# Patient Record
Sex: FEMALE | Race: WHITE | NOT HISPANIC OR LATINO | Employment: OTHER | URBAN - METROPOLITAN AREA
[De-identification: names, ages, dates, MRNs, and addresses within clinical notes are randomized per-mention and may not be internally consistent; named-entity substitution may affect disease eponyms.]

---

## 2017-04-15 ENCOUNTER — APPOINTMENT (EMERGENCY)
Dept: RADIOLOGY | Facility: HOSPITAL | Age: 67
End: 2017-04-15
Payer: MEDICARE

## 2017-04-15 ENCOUNTER — HOSPITAL ENCOUNTER (EMERGENCY)
Facility: HOSPITAL | Age: 67
Discharge: HOME | End: 2017-04-15
Admitting: EMERGENCY MEDICINE
Payer: MEDICARE

## 2017-04-15 VITALS
WEIGHT: 118 LBS | RESPIRATION RATE: 16 BRPM | TEMPERATURE: 99 F | DIASTOLIC BLOOD PRESSURE: 67 MMHG | OXYGEN SATURATION: 97 % | HEART RATE: 79 BPM | SYSTOLIC BLOOD PRESSURE: 149 MMHG

## 2017-04-15 DIAGNOSIS — M79.645 PAIN IN FINGER OF LEFT HAND: Primary | ICD-10-CM

## 2017-04-15 PROCEDURE — 99283 EMERGENCY DEPT VISIT LOW MDM: CPT

## 2017-04-15 PROCEDURE — 73130 X-RAY EXAM OF HAND: CPT

## 2017-04-15 RX ORDER — OXYCODONE HYDROCHLORIDE AND ACETAMINOPHEN 5; 325 MG/1; MG/1
1 TABLET ORAL EVERY 6 HOURS PRN
Qty: 12 TABLET | Refills: 0 | Status: SHIPPED | OUTPATIENT
Start: 2017-04-15 | End: 2017-04-18

## 2017-10-17 ENCOUNTER — TRANSCRIBE ORDERS (OUTPATIENT)
Dept: ADMINISTRATIVE | Facility: HOSPITAL | Age: 67
End: 2017-10-17

## 2017-10-17 DIAGNOSIS — Z12.39 SCREENING BREAST EXAMINATION: Primary | ICD-10-CM

## 2017-10-17 DIAGNOSIS — Z12.31 ENCOUNTER FOR MAMMOGRAM TO ESTABLISH BASELINE MAMMOGRAM: ICD-10-CM

## 2017-10-27 ENCOUNTER — HOSPITAL ENCOUNTER (OUTPATIENT)
Dept: RADIOLOGY | Facility: HOSPITAL | Age: 67
Discharge: HOME | End: 2017-10-27
Payer: MEDICARE

## 2017-10-27 DIAGNOSIS — Z12.31 ENCOUNTER FOR MAMMOGRAM TO ESTABLISH BASELINE MAMMOGRAM: ICD-10-CM

## 2017-10-27 PROCEDURE — 77063 BREAST TOMOSYNTHESIS BI: CPT

## 2017-10-27 PROCEDURE — G0202 SCR MAMMO BI INCL CAD: HCPCS

## 2018-11-02 ENCOUNTER — DOCTOR'S OFFICE (OUTPATIENT)
Dept: URBAN - METROPOLITAN AREA CLINIC 137 | Facility: CLINIC | Age: 68
Setting detail: OPHTHALMOLOGY
End: 2018-11-02
Payer: COMMERCIAL

## 2018-11-02 DIAGNOSIS — H43.811: ICD-10-CM

## 2018-11-02 DIAGNOSIS — H25.13: ICD-10-CM

## 2018-11-02 PROCEDURE — 00001 INTEREST CHARGE: CPT | Performed by: OPTOMETRIST

## 2018-11-02 PROCEDURE — 92004 COMPRE OPH EXAM NEW PT 1/>: CPT | Performed by: OPTOMETRIST

## 2018-11-02 ASSESSMENT — REFRACTION_CURRENTRX
OD_OVR_VA: 20/
OS_OVR_VA: 20/
OD_OVR_VA: 20/
OD_OVR_VA: 20/
OS_OVR_VA: 20/
OS_OVR_VA: 20/

## 2018-11-02 ASSESSMENT — REFRACTION_MANIFEST
OS_VA2: 20/
OU_VA: 20/
OS_VA3: 20/
OD_VA3: 20/
OD_VA2: 20/
OD_VA1: 20/
OS_VA1: 20/
OD_VA2: 20/
OS_VA2: 20/
OU_VA: 20/
OS_VA3: 20/
OS_VA1: 20/
OD_VA3: 20/
OD_VA1: 20/

## 2018-11-02 ASSESSMENT — CONFRONTATIONAL VISUAL FIELD TEST (CVF)
OS_FINDINGS: FULL
OD_FINDINGS: FULL

## 2018-11-02 ASSESSMENT — VISUAL ACUITY
OD_BCVA: 20/60-1
OS_BCVA: 20/60

## 2018-11-12 ENCOUNTER — TRANSCRIBE ORDERS (OUTPATIENT)
Dept: ADMINISTRATIVE | Facility: HOSPITAL | Age: 68
End: 2018-11-12

## 2018-11-12 DIAGNOSIS — Z12.39 SCREENING BREAST EXAMINATION: Primary | ICD-10-CM

## 2018-11-27 ENCOUNTER — HOSPITAL ENCOUNTER (OUTPATIENT)
Dept: RADIOLOGY | Facility: HOSPITAL | Age: 68
Discharge: HOME | End: 2018-11-27
Payer: MEDICARE

## 2018-11-27 VITALS — BODY MASS INDEX: 21.97 KG/M2 | HEIGHT: 63 IN | WEIGHT: 124 LBS

## 2018-11-27 DIAGNOSIS — Z12.39 SCREENING BREAST EXAMINATION: ICD-10-CM

## 2018-11-27 PROCEDURE — 77067 SCR MAMMO BI INCL CAD: CPT

## 2018-11-27 PROCEDURE — 77063 BREAST TOMOSYNTHESIS BI: CPT

## 2018-12-03 ENCOUNTER — DOCTOR'S OFFICE (OUTPATIENT)
Dept: URBAN - METROPOLITAN AREA CLINIC 137 | Facility: CLINIC | Age: 68
Setting detail: OPHTHALMOLOGY
End: 2018-12-03
Payer: COMMERCIAL

## 2018-12-03 DIAGNOSIS — H25.043: ICD-10-CM

## 2018-12-03 DIAGNOSIS — H43.811: ICD-10-CM

## 2018-12-03 DIAGNOSIS — H25.13: ICD-10-CM

## 2018-12-03 PROCEDURE — 00001 INTEREST CHARGE: CPT | Performed by: OPHTHALMOLOGY

## 2018-12-03 PROCEDURE — 92014 COMPRE OPH EXAM EST PT 1/>: CPT | Performed by: OPHTHALMOLOGY

## 2018-12-03 ASSESSMENT — REFRACTION_MANIFEST
OD_VA1: 20/
OD_VA3: 20/
OU_VA: 20/
OS_VA1: 20/
OS_VA3: 20/
OD_VA3: 20/
OS_VA1: 20/
OS_VA2: 20/
OS_VA2: 20/
OD_VA1: 20/
OD_VA2: 20/
OD_VA2: 20/
OU_VA: 20/
OS_VA3: 20/

## 2018-12-03 ASSESSMENT — REFRACTION_CURRENTRX
OD_OVR_VA: 20/
OS_AXIS: 108
OS_CYLINDER: -1.75
OS_SPHERE: +3.00
OS_OVR_VA: 20/
OD_OVR_VA: 20/
OD_SPHERE: +5.00
OD_AXIS: 069
OS_OVR_VA: 20/
OS_ADD: +2.50
OS_OVR_VA: 20/
OS_VPRISM_DIRECTION: PROGS
OD_OVR_VA: 20/
OD_ADD: +2.50
OD_CYLINDER: -3.75
OD_VPRISM_DIRECTION: PROGS

## 2018-12-03 ASSESSMENT — CONFRONTATIONAL VISUAL FIELD TEST (CVF)
OD_FINDINGS: FULL
OS_FINDINGS: FULL

## 2018-12-03 ASSESSMENT — REFRACTION_AUTOREFRACTION
OD_CYLINDER: -2.50
OD_SPHERE: +1.75
OS_CYLINDER: -0.75
OS_AXIS: 093
OS_SPHERE: +1.75
OD_AXIS: 075

## 2018-12-03 ASSESSMENT — AXIALLENGTH_DERIVED
OD_AL: 22.529
OS_AL: 22.1008

## 2018-12-03 ASSESSMENT — VISUAL ACUITY
OS_BCVA: 20/50-1
OD_BCVA: 20/40-1

## 2018-12-03 ASSESSMENT — KERATOMETRY
OS_AXISANGLE_DEGREES: 012
OS_K1POWER_DIOPTERS: 46.25
OD_K2POWER_DIOPTERS: 46.25
OD_K1POWER_DIOPTERS: 45.75
OS_K2POWER_DIOPTERS: 46.50
OD_AXISANGLE_DEGREES: 074

## 2018-12-03 ASSESSMENT — SPHEQUIV_DERIVED
OS_SPHEQUIV: 1.375
OD_SPHEQUIV: 0.5

## 2018-12-14 ENCOUNTER — DOCTOR'S OFFICE (OUTPATIENT)
Dept: URBAN - METROPOLITAN AREA CLINIC 137 | Facility: CLINIC | Age: 68
Setting detail: OPHTHALMOLOGY
End: 2018-12-14
Payer: COMMERCIAL

## 2018-12-14 DIAGNOSIS — H43.811: ICD-10-CM

## 2018-12-14 DIAGNOSIS — H25.13: ICD-10-CM

## 2018-12-14 DIAGNOSIS — H25.043: ICD-10-CM

## 2018-12-14 PROCEDURE — 92134 CPTRZ OPH DX IMG PST SGM RTA: CPT | Performed by: OPHTHALMOLOGY

## 2018-12-14 PROCEDURE — 00001 INTEREST CHARGE: CPT | Performed by: OPHTHALMOLOGY

## 2018-12-14 PROCEDURE — 92014 COMPRE OPH EXAM EST PT 1/>: CPT | Performed by: OPHTHALMOLOGY

## 2018-12-14 ASSESSMENT — CONFRONTATIONAL VISUAL FIELD TEST (CVF)
OD_FINDINGS: FULL
OS_FINDINGS: FULL

## 2018-12-17 ASSESSMENT — REFRACTION_MANIFEST
OS_VA3: 20/
OS_VA2: 20/
OS_VA3: 20/
OS_VA1: 20/
OD_VA2: 20/
OS_VA2: 20/
OD_VA2: 20/
OD_VA1: 20/
OD_VA1: 20/
OU_VA: 20/
OS_VA1: 20/
OD_VA3: 20/
OD_VA3: 20/
OU_VA: 20/

## 2018-12-17 ASSESSMENT — REFRACTION_CURRENTRX
OS_SPHERE: +3.00
OS_AXIS: 108
OS_CYLINDER: -1.75
OS_ADD: +2.50
OS_VPRISM_DIRECTION: PROGS
OD_OVR_VA: 20/
OS_OVR_VA: 20/
OD_ADD: +2.50
OS_OVR_VA: 20/
OD_VPRISM_DIRECTION: PROGS
OD_CYLINDER: -3.75
OD_AXIS: 069
OD_OVR_VA: 20/
OS_OVR_VA: 20/
OD_SPHERE: +5.00
OD_OVR_VA: 20/

## 2018-12-17 ASSESSMENT — REFRACTION_AUTOREFRACTION
OD_SPHERE: +1.75
OS_SPHERE: +1.75
OD_CYLINDER: -2.50
OS_AXIS: 093
OS_CYLINDER: -0.75
OD_AXIS: 075

## 2018-12-17 ASSESSMENT — VISUAL ACUITY
OD_BCVA: 20/50-2
OS_BCVA: 20/50

## 2018-12-17 ASSESSMENT — SPHEQUIV_DERIVED
OS_SPHEQUIV: 1.375
OD_SPHEQUIV: 0.5

## 2019-02-01 ENCOUNTER — DOCTOR'S OFFICE (OUTPATIENT)
Dept: URBAN - METROPOLITAN AREA CLINIC 137 | Facility: CLINIC | Age: 69
Setting detail: OPHTHALMOLOGY
End: 2019-02-01
Payer: COMMERCIAL

## 2019-02-01 DIAGNOSIS — H25.011: ICD-10-CM

## 2019-02-01 PROCEDURE — CATARACT K CATARACT KIT: Performed by: OPHTHALMOLOGY

## 2019-02-01 PROCEDURE — 92136 OPHTHALMIC BIOMETRY: CPT | Performed by: OPHTHALMOLOGY

## 2019-02-11 ENCOUNTER — AMBUL SURGICAL CARE (OUTPATIENT)
Dept: URBAN - METROPOLITAN AREA SURGERY 32 | Facility: SURGERY | Age: 69
Setting detail: OPHTHALMOLOGY
End: 2019-02-11
Payer: COMMERCIAL

## 2019-02-11 DIAGNOSIS — H25.11: ICD-10-CM

## 2019-02-11 DIAGNOSIS — H25.041: ICD-10-CM

## 2019-02-11 PROCEDURE — G8907 PT DOC NO EVENTS ON DISCHARG: HCPCS | Performed by: OPHTHALMOLOGY

## 2019-02-11 PROCEDURE — 66984 XCAPSL CTRC RMVL W/O ECP: CPT | Performed by: OPHTHALMOLOGY

## 2019-02-11 PROCEDURE — G8918 PT W/O PREOP ORDER IV AB PRO: HCPCS | Performed by: OPHTHALMOLOGY

## 2019-02-11 PROCEDURE — 00001 INTEREST CHARGE: CPT | Performed by: OPHTHALMOLOGY

## 2019-02-12 ENCOUNTER — RX ONLY (RX ONLY)
Age: 69
End: 2019-02-12

## 2019-02-12 ENCOUNTER — DOCTOR'S OFFICE (OUTPATIENT)
Dept: URBAN - METROPOLITAN AREA CLINIC 137 | Facility: CLINIC | Age: 69
Setting detail: OPHTHALMOLOGY
End: 2019-02-12
Payer: COMMERCIAL

## 2019-02-12 DIAGNOSIS — H25.12: ICD-10-CM

## 2019-02-12 DIAGNOSIS — H43.811: ICD-10-CM

## 2019-02-12 DIAGNOSIS — Z96.1: ICD-10-CM

## 2019-02-12 PROCEDURE — 00001 INTEREST CHARGE: CPT | Performed by: OPTOMETRIST

## 2019-02-12 PROCEDURE — 92136 OPHTHALMIC BIOMETRY: CPT | Performed by: OPTOMETRIST

## 2019-02-12 PROCEDURE — 99024 POSTOP FOLLOW-UP VISIT: CPT | Performed by: OPTOMETRIST

## 2019-02-12 ASSESSMENT — REFRACTION_CURRENTRX
OD_AXIS: 069
OD_ADD: +2.50
OS_OVR_VA: 20/
OS_SPHERE: +3.00
OS_OVR_VA: 20/
OD_OVR_VA: 20/
OD_CYLINDER: -3.75
OD_SPHERE: +5.00
OS_ADD: +2.50
OS_AXIS: 108
OS_OVR_VA: 20/
OD_VPRISM_DIRECTION: PROGS
OD_OVR_VA: 20/
OD_OVR_VA: 20/
OS_VPRISM_DIRECTION: PROGS
OS_CYLINDER: -1.75

## 2019-02-12 ASSESSMENT — REFRACTION_MANIFEST
OS_VA1: 20/
OD_VA2: 20/
OS_VA1: 20/
OD_VA1: 20/
OS_VA2: 20/
OD_VA1: 20/
OU_VA: 20/
OD_VA3: 20/
OS_VA2: 20/
OD_VA2: 20/
OD_VA3: 20/
OU_VA: 20/
OS_VA3: 20/
OS_VA3: 20/

## 2019-02-12 ASSESSMENT — REFRACTION_AUTOREFRACTION
OS_SPHERE: +1.75
OD_SPHERE: +1.75
OD_CYLINDER: -2.50
OS_AXIS: 093
OS_CYLINDER: -0.75
OD_AXIS: 075

## 2019-02-12 ASSESSMENT — SPHEQUIV_DERIVED
OD_SPHEQUIV: 0.5
OS_SPHEQUIV: 1.375

## 2019-02-12 ASSESSMENT — VISUAL ACUITY
OD_BCVA: 20/50-2
OS_BCVA: 20/40

## 2019-02-19 ENCOUNTER — DOCTOR'S OFFICE (OUTPATIENT)
Dept: URBAN - METROPOLITAN AREA CLINIC 137 | Facility: CLINIC | Age: 69
Setting detail: OPHTHALMOLOGY
End: 2019-02-19
Payer: COMMERCIAL

## 2019-02-19 DIAGNOSIS — H43.811: ICD-10-CM

## 2019-02-19 DIAGNOSIS — Z96.1: ICD-10-CM

## 2019-02-19 DIAGNOSIS — H25.043: ICD-10-CM

## 2019-02-19 DIAGNOSIS — H25.13: ICD-10-CM

## 2019-02-19 PROCEDURE — 99024 POSTOP FOLLOW-UP VISIT: CPT | Performed by: OPTOMETRIST

## 2019-02-19 ASSESSMENT — REFRACTION_MANIFEST
OS_VA1: 20/
OS_VA2: 20/
OD_VA1: 20/
OD_VA2: 20/
OD_VA1: 20/
OS_VA1: 20/
OS_VA2: 20/
OU_VA: 20/
OS_VA3: 20/
OD_VA2: 20/
OD_VA3: 20/
OS_VA3: 20/
OD_VA3: 20/
OU_VA: 20/

## 2019-02-19 ASSESSMENT — REFRACTION_CURRENTRX
OD_OVR_VA: 20/
OS_VPRISM_DIRECTION: PROGS
OS_SPHERE: +3.00
OS_CYLINDER: -1.75
OD_OVR_VA: 20/
OD_ADD: +2.50
OS_AXIS: 108
OD_SPHERE: +5.00
OS_OVR_VA: 20/
OD_OVR_VA: 20/
OS_OVR_VA: 20/
OS_ADD: +2.50
OD_AXIS: 069
OD_CYLINDER: -3.75
OS_OVR_VA: 20/
OD_VPRISM_DIRECTION: PROGS

## 2019-02-19 ASSESSMENT — KERATOMETRY
OS_K1POWER_DIOPTERS: 46.25
OS_K2POWER_DIOPTERS: 46.50
OD_K1POWER_DIOPTERS: 45.75
OD_K2POWER_DIOPTERS: 46.25
OD_AXISANGLE_DEGREES: 074
OS_AXISANGLE_DEGREES: 012

## 2019-02-19 ASSESSMENT — VISUAL ACUITY
OD_BCVA: 20/50-2
OS_BCVA: 20/40

## 2019-02-19 ASSESSMENT — AXIALLENGTH_DERIVED
OS_AL: 22.1008
OD_AL: 22.7532

## 2019-02-19 ASSESSMENT — REFRACTION_AUTOREFRACTION
OS_CYLINDER: -0.75
OS_SPHERE: +1.75
OD_AXIS: 68
OS_AXIS: 093
OD_SPHERE: +0.50
OD_CYLINDER: -1.25

## 2019-02-19 ASSESSMENT — SPHEQUIV_DERIVED
OD_SPHEQUIV: -0.125
OS_SPHEQUIV: 1.375

## 2019-02-25 ENCOUNTER — AMBUL SURGICAL CARE (OUTPATIENT)
Dept: URBAN - METROPOLITAN AREA SURGERY 32 | Facility: SURGERY | Age: 69
Setting detail: OPHTHALMOLOGY
End: 2019-02-25
Payer: COMMERCIAL

## 2019-02-25 DIAGNOSIS — H25.042: ICD-10-CM

## 2019-02-25 DIAGNOSIS — H25.12: ICD-10-CM

## 2019-02-25 PROCEDURE — G8907 PT DOC NO EVENTS ON DISCHARG: HCPCS | Performed by: OPHTHALMOLOGY

## 2019-02-25 PROCEDURE — 66984 XCAPSL CTRC RMVL W/O ECP: CPT | Performed by: OPHTHALMOLOGY

## 2019-02-25 PROCEDURE — 00001 INTEREST CHARGE: CPT | Performed by: OPHTHALMOLOGY

## 2019-02-25 PROCEDURE — G8918 PT W/O PREOP ORDER IV AB PRO: HCPCS | Performed by: OPHTHALMOLOGY

## 2019-02-26 ENCOUNTER — DOCTOR'S OFFICE (OUTPATIENT)
Dept: URBAN - METROPOLITAN AREA CLINIC 137 | Facility: CLINIC | Age: 69
Setting detail: OPHTHALMOLOGY
End: 2019-02-26
Payer: COMMERCIAL

## 2019-02-26 DIAGNOSIS — H43.811: ICD-10-CM

## 2019-02-26 DIAGNOSIS — Z96.1: ICD-10-CM

## 2019-02-26 PROBLEM — H25.043 PSC POLAR AGE RELATED CATARACT; BOTH EYES: Status: RESOLVED | Noted: 2018-12-03 | Resolved: 2019-02-26

## 2019-02-26 PROBLEM — H25.13 CATARACT NUCLEAR SCLEROSIS AGE RELATED; BOTH EYES: Status: RESOLVED | Noted: 2018-11-02 | Resolved: 2019-02-26

## 2019-02-26 PROCEDURE — 99024 POSTOP FOLLOW-UP VISIT: CPT | Performed by: OPTOMETRIST

## 2019-02-26 ASSESSMENT — REFRACTION_AUTOREFRACTION
OD_SPHERE: PLANO
OD_CYLINDER: -1.50
OS_AXIS: 59
OS_CYLINDER: -0.50
OD_AXIS: 67
OS_SPHERE: +0.25

## 2019-02-26 ASSESSMENT — AXIALLENGTH_DERIVED: OS_AL: 22.5811

## 2019-02-26 ASSESSMENT — REFRACTION_CURRENTRX
OD_OVR_VA: 20/
OS_AXIS: 108
OS_CYLINDER: -1.75
OS_OVR_VA: 20/
OS_OVR_VA: 20/
OS_ADD: +2.50
OD_AXIS: 069
OD_SPHERE: +5.00
OS_SPHERE: +3.00
OS_OVR_VA: 20/
OD_CYLINDER: -3.75
OD_VPRISM_DIRECTION: PROGS
OD_ADD: +2.50
OD_OVR_VA: 20/
OS_VPRISM_DIRECTION: PROGS
OD_OVR_VA: 20/

## 2019-02-26 ASSESSMENT — REFRACTION_MANIFEST
OS_VA2: 20/
OD_VA1: 20/
OD_VA3: 20/
OS_VA1: 20/
OD_VA1: 20/
OU_VA: 20/
OU_VA: 20/
OS_VA3: 20/
OD_VA2: 20/
OS_VA1: 20/
OD_VA2: 20/
OD_VA3: 20/
OS_VA3: 20/
OS_VA2: 20/

## 2019-02-26 ASSESSMENT — KERATOMETRY
OD_AXISANGLE_DEGREES: 074
OD_K1POWER_DIOPTERS: 45.75
OS_K1POWER_DIOPTERS: 46.25
OS_AXISANGLE_DEGREES: 012
OD_K2POWER_DIOPTERS: 46.25
OS_K2POWER_DIOPTERS: 46.50

## 2019-02-26 ASSESSMENT — VISUAL ACUITY
OS_BCVA: 20/40
OD_BCVA: 20/40-1

## 2019-02-26 ASSESSMENT — SPHEQUIV_DERIVED: OS_SPHEQUIV: 0

## 2019-03-05 ENCOUNTER — DOCTOR'S OFFICE (OUTPATIENT)
Dept: URBAN - METROPOLITAN AREA CLINIC 137 | Facility: CLINIC | Age: 69
Setting detail: OPHTHALMOLOGY
End: 2019-03-05
Payer: COMMERCIAL

## 2019-03-05 DIAGNOSIS — Z96.1: ICD-10-CM

## 2019-03-05 DIAGNOSIS — H43.811: ICD-10-CM

## 2019-03-05 PROCEDURE — 99024 POSTOP FOLLOW-UP VISIT: CPT | Performed by: OPTOMETRIST

## 2019-03-05 ASSESSMENT — REFRACTION_MANIFEST
OU_VA: 20/
OS_VA1: 20/
OD_VA1: 20/
OU_VA: 20/
OS_VA3: 20/
OS_VA1: 20/
OS_VA3: 20/
OD_VA2: 20/
OD_VA2: 20/
OD_VA3: 20/
OD_VA3: 20/
OS_VA2: 20/
OS_VA2: 20/
OD_VA1: 20/

## 2019-03-05 ASSESSMENT — REFRACTION_CURRENTRX
OS_SPHERE: +3.00
OD_OVR_VA: 20/
OD_VPRISM_DIRECTION: PROGS
OD_SPHERE: +5.00
OS_OVR_VA: 20/
OS_OVR_VA: 20/
OS_AXIS: 108
OD_AXIS: 069
OS_OVR_VA: 20/
OS_CYLINDER: -1.75
OD_ADD: +2.50
OD_CYLINDER: -3.75
OS_VPRISM_DIRECTION: PROGS
OD_OVR_VA: 20/
OS_ADD: +2.50
OD_OVR_VA: 20/

## 2019-03-05 ASSESSMENT — KERATOMETRY
OS_K2POWER_DIOPTERS: 46.50
OS_K1POWER_DIOPTERS: 46.25
OS_AXISANGLE_DEGREES: 012
OD_K2POWER_DIOPTERS: 46.25
OD_K1POWER_DIOPTERS: 45.75
OD_AXISANGLE_DEGREES: 074

## 2019-03-05 ASSESSMENT — SPHEQUIV_DERIVED
OS_SPHEQUIV: -0.125
OD_SPHEQUIV: -0.375

## 2019-03-05 ASSESSMENT — REFRACTION_AUTOREFRACTION
OD_CYLINDER: -1.25
OS_CYLINDER: -0.75
OD_SPHERE: +0.25
OS_SPHERE: +0.25
OD_AXIS: 78
OS_AXIS: 96

## 2019-03-05 ASSESSMENT — AXIALLENGTH_DERIVED
OD_AL: 22.8442
OS_AL: 22.6258

## 2019-03-05 ASSESSMENT — VISUAL ACUITY
OS_BCVA: 20/40
OD_BCVA: 20/30-2

## 2019-04-02 ENCOUNTER — DOCTOR'S OFFICE (OUTPATIENT)
Dept: URBAN - METROPOLITAN AREA CLINIC 137 | Facility: CLINIC | Age: 69
Setting detail: OPHTHALMOLOGY
End: 2019-04-02

## 2019-04-02 ENCOUNTER — RX ONLY (RX ONLY)
Age: 69
End: 2019-04-02

## 2019-04-02 DIAGNOSIS — H52.4: ICD-10-CM

## 2019-04-02 PROCEDURE — 92015 DETERMINE REFRACTIVE STATE: CPT | Performed by: OPTOMETRIST

## 2019-04-02 ASSESSMENT — KERATOMETRY
OS_AXISANGLE_DEGREES: 012
OD_AXISANGLE_DEGREES: 074
OS_K1POWER_DIOPTERS: 46.25
OD_K2POWER_DIOPTERS: 46.25
OS_K2POWER_DIOPTERS: 46.50
OD_K1POWER_DIOPTERS: 45.75

## 2019-04-02 ASSESSMENT — REFRACTION_MANIFEST
OS_ADD: +2.75
OD_VA3: 20/
OS_VA3: 20/
OS_VA3: 20/
OU_VA: 20/
OD_VA2: 20/
OS_SPHERE: PL
OD_VA3: 20/
OS_VA1: 20/
OD_ADD: +2.75
OU_VA: 20/
OS_VA1: 20/25
OD_SPHERE: PL
OS_VA2: 20/
OD_AXIS: 85
OS_VA2: 20/
OD_CYLINDER: -1.75
OS_CYLINDER: -0.75
OS_AXIS: 100
OD_VA1: 20/
OD_VA1: 20/25
OD_VA2: 20/

## 2019-04-02 ASSESSMENT — REFRACTION_CURRENTRX
OD_OVR_VA: 20/
OS_OVR_VA: 20/
OS_OVR_VA: 20/
OD_AXIS: 069
OD_ADD: +2.50
OS_SPHERE: +3.00
OS_OVR_VA: 20/
OS_VPRISM_DIRECTION: PROGS
OS_ADD: +2.50
OD_OVR_VA: 20/
OD_OVR_VA: 20/
OS_AXIS: 108
OD_CYLINDER: -3.75
OD_VPRISM_DIRECTION: PROGS
OS_CYLINDER: -1.75
OD_SPHERE: +5.00

## 2019-04-02 ASSESSMENT — AXIALLENGTH_DERIVED
OS_AL: 22.6707
OD_AL: 22.89

## 2019-04-02 ASSESSMENT — REFRACTION_AUTOREFRACTION
OD_CYLINDER: -2.00
OS_SPHERE: +0.25
OD_SPHERE: +0.50
OS_CYLINDER: -1.00
OD_AXIS: 82
OS_AXIS: 98

## 2019-04-02 ASSESSMENT — SPHEQUIV_DERIVED
OD_SPHEQUIV: -0.5
OS_SPHEQUIV: -0.25

## 2019-04-02 ASSESSMENT — VISUAL ACUITY
OS_BCVA: 20/40
OD_BCVA: 20/40

## 2019-04-12 ENCOUNTER — OPTICAL (OUTPATIENT)
Dept: URBAN - METROPOLITAN AREA CLINIC 148 | Facility: CLINIC | Age: 69
Setting detail: OPHTHALMOLOGY
End: 2019-04-12
Payer: COMMERCIAL

## 2019-04-12 DIAGNOSIS — Z96.1: ICD-10-CM

## 2019-04-12 PROCEDURE — V2020 VISION SVCS FRAMES PURCHASES: HCPCS | Performed by: OPTOMETRIST

## 2019-04-12 PROCEDURE — V2781 PROGRESSIVE LENS PER LENS: HCPCS | Performed by: OPTOMETRIST

## 2019-04-12 PROCEDURE — V2750 ANTI-REFLECTIVE COATING: HCPCS | Performed by: OPTOMETRIST

## 2019-04-12 PROCEDURE — V2200 LENS SPHER BIFOC PLANO 4.00D: HCPCS | Performed by: OPTOMETRIST

## 2019-04-12 PROCEDURE — V2025 EYEGLASSES DELUX FRAMES: HCPCS | Performed by: OPTOMETRIST

## 2019-05-07 ENCOUNTER — TRANSCRIBE ORDERS (OUTPATIENT)
Dept: ADMINISTRATIVE | Facility: HOSPITAL | Age: 69
End: 2019-05-07

## 2019-05-07 ENCOUNTER — HOSPITAL ENCOUNTER (OUTPATIENT)
Dept: RADIOLOGY | Facility: HOSPITAL | Age: 69
Discharge: HOME | End: 2019-05-07
Payer: MEDICARE

## 2019-05-07 DIAGNOSIS — M79.605 LEFT LEG PAIN: ICD-10-CM

## 2019-05-07 DIAGNOSIS — M79.605 LEFT LEG PAIN: Primary | ICD-10-CM

## 2019-05-07 PROCEDURE — 73590 X-RAY EXAM OF LOWER LEG: CPT

## 2019-05-13 ENCOUNTER — TRANSCRIBE ORDERS (OUTPATIENT)
Dept: ADMINISTRATIVE | Facility: HOSPITAL | Age: 69
End: 2019-05-13

## 2019-05-13 DIAGNOSIS — M79.605 LEFT LEG PAIN: Primary | ICD-10-CM

## 2019-05-15 ENCOUNTER — HOSPITAL ENCOUNTER (OUTPATIENT)
Dept: RADIOLOGY | Facility: HOSPITAL | Age: 69
Discharge: HOME | End: 2019-05-15
Payer: MEDICARE

## 2019-05-15 DIAGNOSIS — M79.605 LEFT LEG PAIN: ICD-10-CM

## 2019-05-15 PROCEDURE — 76882 US LMTD JT/FCL EVL NVASC XTR: CPT

## 2019-09-04 ENCOUNTER — TRANSCRIBE ORDERS (OUTPATIENT)
Dept: ADMINISTRATIVE | Facility: HOSPITAL | Age: 69
End: 2019-09-04

## 2019-09-04 DIAGNOSIS — M81.0 AGE-RELATED OSTEOPOROSIS WITHOUT CURRENT PATHOLOGICAL FRACTURE: Primary | ICD-10-CM

## 2019-09-09 ENCOUNTER — DOCTOR'S OFFICE (OUTPATIENT)
Dept: URBAN - METROPOLITAN AREA CLINIC 137 | Facility: CLINIC | Age: 69
Setting detail: OPHTHALMOLOGY
End: 2019-09-09
Payer: COMMERCIAL

## 2019-09-09 DIAGNOSIS — H43.813: ICD-10-CM

## 2019-09-09 DIAGNOSIS — Z96.1: ICD-10-CM

## 2019-09-09 PROBLEM — H52.223 ASTIGMATISM, REGULAR; BOTH EYES: Status: ACTIVE | Noted: 2019-04-02

## 2019-09-09 PROBLEM — H43.812 POSTERIOR VITREOUS DETACHMENT; LEFT EYE: Status: ACTIVE | Noted: 2019-09-09

## 2019-09-09 PROBLEM — H52.4 PRESBYOPIA ; BOTH EYES: Status: ACTIVE | Noted: 2019-04-02

## 2019-09-09 PROCEDURE — 99214 OFFICE O/P EST MOD 30 MIN: CPT | Performed by: OPTOMETRIST

## 2019-09-09 ASSESSMENT — REFRACTION_CURRENTRX
OS_OVR_VA: 20/
OD_AXIS: 069
OS_SPHERE: +3.00
OS_CYLINDER: -1.75
OD_SPHERE: +5.00
OD_VPRISM_DIRECTION: PROGS
OS_AXIS: 108
OD_ADD: +2.50
OD_OVR_VA: 20/
OS_VPRISM_DIRECTION: PROGS
OD_OVR_VA: 20/
OS_OVR_VA: 20/
OS_ADD: +2.50
OS_OVR_VA: 20/
OD_OVR_VA: 20/

## 2019-09-09 ASSESSMENT — REFRACTION_MANIFEST
OS_VA2: 20/
OS_AXIS: 100
OS_VA1: 20/
OD_VA1: 20/
OS_VA1: 20/25
OD_VA1: 20/25
OS_CYLINDER: -0.75
OS_VA2: 20/
OU_VA: 20/
OS_ADD: +2.75
OS_SPHERE: PL
OD_VA3: 20/
OD_VA2: 20/
OU_VA: 20/
OD_VA2: 20/
OD_AXIS: 85
OS_VA3: 20/
OD_CYLINDER: -1.75
OS_VA3: 20/
OD_SPHERE: PL
OD_ADD: +2.75
OD_VA3: 20/

## 2019-09-09 ASSESSMENT — REFRACTION_AUTOREFRACTION
OS_AXIS: 98
OS_SPHERE: +0.25
OD_AXIS: 82
OD_SPHERE: +0.50
OD_CYLINDER: -2.00
OS_CYLINDER: -1.00

## 2019-09-09 ASSESSMENT — CONFRONTATIONAL VISUAL FIELD TEST (CVF)
OD_FINDINGS: FULL
OS_FINDINGS: FULL

## 2019-09-09 ASSESSMENT — SPHEQUIV_DERIVED
OS_SPHEQUIV: -0.25
OD_SPHEQUIV: -0.5

## 2019-09-09 ASSESSMENT — VISUAL ACUITY
OS_BCVA: 20/30-2
OD_BCVA: 20/60

## 2020-04-27 DIAGNOSIS — I10 HYPERTENSION, UNSPECIFIED TYPE: Primary | ICD-10-CM

## 2020-04-27 RX ORDER — ESCITALOPRAM OXALATE 20 MG/1
TABLET ORAL
COMMUNITY
Start: 2020-04-26 | End: 2021-01-26 | Stop reason: SDUPTHER

## 2020-04-27 RX ORDER — RAMIPRIL 10 MG/1
10 CAPSULE ORAL 2 TIMES DAILY
Qty: 180 CAPSULE | Refills: 3 | Status: SHIPPED | OUTPATIENT
Start: 2020-04-27 | End: 2021-04-28 | Stop reason: SDUPTHER

## 2020-04-27 RX ORDER — ALPRAZOLAM 0.5 MG/1
TABLET ORAL
COMMUNITY
Start: 2020-04-19 | End: 2020-06-16 | Stop reason: SDUPTHER

## 2020-04-27 RX ORDER — HYDROCODONE BITARTRATE AND ACETAMINOPHEN 5; 325 MG/1; MG/1
TABLET ORAL
COMMUNITY
Start: 2020-03-27 | End: 2020-07-09

## 2020-04-27 RX ORDER — ONDANSETRON 4 MG/1
TABLET, FILM COATED ORAL
COMMUNITY
Start: 2020-01-21 | End: 2021-01-06

## 2020-06-16 ENCOUNTER — TELEPHONE (OUTPATIENT)
Dept: FAMILY MEDICINE CLINIC | Facility: CLINIC | Age: 70
End: 2020-06-16

## 2020-06-16 DIAGNOSIS — F41.9 ANXIETY: Primary | ICD-10-CM

## 2020-06-16 RX ORDER — ALPRAZOLAM 0.5 MG/1
0.5 TABLET ORAL 2 TIMES DAILY PRN
Qty: 60 TABLET | Refills: 3 | Status: SHIPPED | OUTPATIENT
Start: 2020-06-16 | End: 2020-10-19 | Stop reason: SDUPTHER

## 2020-06-16 NOTE — TELEPHONE ENCOUNTER
Needs a refill alprazolam 0 5mg, 2x's a day;     Pharmacy - [facility]    Patient ph # [phone]

## 2020-07-07 ENCOUNTER — TELEPHONE (OUTPATIENT)
Dept: FAMILY MEDICINE CLINIC | Facility: CLINIC | Age: 70
End: 2020-07-07

## 2020-07-07 NOTE — TELEPHONE ENCOUNTER
Patient fell about 7 weeks ago from a bench, since than has pain in the neck when twisting to left, feels like its cracking wants to be seen and no slots available to add patient wants a call for advice or recommendations to a specialist  ND

## 2020-07-09 ENCOUNTER — OFFICE VISIT (OUTPATIENT)
Dept: FAMILY MEDICINE CLINIC | Facility: CLINIC | Age: 70
End: 2020-07-09
Payer: MEDICARE

## 2020-07-09 ENCOUNTER — HOSPITAL ENCOUNTER (OUTPATIENT)
Dept: RADIOLOGY | Facility: HOSPITAL | Age: 70
Discharge: HOME | End: 2020-07-09
Payer: MEDICARE

## 2020-07-09 VITALS
WEIGHT: 126.5 LBS | BODY MASS INDEX: 22.41 KG/M2 | RESPIRATION RATE: 16 BRPM | DIASTOLIC BLOOD PRESSURE: 70 MMHG | SYSTOLIC BLOOD PRESSURE: 116 MMHG | OXYGEN SATURATION: 98 % | TEMPERATURE: 98.1 F | HEIGHT: 63 IN | HEART RATE: 66 BPM

## 2020-07-09 DIAGNOSIS — M54.2 NECK PAIN: ICD-10-CM

## 2020-07-09 DIAGNOSIS — Z12.31 BREAST CANCER SCREENING BY MAMMOGRAM: Primary | ICD-10-CM

## 2020-07-09 PROCEDURE — 3074F SYST BP LT 130 MM HG: CPT | Performed by: INTERNAL MEDICINE

## 2020-07-09 PROCEDURE — 3078F DIAST BP <80 MM HG: CPT | Performed by: INTERNAL MEDICINE

## 2020-07-09 PROCEDURE — 3008F BODY MASS INDEX DOCD: CPT | Performed by: INTERNAL MEDICINE

## 2020-07-09 PROCEDURE — 99214 OFFICE O/P EST MOD 30 MIN: CPT | Performed by: INTERNAL MEDICINE

## 2020-07-09 PROCEDURE — 4040F PNEUMOC VAC/ADMIN/RCVD: CPT | Performed by: INTERNAL MEDICINE

## 2020-07-09 PROCEDURE — 1160F RVW MEDS BY RX/DR IN RCRD: CPT | Performed by: INTERNAL MEDICINE

## 2020-07-09 PROCEDURE — 72040 X-RAY EXAM NECK SPINE 2-3 VW: CPT

## 2020-07-09 PROCEDURE — 1036F TOBACCO NON-USER: CPT | Performed by: INTERNAL MEDICINE

## 2020-07-09 RX ORDER — BACLOFEN 10 MG/1
10 TABLET ORAL 2 TIMES DAILY PRN
Qty: 60 TABLET | Refills: 3 | Status: SHIPPED | OUTPATIENT
Start: 2020-07-09 | End: 2021-01-06

## 2020-07-09 RX ORDER — NAPROXEN 500 MG/1
500 TABLET ORAL 2 TIMES DAILY WITH MEALS
Qty: 60 TABLET | Refills: 5 | Status: SHIPPED | OUTPATIENT
Start: 2020-07-09 | End: 2020-10-19 | Stop reason: SDUPTHER

## 2020-07-09 NOTE — PROGRESS NOTES
Assessment/Plan:         Problem List Items Addressed This Visit        Other    Neck pain      Other Visit Diagnoses     Breast cancer screening by mammogram    -  Primary    Relevant Orders    Mammo screening bilateral w cad      Will order C spine Xray and will try naproxen and baclofen      Subjective:      Patient ID: Piero Fair is a 65 y o  female  Finesse Masury fell off of a bench about a month ago and her neck snapped back-still hurts-no numbness or tingling in hands or arms but neck hurts      The following portions of the patient's history were reviewed and updated as appropriate:   She has a past medical history of Anxiety, Arthritis, Carpal tunnel syndrome, Depression, Disease of thyroid gland, Diverticulitis, HBP (high blood pressure), Hyperlipidemia, Hypertension, Hypothyroid, Pneumonia, and Psychiatric disorder  ,  does not have any pertinent problems on file  ,   has a past surgical history that includes Breast cyst aspiration (Right); Breast biopsy (Right); Appendectomy; Cholecystectomy; Colonoscopy w/ biopsies (2011); Mandible surgery; [place] tooth extraction; EGD; Cataract extraction, bilateral (2019); Mammo (historical) (Bilateral, 11/27/2018); and Mammo (historical) (Bilateral, 10/27/2017)  ,  family history includes Heart disease in her mother; Heart failure in her mother; Hyperlipidemia in her mother; Hypertension in her mother and sister  ,   reports that she quit smoking about 16 years ago  She has never used smokeless tobacco  She reports that she does not drink alcohol or use drugs  ,  has No Known Allergies     Current Outpatient Medications   Medication Sig Dispense Refill    ALPRAZolam (XANAX) 0 5 mg tablet Take 1 tablet (0 5 mg total) by mouth 2 (two) times a day as needed for anxiety 60 tablet 3    atorvastatin (LIPITOR) 10 mg tablet Take 10 mg by mouth daily      Calcium Carb-Cholecalciferol (CALCIUM 600+D3) 600-200 MG-UNIT TABS Take 1 tablet by mouth daily      escitalopram (LEXAPRO) 20 mg tablet       levothyroxine 100 mcg tablet Take 100 mcg by mouth daily      Multiple Vitamins-Minerals (CENTRUM ADULTS PO) Take 1 tablet by mouth daily      naproxen (NAPROSYN) 500 mg tablet Take 500 mg by mouth 2 (two) times a day with meals      ramipril (ALTACE) 10 MG capsule Take 1 capsule (10 mg total) by mouth 2 (two) times a day 180 capsule 3    ondansetron (ZOFRAN) 4 mg tablet TK 1 T PO TID PRN       No current facility-administered medications for this visit  Review of Systems   Musculoskeletal: Positive for neck pain  Objective:  Vitals:    07/09/20 1137   BP: 116/70   BP Location: Left arm   Patient Position: Sitting   Cuff Size: Adult   Pulse: 66   Resp: 16   Temp: 98 1 °F (36 7 °C)   TempSrc: Temporal   SpO2: 98%   Weight: 57 4 kg (126 lb 8 oz)   Height: 5' 3" (1 6 m)     Body mass index is 22 41 kg/m²  Physical Exam   Constitutional: She appears well-developed and well-nourished  HENT:   Head: Normocephalic  Cardiovascular: Normal rate  Pulmonary/Chest: Effort normal    Musculoskeletal: Normal range of motion

## 2020-09-11 ENCOUNTER — OFFICE VISIT (OUTPATIENT)
Dept: FAMILY MEDICINE CLINIC | Facility: CLINIC | Age: 70
End: 2020-09-11
Payer: MEDICARE

## 2020-09-11 ENCOUNTER — TELEPHONE (OUTPATIENT)
Dept: FAMILY MEDICINE CLINIC | Facility: CLINIC | Age: 70
End: 2020-09-11

## 2020-09-11 VITALS
BODY MASS INDEX: 22.53 KG/M2 | HEIGHT: 63 IN | TEMPERATURE: 98.4 F | HEART RATE: 72 BPM | WEIGHT: 127.13 LBS | OXYGEN SATURATION: 97 % | SYSTOLIC BLOOD PRESSURE: 132 MMHG | RESPIRATION RATE: 16 BRPM | DIASTOLIC BLOOD PRESSURE: 70 MMHG

## 2020-09-11 DIAGNOSIS — M54.2 NECK PAIN: Primary | ICD-10-CM

## 2020-09-11 PROCEDURE — 99214 OFFICE O/P EST MOD 30 MIN: CPT | Performed by: INTERNAL MEDICINE

## 2020-09-11 RX ORDER — PREDNISONE 20 MG/1
TABLET ORAL
Qty: 14 TABLET | Refills: 1 | Status: SHIPPED | OUTPATIENT
Start: 2020-09-11 | End: 2021-01-06

## 2020-09-11 RX ORDER — TRAMADOL HYDROCHLORIDE 50 MG/1
50 TABLET ORAL
Qty: 40 TABLET | Refills: 0 | Status: SHIPPED | OUTPATIENT
Start: 2020-09-11 | End: 2021-01-06

## 2020-09-11 NOTE — PROGRESS NOTES
Assessment/Plan:         Problem List Items Addressed This Visit        Other    Neck pain - Primary      Danielle Perez has DDD and oA in her neck-has been going on for some time now and has tried different meds-will run a prednisone course, continue prn baclofen, naproxen, and will try some tramadol-will try to order MRI so we can really see what is going on-may need PT or something more definitive done      Subjective:      Patient ID: Rogerio Ace is a 65 y o  female  Danielle Perez here hx of neck pain, depression, anxiety-has been complaining of neck pain for awhile-we Xrayed her a few months ago and Xray cspine didn't show any fracture or dislocation but did show deg disc issues and osteoarthritis-now she feels her neck is getting worse-we had her on baclofen and prn naproxen and she bought and is using voltaren gel-still wakes up with a lot of pain and stiffness, interferes with her sleep-no numbness or weakness in her arms or hands but a lot of pain      The following portions of the patient's history were reviewed and updated as appropriate:   She has a past medical history of Anxiety, Arthritis, Carpal tunnel syndrome, Depression, Disease of thyroid gland, Diverticulitis, HBP (high blood pressure), Hyperlipidemia, Hypertension, Hypothyroid, Pneumonia, and Psychiatric disorder  ,  does not have any pertinent problems on file  ,   has a past surgical history that includes Breast cyst aspiration (Right); Breast biopsy (Right); Appendectomy; Cholecystectomy; Colonoscopy w/ biopsies (2011); Mandible surgery; [place] tooth extraction; EGD; Cataract extraction, bilateral (2019); Mammo (historical) (Bilateral, 11/27/2018); and Mammo (historical) (Bilateral, 10/27/2017)  ,  family history includes Heart disease in her mother; Heart failure in her mother; Hyperlipidemia in her mother; Hypertension in her mother and sister  ,   reports that she quit smoking about 16 years ago   She has never used smokeless tobacco  She reports that she does not drink alcohol or use drugs  ,  has No Known Allergies     Current Outpatient Medications   Medication Sig Dispense Refill    ALPRAZolam (XANAX) 0 5 mg tablet Take 1 tablet (0 5 mg total) by mouth 2 (two) times a day as needed for anxiety 60 tablet 3    ASPIRIN 81 PO Take 1 tablet by mouth daily      atorvastatin (LIPITOR) 10 mg tablet Take 10 mg by mouth daily      baclofen 10 mg tablet Take 1 tablet (10 mg total) by mouth 2 (two) times a day as needed for muscle spasms 60 tablet 3    Calcium Carb-Cholecalciferol (CALCIUM 600+D3) 600-200 MG-UNIT TABS Take 1 tablet by mouth daily      escitalopram (LEXAPRO) 20 mg tablet       levothyroxine 100 mcg tablet Take 100 mcg by mouth daily      Multiple Vitamins-Minerals (CENTRUM ADULTS PO) Take 1 tablet by mouth daily      naproxen (NAPROSYN) 500 mg tablet Take 1 tablet (500 mg total) by mouth 2 (two) times a day with meals 60 tablet 5    ondansetron (ZOFRAN) 4 mg tablet TK 1 T PO TID PRN      ramipril (ALTACE) 10 MG capsule Take 1 capsule (10 mg total) by mouth 2 (two) times a day 180 capsule 3     No current facility-administered medications for this visit  Review of Systems   Musculoskeletal: Positive for neck pain  Neurological: Negative for weakness  Objective:  Vitals:    09/11/20 1128   BP: 132/70   BP Location: Left arm   Patient Position: Sitting   Cuff Size: Adult   Pulse: 72   Resp: 16   Temp: 98 4 °F (36 9 °C)   TempSrc: Temporal   SpO2: 97%   Weight: 57 7 kg (127 lb 2 oz)   Height: 5' 3" (1 6 m)     Body mass index is 22 52 kg/m²  Physical Exam  Constitutional:       Appearance: Normal appearance  HENT:      Head: Normocephalic  Mouth/Throat:      Mouth: Mucous membranes are moist    Neck:      Musculoskeletal: Neck rigidity present  Neurological:      Mental Status: She is alert

## 2020-09-24 ENCOUNTER — TELEPHONE (OUTPATIENT)
Dept: OBGYN CLINIC | Facility: HOSPITAL | Age: 70
End: 2020-09-24

## 2020-09-24 ENCOUNTER — HOSPITAL ENCOUNTER (OUTPATIENT)
Dept: RADIOLOGY | Facility: HOSPITAL | Age: 70
Discharge: HOME | End: 2020-09-24
Payer: MEDICARE

## 2020-09-24 ENCOUNTER — TELEPHONE (OUTPATIENT)
Dept: FAMILY MEDICINE CLINIC | Facility: CLINIC | Age: 70
End: 2020-09-24

## 2020-09-24 DIAGNOSIS — M54.2 NECK PAIN: Primary | ICD-10-CM

## 2020-09-24 DIAGNOSIS — M54.2 NECK PAIN: ICD-10-CM

## 2020-09-24 PROCEDURE — G1004 CDSM NDSC: HCPCS

## 2020-09-24 PROCEDURE — 72141 MRI NECK SPINE W/O DYE: CPT

## 2020-09-24 NOTE — TELEPHONE ENCOUNTER
Received a call from radiology in regards to a media finding in patients MRI, needs to be reviewed by Dr Ellen Waldron   ND

## 2020-09-24 NOTE — TELEPHONE ENCOUNTER
Tried calling pt to set up appt with dr ordoñez for this Monday  L/M for pt to call back and schedule appt  If pt calls back, please schedule appt for this Monday  Any spot that is not double booked  Send e-mail to manager to be force on

## 2020-09-28 ENCOUNTER — OFFICE VISIT (OUTPATIENT)
Dept: OBGYN CLINIC | Facility: HOSPITAL | Age: 70
End: 2020-09-28
Payer: MEDICARE

## 2020-09-28 VITALS
HEIGHT: 63 IN | HEART RATE: 90 BPM | WEIGHT: 127 LBS | SYSTOLIC BLOOD PRESSURE: 128 MMHG | DIASTOLIC BLOOD PRESSURE: 77 MMHG | BODY MASS INDEX: 22.5 KG/M2

## 2020-09-28 DIAGNOSIS — S12.100A DENS FRACTURE, CLOSED, INITIAL ENCOUNTER (HCC): ICD-10-CM

## 2020-09-28 DIAGNOSIS — M47.812 CERVICAL SPONDYLOSIS: Primary | ICD-10-CM

## 2020-09-28 PROCEDURE — 99203 OFFICE O/P NEW LOW 30 MIN: CPT | Performed by: ORTHOPAEDIC SURGERY

## 2020-09-28 NOTE — LETTER
September 28, 2020     Angel [address] [address]    Patient: Jim Frank   YOB: 1950   Date of Visit: 9/28/2020       Dear Dr Venegas Case:    Thank you for referring Tiana Luu to me for evaluation  Below are my notes for this consultation  If you have questions, please do not hesitate to call me  I look forward to following your patient along with you           Sincerely,        Sandra Leon MD        CC: No Recipients

## 2020-09-28 NOTE — PROGRESS NOTES
Assessment:  Nondisplaced dense fracture with no evidence of cord compression  CT scan will be needed to better evaluate fracture pattern  Patient is neurologically intact  Cervical collar will be provided  Diagnosis ICD-10-CM Associated Orders   1  Cervical spondylosis  M47 812    2  Dens fracture, closed, initial encounter ([facility] [address] )  S12 100A Cervical Collar     CT spine cervical wo contrast       Plan:    MRI and exam correlate with type II dens fracture of odontoid process without cord compression at this time, she also have multilevel spondylosis  She will be given soft cervical collar to wear at all times, also get CT scan to further evaluate the fracture, see back to review CT scan  To do next visit:  No follow-ups on file  The above stated was discussed in layman's terms and the patient expressed understanding  All questions were answered to the patient's satisfaction  Scribe Attestation    I,:   Yeyo Vigil am acting as a scribe while in the presence of the attending physician :        I,:   Vicki Schultz MD personally performed the services described in this documentation    as scribed in my presence :              Subjective:   Pepe Eric is a 65 y o  female who presents for evaluation of neck pain, referred by Dr Sparkle Caro  Ongoing neck pain since fall off bench backwards 2 months ago  Since then worsening neck pain with cracking and popping  Decreased motion  Reports no numbness or tinging, radiating pain down her arms  2/10 pain today  OTC NsAIDs for pain control  She has been using naproxen, prednisone course, tramadol for pain         Review of systems negative unless otherwise specified in HPI    Past Medical History:   Diagnosis Date    Anxiety     Arthritis     shoulder    Carpal tunnel syndrome     Depression     Disease of thyroid gland     Diverticulitis     HBP (high blood pressure)     Hyperlipidemia     Hypertension     Hypothyroid     Pneumonia     Psychiatric disorder     Depression       Past Surgical History:   Procedure Laterality Date    APPENDECTOMY      BREAST BIOPSY Right     benign    BREAST CYST ASPIRATION Right     benign    CATARACT EXTRACTION, BILATERAL  2019    CHOLECYSTECTOMY      COLONOSCOPY W/ BIOPSIES      EGD      MAMMO (HISTORICAL) Bilateral 2018    MAMMO (HISTORICAL) Bilateral 10/27/2017    MANDIBLE SURGERY      WISDOM TOOTH EXTRACTION      X4       Family History   Problem Relation Age of Onset    Heart disease Mother     Hypertension Mother     Hyperlipidemia Mother     Heart failure Mother     Hypertension Sister        Social History     Occupational History    Not on file   Tobacco Use    Smoking status: Former Smoker     Last attempt to quit: 2004     Years since quittin 7    Smokeless tobacco: Never Used   Substance and Sexual Activity    Alcohol use: No     Comment: last drink 7 years ago    Drug use: No    Sexual activity: Not Currently         Current Outpatient Medications:     ALPRAZolam (XANAX) 0 5 mg tablet, Take 1 tablet (0 5 mg total) by mouth 2 (two) times a day as needed for anxiety, Disp: 60 tablet, Rfl: 3    ASPIRIN 81 PO, Take 1 tablet by mouth daily, Disp: , Rfl:     atorvastatin (LIPITOR) 10 mg tablet, Take 10 mg by mouth daily, Disp: , Rfl:     baclofen 10 mg tablet, Take 1 tablet (10 mg total) by mouth 2 (two) times a day as needed for muscle spasms, Disp: 60 tablet, Rfl: 3    Calcium Carb-Cholecalciferol (CALCIUM 600+D3) 600-200 MG-UNIT TABS, Take 1 tablet by mouth daily, Disp: , Rfl:     escitalopram (LEXAPRO) 20 mg tablet, , Disp: , Rfl:     levothyroxine 100 mcg tablet, Take 100 mcg by mouth daily, Disp: , Rfl:     Multiple Vitamins-Minerals (CENTRUM ADULTS PO), Take 1 tablet by mouth daily, Disp: , Rfl:     naproxen (NAPROSYN) 500 mg tablet, Take 1 tablet (500 mg total) by mouth 2 (two) times a day with meals, Disp: 60 tablet, Rfl: 5    ondansetron (ZOFRAN) 4 mg tablet, TK 1 T PO TID PRN, Disp: , Rfl:     predniSONE 20 mg tablet, Take 2 tablets daily for 2 days, 1 tablet daily for 4 days, then 1/2 tablet daily for 5 days, Disp: 14 tablet, Rfl: 1    ramipril (ALTACE) 10 MG capsule, Take 1 capsule (10 mg total) by mouth 2 (two) times a day, Disp: 180 capsule, Rfl: 3    traMADol (ULTRAM) 50 mg tablet, Take 1 tablet (50 mg total) by mouth daily at bedtime as needed for moderate pain, Disp: 40 tablet, Rfl: 0    No Known Allergies         Vitals:    09/28/20 1437   BP: 128/77   Pulse: 90       Objective:                    Back Exam     Comments:  Cervical spine  No acute distress, no skin deformity  Tenderness to palpation over cervical posture musculature  Shoulder abd, biceps, triceps, wrist 5/5  C1-C7 sensation symmetric and intact  Negative warren             Diagnostics, reviewed and taken today if performed as documented: The attending physician has personally reviewed the pertinent films in PACS and interpretation is as follows: MRI cervical spine trace grade 1 spondylolisthesis at C6/7, mild scatter spondylosis with out cord compression, suspicion of dens fracture type II vs os odontoideum discontinuity of odontoid process from body of C2  Procedures, if performed today:    Procedures    None performed      Portions of the record may have been created with voice recognition software  Occasional wrong word or "sound a like" substitutions may have occurred due to the inherent limitations of voice recognition software  Read the chart carefully and recognize, using context, where substitutions have occurred

## 2020-09-28 NOTE — TELEPHONE ENCOUNTER
MRN: [id number]  Patient Name: David ORDOÑEZ B: 1950  Dept & Loc: Orth[facility]/[place]  Appt Notes: NP/NECK PAIN/MRI-REF by Danny Cerda  Visit Type: New  Provider Name: Adrian Jarquin  Appointment Desired Day/Time: per phone note from Lawanda Flores appointment on Monday, 09-28-20 that is not double booked  Patient chose 3:15 pm today  [facility] [phone]    email sent to Bharti

## 2020-10-06 ENCOUNTER — HOSPITAL ENCOUNTER (OUTPATIENT)
Dept: RADIOLOGY | Facility: HOSPITAL | Age: 70
Discharge: HOME | End: 2020-10-06
Attending: ORTHOPAEDIC SURGERY
Payer: MEDICARE

## 2020-10-06 ENCOUNTER — TRANSCRIBE ORDERS (OUTPATIENT)
Dept: RADIOLOGY | Facility: HOSPITAL | Age: 70
End: 2020-10-06

## 2020-10-06 DIAGNOSIS — S12.100A DENS FRACTURE, CLOSED, INITIAL ENCOUNTER (HCC): ICD-10-CM

## 2020-10-06 PROCEDURE — G1004 CDSM NDSC: HCPCS

## 2020-10-06 PROCEDURE — 72125 CT NECK SPINE W/O DYE: CPT

## 2020-10-12 ENCOUNTER — OFFICE VISIT (OUTPATIENT)
Dept: OBGYN CLINIC | Facility: HOSPITAL | Age: 70
End: 2020-10-12
Payer: MEDICARE

## 2020-10-12 VITALS
DIASTOLIC BLOOD PRESSURE: 77 MMHG | BODY MASS INDEX: 22.5 KG/M2 | HEART RATE: 75 BPM | HEIGHT: 63 IN | WEIGHT: 127 LBS | SYSTOLIC BLOOD PRESSURE: 120 MMHG

## 2020-10-12 DIAGNOSIS — M54.2 NECK PAIN: Primary | ICD-10-CM

## 2020-10-12 PROCEDURE — 99213 OFFICE O/P EST LOW 20 MIN: CPT | Performed by: ORTHOPAEDIC SURGERY

## 2020-10-19 DIAGNOSIS — F41.9 ANXIETY: ICD-10-CM

## 2020-10-19 DIAGNOSIS — M54.2 NECK PAIN: ICD-10-CM

## 2020-10-19 RX ORDER — NAPROXEN 500 MG/1
500 TABLET ORAL 2 TIMES DAILY WITH MEALS
Qty: 60 TABLET | Refills: 5 | Status: SHIPPED | OUTPATIENT
Start: 2020-10-19 | End: 2021-05-28 | Stop reason: HOSPADM

## 2020-10-19 RX ORDER — ALPRAZOLAM 0.5 MG/1
0.5 TABLET ORAL 2 TIMES DAILY PRN
Qty: 60 TABLET | Refills: 3 | Status: SHIPPED | OUTPATIENT
Start: 2020-10-19 | End: 2021-02-15 | Stop reason: SDUPTHER

## 2020-11-23 ENCOUNTER — OFFICE VISIT (OUTPATIENT)
Dept: OBGYN CLINIC | Facility: HOSPITAL | Age: 70
End: 2020-11-23
Payer: MEDICARE

## 2020-11-23 ENCOUNTER — HOSPITAL ENCOUNTER (OUTPATIENT)
Dept: RADIOLOGY | Facility: HOSPITAL | Age: 70
Discharge: HOME | End: 2020-11-23
Attending: ORTHOPAEDIC SURGERY
Payer: MEDICARE

## 2020-11-23 VITALS
HEIGHT: 63 IN | DIASTOLIC BLOOD PRESSURE: 74 MMHG | HEART RATE: 96 BPM | WEIGHT: 128 LBS | BODY MASS INDEX: 22.68 KG/M2 | SYSTOLIC BLOOD PRESSURE: 118 MMHG

## 2020-11-23 DIAGNOSIS — S12.100D CLOSED ODONTOID FRACTURE WITH ROUTINE HEALING, SUBSEQUENT ENCOUNTER: Primary | ICD-10-CM

## 2020-11-23 DIAGNOSIS — M47.812 SPONDYLOSIS OF CERVICAL REGION WITHOUT MYELOPATHY OR RADICULOPATHY: ICD-10-CM

## 2020-11-23 DIAGNOSIS — M50.30 DDD (DEGENERATIVE DISC DISEASE), CERVICAL: ICD-10-CM

## 2020-11-23 DIAGNOSIS — M54.2 NECK PAIN: ICD-10-CM

## 2020-11-23 DIAGNOSIS — S12.100D CLOSED ODONTOID FRACTURE WITH ROUTINE HEALING, SUBSEQUENT ENCOUNTER: ICD-10-CM

## 2020-11-23 DIAGNOSIS — M62.838 MUSCLE SPASMS OF NECK: ICD-10-CM

## 2020-11-23 PROCEDURE — 99213 OFFICE O/P EST LOW 20 MIN: CPT | Performed by: ORTHOPAEDIC SURGERY

## 2020-11-23 PROCEDURE — 72050 X-RAY EXAM NECK SPINE 4/5VWS: CPT

## 2020-12-04 ENCOUNTER — EVALUATION (OUTPATIENT)
Dept: PHYSICAL THERAPY | Facility: CLINIC | Age: 70
End: 2020-12-04
Payer: MEDICARE

## 2020-12-04 ENCOUNTER — TELEPHONE (OUTPATIENT)
Dept: OBGYN CLINIC | Facility: HOSPITAL | Age: 70
End: 2020-12-04

## 2020-12-04 DIAGNOSIS — S12.100D CLOSED ODONTOID FRACTURE WITH ROUTINE HEALING, SUBSEQUENT ENCOUNTER: Primary | ICD-10-CM

## 2020-12-04 DIAGNOSIS — M62.838 MUSCLE SPASMS OF NECK: ICD-10-CM

## 2020-12-04 DIAGNOSIS — M47.812 SPONDYLOSIS OF CERVICAL REGION WITHOUT MYELOPATHY OR RADICULOPATHY: ICD-10-CM

## 2020-12-04 DIAGNOSIS — M50.30 DDD (DEGENERATIVE DISC DISEASE), CERVICAL: ICD-10-CM

## 2020-12-04 DIAGNOSIS — M54.2 NECK PAIN: ICD-10-CM

## 2020-12-04 PROCEDURE — 97161 PT EVAL LOW COMPLEX 20 MIN: CPT | Performed by: PHYSICAL THERAPIST

## 2020-12-08 ENCOUNTER — OFFICE VISIT (OUTPATIENT)
Dept: PHYSICAL THERAPY | Facility: CLINIC | Age: 70
End: 2020-12-08
Payer: MEDICARE

## 2020-12-08 DIAGNOSIS — M62.838 MUSCLE SPASMS OF NECK: ICD-10-CM

## 2020-12-08 DIAGNOSIS — S12.100D CLOSED ODONTOID FRACTURE WITH ROUTINE HEALING, SUBSEQUENT ENCOUNTER: Primary | ICD-10-CM

## 2020-12-08 DIAGNOSIS — M54.2 NECK PAIN: ICD-10-CM

## 2020-12-08 DIAGNOSIS — M50.30 DDD (DEGENERATIVE DISC DISEASE), CERVICAL: ICD-10-CM

## 2020-12-08 DIAGNOSIS — M47.812 SPONDYLOSIS OF CERVICAL REGION WITHOUT MYELOPATHY OR RADICULOPATHY: ICD-10-CM

## 2020-12-08 PROCEDURE — 97112 NEUROMUSCULAR REEDUCATION: CPT | Performed by: PHYSICAL THERAPIST

## 2020-12-11 ENCOUNTER — OFFICE VISIT (OUTPATIENT)
Dept: PHYSICAL THERAPY | Facility: CLINIC | Age: 70
End: 2020-12-11
Payer: MEDICARE

## 2020-12-11 DIAGNOSIS — M47.812 SPONDYLOSIS OF CERVICAL REGION WITHOUT MYELOPATHY OR RADICULOPATHY: ICD-10-CM

## 2020-12-11 DIAGNOSIS — S12.100D CLOSED ODONTOID FRACTURE WITH ROUTINE HEALING, SUBSEQUENT ENCOUNTER: Primary | ICD-10-CM

## 2020-12-11 DIAGNOSIS — M62.838 MUSCLE SPASMS OF NECK: ICD-10-CM

## 2020-12-11 DIAGNOSIS — M50.30 DDD (DEGENERATIVE DISC DISEASE), CERVICAL: ICD-10-CM

## 2020-12-11 DIAGNOSIS — M54.2 NECK PAIN: ICD-10-CM

## 2020-12-11 PROCEDURE — 97140 MANUAL THERAPY 1/> REGIONS: CPT | Performed by: PHYSICAL THERAPIST

## 2020-12-11 PROCEDURE — 97112 NEUROMUSCULAR REEDUCATION: CPT | Performed by: PHYSICAL THERAPIST

## 2020-12-14 ENCOUNTER — OFFICE VISIT (OUTPATIENT)
Dept: PHYSICAL THERAPY | Facility: CLINIC | Age: 70
End: 2020-12-14
Payer: MEDICARE

## 2020-12-14 DIAGNOSIS — M54.2 NECK PAIN: ICD-10-CM

## 2020-12-14 DIAGNOSIS — M50.30 DDD (DEGENERATIVE DISC DISEASE), CERVICAL: ICD-10-CM

## 2020-12-14 DIAGNOSIS — M62.838 MUSCLE SPASMS OF NECK: ICD-10-CM

## 2020-12-14 DIAGNOSIS — S12.100D CLOSED ODONTOID FRACTURE WITH ROUTINE HEALING, SUBSEQUENT ENCOUNTER: Primary | ICD-10-CM

## 2020-12-14 DIAGNOSIS — M47.812 SPONDYLOSIS OF CERVICAL REGION WITHOUT MYELOPATHY OR RADICULOPATHY: ICD-10-CM

## 2020-12-14 PROCEDURE — 97112 NEUROMUSCULAR REEDUCATION: CPT | Performed by: PHYSICAL THERAPIST

## 2020-12-15 ENCOUNTER — OFFICE VISIT (OUTPATIENT)
Dept: PHYSICAL THERAPY | Facility: CLINIC | Age: 70
End: 2020-12-15
Payer: MEDICARE

## 2020-12-15 DIAGNOSIS — M54.2 NECK PAIN: ICD-10-CM

## 2020-12-15 DIAGNOSIS — M50.30 DDD (DEGENERATIVE DISC DISEASE), CERVICAL: ICD-10-CM

## 2020-12-15 DIAGNOSIS — M47.812 SPONDYLOSIS OF CERVICAL REGION WITHOUT MYELOPATHY OR RADICULOPATHY: ICD-10-CM

## 2020-12-15 DIAGNOSIS — S12.100D CLOSED ODONTOID FRACTURE WITH ROUTINE HEALING, SUBSEQUENT ENCOUNTER: Primary | ICD-10-CM

## 2020-12-15 DIAGNOSIS — M62.838 MUSCLE SPASMS OF NECK: ICD-10-CM

## 2020-12-15 PROCEDURE — 97112 NEUROMUSCULAR REEDUCATION: CPT

## 2020-12-16 ENCOUNTER — APPOINTMENT (OUTPATIENT)
Dept: PHYSICAL THERAPY | Facility: CLINIC | Age: 70
End: 2020-12-16
Payer: MEDICARE

## 2020-12-21 ENCOUNTER — OFFICE VISIT (OUTPATIENT)
Dept: PHYSICAL THERAPY | Facility: CLINIC | Age: 70
End: 2020-12-21
Payer: MEDICARE

## 2020-12-21 DIAGNOSIS — M50.30 DDD (DEGENERATIVE DISC DISEASE), CERVICAL: ICD-10-CM

## 2020-12-21 DIAGNOSIS — M62.838 MUSCLE SPASMS OF NECK: ICD-10-CM

## 2020-12-21 DIAGNOSIS — M47.812 SPONDYLOSIS OF CERVICAL REGION WITHOUT MYELOPATHY OR RADICULOPATHY: ICD-10-CM

## 2020-12-21 DIAGNOSIS — M54.2 NECK PAIN: ICD-10-CM

## 2020-12-21 DIAGNOSIS — S12.100D CLOSED ODONTOID FRACTURE WITH ROUTINE HEALING, SUBSEQUENT ENCOUNTER: Primary | ICD-10-CM

## 2020-12-21 PROCEDURE — 97140 MANUAL THERAPY 1/> REGIONS: CPT | Performed by: PHYSICAL MEDICINE & REHABILITATION

## 2020-12-21 PROCEDURE — 97110 THERAPEUTIC EXERCISES: CPT | Performed by: PHYSICAL MEDICINE & REHABILITATION

## 2020-12-23 ENCOUNTER — OFFICE VISIT (OUTPATIENT)
Dept: PHYSICAL THERAPY | Facility: CLINIC | Age: 70
End: 2020-12-23
Payer: MEDICARE

## 2020-12-23 DIAGNOSIS — M62.838 MUSCLE SPASMS OF NECK: ICD-10-CM

## 2020-12-23 DIAGNOSIS — S12.100D CLOSED ODONTOID FRACTURE WITH ROUTINE HEALING, SUBSEQUENT ENCOUNTER: Primary | ICD-10-CM

## 2020-12-23 DIAGNOSIS — M47.812 SPONDYLOSIS OF CERVICAL REGION WITHOUT MYELOPATHY OR RADICULOPATHY: ICD-10-CM

## 2020-12-23 DIAGNOSIS — M50.30 DDD (DEGENERATIVE DISC DISEASE), CERVICAL: ICD-10-CM

## 2020-12-23 DIAGNOSIS — M54.2 NECK PAIN: ICD-10-CM

## 2020-12-23 PROCEDURE — 97112 NEUROMUSCULAR REEDUCATION: CPT

## 2020-12-23 PROCEDURE — 97140 MANUAL THERAPY 1/> REGIONS: CPT

## 2020-12-29 ENCOUNTER — OFFICE VISIT (OUTPATIENT)
Dept: PHYSICAL THERAPY | Facility: CLINIC | Age: 70
End: 2020-12-29
Payer: MEDICARE

## 2020-12-29 DIAGNOSIS — S12.100D CLOSED ODONTOID FRACTURE WITH ROUTINE HEALING, SUBSEQUENT ENCOUNTER: ICD-10-CM

## 2020-12-29 DIAGNOSIS — M50.30 DDD (DEGENERATIVE DISC DISEASE), CERVICAL: ICD-10-CM

## 2020-12-29 DIAGNOSIS — M62.838 MUSCLE SPASMS OF NECK: Primary | ICD-10-CM

## 2020-12-29 DIAGNOSIS — M47.812 SPONDYLOSIS OF CERVICAL REGION WITHOUT MYELOPATHY OR RADICULOPATHY: ICD-10-CM

## 2020-12-29 DIAGNOSIS — M54.2 NECK PAIN: ICD-10-CM

## 2020-12-29 PROCEDURE — 97112 NEUROMUSCULAR REEDUCATION: CPT | Performed by: PHYSICAL THERAPIST

## 2021-01-05 ENCOUNTER — OFFICE VISIT (OUTPATIENT)
Dept: PHYSICAL THERAPY | Facility: CLINIC | Age: 71
End: 2021-01-05
Payer: MEDICARE

## 2021-01-05 DIAGNOSIS — M47.812 SPONDYLOSIS OF CERVICAL REGION WITHOUT MYELOPATHY OR RADICULOPATHY: ICD-10-CM

## 2021-01-05 DIAGNOSIS — M62.838 MUSCLE SPASMS OF NECK: Primary | ICD-10-CM

## 2021-01-05 DIAGNOSIS — M54.2 NECK PAIN: ICD-10-CM

## 2021-01-05 DIAGNOSIS — M50.30 DDD (DEGENERATIVE DISC DISEASE), CERVICAL: ICD-10-CM

## 2021-01-05 DIAGNOSIS — S12.100D CLOSED ODONTOID FRACTURE WITH ROUTINE HEALING, SUBSEQUENT ENCOUNTER: ICD-10-CM

## 2021-01-05 PROCEDURE — 97112 NEUROMUSCULAR REEDUCATION: CPT | Performed by: PHYSICAL THERAPIST

## 2021-01-05 NOTE — PROGRESS NOTES
Daily Note     Today's date: 2021  Patient name: Kian Pereyra  : 1950  MRN: [id number]  Referring provider: Alannah Triplett MD  Dx:   Encounter Diagnosis     ICD-10-CM    1  Muscle spasms of neck  M62 838    2  Closed odontoid fracture with routine healing, subsequent encounter  S12 100D    3  Spondylosis of cervical region without myelopathy or radiculopathy  M47 812    4  Neck pain  M54 2    5  DDD (degenerative disc disease), cervical  M50 30                 Subjective: Pt reports mildly improved pain with ROM  She will be f/u with pain mgmt later this week  Objective: See treatment diary below    Assessment: Pt continues to require max cueing for HEP technique, unable to maintain correction from verbal and tactile cueing for neuromuscular control  Plan: RE NV  Precautions:  PMHx: depression, anxiety, hypothyroidism, HTN, s/p L knee scope, falls  No traction, C/S manips  Dx: C/S DDD/DJD, s/p C2 fx 2020    Manuals 12/4 12/8 12/11 12/14 12/15 12/21 12/23 12/29 1/5/21    Gentle B C/S rotation, SB PROM  5"x5 ea 5"x5 ea 5"x10 ea 5"x10 ea LH 5"x10 ea 5"x10 ea 5"X10 ea    STM B UT, post scalenes, suboccipitals  8 min 8 min 8 min 8 min LH 8 min 8 min 6 min                              Neuro Re-Ed             Supine chin tucks 2"x10 2"x10 2"x10 3"x10 3"x10 10x3" 3"x10 3"x15 3"x15    Supine chin tuck w/ B cv rotation AROM 2"x10 2"x10 2"x10 1x15 1x15 15x ea 1x15 np     Supine chin tuck w/ B cv SB AROM 2"x10 2"x10 2"x10  1x15 1x15 15x ea AAROM 1x15  AAROM np     iso scap squeeze 5"x10 5"x10 5"x10 5"x15 5"x15 15x5" 5"x15 5"x10 5"x10    B TB ER  Y/  2x10 Y/   2x10 Y/  2x10 Y/  2x12   YTB 2x12 YTB  3x10 Y/  3x10 Y/  3x15    Seated chin tucks 2"x10 2"x10 2"x10 2"x10 2"x10 10x2" 2"x10 3"x10 3"x10    Seated chin tuck w/ B C/S rotation, SB        AAROM  1x10 ea AAROM  1x10 ea                                           Ther Ex Ther Activity                                       Gait Training                                       Modalities

## 2021-01-06 ENCOUNTER — CONSULT (OUTPATIENT)
Dept: PAIN MEDICINE | Facility: CLINIC | Age: 71
End: 2021-01-06
Payer: MEDICARE

## 2021-01-06 ENCOUNTER — TRANSCRIBE ORDERS (OUTPATIENT)
Dept: PAIN MEDICINE | Facility: CLINIC | Age: 71
End: 2021-01-06

## 2021-01-06 VITALS
DIASTOLIC BLOOD PRESSURE: 72 MMHG | SYSTOLIC BLOOD PRESSURE: 139 MMHG | WEIGHT: 131 LBS | BODY MASS INDEX: 23.21 KG/M2 | HEART RATE: 68 BPM

## 2021-01-06 DIAGNOSIS — S12.100A DENS FRACTURE, CLOSED, INITIAL ENCOUNTER (HCC): ICD-10-CM

## 2021-01-06 DIAGNOSIS — M47.812 SPONDYLOSIS OF CERVICAL REGION WITHOUT MYELOPATHY OR RADICULOPATHY: Primary | ICD-10-CM

## 2021-01-06 DIAGNOSIS — M54.2 NECK PAIN: ICD-10-CM

## 2021-01-06 PROCEDURE — 99204 OFFICE O/P NEW MOD 45 MIN: CPT | Performed by: ANESTHESIOLOGY

## 2021-01-06 RX ORDER — BACLOFEN 10 MG/1
10 TABLET ORAL 2 TIMES DAILY PRN
Qty: 30 TABLET | Refills: 5 | Status: SHIPPED | OUTPATIENT
Start: 2021-01-06 | End: 2022-01-21

## 2021-01-06 RX ORDER — ATORVASTATIN CALCIUM 10 MG/1
10 TABLET, FILM COATED ORAL 2 TIMES DAILY
COMMUNITY
End: 2021-03-14

## 2021-01-06 RX ORDER — ASPIRIN 81 MG/1
81 TABLET ORAL DAILY
COMMUNITY
End: 2022-01-28 | Stop reason: HOSPADM

## 2021-01-06 NOTE — PROGRESS NOTES
Assessment  1  Spondylosis of cervical region without myelopathy or radiculopathy    2  Dens fracture, closed, initial encounter ([facility] [address] )    3  Neck pain        Plan  The patient's symptoms, history/physical are consistent with pain that is multifactorial in origin but predominantly the result of her cervical spondylosis and dens fracture  At this time, I discussed treatment that will be multimodal in approach  I discussed performing diagnostic left C3-6 medial branch blocks to diagnose facet mediated pain  If she gets significant relief from that then she would be a candidate for medial branch radiofrequency ablation which provides more long-term relief  She was apprised of the most common risks and would like to proceed  She will be scheduled for an upcoming Tuesday or Thursday under fluoroscopic guidance  In addition, since baclofen was helping for the spasms, I will resume her on 10 mg just at bedtime  Complete risks and benefits including bleeding, infection, tissue reaction, nerve injury and allergic reaction were discussed  The approach was demonstrated using models and literature was provided  Verbal and written consent was obtained  My impressions and treatment recommendations were discussed in detail with the patient who verbalized understanding and had no further questions  Discharge instructions were provided  I personally saw and examined the patient and I agree with the above discussed plan of care  Orders Placed This Encounter   Procedures    FL spine and pain procedure     Standing Status:   Future     Standing Expiration Date:   1/6/2025     Order Specific Question:   Reason for Exam:     Answer:   Left C3-6 MBB     Order Specific Question:   Anticoagulant hold needed?      Answer:   No     New Medications Ordered This Visit   Medications    aspirin (ECOTRIN LOW STRENGTH) 81 mg EC tablet     Sig: Take 81 mg by mouth daily    atorvastatin (LIPITOR) 10 mg tablet     Sig: Take 10 mg by mouth 2 (two) times a day    baclofen 10 mg tablet     Sig: Take 1 tablet (10 mg total) by mouth 2 (two) times a day as needed for muscle spasms     Dispense:  30 tablet     Refill:  5       History of Present Illness    Zeynep Bar is a 65 y o  female who presents for consultation in regards to left-sided neck pain  Symptoms have been present for several months following an injury on May 8, 2020 in which she fell off a bench  Pain is moderate to severe rated 8/10 on a numeric rating scale and felt nearly constantly  Symptoms are worst in the morning and nighttime  Pain is described to be sharp and shooting  Symptoms are aggravated with turning her head and decreased with lying down  There is no change with coughing, sneezing or bowel movements  Treatment history has included physical therapy which provided no relief  She has been taking baclofen which provides moderate relief  She saw Dr Idania Sutton for consultation who referred her here for further treatment including facet blocks    I have personally reviewed and/or updated the patient's past medical history, past surgical history, family history, social history, current medications, allergies, and vital signs today  Review of Systems   Constitutional: Negative for fever and unexpected weight change  HENT: Negative for trouble swallowing  Eyes: Negative for visual disturbance  Respiratory: Negative for shortness of breath and wheezing  Cardiovascular: Negative for chest pain and palpitations  Gastrointestinal: Negative for constipation, diarrhea, nausea and vomiting  Endocrine: Negative for cold intolerance, heat intolerance and polydipsia  Genitourinary: Negative for difficulty urinating and frequency  Musculoskeletal: Positive for neck pain and neck stiffness  Negative for arthralgias, gait problem, joint swelling and myalgias  Skin: Negative for rash     Neurological: Negative for dizziness, seizures, syncope, weakness and headaches  Hematological: Does not bruise/bleed easily  Psychiatric/Behavioral: Negative for dysphoric mood  All other systems reviewed and are negative        Patient Active Problem List   Diagnosis    Neck pain       Past Medical History:   Diagnosis Date    Anxiety     Arthritis     shoulder    Carpal tunnel syndrome     Depression     Disease of thyroid gland     Diverticulitis     HBP (high blood pressure)     Hyperlipidemia     Hypertension     Hypothyroid     Pneumonia     Psychiatric disorder     Depression       Past Surgical History:   Procedure Laterality Date    APPENDECTOMY      BREAST BIOPSY Right     benign    BREAST CYST ASPIRATION Right     benign    CATARACT EXTRACTION, BILATERAL  2019    CHOLECYSTECTOMY      COLONOSCOPY W/ BIOPSIES      EGD      MAMMO (HISTORICAL) Bilateral 2018    MAMMO (HISTORICAL) Bilateral 10/27/2017    MANDIBLE SURGERY      WISDOM TOOTH EXTRACTION      X4       Family History   Problem Relation Age of Onset    Heart disease Mother     Hypertension Mother     Hyperlipidemia Mother     Heart failure Mother     Hypertension Sister        Social History     Occupational History    Not on file   Tobacco Use    Smoking status: Former Smoker     Quit date:      Years since quittin 0    Smokeless tobacco: Never Used   Substance and Sexual Activity    Alcohol use: No     Comment: last drink 7 years ago    Drug use: No    Sexual activity: Not Currently       Current Outpatient Medications on File Prior to Visit   Medication Sig    ALPRAZolam (XANAX) 0 5 mg tablet Take 1 tablet (0 5 mg total) by mouth 2 (two) times a day as needed for anxiety    aspirin (ECOTRIN LOW STRENGTH) 81 mg EC tablet Take 81 mg by mouth daily    atorvastatin (LIPITOR) 10 mg tablet Take 10 mg by mouth 2 (two) times a day    Calcium Carb-Cholecalciferol (CALCIUM 600+D3) 600-200 MG-UNIT TABS Take 1 tablet by mouth daily    escitalopram (LEXAPRO) 20 mg tablet     levothyroxine 100 mcg tablet Take 100 mcg by mouth daily    Multiple Vitamins-Minerals (CENTRUM ADULTS PO) Take 1 tablet by mouth daily    naproxen (NAPROSYN) 500 mg tablet Take 1 tablet (500 mg total) by mouth 2 (two) times a day with meals    ramipril (ALTACE) 10 MG capsule Take 1 capsule (10 mg total) by mouth 2 (two) times a day    [DISCONTINUED] ASPIRIN 81 PO Take 1 tablet by mouth daily    [DISCONTINUED] atorvastatin (LIPITOR) 10 mg tablet Take 10 mg by mouth daily    [DISCONTINUED] baclofen 10 mg tablet Take 1 tablet (10 mg total) by mouth 2 (two) times a day as needed for muscle spasms    [DISCONTINUED] ondansetron (ZOFRAN) 4 mg tablet TK 1 T PO TID PRN    [DISCONTINUED] predniSONE 20 mg tablet Take 2 tablets daily for 2 days, 1 tablet daily for 4 days, then 1/2 tablet daily for 5 days    [DISCONTINUED] traMADol (ULTRAM) 50 mg tablet Take 1 tablet (50 mg total) by mouth daily at bedtime as needed for moderate pain     No current facility-administered medications on file prior to visit  No Known Allergies    Physical Exam    /72   Pulse 68   Wt 59 4 kg (131 lb)   BMI 23 21 kg/m²     Constitutional: normal, well developed, well nourished, alert, in no distress and non-toxic and no overt pain behavior  Eyes: anicteric  HEENT: grossly intact  Neck: supple, symmetric, trachea midline and no masses   Pulmonary:even and unlabored  Cardiovascular:No edema or pitting edema present  Skin:Normal without rashes or lesions and well hydrated  Psychiatric:Mood and affect appropriate  Neurologic:Cranial Nerves II-XII grossly intact  Musculoskeletal:normal     Cervical Spine Exam  Appearance:  Normal lordosis  Palpation/Tenderness:  left cervical paraspinal tenderness  left trapezium tenderness  Range of Motion:  Flexion:   Moderately limited  with pain  Extension:  Moderately limited  with pain  Lateral Flexion - Left:  Severely limited  with pain  Lateral Flexion - Right:  Moderately limited  with pain  Rotation - Left:  Severely limited  with pain  Rotation - Right:  Moderately limited  with pain  Motor Strength:  Left Arm Flexion  5/5  Left Arm Extension  5/5  Right Arm Flexion  5/5  Right Arm Extension  5/5  Left Wrist Flexion  5/5  Left Wrist Extension  5/5  Left Finger Abduction  5/5  Right Finger Abduction  5/5  Reflexes:  Left Biceps:  2+   Right Biceps:  2+   Left Triceps:  2+   Right Triceps:  2+     Imaging      MRI CERVICAL SPINE WITHOUT CONTRAST (9/24/2020)     INDICATION: M54 2: Cervicalgia  hx of fall c/o left sided neck pain x 3 months      COMPARISON:  7/29/2020     TECHNIQUE:  Sagittal T1, sagittal T2, sagittal inversion recovery, axial T2, axial  2D merge     IMAGE QUALITY:  Diagnostic     FINDINGS:     ALIGNMENT:  Normal alignment of the cervical spine  No compression fracture  No subluxation  No scoliosis      MARROW SIGNAL:  There is a apparent fragmentation between the base of the dens and the body of the C2 vertebra with questionable edema about this region, possibly a type II dens fracture  No definite bony retropulsion  No epidural hematoma  Mild   spondylotic changes elsewhere noted      CERVICAL AND VISUALIZED THORACIC CORD:  Normal signal within the visualized cord      PREVERTEBRAL AND PARASPINAL SOFT TISSUES:  Normal      VISUALIZED POSTERIOR FOSSA:  The visualized posterior fossa demonstrates no abnormal signal      CERVICAL DISC SPACES:     C2-C3:  Normal      C3-C4:  There is diffuse disc bulge  Central canal patent  Mild bilateral neural foraminal narrowing      C4-C5:  Mild loss of disc height and signal without focal herniation  Bilateral facet hypertrophy  Central canal and neural foramina patent      C5-C6:  There is a right paracentral disc herniation, protrusion type  There is mild to moderate right neural foraminal narrowing  Central canal and left neural foramen patent      C6-C7:  There is uncovering of the intervertebral disc space  Central canal patent  Mild bilateral neural foraminal narrowing      C7-T1:  Normal      UPPER THORACIC DISC SPACES:  Normal      IMPRESSION:        1  Discontinuity of the odontoid process from the body of C2, suspicious for a type II dens fracture  Differential diagnosis includes os odontoideum  There is no cord compression, significant bony retropulsion or epidural hematoma  Consider further   assessment with CT of the cervical spine, as clinically indicated    2   Trace grade 1 anterolisthesis of C6 on C7   3   Mild scattered spondylosis without cord compression or cord signal abnormality

## 2021-01-06 NOTE — PATIENT INSTRUCTIONS
Baclofen (By mouth)   Baclofen (EVELIA-hu-fen)  Treats muscle spasms  Brand Name(s): Ozobax   There may be other brand names for this medicine  When This Medicine Should Not Be Used: This medicine is not right for everyone  Do not use it if you had an allergic reaction to baclofen  How to Use This Medicine:   Liquid, Tablet  · Take your medicine as directed  Your dose may need to be changed several times to find what works best for you  · Oral liquid: Measure the oral liquid medicine with a marked measuring spoon, oral syringe, or medicine cup  · Missed dose: Take a dose as soon as you remember  If it is almost time for your next dose, wait until then and take a regular dose  Do not take extra medicine to make up for a missed dose  ·   ? Oral liquid: Store in the refrigerator  Do not freeze  ? Tablets: Store the medicine in a closed container at room temperature, away from heat, moisture, and direct light  Drugs and Foods to Avoid:   Ask your doctor or pharmacist before using any other medicine, including over-the-counter medicines, vitamins, and herbal products  · Do not drink alcohol while you are using this medicine  · Tell your doctor if you use anything else that makes you sleepy  Some examples are allergy medicine, narcotic pain medicine, and alcohol  Warnings While Using This Medicine:   · Tell your doctor if you are pregnant or breastfeeding, or if you have kidney disease, diabetes, epilepsy, mental illness, ovarian cyst, posture or balance problems, a recent stroke, or history of autonomic dysreflexia  · This medicine may make you dizzy or drowsy  Avoid driving, using machines, or doing anything else that could be dangerous if you are not alert  · Do not stop using this medicine suddenly  Your doctor will need to slowly decrease your dose before you stop it completely  · Your doctor will check your progress and the effects of this medicine at regular visits  Keep all appointments    · Keep all medicine out of the reach of children  Never share your medicine with anyone  Possible Side Effects While Using This Medicine:   Call your doctor right away if you notice any of these side effects:  · Allergic reaction: Itching or hives, swelling in your face or hands, swelling or tingling in your mouth or throat, chest tightness, trouble breathing  · Lightheadedness, dizziness, fainting  · Seizures  · Muscles weakness, trouble breathing, trouble seeing  · Increase in how much or how often you urinate  If you notice these less serious side effects, talk with your doctor:   · Confusion, headache, trouble sleeping  · Constipation, nausea  · Drowsiness, dizziness, or weakness  If you notice other side effects that you think are caused by this medicine, tell your doctor  Call your doctor for medical advice about side effects  You may report side effects to FDA at 4-668-FDA-8209  © Copyright [address] Information is for End User's use only and may not be sold, redistributed or otherwise used for commercial purposes  The above information is an  only  It is not intended as medical advice for individual conditions or treatments  Talk to your doctor, nurse or pharmacist before following any medical regimen to see if it is safe and effective for you

## 2021-01-08 ENCOUNTER — APPOINTMENT (OUTPATIENT)
Dept: PHYSICAL THERAPY | Facility: CLINIC | Age: 71
End: 2021-01-08
Payer: MEDICARE

## 2021-01-12 ENCOUNTER — TELEPHONE (OUTPATIENT)
Dept: PAIN MEDICINE | Facility: CLINIC | Age: 71
End: 2021-01-12

## 2021-01-12 NOTE — TELEPHONE ENCOUNTER
Scheduled pt for Lt C3-C6 MBB#1 for 1/21/21  Went over pre-procedure instructions below:  Nothing to eat or drink 1 hr prior to procedure  Need to arrange transportation  Proper clothing for procedure  If ill or placed on antibiotics please call to reschedule  Covid/travel/ and vaccine instructions   Lyft arranged through [place] at [phone]

## 2021-01-18 DIAGNOSIS — E03.9 HYPOTHYROIDISM, UNSPECIFIED TYPE: Primary | ICD-10-CM

## 2021-01-18 RX ORDER — LEVOTHYROXINE SODIUM 0.1 MG/1
100 TABLET ORAL DAILY
Qty: 90 TABLET | Refills: 3 | Status: SHIPPED | OUTPATIENT
Start: 2021-01-18 | End: 2021-09-30

## 2021-01-21 ENCOUNTER — HOSPITAL ENCOUNTER (OUTPATIENT)
Dept: RADIOLOGY | Facility: CLINIC | Age: 71
Discharge: HOME | End: 2021-01-21
Attending: ANESTHESIOLOGY | Admitting: ANESTHESIOLOGY
Payer: MEDICARE

## 2021-01-21 VITALS
OXYGEN SATURATION: 97 % | RESPIRATION RATE: 20 BRPM | HEART RATE: 71 BPM | DIASTOLIC BLOOD PRESSURE: 75 MMHG | SYSTOLIC BLOOD PRESSURE: 180 MMHG

## 2021-01-21 DIAGNOSIS — M47.812 SPONDYLOSIS OF CERVICAL REGION WITHOUT MYELOPATHY OR RADICULOPATHY: ICD-10-CM

## 2021-01-21 PROCEDURE — 64491 INJ PARAVERT F JNT C/T 2 LEV: CPT | Performed by: ANESTHESIOLOGY

## 2021-01-21 PROCEDURE — 64492 INJ PARAVERT F JNT C/T 3 LEV: CPT | Performed by: ANESTHESIOLOGY

## 2021-01-21 PROCEDURE — 64490 INJ PARAVERT F JNT C/T 1 LEV: CPT | Performed by: ANESTHESIOLOGY

## 2021-01-21 RX ORDER — BUPIVACAINE HCL/PF 2.5 MG/ML
10 VIAL (ML) INJECTION ONCE
Status: COMPLETED | OUTPATIENT
Start: 2021-01-21 | End: 2021-01-21

## 2021-01-21 RX ADMIN — BUPIVACAINE HYDROCHLORIDE 2 ML: 2.5 INJECTION, SOLUTION EPIDURAL; INFILTRATION; INTRACAUDAL at 09:59

## 2021-01-21 NOTE — DISCHARGE INSTR - LAB

## 2021-01-21 NOTE — H&P
History of Present Illness: The patient is a 65 y o  female who presents with complaints of left-sided neck pain secondary cervical spondylosis and is here today for left C3-6 medial branch blocks      Patient Active Problem List   Diagnosis    Neck pain       Past Medical History:   Diagnosis Date    Anxiety     Arthritis     shoulder    Carpal tunnel syndrome     Depression     Disease of thyroid gland     Diverticulitis     HBP (high blood pressure)     Hyperlipidemia     Hypertension     Hypothyroid     Pneumonia     Psychiatric disorder     Depression       Past Surgical History:   Procedure Laterality Date    APPENDECTOMY      BREAST BIOPSY Right     benign    BREAST CYST ASPIRATION Right     benign    CATARACT EXTRACTION, BILATERAL  2019    CHOLECYSTECTOMY      COLONOSCOPY W/ BIOPSIES  2011    EGD      MAMMO (HISTORICAL) Bilateral 11/27/2018    MAMMO (HISTORICAL) Bilateral 10/27/2017    MANDIBLE SURGERY      WISDOM TOOTH EXTRACTION      X4         Current Outpatient Medications:     ALPRAZolam (XANAX) 0 5 mg tablet, Take 1 tablet (0 5 mg total) by mouth 2 (two) times a day as needed for anxiety, Disp: 60 tablet, Rfl: 3    aspirin (ECOTRIN LOW STRENGTH) 81 mg EC tablet, Take 81 mg by mouth daily, Disp: , Rfl:     atorvastatin (LIPITOR) 10 mg tablet, Take 10 mg by mouth 2 (two) times a day, Disp: , Rfl:     baclofen 10 mg tablet, Take 1 tablet (10 mg total) by mouth 2 (two) times a day as needed for muscle spasms, Disp: 30 tablet, Rfl: 5    Calcium Carb-Cholecalciferol (CALCIUM 600+D3) 600-200 MG-UNIT TABS, Take 1 tablet by mouth daily, Disp: , Rfl:     escitalopram (LEXAPRO) 20 mg tablet, , Disp: , Rfl:     levothyroxine 100 mcg tablet, Take 1 tablet (100 mcg total) by mouth daily, Disp: 90 tablet, Rfl: 3    Multiple Vitamins-Minerals (CENTRUM ADULTS PO), Take 1 tablet by mouth daily, Disp: , Rfl:     naproxen (NAPROSYN) 500 mg tablet, Take 1 tablet (500 mg total) by mouth 2 (two) times a day with meals, Disp: 60 tablet, Rfl: 5    ramipril (ALTACE) 10 MG capsule, Take 1 capsule (10 mg total) by mouth 2 (two) times a day, Disp: 180 capsule, Rfl: 3    Current Facility-Administered Medications:     bupivacaine (PF) (MARCAINE) 0 25 % injection 10 mL, 10 mL, Perineural, Once, Xochitl Donovan MD    No Known Allergies    Physical Exam:   Vitals:    01/21/21 0945   BP: 170/79   Pulse: 68   Resp: 20   SpO2: 99%     General: Awake, Alert, Oriented x 3, Mood and affect appropriate  Respiratory: Respirations even and unlabored  Cardiovascular: Peripheral pulses intact; no edema  Musculoskeletal Exam:  Left-sided neck tenderness    ASA Score: 2    Patient/Chart Verification  Patient ID Verified: Verbal  Consents Confirmed: To be obtained in the Pre-Procedure area  H&P( within 30 days) Verified: To be obtained in the Pre-Procedure area  Allergies Reviewed: Yes  Anticoag/NSAID held?: NA  Currently on antibiotics?: No    Assessment:   1   Spondylosis of cervical region without myelopathy or radiculopathy        Plan: Left C3-6 MBB

## 2021-01-26 ENCOUNTER — TELEPHONE (OUTPATIENT)
Dept: FAMILY MEDICINE CLINIC | Facility: CLINIC | Age: 71
End: 2021-01-26

## 2021-01-26 DIAGNOSIS — Z76.0 MEDICATION REFILL: Primary | ICD-10-CM

## 2021-01-26 RX ORDER — ESCITALOPRAM OXALATE 20 MG/1
20 TABLET ORAL DAILY
Qty: 30 TABLET | Refills: 5 | Status: SHIPPED | OUTPATIENT
Start: 2021-01-26 | End: 2021-05-03

## 2021-02-04 ENCOUNTER — TELEPHONE (OUTPATIENT)
Dept: PAIN MEDICINE | Facility: CLINIC | Age: 71
End: 2021-02-04

## 2021-02-04 NOTE — TELEPHONE ENCOUNTER
Pt fell and slid on the ice   now has extreme pain in her neck again where she got her injection on 1/21/21    Pt # [phone]

## 2021-02-04 NOTE — TELEPHONE ENCOUNTER
S/w pt, advised that if she suffered a fall recently she should go to ED or urgent care to be evaluated  Pt verbalized understanding  Any further recommendations at this time?

## 2021-02-05 ENCOUNTER — TRANSCRIBE ORDERS (OUTPATIENT)
Dept: PAIN MEDICINE | Facility: CLINIC | Age: 71
End: 2021-02-05

## 2021-02-05 ENCOUNTER — TELEPHONE (OUTPATIENT)
Dept: PAIN MEDICINE | Facility: CLINIC | Age: 71
End: 2021-02-05

## 2021-02-08 ENCOUNTER — HOSPITAL ENCOUNTER (EMERGENCY)
Facility: HOSPITAL | Age: 71
Discharge: HOME | End: 2021-02-08
Attending: EMERGENCY MEDICINE | Admitting: EMERGENCY MEDICINE
Payer: MEDICARE

## 2021-02-08 ENCOUNTER — TELEPHONE (OUTPATIENT)
Dept: FAMILY MEDICINE CLINIC | Facility: CLINIC | Age: 71
End: 2021-02-08

## 2021-02-08 ENCOUNTER — APPOINTMENT (EMERGENCY)
Dept: RADIOLOGY | Facility: HOSPITAL | Age: 71
End: 2021-02-08
Payer: MEDICARE

## 2021-02-08 VITALS
OXYGEN SATURATION: 98 % | RESPIRATION RATE: 16 BRPM | HEIGHT: 63 IN | HEART RATE: 72 BPM | BODY MASS INDEX: 21.26 KG/M2 | WEIGHT: 120 LBS | DIASTOLIC BLOOD PRESSURE: 79 MMHG | SYSTOLIC BLOOD PRESSURE: 132 MMHG | TEMPERATURE: 98.3 F

## 2021-02-08 DIAGNOSIS — S12.110K ODONTOID FRACTURE WITH NONUNION: ICD-10-CM

## 2021-02-08 DIAGNOSIS — M54.2 NECK PAIN: ICD-10-CM

## 2021-02-08 DIAGNOSIS — S12.9XXA CERVICAL SPINE FRACTURE (HCC): Primary | ICD-10-CM

## 2021-02-08 PROCEDURE — 99284 EMERGENCY DEPT VISIT MOD MDM: CPT

## 2021-02-08 PROCEDURE — 99285 EMERGENCY DEPT VISIT HI MDM: CPT | Performed by: EMERGENCY MEDICINE

## 2021-02-08 PROCEDURE — NC001 PR NO CHARGE: Performed by: ORTHOPAEDIC SURGERY

## 2021-02-08 PROCEDURE — 72125 CT NECK SPINE W/O DYE: CPT

## 2021-02-08 PROCEDURE — G1004 CDSM NDSC: HCPCS

## 2021-02-08 PROCEDURE — 70450 CT HEAD/BRAIN W/O DYE: CPT

## 2021-02-08 RX ORDER — ACETAMINOPHEN 500 MG
500 TABLET ORAL EVERY 6 HOURS PRN
Qty: 30 TABLET | Refills: 0 | Status: ON HOLD | OUTPATIENT
Start: 2021-02-08 | End: 2022-01-28 | Stop reason: SDUPTHER

## 2021-02-08 RX ORDER — LIDOCAINE 50 MG/G
1 PATCH TOPICAL ONCE
Status: DISCONTINUED | OUTPATIENT
Start: 2021-02-08 | End: 2021-02-08 | Stop reason: HOSPADM

## 2021-02-08 RX ORDER — ACETAMINOPHEN 325 MG/1
975 TABLET ORAL ONCE
Status: COMPLETED | OUTPATIENT
Start: 2021-02-08 | End: 2021-02-08

## 2021-02-08 RX ADMIN — ACETAMINOPHEN 975 MG: 325 TABLET, FILM COATED ORAL at 14:49

## 2021-02-08 RX ADMIN — LIDOCAINE 1 PATCH: 50 PATCH TOPICAL at 14:49

## 2021-02-08 NOTE — ED PROVIDER NOTES
History  Chief Complaint   Patient presents with    Pain     pt fell 3 days ago that affected her chronic pain that she sees pain mag for  she called them as she was having problems controlling her left sided neck pain, they refered her to go to ED for xrays  she usually gos to [facility] but missed the exit and came to [place] instead, since we where the next exit        Neck Pain  Pain location:  L side  Quality:  Stiffness  Pain radiates to:  Does not radiate  Pain severity:  Moderate  Timing:  Constant  Progression:  Waxing and waning  Chronicity: acute on chronic  Context: fall    Associated symptoms: no chest pain, no fever, no headaches, no numbness and no weakness        Prior to Admission Medications   Prescriptions Last Dose Informant Patient Reported? Taking?    ALPRAZolam (XANAX) 0 5 mg tablet  Self No Yes   Sig: Take 1 tablet (0 5 mg total) by mouth 2 (two) times a day as needed for anxiety   Calcium Carb-Cholecalciferol (CALCIUM 600+D3) 600-200 MG-UNIT TABS  Self Yes Yes   Sig: Take 1 tablet by mouth daily   Multiple Vitamins-Minerals (CENTRUM ADULTS PO)  Self Yes Yes   Sig: Take 1 tablet by mouth daily   aspirin (ECOTRIN LOW STRENGTH) 81 mg EC tablet  Self Yes Yes   Sig: Take 81 mg by mouth daily   atorvastatin (LIPITOR) 10 mg tablet  Self Yes Yes   Sig: Take 10 mg by mouth 2 (two) times a day   baclofen 10 mg tablet   No Yes   Sig: Take 1 tablet (10 mg total) by mouth 2 (two) times a day as needed for muscle spasms   escitalopram (LEXAPRO) 20 mg tablet   No Yes   Sig: Take 1 tablet (20 mg total) by mouth daily   levothyroxine 100 mcg tablet   No Yes   Sig: Take 1 tablet (100 mcg total) by mouth daily   naproxen (NAPROSYN) 500 mg tablet  Self No Yes   Sig: Take 1 tablet (500 mg total) by mouth 2 (two) times a day with meals   ramipril (ALTACE) 10 MG capsule  Self No Yes   Sig: Take 1 capsule (10 mg total) by mouth 2 (two) times a day      Facility-Administered Medications: None       Past Medical History:   Diagnosis Date    Anxiety     Arthritis     shoulder    Carpal tunnel syndrome     Depression     Disease of thyroid gland     Diverticulitis     HBP (high blood pressure)     Hyperlipidemia     Hypertension     Hypothyroid     Pneumonia     Psychiatric disorder     Depression       Past Surgical History:   Procedure Laterality Date    APPENDECTOMY      BREAST BIOPSY Right     benign    BREAST CYST ASPIRATION Right     benign    CATARACT EXTRACTION, BILATERAL  2019    CHOLECYSTECTOMY      COLONOSCOPY W/ BIOPSIES      EGD      MAMMO (HISTORICAL) Bilateral 2018    MAMMO (HISTORICAL) Bilateral 10/27/2017    MANDIBLE SURGERY      WISDOM TOOTH EXTRACTION      X4       Family History   Problem Relation Age of Onset    Heart disease Mother     Hypertension Mother     Hyperlipidemia Mother     Heart failure Mother     Hypertension Sister      I have reviewed and agree with the history as documented  E-Cigarette/Vaping     E-Cigarette/Vaping Substances     Social History     Tobacco Use    Smoking status: Former Smoker     Quit date:      Years since quittin 1    Smokeless tobacco: Never Used   Substance Use Topics    Alcohol use: No     Comment: last drink 7 years ago    Drug use: No        Review of Systems   Constitutional: Negative for chills and fever  Eyes: Negative for visual disturbance  Respiratory: Negative for cough and shortness of breath  Cardiovascular: Negative for chest pain  Gastrointestinal: Negative for vomiting  Genitourinary: Negative for flank pain  Musculoskeletal: Positive for neck pain and neck stiffness  Skin: Negative for wound  Neurological: Negative for syncope, weakness, numbness and headaches  Psychiatric/Behavioral: Negative for confusion  All other systems reviewed and are negative        Physical Exam  ED Triage Vitals   Temperature Pulse Respirations Blood Pressure SpO2   21 1308 21 1308 02/08/21 1308 02/08/21 1308 02/08/21 1308   98 3 °F (36 8 °C) 69 16 157/93 98 %      Temp Source Heart Rate Source Patient Position - Orthostatic VS BP Location FiO2 (%)   02/08/21 1308 02/08/21 1410 02/08/21 1410 02/08/21 1715 --   Oral Monitor Lying Right arm       Pain Score       02/08/21 1308       9             Orthostatic Vital Signs  Vitals:    02/08/21 1308 02/08/21 1410 02/08/21 1715 02/08/21 2053   BP: 157/93 150/90 140/80 132/79   Pulse: 69 70 75 72   Patient Position - Orthostatic VS:  Lying Lying Lying       Physical Exam  Vitals signs and nursing note reviewed  Constitutional:       General: She is not in acute distress  Appearance: She is well-developed  She is not diaphoretic  HENT:      Head: Normocephalic  Right Ear: External ear normal       Left Ear: External ear normal       Nose: Nose normal    Eyes:      Conjunctiva/sclera: Conjunctivae normal    Neck:      Musculoskeletal: Normal range of motion  Trachea: No tracheal deviation  Cardiovascular:      Rate and Rhythm: Normal rate  Pulmonary:      Effort: Pulmonary effort is normal  No respiratory distress  Breath sounds: No stridor  Abdominal:      General: There is no distension  Musculoskeletal:         General: No tenderness or deformity  Comments: + C/ - T/ - L spine tenderness     No Abrasions    LUE:  No bony tenderness, crepitus, deformity, or effusion  Full range of motion, compartments soft, intact motor and distal sensation, intact pincher grasp  RUE:  No bony tenderness, crepitus, deformity, or effusion  Full range of motion, compartments soft, intact motor and distal sensation, intact pincher grasp  LLE: No bony tenderness, crepitus, deformity, or effusion  Full range of motion, compartments soft, intact motor and distal sensation  RLE: No bony tenderness, crepitus, deformity, or effusion    Full range of motion, compartments soft, intact motor and distal sensation         Skin:     General: Skin is warm and dry  Neurological:      Mental Status: She is alert  Cranial Nerves: Cranial nerves are intact  No cranial nerve deficit  Sensory: Sensation is intact  Motor: Motor function is intact  Comments: LUE: 5/5 motor strength, intact distal sensation  RUE: 5/5 motor strength, intact distal sensation  LLE: 5/5 motor strength, intact distal sensation  RLE: 5/5 motor strength, intact distal sensation     Psychiatric:         Behavior: Behavior normal          ED Medications  Medications   lidocaine (LIDODERM) 5 % patch 1 patch (1 patch Topical Medication Applied 2/8/21 1449)   acetaminophen (TYLENOL) tablet 975 mg (975 mg Oral Given 2/8/21 1449)       Diagnostic Studies  Results Reviewed     None                 CT cervical spine without contrast   Final Result by Mariluz Guzman MD (02/08 0466)      Interval rightward deviation of C1 lateral mass compared to C2  Interval rightward shift of the odontoid  There is nonunion of the odontoid fracture  Other findings are unchanged  Workstation performed: [id number]         CT head wo contrast   ED Interpretation by Nelida Rudd MD (02/08 6989)   No grossly appreciable hematoma or mass lesion      Final Result by Mariluz Guzman MD (02/08 7547)      No acute intracranial abnormality  Microangiopathic changes  Workstation performed: [id number]               Procedures  Procedures      ED Course  ED Course as of Feb 08 2053   Mon Feb 08, 2021   1607 I personally reviewed these images and agree with the radiologist's report  CT cervical spine without contrast   1608 Pt in c-collar, discussed w Dr Cassidy Martin trauma consult placed given concern for new c-spine injury      1842 Trauma service recommends pt be evaluated by ortho      1957 Ortho recommends conservative management for patient, may be discharged with c-collar in place and f/u with Ortho  Pt already has home [facility] collar from prior injury  Identification of Seniors at Risk      Most Recent Value   (ISAR) Identification of Seniors at Risk   Before the illness or injury that brought you to the Emergency, did you need someone to help you on a regular basis? 0 Filed at: 02/08/2021 1311   In the last 24 hours, have you needed more help than usual?  0 Filed at: 02/08/2021 1311   Have you been hospitalized for one or more nights during the past 6 months? 1 Filed at: 02/08/2021 1311   In general, do you see well?  0 Filed at: 02/08/2021 1311   In general, do you have serious problems with your memory? 0 Filed at: 02/08/2021 1311   Do you take more than three different medications every day? 1 Filed at: 02/08/2021 1311   ISAR Score  2 Filed at: 02/08/2021 1311                    SBIRT 22yo+      Most Recent Value   SBIRT (23 yo +)   In order to provide better care to our patients, we are screening all of our patients for alcohol and drug use  Would it be okay to ask you these screening questions? No Filed at: 02/08/2021 1409                MDM  Number of Diagnoses or Management Options  Diagnosis management comments: This is a 57-year-old female who presents for evaluation of neck pain    I made the decision to obtain, review, and summarize prior medical records of the patient  Pertinent summary of chart review:  She has a history of a type 2 dens fracture which she has followed with neuro surgery, she has some chronic pain from this prior injury for which she has has been referred to a chronic pain service for which she has been receiving injections  Patient now reports that in the past week she slipped and fell on ice, she fell backwards struck her head, states that since then she has had worsening pain in the neck, the pain does not radiate, she has not had any weakness or numbness, no fevers or chills or systemic symptoms, she denies headache or vision changes  She has not had any nausea or vomiting    She states that the pain is primarily on the left side of her neck and she does report difficulty turning her neck due to the pain  Patient exam is well-appearing with stable vital signs, she has an intact neurologic exam, she is noted to have some tenderness in the midline of her C-spine as well as on the left paraspinal musculature  She has no other injuries that I can appreciate from the fall on head-to-toe exam     Overall given mechanism and age with a history of prior C-spine fracture, will obtain CT of the neck to rule out acute fracture, will also obtain a CT of the head to rule out occult head bleed as patient struck her head on aspirin  I do not appreciate any injuries of her extremities, she does not report any lower back pain, low suspicion for other injuries  Will give Tylenol and Lidoderm patch applied to the neck for pain control  Amount and/or Complexity of Data Reviewed  Tests in the radiology section of CPT®: ordered and reviewed  Decide to obtain previous medical records or to obtain history from someone other than the patient: yes  Review and summarize past medical records: yes    Risk of Complications, Morbidity, and/or Mortality  Presenting problems: high        Disposition  Final diagnoses:   Neck pain   Cervical spine fracture ([facility] [address] )   Odontoid fracture with nonunion     Time reflects when diagnosis was documented in both MDM as applicable and the Disposition within this note     Time User Action Codes Description Comment    2/8/2021  8:28 PM Trevor Richardson Add [M54 2] Neck pain     2/8/2021  8:33 PM Trevor Richardson Add [S12  9XXA] Cervical spine fracture ([facility] [address] )     2/8/2021  8:33 PM Adam Richardson Modify [M54 2] Neck pain     2/8/2021  8:33 PM Trevor Richardson Modify [S12  9XXA] Cervical spine fracture ([facility] [address] )     2/8/2021  8:34 PM Trevor Richardson Add [B47 905Y] Odontoid fracture with nonunion       ED Disposition     ED Disposition Condition Date/Time Comment    Discharge Stable Mon Feb 8, 2021  8:24 PM Adam Bradford discharge to home/self care              Follow-up Information     Follow up With Specialties Details Why Contact Info Additional MD [facility] Schedule an appointment as soon as possible for a visit   [facility] [id number] [place] [address] Emergency Department Emergency Medicine Go to  If symptoms worsen [address]  [address] [facility] [address] Emergency Department, [address], [address] [phone]          Discharge Medication List as of 2/8/2021  8:34 PM      START taking these medications    Details   acetaminophen (TYLENOL) 500 mg tablet Take 1 tablet (500 mg total) by mouth every 6 (six) hours as needed for mild pain, Starting Mon 2/8/2021, Normal         CONTINUE these medications which have NOT CHANGED    Details   ALPRAZolam (XANAX) 0 5 mg tablet Take 1 tablet (0 5 mg total) by mouth 2 (two) times a day as needed for anxiety, Starting Mon 10/19/2020, Normal      aspirin (ECOTRIN LOW STRENGTH) 81 mg EC tablet Take 81 mg by mouth daily, Historical Med      atorvastatin (LIPITOR) 10 mg tablet Take 10 mg by mouth 2 (two) times a day, Historical Med      baclofen 10 mg tablet Take 1 tablet (10 mg total) by mouth 2 (two) times a day as needed for muscle spasms, Starting Wed 1/6/2021, Normal      Calcium Carb-Cholecalciferol (CALCIUM 600+D3) 600-200 MG-UNIT TABS Take 1 tablet by mouth daily, Historical Med      escitalopram (LEXAPRO) 20 mg tablet Take 1 tablet (20 mg total) by mouth daily, Starting Tue 1/26/2021, Until Thu 2/25/2021, Normal      levothyroxine 100 mcg tablet Take 1 tablet (100 mcg total) by mouth daily, Starting Mon 1/18/2021, Normal      Multiple Vitamins-Minerals (CENTRUM ADULTS PO) Take 1 tablet by mouth daily, Historical Med      naproxen (NAPROSYN) 500 mg tablet Take 1 tablet (500 mg total) by mouth 2 (two) times a day with meals, Starting Mon 10/19/2020, Normal      ramipril (ALTACE) 10 MG capsule Take 1 capsule (10 mg total) by mouth 2 (two) times a day, Starting Mon 4/27/2020, Normal           No discharge procedures on file  PDMP Review     None           ED Provider  Attending physically available and evaluated Thad Dials  I managed the patient along with the ED Attending      Electronically Signed by         Christie Cleaning MD  02/08/21 2751

## 2021-02-09 NOTE — DISCHARGE INSTRUCTIONS
Keep cervical collar on at all times  Return to the ER if you have any weakness, numbness, or new or worsening symptoms

## 2021-02-12 NOTE — ED ATTENDING ATTESTATION
2/8/2021  IAllen MD, saw and evaluated the patient  I have discussed the patient with the resident/non-physician practitioner and agree with the resident's/non-physician practitioner's findings, Plan of Care, and MDM as documented in the resident's/non-physician practitioner's note, except where noted  All available labs and Radiology studies were reviewed  I was present for key portions of any procedure(s) performed by the resident/non-physician practitioner and I was immediately available to provide assistance  At this point I agree with the current assessment done in the Emergency Department  I have conducted an independent evaluation of this patient a history and physical is as follows:    ED Course     Patient is a 75-year-old female who presents with worsening neck pain  Patient has a past medical history of a type 2 dens fracture for which she is followed by Orthopedics and Spine surgery  Patient reports mechanical fall 3 days prior to arrival with worsening left-sided neck pain  Patient was referred to the emergency department for x-rays by her pain management doctor  Patient denies any neurologic deficits  Vital signs reviewed    Physical exam is remarkable for lateral left neck tenderness to palpation  Impression left neck pain with history of type 2 dens fracture obtain CT of the neck to evaluate for any new fractures or instability pain control    Results of CT imaging reviewed patient now has interval rightward deviation of C1 lateral mass compared to C2 with interval rightward shift of the odontoid  Trauma Orthopedics for consulted for patient given new radiographic findings  Orthopedics saw and evaluated patient recommends conservative management for patient patient may be discharged cervical collar in place follow-up with orthopedics as outpatient  Patient already has a [place] collar at home from her prior injury and was advised to continue to use [facility] collar  Return precautions given      Critical Care Time  Procedures

## 2021-02-15 DIAGNOSIS — F41.9 ANXIETY: ICD-10-CM

## 2021-02-15 RX ORDER — ALPRAZOLAM 0.5 MG/1
0.5 TABLET ORAL 2 TIMES DAILY PRN
Qty: 60 TABLET | Refills: 3 | Status: SHIPPED | OUTPATIENT
Start: 2021-02-15 | End: 2021-06-29 | Stop reason: SDUPTHER

## 2021-02-15 NOTE — TELEPHONE ENCOUNTER
Needs a refill for:  Alprazolam 0 5mg, 2x's a day; Pharmacy - [facility] [address]    Patient ph # [phone]

## 2021-03-01 ENCOUNTER — OFFICE VISIT (OUTPATIENT)
Dept: OBGYN CLINIC | Facility: HOSPITAL | Age: 71
End: 2021-03-01
Payer: MEDICARE

## 2021-03-01 VITALS
HEART RATE: 64 BPM | BODY MASS INDEX: 21.26 KG/M2 | DIASTOLIC BLOOD PRESSURE: 74 MMHG | SYSTOLIC BLOOD PRESSURE: 125 MMHG | HEIGHT: 63 IN | WEIGHT: 120 LBS

## 2021-03-01 DIAGNOSIS — M47.812 SPONDYLOSIS OF CERVICAL REGION WITHOUT MYELOPATHY OR RADICULOPATHY: ICD-10-CM

## 2021-03-01 DIAGNOSIS — S12.9XXA CLOSED FRACTURE DISLOCATION OF CERVICAL SPINE, INITIAL ENCOUNTER (HCC): Primary | ICD-10-CM

## 2021-03-01 PROCEDURE — 99213 OFFICE O/P EST LOW 20 MIN: CPT | Performed by: ORTHOPAEDIC SURGERY

## 2021-03-01 NOTE — PROGRESS NOTES
Assessment:    Cervical DDD, multilevel  Non-union of odontoid fracture, type II  Neurologically stable      Plan:    ASAP referral to pain management for occipital nerve block, C2-C3 left facet injection  Cervical collar at all times  Follow-up 3-4 weeks for re-evaluation  No driving  May require surgical intervention in the future      Problem List Items Addressed This Visit        Musculoskeletal and Integument    Spondylosis of cervical region without myelopathy or radiculopathy      Other Visit Diagnoses     Closed fracture dislocation of cervical spine, initial encounter [facility])    -  Primary                   Subjective:     Patient ID:  Uyen Schulte is a 65 y o  female    HPI    75-year-old female presenting for follow-up  She was last seen in November 2020 for cervical spine  She has a history of cervical degenerative disc disease as well as chronic dens fracture for which a cervical collar was discontinued at her last visit  Today, she returns to the office  With continued posterior left-sided neck pain with radiation to the base of her skull  Pain is made worse with any movement specifically to the left side  She states it has intensified despite recent administration of Tylenol  She denies any pain distally to the upper extremities  He is unable to wear cervical collar due to comfort reasons  She was seen in the emergency department less than one month ago for a fall  CT of the cervical spine demonstrated nonunion of the odontoid fracture  The following portions of the patient's history were reviewed and updated as appropriate: allergies, current medications, past family history, past medical history, past social history, past surgical history and problem list     Review of Systems   Constitutional: Positive for activity change  Negative for chills, diaphoresis, fatigue and fever  Respiratory: Negative  Cardiovascular: Negative      Genitourinary: Negative for decreased urine volume and difficulty urinating  Musculoskeletal: Positive for neck pain and neck stiffness  Skin: Negative  Neurological: Negative for weakness and numbness  All other systems reviewed and are negative  Objective:    Imaging:  Recent CT cervical spine demonstrates multilevel degenerative changes, and  Nonunion of odontoid fracture      Vitals:    03/01/21 1132   BP: 125/74   Pulse: 64           Physical Exam  Vitals signs and nursing note reviewed  Constitutional:       General: She is not in acute distress  Appearance: Normal appearance  She is not ill-appearing, toxic-appearing or diaphoretic  HENT:      Head: Normocephalic and atraumatic  Eyes:      General:         Right eye: No discharge  Left eye: No discharge  Pulmonary:      Effort: Pulmonary effort is normal  No respiratory distress  Musculoskeletal:         General: Tenderness and deformity present  Skin:     General: Skin is warm and dry  Neurological:      General: No focal deficit present  Mental Status: She is alert and oriented to person, place, and time  Psychiatric:         Mood and Affect: Mood normal          Behavior: Behavior normal         No acute distress  Gait is normal without assistance  Inspection reveals no open wounds    Cervical ROM is significantly limited to the left  Left trapezius muscle is TTP  Grossly motor and sensory stable, C5-T1 bilaterally  Upper extremities are warm and well perfused

## 2021-03-02 ENCOUNTER — TELEPHONE (OUTPATIENT)
Dept: PAIN MEDICINE | Facility: CLINIC | Age: 71
End: 2021-03-02

## 2021-03-02 NOTE — TELEPHONE ENCOUNTER
Scheduled pt for left C2-3 facet injection/L occipital nerve block for 3/4/21  Pt denies blood thinners  Went over pre-procedure instructions below:  Nothing to eat or drink 1 hr prior to procedure  Need to arrange transportation  Proper clothing for procedure  If ill or placed on antibiotics please call to reschedule  Covid/travel/ and vaccine instructions  Lyft has been arranged by Juhi Cotto at [address]    time 2:15 Pt is aware

## 2021-03-03 ENCOUNTER — TELEPHONE (OUTPATIENT)
Dept: OBGYN CLINIC | Facility: OTHER | Age: 71
End: 2021-03-03

## 2021-03-03 NOTE — TELEPHONE ENCOUNTER
Patient is looking to schedule KENRICK for her upcoming appointment with Dr Valarie Alvarez on 03-22 @ 3:15pm     C/b # [phone]

## 2021-03-04 ENCOUNTER — HOSPITAL ENCOUNTER (OUTPATIENT)
Dept: RADIOLOGY | Facility: CLINIC | Age: 71
Discharge: HOME | End: 2021-03-04
Attending: ANESTHESIOLOGY | Admitting: ANESTHESIOLOGY
Payer: MEDICARE

## 2021-03-04 VITALS
SYSTOLIC BLOOD PRESSURE: 175 MMHG | OXYGEN SATURATION: 100 % | HEART RATE: 72 BPM | RESPIRATION RATE: 20 BRPM | DIASTOLIC BLOOD PRESSURE: 83 MMHG | TEMPERATURE: 99.1 F

## 2021-03-04 DIAGNOSIS — S12.9XXA FRACTURE OF CERVICAL VERTEBRAE, MULTIPLE, INITIAL ENCOUNTER (HCC): ICD-10-CM

## 2021-03-04 DIAGNOSIS — M47.812 CERVICAL SPONDYLOSIS: ICD-10-CM

## 2021-03-04 PROCEDURE — 64490 INJ PARAVERT F JNT C/T 1 LEV: CPT | Performed by: ANESTHESIOLOGY

## 2021-03-04 PROCEDURE — 64405 NJX AA&/STRD GR OCPL NRV: CPT | Performed by: ANESTHESIOLOGY

## 2021-03-04 RX ORDER — METHYLPREDNISOLONE ACETATE 80 MG/ML
80 INJECTION, SUSPENSION INTRA-ARTICULAR; INTRALESIONAL; INTRAMUSCULAR; PARENTERAL; SOFT TISSUE ONCE
Status: COMPLETED | OUTPATIENT
Start: 2021-03-04 | End: 2021-03-04

## 2021-03-04 RX ORDER — 0.9 % SODIUM CHLORIDE 0.9 %
10 VIAL (ML) INJECTION ONCE
Status: COMPLETED | OUTPATIENT
Start: 2021-03-04 | End: 2021-03-04

## 2021-03-04 RX ORDER — PAPAVERINE HCL 150 MG
10 CAPSULE, EXTENDED RELEASE ORAL ONCE
Status: COMPLETED | OUTPATIENT
Start: 2021-03-04 | End: 2021-03-04

## 2021-03-04 RX ORDER — BUPIVACAINE HCL/PF 2.5 MG/ML
10 VIAL (ML) INJECTION ONCE
Status: COMPLETED | OUTPATIENT
Start: 2021-03-04 | End: 2021-03-04

## 2021-03-04 RX ADMIN — SODIUM CHLORIDE 1 ML: 9 INJECTION, SOLUTION INTRAMUSCULAR; INTRAVENOUS; SUBCUTANEOUS at 15:32

## 2021-03-04 RX ADMIN — BUPIVACAINE HYDROCHLORIDE 0.5 ML: 2.5 INJECTION, SOLUTION EPIDURAL; INFILTRATION; INTRACAUDAL at 15:35

## 2021-03-04 RX ADMIN — IOHEXOL 1 ML: 300 INJECTION, SOLUTION INTRAVENOUS at 15:34

## 2021-03-04 RX ADMIN — METHYLPREDNISOLONE ACETATE 40 MG: 80 INJECTION, SUSPENSION INTRA-ARTICULAR; INTRALESIONAL; INTRAMUSCULAR; PARENTERAL; SOFT TISSUE at 15:35

## 2021-03-04 RX ADMIN — Medication 1 ML: at 15:32

## 2021-03-04 RX ADMIN — Medication 10 MG: at 15:35

## 2021-03-04 NOTE — TELEPHONE ENCOUNTER
I called and spoke to the patient  I informed her that we can only set up the LYFT rides a week before the appointment  I will add her to our call for LYFT ride calendar so that this can be set up  I also told her that if she does not hear from us by 3/21/21 to call us back to verify

## 2021-03-04 NOTE — H&P
History of Present Illness:  The patient is a 65 y o  female who presents with complaints of  Left-sided neck pain secondary to occipital neuralgia and is here today for left C2-3 facet injection and occipital nerve block    Patient Active Problem List   Diagnosis    Neck pain    Spondylosis of cervical region without myelopathy or radiculopathy       Past Medical History:   Diagnosis Date    Anxiety     Arthritis     shoulder    Carpal tunnel syndrome     Depression     Disease of thyroid gland     Diverticulitis     HBP (high blood pressure)     Hyperlipidemia     Hypertension     Hypothyroid     Pneumonia     Psychiatric disorder     Depression       Past Surgical History:   Procedure Laterality Date    APPENDECTOMY      BREAST BIOPSY Right     benign    BREAST CYST ASPIRATION Right     benign    CATARACT EXTRACTION, BILATERAL  2019    CHOLECYSTECTOMY      COLONOSCOPY W/ BIOPSIES  2011    EGD      MAMMO (HISTORICAL) Bilateral 11/27/2018    MAMMO (HISTORICAL) Bilateral 10/27/2017    MANDIBLE SURGERY      WISDOM TOOTH EXTRACTION      X4         Current Outpatient Medications:     acetaminophen (TYLENOL) 500 mg tablet, Take 1 tablet (500 mg total) by mouth every 6 (six) hours as needed for mild pain, Disp: 30 tablet, Rfl: 0    ALPRAZolam (XANAX) 0 5 mg tablet, Take 1 tablet (0 5 mg total) by mouth 2 (two) times a day as needed for anxiety, Disp: 60 tablet, Rfl: 3    aspirin (ECOTRIN LOW STRENGTH) 81 mg EC tablet, Take 81 mg by mouth daily, Disp: , Rfl:     atorvastatin (LIPITOR) 10 mg tablet, Take 10 mg by mouth 2 (two) times a day, Disp: , Rfl:     baclofen 10 mg tablet, Take 1 tablet (10 mg total) by mouth 2 (two) times a day as needed for muscle spasms, Disp: 30 tablet, Rfl: 5    Calcium Carb-Cholecalciferol (CALCIUM 600+D3) 600-200 MG-UNIT TABS, Take 1 tablet by mouth daily, Disp: , Rfl:     escitalopram (LEXAPRO) 20 mg tablet, Take 1 tablet (20 mg total) by mouth daily, Disp: 30 tablet, Rfl: 5    levothyroxine 100 mcg tablet, Take 1 tablet (100 mcg total) by mouth daily, Disp: 90 tablet, Rfl: 3    Multiple Vitamins-Minerals (CENTRUM ADULTS PO), Take 1 tablet by mouth daily, Disp: , Rfl:     naproxen (NAPROSYN) 500 mg tablet, Take 1 tablet (500 mg total) by mouth 2 (two) times a day with meals, Disp: 60 tablet, Rfl: 5    ramipril (ALTACE) 10 MG capsule, Take 1 capsule (10 mg total) by mouth 2 (two) times a day, Disp: 180 capsule, Rfl: 3    Current Facility-Administered Medications:     bupivacaine (PF) (MARCAINE) 0 25 % injection 10 mL, 10 mL, Intra-articular, Once, Adam Howe MD    dexamethasone (PF) (DECADRON) injection 10 mg, 10 mg, Intra-articular, Once, Adam Howe MD    iohexol (OMNIPAQUE) 300 mg/mL injection 50 mL, 50 mL, Intra-articular, Once, Adma Howe MD    lidocaine (PF) (XYLOCAINE-MPF) 2 % injection 5 mL, 5 mL, Infiltration, Once, Adam Howe MD    methylPREDNISolone acetate (DEPO-MEDROL) injection 80 mg, 80 mg, Intra-articular, Once, Adam Howe MD    sodium chloride (PF) 0 9 % injection 10 mL, 10 mL, Infiltration, Once, Adam Howe MD    No Known Allergies    Physical Exam:   Vitals:    03/04/21 1517   BP: (!) 172/75   Pulse: 75   Resp: 20   Temp: 99 1 °F (37 3 °C)   SpO2: 99%     General: Awake, Alert, Oriented x 3, Mood and affect appropriate  Respiratory: Respirations even and unlabored  Cardiovascular: Peripheral pulses intact; no edema  Musculoskeletal Exam:  Left neck pain    ASA Score: 3    Patient/Chart Verification  Patient ID Verified: Verbal  Consents Confirmed: To be obtained in the Pre-Procedure area  H&P( within 30 days) Verified: To be obtained in the Pre-Procedure area  Allergies Reviewed: Yes  Anticoag/NSAID held?: NA  Currently on antibiotics?: NA    Assessment:   1   Fracture of cervical vertebrae, multiple, initial encounter ([facility] [address] )    2  Cervical spondylosis        Plan: left C2-3 facet injection/L occipital nerve block

## 2021-03-04 NOTE — DISCHARGE INSTR - LAB
Steroid Joint Injection   WHAT YOU NEED TO KNOW:   A steroid joint injection is a procedure to inject steroid medicine into a joint  Steroid medicine decreases pain and inflammation  The injection may also contain an anesthetic (numbing medicine) to decrease pain  It may be done to treat conditions such as arthritis, gout, or carpal tunnel syndrome  The injections may be given in your knee, ankle, shoulder, elbow, wrist, ankle or sacroiliac joint  1  Do not apply heat to any area that is numb  If you have discomfort or soreness at the injection site, you may apply ice today, 20 minutes on and 20 minutes off  Tomorrow you may use ice or warm, moist heat  Do not apply ice or heat directly to the skin  2  You may have an increase or change in the discomfort for 36-48 hours after your treatment  Apply ice and continue with any pain medicine you have been prescribed  3  Do not do anything strenuous today  You may shower, but no tub baths or hot tubs today  You may resume your normal activities tomorrow, but do not overdo it  Resume normal activities slowly when you are feeling better  4  If you experience redness, drainage or swelling at the injection site, or if you develop a fever above 100 degrees, please call [facility] at [phone] or go to the Emergency Room  5  Continue to take all routine medicines prescribed by your primary care physician unless otherwise instructed by our staff  Most blood thinners should be started again according to your regularly scheduled dosing  If you have any questions, please give our office a call  If you have a problem specifically related to your procedure, please call our office at [phone]  Problems not related to your procedure should be directed to your primary care physician

## 2021-03-11 ENCOUNTER — TELEPHONE (OUTPATIENT)
Dept: RADIOLOGY | Facility: CLINIC | Age: 71
End: 2021-03-11

## 2021-03-11 NOTE — TELEPHONE ENCOUNTER
1st attempt   Attempt to call house number which gave a busy signal  Attempt to call cell phone number the patient does not have an voicemail set up yet

## 2021-03-14 DIAGNOSIS — Z76.0 MEDICATION REFILL: Primary | ICD-10-CM

## 2021-03-14 RX ORDER — ATORVASTATIN CALCIUM 10 MG/1
TABLET, FILM COATED ORAL
Qty: 90 TABLET | Refills: 3 | Status: SHIPPED | OUTPATIENT
Start: 2021-03-14 | End: 2021-12-27

## 2021-03-15 ENCOUNTER — TELEPHONE (OUTPATIENT)
Dept: FAMILY MEDICINE CLINIC | Facility: CLINIC | Age: 71
End: 2021-03-15

## 2021-03-17 ENCOUNTER — TELEPHONE (OUTPATIENT)
Dept: OBGYN CLINIC | Facility: HOSPITAL | Age: 71
End: 2021-03-17

## 2021-03-17 NOTE — TELEPHONE ENCOUNTER
Spoke to patient Emily zambrano is scheduled for appointment with Dr Raquel Diggs on 3/22/21  Patient will be picked up @ approximately 2:15pm for a 3:15pm appointment  Emily will call the patient a few minutes before they arrive to pick her up  Patient informed of the above information

## 2021-03-22 ENCOUNTER — HOSPITAL ENCOUNTER (OUTPATIENT)
Dept: RADIOLOGY | Facility: HOSPITAL | Age: 71
Discharge: HOME | End: 2021-03-22
Attending: ORTHOPAEDIC SURGERY
Payer: MEDICARE

## 2021-03-22 ENCOUNTER — OFFICE VISIT (OUTPATIENT)
Dept: OBGYN CLINIC | Facility: HOSPITAL | Age: 71
End: 2021-03-22
Payer: MEDICARE

## 2021-03-22 VITALS
HEART RATE: 76 BPM | BODY MASS INDEX: 23.39 KG/M2 | HEIGHT: 63 IN | WEIGHT: 132 LBS | DIASTOLIC BLOOD PRESSURE: 77 MMHG | SYSTOLIC BLOOD PRESSURE: 169 MMHG

## 2021-03-22 DIAGNOSIS — S12.9XXA FRACTURE OF CERVICAL VERTEBRAE, MULTIPLE, INITIAL ENCOUNTER (HCC): Primary | ICD-10-CM

## 2021-03-22 DIAGNOSIS — M50.30 DDD (DEGENERATIVE DISC DISEASE), CERVICAL: ICD-10-CM

## 2021-03-22 DIAGNOSIS — S12.100K CLOSED ODONTOID FRACTURE WITH NONUNION, SUBSEQUENT ENCOUNTER: ICD-10-CM

## 2021-03-22 DIAGNOSIS — S12.9XXA FRACTURE OF CERVICAL VERTEBRAE, MULTIPLE, INITIAL ENCOUNTER (HCC): ICD-10-CM

## 2021-03-22 PROCEDURE — 99214 OFFICE O/P EST MOD 30 MIN: CPT | Performed by: ORTHOPAEDIC SURGERY

## 2021-03-22 PROCEDURE — 72050 X-RAY EXAM NECK SPINE 4/5VWS: CPT

## 2021-03-22 NOTE — TELEPHONE ENCOUNTER
Patient reports no change since last week  10% relief  Pain scale is 8/10    Patient states the she has appointment with Dr Betty Zamorano on 3/22/21

## 2021-03-22 NOTE — PROGRESS NOTES
Assessment:   patient with persistent activity related neck pain especially with head movements in the setting of history of C2 nonunion  Recent CT scan demonstrates offset of the lateral masses of C1 and C2 on the right  She also has extensive degenerative changes and facet arthrosis at multiple levels  So far recent occipital nerve root block and see to 3 facet injection has not provided lasting relief  On clinical exam she has pain with flexion extension lateral rotation  She has mild torticollis  She has limitations with lateral rotation to the left past 10  Degrees  She is tender to palpation from the occiput to the mid cervical spine  No crepitus  she has tried and failed conservative management  She will need a CT angio to determine the vertebral artery course as well as an MRI of the cervical spine in preparation for surgical intervention which will likely include posterior C1-C2 fusion for her C2 nonunion which appears to be symptomatic       Diagnosis ICD-10-CM Associated Orders   1  Fracture of cervical vertebrae, multiple, initial encounter ([facility] [address] )  S12  9XXA XR spine cervical complete 4 or 5 vw non injury   2  DDD (degenerative disc disease), cervical  M50 30    3  Closed odontoid fracture with nonunion, subsequent encounter  S12 100K CTA head and neck w wo contrast     BUN     Creatinine, serum     MRI cervical spine wo contrast       Plan:    Patient will be sent for CT of head and neck with contrast to evaluate vertebral arteries for presurgical planning of non union of odontoid for cervical fusion of C1 and C2, also get MRI as well for surgical planning, BUN and Creatinine placed  Patient continues with limited range of motion in cervical spine with left cervical paraspinal and upper trapezius musculature  She maintains good strength in bilateral UE and is neurologically stable  See back to review the studies and determine if she wishes to proceed with surgery   She would need to stay on in patient rehab following surgery and would need help at home  See back for imaging review  To do next visit:  Return for see back to review imaging   The above stated was discussed in layman's terms and the patient expressed understanding  All questions were answered to the patient's satisfaction  Scribe Attestation    I,:  Kristin Saenz am acting as a scribe while in the presence of the attending physician :       I,:  Barbi More MD personally performed the services described in this documentation    as scribed in my presence :             Subjective:   Zeynep Bar is a 65 y o  female who presents for follow up cervical spine  Treating for cervical ddd and chronic dens fractures  She was placed back in cervical collar at last appt and referral to pain management for occipital nerve block, C2-3 facet injection  She did have with Dr Jerome Cervantes 3/4/21 and got 2 solid day of relief before symptoms returned  She continues with left sided neck, axial pain and continued radiating pain into occiput  She states the pain isn't as bad as last appt  Denies any radiating pain into her upper extremities, numbness or tingling  No weakness in arms  She is using tylenol for pain as needed  Review of systems negative unless otherwise specified in HPI  Review of Systems   Constitutional: Negative for chills and fever  HENT: Negative for ear pain and sore throat  Eyes: Negative for pain and visual disturbance  Respiratory: Negative for cough and shortness of breath  Cardiovascular: Negative for chest pain and palpitations  Gastrointestinal: Negative for abdominal pain and vomiting  Genitourinary: Negative for dysuria and hematuria  Musculoskeletal: Negative for arthralgias and back pain  Skin: Negative for color change and rash  Neurological: Negative for seizures and syncope  All other systems reviewed and are negative        Past Medical History:   Diagnosis Date    Anxiety  Arthritis     shoulder    Carpal tunnel syndrome     Depression     Disease of thyroid gland     Diverticulitis     HBP (high blood pressure)     Hyperlipidemia     Hypertension     Hypothyroid     Pneumonia     Psychiatric disorder     Depression       Past Surgical History:   Procedure Laterality Date    APPENDECTOMY      BREAST BIOPSY Right     benign    BREAST CYST ASPIRATION Right     benign    CATARACT EXTRACTION, BILATERAL  2019    CHOLECYSTECTOMY      COLONOSCOPY W/ BIOPSIES      EGD      MAMMO (HISTORICAL) Bilateral 2018    MAMMO (HISTORICAL) Bilateral 10/27/2017    MANDIBLE SURGERY      WISDOM TOOTH EXTRACTION      X4       Family History   Problem Relation Age of Onset    Heart disease Mother     Hypertension Mother     Hyperlipidemia Mother     Heart failure Mother     Hypertension Sister        Social History     Occupational History    Not on file   Tobacco Use    Smoking status: Former Smoker     Quit date:      Years since quittin 2    Smokeless tobacco: Never Used   Substance and Sexual Activity    Alcohol use: No     Comment: last drink 7 years ago    Drug use: No    Sexual activity: Not Currently         Current Outpatient Medications:     acetaminophen (TYLENOL) 500 mg tablet, Take 1 tablet (500 mg total) by mouth every 6 (six) hours as needed for mild pain, Disp: 30 tablet, Rfl: 0    ALPRAZolam (XANAX) 0 5 mg tablet, Take 1 tablet (0 5 mg total) by mouth 2 (two) times a day as needed for anxiety, Disp: 60 tablet, Rfl: 3    aspirin (ECOTRIN LOW STRENGTH) 81 mg EC tablet, Take 81 mg by mouth daily, Disp: , Rfl:     atorvastatin (LIPITOR) 10 mg tablet, TAKE 1 TABLET BY MOUTH EVERY DAY AT BEDTIME, Disp: 90 tablet, Rfl: 3    baclofen 10 mg tablet, Take 1 tablet (10 mg total) by mouth 2 (two) times a day as needed for muscle spasms, Disp: 30 tablet, Rfl: 5    Calcium Carb-Cholecalciferol (CALCIUM 600+D3) 600-200 MG-UNIT TABS, Take 1 tablet by mouth daily, Disp: , Rfl:     levothyroxine 100 mcg tablet, Take 1 tablet (100 mcg total) by mouth daily, Disp: 90 tablet, Rfl: 3    Multiple Vitamins-Minerals (CENTRUM ADULTS PO), Take 1 tablet by mouth daily, Disp: , Rfl:     naproxen (NAPROSYN) 500 mg tablet, Take 1 tablet (500 mg total) by mouth 2 (two) times a day with meals, Disp: 60 tablet, Rfl: 5    ramipril (ALTACE) 10 MG capsule, Take 1 capsule (10 mg total) by mouth 2 (two) times a day, Disp: 180 capsule, Rfl: 3    escitalopram (LEXAPRO) 20 mg tablet, Take 1 tablet (20 mg total) by mouth daily, Disp: 30 tablet, Rfl: 5    No Known Allergies         Vitals:    03/22/21 1500   BP: 169/77   Pulse: 76       Objective:                    Back Exam     Comments:  Cervical spine  No acute distress  Gait is normal without assistance, wearing cervical collar  Inspection reveals no open wounds  Cervical ROM is significantly limited in all planes by pain  Left trapezius muscle is TTP along with cervical paraspinals   Grossly motor and sensory stable, C5-T1 bilaterally  Upper extremities are warm and well perfused            Diagnostics, reviewed and taken today if performed as documented: The attending physician has personally reviewed the pertinent films in PACS and interpretation is as follows:XR cervical spine nonunion of odontoid fracture, multi level ddd  Procedures, if performed today:    Procedures    None performed      Portions of the record may have been created with voice recognition software  Occasional wrong word or "sound a like" substitutions may have occurred due to the inherent limitations of voice recognition software  Read the chart carefully and recognize, using context, where substitutions have occurred

## 2021-03-24 ENCOUNTER — APPOINTMENT (OUTPATIENT)
Dept: LAB | Facility: HOSPITAL | Age: 71
End: 2021-03-24
Attending: ORTHOPAEDIC SURGERY
Payer: MEDICARE

## 2021-03-24 DIAGNOSIS — S12.100K CLOSED ODONTOID FRACTURE WITH NONUNION, SUBSEQUENT ENCOUNTER: ICD-10-CM

## 2021-03-24 LAB
BUN SERPL-MCNC: 22 MG/DL (ref 6–20)
CREAT SERPL-MCNC: 0.92 MG/DL (ref 0.4–1.1)
GFR SERPL CREATININE-BSD FRML MDRD: 62 ML/MIN/1.73SQ M

## 2021-03-24 PROCEDURE — 36415 COLL VENOUS BLD VENIPUNCTURE: CPT

## 2021-03-24 PROCEDURE — 84520 ASSAY OF UREA NITROGEN: CPT

## 2021-03-24 PROCEDURE — 82565 ASSAY OF CREATININE: CPT

## 2021-04-04 ENCOUNTER — HOSPITAL ENCOUNTER (OUTPATIENT)
Dept: RADIOLOGY | Facility: HOSPITAL | Age: 71
Discharge: HOME | End: 2021-04-04
Attending: ORTHOPAEDIC SURGERY
Payer: MEDICARE

## 2021-04-04 DIAGNOSIS — S12.100K CLOSED ODONTOID FRACTURE WITH NONUNION, SUBSEQUENT ENCOUNTER: ICD-10-CM

## 2021-04-04 PROCEDURE — 72141 MRI NECK SPINE W/O DYE: CPT

## 2021-04-04 PROCEDURE — G1004 CDSM NDSC: HCPCS

## 2021-04-04 PROCEDURE — 70498 CT ANGIOGRAPHY NECK: CPT

## 2021-04-04 PROCEDURE — 70496 CT ANGIOGRAPHY HEAD: CPT

## 2021-04-04 RX ADMIN — IOHEXOL 75 ML: 350 INJECTION, SOLUTION INTRAVENOUS at 10:45

## 2021-04-15 ENCOUNTER — TELEPHONE (OUTPATIENT)
Dept: FAMILY MEDICINE CLINIC | Facility: CLINIC | Age: 71
End: 2021-04-15

## 2021-04-15 NOTE — TELEPHONE ENCOUNTER
Patient says she is going to have surgery on her neck soon- wants to know if the covid shot is required for the procedure- I told her usually its not required and that she might just need to get tested for covid before but she says she wants dr villalba to get in touch with the surgeon because she cant  Doesn't have a date for surgery yet but her covid shot was held off because she was getting steroid injections

## 2021-04-15 NOTE — TELEPHONE ENCOUNTER
So,usually if the surgeon asks for a covid test that's ordered prior to the surgery I can look in the chart and see if one's ordered

## 2021-04-19 ENCOUNTER — TELEPHONE (OUTPATIENT)
Dept: OBGYN CLINIC | Facility: OTHER | Age: 71
End: 2021-04-19

## 2021-04-19 NOTE — TELEPHONE ENCOUNTER
Patient called in requesting a LYFT ride for her upcoming appointment on 04-26 with Dr Best Pae  Appointment is at 1:15pm     Please call patient back if / when it is set up      C/b # [phone] , can leave a message

## 2021-04-20 ENCOUNTER — TELEPHONE (OUTPATIENT)
Dept: OBGYN CLINIC | Facility: HOSPITAL | Age: 71
End: 2021-04-20

## 2021-04-20 NOTE — TELEPHONE ENCOUNTER
Left voice mail for patient with all of the LYFT information  Patient will be picked up on 4/26/21 at approx 12:15 for her appointment with Dr Shin Counter   Patient will be contacted about 5 minutes before they arrive to pick her up

## 2021-04-26 ENCOUNTER — HOSPITAL ENCOUNTER (OUTPATIENT)
Dept: RADIOLOGY | Facility: HOSPITAL | Age: 71
Discharge: HOME | End: 2021-04-26
Payer: MEDICARE

## 2021-04-26 ENCOUNTER — LAB REQUISITION (OUTPATIENT)
Dept: LAB | Facility: HOSPITAL | Age: 71
End: 2021-04-26
Payer: MEDICARE

## 2021-04-26 ENCOUNTER — OFFICE VISIT (OUTPATIENT)
Dept: OBGYN CLINIC | Facility: HOSPITAL | Age: 71
End: 2021-04-26
Payer: MEDICARE

## 2021-04-26 ENCOUNTER — APPOINTMENT (OUTPATIENT)
Dept: LAB | Facility: HOSPITAL | Age: 71
End: 2021-04-26
Attending: ORTHOPAEDIC SURGERY
Payer: MEDICARE

## 2021-04-26 VITALS
HEART RATE: 90 BPM | BODY MASS INDEX: 23.39 KG/M2 | SYSTOLIC BLOOD PRESSURE: 150 MMHG | WEIGHT: 132 LBS | HEIGHT: 63 IN | DIASTOLIC BLOOD PRESSURE: 88 MMHG

## 2021-04-26 DIAGNOSIS — M50.30 DDD (DEGENERATIVE DISC DISEASE), CERVICAL: ICD-10-CM

## 2021-04-26 DIAGNOSIS — S12.9XXA FRACTURE OF CERVICAL VERTEBRAE, MULTIPLE, INITIAL ENCOUNTER (HCC): ICD-10-CM

## 2021-04-26 DIAGNOSIS — S12.100K CLOSED ODONTOID FRACTURE WITH NONUNION, SUBSEQUENT ENCOUNTER: ICD-10-CM

## 2021-04-26 DIAGNOSIS — S12.9XXA FRACTURE OF CERVICAL VERTEBRAE, MULTIPLE, INITIAL ENCOUNTER (HCC): Primary | ICD-10-CM

## 2021-04-26 DIAGNOSIS — M50.30 OTHER CERVICAL DISC DEGENERATION, UNSPECIFIED CERVICAL REGION: ICD-10-CM

## 2021-04-26 LAB
ABO GROUP BLD: NORMAL
ALBUMIN SERPL BCP-MCNC: 4 G/DL (ref 3.5–5)
ALP SERPL-CCNC: 64 U/L (ref 46–116)
ALT SERPL W P-5'-P-CCNC: 37 U/L (ref 12–78)
ANION GAP SERPL CALCULATED.3IONS-SCNC: 7 MMOL/L (ref 4–13)
APTT PPP: 27 SECONDS (ref 23–37)
AST SERPL W P-5'-P-CCNC: 29 U/L (ref 5–45)
ATRIAL RATE: 65 BPM
BASOPHILS # BLD AUTO: 0.07 THOUSANDS/ΜL (ref 0–0.1)
BASOPHILS NFR BLD AUTO: 1 % (ref 0–1)
BILIRUB SERPL-MCNC: 0.38 MG/DL (ref 0.2–1)
BILIRUB UR QL STRIP: NEGATIVE
BLD GP AB SCN SERPL QL: NEGATIVE
BUN SERPL-MCNC: 21 MG/DL (ref 5–25)
CALCIUM SERPL-MCNC: 9.8 MG/DL (ref 8.3–10.1)
CHLORIDE SERPL-SCNC: 104 MMOL/L (ref 100–108)
CLARITY UR: CLEAR
CO2 SERPL-SCNC: 28 MMOL/L (ref 21–32)
COLOR UR: YELLOW
CREAT SERPL-MCNC: 0.91 MG/DL (ref 0.6–1.3)
EOSINOPHIL # BLD AUTO: 0.38 THOUSAND/ΜL (ref 0–0.61)
EOSINOPHIL NFR BLD AUTO: 5 % (ref 0–6)
ERYTHROCYTE [DISTWIDTH] IN BLOOD BY AUTOMATED COUNT: 12.8 % (ref 11.6–15.1)
EST. AVERAGE GLUCOSE BLD GHB EST-MCNC: 120 MG/DL
GFR SERPL CREATININE-BSD FRML MDRD: 63 ML/MIN/1.73SQ M
GLUCOSE P FAST SERPL-MCNC: 120 MG/DL (ref 65–99)
GLUCOSE UR STRIP-MCNC: NEGATIVE MG/DL
HBA1C MFR BLD: 5.8 %
HCT VFR BLD AUTO: 37 % (ref 34.8–46.1)
HGB BLD-MCNC: 11.9 G/DL (ref 11.5–15.4)
HGB UR QL STRIP.AUTO: NEGATIVE
IMM GRANULOCYTES # BLD AUTO: 0.03 THOUSAND/UL (ref 0–0.2)
IMM GRANULOCYTES NFR BLD AUTO: 0 % (ref 0–2)
INR PPP: 0.98 (ref 0.84–1.19)
KETONES UR STRIP-MCNC: NEGATIVE MG/DL
LEUKOCYTE ESTERASE UR QL STRIP: NEGATIVE
LYMPHOCYTES # BLD AUTO: 1.72 THOUSANDS/ΜL (ref 0.6–4.47)
LYMPHOCYTES NFR BLD AUTO: 24 % (ref 14–44)
MCH RBC QN AUTO: 30.1 PG (ref 26.8–34.3)
MCHC RBC AUTO-ENTMCNC: 32.2 G/DL (ref 31.4–37.4)
MCV RBC AUTO: 93 FL (ref 82–98)
MONOCYTES # BLD AUTO: 0.6 THOUSAND/ΜL (ref 0.17–1.22)
MONOCYTES NFR BLD AUTO: 8 % (ref 4–12)
NEUTROPHILS # BLD AUTO: 4.44 THOUSANDS/ΜL (ref 1.85–7.62)
NEUTS SEG NFR BLD AUTO: 62 % (ref 43–75)
NITRITE UR QL STRIP: NEGATIVE
NRBC BLD AUTO-RTO: 0 /100 WBCS
P AXIS: 66 DEGREES
PH UR STRIP.AUTO: 6 [PH]
PLATELET # BLD AUTO: 316 THOUSANDS/UL (ref 149–390)
PMV BLD AUTO: 9.5 FL (ref 8.9–12.7)
POTASSIUM SERPL-SCNC: 4.1 MMOL/L (ref 3.5–5.3)
PR INTERVAL: 162 MS
PROT SERPL-MCNC: 7.1 G/DL (ref 6.4–8.2)
PROT UR STRIP-MCNC: NEGATIVE MG/DL
PROTHROMBIN TIME: 13 SECONDS (ref 11.6–14.5)
QRS AXIS: -37 DEGREES
QRSD INTERVAL: 110 MS
QT INTERVAL: 448 MS
QTC INTERVAL: 465 MS
RBC # BLD AUTO: 3.96 MILLION/UL (ref 3.81–5.12)
RH BLD: POSITIVE
SODIUM SERPL-SCNC: 139 MMOL/L (ref 136–145)
SP GR UR STRIP.AUTO: 1 (ref 1–1.03)
SPECIMEN EXPIRATION DATE: NORMAL
T WAVE AXIS: 76 DEGREES
UROBILINOGEN UR QL STRIP.AUTO: 0.2 E.U./DL
VENTRICULAR RATE: 65 BPM
WBC # BLD AUTO: 7.24 THOUSAND/UL (ref 4.31–10.16)

## 2021-04-26 PROCEDURE — 85610 PROTHROMBIN TIME: CPT

## 2021-04-26 PROCEDURE — 71046 X-RAY EXAM CHEST 2 VIEWS: CPT

## 2021-04-26 PROCEDURE — 93005 ELECTROCARDIOGRAM TRACING: CPT

## 2021-04-26 PROCEDURE — 83036 HEMOGLOBIN GLYCOSYLATED A1C: CPT

## 2021-04-26 PROCEDURE — 85730 THROMBOPLASTIN TIME PARTIAL: CPT

## 2021-04-26 PROCEDURE — 85025 COMPLETE CBC W/AUTO DIFF WBC: CPT

## 2021-04-26 PROCEDURE — 93010 ELECTROCARDIOGRAM REPORT: CPT | Performed by: INTERNAL MEDICINE

## 2021-04-26 PROCEDURE — 36415 COLL VENOUS BLD VENIPUNCTURE: CPT

## 2021-04-26 PROCEDURE — 86901 BLOOD TYPING SEROLOGIC RH(D): CPT | Performed by: ORTHOPAEDIC SURGERY

## 2021-04-26 PROCEDURE — 81003 URINALYSIS AUTO W/O SCOPE: CPT | Performed by: ORTHOPAEDIC SURGERY

## 2021-04-26 PROCEDURE — 80053 COMPREHEN METABOLIC PANEL: CPT

## 2021-04-26 PROCEDURE — 86850 RBC ANTIBODY SCREEN: CPT | Performed by: ORTHOPAEDIC SURGERY

## 2021-04-26 PROCEDURE — 86900 BLOOD TYPING SEROLOGIC ABO: CPT | Performed by: ORTHOPAEDIC SURGERY

## 2021-04-26 PROCEDURE — 99214 OFFICE O/P EST MOD 30 MIN: CPT | Performed by: ORTHOPAEDIC SURGERY

## 2021-04-26 RX ORDER — GABAPENTIN 300 MG/1
300 CAPSULE ORAL ONCE
Status: CANCELLED | OUTPATIENT
Start: 2021-04-26 | End: 2021-04-26

## 2021-04-26 RX ORDER — CEFAZOLIN SODIUM 1 G/50ML
1000 SOLUTION INTRAVENOUS ONCE
Status: CANCELLED | OUTPATIENT
Start: 2021-04-26 | End: 2021-04-26

## 2021-04-26 RX ORDER — CHLORHEXIDINE GLUCONATE 0.12 MG/ML
15 RINSE ORAL ONCE
Status: CANCELLED | OUTPATIENT
Start: 2021-04-26 | End: 2021-04-26

## 2021-04-26 RX ORDER — ACETAMINOPHEN 325 MG/1
975 TABLET ORAL ONCE
Status: CANCELLED | OUTPATIENT
Start: 2021-04-26 | End: 2021-04-26

## 2021-04-26 NOTE — PROGRESS NOTES
Assessment/Plan:    No problem-specific Assessment & Plan notes found for this encounter  C2 odontoid non-union  · Cervical MRI and head CT reviewed with patient  · C1-C2 fusion can be considered yet is explained to patient this does not guarantee pain relief  · Patient displays verbal understanding and would like to proceed with the procedure  · Risks, benefits and expectations were discussed in detail with patient  · Patient is a candidate for posterior cervical fusion C1-C2 with allograft and possible additional levels  she understands risks of surgery to include but not limited to bleeding, infection, CSF leak, nerve damage, possible persistent symptoms, possible need for reoperation  she will need medical clearance and blood work  · Follow up post-op         Problem List Items Addressed This Visit     None      Visit Diagnoses     [facility]e, [facility]    -  Primary    DDD (degenerative disc disease), cervical        Closed odontoid fracture with nonunion, subsequent encounter                Subjective:      Patient ID: Mouna Martines is a 66 y o  female  HPI   The patient presents for follow up of C2 non-union post C1-C2 fusion with Head CT and cervical MRI review  Today she complains of left and posterior cervical pain  She rates her symptoms 5/10 and 10/10 at its worse  She denies radiating symptoms into the arm  He does mention balance issues with walking  Cervical rotation aggravates while remaining still alleviates  She does use a cervical collar  She uses Tylenol for pain  She has tried nerve blocks and facet injections with Dr Pedersen with limited benefit        The following portions of the patient's history were reviewed and updated as appropriate: allergies, current medications, past family history, past medical history, past social history, past surgical history and problem list     Review of Systems   Constitutional: Negative for chills, fever and unexpected weight change  HENT: Negative for hearing loss, nosebleeds and sore throat  Eyes: Negative for pain, redness and visual disturbance  Respiratory: Negative for cough, shortness of breath and wheezing  Cardiovascular: Negative for chest pain, palpitations and leg swelling  Gastrointestinal: Negative for abdominal pain, nausea and vomiting  Genitourinary: Negative for dyspareunia, dysuria and frequency  Skin: Negative for rash and wound  Neurological: Negative for dizziness, numbness and headaches  Psychiatric/Behavioral: Negative for decreased concentration and suicidal ideas  The patient is not nervous/anxious  Objective:      /88   Pulse 90   Ht 5' 3" (1 6 m)   Wt 59 9 kg (132 lb)   BMI 23 38 kg/m²          Physical Exam  Constitutional:       Appearance: She is well-developed  HENT:      Head: Normocephalic  Eyes:      Conjunctiva/sclera: Conjunctivae normal    Neck:      Musculoskeletal: Normal range of motion  Cardiovascular:      Rate and Rhythm: Normal rate  Pulmonary:      Effort: Pulmonary effort is normal    Skin:     General: Skin is warm and dry  Neurological:      Mental Status: She is alert and oriented to person, place, and time  Patient ambulates without assistance   Patient in cervical collar  Tender to palpation over cervical musculature  Strength C5-T1 5/5 bilaterally   Sensation C5-T1 intact bilaterally       Imaging:  Cervical MRI of 4/4/2021  Radiologist IMPRESSION:     1  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

## 2021-04-28 ENCOUNTER — TELEPHONE (OUTPATIENT)
Dept: FAMILY MEDICINE CLINIC | Facility: CLINIC | Age: 71
End: 2021-04-28

## 2021-04-28 DIAGNOSIS — I10 HYPERTENSION, UNSPECIFIED TYPE: ICD-10-CM

## 2021-04-28 RX ORDER — RAMIPRIL 10 MG/1
10 CAPSULE ORAL 2 TIMES DAILY
Qty: 180 CAPSULE | Refills: 3 | Status: SHIPPED | OUTPATIENT
Start: 2021-04-28 | End: 2022-04-26

## 2021-04-29 ENCOUNTER — ANESTHESIA EVENT (OUTPATIENT)
Dept: PERIOP | Facility: HOSPITAL | Age: 71
DRG: 472 | End: 2021-04-29
Payer: MEDICARE

## 2021-05-03 ENCOUNTER — OFFICE VISIT (OUTPATIENT)
Dept: FAMILY MEDICINE CLINIC | Facility: CLINIC | Age: 71
End: 2021-05-03
Payer: MEDICARE

## 2021-05-03 VITALS
RESPIRATION RATE: 16 BRPM | WEIGHT: 132 LBS | OXYGEN SATURATION: 96 % | TEMPERATURE: 98.1 F | HEIGHT: 63 IN | BODY MASS INDEX: 23.39 KG/M2 | HEART RATE: 81 BPM | SYSTOLIC BLOOD PRESSURE: 138 MMHG | DIASTOLIC BLOOD PRESSURE: 72 MMHG

## 2021-05-03 DIAGNOSIS — S12.9XXA FRACTURE OF CERVICAL VERTEBRAE, MULTIPLE, INITIAL ENCOUNTER (HCC): ICD-10-CM

## 2021-05-03 DIAGNOSIS — Z01.818 PRE-OP EXAMINATION: Primary | ICD-10-CM

## 2021-05-03 DIAGNOSIS — M50.30 DDD (DEGENERATIVE DISC DISEASE), CERVICAL: ICD-10-CM

## 2021-05-03 DIAGNOSIS — S12.100K CLOSED ODONTOID FRACTURE WITH NONUNION, SUBSEQUENT ENCOUNTER: ICD-10-CM

## 2021-05-03 DIAGNOSIS — Z13.820 OSTEOPOROSIS SCREENING: ICD-10-CM

## 2021-05-03 PROCEDURE — 99214 OFFICE O/P EST MOD 30 MIN: CPT | Performed by: INTERNAL MEDICINE

## 2021-05-03 NOTE — PROGRESS NOTES
PRE-OP CLEARANCE    Falls Plan of Care: Balance, strength, and gait training instructions were provided  EKG reviewed and NSR rate 60  Assessment/Plan:    No problem-specific Assessment & Plan notes found for this encounter  Diagnoses and all orders for this visit:    Pre-op examination    Fracture of cervical vertebrae, multiple, initial encounter ([facility] [address] )  -     Ambulatory referral to Internal Medicine  -     DXA bone density spine hip and pelvis; Future    DDD (degenerative disc disease), cervical  -     Ambulatory referral to Internal Medicine    Closed odontoid fracture with nonunion, subsequent encounter  -     Ambulatory referral to Internal Medicine    BMI 23 0-23 9, adult    Osteoporosis screening  -     DXA bone density spine hip and pelvis; Future    Other orders  -     Cancel: DXA bone density spine hip and pelvis; Future          Subjective:      Patient ID: Gisselle Blue is a 66 y o  female  Wilmer here for preoperative risk assessment prior to cervical spine fusion surgery on May 25-Josee has no hx of CAD, CHF, CVA, insulin requiring DM, or renal issues-I reviewed all of her medications and the preoperative labwork that was ordered by orthopedics-she does not smoke          Review of Systems   Constitutional: Negative for fatigue and fever  Respiratory: Negative  Cardiovascular: Negative  Gastrointestinal: Negative  Genitourinary: Negative  Musculoskeletal: Positive for neck pain and neck stiffness  Neurological: Negative  Hematological: Negative  Psychiatric/Behavioral: The patient is nervous/anxious  Objective:      /72 (BP Location: Right arm, Patient Position: Sitting, Cuff Size: Adult)   Pulse 81   Temp 98 1 °F (36 7 °C) (Temporal)   Resp 16   Ht 5' 3" (1 6 m)   Wt 59 9 kg (132 lb)   SpO2 96%   BMI 23 38 kg/m²          Physical Exam  Constitutional:       Appearance: Normal appearance  HENT:      Head: Normocephalic and atraumatic  Right Ear: External ear normal       Left Ear: External ear normal       Nose: Nose normal       Mouth/Throat:      Mouth: Mucous membranes are moist    Eyes:      Pupils: Pupils are equal, round, and reactive to light  Neck:      Musculoskeletal: Neck rigidity present  Cardiovascular:      Rate and Rhythm: Normal rate and regular rhythm  Pulmonary:      Effort: Pulmonary effort is normal       Breath sounds: Normal breath sounds  Musculoskeletal: Normal range of motion  Skin:     General: Skin is warm  Capillary Refill: Capillary refill takes less than 2 seconds  Neurological:      General: No focal deficit present  Mental Status: She is alert

## 2021-05-03 NOTE — PATIENT INSTRUCTIONS
Fall Prevention   AMBULATORY CARE:   Fall prevention  includes ways to make your home and other areas safer  It also includes ways you can move more carefully to prevent a fall  Health conditions that cause changes in your blood pressure, vision, or muscle strength and coordination may increase your risk for falls  Medicines may also increase your risk for falls if they make you dizzy, weak, or sleepy  Call 911 or have someone else call if:   · You have fallen and are unconscious  · You have fallen and cannot move part of your body  Contact your healthcare provider if:   · You have fallen and have pain or a headache  · You have questions or concerns about your condition or care  Fall prevention tips:   · Stand or sit up slowly  This may help you keep your balance and prevent falls  · Use assistive devices as directed  Your healthcare provider may suggest that you use a cane or walker to help you keep your balance  You may need to have grab bars put in your bathroom near the toilet or in the shower  · Wear shoes that fit well and have soles that   Wear shoes both inside and outside  Use slippers with good   Do not wear shoes with high heels  · Wear a personal alarm  This is a device that allows you to call 911 if you fall and need help  Ask your healthcare provider for more information  · Stay active  Exercise can help strengthen your muscles and improve your balance  Your healthcare provider may recommend water aerobics or walking  He or she may also recommend physical therapy to improve your coordination  Never start an exercise program without talking to your healthcare provider first          · Manage your medical conditions  Keep all appointments with your healthcare providers  Visit your eye doctor as directed  Home safety tips:       · Add items to prevent falls in the bathroom  Put nonslip strips on your bath or shower floor to prevent you from slipping   Use a bath mat if you do not have carpet in the bathroom  This will prevent you from falling when you step out of the bath or shower  Use a shower seat so you do not need to stand while you shower  Sit on the toilet or a chair in your bathroom to dry yourself and put on clothing  This will prevent you from losing your balance from drying or dressing yourself while you are standing  · Keep paths clear  Remove books, shoes, and other objects from walkways and stairs  Place cords for telephones and lamps out of the way so that you do not need to walk over them  Tape them down if you cannot move them  Remove small rugs  If you cannot remove a rug, secure it with double-sided tape  This will prevent you from tripping  · Install bright lights in your home  Use night lights to help light paths to the bathroom or kitchen  Always turn on the light before you start walking  · Keep items you use often on shelves within reach  Do not use a step stool to help you reach an item  · Paint or place reflective tape on the edges of your stairs  This will help you see the stairs better  Follow up with your healthcare provider as directed:  Write down your questions so you remember to ask them during your visits  © Copyright [address] Information is for End User's use only and may not be sold, redistributed or otherwise used for commercial purposes  All illustrations and images included in CareNotes® are the copyrighted property of [employer]  or [facility]   The above information is an  only  It is not intended as medical advice for individual conditions or treatments  Talk to your doctor, nurse or pharmacist before following any medical regimen to see if it is safe and effective for you

## 2021-05-03 NOTE — H&P (VIEW-ONLY)
PRE-OP CLEARANCE    Falls Plan of Care: Balance, strength, and gait training instructions were provided  EKG reviewed and NSR rate 60  Assessment/Plan:    No problem-specific Assessment & Plan notes found for this encounter  Diagnoses and all orders for this visit:    Pre-op examination    Fracture of cervical vertebrae, multiple, initial encounter ([facility] [address] )  -     Ambulatory referral to Internal Medicine  -     DXA bone density spine hip and pelvis; Future    DDD (degenerative disc disease), cervical  -     Ambulatory referral to Internal Medicine    Closed odontoid fracture with nonunion, subsequent encounter  -     Ambulatory referral to Internal Medicine    BMI 23 0-23 9, adult    Osteoporosis screening  -     DXA bone density spine hip and pelvis; Future    Other orders  -     Cancel: DXA bone density spine hip and pelvis; Future          Subjective:      Patient ID: Jerome Betts is a 66 y o  female  Bruce Nicholasinz here for preoperative risk assessment prior to cervical spine fusion surgery on May 25-Josee has no hx of CAD, CHF, CVA, insulin requiring DM, or renal issues-I reviewed all of her medications and the preoperative labwork that was ordered by orthopedics-she does not smoke          Review of Systems   Constitutional: Negative for fatigue and fever  Respiratory: Negative  Cardiovascular: Negative  Gastrointestinal: Negative  Genitourinary: Negative  Musculoskeletal: Positive for neck pain and neck stiffness  Neurological: Negative  Hematological: Negative  Psychiatric/Behavioral: The patient is nervous/anxious  Objective:      /72 (BP Location: Right arm, Patient Position: Sitting, Cuff Size: Adult)   Pulse 81   Temp 98 1 °F (36 7 °C) (Temporal)   Resp 16   Ht 5' 3" (1 6 m)   Wt 59 9 kg (132 lb)   SpO2 96%   BMI 23 38 kg/m²          Physical Exam  Constitutional:       Appearance: Normal appearance  HENT:      Head: Normocephalic and atraumatic  Right Ear: External ear normal       Left Ear: External ear normal       Nose: Nose normal       Mouth/Throat:      Mouth: Mucous membranes are moist    Eyes:      Pupils: Pupils are equal, round, and reactive to light  Neck:      Musculoskeletal: Neck rigidity present  Cardiovascular:      Rate and Rhythm: Normal rate and regular rhythm  Pulmonary:      Effort: Pulmonary effort is normal       Breath sounds: Normal breath sounds  Musculoskeletal: Normal range of motion  Skin:     General: Skin is warm  Capillary Refill: Capillary refill takes less than 2 seconds  Neurological:      General: No focal deficit present  Mental Status: She is alert

## 2021-05-03 NOTE — ASSESSMENT & PLAN NOTE
Bethany Watts per RCRI index is a Class 0 risk, which carries a 3 9 % risk of death, MI, or cardiac arrest within 30 days of the surgery-recommend proceeding with surgery as planned-hold aspirin several days prior to surgery

## 2021-05-05 ENCOUNTER — TELEPHONE (OUTPATIENT)
Dept: OBGYN CLINIC | Facility: HOSPITAL | Age: 71
End: 2021-05-05

## 2021-05-06 ENCOUNTER — TELEPHONE (OUTPATIENT)
Dept: FAMILY MEDICINE CLINIC | Facility: CLINIC | Age: 71
End: 2021-05-06

## 2021-05-06 NOTE — ANESTHESIA PREPROCEDURE EVALUATION
Procedure:  FUSION CERVICAL POSTERIOR C1-C2 with allograft and neuromonitoring (possible additional levels) (N/A Spine Cervical)    Relevant Problems   MUSCULOSKELETAL   (+) Spondylosis of cervical region without myelopathy or radiculopathy      Pain doc: perea  Pain regimen: tylenol, xanax, naprosyn  Tylenol and gabapentin ordered preop by surgeon  Physical Exam    Airway    Mallampati score: II  TM Distance: >3 FB  Neck ROM: limited     Dental   No notable dental hx     Cardiovascular  Rhythm: regular, Rate: normal,     Pulmonary  Breath sounds clear to auscultation,     Other Findings       Hx of HTN, HLD  > 4 met exercise kim  No red flag cardiopulmonary symptoms      Anesthesia Plan  ASA Score- 3     Anesthesia Type- general with ASA Monitors  Additional Monitors: arterial line  Airway Plan: ETT  Plan Factors-    Chart reviewed  EKG reviewed  Imaging results reviewed  Existing labs reviewed  Patient summary reviewed  Patient is not a current smoker  Induction- intravenous  Postoperative Plan-     Informed Consent- Anesthetic plan and risks discussed with patient  I personally reviewed this patient with the CRNA  Discussed and agreed on the Anesthesia Plan with the CRNA  Bradly Cramer

## 2021-05-06 NOTE — PRE-PROCEDURE INSTRUCTIONS
Pre-Surgery Instructions:   Medication Instructions    acetaminophen (TYLENOL) 500 mg tablet Instructed patient per Anesthesia Guidelines   ALPRAZolam (XANAX) 0 5 mg tablet Instructed patient per Anesthesia Guidelines   aspirin (ECOTRIN LOW STRENGTH) 81 mg EC tablet Patient was instructed by Physician and understands   atorvastatin (LIPITOR) 10 mg tablet Instructed patient per Anesthesia Guidelines   baclofen 10 mg tablet Instructed patient per Anesthesia Guidelines   Calcium Carb-Cholecalciferol (CALCIUM 600+D3) 600-200 MG-UNIT TABS Patient was instructed by Physician and understands   levothyroxine 100 mcg tablet Instructed patient per Anesthesia Guidelines   Multiple Vitamins-Minerals (CENTRUM ADULTS PO) Patient was instructed by Physician and understands   naproxen (NAPROSYN) 500 mg tablet Patient was instructed by Physician and understands   ramipril (ALTACE) 10 MG capsule Instructed patient per Anesthesia Guidelines  Pre op and bathing instructions reviewed  Pt has hibiclens  Pt  Verbalized understanding of current visitor restrictions  Pt  Verbalized an understanding of all instructions reviewed and offers no concerns at this time  Instructed to avoid all NSAIDs and OTC Vit/Supp from now until after surgery   Tylenol ok prn    ASA  Hold 5 days prior to Sx as per PCP  Instructed to take per normal schedule except DOS  DOS instructed to take Levothyroxine,Baclofen and Xanax with a few sips of H2O

## 2021-05-06 NOTE — TELEPHONE ENCOUNTER
Patient wants to confirm if baby aspirin should be discontinued prior to surgery scheduled for 05/25/2021, how many days prior and after  Pending on call back for advice, thanks   ND

## 2021-05-07 ENCOUNTER — TELEPHONE (OUTPATIENT)
Dept: FAMILY MEDICINE CLINIC | Facility: CLINIC | Age: 71
End: 2021-05-07

## 2021-05-07 NOTE — TELEPHONE ENCOUNTER
[facility] called and want to know if you could addendum your note to include the EKG result for her surgery

## 2021-05-18 DIAGNOSIS — S12.9XXA FRACTURE OF CERVICAL VERTEBRAE, MULTIPLE, INITIAL ENCOUNTER (HCC): ICD-10-CM

## 2021-05-18 DIAGNOSIS — M50.30 DDD (DEGENERATIVE DISC DISEASE), CERVICAL: ICD-10-CM

## 2021-05-18 DIAGNOSIS — S12.100K CLOSED ODONTOID FRACTURE WITH NONUNION, SUBSEQUENT ENCOUNTER: ICD-10-CM

## 2021-05-18 LAB — SARS-COV-2 RNA RESP QL NAA+PROBE: NEGATIVE

## 2021-05-18 PROCEDURE — U0003 INFECTIOUS AGENT DETECTION BY NUCLEIC ACID (DNA OR RNA); SEVERE ACUTE RESPIRATORY SYNDROME CORONAVIRUS 2 (SARS-COV-2) (CORONAVIRUS DISEASE [COVID-19]), AMPLIFIED PROBE TECHNIQUE, MAKING USE OF HIGH THROUGHPUT TECHNOLOGIES AS DESCRIBED BY CMS-2020-01-R: HCPCS | Performed by: ORTHOPAEDIC SURGERY

## 2021-05-18 PROCEDURE — U0005 INFEC AGEN DETEC AMPLI PROBE: HCPCS | Performed by: ORTHOPAEDIC SURGERY

## 2021-05-24 ENCOUNTER — TELEPHONE (OUTPATIENT)
Dept: OBGYN CLINIC | Facility: HOSPITAL | Age: 71
End: 2021-05-24

## 2021-05-25 ENCOUNTER — ANESTHESIA (OUTPATIENT)
Dept: PERIOP | Facility: HOSPITAL | Age: 71
DRG: 472 | End: 2021-05-25
Payer: MEDICARE

## 2021-05-25 ENCOUNTER — APPOINTMENT (OUTPATIENT)
Dept: RADIOLOGY | Facility: HOSPITAL | Age: 71
DRG: 472 | End: 2021-05-25
Payer: MEDICARE

## 2021-05-25 ENCOUNTER — HOSPITAL ENCOUNTER (INPATIENT)
Facility: HOSPITAL | Age: 71
LOS: 3 days | Discharge: NURSING, REHAB OR LONG-TERM CARE | DRG: 472 | End: 2021-05-28
Attending: ORTHOPAEDIC SURGERY | Admitting: ORTHOPAEDIC SURGERY
Payer: MEDICARE

## 2021-05-25 DIAGNOSIS — Z98.1 S/P CERVICAL SPINAL FUSION: ICD-10-CM

## 2021-05-25 DIAGNOSIS — K92.0 HEMATEMESIS: ICD-10-CM

## 2021-05-25 DIAGNOSIS — E03.9 HYPOTHYROIDISM, UNSPECIFIED TYPE: ICD-10-CM

## 2021-05-25 DIAGNOSIS — I10 HYPERTENSION, UNSPECIFIED TYPE: Primary | ICD-10-CM

## 2021-05-25 DIAGNOSIS — E78.5 HYPERLIPIDEMIA, UNSPECIFIED HYPERLIPIDEMIA TYPE: ICD-10-CM

## 2021-05-25 LAB
ABO GROUP BLD: NORMAL
BLD GP AB SCN SERPL QL: NEGATIVE
GLUCOSE SERPL-MCNC: 166 MG/DL (ref 65–140)
RH BLD: POSITIVE
SPECIMEN EXPIRATION DATE: NORMAL

## 2021-05-25 PROCEDURE — 86850 RBC ANTIBODY SCREEN: CPT | Performed by: ORTHOPAEDIC SURGERY

## 2021-05-25 PROCEDURE — 82948 REAGENT STRIP/BLOOD GLUCOSE: CPT

## 2021-05-25 PROCEDURE — 22595 ARTHRD PST TQ ATLAS-AXIS: CPT | Performed by: ORTHOPAEDIC SURGERY

## 2021-05-25 PROCEDURE — 72040 X-RAY EXAM NECK SPINE 2-3 VW: CPT

## 2021-05-25 PROCEDURE — C1713 ANCHOR/SCREW BN/BN,TIS/BN: HCPCS | Performed by: ORTHOPAEDIC SURGERY

## 2021-05-25 PROCEDURE — 86901 BLOOD TYPING SEROLOGIC RH(D): CPT | Performed by: ORTHOPAEDIC SURGERY

## 2021-05-25 PROCEDURE — 86900 BLOOD TYPING SEROLOGIC ABO: CPT | Performed by: ORTHOPAEDIC SURGERY

## 2021-05-25 PROCEDURE — 0RG10J1 FUSION OF CERVICAL VERTEBRAL JOINT WITH SYNTHETIC SUBSTITUTE, POSTERIOR APPROACH, POSTERIOR COLUMN, OPEN APPROACH: ICD-10-PCS | Performed by: ORTHOPAEDIC SURGERY

## 2021-05-25 PROCEDURE — 0PS304Z REPOSITION CERVICAL VERTEBRA WITH INTERNAL FIXATION DEVICE, OPEN APPROACH: ICD-10-PCS | Performed by: ORTHOPAEDIC SURGERY

## 2021-05-25 PROCEDURE — 22840 INSERT SPINE FIXATION DEVICE: CPT | Performed by: ORTHOPAEDIC SURGERY

## 2021-05-25 DEVICE — POLYAXIAL SCREW
Type: IMPLANTABLE DEVICE | Site: POSTERIOR CERVICAL | Status: FUNCTIONAL
Brand: OASYS

## 2021-05-25 DEVICE — ROD
Type: IMPLANTABLE DEVICE | Site: POSTERIOR CERVICAL | Status: FUNCTIONAL
Brand: OASYS

## 2021-05-25 DEVICE — SMOOTH SHANK POLYAXIAL SCREW
Type: IMPLANTABLE DEVICE | Site: POSTERIOR CERVICAL | Status: FUNCTIONAL
Brand: OASYS

## 2021-05-25 DEVICE — BLOCKER
Type: IMPLANTABLE DEVICE | Site: POSTERIOR CERVICAL | Status: FUNCTIONAL
Brand: OASYS

## 2021-05-25 RX ORDER — PROPOFOL 10 MG/ML
INJECTION, EMULSION INTRAVENOUS CONTINUOUS PRN
Status: DISCONTINUED | OUTPATIENT
Start: 2021-05-25 | End: 2021-05-25

## 2021-05-25 RX ORDER — ROCURONIUM BROMIDE 10 MG/ML
INJECTION, SOLUTION INTRAVENOUS AS NEEDED
Status: DISCONTINUED | OUTPATIENT
Start: 2021-05-25 | End: 2021-05-25

## 2021-05-25 RX ORDER — GABAPENTIN 300 MG/1
300 CAPSULE ORAL 2 TIMES DAILY
Qty: 60 CAPSULE | Refills: 0 | Status: SHIPPED | OUTPATIENT
Start: 2021-05-25 | End: 2021-05-25 | Stop reason: SDUPTHER

## 2021-05-25 RX ORDER — FENTANYL CITRATE/PF 50 MCG/ML
25 SYRINGE (ML) INJECTION
Status: DISCONTINUED | OUTPATIENT
Start: 2021-05-25 | End: 2021-05-25 | Stop reason: HOSPADM

## 2021-05-25 RX ORDER — PROPOFOL 10 MG/ML
INJECTION, EMULSION INTRAVENOUS AS NEEDED
Status: DISCONTINUED | OUTPATIENT
Start: 2021-05-25 | End: 2021-05-25

## 2021-05-25 RX ORDER — VANCOMYCIN HYDROCHLORIDE 1 G/20ML
INJECTION, POWDER, LYOPHILIZED, FOR SOLUTION INTRAVENOUS AS NEEDED
Status: DISCONTINUED | OUTPATIENT
Start: 2021-05-25 | End: 2021-05-25 | Stop reason: HOSPADM

## 2021-05-25 RX ORDER — DEXAMETHASONE SODIUM PHOSPHATE 10 MG/ML
INJECTION, SOLUTION INTRAMUSCULAR; INTRAVENOUS AS NEEDED
Status: DISCONTINUED | OUTPATIENT
Start: 2021-05-25 | End: 2021-05-25

## 2021-05-25 RX ORDER — ALPRAZOLAM 0.5 MG/1
0.5 TABLET ORAL 2 TIMES DAILY PRN
Status: DISCONTINUED | OUTPATIENT
Start: 2021-05-25 | End: 2021-05-28 | Stop reason: HOSPADM

## 2021-05-25 RX ORDER — ONDANSETRON 2 MG/ML
INJECTION INTRAMUSCULAR; INTRAVENOUS AS NEEDED
Status: DISCONTINUED | OUTPATIENT
Start: 2021-05-25 | End: 2021-05-25

## 2021-05-25 RX ORDER — SODIUM CHLORIDE, SODIUM LACTATE, POTASSIUM CHLORIDE, CALCIUM CHLORIDE 600; 310; 30; 20 MG/100ML; MG/100ML; MG/100ML; MG/100ML
INJECTION, SOLUTION INTRAVENOUS CONTINUOUS PRN
Status: DISCONTINUED | OUTPATIENT
Start: 2021-05-25 | End: 2021-05-25

## 2021-05-25 RX ORDER — FENTANYL CITRATE 50 UG/ML
INJECTION, SOLUTION INTRAMUSCULAR; INTRAVENOUS AS NEEDED
Status: DISCONTINUED | OUTPATIENT
Start: 2021-05-25 | End: 2021-05-25

## 2021-05-25 RX ORDER — DOCUSATE SODIUM 100 MG/1
100 CAPSULE, LIQUID FILLED ORAL 2 TIMES DAILY
Qty: 10 CAPSULE | Refills: 0 | Status: SHIPPED | OUTPATIENT
Start: 2021-05-25 | End: 2022-01-10 | Stop reason: ALTCHOICE

## 2021-05-25 RX ORDER — CEFAZOLIN SODIUM 1 G/50ML
1000 SOLUTION INTRAVENOUS ONCE
Status: COMPLETED | OUTPATIENT
Start: 2021-05-25 | End: 2021-05-25

## 2021-05-25 RX ORDER — DOCUSATE SODIUM 100 MG/1
100 CAPSULE, LIQUID FILLED ORAL 2 TIMES DAILY
Status: DISCONTINUED | OUTPATIENT
Start: 2021-05-25 | End: 2021-05-28 | Stop reason: HOSPADM

## 2021-05-25 RX ORDER — SULFAMETHOXAZOLE AND TRIMETHOPRIM 800; 160 MG/1; MG/1
1 TABLET ORAL 2 TIMES DAILY
Qty: 10 TABLET | Refills: 0 | Status: SHIPPED | OUTPATIENT
Start: 2021-05-25 | End: 2021-05-30

## 2021-05-25 RX ORDER — ACETAMINOPHEN 325 MG/1
975 TABLET ORAL EVERY 8 HOURS
Status: DISCONTINUED | OUTPATIENT
Start: 2021-05-25 | End: 2021-05-28 | Stop reason: HOSPADM

## 2021-05-25 RX ORDER — SODIUM CHLORIDE, SODIUM LACTATE, POTASSIUM CHLORIDE, CALCIUM CHLORIDE 600; 310; 30; 20 MG/100ML; MG/100ML; MG/100ML; MG/100ML
50 INJECTION, SOLUTION INTRAVENOUS CONTINUOUS
Status: DISPENSED | OUTPATIENT
Start: 2021-05-25 | End: 2021-05-26

## 2021-05-25 RX ORDER — CHLORHEXIDINE GLUCONATE 0.12 MG/ML
15 RINSE ORAL ONCE
Status: COMPLETED | OUTPATIENT
Start: 2021-05-25 | End: 2021-05-25

## 2021-05-25 RX ORDER — OXYCODONE HYDROCHLORIDE 5 MG/1
5 TABLET ORAL EVERY 4 HOURS PRN
Status: DISCONTINUED | OUTPATIENT
Start: 2021-05-25 | End: 2021-05-26

## 2021-05-25 RX ORDER — SODIUM CHLORIDE 9 MG/ML
INJECTION, SOLUTION INTRAVENOUS CONTINUOUS PRN
Status: DISCONTINUED | OUTPATIENT
Start: 2021-05-25 | End: 2021-05-25

## 2021-05-25 RX ORDER — HYDRALAZINE HYDROCHLORIDE 25 MG/1
25 TABLET, FILM COATED ORAL EVERY 8 HOURS PRN
Status: DISCONTINUED | OUTPATIENT
Start: 2021-05-25 | End: 2021-05-28 | Stop reason: HOSPADM

## 2021-05-25 RX ORDER — OXYCODONE HYDROCHLORIDE 5 MG/1
5 TABLET ORAL EVERY 6 HOURS PRN
Qty: 30 TABLET | Refills: 0 | Status: SHIPPED | OUTPATIENT
Start: 2021-05-25 | End: 2021-05-25 | Stop reason: SDUPTHER

## 2021-05-25 RX ORDER — ACETAMINOPHEN 325 MG/1
975 TABLET ORAL ONCE
Status: COMPLETED | OUTPATIENT
Start: 2021-05-25 | End: 2021-05-25

## 2021-05-25 RX ORDER — OXYCODONE HYDROCHLORIDE 10 MG/1
10 TABLET ORAL EVERY 4 HOURS PRN
Status: DISCONTINUED | OUTPATIENT
Start: 2021-05-25 | End: 2021-05-26

## 2021-05-25 RX ORDER — GABAPENTIN 300 MG/1
300 CAPSULE ORAL 2 TIMES DAILY
Status: DISCONTINUED | OUTPATIENT
Start: 2021-05-25 | End: 2021-05-26

## 2021-05-25 RX ORDER — SENNOSIDES 8.6 MG
1 TABLET ORAL DAILY
Status: DISCONTINUED | OUTPATIENT
Start: 2021-05-25 | End: 2021-05-28 | Stop reason: HOSPADM

## 2021-05-25 RX ORDER — HYDROMORPHONE HCL/PF 1 MG/ML
SYRINGE (ML) INJECTION AS NEEDED
Status: DISCONTINUED | OUTPATIENT
Start: 2021-05-25 | End: 2021-05-25

## 2021-05-25 RX ORDER — SODIUM CHLORIDE, SODIUM LACTATE, POTASSIUM CHLORIDE, CALCIUM CHLORIDE 600; 310; 30; 20 MG/100ML; MG/100ML; MG/100ML; MG/100ML
100 INJECTION, SOLUTION INTRAVENOUS CONTINUOUS
Status: DISCONTINUED | OUTPATIENT
Start: 2021-05-25 | End: 2021-05-26

## 2021-05-25 RX ORDER — ONDANSETRON 2 MG/ML
4 INJECTION INTRAMUSCULAR; INTRAVENOUS EVERY 6 HOURS PRN
Status: DISCONTINUED | OUTPATIENT
Start: 2021-05-25 | End: 2021-05-28 | Stop reason: HOSPADM

## 2021-05-25 RX ORDER — HYDROMORPHONE HCL/PF 1 MG/ML
0.5 SYRINGE (ML) INJECTION EVERY 2 HOUR PRN
Status: ACTIVE | OUTPATIENT
Start: 2021-05-25 | End: 2021-05-27

## 2021-05-25 RX ORDER — HYDRALAZINE HYDROCHLORIDE 20 MG/ML
10 INJECTION INTRAMUSCULAR; INTRAVENOUS
Status: DISCONTINUED | OUTPATIENT
Start: 2021-05-25 | End: 2021-05-25 | Stop reason: HOSPADM

## 2021-05-25 RX ORDER — SODIUM CHLORIDE, SODIUM LACTATE, POTASSIUM CHLORIDE, CALCIUM CHLORIDE 600; 310; 30; 20 MG/100ML; MG/100ML; MG/100ML; MG/100ML
75 INJECTION, SOLUTION INTRAVENOUS CONTINUOUS
Status: DISCONTINUED | OUTPATIENT
Start: 2021-05-25 | End: 2021-05-26

## 2021-05-25 RX ORDER — SUCCINYLCHOLINE/SOD CL,ISO/PF 100 MG/5ML
SYRINGE (ML) INTRAVENOUS AS NEEDED
Status: DISCONTINUED | OUTPATIENT
Start: 2021-05-25 | End: 2021-05-25

## 2021-05-25 RX ORDER — OXYCODONE HYDROCHLORIDE 5 MG/1
5 TABLET ORAL EVERY 6 HOURS PRN
Qty: 30 TABLET | Refills: 0 | Status: SHIPPED | OUTPATIENT
Start: 2021-05-25 | End: 2021-05-28 | Stop reason: SDUPTHER

## 2021-05-25 RX ORDER — DEXAMETHASONE SODIUM PHOSPHATE 10 MG/ML
10 INJECTION, SOLUTION INTRAMUSCULAR; INTRAVENOUS EVERY 12 HOURS SCHEDULED
Status: COMPLETED | OUTPATIENT
Start: 2021-05-25 | End: 2021-05-26

## 2021-05-25 RX ORDER — CEFAZOLIN SODIUM 2 G/50ML
2000 SOLUTION INTRAVENOUS EVERY 8 HOURS
Status: COMPLETED | OUTPATIENT
Start: 2021-05-25 | End: 2021-05-26

## 2021-05-25 RX ORDER — BACLOFEN 10 MG/1
10 TABLET ORAL 3 TIMES DAILY
Status: DISCONTINUED | OUTPATIENT
Start: 2021-05-25 | End: 2021-05-26

## 2021-05-25 RX ORDER — GABAPENTIN 300 MG/1
300 CAPSULE ORAL 2 TIMES DAILY
Qty: 60 CAPSULE | Refills: 0 | Status: SHIPPED | OUTPATIENT
Start: 2021-05-25 | End: 2022-01-21

## 2021-05-25 RX ORDER — LIDOCAINE HYDROCHLORIDE 10 MG/ML
0.5 INJECTION, SOLUTION EPIDURAL; INFILTRATION; INTRACAUDAL; PERINEURAL ONCE AS NEEDED
Status: DISCONTINUED | OUTPATIENT
Start: 2021-05-25 | End: 2021-05-25 | Stop reason: HOSPADM

## 2021-05-25 RX ORDER — DOCUSATE SODIUM 100 MG/1
100 CAPSULE, LIQUID FILLED ORAL 2 TIMES DAILY
Qty: 10 CAPSULE | Refills: 0 | Status: SHIPPED | OUTPATIENT
Start: 2021-05-25 | End: 2021-05-25 | Stop reason: SDUPTHER

## 2021-05-25 RX ORDER — SULFAMETHOXAZOLE AND TRIMETHOPRIM 800; 160 MG/1; MG/1
1 TABLET ORAL 2 TIMES DAILY
Qty: 10 TABLET | Refills: 0 | Status: SHIPPED | OUTPATIENT
Start: 2021-05-25 | End: 2021-05-25 | Stop reason: SDUPTHER

## 2021-05-25 RX ORDER — LEVOTHYROXINE SODIUM 0.1 MG/1
100 TABLET ORAL
Status: DISCONTINUED | OUTPATIENT
Start: 2021-05-26 | End: 2021-05-28 | Stop reason: HOSPADM

## 2021-05-25 RX ORDER — GABAPENTIN 300 MG/1
300 CAPSULE ORAL ONCE
Status: COMPLETED | OUTPATIENT
Start: 2021-05-25 | End: 2021-05-25

## 2021-05-25 RX ORDER — ONDANSETRON 2 MG/ML
4 INJECTION INTRAMUSCULAR; INTRAVENOUS ONCE AS NEEDED
Status: COMPLETED | OUTPATIENT
Start: 2021-05-25 | End: 2021-05-25

## 2021-05-25 RX ORDER — LIDOCAINE HYDROCHLORIDE 10 MG/ML
INJECTION, SOLUTION EPIDURAL; INFILTRATION; INTRACAUDAL; PERINEURAL AS NEEDED
Status: DISCONTINUED | OUTPATIENT
Start: 2021-05-25 | End: 2021-05-25

## 2021-05-25 RX ORDER — ATORVASTATIN CALCIUM 10 MG/1
10 TABLET, FILM COATED ORAL
Status: DISCONTINUED | OUTPATIENT
Start: 2021-05-25 | End: 2021-05-28 | Stop reason: HOSPADM

## 2021-05-25 RX ADMIN — ATORVASTATIN CALCIUM 10 MG: 10 TABLET, FILM COATED ORAL at 21:34

## 2021-05-25 RX ADMIN — DOCUSATE SODIUM 100 MG: 100 CAPSULE, LIQUID FILLED ORAL at 17:48

## 2021-05-25 RX ADMIN — SODIUM CHLORIDE, SODIUM LACTATE, POTASSIUM CHLORIDE, AND CALCIUM CHLORIDE 50 ML/HR: .6; .31; .03; .02 INJECTION, SOLUTION INTRAVENOUS at 13:07

## 2021-05-25 RX ADMIN — ACETAMINOPHEN 975 MG: 325 TABLET, FILM COATED ORAL at 21:33

## 2021-05-25 RX ADMIN — BACLOFEN 10 MG: 10 TABLET ORAL at 21:34

## 2021-05-25 RX ADMIN — ACETAMINOPHEN 975 MG: 325 TABLET, FILM COATED ORAL at 05:54

## 2021-05-25 RX ADMIN — PHENYLEPHRINE HYDROCHLORIDE 200 MCG: 10 INJECTION INTRAVENOUS at 08:59

## 2021-05-25 RX ADMIN — FENTANYL CITRATE 50 MCG: 50 INJECTION INTRAMUSCULAR; INTRAVENOUS at 07:31

## 2021-05-25 RX ADMIN — GABAPENTIN 300 MG: 300 CAPSULE ORAL at 17:48

## 2021-05-25 RX ADMIN — HYDROMORPHONE HYDROCHLORIDE 1 MG: 1 INJECTION, SOLUTION INTRAMUSCULAR; INTRAVENOUS; SUBCUTANEOUS at 07:27

## 2021-05-25 RX ADMIN — GABAPENTIN 300 MG: 300 CAPSULE ORAL at 05:54

## 2021-05-25 RX ADMIN — CEFAZOLIN SODIUM 1000 MG: 1 SOLUTION INTRAVENOUS at 08:29

## 2021-05-25 RX ADMIN — IRON SUCROSE 200 MG: 20 INJECTION, SOLUTION INTRAVENOUS at 17:49

## 2021-05-25 RX ADMIN — SODIUM CHLORIDE: 0.9 INJECTION, SOLUTION INTRAVENOUS at 09:26

## 2021-05-25 RX ADMIN — ACETAMINOPHEN 975 MG: 325 TABLET, FILM COATED ORAL at 14:28

## 2021-05-25 RX ADMIN — Medication 100 MG: at 07:31

## 2021-05-25 RX ADMIN — PHENYLEPHRINE HYDROCHLORIDE 200 MCG: 10 INJECTION INTRAVENOUS at 09:03

## 2021-05-25 RX ADMIN — PROPOFOL 120 MG: 10 INJECTION, EMULSION INTRAVENOUS at 07:31

## 2021-05-25 RX ADMIN — ONDANSETRON 4 MG: 2 INJECTION INTRAMUSCULAR; INTRAVENOUS at 12:44

## 2021-05-25 RX ADMIN — FENTANYL CITRATE 50 MCG: 50 INJECTION INTRAMUSCULAR; INTRAVENOUS at 07:40

## 2021-05-25 RX ADMIN — CHLORHEXIDINE GLUCONATE 15 ML: 1.2 SOLUTION ORAL at 05:54

## 2021-05-25 RX ADMIN — PROPOFOL 100 MCG/KG/MIN: 10 INJECTION, EMULSION INTRAVENOUS at 07:45

## 2021-05-25 RX ADMIN — HYDRALAZINE HYDROCHLORIDE 10 MG: 20 INJECTION INTRAMUSCULAR; INTRAVENOUS at 11:55

## 2021-05-25 RX ADMIN — PHENYLEPHRINE HYDROCHLORIDE 15 MCG/MIN: 10 INJECTION INTRAVENOUS at 08:40

## 2021-05-25 RX ADMIN — LIDOCAINE HYDROCHLORIDE 50 MG: 10 INJECTION, SOLUTION EPIDURAL; INFILTRATION; INTRACAUDAL; PERINEURAL at 07:31

## 2021-05-25 RX ADMIN — DOCUSATE SODIUM 100 MG: 100 CAPSULE, LIQUID FILLED ORAL at 14:28

## 2021-05-25 RX ADMIN — SODIUM CHLORIDE: 0.9 INJECTION, SOLUTION INTRAVENOUS at 07:36

## 2021-05-25 RX ADMIN — PHENYLEPHRINE HYDROCHLORIDE 200 MCG: 10 INJECTION INTRAVENOUS at 08:51

## 2021-05-25 RX ADMIN — OXYCODONE HYDROCHLORIDE 10 MG: 10 TABLET ORAL at 19:56

## 2021-05-25 RX ADMIN — DEXAMETHASONE SODIUM PHOSPHATE 10 MG: 10 INJECTION, SOLUTION INTRAMUSCULAR; INTRAVENOUS at 08:20

## 2021-05-25 RX ADMIN — ROCURONIUM BROMIDE 10 MG: 50 INJECTION, SOLUTION INTRAVENOUS at 08:34

## 2021-05-25 RX ADMIN — REMIFENTANIL HYDROCHLORIDE 0.2 MCG/KG/MIN: 1 INJECTION, POWDER, LYOPHILIZED, FOR SOLUTION INTRAVENOUS at 07:45

## 2021-05-25 RX ADMIN — CEFAZOLIN SODIUM 1000 MG: 1 SOLUTION INTRAVENOUS at 07:27

## 2021-05-25 RX ADMIN — PHENYLEPHRINE HYDROCHLORIDE 200 MCG: 10 INJECTION INTRAVENOUS at 08:43

## 2021-05-25 RX ADMIN — SODIUM CHLORIDE, SODIUM LACTATE, POTASSIUM CHLORIDE, AND CALCIUM CHLORIDE: .6; .31; .03; .02 INJECTION, SOLUTION INTRAVENOUS at 07:27

## 2021-05-25 RX ADMIN — ONDANSETRON 4 MG: 2 INJECTION INTRAMUSCULAR; INTRAVENOUS at 10:18

## 2021-05-25 RX ADMIN — DEXAMETHASONE SODIUM PHOSPHATE 10 MG: 10 INJECTION, SOLUTION INTRAMUSCULAR; INTRAVENOUS at 21:34

## 2021-05-25 RX ADMIN — BACLOFEN 10 MG: 10 TABLET ORAL at 15:58

## 2021-05-25 RX ADMIN — CEFAZOLIN SODIUM 2000 MG: 2 SOLUTION INTRAVENOUS at 16:54

## 2021-05-25 RX ADMIN — PHENYLEPHRINE HYDROCHLORIDE 200 MCG: 10 INJECTION INTRAVENOUS at 10:23

## 2021-05-25 NOTE — CONSULTS
Internal Medicine  Consultation Note    Patient: Gisselle Blue  Age/sex: 66 y o  female  Medical Record #: [id number]    Assessment/Plan    Status Post decompressive CervicalSurgery   Continue post op pain control measures as prescribed   Follow bowel regimen to help decrease narcotic induced constipation    Follow post operative hemoglobin with serial CBC and treat accordingly   Monitor WBC and fever curve post op while encouraging use of incentive spirometer   DVT prophylaxis in place and reviewed  HTN   Hold ramipril in the post operative setting to avoid hypotension/ROSA   OK to resume on dc   Add hydralazine as needed for SBP >160   Monitor trend    Impaired fasting glucose   Hgb A1c 5 8   Monitor FBS    Hypothyroid  · Cont supplementation    Hyperlipidemia   Low cholesterol diet   Continue statin therapy            PRE-OP HGB LEVEL: 11 9    Subjective/ HPI: Gisselle Blue was seen and examined  Hx of Cervicalpain failed out patient conservative measures  Elected to undergo surgical intervention  We are asked to see patient for post op management of underlying medical co-morbidities as outlined above  Pt did well intra and post operatively with good hemodynamics  Pt currently comfortable and without any reported post op nausea  ROS:   A 10 point ROS was performed; negative except as noted above       Social History:    Substance Use History:   Social History     Substance and Sexual Activity   Alcohol Use No    Comment: last drink 7 years ago     Social History     Tobacco Use   Smoking Status Former Smoker    Quit date:     Years since quittin 4   Smokeless Tobacco Never Used     Social History     Substance and Sexual Activity   Drug Use No       Family History:    non-contributory      Review of Scheduled Meds:  Current Facility-Administered Medications   Medication Dose Route Frequency Provider Last Rate    acetaminophen  975 mg Oral Q8H [place], [place]  ALPRAZolam  0 5 mg Oral BID PRN Ross Beverley, PA-C      atorvastatin  10 mg Oral HS [place], [place]      baclofen  10 mg Oral TID [place], [place]      cefazolin  2,000 mg Intravenous [address] [place] [address]      dexamethasone  10 mg Intravenous Q12H [place] R[place] , [place]      docusate sodium  100 mg Oral BID [place], [place]      gabapentin  300 mg Oral BID [place], [place]      hydrALAZINE  25 mg Oral Q8H PRN CARLOS Garcia      HYDROmorphone  0 5 mg Intravenous Q2H PRN [place], [place]      iron sucrose  200 mg Intravenous Daily CARLOS Garcia      lactated ringers  75 mL/hr Intravenous Continuous Ashish Tesoriero, DO      lactated ringers  50 mL/hr Intravenous Continuous Ross [place], PA-C 50 mL/hr (05/25/21 1307)    lactated ringers  100 mL/hr Intravenous Continuous Ramakrishna Presume CRNA Stopped (05/25/21 1306)    [START ON 5/26/2021] levothyroxine  100 mcg Oral Early Morning Nannette [place], PA-C      ondansetron  4 mg Intravenous Q6H PRN Ross [place], PA-C      oxyCODONE  10 mg Oral Q4H PRN Ross [place], PA-C      oxyCODONE  5 mg Oral Q4H PRN [facility], PA-C      senna  1 tablet Oral Daily [place], [place]         Labs: Invalid input(s): LABGLOM, CMP               Results from last 7 days   Lab Units 05/25/21  1113   POC GLUCOSE mg/dl 166*       No results found for: Gayle Sanches, WOUNDCULT, SPUTUMCULTUR    Input and Output Summary (last 24 hours):        Intake/Output Summary (Last 24 hours) at 5/25/2021 1416  Last data filed at 5/25/2021 1307  Gross per 24 hour   Intake 2100 ml   Output 200 ml   Net 1900 ml       Imaging:     XR spine cervical 2 or 3 vw injury    (Results Pending)       *Labs /Radiology studiesLabs reviewed  *Medications reviewed and reconciled as needed  *Please refer to order section for additional ordered labs studies  *Case discussed with primary attending during morning huddle case rounds    Vitals:   Temp (24hrs), Av 3 °F (36 3 °C), Min:97 °F (36 1 °C), Max:97 7 °F (36 5 °C)    Temp:  [97 °F (36 1 °C)-97 7 °F (36 5 °C)] 97 4 °F (36 3 °C)  HR:  [71-86] 86  Resp:  [11-18] 13  BP: (138-217)/(64-94) 145/76  SpO2:  [97 %-100 %] 97 %  Body mass index is 23 38 kg/m²  Physical Exam:   General Appearance: no distress, conversive, Cervical brace in place  HEENT: PERRLA, conjuctiva normal; oropharynx clear; mucous membranes moist;   Neck:  Supple, no lymphadenopathy or thyromegaly  Lungs: CTA, normal respiratory effort, no retractions, expiratory effort normal  CV: regular rate and rhythm , PMI normal   ABD: soft non tender, no masses , no hepatic or splenomegaly  EXT: DP pulses intact, no lymphadenopathy, no edema   Skin: normal turgor, normal texture, no rash  Psych: affect normal, mood normal  Neuro: AAOx3          Invasive Devices     Peripheral Intravenous Line            Peripheral IV 21 Left Wrist less than 1 day    Peripheral IV 21 Right Hand less than 1 day          Drain            Urethral Catheter Latex 16 Fr  less than 1 day                   Code Status: No Order  Current Length of Stay: 0 day(s)    Total floor / unit time spent today 45 minutes with more than 50% spent counseling/coordinating care  Counseling includes discussion with patient re: progress  and discussion with patient of his/her current medical state/information  Coordination of patient's care was performed in conjunction with primary service  Time invested included review of patient's labs, vitals, and management of their comorbidities with continued monitoring  In addition, this patient was discussed with medical team including physician and advanced extenders  The care of the patient was extensively discussed and appropriate treatment plan was formulated unique for this patient  ** Please Note: Fluency Direct voice to text software may have been used in the creation of this document  Audio transcription errors may occur**

## 2021-05-25 NOTE — PLAN OF CARE
Problem: PAIN - ADULT  Goal: Verbalizes/displays adequate comfort level or baseline comfort level  Description: Interventions:  - Encourage patient to monitor pain and request assistance  - Assess pain using appropriate pain scale  - Administer analgesics based on type and severity of pain and evaluate response  - Implement non-pharmacological measures as appropriate and evaluate response  - Consider cultural and social influences on pain and pain management  - Notify physician/advanced practitioner if interventions unsuccessful or patient reports new pain  Outcome: Progressing     Problem: INFECTION - ADULT  Goal: Absence or prevention of progression during hospitalization  Description: INTERVENTIONS:  - Assess and monitor for signs and symptoms of infection  - Monitor lab/diagnostic results  - Monitor all insertion sites, i e  indwelling lines, tubes, and drains  - Monitor endotracheal if appropriate and nasal secretions for changes in amount and color  - [place] appropriate cooling/warming therapies per order  - Administer medications as ordered  - Instruct and encourage patient and family to use good hand hygiene technique  - Identify and instruct in appropriate isolation precautions for identified infection/condition  Outcome: Progressing  Goal: Absence of fever/infection during neutropenic period  Description: INTERVENTIONS:  - Monitor WBC    Outcome: Progressing     Problem: SAFETY ADULT  Goal: Patient will remain free of falls  Description: INTERVENTIONS:  - Assess patient frequently for physical needs  -  Identify cognitive and physical deficits and behaviors that affect risk of falls    -  [place] fall precautions as indicated by assessment   - Educate patient/family on patient safety including physical limitations  - Instruct patient to call for assistance with activity based on assessment  - Modify environment to reduce risk of injury  - Consider OT/PT consult to assist with strengthening/mobility  Outcome: Progressing  Goal: Maintain or return to baseline ADL function  Description: INTERVENTIONS:  -  Assess patient's ability to carry out ADLs; assess patient's baseline for ADL function and identify physical deficits which impact ability to perform ADLs (bathing, care of mouth/teeth, toileting, grooming, dressing, etc )  - Assess/evaluate cause of self-care deficits   - Assess range of motion  - Assess patient's mobility; develop plan if impaired  - Assess patient's need for assistive devices and provide as appropriate  - Encourage maximum independence but intervene and supervise when necessary  - Involve family in performance of ADLs  - Assess for home care needs following discharge   - Consider OT consult to assist with ADL evaluation and planning for discharge  - Provide patient education as appropriate  Outcome: Progressing  Goal: Maintain or return mobility status to optimal level  Description: INTERVENTIONS:  - Assess patient's baseline mobility status (ambulation, transfers, stairs, etc )    - Identify cognitive and physical deficits and behaviors that affect mobility  - Identify mobility aids required to assist with transfers and/or ambulation (gait belt, sit-to-stand, lift, walker, cane, etc )  - [place] fall precautions as indicated by assessment  - Record patient progress and toleration of activity level on Mobility SBAR; progress patient to next Phase/Stage  - Instruct patient to call for assistance with activity based on assessment  - Consider rehabilitation consult to assist with strengthening/weightbearing, etc   Outcome: Progressing     Problem: DISCHARGE PLANNING  Goal: Discharge to home or other facility with appropriate resources  Description: INTERVENTIONS:  - Identify barriers to discharge w/patient and caregiver  - Arrange for needed discharge resources and transportation as appropriate  - Identify discharge learning needs (meds, wound care, etc )  - Arrange for interpretive services to assist at discharge as needed  - Refer to Case Management Department for coordinating discharge planning if the patient needs post-hospital services based on physician/advanced practitioner order or complex needs related to functional status, cognitive ability, or social support system  Outcome: Progressing     Problem: Knowledge Deficit  Goal: Patient/family/caregiver demonstrates understanding of disease process, treatment plan, medications, and discharge instructions  Description: Complete learning assessment and assess knowledge base    Interventions:  - Provide teaching at level of understanding  - Provide teaching via preferred learning methods  Outcome: Progressing

## 2021-05-25 NOTE — ANESTHESIA PROCEDURE NOTES
Arterial Line Insertion  Performed by: Sahil Rodriguez DO  Authorized by: Sahil Rodriguez DO   Preparation: Patient was prepped and draped in the usual sterile fashion    Indications: hemodynamic monitoring    Procedure Details:  Needle gauge: 20    Post-procedure:  Post-procedure: dressing applied  Patient tolerance: Patient tolerated the procedure well with no immediate complications

## 2021-05-25 NOTE — INTERVAL H&P NOTE
H&P reviewed  After examining the patient I find no changes in the patients condition since the H&P had been written  Vitals:    05/25/21 0635   BP: (!) 179/71   Pulse:    Resp:    Temp:    SpO2:      Assessment/Plan:     No problem-specific Assessment & Plan notes found for this encounter      C2 odontoid non-union  · Cervical MRI and head CT reviewed with patient  · C1-C2 fusion can be considered yet is explained to patient this does not guarantee pain relief  ? Patient displays verbal understanding and would like to proceed with the procedure  ? Risks, benefits and expectations were discussed in detail with patient  · Patient is a candidate for posterior cervical fusion C1-C2 with allograft and possible additional levels   she understands risks of surgery to include but not limited to bleeding, stroke,  infection, CSF leak, nerve damage, possible persistent symptoms, hardware complications, possible need for reoperation   she will need medical clearance and blood work

## 2021-05-25 NOTE — OP NOTE
OPERATIVE REPORT  PATIENT NAME: Aliya Kulkarni    :  1950  MRN: [id number]  Pt Location: BE OR ROOM 18    SURGERY DATE: 2021    Surgeon(s) and Role:     * Jean Berg MD - Primary     * Mehrdad Posadas MD - [facility] [place] - Assisting    Preop Diagnosis:  DDD (degenerative disc disease), cervical [M50 30]  Closed odontoid fracture with nonunion, subsequent encounter [N36 112Y]  Fracture of cervical vertebrae, multiple, initial encounter ([address] ) [S12  9XXA]    Post-Op Diagnosis Codes:     * DDD (degenerative disc disease), cervical [M50 30]     * Closed odontoid fracture with nonunion, subsequent encounter [F04 113T]     * Fracture of cervical vertebrae, multiple, initial encounter ([address] ) [S12  9XXA]    PROCEDURE:  POSTERIOR C1-C2 FIXATION    Specimen(s):  * No specimens in log *    Estimated Blood Loss:   Minimal    Drains:  Urethral Catheter Latex 16 Fr  (Active)   Site Assessment Skin intact 21 0851   Collection Container Standard drainage bag 21 0851   Securement Method Securing device (Describe) 21 0851   Number of days: 0       Anesthesia Type:   General    Operative Indications:  DDD (degenerative disc disease), cervical [M50 30]  Closed odontoid fracture with nonunion, subsequent encounter [S12 100K]  Fracture of cervical vertebrae, multiple, initial encounter ([address] ) [S12  9XXA]      Operative Findings:  SEE DETAILED OPERATIVE SECTION    Complications:   None    Procedure and Technique:    POSTERIOR CERVICAL C1-C2 FIXATION WITH C1 LATERAL MASS SCREWS AND C2 PARS SCREWS, WITH NEURAL MONITORING    PATIENT WAS CORRECTLY IDENTIFIED BY BOTH THE ANESTHESIA DEPARTMENT AND MYSELF  THE NECK WAS MARKED  IN THE OPERATING ROOM GENERAL ANESTHESIA WAS INDUCED  IV ANTIBIOTICS WERE ADMINISTERED PREOPERATIVELY  NEURAL MONITORING LEADS WERE PLACED  CISNEROS CATHETER WAS PLACED  SCDS WERE ATTACHED LEGS  A FINE HEAD VASQUEZ WAS THEN ATTACHED    THE PATIENT WAS THEN FLIPPED INTO A PRONE POSITION ONTO RADIOLUCENT FLAT TOP TABLE ON GEL ROLLS ENSURING THAT ALL BONY PROMINENCES AND NEUROVASCULAR STRUCTURES WERE ADEQUATELY PADDED AND PROTECTED  THE HEAD WAS ATTACHED TO THE BED FRAME UTILIZING THE FINE HELD VASQUEZ WITH THE NECK IN NEUTRAL ALIGNMENT  AP AND LATERAL IMAGING DEMONSTRATED WITH THE INCISION BE MADE OVERLYING OCCIPUT TO C2  THE NECK WAS THEN PREPPED AND DRAPED IN THE USUAL STERILE FASHION  A TIME-OUT WAS PERFORMED  A 10 BLADE WAS USED TO CREATE A 4 CM LONGITUDINAL INCISION OVERLYING THE OCCIPUT TO SEE 3  DISSECTION WAS CARRIED TO THE FASCIAL PLANE  SUBPERIOSTEAL DISSECTION WAS PERFORMED EXPOSING THE BIFIDA SPINOUS PROCESS AT C2  THE POSTERIOR ASPECT OF THE C1 RING WAS DISSECTED WITH THE BOVIE ELECTRIC CAUTERY AVOIDING THE SUPERIOR ASPECT OF THE C1 POSTERIOR ARCH  DISSECTION WAS CARRIED OUT LATERALLY FOR A LENGTH OF 1 CM OFF THE MIDLINE IN EITHER DIRECTION  SELF-RETAINING RETRACTORS WERE PLACED  PENFIELD WAS THEN USED TO FURTHER DISSECT THE LATERAL ASPECTS OF C1 AND C2  THE APPROPRIATE START POINT FOR THE C2 PARS SCREWS WAS CONFIRMED ON LATERAL FLUOROSCOPIC IMAGING  A HAND-HELD DRILL WAS THEN USED TO DRILL A PATHWAY FOR THE PARS SCREWS  BALL-TIP FEELER WAS PASSED AND SHOWED NO SIGNIFICANT BREACHES  THE SCREW HOLES WERE TAPPED  TWO 3 5 X 28 MM SCREWS WERE THEN ADVANCED ON LATERAL FLUOROSCOPIC IMAGING   ATTENTION WAS THEN PAID TO THE START OF THE C1 LATERAL MASS SCREWS  THE C2 NERVE ROOTS WERE BOTH RETRACTED INFERIORLY AND UNDER LATERAL FLUOROSCOPIC GUIDANCE THE TRAJECTORY FOR THE C1 LATERAL MASS SCREWS WAS PERFORMED WITH 5° OF MEDIAL ANGULATION ON EITHER SIDE  WE MEASURED UP TO A SIZE 20  LYRIC SIZE 3 5 X 30 MM SCREWS WITH 20 MM THREADS WERE THEN SELECTED AND ADVANCED INTO THE C1 LATERAL MASSES UNDER FLUOROSCOPIC IMAGING, ENSURING NO VIOLATION OF THE ANTERIOR CORTEX  FOLLOWING PLACEMENT OF THE SCREWS NO SIGNIFICANT NEURAL MONITORING EVENTS WERE NOTED  HEMOSTASIS WAS ACHIEVED  COPIOUS IRRIGATION WAS PERFORMED TO THE ENTIRE WOUND   TWO RODS WERE SELECTED AND SECURED IN PLACE WITH LOCKING CAPS AND FINAL TIGHTENED  THE WOUND WAS ONCE AGAIN COPIOUSLY IRRIGATED  FINAL AP AND LATERAL IMAGING DEMONSTRATED APPROPRIATE PLACEMENT OF C1 LATERAL MASS SCREWS AND C2 PARS SCREWS BILATERALLY  VANCOMYCIN POWDER WAS SPRINKLED INTO THE DEEP AND SUPERFICIAL SOFT TISSUES  THE FASCIAL LAYER WAS CLOSED IN A WATERTIGHT FASHION USING 0 PDS SUTURE  THE SUBCUTANEOUS LAYER WAS CLOSED WITH 2 VICRYL SUTURE  THE SKIN LAYER WAS CLOSED WITH 2-0 NYLON SUTURE IN INTERRUPTED FASHION  STERILE DRESSINGS WERE APPLIED TO THE WOUND THE PATIENT WAS FLIPPED INTO THE SUPINE POSITION, EXTUBATED AND TAKEN TO RECOVERY ROOM STABLE CONDITION    RIGID CERVICAL COLLAR WAS APPLIED IN OPERATING ROOM       I was present for the entire procedure    Patient Disposition:  PACU      Implants: Reinier    SIGNATURE: Kristy Galindo MD  DATE: May 25, 2021  TIME: 10:49 AM

## 2021-05-25 NOTE — ANESTHESIA POSTPROCEDURE EVALUATION
Post-Op Assessment Note    CV Status:  Stable    Pain management: adequate     Mental Status:  Awake and sleepy   Hydration Status:  Euvolemic   PONV Controlled:  Controlled   Airway Patency:  Patent      Post Op Vitals Reviewed: Yes      Staff: CRNA         No complications documented      /65 (05/25/21 1059)    Temp (!) 97 °F (36 1 °C) (05/25/21 1059)    Pulse 71 (05/25/21 1059)   Resp 18 (05/25/21 1059)    SpO2 100 % (05/25/21 1059)

## 2021-05-25 NOTE — DISCHARGE INSTRUCTIONS
Discharge Instructions - Orthopedics  Jasvir Carbone 66 y o  female MRN: [id number]  Unit/Bed#: Operating Room    Weight Bearing Status:                                           Full weight bearing to both legs  Light use of arms  Cervical Collar:  Must wear cervical collar at all times  DVT prophylaxis:  DO NOT take blood thinners until cleared by surgeon    Pain:  Continue analgesics as directed    Showering Instructions:   Do not shower until 2-3 days after the procedure  Dressing Instructions:   Keep surgical incision clean and dry at all times  A dry atmosphere helps the incision heal   No soaking or scrubbing of wound at any time  No ointment or soap on the incision  May change dressing in 2-3 days, apply dry bandage  OK to change the dressing if saturated  Driving Instructions:  No driving until cleared by Orthopaedic Surgery  PT/OT:  No PT/OT required until directed by surgeon at followup appointment    Appt Instructions: If you do not have your appointment, please call the clinic at [phone]  Otherwise followup as scheduled below: Follow-up with Dr Bettie Bennett in the office in 2 weeks  Contact the office sooner if you experience any increased numbness/tingling in the extremities  Miscellaneous:  No lifting greater than 5 lbs  No strenuous exercise  No repetitive lifting or bending    Maintain a soft diet (i e  Pudding, jello, soup, etc )

## 2021-05-26 LAB
ANION GAP SERPL CALCULATED.3IONS-SCNC: 5 MMOL/L (ref 4–13)
BASOPHILS # BLD MANUAL: 0 THOUSAND/UL (ref 0–0.1)
BASOPHILS NFR MAR MANUAL: 0 % (ref 0–1)
BUN SERPL-MCNC: 17 MG/DL (ref 5–25)
CALCIUM SERPL-MCNC: 9.3 MG/DL (ref 8.3–10.1)
CHLORIDE SERPL-SCNC: 109 MMOL/L (ref 100–108)
CO2 SERPL-SCNC: 25 MMOL/L (ref 21–32)
CREAT SERPL-MCNC: 0.86 MG/DL (ref 0.6–1.3)
EOSINOPHIL # BLD MANUAL: 0 THOUSAND/UL (ref 0–0.4)
EOSINOPHIL NFR BLD MANUAL: 0 % (ref 0–6)
ERYTHROCYTE [DISTWIDTH] IN BLOOD BY AUTOMATED COUNT: 13.2 % (ref 11.6–15.1)
GFR SERPL CREATININE-BSD FRML MDRD: 67 ML/MIN/1.73SQ M
GLUCOSE SERPL-MCNC: 201 MG/DL (ref 65–140)
HCT VFR BLD AUTO: 33.2 % (ref 34.8–46.1)
HCT VFR BLD AUTO: 34.6 % (ref 34.8–46.1)
HCT VFR BLD AUTO: 35.6 % (ref 34.8–46.1)
HGB BLD-MCNC: 10.8 G/DL (ref 11.5–15.4)
HGB BLD-MCNC: 11.4 G/DL (ref 11.5–15.4)
HGB BLD-MCNC: 11.6 G/DL (ref 11.5–15.4)
LYMPHOCYTES # BLD AUTO: 0.19 THOUSAND/UL (ref 0.6–4.47)
LYMPHOCYTES # BLD AUTO: 1 % (ref 14–44)
MCH RBC QN AUTO: 30.6 PG (ref 26.8–34.3)
MCHC RBC AUTO-ENTMCNC: 32.5 G/DL (ref 31.4–37.4)
MCV RBC AUTO: 94 FL (ref 82–98)
MONOCYTES # BLD AUTO: 0.56 THOUSAND/UL (ref 0–1.22)
MONOCYTES NFR BLD: 3 % (ref 4–12)
NEUTROPHILS # BLD MANUAL: 17.73 THOUSAND/UL (ref 1.85–7.62)
NEUTS BAND NFR BLD MANUAL: 2 % (ref 0–8)
NEUTS SEG NFR BLD AUTO: 93 % (ref 43–75)
NRBC BLD AUTO-RTO: 0 /100 WBCS
PLATELET # BLD AUTO: 256 THOUSANDS/UL (ref 149–390)
PLATELET BLD QL SMEAR: ADEQUATE
PMV BLD AUTO: 10.4 FL (ref 8.9–12.7)
POTASSIUM SERPL-SCNC: 4.1 MMOL/L (ref 3.5–5.3)
RBC # BLD AUTO: 3.53 MILLION/UL (ref 3.81–5.12)
RBC MORPH BLD: NORMAL
SODIUM SERPL-SCNC: 139 MMOL/L (ref 136–145)
VARIANT LYMPHS # BLD AUTO: 1 %
WBC # BLD AUTO: 18.66 THOUSAND/UL (ref 4.31–10.16)

## 2021-05-26 PROCEDURE — 99222 1ST HOSP IP/OBS MODERATE 55: CPT | Performed by: INTERNAL MEDICINE

## 2021-05-26 PROCEDURE — 80048 BASIC METABOLIC PNL TOTAL CA: CPT | Performed by: PHYSICIAN ASSISTANT

## 2021-05-26 PROCEDURE — 85007 BL SMEAR W/DIFF WBC COUNT: CPT | Performed by: PHYSICIAN ASSISTANT

## 2021-05-26 PROCEDURE — 85014 HEMATOCRIT: CPT | Performed by: NURSE PRACTITIONER

## 2021-05-26 PROCEDURE — 97167 OT EVAL HIGH COMPLEX 60 MIN: CPT

## 2021-05-26 PROCEDURE — 99232 SBSQ HOSP IP/OBS MODERATE 35: CPT | Performed by: NURSE PRACTITIONER

## 2021-05-26 PROCEDURE — NC001 PR NO CHARGE: Performed by: ORTHOPAEDIC SURGERY

## 2021-05-26 PROCEDURE — C9113 INJ PANTOPRAZOLE SODIUM, VIA: HCPCS | Performed by: NURSE PRACTITIONER

## 2021-05-26 PROCEDURE — 85027 COMPLETE CBC AUTOMATED: CPT | Performed by: PHYSICIAN ASSISTANT

## 2021-05-26 PROCEDURE — 97163 PT EVAL HIGH COMPLEX 45 MIN: CPT

## 2021-05-26 PROCEDURE — 85018 HEMOGLOBIN: CPT | Performed by: NURSE PRACTITIONER

## 2021-05-26 RX ORDER — OXYCODONE HYDROCHLORIDE 5 MG/1
2.5 TABLET ORAL EVERY 4 HOURS PRN
Status: DISCONTINUED | OUTPATIENT
Start: 2021-05-26 | End: 2021-05-28 | Stop reason: HOSPADM

## 2021-05-26 RX ORDER — SODIUM CHLORIDE 9 MG/ML
75 INJECTION, SOLUTION INTRAVENOUS CONTINUOUS
Status: DISCONTINUED | OUTPATIENT
Start: 2021-05-26 | End: 2021-05-26

## 2021-05-26 RX ORDER — GABAPENTIN 100 MG/1
100 CAPSULE ORAL 2 TIMES DAILY
Status: DISCONTINUED | OUTPATIENT
Start: 2021-05-26 | End: 2021-05-28 | Stop reason: HOSPADM

## 2021-05-26 RX ORDER — PANTOPRAZOLE SODIUM 40 MG/1
40 INJECTION, POWDER, FOR SOLUTION INTRAVENOUS EVERY 12 HOURS SCHEDULED
Status: DISCONTINUED | OUTPATIENT
Start: 2021-05-26 | End: 2021-05-27

## 2021-05-26 RX ORDER — BACLOFEN 10 MG/1
5 TABLET ORAL 3 TIMES DAILY
Status: DISCONTINUED | OUTPATIENT
Start: 2021-05-26 | End: 2021-05-28 | Stop reason: HOSPADM

## 2021-05-26 RX ORDER — OXYCODONE HYDROCHLORIDE 5 MG/1
5 TABLET ORAL EVERY 4 HOURS PRN
Status: DISCONTINUED | OUTPATIENT
Start: 2021-05-26 | End: 2021-05-28 | Stop reason: HOSPADM

## 2021-05-26 RX ADMIN — IRON SUCROSE 200 MG: 20 INJECTION, SOLUTION INTRAVENOUS at 10:10

## 2021-05-26 RX ADMIN — ONDANSETRON 4 MG: 2 INJECTION INTRAMUSCULAR; INTRAVENOUS at 03:49

## 2021-05-26 RX ADMIN — OXYCODONE HYDROCHLORIDE 5 MG: 5 TABLET ORAL at 23:09

## 2021-05-26 RX ADMIN — SODIUM CHLORIDE 75 ML/HR: 0.9 INJECTION, SOLUTION INTRAVENOUS at 07:33

## 2021-05-26 RX ADMIN — ONDANSETRON 4 MG: 2 INJECTION INTRAMUSCULAR; INTRAVENOUS at 10:44

## 2021-05-26 RX ADMIN — CEFAZOLIN SODIUM 2000 MG: 2 SOLUTION INTRAVENOUS at 00:33

## 2021-05-26 RX ADMIN — PANTOPRAZOLE SODIUM 40 MG: 40 INJECTION, POWDER, FOR SOLUTION INTRAVENOUS at 22:59

## 2021-05-26 RX ADMIN — BACLOFEN 5 MG: 10 TABLET ORAL at 22:59

## 2021-05-26 RX ADMIN — PANTOPRAZOLE SODIUM 40 MG: 40 INJECTION, POWDER, FOR SOLUTION INTRAVENOUS at 07:33

## 2021-05-26 RX ADMIN — ACETAMINOPHEN 975 MG: 325 TABLET, FILM COATED ORAL at 23:00

## 2021-05-26 RX ADMIN — ATORVASTATIN CALCIUM 10 MG: 10 TABLET, FILM COATED ORAL at 22:59

## 2021-05-26 RX ADMIN — DEXAMETHASONE SODIUM PHOSPHATE 10 MG: 10 INJECTION, SOLUTION INTRAMUSCULAR; INTRAVENOUS at 10:10

## 2021-05-26 NOTE — PROGRESS NOTES
Consult Note- Acute Pain Service   Jamal Florence 66 y o  female MRN: [id number]  Unit/Bed#: -01 Encounter: [id number]               Assessment/Plan     Assessment:   Patient Active Problem List   Diagnosis    Neck pain    Spondylosis of cervical region without myelopathy or radiculopathy    Occipital neuralgia of left side    Pre-op examination      Jamal Florence is a 66 y o  female who is POD#1 C1-C2 posterior cervical fusion  Plan:   · Tylenol 975 mg every 8 hours scheduled   · Decrease Baclofen to 5mg TID  · Patients last prescription was for Baclofen 10mg BID PRN spasms  · Decrease Gabapentin to 100mg BID  · Xanax PRN, home medication   · Decrease Oxycodone to 2 5mg every 4 hours as needed for moderate pain  · Decrease Oxycodone to 5 mg every 4 hours as needed for severe pain   · Dilaudid 0 2 mg IV every 4 hours as needed for breakthrough pain  · Continue IV opioids for breakthrough pain whilel patient is NPO/nausea or vomiting    Bowel Management  · Colace 100 mg twice a day  · Senna daily    Treatment recommendations discussed with primary care service  Would recommend de-escalation as outlined above due to age  APS will continue to follow  Please call  / [phone] or "[employer] Acute Pain Service - B (/ between [phone] and on weekends) with questions or concerns    History of Present Illness    Admit Date:  5/25/2021  Hospital Day:  1 day  Primary Service:  Orthopedic Surgery  Attending Provider:  Bebo Rod MD  Reason for Consult / Principal Problem: post-op pain   HPI: Jamal Florence is a 66 y o  female who presents s/p C1-C2 posterior cervical fusion  APS consulted for further evaluation of postoperative pain  GI consulted for severe nausea and vomiting postop and concern for coffee-ground emesis  GI will continue to trend hemoglobin and are recommending outpatient EGD if hemoglobin remains stable      Current pain location(s):  Posterior neck with radiation towards her shoulders  Pain Scale:  3-8  Quality:  shooting  Current Analgesic regimen: In the last 24 hours patient received Tylenol, baclofen, gabapentin, and oxycodone 10  At present time patient is reporting moderate to severe posterior neck pain that did improve post oxycodone dose last night  At this time states she is unable to tolerate liquids/ oral intake due to severe nausea vomiting  Pain History:   History of neck pain, was taking Tylenol and Aleve prior to surgery which was not effective  I have reviewed the patient's controlled substance dispensing history in the Prescription Drug Monitoring Program in compliance with the [facility] regulations before prescribing any controlled substances  Inpatient consult to Acute Pain Service  Consult performed by: CARLOS Smith  Consult ordered by: Delroy Vera PA-C          Review of Systems   Respiratory: Negative for shortness of breath  Cardiovascular: Negative for chest pain  Gastrointestinal: Positive for constipation, nausea and vomiting  Negative for abdominal pain  Genitourinary: Negative for difficulty urinating  Musculoskeletal: Positive for neck pain  Neurological: Positive for headaches  Negative for dizziness, weakness and numbness  All other systems reviewed and are negative        Historical Information   Past Medical History:   Diagnosis Date    Anxiety     Arthritis     shoulder    Carpal tunnel syndrome     Depression     Disease of thyroid gland     Diverticulitis     HBP (high blood pressure)     Hyperlipidemia     Hypertension     Hypothyroid     Pneumonia     2017    PONV (postoperative nausea and vomiting)     Psychiatric disorder     Depression    Sleep apnea     mild      Past Surgical History:   Procedure Laterality Date    APPENDECTOMY      BREAST BIOPSY Right     benign    BREAST CYST ASPIRATION Right     benign    CATARACT EXTRACTION Bilateral     CATARACT EXTRACTION, BILATERAL  2019  CHOLECYSTECTOMY      COLONOSCOPY      COLONOSCOPY W/ BIOPSIES      EGD      HAND SURGERY Right     MAMMO (HISTORICAL) Bilateral 2018    MAMMO (HISTORICAL) Bilateral 10/27/2017    MANDIBLE SURGERY      WISDOM TOOTH EXTRACTION      X4     Social History   Social History     Substance and Sexual Activity   Alcohol Use No    Comment: last drink 7 years ago     Social History     Substance and Sexual Activity   Drug Use No     Social History     Tobacco Use   Smoking Status Former Smoker    Quit date:     Years since quittin 4   Smokeless Tobacco Never Used     Family History: non-contributory    Meds/Allergies   all current active meds have been reviewed, current meds:   Current Facility-Administered Medications   Medication Dose Route Frequency    acetaminophen (TYLENOL) tablet 975 mg  975 mg Oral Q8H    ALPRAZolam (XANAX) tablet 0 5 mg  0 5 mg Oral BID PRN    atorvastatin (LIPITOR) tablet 10 mg  10 mg Oral HS    baclofen tablet 10 mg  10 mg Oral TID    docusate sodium (COLACE) capsule 100 mg  100 mg Oral BID    gabapentin (NEURONTIN) capsule 300 mg  300 mg Oral BID    hydrALAZINE (APRESOLINE) tablet 25 mg  25 mg Oral Q8H PRN    HYDROmorphone (DILAUDID) injection 0 5 mg  0 5 mg Intravenous Q2H PRN    lactated ringers infusion  75 mL/hr Intravenous Continuous    lactated ringers infusion  100 mL/hr Intravenous Continuous    levothyroxine tablet 100 mcg  100 mcg Oral Early Morning    ondansetron (ZOFRAN) injection 4 mg  4 mg Intravenous Q6H PRN    oxyCODONE (ROXICODONE) IR tablet 10 mg  10 mg Oral Q4H PRN    oxyCODONE (ROXICODONE) IR tablet 5 mg  5 mg Oral Q4H PRN    pantoprazole (PROTONIX) injection 40 mg  40 mg Intravenous Q12H [address]    senna (SENOKOT) tablet 8 6 mg  1 tablet Oral Daily    sodium chloride 0 9 % infusion  75 mL/hr Intravenous Continuous    and PTA meds:   Prior to Admission Medications   Prescriptions Last Dose Informant Patient Reported? Taking? ALPRAZolam (XANAX) 0 5 mg tablet 5/24/2021 at Unknown time Self No Yes   Sig: Take 1 tablet (0 5 mg total) by mouth 2 (two) times a day as needed for anxiety   Calcium Carb-Cholecalciferol (CALCIUM 600+D3) 600-200 MG-UNIT TABS 5/24/2021 at Unknown time Self Yes Yes   Sig: Take 1 tablet by mouth daily   Multiple Vitamins-Minerals (CENTRUM ADULTS PO) 5/24/2021 at Unknown time Self Yes Yes   Sig: Take 1 tablet by mouth daily   acetaminophen (TYLENOL) 500 mg tablet 5/18/2021 Self No Yes   Sig: Take 1 tablet (500 mg total) by mouth every 6 (six) hours as needed for mild pain   aspirin (ECOTRIN LOW STRENGTH) 81 mg EC tablet 5/18/2021 Self Yes Yes   Sig: Take 81 mg by mouth daily   atorvastatin (LIPITOR) 10 mg tablet 5/24/2021 at Unknown time Self No Yes   Sig: TAKE 1 TABLET BY MOUTH EVERY DAY AT BEDTIME   baclofen 10 mg tablet 5/24/2021 at Unknown time Self No Yes   Sig: Take 1 tablet (10 mg total) by mouth 2 (two) times a day as needed for muscle spasms   levothyroxine 100 mcg tablet 5/24/2021 at Unknown time Self No Yes   Sig: Take 1 tablet (100 mcg total) by mouth daily   naproxen (NAPROSYN) 500 mg tablet 5/24/2021 at Unknown time Self No Yes   Sig: Take 1 tablet (500 mg total) by mouth 2 (two) times a day with meals   ramipril (ALTACE) 10 MG capsule 5/24/2021 at Unknown time  No Yes   Sig: Take 1 capsule (10 mg total) by mouth 2 (two) times a day      Facility-Administered Medications: None       No Known Allergies    Objective   Temp:  [97 4 °F (36 3 °C)-100 2 °F (37 9 °C)] 99 3 °F (37 4 °C)  HR:  [83-86] 86  Resp:  [17-18] 17  BP: (139-155)/(72-74) 139/72    Intake/Output Summary (Last 24 hours) at 5/26/2021 1350  Last data filed at 5/26/2021 1203  Gross per 24 hour   Intake 1442 5 ml   Output 1900 ml   Net -457 5 ml       Physical Exam  Constitutional:       General: She is awake  She is not in acute distress  Appearance: She is not ill-appearing, toxic-appearing or diaphoretic  HENT:      Head: Atraumatic  Nose: Nose normal    Eyes:      Extraocular Movements: Extraocular movements intact  Neck:      Comments: Cervical collar  Pulmonary:      Effort: Pulmonary effort is normal  No tachypnea, bradypnea or respiratory distress  Skin:     Findings: No rash  Neurological:      Mental Status: She is alert and oriented to person, place, and time  Mental status is at baseline  Psychiatric:         Mood and Affect: Mood normal          Speech: Speech normal          Behavior: Behavior normal  Behavior is not agitated  Behavior is cooperative  Lab Results:   I have personally reviewed pertinent labs  , CBC:   Lab Results   Component Value Date    WBC 18 66 (H) 05/26/2021    HGB 10 8 (L) 05/26/2021    HCT 33 2 (L) 05/26/2021    MCV 94 05/26/2021     05/26/2021    MCH 30 6 05/26/2021    MCHC 32 5 05/26/2021    RDW 13 2 05/26/2021    MPV 10 4 05/26/2021    NRBC 0 05/26/2021   , CMP:   Lab Results   Component Value Date    SODIUM 139 05/26/2021    K 4 1 05/26/2021     (H) 05/26/2021    CO2 25 05/26/2021    BUN 17 05/26/2021    CREATININE 0 86 05/26/2021    CALCIUM 9 3 05/26/2021    EGFR 67 05/26/2021   , BMP:  Lab Results   Component Value Date    SODIUM 139 05/26/2021    K 4 1 05/26/2021     (H) 05/26/2021    CO2 25 05/26/2021    BUN 17 05/26/2021    CREATININE 0 86 05/26/2021    GLUC 201 (H) 05/26/2021    CALCIUM 9 3 05/26/2021    AGAP 5 05/26/2021    EGFR 67 05/26/2021   , PT/PTT:No results found for: PT, PTT    Imaging Studies: I have personally reviewed pertinent reports  Counseling / Coordination of Care  Total floor / unit time spent today Level 3 = 55 minutes  Greater than 50% of total time was spent with the patient and / or family counseling and / or coordination of care  A description of the counseling / coordination of care:   Reviewed plan of care and medications with patient, RN staff, and primary care service      Please note that the APS provides consultative services regarding pain management only  With the exception of ketamine and epidural infusions and except when indicated, final decisions regarding starting or changing doses of analgesic medications are at the discretion of the consulting service  Off hours consultation and/or medication management is generally not available      CARLOS Neely  Acute Pain Service

## 2021-05-26 NOTE — CONSULTS
GASTROENTEROLOGY CONSULT NOTE     Name: Johana Garcia   Age & Sex: 66 y o  female   MRN: [id number]  Unit/Bed#: -01   Encounter: [id number]  Primary Care Provider: Meena Galvan MD      ASSESSMENT/PLAN     66year old female with past medical history of CAD, CHF , CVA, Insulin dependent DM,HLD, hypothyroidism,HLD, GERD, PUD who was admitted for C1-C2 fixation for Non-union,closed odontoid fracture which failed outpatient  Conservative therapy   GI consulted for coffee ground emesis 1 day post op  Gastrointestinal bleeding  History of GERD, PUD   Recent use of naproxen and decadron  Currently off  Coffee ground emesis with epigastric pain in a m  Continue Protonix 40mg daily  Zofran prn for nausea  Trend cbc daily  No EGD for now ,follow hemoglobin trend   If hemoglobin stable , will need outpatient EGD  Also Colonoscopy as she is due for her screening colonoscopy  Avoid NSAIDS  GERD  Continue PPI    Post of C1-C2 fixation  Management per orthopedic surgery  Thanks for consultation  REASON FOR CONSULT   Coffee ground emesis  HISTORY OF PRESENT ILLNESS     66year old female with past medical history of CAD, CHF , CVA, Insulin dependent DM,HLD, hypothyroidism,HLD, GERD, PUD who was admitted for C1-C2 fixation for Non-union,closed odontoid fracture which failed outpatient  Conservative therapy   She is post op day 1  She reports epigastric abdominal pain which woke her up this morning around 3 am, constant, non radiating , no known relieving or aggravating factor  Assoc with nausea and malaise  Since Post op , patient felt nausea with heartburn and had an vomiting containing coffee ground material    She was given naproxen as outpatient and was taking it before surgery  She also got 2 doses of decadron post op  Debied alcohol use and does not smoke    Denied chest pain, diarrhea, constipation, melena stool or bloody in stool, dizziness or lightheadedness, easy fatigability, palpitations, headache, urinary symptoms, fever, chills, confusion  Patient was started on Protonix IV and also started on venofer  Hb dropped from 11 9-10 8  At the time of my review, symptoms still present but subsiding  Vitals relatively stable  Patient had EGD done about 4 years ago which showed hiatal hernia and ulcers   Colonoscopy done few years ago removed polyps  REVIEW OF SYSTEMS     Review of Systems   Constitutional: Negative for appetite change, chills, diaphoresis, fatigue and fever  HENT: Negative for congestion, sneezing, sore throat and trouble swallowing  Eyes: Negative for photophobia and visual disturbance  Respiratory: Negative for cough, choking, chest tightness, shortness of breath, wheezing and stridor  Cardiovascular: Negative for chest pain, palpitations and leg swelling  Gastrointestinal: Positive for abdominal pain, nausea and vomiting  Negative for abdominal distention, blood in stool, constipation and diarrhea  Genitourinary: Negative for difficulty urinating, dysuria, frequency, hematuria and urgency  Musculoskeletal: Negative for arthralgias, joint swelling, neck pain and neck stiffness  Skin: Negative for pallor and rash  Neurological: Negative for dizziness, tremors, seizures, light-headedness and headaches  Psychiatric/Behavioral: Negative for agitation and confusion  OBJECTIVE     Vitals:    21 2015 21 0023 21 0309 21 0758   BP: 149/72 153/74 155/74 139/72   Pulse: 84 85 83 86   Resp:  17 18 17   Temp: 100 2 °F (37 9 °C) 99 2 °F (37 3 °C) 98 9 °F (37 2 °C) 99 3 °F (37 4 °C)   TempSrc:       SpO2: 95% 93% 91% 94%   Weight:       Height:          Temperature:   Temp (24hrs), Av 4 °F (36 9 °C), Min:97 °F (36 1 °C), Max:100 2 °F (37 9 °C)    Temperature: 99 3 °F (37 4 °C)  Intake & Output:  I/O       701 -  07 -  07 0700    P  O   400     I V  (mL/kg)  2992 5 (50)     IV Piggyback  150     Total Intake(mL/kg)  3542 5 (59 1)     Urine (mL/kg/hr)  1750 (1 2)     Emesis/NG output  0     Total Output  1750     Net  +1792 5            Unmeasured Emesis Occurrence  1 x 1 x        Weights:   IBW (Ideal Body Weight): 52 4 kg    Body mass index is 23 38 kg/m²  Weight (last 2 days)     Date/Time   Weight    05/25/21 0555   59 9 (132)            Physical Exam  Constitutional:       General: She is not in acute distress  Appearance: Normal appearance  She is not ill-appearing or toxic-appearing  HENT:      Head: Normocephalic and atraumatic  Nose: No congestion  Mouth/Throat:      Mouth: Mucous membranes are moist    Eyes:      General: No scleral icterus  Extraocular Movements: Extraocular movements intact  Pupils: Pupils are equal, round, and reactive to light  Neck:      Musculoskeletal: Normal range of motion and neck supple  Cardiovascular:      Rate and Rhythm: Normal rate and regular rhythm  Pulses: Normal pulses  Heart sounds: Normal heart sounds  No murmur  No friction rub  No gallop  Pulmonary:      Effort: Pulmonary effort is normal       Breath sounds: Normal breath sounds  No stridor  No wheezing, rhonchi or rales  Chest:      Chest wall: No tenderness  Abdominal:      General: Abdomen is flat  There is no distension  Palpations: Abdomen is soft  There is no mass  Tenderness: There is abdominal tenderness in the epigastric area  There is no right CVA tenderness, left CVA tenderness, guarding or rebound  Hernia: No hernia is present  Musculoskeletal:      Right lower leg: No edema  Left lower leg: No edema  Comments: On rigid cervical collar  Skin:     Capillary Refill: Capillary refill takes less than 2 seconds  Coloration: Skin is not jaundiced  Findings: No bruising or lesion  Neurological:      General: No focal deficit present        Mental Status: She is alert and oriented to person, place, and time  Cranial Nerves: No cranial nerve deficit  Sensory: No sensory deficit  Motor: No weakness  Coordination: Coordination normal       Gait: Gait normal       Deep Tendon Reflexes: Reflexes normal    Psychiatric:         Mood and Affect: Mood normal          Behavior: Behavior normal        PAST MEDICAL HISTORY     Past Medical History:   Diagnosis Date    Anxiety     Arthritis     shoulder    Carpal tunnel syndrome     Depression     Disease of thyroid gland     Diverticulitis     HBP (high blood pressure)     Hyperlipidemia     Hypertension     Hypothyroid     Pneumonia     2017    PONV (postoperative nausea and vomiting)     Psychiatric disorder     Depression    Sleep apnea     mild      PAST SURGICAL HISTORY     Past Surgical History:   Procedure Laterality Date    APPENDECTOMY      BREAST BIOPSY Right     benign    BREAST CYST ASPIRATION Right     benign    CATARACT EXTRACTION Bilateral     CATARACT EXTRACTION, BILATERAL  2019    CHOLECYSTECTOMY      COLONOSCOPY      COLONOSCOPY W/ BIOPSIES      EGD      HAND SURGERY Right     MAMMO (HISTORICAL) Bilateral 2018    MAMMO (HISTORICAL) Bilateral 10/27/2017    MANDIBLE SURGERY      WISDOM TOOTH EXTRACTION      X4     SOCIAL & FAMILY HISTORY     Social History     Substance and Sexual Activity   Alcohol Use No    Comment: last drink 7 years ago     Social History     Substance and Sexual Activity   Drug Use No     Social History     Tobacco Use   Smoking Status Former Smoker    Quit date:     Years since quittin 4   Smokeless Tobacco Never Used     Family History   Problem Relation Age of Onset    Heart disease Mother     Hypertension Mother     Hyperlipidemia Mother     Heart failure Mother     Hypertension Sister      LABORATORY DATA     Labs: I have personally reviewed pertinent reports      Results from last 7 days   Lab Units 21  0553   WBC Thousand/uL 18 66*   HEMOGLOBIN g/dL 10 8*   HEMATOCRIT % 33 2*   PLATELETS Thousands/uL 256   MONO PCT % 3*      Results from last 7 days   Lab Units 05/26/21  0553   POTASSIUM mmol/L 4 1   CHLORIDE mmol/L 109*   CO2 mmol/L 25   BUN mg/dL 17   CREATININE mg/dL 0 86   CALCIUM mg/dL 9 3                          Micro:  No results found for: BLOODCX, URINECX, WOUNDCULT, SPUTUMCULTUR  IMAGING & DIAGNOSTIC TESTS     Imaging: I have personally reviewed pertinent reports  Xr Spine Cervical 2 Or 3 Vw Injury    Result Date: 5/25/2021  Impression: Fluoroscopic guidance provided for surgical procedure  Please refer to the separate procedure notes for additional details  Localization procedure was performed, with the OR notified of the level at approximately 8:47 AM on 5/25/2021 via telephone conversation with the OR x-ray technologist   Additional images were obtained after level verification and are present for evaluation  Workstation performed: [id number]     EKG, Pathology, and Other Studies: I have personally reviewed pertinent reports  ALLERGIES   No Known Allergies  MEDICATIONS PRIOR TO ARRIVAL     Prior to Admission medications    Medication Sig Start Date End Date Taking?  Authorizing Provider   acetaminophen (TYLENOL) 500 mg tablet Take 1 tablet (500 mg total) by mouth every 6 (six) hours as needed for mild pain 2/8/21  Yes Mechelle Stein MD   ALPRAZolam Regina Ready) 0 5 mg tablet Take 1 tablet (0 5 mg total) by mouth 2 (two) times a day as needed for anxiety 2/15/21  Yes Jordan Wood MD   aspirin (ECOTRIN LOW STRENGTH) 81 mg EC tablet Take 81 mg by mouth daily   Yes Historical Provider, MD   atorvastatin (LIPITOR) 10 mg tablet TAKE 1 TABLET BY MOUTH EVERY DAY AT BEDTIME 3/14/21  Yes Jordan Wood MD   baclofen 10 mg tablet Take 1 tablet (10 mg total) by mouth 2 (two) times a day as needed for muscle spasms 1/6/21  Yes Alayna Devi MD   Calcium Carb-Cholecalciferol (CALCIUM 600+D3) 600-200 MG-UNIT TABS Take 1 tablet by mouth daily   Yes Historical Provider, MD   levothyroxine 100 mcg tablet Take 1 tablet (100 mcg total) by mouth daily 1/18/21  Yes Ruddy Mclean MD   Multiple Vitamins-Minerals (CENTRUM ADULTS PO) Take 1 tablet by mouth daily   Yes Historical Provider, MD   naproxen (NAPROSYN) 500 mg tablet Take 1 tablet (500 mg total) by mouth 2 (two) times a day with meals 10/19/20  Yes Ruddy Mclean MD   ramipril (ALTACE) 10 MG capsule Take 1 capsule (10 mg total) by mouth 2 (two) times a day 4/28/21  Yes Ruddy Mclean MD   docusate sodium (COLACE) 100 mg capsule Take 1 capsule (100 mg total) by mouth 2 (two) times a day 5/25/21   Vivienne Lucero PA-C   gabapentin (NEURONTIN) 300 mg capsule Take 1 capsule (300 mg total) by mouth 2 (two) times a day 5/25/21   Vivienne Lucero PA-C   oxyCODONE (ROXICODONE) 5 mg immediate release tablet Take 1 tablet (5 mg total) by mouth every 6 (six) hours as needed for severe pain for up to 10 daysMax Daily Amount: 20 mg 5/25/21 6/4/21  Vivienne Lucero PA-C   sulfamethoxazole-trimethoprim (BACTRIM DS) 800-160 mg per tablet Take 1 tablet by mouth 2 (two) times a day for 5 days 5/25/21 5/30/21  Vivienne Lucero PA-C     MEDICATIONS ADMINISTERED IN LAST 24 HOURS     Medication Administration - last 24 hours from 05/25/2021 0949 to 05/26/2021 0949       Date/Time Order Dose Route Action Action by     05/26/2021 0505 lactated ringers infusion 0 mL/hr Intravenous Stopped Linsey James RN     05/25/2021 1028 neomycin-polymyxin B (NEOSPORIN-) 1 mL in sodium chloride 0 9 % 1,000 mL irrigation 500 mL Irrigation Given Savi Blackburn MD     05/25/2021 2134 atorvastatin (LIPITOR) tablet 10 mg 10 mg Oral Given Linsey James RN     05/26/2021 0645 levothyroxine tablet 100 mcg 100 mcg Oral Not Given Linsey James RN     05/26/2021 0651 lactated ringers infusion 0 mL/hr Intravenous Stopped Diane Melo RN     05/25/2021 1307 lactated ringers infusion 50 mL/hr Intravenous New Bag Estuardo Brown RN 05/26/2021 0033 ceFAZolin (ANCEF) IVPB (premix in dextrose) 2,000 mg 50 mL 2,000 mg Intravenous [address] Sadia Garcia, RN     05/25/2021 1654 ceFAZolin (ANCEF) IVPB (premix in dextrose) 2,000 mg 50 mL 2,000 mg Intravenous [address] Melissa Connor, RN     05/25/2021 1756 oxyCODONE (ROXICODONE) IR tablet 5 mg 0 mg Oral Return to [facility], RN     05/26/2021 0645 acetaminophen (TYLENOL) tablet 975 mg 975 mg Oral Not Given Sadia Garcia, RN     05/25/2021 2133 acetaminophen (TYLENOL) tablet 975 mg 975 mg Oral Given Sadia Garcia, RN     05/25/2021 1428 acetaminophen (TYLENOL) tablet 975 mg 975 mg Oral Given Melissa Connor, RN     05/25/2021 1748 gabapentin (NEURONTIN) capsule 300 mg 300 mg Oral Given Melissa Connor, RN     05/26/2021 0645 oxyCODONE (ROXICODONE) IR tablet 10 mg 10 mg Oral Not Given Sadia Garcia, RN     05/25/2021 1956 oxyCODONE (ROXICODONE) IR tablet 10 mg 10 mg Oral Given Sadia Garcia, RN     05/25/2021 2134 baclofen tablet 10 mg 10 mg Oral Given Sadia Garcia, RN     05/25/2021 1558 baclofen tablet 10 mg 10 mg Oral Given Melissa Connor, ELISEO     05/25/2021 1748 docusate sodium (COLACE) capsule 100 mg 100 mg Oral Given Melissa Connor, RN     05/25/2021 1428 docusate sodium (COLACE) capsule 100 mg 100 mg Oral Given Melissa Connor, RN     05/25/2021 1428 senna (SENOKOT) tablet 8 6 mg 8 6 mg Oral Not Given Melissa Connor, ELISEO     05/26/2021 0349 ondansetron (ZOFRAN) injection 4 mg 4 mg Intravenous Given Sadia Garcia, ELISEO     05/25/2021 2134 dexamethasone (PF) (DECADRON) injection 10 mg 10 mg Intravenous Given Sadia Garcia, ELISEO     05/25/2021 1244 ondansetron (ZOFRAN) injection 4 mg 4 mg Intravenous Given Maya Abdul RN     05/25/2021 1306 lactated ringers infusion 0 mL/hr Intravenous Stopped Maya Abdul RN     05/25/2021 1059 lactated ringers infusion 100 mL/hr Intravenous Continued from [address], ELISEO     05/25/2021 1155 hydrALAZINE (APRESOLINE) injection 10 mg 10 mg Intravenous Given Qian Martinez RN     05/25/2021 1749 iron sucrose (VENOFER) 200 mg in sodium chloride 0 9 % 100 mL IVPB 200 mg Intravenous [address] Gia Wynn, [address] [facility]     05/26/2021 8119 sodium chloride 0 9 % infusion 75 mL/hr Intravenous [address] Gia Wynn, [address] [facility]     05/26/2021 5393 sodium chloride 0 9 % infusion 75 mL/hr Intravenous Restarted Andrei Chase RN     05/26/2021 9812 pantoprazole (PROTONIX) injection 40 mg 40 mg Intravenous Given Gia Wynn RN        CURRENT MEDICATIONS     Current Facility-Administered Medications   Medication Dose Route Frequency Provider Last Rate    acetaminophen  975 mg Oral [address] [facility] [place]      ALPRAZolam  0 5 mg Oral BID PRN Rossana Lowery PA-C      atorvastatin  10 mg Oral HS [place], [place]      baclofen  10 mg Oral TID Rossana Lowery PA-C      dexamethasone  10 mg Intravenous Q12H Jose Del Renee , [place]      docusate sodium  100 mg Oral BID [place], [place]      gabapentin  300 mg Oral BID [place], [place]      hydrALAZINE  25 mg Oral Q8H PRN CARLOS Garcia      HYDROmorphone  0 5 mg Intravenous Q2H PRN RossREMI Mondragon-TATI      iron sucrose  200 mg Intravenous Daily CARLOS Garcia      lactated ringers  75 mL/hr Intravenous Continuous Cresenciano Mallet, DO Stopped (05/26/21 0505)    lactated ringers  100 mL/hr Intravenous Continuous Ramakrishna Presumdeloris CRNA Stopped (05/25/21 1306)    levothyroxine  100 mcg Oral Early Morning [facility] Jordanflavíkurflugpako PA-C      ondansetron  4 mg Intravenous Q6H PRN Ross REMI Lowery-TATI      oxyCODONE  10 mg Oral Q4H PRN Ross Beverley, PA-TATI      oxyCODONE  5 mg Oral Q4H PRN Ross REMI Lowery-TATI      pantoprazole  40 mg Intravenous Q12H [facility] CARLOS Garcia      senna  1 tablet Oral Daily [place], [place]      sodium chloride  75 mL/hr Intravenous Continuous Gemma Senior, CRNP 75 mL/hr (05/26/21 0733)     lactated ringers, 75 mL/hr, Last Rate: Stopped (05/26/21 5055)  lactated ringers, 100 mL/hr, Last Rate: Stopped (05/25/21 1306)  sodium chloride, 75 mL/hr, Last Rate: 75 mL/hr (05/26/21 8691)      ALPRAZolam, 0 5 mg, BID PRN  hydrALAZINE, 25 mg, Q8H PRN  HYDROmorphone, 0 5 mg, Q2H PRN  ondansetron, 4 mg, Q6H PRN  oxyCODONE, 10 mg, Q4H PRN  oxyCODONE, 5 mg, Q4H PRN        Admission Time  I spent 30 minutes admitting the patient  This involved direct patient contact where I performed a full history and physical, reviewing previous records, and reviewing laboratory and other diagnostic studies  Portions of the record may have been created with voice recognition software  Occasional wrong word or "sound a like" substitutions may have occurred due to the inherent limitations of voice recognition software    Read the chart carefully and recognize, using context, where substitutions have occurred     ==  Rajani Naqvi MD  [address]  Internal Medicine Residency PGY-2

## 2021-05-26 NOTE — OCCUPATIONAL THERAPY NOTE
Occupational Therapy Evaluation     Patient Name: Teofilo Angelucci  MFGRI'L Date: 5/26/2021  Problem List  Principal Problem:    Neck pain  Active Problems:    Spondylosis of cervical region without myelopathy or radiculopathy    Occipital neuralgia of left side    Pre-op examination    Past Medical History  Past Medical History:   Diagnosis Date    Anxiety     Arthritis     shoulder    Carpal tunnel syndrome     Depression     Disease of thyroid gland     Diverticulitis     HBP (high blood pressure)     Hyperlipidemia     Hypertension     Hypothyroid     Pneumonia     2017    PONV (postoperative nausea and vomiting)     Psychiatric disorder     Depression    Sleep apnea     mild      Past Surgical History  Past Surgical History:   Procedure Laterality Date    APPENDECTOMY      BREAST BIOPSY Right     benign    BREAST CYST ASPIRATION Right     benign    CATARACT EXTRACTION Bilateral     CATARACT EXTRACTION, BILATERAL  2019    CHOLECYSTECTOMY      COLONOSCOPY      COLONOSCOPY W/ BIOPSIES  2011    EGD      HAND SURGERY Right     MAMMO (HISTORICAL) Bilateral 11/27/2018    MAMMO (HISTORICAL) Bilateral 10/27/2017    MANDIBLE SURGERY      WISDOM TOOTH EXTRACTION      X4         05/26/21 0835   OT Last Visit   OT Visit Date 05/26/21   Note Type   Note type Evaluation   Restrictions/Precautions   Weight Bearing Precautions Per Order No   Braces or Orthoses C/S Collar   Other Precautions Cognitive; Chair Alarm; Bed Alarm; Impulsive;Multiple lines; Fall Risk;Pain;Spinal precautions   Pain Assessment   Pain Assessment Tool 0-10   Pain Score 6   Pain Location/Orientation Location: Neck   Patient's Stated Pain Goal No pain   Hospital Pain Intervention(s) Repositioned; Ambulation/increased activity; Emotional support   Home Living   Type of [address] [place] [address] Two level;Bed/bath upstairs;Stairs to enter with rails  (4 NICHOL )   Bathroom Shower/Tub Tub/shower unit   Bathroom Toilet Standard   Bathroom Accessibility Accessible   Home Equipment Walker   Additional Comments + USE OF RW AS NEEDED   Prior Function   Level of [place] Independent with ADLs and functional mobility   Lives With Daughter   Receives Help From Family   ADL Assistance Independent   IADLs Independent   Falls in the last 6 months 1 to 4  (4)   Vocational Retired   Lifestyle   Autonomy  [address] WITH ADLS/IADLS/DRIVING PTA    Reciprocal Relationships LIVES WITH DAUGHTER WHO HAS SEIZURES, PT REPORTS SHE ASSISTS DAUGHTER AS NEEDED  LIMITED ADDITIONAL SUPPORT AVAILABLE  Service to Others RETIRED   Intrinsic Gratification ENJOYS SPENDING TIME WITH HER DOGS   Psychosocial   Psychosocial (WDL) WDL   Subjective   Subjective "I JUST NEED TO GET HOME TO MY BABIES"   ADL   Eating Assistance 5  Supervision/Setup   Grooming Assistance 4  Minimal Assistance   UB Bathing Assistance 4  Minimal Assistance   LB Bathing Assistance 3  Moderate Assistance   UB Dressing Assistance 4  Minimal [address] 3  Moderate [address]  3  Moderate Assistance   Functional Assistance 3  Moderate Assistance   Bed Mobility   Additional Comments PT [address]  RETURNED TO BEDSIDE CHAIR WITH ALL NEEDS IN REACH + ALARM ON    Transfers   Sit to Stand 3  Moderate assistance   Additional items Assist x 1; Increased time required;Verbal cues   Stand to Sit 3  Moderate assistance   Additional items Assist x 1; Increased time required;Verbal cues   Functional Mobility   Functional Mobility 3  Moderate assistance   Additional items Rolling walker   Balance   Static Sitting Fair -   Static Standing Poor +   Ambulatory Poor   Activity Tolerance   Activity Tolerance Patient limited by pain; Patient limited by fatigue   Medical Staff Made Aware PT SEEN FOR CO-SESSION WITH SKILLED PHYSICAL THERAPIST 2' CLINICALLY UNSTABLE PRESENTATION, NEW PRECAUTIONS/LIMITATIONS, LIMITED ACTIVITY TOLERANCE AND PRESENT IMPAIRMENTS WHICH ARE A REGRESSION FROM THE PT'S BASELINE AND IMPACTING OVERALL OCCUPATIONAL PERFORMANCE  Nurse Made Aware APPROPRIATE TO SEE    RUE Assessment   RUE Assessment WFL   LUE Assessment   LUE Assessment WFL   Hand Function   Gross Motor Coordination Functional   Fine Motor Coordination Functional   Sensation   Light Touch Partial deficits in the RLE;Partial deficits in the LLE  (DISTALLY )   Cognition   Overall Cognitive Status Impaired   Arousal/Participation Alert; Cooperative   Attention Attends with cues to redirect   Orientation Level Oriented X4   Memory Decreased recall of precautions   Following Commands Follows one step commands with increased time or repetition   Comments PT IS OVERALL PLEASANT AND COOPERATIVE  TANGENTIAL AND EASILY DISTRACTED AT TIMES REQUIRING CUES TO ATTEND TO TASK  LIMITED INSIGHT/JUDGEMENT/SAFETY AWARENESS  ALARM ON FOR SAFETY  Assessment   Limitation Decreased ADL status; Decreased Safe judgement during ADL;Decreased cognition;Decreased endurance;Decreased self-care trans;Decreased high-level ADLs   Prognosis Good   Assessment 65 YO Female SEEN FOR INITIAL OCCUPATIONAL THERAPY EVALUATION S/P  POSTERIOR C1-2 FIXATION ON 5/25/21  PT CURRENTLY HAS THE FOLLOWING RESTRICTIONS;SPINAL PRECAUTIONS and C-COLLAR   PROBLEMS LIST INCLUDES ANXIETY, ARTHRITIS, DEPRESSION, DISEASE OF THYROID GLAND, DIVERTICULITIS, HLD, AND HTN  PT IS FROM HOME WITH DISABLED(?) DAUGHTER WHERE SHE REPORTS BEING INDEPENDENT WITH ADLS/IADLS/DRIVING AND PROVIDES CARE TO DAUGHTER AS NEEDED PTA  PT CURRENTLY REQUIRES OVERALL MIN-MOD A WITH ADLS, AND MOD A WITH TRANSFERS /FUNCTIONAL MOBILITY WITH USE OF RW  PT IS LIMITED 2' PAIN, FATIGUE, IMPAIRED BALANCE, FALL RISK , LIMITED SAFETY AWARENESS/INSIGHT/JUDGEMENT, IMPULSIVE, SPINAL PRECAUTIONS, OVERALL WEAKNESS/DECONDITIONING , NAUSEA + VOMITING, LIMITED FAMILY/FRIEND SUPPORT , INACCESSIBLE HOME ENVIRONMENT and OVERALL LIMITED ACTIVITY TOLERANCE   PT EDUCATED ON SPINAL PRECAUTIONS, C-COLLAR MANAGEMENT, DEEP BREATHING TECHNIQUES T/O ACTIVITY, SLOWING OF PACE, ENERGY CONSERVATION TECHNIQUES FOR CARRY OVER UPON D/C, INCREASED FAMILY SUPPORT and CONTINUE PARTICIPATION IN SELF-CARE/MOBILITY WITH STAFF Adam Contreras   The patient's raw score on the AM-PAC Daily Activity inpatient short form is 15, standardized score is 34 69, less than 39 4  Patients at this level are likely to benefit from discharge to post-acute rehabilitation services  Please refer to the recommendation of the Occupational Therapist for safe discharge planning  FROM AN OCCUPATIONAL THERAPY PERSPECTIVE, PT WOULD BENEFIT FROM ADDITIONAL OT SERVICES IN AN INPT REHAB SETTING UPON D/C  WILL CONT TO FOLLOW TO ADDRESS THE BELOW DESCRIBED GOALS  Goals   Patient Goals TO RETURN HOME    LTG Time Frame 10-14   Long Term Goal #1 SEE BELOW    Plan   Treatment Interventions ADL retraining;Functional transfer training; Endurance training;Cognitive reorientation;Patient/family training;Equipment evaluation/education; Compensatory technique education; Energy conservation; Activityengagement   Goal Expiration Date 06/09/21   OT Frequency 3-5x/wk   Recommendation   OT Discharge Recommendation Post acute rehabilitation services   OT - OK to Discharge Yes   AM-PAC Daily Activity Inpatient   Lower Body Dressing 2   Bathing 2   Toileting 2   Upper Body Dressing 3   Grooming 3   Eating 3   Daily Activity Raw Score 15   Daily Activity Standardized Score (Calc for Raw Score >=11) 34 69   AM-PAC Applied Cognition Inpatient   Following a Speech/Presentation 3   Understanding Ordinary Conversation 4   Taking Medications 4   Remembering Where Things Are Placed or Put Away 3   Remembering List of 4-5 Errands 3   Taking Care of Complicated Tasks 3   Applied Cognition Raw Score 20   Applied Cognition Standardized Score 41 76   Modified [place] Scale   Modified Thais Scale 4       OCCUPATIONAL THERAPY GOALS TO BE MET WITHIN 14 DAYS:    -Pt will increase bed mobility to MOD I to participate in functional activities with G tolerance and balance  -Pt will improve functional mobility and transfers to MOD I on/off all surfaces w/ G balance/safety including toileting   -Pt will participate in lt grooming task with MOD I after set-up standing at sink ~3-5 minutes with G safety and balance  -Pt will increase independence in all ADLS to MOD I with G balance sitting upright in chair   -Pt will improve activity tolerance to G for 30 min txment sessions w/ G carry over of learned energy conservation techniques   -Pt will improve independence in lt homemaking activities to MOD I without requiring cues for safety   -Pt will demonstrate G carryover of learned spinal precautions, safety techniques and proper body mechanics in functional and leisure activities with use of DME   -Pt will complete additional cognitive assessment with 100% attention to task in order to assist with safe d/c plan         Documentation completed by Marisue Severs, MOT, OTR/L

## 2021-05-26 NOTE — PHYSICAL THERAPY NOTE
PHYSICAL THERAPY EVALUATION  NAME:  Aliya Kulkarni  DATE: 05/26/21    AGE:   66 y o  Mrn:   [id number]  ADMIT DX:  DDD (degenerative disc disease), cervical [M50 30]  Closed odontoid fracture with nonunion, subsequent encounter [S12 100K]  Fracture of cervical vertebrae, multiple, [facility] ) [S12  9XXA]    Past Medical History:   Diagnosis Date    Anxiety     Arthritis     shoulder    Carpal tunnel syndrome     Depression     Disease of thyroid gland     Diverticulitis     HBP (high blood pressure)     Hyperlipidemia     Hypertension     Hypothyroid     Pneumonia     2017    PONV (postoperative nausea and vomiting)     Psychiatric disorder     Depression    Sleep apnea     mild        Past Surgical History:   Procedure Laterality Date    APPENDECTOMY      BREAST BIOPSY Right     benign    BREAST CYST ASPIRATION Right     benign    CATARACT EXTRACTION Bilateral     CATARACT EXTRACTION, BILATERAL  2019    CHOLECYSTECTOMY      COLONOSCOPY      COLONOSCOPY W/ BIOPSIES  2011    EGD      HAND SURGERY Right     MAMMO (HISTORICAL) Bilateral 11/27/2018    MAMMO (HISTORICAL) Bilateral 10/27/2017    MANDIBLE SURGERY      WISDOM TOOTH EXTRACTION      X4       Length Of Stay: 1    PHYSICAL THERAPY EVALUATION:        05/26/21 0840   Note Type   Note type Evaluation   Pain Assessment   Pain Assessment Tool 0-10   Pain Score 6   Pain Location/Orientation Location: Neck   Pain Onset/Description Onset: Ongoing;Frequency: Constant/Continuous; Descriptor: Aching   Effect of Pain on Daily Activities Increased pain with activity   Patient's Stated Pain Goal No pain   Hospital Pain Intervention(s) Ambulation/increased activity;Repositioned   Home Living   Type of [address] [place] [address] Two level; Able to live on main level with bedroom/bathroom;Stairs to enter with rails  (3-4 NICHOL, pt lives on first floor of home)   [address] [facility]   Additional Comments Patient reports living with daughter who is able to minimally assist patient if needed  Pt states her daughter has a hx of seizures   Prior Function   Level of [place] Independent with ADLs and functional mobility   Lives With Daughter   Tommy in the last 6 months 1 to 4  (2 per pt)   Comments Patient denies use of an assistive device for ambulation prior to admission   Restrictions/Precautions   Weight Bearing Precautions Per Order No   Braces or Orthoses C/S Collar   Other Precautions Chair Alarm; Bed Alarm;Multiple lines; Fall Risk;Pain;Spinal precautions;Cognitive; hard of hearing   General   Family/Caregiver Present No   Cognition   Overall Cognitive Status Impaired   Arousal/Participation Alert   Orientation Level Oriented to person;Oriented to place;Oriented to time   Memory Decreased recall of precautions   Following Commands Follows one step commands with increased time or repetition   RUE Assessment   RUE Assessment WFL   LUE Assessment   LUE Assessment WFL   RLE Assessment   RLE Assessment WFL   Strength RLE   RLE Overall Strength 4-/5   LLE Assessment   LLE Assessment WFL   Strength LLE   LLE Overall Strength 4-/5   Bed Mobility   Additional Comments NA, Pt OOB in chair at time of PT eval    Transfers   Sit to Stand 3  Moderate assistance   Additional items Assist x 1; Increased time required;Verbal cues   Stand to Sit 3  Moderate assistance   Additional items Assist x 1; Increased time required;Verbal cues   Additional Comments VC and TC needed for hand placement and safety    Ambulation/Elevation   Gait pattern Excessively slow; Short stride; Foward flexed; Inconsistent lou;Decreased foot clearance   Gait Assistance 3  Moderate assist   Additional items Assist x 1   Assistive Device Rolling walker   Distance 15ft x 2   (limited by fatigue )   Balance   Static Sitting Fair -   Static Standing Poor +   Ambulatory Poor   Endurance Deficit   Endurance Deficit Yes   Endurance Deficit Description Fatigue, pain   Activity Tolerance   Activity Tolerance Patient limited by fatigue;Patient limited by pain   Medical Staff Made Aware Chise, OT; Pt seen for co eval with OT due to pts unpredictable medical status, being s/p sx intervention with new spinal precautions and c/s collar, and decreased activity tolerance compared to pts baseline   Nurse Made Aware Patient appropriate to be seen and mobilized per nursing   Assessment   Prognosis Good   Problem List Decreased strength;Decreased range of motion;Decreased endurance; Impaired balance;Decreased mobility; Decreased cognition; Impaired judgement;Decreased safety awareness;Orthopedic restrictions;Pain   Assessment Pt is 66 y o  female seen for PT evaluation s/p admit [facility] on 5/25/2021  Two pt identifiers were used to confirm  Pt presented for scheduled POSTERIOR C1-C2 FIXATION which was performed on 05/25/2021   Pt was admitted with a primary dx of: closed non union odontoid fx s/p POSTERIOR C1-C2 FIXATION  PT now consulted for assessment of mobility and d/c needs  Pt with Ambulate orders  Pts current co morbidities affecting treatment include: Anxiety, arthritis, depression, HTN, HLD, and personal factors including steps to enter home  Pts current clinical presentation is Unstable/ Unpredictable (high complexity) due to Ongoing medical management for primary dx, Increased reliance on more restrictive AD compared to baseline, Decreased activity tolerance compared to baseline, Fall risk, Increased assistance needed from caregiver at current time, Cog status, Spinal precautions at current time, Continuous pulse oximetry monitoring , s/p surgical intervention    Upon evaluation, pt currently is requiringMod Ax1 for transfers and Mod Ax1 for ambulation w/ RW    Pt presents at PT eval functioning below baseline and currently w/ overall mobility deficits 2* to: BLE weakness, decreased ROM, impaired balance, decreased endurance, gait deviations, pain, decreased activity tolerance compared to baseline, decreased safety awareness, impaired judgement, fall risk, decreased cognition, spinal precautions  Pt currently at a fall risk 2* to impairments listed above  Based on the aforementioned PT evaluation, pt will continue to benefit from skilled Acute PT interventions to address stated impairments; to maximize functional mobility; for ongoing pt/ family training; and DME needs  At conclusion of PT session pt returned back in chair with phone and call bell within reach  Pt denies any further questions at this time  PT is currently recommending Rehab  PT will continue to follow during hospital stay  Barriers to Discharge Inaccessible home environment;Decreased caregiver support   Goals   Patient Goals " to get better"   STG Expiration Date 06/05/21   Short Term Goal #1 In 10 days pt will complete: 1) Bed mobility skills with S to increase safety and independence as well as decrease caregiver burden  2) Functional transfers with S to promote increased independence, safety, and QOL  3) Ambulate 150' using least restrictive AD with S without LOB and stable vitals so that pt can negotiate previous living environment safely and promote independence with functional mobility and return to PLOF  4) Stair training up/ down 4 step/s using rail/s with S so that pt can enter/negotiate previous living environment safely and decrease fall risk  5) Improve balance grades by 1/2 grade to increase safety with all mobility and decrease fall risk  6) Improve BLE strength by 1/2 grade to help increase overall functional mobility and decrease fall risk  Plan   Treatment/Interventions Functional transfer training;LE strengthening/ROM; Elevations; Therapeutic exercise; Endurance training;Patient/family training;Equipment eval/education; Bed mobility;Gait training;Spoke to nursing;OT   PT Frequency Other (Comment)  (3-6x a week )   Recommendation   PT Discharge Recommendation Post acute rehabilitation services   Equipment Recommended [facility] Recommended Wheeled walker   PT - OK to Discharge Yes  (to rehab when medically cleared )   AM-PAC Basic Mobility Inpatient   Turning in Bed Without Bedrails 3   Lying on Back to Sitting on Edge of Flat Bed 3   Moving Bed to Chair 2   Standing Up From Chair 2   Walk in Room 2   Climb 3-5 Stairs 2   Basic Mobility Inpatient Raw Score 14   Basic Mobility Standardized Score 35 55   Modified Burton Scale   Modified Thais Scale 4   Barthel Index   Feeding 10   Bathing 0   Grooming Score 5   Dressing Score 5   Bladder Score 10   Bowels Score 10   Toilet Use Score 5   Transfers (Bed/Chair) Score 5   Mobility (Level Surface) Score 0   Stairs Score 0   Barthel Index Score 50   Portions of the documentation may have been created using voice recognition software  Occasional wrong word or sound alike substitutions may have occurred due to the inherent limitations of the voice recognition software  Read the chart carefully and recognize, using context, where substitutions have occurred      Debi Barger, PT, DPT

## 2021-05-26 NOTE — PROGRESS NOTES
Internal Medicine Progress Note  Patient: Maribel Macdonald  Age/sex: 66 y o  female  Medical Record #: [id number]      ASSESSMENT/PLAN: (Interval History)  Maribel Macdonald is seen and examined and management for following issues:    Status Post Cervical Spinal surgery   Pain controlled   Continue encourage incentive spirometry; monitor fever curve   DVT prophylaxis in place and reviewed  Results from last 7 days   Lab Units 05/26/21  0553   WBC Thousand/uL 18 66*   HEMOGLOBIN g/dL 10 8*   HEMATOCRIT % 33 2*   PLATELETS Thousands/uL 256         Operative acute blood loss   · Decrease in hgb levels from preop labs results; suspect due to post operation extravasation losses along with potential dilutional effects of fluids  · Cont post operative iron infusion x 2 doses  · Transfuse PRBC if hgb less then 8 0 (per ortho protocol)  or symptomatic  · Monitor follow up CBC post intervention to trend effect    Hematemesis  · Will make NPO  · Start IVF  · Hgb/hct every 8 hours  · Consult GI for evaluation (notified via TT)  · Start PPI IVPB q12 hrs    HTN  · Hold ramipril in the post operative setting to avoid hypotension/ROSA  · OK to resume on dc  · Add hydralazine as needed for SBP >160  · Monitor trend     Impaired fasting glucose  · Hgb A1c 5 8  · Monitor FBS     Hypothyroid  · Cont supplementation     Hyperlipidemia  · Low cholesterol diet  · Continue statin therapy                 PRE-OP HGB LEVEL: 11 9  The above assessment and plan was reviewed and updated as determined by my evaluation of the patient on 5/26/2021      Labs:   Results from last 7 days   Lab Units 05/26/21  0553   WBC Thousand/uL 18 66*   HEMOGLOBIN g/dL 10 8*   HEMATOCRIT % 33 2*   PLATELETS Thousands/uL 256     Results from last 7 days   Lab Units 05/26/21  0553   SODIUM mmol/L 139   POTASSIUM mmol/L 4 1   CHLORIDE mmol/L 109*   CO2 mmol/L 25   BUN mg/dL 17   CREATININE mg/dL 0 86   CALCIUM mg/dL 9 3             Results from last 7 days   Lab Units 05/25/21  1113   POC GLUCOSE mg/dl 166*       Review of Scheduled Meds:  Current Facility-Administered Medications   Medication Dose Route Frequency Provider Last Rate    acetaminophen  975 mg Oral [address] [facility] [place]      ALPRAZolam  0 5 mg Oral BID PRN Juan Jose BatNAMAN mittal      atorvastatin  10 mg Oral HS [place], [place]      baclofen  10 mg Oral TID Juan Jose BatNAMAN mittal      dexamethasone  10 mg Intravenous Q12H [address]      docusate sodium  100 mg Oral BID [place], [place]      gabapentin  300 mg Oral BID [place], [place]      hydrALAZINE  25 mg Oral Q8H PRN CARLOS Nur      HYDROmorphone  0 5 mg Intravenous Q2H PRN [facility] NAMAN [place]      iron sucrose  200 mg Intravenous Daily CARLOS Nur      lactated ringers  75 mL/hr Intravenous Continuous Verneta Khurram DO Stopped (05/26/21 0505)    lactated ringers  100 mL/hr Intravenous Continuous Helio Pringle CRNA Stopped (05/25/21 1306)    levothyroxine  100 mcg Oral Early Morning Kem Gee PA-C      ondansetron  4 mg Intravenous Q6H PRN Juan Jose BatNAMAN mittal      oxyCODONE  10 mg Oral Q4H PRN Juan Jose BatNAMAN mittal      oxyCODONE  5 mg Oral Q4H PRN [facility] NAMAN [place]ides      pantoprazole  40 mg Intravenous Q12H [address] CARLOS Nur      senna  1 tablet Oral Daily [place], [place]      sodium chloride  75 mL/hr Intravenous Continuous CARLOS Nur 75 mL/hr (05/26/21 4988)       Subjective/ HPI: Piero Fair seen and examined  Patients overnight issues or events were reviewed with nursing or staff during rounds or morning huddle session  New or overnight issues include the following:     Pt had an episode of vomiting yesterday evening and another episode this a m ; appears coffee ground in nature  Denies any h/o ulcers or GIB  Stable now, nausea is improved      ROS:   A 10 point ROS was performed; negative except as noted above         Imaging:     XR spine cervical 2 or 3 vw injury   Final Result by Ritchie Thornton MD (05/25 1533)      Fluoroscopic guidance provided for surgical procedure  Please refer to the separate procedure notes for additional details  Localization procedure was performed, with the OR notified of the level at approximately 8:47 AM on 5/25/2021 via telephone conversation with the OR x-ray technologist        Additional images were obtained after level verification and are present for evaluation  Workstation performed: [id number]             *Labs /Radiology studies Reviewed  *Medications  reviewed and reconciled as needed  *Please refer to order section for additional ordered labs studies  *Case discussed with primary attending during morning huddle case rounds    Physical Examination:  Vitals:   Vitals:    05/25/21 1557 05/25/21 2015 05/26/21 0023 05/26/21 0309   BP: 144/73 149/72 153/74 155/74   Pulse: 84 84 85 83   Resp: 18  17 18   Temp: 98 1 °F (36 7 °C) 100 2 °F (37 9 °C) 99 2 °F (37 3 °C) 98 9 °F (37 2 °C)   TempSrc:       SpO2: 97% 95% 93% 91%   Weight:       Height:           General Appearance: no distress, conversive  HEENT: PERRLA, conjuctiva normal; oropharynx clear; mucous membranes moist;   Neck:  Supple, no lymphadenopathy or thyromegaly  Lungs: CTA, normal respiratory effort, no retractions, expiratory effort normal  CV: regular rate and rhythm , PMI normal   ABD: soft non tender, no masses , no hepatic or splenomegaly  EXT: DP pulses intact, no lymphadenopathy, no edema; C Spine surgical dressing in place  Skin: normal turgor, normal texture, no rash  Psych: affect normal, mood normal  Neuro: AAOx3    : garcia removed ; due to void    The above physical exam was reviewed and updated as determined by my evaluation of the patient on 5/26/2021      Invasive Devices     Peripheral Intravenous Line            Peripheral IV 05/25/21 Left Wrist less than 1 day    Peripheral IV 05/25/21 Right Hand less than 1 day Drain            Urethral Catheter Latex 16 Fr  less than 1 day                   VTE Pharmacologic Prophylaxis: Sequential pneumatic compression stocking  Code Status: No Order  Current Length of Stay: 1 day(s)      Total time spent:  30 minutes with more than 50% spent counseling/coordinating care  Counseling includes discussion with patient re: progress  and discussion with patient of his/her current medical state/information  Coordination of patient's care was performed in conjunction with primary service  Time invested included review of patient's labs, vitals, and management of their comorbidities with continued monitoring  In addition, this patient was discussed with medical team including physician and advanced extenders  The care of the patient was extensively discussed and appropriate treatment plan was formulated unique for this patient  ** Please Note:  voice to text software may have been used in the creation of this document   Although proof errors in transcription or interpretation are a potential of such software**

## 2021-05-26 NOTE — PHYSICAL THERAPY NOTE
PHYSICAL THERAPY EVALUATION  NAME:  Belinda Gonzalez  DATE: 05/26/21    AGE:   66 y o  Mrn:   [id number]  ADMIT DX:  DDD (degenerative disc disease), cervical [M50 30]  Closed odontoid fracture with nonunion, subsequent encounter [S12 100K]  Fracture of cervical vertebrae, multiple, [facility] [S12  9XXA]    Past Medical History:   Diagnosis Date    Anxiety     Arthritis     shoulder    Carpal tunnel syndrome     Depression     Disease of thyroid gland     Diverticulitis     HBP (high blood pressure)     Hyperlipidemia     Hypertension     Hypothyroid     Pneumonia     2017    PONV (postoperative nausea and vomiting)     Psychiatric disorder     Depression    Sleep apnea     mild        Past Surgical History:   Procedure Laterality Date    APPENDECTOMY      BREAST BIOPSY Right     benign    BREAST CYST ASPIRATION Right     benign    CATARACT EXTRACTION Bilateral     CATARACT EXTRACTION, BILATERAL  2019    CHOLECYSTECTOMY      COLONOSCOPY      COLONOSCOPY W/ BIOPSIES  2011    EGD      HAND SURGERY Right     MAMMO (HISTORICAL) Bilateral 11/27/2018    MAMMO (HISTORICAL) Bilateral 10/27/2017    MANDIBLE SURGERY      WISDOM TOOTH EXTRACTION      X4       Length Of Stay: 1    PHYSICAL THERAPY EVALUATION:        05/26/21 0840   Note Type   Note type Evaluation   Pain Assessment   Pain Assessment Tool 0-10   Pain Score 6   Pain Location/Orientation Location: Neck   Pain Onset/Description Onset: Ongoing;Frequency: Constant/Continuous; Descriptor: Aching   Effect of Pain on Daily Activities Increased pain with activity   Patient's Stated Pain Goal No pain   Hospital Pain Intervention(s) Ambulation/increased activity;Repositioned   Home Living   Type of [address] [place] [address] Two level; Able to live on main level with bedroom/bathroom;Stairs to enter with rails  (3-4 NICHOL, pt lives on first floor of home)   [address] [facility]   Additional Comments Patient reports living with daughter who pt assists as needed at baseline  Pt states her daughter has a hx of seizures  Limited additional support available to pt  Prior Function   Level of Vernal Independent with ADLs and functional mobility   Lives With Daughter   Tommy in the last 6 months 1 to 4  (4)   Comments Patient denies use of an assistive device for ambulation prior to admission   Restrictions/Precautions   Weight Bearing Precautions Per Order No   Braces or Orthoses C/S Collar   Other Precautions Chair Alarm; Bed Alarm;Multiple lines; Fall Risk;Pain;Spinal precautions;Cognitive;Hard of hearing   General   Family/Caregiver Present No   Cognition   Overall Cognitive Status Impaired   Arousal/Participation Alert   Orientation Level Oriented to person;Oriented to place;Oriented to time   Memory Decreased recall of precautions   Following Commands Follows one step commands with increased time or repetition   RUE Assessment   RUE Assessment WFL   LUE Assessment   LUE Assessment WFL   RLE Assessment   RLE Assessment WFL   Strength RLE   RLE Overall Strength 4-/5   LLE Assessment   LLE Assessment WFL   Strength LLE   LLE Overall Strength 4-/5   Bed Mobility   Additional Comments NA, Pt OOB in chair at time of PT eval    Transfers   Sit to Stand 3  Moderate assistance   Additional items Assist x 1; Increased time required;Verbal cues   Stand to Sit 3  Moderate assistance   Additional items Assist x 1; Increased time required;Verbal cues   Additional Comments VC and TC needed for hand placement and safety    Ambulation/Elevation   Gait pattern Excessively slow; Short stride; Foward flexed; Inconsistent lou;Decreased foot clearance   Gait Assistance 3  Moderate assist   Additional items Assist x 1   Assistive Device Rolling walker   Distance 15ft x 2   (limited by fatigue )   Balance   Static Sitting Fair -   Static Standing Poor +   Ambulatory Poor   Endurance Deficit   Endurance Deficit Yes   Endurance Deficit Description Fatigue, pain   Activity Tolerance   Activity Tolerance Patient limited by fatigue;Patient limited by pain   Medical Staff Made Aware Chise, OT; Pt seen for co eval with OT due to pts unpredictable medical status, being s/p sx intervention with new spinal precautions and c/s collar, and decreased activity tolerance compared to pts baseline   Nurse Made Aware Patient appropriate to be seen and mobilized per nursing   Assessment   Prognosis Good   Problem List Decreased strength;Decreased range of motion;Decreased endurance; Impaired balance;Decreased mobility; Decreased cognition; Impaired judgement;Decreased safety awareness;Orthopedic restrictions;Pain   Assessment Pt is 66 y o  female seen for PT evaluation s/p admit to [facility] on 5/25/2021  Two pt identifiers were used to confirm  Pt presented for scheduled POSTERIOR C1-C2 FIXATION which was performed on 05/25/2021   Pt was admitted with a primary dx of: closed non union odontoid fx s/p POSTERIOR C1-C2 FIXATION  PT now consulted for assessment of mobility and d/c needs  Pt with Ambulate orders  Pts current co morbidities affecting treatment include: Anxiety, arthritis, depression, HTN, HLD, and personal factors including steps to enter home  Pts current clinical presentation is Unstable/ Unpredictable (high complexity) due to Ongoing medical management for primary dx, Increased reliance on more restrictive AD compared to baseline, Decreased activity tolerance compared to baseline, Fall risk, Increased assistance needed from caregiver at current time, Cog status, Spinal precautions at current time, Continuous pulse oximetry monitoring , s/p surgical intervention    Upon evaluation, pt currently is requiringMod Ax1 for transfers and Mod Ax1 for ambulation w/ RW    Pt presents at PT eval functioning below baseline and currently w/ overall mobility deficits 2* to: BLE weakness, decreased ROM, impaired balance, decreased endurance, gait deviations, pain, decreased activity tolerance compared to baseline, decreased safety awareness, impaired judgement, fall risk, decreased cognition, spinal precautions  Pt currently at a fall risk 2* to impairments listed above  Based on the aforementioned PT evaluation, pt will continue to benefit from skilled Acute PT interventions to address stated impairments; to maximize functional mobility; for ongoing pt/ family training; and DME needs  At conclusion of PT session pt returned back in chair with phone and call bell within reach  Pt denies any further questions at this time  PT is currently recommending Rehab  PT will continue to follow during hospital stay  Barriers to Discharge Inaccessible home environment;Decreased caregiver support   Goals   Patient Goals " to get better"   STG Expiration Date 06/05/21   Short Term Goal #1 In 10 days pt will complete: 1) Bed mobility skills with S to increase safety and independence as well as decrease caregiver burden  2) Functional transfers with S to promote increased independence, safety, and QOL  3) Ambulate 150' using least restrictive AD with S without LOB and stable vitals so that pt can negotiate previous living environment safely and promote independence with functional mobility and return to PLOF  4) Stair training up/ down 4 step/s using rail/s with S so that pt can enter/negotiate previous living environment safely and decrease fall risk  5) Improve balance grades by 1/2 grade to increase safety with all mobility and decrease fall risk  6) Improve BLE strength by 1/2 grade to help increase overall functional mobility and decrease fall risk  Plan   Treatment/Interventions Functional transfer training;LE strengthening/ROM; Elevations; Therapeutic exercise; Endurance training;Patient/family training;Equipment eval/education; Bed mobility;Gait training;Spoke to nursing;OT   PT Frequency Other (Comment)  (3-6x a week ) Recommendation   PT Discharge Recommendation Post acute rehabilitation services   Equipment Recommended [facility] Recommended Wheeled walker   PT - OK to Discharge Yes  (to rehab when medically cleared )   AM-PAC Basic Mobility Inpatient   Turning in Bed Without Bedrails 3   Lying on Back to Sitting on Edge of Flat Bed 3   Moving Bed to Chair 2   Standing Up From Chair 2   Walk in Room 2   Climb 3-5 Stairs 2   Basic Mobility Inpatient Raw Score 14   Basic Mobility Standardized Score 35 55   Modified [place] Scale   Modified [place] Scale 4   Barthel Index   Feeding 10   Bathing 0   Grooming Score 5   Dressing Score 5   Bladder Score 10   Bowels Score 10   Toilet Use Score 5   Transfers (Bed/Chair) Score 5   Mobility (Level Surface) Score 0   Stairs Score 0   Barthel Index Score 50   Portions of the documentation may have been created using voice recognition software  Occasional wrong word or sound alike substitutions may have occurred due to the inherent limitations of the voice recognition software  Read the chart carefully and recognize, using context, where substitutions have occurred      Daniel Subramanian, PT, DPT

## 2021-05-26 NOTE — PLAN OF CARE
Problem: OCCUPATIONAL THERAPY ADULT  Goal: Performs self-care activities at highest level of function for planned discharge setting  See evaluation for individualized goals  Description: Treatment Interventions: ADL retraining, Functional transfer training, Endurance training, Cognitive reorientation, Patient/family training, Equipment evaluation/education, Compensatory technique education, Energy conservation, Activityengagement          See flowsheet documentation for full assessment, interventions and recommendations  Note: Limitation: Decreased ADL status, Decreased Safe judgement during ADL, Decreased cognition, Decreased endurance, Decreased self-care trans, Decreased high-level ADLs  Prognosis: Good  Assessment: 67 YO Female SEEN FOR INITIAL OCCUPATIONAL THERAPY EVALUATION S/P  POSTERIOR C1-2 FIXATION ON 5/25/21  PT CURRENTLY HAS THE FOLLOWING RESTRICTIONS;SPINAL PRECAUTIONS and C-COLLAR   PROBLEMS LIST INCLUDES ANXIETY, ARTHRITIS, DEPRESSION, DISEASE OF THYROID GLAND, DIVERTICULITIS, HLD, AND HTN  PT IS FROM HOME WITH DISABLED(?) DAUGHTER WHERE SHE REPORTS BEING INDEPENDENT WITH ADLS/IADLS/DRIVING AND PROVIDES CARE TO DAUGHTER AS NEEDED PTA  PT CURRENTLY REQUIRES OVERALL MIN-MOD A WITH ADLS, AND MOD A WITH TRANSFERS /FUNCTIONAL MOBILITY WITH USE OF RW  PT IS LIMITED 2' PAIN, FATIGUE, IMPAIRED BALANCE, FALL RISK , LIMITED SAFETY AWARENESS/INSIGHT/JUDGEMENT, IMPULSIVE, SPINAL PRECAUTIONS, OVERALL WEAKNESS/DECONDITIONING , NAUSEA + VOMITING, LIMITED FAMILY/FRIEND SUPPORT , INACCESSIBLE HOME ENVIRONMENT and OVERALL LIMITED ACTIVITY TOLERANCE  PT EDUCATED ON SPINAL PRECAUTIONS, C-COLLAR MANAGEMENT, DEEP BREATHING TECHNIQUES T/O ACTIVITY, SLOWING OF PACE, ENERGY CONSERVATION TECHNIQUES FOR CARRY OVER UPON D/C, INCREASED FAMILY SUPPORT and CONTINUE PARTICIPATION IN SELF-CARE/MOBILITY WITH STAFF Adam Contreras    The patient's raw score on the AM-PAC Daily Activity inpatient short form is 15, standardized score is 34 69, less than 39 4  Patients at this level are likely to benefit from discharge to post-acute rehabilitation services  Please refer to the recommendation of the Occupational Therapist for safe discharge planning  FROM AN OCCUPATIONAL THERAPY PERSPECTIVE, PT WOULD BENEFIT FROM ADDITIONAL OT SERVICES IN AN INPT REHAB SETTING UPON D/C  WILL CONT TO FOLLOW TO ADDRESS THE BELOW DESCRIBED GOALS  OT Discharge Recommendation: Post acute rehabilitation services  OT - OK to Discharge:  Yes

## 2021-05-26 NOTE — PROGRESS NOTES
Subjective: patient had issues with nausea and vomiting overnight, otherwise pain overall controlled  Objective:  A 10 point ROS was performed; negative except as noted above       Lab Results   Component Value Date/Time    WBC 18 66 (H) 05/26/2021 05:53 AM    HGB 10 8 (L) 05/26/2021 05:53 AM       Vitals:    05/26/21 0309   BP: 155/74   Pulse: 83   Resp: 18   Temp: 98 9 °F (37 2 °C)   SpO2: 91%     Cervical spine exam  C-collar in place  Dressing C/D/I  5/5 motor to C5-T1 nerve distributions bilaterally  SILT to C5-T1 nerve distributions bilaterally  Digits warm and well perfused with brisk capillary refill     Assessment: 66 y o  female post op day #1 from C1/C2 posterior cervical fusion  Plan:  WBAT BLUE in c-collar at all times  Pain control  DVT ppx: mechanical   PT/OT  Will continue to assess for acute blood loss anemia  Dispo: pending clinical improvement with nausea and PT

## 2021-05-26 NOTE — PLAN OF CARE
Problem: PHYSICAL THERAPY ADULT  Goal: Performs mobility at highest level of function for planned discharge setting  See evaluation for individualized goals  Description: Treatment/Interventions: Functional transfer training, LE strengthening/ROM, Elevations, Therapeutic exercise, Endurance training, Patient/family training, Equipment eval/education, Bed mobility, Gait training, Spoke to nursing, OT  Equipment Recommended: Merlin Sovereign       See flowsheet documentation for full assessment, interventions and recommendations  Note: Prognosis: Good  Problem List: Decreased strength, Decreased range of motion, Decreased endurance, Impaired balance, Decreased mobility, Decreased cognition, Impaired judgement, Decreased safety awareness, Orthopedic restrictions, Pain  Assessment: Pt is 66 y o  female seen for PT evaluation s/p admit to [facility] on 5/25/2021  Two pt identifiers were used to confirm  Pt presented for scheduled POSTERIOR C1-C2 FIXATION which was performed on 05/25/2021   Pt was admitted with a primary dx of: closed non union odontoid fx s/p POSTERIOR C1-C2 FIXATION  PT now consulted for assessment of mobility and d/c needs  Pt with Ambulate orders  Pts current co morbidities affecting treatment include: Anxiety, arthritis, depression, HTN, HLD, and personal factors including steps to enter home  Pts current clinical presentation is Unstable/ Unpredictable (high complexity) due to Ongoing medical management for primary dx, Increased reliance on more restrictive AD compared to baseline, Decreased activity tolerance compared to baseline, Fall risk, Increased assistance needed from caregiver at current time, Cog status, Spinal precautions at current time, Continuous pulse oximetry monitoring , s/p surgical intervention    Upon evaluation, pt currently is requiringMod Ax1 for transfers and Mod Ax1 for ambulation w/ RW    Pt presents at PT eval functioning below baseline and currently w/ overall mobility deficits 2* to: BLE weakness, decreased ROM, impaired balance, decreased endurance, gait deviations, pain, decreased activity tolerance compared to baseline, decreased safety awareness, impaired judgement, fall risk, decreased cognition, spinal precautions  Pt currently at a fall risk 2* to impairments listed above  Based on the aforementioned PT evaluation, pt will continue to benefit from skilled Acute PT interventions to address stated impairments; to maximize functional mobility; for ongoing pt/ family training; and DME needs  At conclusion of PT session pt returned back in chair with phone and call bell within reach  Pt denies any further questions at this time  PT is currently recommending Rehab  PT will continue to follow during hospital stay  Barriers to Discharge: Inaccessible home environment, Decreased caregiver support        PT Discharge Recommendation: Post acute rehabilitation services     PT - OK to Discharge: Yes(to rehab when medically cleared )    See flowsheet documentation for full assessment

## 2021-05-27 LAB
ANION GAP SERPL CALCULATED.3IONS-SCNC: 5 MMOL/L (ref 4–13)
BASOPHILS # BLD AUTO: 0.03 THOUSANDS/ΜL (ref 0–0.1)
BASOPHILS NFR BLD AUTO: 0 % (ref 0–1)
BUN SERPL-MCNC: 15 MG/DL (ref 5–25)
CALCIUM SERPL-MCNC: 9.6 MG/DL (ref 8.3–10.1)
CHLORIDE SERPL-SCNC: 109 MMOL/L (ref 100–108)
CO2 SERPL-SCNC: 26 MMOL/L (ref 21–32)
CREAT SERPL-MCNC: 0.74 MG/DL (ref 0.6–1.3)
EOSINOPHIL # BLD AUTO: 0.01 THOUSAND/ΜL (ref 0–0.61)
EOSINOPHIL NFR BLD AUTO: 0 % (ref 0–6)
ERYTHROCYTE [DISTWIDTH] IN BLOOD BY AUTOMATED COUNT: 13.5 % (ref 11.6–15.1)
GFR SERPL CREATININE-BSD FRML MDRD: 81 ML/MIN/1.73SQ M
GLUCOSE SERPL-MCNC: 115 MG/DL (ref 65–140)
HCT VFR BLD AUTO: 34.7 % (ref 34.8–46.1)
HGB BLD-MCNC: 11.1 G/DL (ref 11.5–15.4)
IMM GRANULOCYTES # BLD AUTO: 0.16 THOUSAND/UL (ref 0–0.2)
IMM GRANULOCYTES NFR BLD AUTO: 1 % (ref 0–2)
LYMPHOCYTES # BLD AUTO: 2.37 THOUSANDS/ΜL (ref 0.6–4.47)
LYMPHOCYTES NFR BLD AUTO: 11 % (ref 14–44)
MCH RBC QN AUTO: 30.7 PG (ref 26.8–34.3)
MCHC RBC AUTO-ENTMCNC: 32 G/DL (ref 31.4–37.4)
MCV RBC AUTO: 96 FL (ref 82–98)
MONOCYTES # BLD AUTO: 1.33 THOUSAND/ΜL (ref 0.17–1.22)
MONOCYTES NFR BLD AUTO: 6 % (ref 4–12)
NEUTROPHILS # BLD AUTO: 17.01 THOUSANDS/ΜL (ref 1.85–7.62)
NEUTS SEG NFR BLD AUTO: 82 % (ref 43–75)
NRBC BLD AUTO-RTO: 0 /100 WBCS
PLATELET # BLD AUTO: 299 THOUSANDS/UL (ref 149–390)
PMV BLD AUTO: 10.4 FL (ref 8.9–12.7)
POTASSIUM SERPL-SCNC: 3.8 MMOL/L (ref 3.5–5.3)
RBC # BLD AUTO: 3.61 MILLION/UL (ref 3.81–5.12)
SARS-COV-2 RNA RESP QL NAA+PROBE: NEGATIVE
SODIUM SERPL-SCNC: 140 MMOL/L (ref 136–145)
WBC # BLD AUTO: 20.91 THOUSAND/UL (ref 4.31–10.16)

## 2021-05-27 PROCEDURE — NC001 PR NO CHARGE: Performed by: ORTHOPAEDIC SURGERY

## 2021-05-27 PROCEDURE — U0003 INFECTIOUS AGENT DETECTION BY NUCLEIC ACID (DNA OR RNA); SEVERE ACUTE RESPIRATORY SYNDROME CORONAVIRUS 2 (SARS-COV-2) (CORONAVIRUS DISEASE [COVID-19]), AMPLIFIED PROBE TECHNIQUE, MAKING USE OF HIGH THROUGHPUT TECHNOLOGIES AS DESCRIBED BY CMS-2020-01-R: HCPCS | Performed by: ORTHOPAEDIC SURGERY

## 2021-05-27 PROCEDURE — 97112 NEUROMUSCULAR REEDUCATION: CPT

## 2021-05-27 PROCEDURE — U0005 INFEC AGEN DETEC AMPLI PROBE: HCPCS | Performed by: ORTHOPAEDIC SURGERY

## 2021-05-27 PROCEDURE — 97116 GAIT TRAINING THERAPY: CPT

## 2021-05-27 PROCEDURE — 80048 BASIC METABOLIC PNL TOTAL CA: CPT | Performed by: PHYSICIAN ASSISTANT

## 2021-05-27 PROCEDURE — 97530 THERAPEUTIC ACTIVITIES: CPT

## 2021-05-27 PROCEDURE — 85025 COMPLETE CBC W/AUTO DIFF WBC: CPT | Performed by: PHYSICIAN ASSISTANT

## 2021-05-27 RX ORDER — PANTOPRAZOLE SODIUM 40 MG/1
40 TABLET, DELAYED RELEASE ORAL
Status: DISCONTINUED | OUTPATIENT
Start: 2021-05-27 | End: 2021-05-28 | Stop reason: HOSPADM

## 2021-05-27 RX ORDER — LISINOPRIL 10 MG/1
10 TABLET ORAL DAILY
Status: DISCONTINUED | OUTPATIENT
Start: 2021-05-27 | End: 2021-05-28

## 2021-05-27 RX ADMIN — OXYCODONE HYDROCHLORIDE 5 MG: 5 TABLET ORAL at 21:54

## 2021-05-27 RX ADMIN — ACETAMINOPHEN 975 MG: 325 TABLET, FILM COATED ORAL at 06:19

## 2021-05-27 RX ADMIN — OXYCODONE HYDROCHLORIDE 5 MG: 5 TABLET ORAL at 17:36

## 2021-05-27 RX ADMIN — GABAPENTIN 100 MG: 100 CAPSULE ORAL at 17:34

## 2021-05-27 RX ADMIN — LEVOTHYROXINE SODIUM 100 MCG: 100 TABLET ORAL at 06:18

## 2021-05-27 RX ADMIN — PANTOPRAZOLE SODIUM 40 MG: 40 TABLET, DELAYED RELEASE ORAL at 16:29

## 2021-05-27 RX ADMIN — OXYCODONE HYDROCHLORIDE 5 MG: 5 TABLET ORAL at 06:19

## 2021-05-27 RX ADMIN — ATORVASTATIN CALCIUM 10 MG: 10 TABLET, FILM COATED ORAL at 21:53

## 2021-05-27 RX ADMIN — BACLOFEN 5 MG: 10 TABLET ORAL at 21:53

## 2021-05-27 RX ADMIN — DOCUSATE SODIUM 100 MG: 100 CAPSULE, LIQUID FILLED ORAL at 08:30

## 2021-05-27 RX ADMIN — ACETAMINOPHEN 975 MG: 325 TABLET, FILM COATED ORAL at 21:53

## 2021-05-27 RX ADMIN — OXYCODONE HYDROCHLORIDE 5 MG: 5 TABLET ORAL at 11:32

## 2021-05-27 RX ADMIN — ALPRAZOLAM 0.5 MG: 0.5 TABLET ORAL at 16:34

## 2021-05-27 RX ADMIN — BACLOFEN 5 MG: 10 TABLET ORAL at 16:29

## 2021-05-27 RX ADMIN — ACETAMINOPHEN 975 MG: 325 TABLET, FILM COATED ORAL at 12:24

## 2021-05-27 RX ADMIN — STANDARDIZED SENNA CONCENTRATE 8.6 MG: 8.6 TABLET ORAL at 08:30

## 2021-05-27 RX ADMIN — DOCUSATE SODIUM 100 MG: 100 CAPSULE, LIQUID FILLED ORAL at 17:36

## 2021-05-27 RX ADMIN — BACLOFEN 5 MG: 10 TABLET ORAL at 08:30

## 2021-05-27 RX ADMIN — GABAPENTIN 100 MG: 100 CAPSULE ORAL at 08:30

## 2021-05-27 RX ADMIN — LISINOPRIL 10 MG: 10 TABLET ORAL at 08:36

## 2021-05-27 RX ADMIN — PANTOPRAZOLE SODIUM 40 MG: 40 TABLET, DELAYED RELEASE ORAL at 08:36

## 2021-05-27 NOTE — PROGRESS NOTES
Internal Medicine Progress Note  Patient: Liz Burr  Age/sex: 66 y o  female  Medical Record #: [id number]      ASSESSMENT/PLAN: (Interval History)  Liz Burr is seen and examined and management for following issues:    Status Post Cervical Spinal surgery   Pain controlled   Continue encourage incentive spirometry; monitor fever curve   DVT prophylaxis in place and reviewed  Results from last 7 days   Lab Units 05/27/21  0622   WBC Thousand/uL 20 91*   HEMOGLOBIN g/dL 11 1*   HEMATOCRIT % 34 7*   PLATELETS Thousands/uL 299   afebrile overnight despite leukocytosis  Likely reactive      Operative acute blood loss   · Decrease in hgb levels from preop labs results; suspect due to post operation extravasation losses along with potential dilutional effects of fluids  · S/p iron infusion x 2 doses  · Transfuse PRBC if hgb less then 8 0 (per ortho protocol)  or symptomatic  · Monitor follow up CBC post intervention to trend effect    Hematemesis  · No further episodes  · Tolerating diet  · Cont PPI bid x 2 weeks then daily  · Outpatient GI work up    HTN  · Hold ramipril in the post operative setting to avoid hypotension/ROSA  · OK to resume this a m   · cont hydralazine as needed for SBP >160  · Monitor trend     Impaired fasting glucose  · Hgb A1c 5 8  · Monitor FBS     Hypothyroid  · Cont supplementation     Hyperlipidemia  · Low cholesterol diet  · Continue statin therapy        PRE-OP HGB LEVEL: 11 9  The above assessment and plan was reviewed and updated as determined by my evaluation of the patient on 5/27/2021  Labs:   Results from last 7 days   Lab Units 05/27/21  0622 05/26/21  1614  05/26/21  0553   WBC Thousand/uL 20 91*  --   --  18 66*   HEMOGLOBIN g/dL 11 1* 11 6   < > 10 8*   HEMATOCRIT % 34 7* 35 6   < > 33 2*   PLATELETS Thousands/uL 299  --   --  256    < > = values in this interval not displayed       Results from last 7 days   Lab Units 05/27/21  0622 05/26/21  0553   SODIUM mmol/L 140 139   POTASSIUM mmol/L 3 8 4 1   CHLORIDE mmol/L 109* 109*   CO2 mmol/L 26 25   BUN mg/dL 15 17   CREATININE mg/dL 0 74 0 86   CALCIUM mg/dL 9 6 9 3             Results from last 7 days   Lab Units 05/25/21  1113   POC GLUCOSE mg/dl 166*       Review of Scheduled Meds:  Current Facility-Administered Medications   Medication Dose Route Frequency Provider Last Rate    acetaminophen  975 mg Oral [facility] [place]      ALPRAZolam  0 5 mg Oral BID PRN Delroy Fiddler, PA-TATI      atorvastatin  10 mg Oral HS [place], [place]      baclofen  5 mg Oral TID Veleria Fret, CARLOS      docusate sodium  100 mg Oral BID [facility], NAMAN      gabapentin  100 mg Oral BID Raisa James MD      hydrALAZINE  25 mg Oral Q8H PRN [facility], CARLOS      HYDROmorphone  0 5 mg Intravenous Q2H PRN [facility] NAMAN      levothyroxine  100 mcg Oral Early Morning [place], [place]      lisinopril  10 mg Oral Daily CARLOS [place]      ondansetron  4 mg Intravenous Q6H PRN [facility], NAMAN      oxyCODONE  2 5 mg Oral Q4H PRN Raisa James MD      oxyCODONE  5 mg Oral Q4H PRN Raisa James MD      pantoprazole  40 mg Oral BID AC [facility], CARLOS      senna  1 tablet Oral Daily [facility]         Subjective/ HPI: Vinay Flores seen and examined  Patients overnight issues or events were reviewed with nursing or staff during rounds or morning huddle session  New or overnight issues include the following:     Pt without any further episodes of vomiting; tolerating diet      ROS:   A 10 point ROS was performed; negative except as noted above  Imaging:     XR spine cervical 2 or 3 vw injury   Final Result by Porfirio Leyva MD (05/25 8616)      Fluoroscopic guidance provided for surgical procedure  Please refer to the separate procedure notes for additional details              Localization procedure was performed, with the OR notified of the level at approximately 8:47 AM on 5/25/2021 via telephone conversation with the OR x-ray technologist        Additional images were obtained after level verification and are present for evaluation  Workstation performed: [id number]             *Labs /Radiology studies Reviewed  *Medications  reviewed and reconciled as needed  *Please refer to order section for additional ordered labs studies  *Case discussed with primary attending during morning huddle case rounds    Physical Examination:  Vitals:   Vitals:    05/26/21 1907 05/26/21 2331 05/26/21 2334 05/27/21 0735   BP: (!) 184/83 (!) 181/83 168/83 167/82   Pulse: 89 89  89   Resp: 18 16 17 13   Temp: 99 1 °F (37 3 °C) 98 5 °F (36 9 °C) 98 5 °F (36 9 °C) 98 2 °F (36 8 °C)   TempSrc:       SpO2: 95% 94%  95%   Weight:       Height:           GEN: No apparent distress, interactive  NEURO: Alert and oriented x3; C Collar in place  HEENT: Pupils are equal and reactive, EOMI, mucous membranes are moist, face symmetrical  CV: S1 S2 regular, no MRG, no peripheral edema noted  RESP: Lungs are clear bilaterally, no wheezes, rales or rhonchi noted, on room air, respirations easy and non labored  GI: Flat, soft non tender, non distended; +BS x4  : Voiding without difficulty  MUSC: Moves all extremities  SKIN: pink, warm and dry, normal turgor, no rashes, lesions        The above physical exam was reviewed and updated as determined by my evaluation of the patient on 5/27/2021  Invasive Devices     Peripheral Intravenous Line            Peripheral IV 05/25/21 Left Wrist 2 days    Peripheral IV 05/25/21 Right Hand 2 days                   VTE Pharmacologic Prophylaxis: Sequential pneumatic compression stocking  Code Status: No Order  Current Length of Stay: 2 day(s)      Total time spent:  30 minutes with more than 50% spent counseling/coordinating care  Counseling includes discussion with patient re: progress  and discussion with patient of his/her current medical state/information   Coordination of patient's care was performed in conjunction with primary service  Time invested included review of patient's labs, vitals, and management of their comorbidities with continued monitoring  In addition, this patient was discussed with medical team including physician and advanced extenders  The care of the patient was extensively discussed and appropriate treatment plan was formulated unique for this patient  ** Please Note:  voice to text software may have been used in the creation of this document   Although proof errors in transcription or interpretation are a potential of such software**

## 2021-05-27 NOTE — PROGRESS NOTES
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, fracture of the cervical vertebra who presented for orthopedic surgical intervention in the form of posterior C1-C2 fixation which was performed on 80/58/7495 without complication  Patient has also been evaluated by GI for gastritis versus peptic ulcer disease given  Coffee-ground emesis  Plan:   - continue acetaminophen 975 milligrams p o  q 8 hours scheduled  - continue oxycodone 2 5 milligrams p o  q 4 hours p r n  for moderate pain  - continue oxycodone 5 milligrams p o  q 4 hours p r n  for severe pain  - patient would benefit from taking oral opioids more frequently to improve overall pain control  - patient is on alprazolam 0 5 milligrams p o  b i d  p r n  for anxiety  - continue baclofen 5 milligrams p o  t i d   - continue gabapentin 100 milligrams p o  b i d   - bowel regimen with docusate, senna    APS will continue to follow  Please call  / [phone] or [facility] (/ between [phone] and on weekends) with questions or concerns    Pain History  Current pain location(s): neck  Pain Scale:   5-7  Quality: sharp  24 hour history:  Patient examined at bedside  She refers only moderate pain control  She states she slept well  She is anxious about going to STR given dtr at home with epilepsy/MR  Patient's blood pressure has been elevated frequently  No CGE today  All other ROS neg at this time  Opioid requirement previous 24 hours:    oxycodone 10 milligrams    Meds/Allergies   all current active meds have been reviewed and current meds:   Current Facility-Administered Medications   Medication Dose Route Frequency    acetaminophen (TYLENOL) tablet 975 mg  975 mg Oral Q8H    ALPRAZolam (XANAX) tablet 0 5 mg  0 5 mg Oral BID PRN    atorvastatin (LIPITOR) tablet 10 mg  10 mg Oral HS    baclofen tablet 5 mg  5 mg Oral TID    docusate sodium (COLACE) capsule 100 mg  100 mg Oral BID    gabapentin (NEURONTIN) capsule 100 mg  100 mg Oral BID    hydrALAZINE (APRESOLINE) tablet 25 mg  25 mg Oral Q8H PRN    HYDROmorphone (DILAUDID) injection 0 5 mg  0 5 mg Intravenous Q2H PRN    levothyroxine tablet 100 mcg  100 mcg Oral Early Morning    lisinopril (ZESTRIL) tablet 10 mg  10 mg Oral Daily    ondansetron (ZOFRAN) injection 4 mg  4 mg Intravenous Q6H PRN    oxyCODONE (ROXICODONE) IR tablet 2 5 mg  2 5 mg Oral Q4H PRN    oxyCODONE (ROXICODONE) IR tablet 5 mg  5 mg Oral Q4H PRN    pantoprazole (PROTONIX) EC tablet 40 mg  40 mg Oral BID AC    senna (SENOKOT) tablet 8 6 mg  1 tablet Oral Daily       No Known Allergies    Objective     Temp:  [98 2 °F (36 8 °C)-99 1 °F (37 3 °C)] 98 2 °F (36 8 °C)  HR:  [85-89] 89  Resp:  [13-18] 13  BP: (114-184)/(80-83) 167/82    Physical Exam  Vitals signs and nursing note reviewed  Constitutional:       Appearance: Normal appearance  She is not ill-appearing or toxic-appearing  Comments: BMI 23   HENT:      Head: Normocephalic and atraumatic  Eyes:      General: No scleral icterus  Conjunctiva/sclera: Conjunctivae normal    Neck:      Comments: Cervical collar  Cardiovascular:      Rate and Rhythm: Normal rate  Pulmonary:      Effort: Pulmonary effort is normal  No respiratory distress  Skin:     General: Skin is warm and dry  Neurological:      Mental Status: She is alert        Comments: Awake, alert and oriented to person place time situation Psychiatric:         Thought Content: Thought content normal          Lab Results:   Results from last 7 days   Lab Units 05/27/21  0622   WBC Thousand/uL 20 91*   HEMOGLOBIN g/dL 11 1*   HEMATOCRIT % 34 7*   PLATELETS Thousands/uL 299      Results from last 7 days   Lab Units 05/27/21  0622   POTASSIUM mmol/L 3 8   CHLORIDE mmol/L 109*   CO2 mmol/L 26   BUN mg/dL 15   CREATININE mg/dL 0 74   CALCIUM mg/dL 9 6       Imaging Studies: I have personally reviewed pertinent reports  EKG, Pathology, and Other Studies: I have personally reviewed pertinent reports  Please note that the APS provides consultative services regarding pain management only  With the exception of ketamine and epidural infusions and except when indicated, final decisions regarding starting or changing doses of analgesic medications are at the discretion of the consulting service  Off hours consultation and/or medication management is generally not available      Yun Angel MD  Acute Pain Service

## 2021-05-27 NOTE — PHYSICAL THERAPY NOTE
Physical Therapy Progress Note     05/27/21 1540   PT Last Visit   PT Visit Date 05/27/21   Note Type   Note Type Treatment   Pain Assessment   Pain Assessment Tool 0-10   Pain Score 9   Pain Location/Orientation Location: Neck  (Posterior )   Hospital Pain Intervention(s) Repositioned; Ambulation/increased activity; Emotional support   Restrictions/Precautions   Braces or Orthoses C/S Collar   Other Precautions Pain; Fall Risk;Spinal precautions   Subjective   Subjective The pt  was expressive about her situation, but she understands that she is unable to care for her daughter in her current state  She is looking forward to getting better, and to be better than she has been with her neck  Transfers   Sit to Stand 4  Minimal assistance   Additional items Assist x 1; Increased time required   Stand to Sit 4  Minimal assistance   Additional items Assist x 1   Ambulation/Elevation   Gait pattern Excessively slow;Decreased foot clearance; Inconsistent lou   Gait Assistance 4  Minimal assist   Additional items Assist x 1   Assistive Device Rolling walker   Distance 50 feet  Balance   Static Sitting Fair +   Dynamic Sitting Fair   Static Standing Fair   Ambulatory Fair -   Activity Tolerance   Activity Tolerance Patient tolerated treatment well;Patient limited by pain   Nurse [address] Yang RN  Exercises   Balance training  Static standing as well as dynamic reaching activities x 11 minutes  Assessment   Prognosis Good   Problem List Decreased strength;Decreased range of motion;Decreased endurance; Impaired balance;Decreased mobility; Decreased cognition; Impaired judgement;Decreased safety awareness;Orthopedic restrictions;Pain   Assessment The pt  has improving strength and balance, but she does remain limited from her baseline  She is able to ambulate household distances, but she does fatigue easily with contact guard assistance required   The pt  was emotional and expressive about her situation, but she agreed that she needs to get better first, and that she is unable to care for her daughter  She did note that she feels comfortable with her daughter taking care of herself, and is hopeful that her daughter is more appreciative of what the patient does for her  She admits to always trying to do too much, and if she returns home that she would continue to do so  Due to her continued limitations as well as having no support at home, inpatient rehab is appropriate to facilitate her recovery and return to independence  Barriers to Discharge Decreased caregiver support   Goals   Patient Goals To get better  STG Expiration Date 06/05/21   PT Treatment Day 1   Plan   Treatment/Interventions Functional transfer training;LE strengthening/ROM; Therapeutic exercise; Endurance training;Patient/family training;Bed mobility;Gait training;Elevations   Progress Progressing toward goals   PT Frequency   (3-6x a week )   Recommendation   PT Discharge Recommendation Post acute rehabilitation services   Equipment Recommended [facility] Recommended Wheeled walker   AM-PAC Basic Mobility Inpatient   Turning in Bed Without Bedrails 3   Lying on Back to Sitting on Edge of Flat Bed 3   Moving Bed to Chair 3   Standing Up From Chair 3   Walk in Room 3   Climb 3-5 Stairs 3   Basic Mobility Inpatient Raw Score 18   Basic Mobility Standardized Score 41 05     An AM-PAC Basic Mobility standardized score less than 42 9 suggests the patient may benefit from discharge to post-acute rehab services      Quincy Saenz, PTA

## 2021-05-27 NOTE — CASE MANAGEMENT
Pt is not a <30 day readmission or current bundle  Risk of unplanned readmission score is 7 (green)  Pt s/p cervical fixation  CM met with pt at bedside to introduce CM role and begin discharge planning  Pt lives with her daughter, Pranav Perea ([phone]) in a 3 story house that has 1 NICHOL  Pt reports being independent with ADLs PTA, pt has a rolling walker  Pt reports no history of VNA, STR, inpatient MH treatment or D/A treatment  Pt drives and is retired  PCP is Dr Magdalena Bryan ([phone]) and pt uses [address] for prescriptions  CM reviewed STR recommendation  Pt reports that she would prefer to return home with  VNA, referral placed via [facility] for SN/PT/OT  [facility] as [facility] to her home and reports that she wishes to see how she progresses with therapy but requested referral to both  VNA and [address] [place] [address] to prepare for either recommendation  Referrals placed via [facility]  CM to follow  CM reviewed d/c planning process including the following: identifying help at home, patient preference for d/c planning needs, Discharge unge, [facility] Meds to Bed program, availability of treatment team to discuss questions or concerns patient and/or family may have regarding understanding medications and recognizing signs and symptoms once discharged  CM also encouraged patient to follow up with all recommended appointments after discharge  Patient advised of importance for patient and family to participate in managing patients medical well being

## 2021-05-27 NOTE — PLAN OF CARE
Problem: PHYSICAL THERAPY ADULT  Goal: Performs mobility at highest level of function for planned discharge setting  See evaluation for individualized goals  Description: Treatment/Interventions: Functional transfer training, LE strengthening/ROM, Elevations, Therapeutic exercise, Endurance training, Patient/family training, Equipment eval/education, Bed mobility, Gait training, Spoke to nursing, OT  Equipment Recommended: Ila Oconnell       See flowsheet documentation for full assessment, interventions and recommendations  Outcome: Progressing  Note: Prognosis: Good  Problem List: Decreased strength, Decreased range of motion, Decreased endurance, Impaired balance, Decreased mobility, Decreased cognition, Impaired judgement, Decreased safety awareness, Orthopedic restrictions, Pain  Assessment: The pt  has improving strength and balance, but she does remain limited from her baseline  She is able to ambulate household distances, but she does fatigue easily with contact guard assistance required  The pt  was emotional and expressive about her situation, but she agreed that she needs to get better first, and that she is unable to care for her daughter  She did note that she feels comfortable with her daughter taking care of herself, and is hopeful that her daughter is more appreciative of what the patient does for her  She admits to always trying to do too much, and if she returns home that she would continue to do so  Due to her continued limitations as well as having no support at home, inpatient rehab is appropriate to facilitate her recovery and return to independence  Barriers to Discharge: Decreased caregiver support        PT Discharge Recommendation: Post acute rehabilitation services     PT - OK to Discharge: Yes(to rehab when medically cleared )    See flowsheet documentation for full assessment

## 2021-05-27 NOTE — CASE MANAGEMENT
CM spoke with [facility] liaison who informed that [facility] can accept pt tomorrow pending medical stability  CM spoke with pt again who is in agreement with STR recommendation and wishes to accept bed  Pt reports that her daughter is home alone and has a history of seizures and has slight cognitive delays, however pt reports that she feels pt is safe at home  Pt's daughter is able to care for herself - i e  can cook, perform ADLs, etc and a neighbor is checking in on her daughter while pt is in the hospital/STR  CM sent TT to Ortho resident Neva Stephen requesting Covid test  Pt reports that she has not yet received the Covid vaccine, CM informed [facility] liaison of same  CM department to follow

## 2021-05-27 NOTE — PROGRESS NOTES
Subjective: patient had episodes of coffee ground emesis yesterday, today able to tolerate orals with some pretzels and water  Objective:  A 10 point ROS was performed; negative except as noted above       Lab Results   Component Value Date/Time    WBC 18 66 (H) 05/26/2021 05:53 AM    HGB 11 6 05/26/2021 04:14 PM       Vitals:    05/26/21 2334   BP: 168/83   Pulse:    Resp: 17   Temp: 98 5 °F (36 9 °C)   SpO2:      Cervical spine exam  C-collar in place  Dressing C/D/I  5/5 motor to C5-T1 nerve distributions bilaterally  SILT to C5-T1 nerve distributions bilaterally  Digits warm and well perfused with brisk capillary refill     Assessment: 66 y o  female post op day #2 from C1/C2 posterior cervical fusion, also with coffee ground emesis yesterday, appreciate GI recs    Plan:  WBAT BLUE in c-collar at all times  Pain control  DVT ppx: mechanical   PT/OT  Follow up GI recommendations for any further interventions  Will continue to assess for acute blood loss anemia  Dispo: pending clinical improvement with nausea/emesis and PT

## 2021-05-28 VITALS
HEART RATE: 83 BPM | RESPIRATION RATE: 18 BRPM | OXYGEN SATURATION: 95 % | WEIGHT: 132 LBS | SYSTOLIC BLOOD PRESSURE: 163 MMHG | HEIGHT: 63 IN | TEMPERATURE: 98.8 F | BODY MASS INDEX: 23.39 KG/M2 | DIASTOLIC BLOOD PRESSURE: 78 MMHG

## 2021-05-28 LAB
ANION GAP SERPL CALCULATED.3IONS-SCNC: 4 MMOL/L (ref 4–13)
BASOPHILS # BLD AUTO: 0.07 THOUSANDS/ΜL (ref 0–0.1)
BASOPHILS NFR BLD AUTO: 1 % (ref 0–1)
BUN SERPL-MCNC: 11 MG/DL (ref 5–25)
CALCIUM SERPL-MCNC: 9.4 MG/DL (ref 8.3–10.1)
CHLORIDE SERPL-SCNC: 109 MMOL/L (ref 100–108)
CO2 SERPL-SCNC: 29 MMOL/L (ref 21–32)
CREAT SERPL-MCNC: 0.65 MG/DL (ref 0.6–1.3)
EOSINOPHIL # BLD AUTO: 0.27 THOUSAND/ΜL (ref 0–0.61)
EOSINOPHIL NFR BLD AUTO: 3 % (ref 0–6)
ERYTHROCYTE [DISTWIDTH] IN BLOOD BY AUTOMATED COUNT: 13.5 % (ref 11.6–15.1)
GFR SERPL CREATININE-BSD FRML MDRD: 88 ML/MIN/1.73SQ M
GLUCOSE SERPL-MCNC: 97 MG/DL (ref 65–140)
HCT VFR BLD AUTO: 33.6 % (ref 34.8–46.1)
HGB BLD-MCNC: 10.7 G/DL (ref 11.5–15.4)
IMM GRANULOCYTES # BLD AUTO: 0.04 THOUSAND/UL (ref 0–0.2)
IMM GRANULOCYTES NFR BLD AUTO: 0 % (ref 0–2)
LYMPHOCYTES # BLD AUTO: 1.49 THOUSANDS/ΜL (ref 0.6–4.47)
LYMPHOCYTES NFR BLD AUTO: 14 % (ref 14–44)
MCH RBC QN AUTO: 30.5 PG (ref 26.8–34.3)
MCHC RBC AUTO-ENTMCNC: 31.8 G/DL (ref 31.4–37.4)
MCV RBC AUTO: 96 FL (ref 82–98)
MONOCYTES # BLD AUTO: 0.84 THOUSAND/ΜL (ref 0.17–1.22)
MONOCYTES NFR BLD AUTO: 8 % (ref 4–12)
NEUTROPHILS # BLD AUTO: 8.29 THOUSANDS/ΜL (ref 1.85–7.62)
NEUTS SEG NFR BLD AUTO: 74 % (ref 43–75)
NRBC BLD AUTO-RTO: 0 /100 WBCS
PLATELET # BLD AUTO: 245 THOUSANDS/UL (ref 149–390)
PMV BLD AUTO: 10.1 FL (ref 8.9–12.7)
POTASSIUM SERPL-SCNC: 3.7 MMOL/L (ref 3.5–5.3)
RBC # BLD AUTO: 3.51 MILLION/UL (ref 3.81–5.12)
SODIUM SERPL-SCNC: 142 MMOL/L (ref 136–145)
WBC # BLD AUTO: 11 THOUSAND/UL (ref 4.31–10.16)

## 2021-05-28 PROCEDURE — 80048 BASIC METABOLIC PNL TOTAL CA: CPT | Performed by: PHYSICIAN ASSISTANT

## 2021-05-28 PROCEDURE — 97530 THERAPEUTIC ACTIVITIES: CPT

## 2021-05-28 PROCEDURE — 85025 COMPLETE CBC W/AUTO DIFF WBC: CPT | Performed by: PHYSICIAN ASSISTANT

## 2021-05-28 PROCEDURE — 99024 POSTOP FOLLOW-UP VISIT: CPT | Performed by: ORTHOPAEDIC SURGERY

## 2021-05-28 PROCEDURE — 97116 GAIT TRAINING THERAPY: CPT

## 2021-05-28 PROCEDURE — 97112 NEUROMUSCULAR REEDUCATION: CPT

## 2021-05-28 PROCEDURE — NC001 PR NO CHARGE: Performed by: ORTHOPAEDIC SURGERY

## 2021-05-28 PROCEDURE — 97535 SELF CARE MNGMENT TRAINING: CPT

## 2021-05-28 RX ORDER — OXYCODONE HYDROCHLORIDE 5 MG/1
5 TABLET ORAL EVERY 6 HOURS PRN
Qty: 24 TABLET | Refills: 0 | Status: SHIPPED | OUTPATIENT
Start: 2021-05-28 | End: 2022-01-21

## 2021-05-28 RX ORDER — LISINOPRIL 20 MG/1
20 TABLET ORAL DAILY
Status: DISCONTINUED | OUTPATIENT
Start: 2021-05-28 | End: 2021-05-28 | Stop reason: HOSPADM

## 2021-05-28 RX ORDER — POLYETHYLENE GLYCOL 3350 17 G/17G
17 POWDER, FOR SOLUTION ORAL DAILY
Status: DISCONTINUED | OUTPATIENT
Start: 2021-05-28 | End: 2021-05-28 | Stop reason: HOSPADM

## 2021-05-28 RX ORDER — BISACODYL 10 MG
10 SUPPOSITORY, RECTAL RECTAL DAILY PRN
Status: DISCONTINUED | OUTPATIENT
Start: 2021-05-28 | End: 2021-05-28 | Stop reason: HOSPADM

## 2021-05-28 RX ADMIN — STANDARDIZED SENNA CONCENTRATE 8.6 MG: 8.6 TABLET ORAL at 08:31

## 2021-05-28 RX ADMIN — POLYETHYLENE GLYCOL 3350 17 G: 17 POWDER, FOR SOLUTION ORAL at 09:18

## 2021-05-28 RX ADMIN — BACLOFEN 5 MG: 10 TABLET ORAL at 08:31

## 2021-05-28 RX ADMIN — GABAPENTIN 100 MG: 100 CAPSULE ORAL at 08:31

## 2021-05-28 RX ADMIN — LEVOTHYROXINE SODIUM 100 MCG: 100 TABLET ORAL at 06:07

## 2021-05-28 RX ADMIN — LISINOPRIL 20 MG: 10 TABLET ORAL at 08:32

## 2021-05-28 RX ADMIN — PANTOPRAZOLE SODIUM 40 MG: 40 TABLET, DELAYED RELEASE ORAL at 06:07

## 2021-05-28 RX ADMIN — ACETAMINOPHEN 975 MG: 325 TABLET, FILM COATED ORAL at 06:06

## 2021-05-28 RX ADMIN — DOCUSATE SODIUM 100 MG: 100 CAPSULE, LIQUID FILLED ORAL at 08:32

## 2021-05-28 RX ADMIN — OXYCODONE HYDROCHLORIDE 5 MG: 5 TABLET ORAL at 06:07

## 2021-05-28 NOTE — PROGRESS NOTES
Internal Medicine Progress Note  Patient: Vinay Flores  Age/sex: 66 y o  female  Medical Record #: [id number]      ASSESSMENT/PLAN: (Interval History)  Vinay Flores is seen and examined and management for following issues:    Status Post Cervical Spinal surgery   Pain controlled   Continue encourage incentive spirometry; monitor fever curve   DVT prophylaxis in place and reviewed  Results from last 7 days   Lab Units 05/28/21  0553   WBC Thousand/uL 11 00*   HEMOGLOBIN g/dL 10 7*   HEMATOCRIT % 33 6*   PLATELETS Thousands/uL 245   afebrile overnight despite leukocytosis  Likely reactive      Operative acute blood loss   · Decrease in hgb levels from preop labs results; suspect due to post operation extravasation losses along with potential dilutional effects of fluids  · S/p iron infusion x 2 doses  · stable    Hematemesis  · resolved  · Tolerating diet  · Cont PPI bid x 2 weeks then daily  · Outpatient GI work up    HTN  · Lisinopril resumed 5/27  · Will titrate due to elevated BP's   · cont hydralazine as needed for SBP >160  · Monitor trend     Impaired fasting glucose  · Hgb A1c 5 8  · Monitor FBS     Hypothyroid  · Cont supplementation     Hyperlipidemia  · Low cholesterol diet  · Continue statin therapy    Constipation  · Add miralax    OK for dc from medicine standpoint        PRE-OP HGB LEVEL: 11 9  The above assessment and plan was reviewed and updated as determined by my evaluation of the patient on 5/28/2021      Labs:   Results from last 7 days   Lab Units 05/28/21  0553 05/27/21  0622   WBC Thousand/uL 11 00* 20 91*   HEMOGLOBIN g/dL 10 7* 11 1*   HEMATOCRIT % 33 6* 34 7*   PLATELETS Thousands/uL 245 299     Results from last 7 days   Lab Units 05/28/21  0553 05/27/21  0622   SODIUM mmol/L 142 140   POTASSIUM mmol/L 3 7 3 8   CHLORIDE mmol/L 109* 109*   CO2 mmol/L 29 26   BUN mg/dL 11 15   CREATININE mg/dL 0 65 0 74   CALCIUM mg/dL 9 4 9 6             Results from last 7 days   Lab Units 05/25/21  1113   POC GLUCOSE mg/dl 166*       Review of Scheduled Meds:  Current Facility-Administered Medications   Medication Dose Route Frequency Provider Last Rate    acetaminophen  975 mg Oral [address] [facility]      ALPRAZolam  0 5 mg Oral BID PRN [place]      atorvastatin  10 mg Oral HS [facility]      baclofen  5 mg Oral TID CARLOS Sheth      docusate sodium  100 mg Oral BID [place]      gabapentin  100 mg Oral BID Mynor Hunt MD      hydrALAZINE  25 mg Oral Q8H PRN CARLOS Garcia      levothyroxine  100 mcg Oral Early Morning [facility]      lisinopril  20 mg Oral Daily CARLOS Garcia      ondansetron  4 mg Intravenous Q6H PRN [place]      oxyCODONE  2 5 mg Oral Q4H PRN Mynor Hunt MD      oxyCODONE  5 mg Oral Q4H PRN Mynor Hunt MD      pantoprazole  40 mg Oral BID AC CARLOS Garcia      senna  1 tablet Oral Daily [place]         Subjective/ HPI: Roxana Wall seen and examined  Patients overnight issues or events were reviewed with nursing or staff during rounds or morning huddle session  New or overnight issues include the following:     Pt without any further episodes of vomiting; tolerating diet  Anxious to start rehab      ROS:   A 10 point ROS was performed; negative except as noted above  Imaging:     XR spine cervical 2 or 3 vw injury   Final Result by Zo Loya MD (05/25 4769)      Fluoroscopic guidance provided for surgical procedure  Please refer to the separate procedure notes for additional details  Localization procedure was performed, with the OR notified of the level at approximately 8:47 AM on 5/25/2021 via telephone conversation with the OR x-ray technologist        Additional images were obtained after level verification and are present for evaluation         Workstation performed: [id number]             *Labs /Radiology studies Reviewed  *Medications  reviewed and reconciled as needed  *Please refer to order section for additional ordered labs studies  *Case discussed with primary attending during morning huddle case rounds    Physical Examination:  Vitals:   Vitals:    05/27/21 0735 05/27/21 1500 05/27/21 2324 05/28/21 0758   BP: 167/82 150/98 164/79 163/78   Pulse: 89 (!) 118 79 83   Resp: 13 18 17 18   Temp: 98 2 °F (36 8 °C) 99 1 °F (37 3 °C) 98 6 °F (37 °C) 98 8 °F (37 1 °C)   TempSrc:       SpO2: 95% 95% 95% 95%   Weight:       Height:           GEN: No apparent distress, interactive  NEURO: Alert and oriented x3; C Collar in place  HEENT: Pupils are equal and reactive, EOMI, mucous membranes are moist, face symmetrical  CV: S1 S2 regular, no MRG, no peripheral edema noted  RESP: Lungs are clear bilaterally, no wheezes, rales or rhonchi noted, on room air, respirations easy and non labored  GI: Flat, soft non tender, non distended; +BS x4  : Voiding without difficulty  MUSC: Moves all extremities  SKIN: pink, warm and dry, normal turgor, dressing intact      The above physical exam was reviewed and updated as determined by my evaluation of the patient on 5/28/2021  Invasive Devices     Peripheral Intravenous Line            Peripheral IV 05/25/21 Right Hand 3 days                   VTE Pharmacologic Prophylaxis: Sequential pneumatic compression stocking  Code Status: No Order  Current Length of Stay: 3 day(s)      Total time spent:  30 minutes with more than 50% spent counseling/coordinating care  Counseling includes discussion with patient re: progress  and discussion with patient of his/her current medical state/information  Coordination of patient's care was performed in conjunction with primary service  Time invested included review of patient's labs, vitals, and management of their comorbidities with continued monitoring  In addition, this patient was discussed with medical team including physician and advanced extenders  The care of the patient was extensively discussed and appropriate treatment plan was formulated unique for this patient  ** Please Note:  voice to text software may have been used in the creation of this document   Although proof errors in transcription or interpretation are a potential of such software**

## 2021-05-28 NOTE — CASE MANAGEMENT
Patient reviewed during care coordination rounds with Ortho resident Jenifer Suarez who confirmed that pt is medically stable for discharge to SNF today  CM spoke with pt to review out of pocket cost of wheelchair Logan Cantu, pt aware and agreeable  Pt reports that she has a contract with [facility] and would prefer their services  CM spoke with Felicitas Holloway from [facility] to schedule [facility] transport with a 12:00 PM pickup time  CM informed pt, [address], RN, and Ortho resident Jenifer Suarez of transportation time  Report can be called to P: [phone]; F: [phone]

## 2021-05-28 NOTE — PROGRESS NOTES
Subjective: improved nausea and vomiting able to tolerate oral intake throughout the day yesterday  Objective:  A 10 point ROS was performed; negative except as noted above       Lab Results   Component Value Date/Time    WBC 20 91 (H) 05/27/2021 06:22 AM    HGB 11 1 (L) 05/27/2021 06:22 AM       Vitals:    05/27/21 2324   BP: 164/79   Pulse: 79   Resp: 17   Temp: 98 6 °F (37 °C)   SpO2: 95%     Cervical spine exam  C-collar in place  Dressing C/D/I  5/5 motor to C5-T1 nerve distributions bilaterally  SILT to C5-T1 nerve distributions bilaterally  Digits warm and well perfused with brisk capillary refill     Assessment: 66 y o  female post op day #2 from C1/C2 posterior cervical fusion, also with coffee ground emesis yesterday, appreciate GI recs    Plan:  WBAT BLUE in c-collar at all times  Pain control  DVT ppx: mechanical   PT/OT  Follow up GI recommendations for any further interventions  Will continue to assess for acute blood loss anemia  Dispo: DC today

## 2021-05-28 NOTE — DISCHARGE SUMMARY
fORTHOPEDICS DISCHARGE SUMMARY  Austin Bright 66 y o  female MRN: [id number]  Unit/Bed#: -15     Attending Physician: Priya Wright    Admitting diagnosis: DDD (degenerative disc disease), cervical [M50 30]  Closed odontoid fracture with nonunion, subsequent encounter [S12 100K]  Fracture of cervical vertebrae, multiple, initial encounter ([facility] [address] ) [S12  9XXA]    Discharge diagnosis: DDD (degenerative disc disease), cervical [M50 30]  Closed odontoid fracture with nonunion, subsequent encounter [S12 100K]  Fracture of cervical vertebrae, multiple, initial encounter ([facility] [address] ) [S12  9XXA]    Date of admission: 5/25/2021    Date of discharge: 05/28/21    Procedure: Posterior C1-C2 fixation     HPI:  This is a 66y o  year old female that presented to the office with signs and symptoms of C2 odontoid nonunion   They tried and failed conservative treatment measures and wished to proceed with surgical intervention  The risks, benefits, and complications of the procedure were discussed with the patient and informed consent was obtained  Hospital Course: The patient was admitted to the hospital on 5/25/2021 and underwent an uncomplicated C1 to see to posterior cervical fusion  They were transferred to the floor after a brief stay in the post-anesthesia care unit  Their pain was well managed with IV and oral pain medications  They began therapy on post operative day #1  Daily discussion was had with the patient, nursing staff, orthopaedic team, and family members if present  All questions were answered to the patients satisfaction  0   Lab Value Date/Time    HGB 10 7 (L) 05/28/2021 0553    HGB 11 1 (L) 05/27/2021 0622    HGB 11 6 05/26/2021 1614    HGB 11 4 (L) 05/26/2021 1404    HGB 10 8 (L) 05/26/2021 0553    HGB 11 9 04/26/2021 1416    HGB 12 4 07/27/2016 0826        Vital signs remained stable and pt was resuscitated with IVF as needed   Body mass index is 23 38 kg/m²  Discharge Instructions:   The patient was discharged weight bearing as tolerated to all extremities  Take pain medications as instructed  Patient must keep cervical collar on at all times  Discharge Medications: For the complete list of discharge medications, please refer to the patient's medication reconciliation

## 2021-05-28 NOTE — PLAN OF CARE
Problem: PHYSICAL THERAPY ADULT  Goal: Performs mobility at highest level of function for planned discharge setting  See evaluation for individualized goals  Description: Treatment/Interventions: Functional transfer training, LE strengthening/ROM, Elevations, Therapeutic exercise, Endurance training, Patient/family training, Equipment eval/education, Bed mobility, Gait training, Spoke to nursing, OT  Equipment Recommended: Bailey Galvez       See flowsheet documentation for full assessment, interventions and recommendations  Outcome: Progressing  Note: Prognosis: Good  Problem List: Decreased strength, Decreased range of motion, Decreased endurance, Impaired balance, Decreased mobility, Decreased cognition, Impaired judgement, Decreased safety awareness, Orthopedic restrictions, Pain  Assessment: The pt  has improving mobility as evidenced by increased ambulatory distance  She had no loss of balance but she did require increased time to manuever around obstacles  She did require increased time to complete, and she progressively fatigued throughout the session  She did recover with rests  Continue to recommend rehab at discharge in order to safely progress the patient toward independence  Barriers to Discharge: Decreased caregiver support        PT Discharge Recommendation: Post acute rehabilitation services     PT - OK to Discharge: Yes(to rehab when medically cleared )    See flowsheet documentation for full assessment

## 2021-05-28 NOTE — PHYSICAL THERAPY NOTE
Physical Therapy Progress Note     05/28/21 0900   PT Last Visit   PT Visit Date 05/28/21   Note Type   Note Type Treatment   Pain Assessment   Pain Assessment Tool 0-10   Pain Score 7   Pain Location/Orientation Location: Neck   Hospital Pain Intervention(s) Repositioned; Ambulation/increased activity; Emotional support   Restrictions/Precautions   Braces or Orthoses C/S Collar   Other Precautions Pain; Fall Risk   Subjective   Subjective The pt  states that she is feeling better today  Transfers   Sit to Stand 5  Supervision   Additional items Increased time required   Stand to Sit 5  Supervision   Ambulation/Elevation   Gait pattern Excessively slow; Short stride; Inconsistent lou; Forward Flexion   Gait Assistance 5  Supervision   Additional items Verbal cues   Assistive Device Rolling walker   Distance 80 feet, 100 feet, 40 feet  Balance   Static Sitting Good   Dynamic Sitting Fair +   Static Standing Fair   Ambulatory Fair -   Activity Tolerance   Activity Tolerance Patient tolerated treatment well;Patient limited by fatigue   Nurse [address] [place], RN  Exercises   Balance training  Tandem walking forward, standing reaching with single UE support  Assessment   Prognosis Good   Problem List Decreased strength;Decreased range of motion;Decreased endurance; Impaired balance;Decreased mobility; Decreased cognition; Impaired judgement;Decreased safety awareness;Orthopedic restrictions;Pain   Assessment The pt  has improving mobility as evidenced by increased ambulatory distance  She had no loss of balance but she did require increased time to manuever around obstacles  She did require increased time to complete, and she progressively fatigued throughout the session  She did recover with rests  Continue to recommend rehab at discharge in order to safely progress the patient toward independence  Barriers to Discharge Decreased caregiver support   Goals   Patient Goals To get better     STG Expiration Date 06/05/21   PT Treatment Day 2   Plan   Treatment/Interventions Functional transfer training;LE strengthening/ROM; Therapeutic exercise; Endurance training;Patient/family training;Bed mobility;Gait training;Elevations   Progress Progressing toward goals   PT Frequency   (3-6x a week )   Recommendation   PT Discharge Recommendation Post acute rehabilitation services   Equipment Recommended [facility] Recommended Wheeled walker   AM-PAC Basic Mobility Inpatient   Turning in Bed Without Bedrails 3   Lying on Back to Sitting on Edge of Flat Bed 3   Moving Bed to Chair 3   Standing Up From Chair 3   Walk in Room 3   Climb 3-5 Stairs 3   Basic Mobility Inpatient Raw Score 18   Basic Mobility Standardized Score 41 05     An AM-PAC Basic Mobility standardized score less than 42 9 suggests the patient may benefit from discharge to post-acute rehab services      [facility], [facility]

## 2021-05-28 NOTE — OCCUPATIONAL THERAPY NOTE
Occupational Therapy Treatment Note:     05/28/21 0915   OT Last Visit   OT Visit Date 05/28/21   Note Type   Note Type Treatment   Restrictions/Precautions   Braces or Orthoses C/S Collar   Other Precautions Fall Risk;Pain;Spinal precautions   Pain Assessment   Pain Score 6   Pain Location/Orientation Location: Neck   ADL   Where Assessed Chair   Grooming Assistance 5  Supervision/Setup   Grooming Deficit Wash/dry hands   LB Dressing Assistance 3  Moderate Assistance   LB Dressing Deficit Don/doff R sock; Don/doff L sock; Thread RLE into pants; Thread LLE into pants;Pull up over hips   Toileting Assistance  4  Minimal Assistance   Toileting Deficit Clothing management up;Clothing management down   Bed Mobility   Additional Comments OOB upon presentation and at end of session   Transfers   Sit to Stand 5  Supervision   Additional items Assist x 1   Stand to Sit 4  Minimal assistance   Additional items Assist x 1   Stand pivot 4  Minimal assistance   Additional items Assist x 1   Toilet transfer 4  Minimal assistance   Additional items Assist x 1   Additional Comments RW level   Functional Mobility   Functional Mobility 5  Supervision   Additional Comments to and from bathroom, very close SBA    Additional items Rolling walker   Cognition   Overall Cognitive Status [facility]   Arousal/Participation Alert   Attention Attends with cues to redirect   Orientation Level Oriented X4   Memory Decreased recall of precautions   Following Commands Follows one step commands without difficulty   Comments Plesant and cooperative, tangential at times, cues to redirect and for carryover of new techniques   Activity Tolerance   Activity Tolerance Patient limited by fatigue   Medical Staff Made Aware NSG aware   Assessment   Assessment Pt was seen this date for OT tx session focusing on self care tasks, sit to stand progressions, standing tolerance, tranfers, functional mobility, safety awareness, compensatory techniques, energy conservation, and overall activity tolerance  Pt presents  Seated OOB in [facility] , completes previously mentioned tasks at documented assist levels please see above in flow sheet  Pt has made notable progress in transfers and mobility however continues to require increased assist for self care tasks  Pt resting OOB in [facility] at end of session with all needs in reach  Would benefit from continued OT tx to imporve overall functional abilites   Continue t ofollow with diane [facility]    Plan   Treatment Interventions ADL retraining   Goal Expiration Date 06/09/21   OT Treatment Day 1   OT Frequency 3-5x/wk   Recommendation   OT Discharge Recommendation Post acute rehabilitation services       Shelia Buchanan, BANEGAS

## 2021-05-28 NOTE — PLAN OF CARE
Problem: PAIN - ADULT  Goal: Verbalizes/displays adequate comfort level or baseline comfort level  Description: Interventions:  - Encourage patient to monitor pain and request assistance  - Assess pain using appropriate pain scale  - Administer analgesics based on type and severity of pain and evaluate response  - Implement non-pharmacological measures as appropriate and evaluate response  - Consider cultural and social influences on pain and pain management  - Notify physician/advanced practitioner if interventions unsuccessful or patient reports new pain  Outcome: Adequate for Discharge     Problem: INFECTION - ADULT  Goal: Absence or prevention of progression during hospitalization  Description: INTERVENTIONS:  - Assess and monitor for signs and symptoms of infection  - Monitor lab/diagnostic results  - Monitor all insertion sites, i e  indwelling lines, tubes, and drains  - Monitor endotracheal if appropriate and nasal secretions for changes in amount and color  - [place] appropriate cooling/warming therapies per order  - Administer medications as ordered  - Instruct and encourage patient and family to use good hand hygiene technique  - Identify and instruct in appropriate isolation precautions for identified infection/condition  Outcome: Adequate for Discharge  Goal: Absence of fever/infection during neutropenic period  Description: INTERVENTIONS:  - Monitor WBC    Outcome: Adequate for Discharge     Problem: SAFETY ADULT  Goal: Patient will remain free of falls  Description: INTERVENTIONS:  - Assess patient frequently for physical needs  -  Identify cognitive and physical deficits and behaviors that affect risk of falls    -  [place] fall precautions as indicated by assessment   - Educate patient/family on patient safety including physical limitations  - Instruct patient to call for assistance with activity based on assessment  - Modify environment to reduce risk of injury  - Consider OT/PT consult to assist with strengthening/mobility  Outcome: Adequate for Discharge  Goal: Maintain or return to baseline ADL function  Description: INTERVENTIONS:  -  Assess patient's ability to carry out ADLs; assess patient's baseline for ADL function and identify physical deficits which impact ability to perform ADLs (bathing, care of mouth/teeth, toileting, grooming, dressing, etc )  - Assess/evaluate cause of self-care deficits   - Assess range of motion  - Assess patient's mobility; develop plan if impaired  - Assess patient's need for assistive devices and provide as appropriate  - Encourage maximum independence but intervene and supervise when necessary  - Involve family in performance of ADLs  - Assess for home care needs following discharge   - Consider OT consult to assist with ADL evaluation and planning for discharge  - Provide patient education as appropriate  Outcome: Adequate for Discharge  Goal: Maintain or return mobility status to optimal level  Description: INTERVENTIONS:  - Assess patient's baseline mobility status (ambulation, transfers, stairs, etc )    - Identify cognitive and physical deficits and behaviors that affect mobility  - Identify mobility aids required to assist with transfers and/or ambulation (gait belt, sit-to-stand, lift, walker, cane, etc )  - [place] fall precautions as indicated by assessment  - Record patient progress and toleration of activity level on Mobility SBAR; progress patient to next Phase/Stage  - Instruct patient to call for assistance with activity based on assessment  - Consider rehabilitation consult to assist with strengthening/weightbearing, etc   Outcome: Adequate for Discharge     Problem: DISCHARGE PLANNING  Goal: Discharge to home or other facility with appropriate resources  Description: INTERVENTIONS:  - Identify barriers to discharge w/patient and caregiver  - Arrange for needed discharge resources and transportation as appropriate  - Identify discharge learning needs (meds, wound care, etc )  - Arrange for interpretive services to assist at discharge as needed  - Refer to Case Management Department for coordinating discharge planning if the patient needs post-hospital services based on physician/advanced practitioner order or complex needs related to functional status, cognitive ability, or social support system  Outcome: Adequate for Discharge     Problem: Knowledge Deficit  Goal: Patient/family/caregiver demonstrates understanding of disease process, treatment plan, medications, and discharge instructions  Description: Complete learning assessment and assess knowledge base  Interventions:  - Provide teaching at level of understanding  - Provide teaching via preferred learning methods  Outcome: Adequate for Discharge     Problem: Potential for Falls  Goal: Patient will remain free of falls  Description: INTERVENTIONS:  - Assess patient frequently for physical needs  -  Identify cognitive and physical deficits and behaviors that affect risk of falls    -  [place] fall precautions as indicated by assessment   - Educate patient/family on patient safety including physical limitations  - Instruct patient to call for assistance with activity based on assessment  - Modify environment to reduce risk of injury  - Consider OT/PT consult to assist with strengthening/mobility  Outcome: Adequate for Discharge     Problem: Prexisting or High Potential for Compromised Skin Integrity  Goal: Skin integrity is maintained or improved  Description: INTERVENTIONS:  - Identify patients at risk for skin breakdown  - Assess and monitor skin integrity  - Assess and monitor nutrition and hydration status  - Monitor labs   - Assess for incontinence   - Turn and reposition patient  - Assist with mobility/ambulation  - Relieve pressure over bony prominences  - Avoid friction and shearing  - Provide appropriate hygiene as needed including keeping skin clean and dry  - Evaluate need for skin moisturizer/barrier cream  - Collaborate with interdisciplinary team   - Patient/family teaching  - Consider wound care consult   Outcome: Adequate for Discharge

## 2021-06-10 ENCOUNTER — HOSPITAL ENCOUNTER (OUTPATIENT)
Dept: RADIOLOGY | Facility: HOSPITAL | Age: 71
Discharge: HOME | End: 2021-06-10
Payer: MEDICARE

## 2021-06-10 ENCOUNTER — OFFICE VISIT (OUTPATIENT)
Dept: OBGYN CLINIC | Facility: HOSPITAL | Age: 71
End: 2021-06-10

## 2021-06-10 VITALS
WEIGHT: 132 LBS | HEART RATE: 84 BPM | DIASTOLIC BLOOD PRESSURE: 82 MMHG | SYSTOLIC BLOOD PRESSURE: 125 MMHG | HEIGHT: 63 IN | BODY MASS INDEX: 23.39 KG/M2

## 2021-06-10 DIAGNOSIS — M43.22 CERVICAL VERTEBRAL FUSION: ICD-10-CM

## 2021-06-10 DIAGNOSIS — M43.22 CERVICAL VERTEBRAL FUSION: Primary | ICD-10-CM

## 2021-06-10 PROCEDURE — 1124F ACP DISCUSS-NO DSCNMKR DOCD: CPT | Performed by: PHYSICIAN ASSISTANT

## 2021-06-10 PROCEDURE — 99024 POSTOP FOLLOW-UP VISIT: CPT | Performed by: PHYSICIAN ASSISTANT

## 2021-06-10 PROCEDURE — 72040 X-RAY EXAM NECK SPINE 2-3 VW: CPT

## 2021-06-10 NOTE — PROGRESS NOTES
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  Overall doing well with controlled pain  Incision healing nicely, sutures removed and steri-strips applied    Plan:    Weight bearing as tolerated bilateral upper extremities  Cervical collar at all times  Cervical spine precautions   No heavy or overhead lifting  Monitor incision, do not have to leave covered if no drainage  Pain control as needed, can refill Oxycodone up until 6 weeks from surgery date  F/U 4 weeks for re-evaluation and new cervical spine x-rays       Problem List Items Addressed This Visit        Musculoskeletal and Integument    Cervical vertebral fusion - Primary                   Subjective:     Patient ID:  Arlington Callow is a 66 y o  female    HPI    2 weeks s/p posterior C1-C2 fixation done 5/25/2021  Overall patient is doing well with minimal complaints  She feels incisional pain and that her sutures feel tight  Has been doing [place] at [facility]  No drainage from her cervical incision  The following portions of the patient's history were reviewed and updated as appropriate: allergies, current medications, past family history, past medical history, past social history, past surgical history and problem list     Review of Systems     Objective:    Imaging:  Cervical spine x-rays  Demonstrate stable fusion, C1-C2 with hardware intact, in good alignment  Vitals:    06/10/21 1320   BP: 125/82   Pulse: 84         Physical Exam     NAD  Gait assisted with a walker  Posterior cervical incision c/d/i with scabbing  No erythema, drainage, or wound dehiscence      Strength is 5/5 C5-T1 bilaterally, sensation intact  Palpable radial pulse bilaterally    Suture removal    Date/Time: 6/10/2021 1:58 PM  Performed by: Gianna Schmidt PA-C  Authorized by: Gianna Schmidt PA-C   Universal Protocol:  Consent given by: patient        Patient location:  Clinic  Location: Location:  [address] [facility] location:  Neck  Procedure details: Tools used:  Suture removal kit    Wound appearance:  No sign(s) of infection, good wound healing and clean  Post-procedure details:     Post-removal:  Steri-Strips applied    Patient tolerance of procedure:   Tolerated well, no immediate complications

## 2021-06-11 ENCOUNTER — TELEPHONE (OUTPATIENT)
Dept: FAMILY MEDICINE CLINIC | Facility: CLINIC | Age: 71
End: 2021-06-11

## 2021-06-21 ENCOUNTER — TELEPHONE (OUTPATIENT)
Dept: FAMILY MEDICINE CLINIC | Facility: CLINIC | Age: 71
End: 2021-06-21

## 2021-06-21 DIAGNOSIS — Z76.0 MEDICATION REFILL: Primary | ICD-10-CM

## 2021-06-21 DIAGNOSIS — M50.30 DDD (DEGENERATIVE DISC DISEASE), CERVICAL: ICD-10-CM

## 2021-06-21 RX ORDER — ESCITALOPRAM OXALATE 20 MG/1
TABLET ORAL
COMMUNITY
Start: 2021-06-11 | End: 2021-08-03

## 2021-06-21 RX ORDER — NAPROXEN 500 MG/1
500 TABLET ORAL 2 TIMES DAILY PRN
Qty: 60 TABLET | Refills: 1 | Status: SHIPPED | OUTPATIENT
Start: 2021-06-21 | End: 2021-08-15

## 2021-06-21 NOTE — TELEPHONE ENCOUNTER
Refill:  Naproxen 500mg  (Pharmacy told her it was cx'd but she needs it)  DELIVER  ([facility])

## 2021-06-29 DIAGNOSIS — F41.9 ANXIETY: ICD-10-CM

## 2021-06-29 RX ORDER — ALPRAZOLAM 0.5 MG/1
0.5 TABLET ORAL 2 TIMES DAILY PRN
Qty: 60 TABLET | Refills: 3 | Status: SHIPPED | OUTPATIENT
Start: 2021-06-29 | End: 2021-11-12 | Stop reason: SDUPTHER

## 2021-06-29 NOTE — TELEPHONE ENCOUNTER
Pt called for refill of Alprazolam 0 5mg  Pt requested to put in for pharmacy to deliver     Order created and pending approval   Orion Vargas

## 2021-07-13 ENCOUNTER — OFFICE VISIT (OUTPATIENT)
Dept: FAMILY MEDICINE CLINIC | Facility: CLINIC | Age: 71
End: 2021-07-13
Payer: MEDICARE

## 2021-07-13 ENCOUNTER — TELEPHONE (OUTPATIENT)
Dept: OBGYN CLINIC | Facility: HOSPITAL | Age: 71
End: 2021-07-13

## 2021-07-13 VITALS
OXYGEN SATURATION: 99 % | RESPIRATION RATE: 16 BRPM | WEIGHT: 129.38 LBS | TEMPERATURE: 97.2 F | SYSTOLIC BLOOD PRESSURE: 128 MMHG | DIASTOLIC BLOOD PRESSURE: 66 MMHG | HEART RATE: 51 BPM | HEIGHT: 63 IN | BODY MASS INDEX: 22.93 KG/M2

## 2021-07-13 DIAGNOSIS — Z13.31 STANDARDIZED ADULT DEPRESSION SCREENING TOOL COMPLETED: ICD-10-CM

## 2021-07-13 DIAGNOSIS — I10 HYPERTENSION, UNSPECIFIED TYPE: ICD-10-CM

## 2021-07-13 DIAGNOSIS — Z53.20 COLONOSCOPY REFUSED: ICD-10-CM

## 2021-07-13 DIAGNOSIS — Z00.00 MEDICARE ANNUAL WELLNESS VISIT, SUBSEQUENT: Primary | ICD-10-CM

## 2021-07-13 DIAGNOSIS — Z11.59 NEED FOR HEPATITIS C SCREENING TEST: ICD-10-CM

## 2021-07-13 DIAGNOSIS — Z53.20 MAMMOGRAM DECLINED: ICD-10-CM

## 2021-07-13 DIAGNOSIS — E03.9 HYPOTHYROIDISM, UNSPECIFIED TYPE: ICD-10-CM

## 2021-07-13 DIAGNOSIS — E55.9 VITAMIN D DEFICIENCY: ICD-10-CM

## 2021-07-13 PROCEDURE — 1123F ACP DISCUSS/DSCN MKR DOCD: CPT | Performed by: INTERNAL MEDICINE

## 2021-07-13 PROCEDURE — G0439 PPPS, SUBSEQ VISIT: HCPCS | Performed by: INTERNAL MEDICINE

## 2021-07-13 NOTE — PATIENT INSTRUCTIONS
Medicare Preventive Visit Patient Instructions  Thank you for completing your Welcome to Medicare Visit or Medicare Annual Wellness Visit today  Your next wellness visit will be due in one year (7/14/2022)  The screening/preventive services that you may require over the next 5-10 years are detailed below  Some tests may not apply to you based off risk factors and/or age  Screening tests ordered at today's visit but not completed yet may show as past due  Also, please note that scanned in results may not display below  Preventive Screenings:  Service Recommendations Previous Testing/Comments   Colorectal Cancer Screening  * Colonoscopy    * Fecal Occult Blood Test (FOBT)/Fecal Immunochemical Test (FIT)  * Fecal DNA/Cologuard Test  * Flexible Sigmoidoscopy Age: 54-65 years old   Colonoscopy: every 10 years (may be performed more frequently if at higher risk)  OR  FOBT/FIT: every 1 year  OR  Cologuard: every 3 years  OR  Sigmoidoscopy: every 5 years  Screening may be recommended earlier than age 48 if at higher risk for colorectal cancer  Also, an individualized decision between you and your healthcare provider will decide whether screening between the ages of 74-80 would be appropriate  Colonoscopy: Not on file  FOBT/FIT: Not on file  Cologuard: Not on file  Sigmoidoscopy: Not on file          Breast Cancer Screening Age: 36 years old  Frequency: every 1-2 years  Not required if history of left and right mastectomy Mammogram: 11/27/2018        Cervical Cancer Screening Between the ages of 21-29, pap smear recommended once every 3 years  Between the ages of 33-67, can perform pap smear with HPV co-testing every 5 years     Recommendations may differ for women with a history of total hysterectomy, cervical cancer, or abnormal pap smears in past  Pap Smear: Not on file        Hepatitis C Screening Once for adults born between [facility]  More frequently in patients at high risk for Hepatitis C Hep C Antibody: Not on file        Diabetes Screening 1-2 times per year if you're at risk for diabetes or have pre-diabetes Fasting glucose: 120 mg/dL   A1C: 5 8 %        Cholesterol Screening Once every 5 years if you don't have a lipid disorder  May order more often based on risk factors  Lipid panel: 07/27/2016          Other Preventive Screenings Covered by Medicare:  1  Abdominal Aortic Aneurysm (AAA) Screening: covered once if your at risk  You're considered to be at risk if you have a family history of AAA  2  Lung Cancer Screening: covers low dose CT scan once per year if you meet all of the following conditions: (1) Age 50-69; (2) No signs or symptoms of lung cancer; (3) Current smoker or have quit smoking within the last 15 years; (4) You have a tobacco smoking history of at least 30 pack years (packs per day multiplied by number of years you smoked); (5) You get a written order from a healthcare provider  3  Glaucoma Screening: covered annually if you're considered high risk: (1) You have diabetes OR (2) Family history of glaucoma OR (3)  aged 48 and older OR (3)  American aged 72 and older  3  Osteoporosis Screening: covered every 2 years if you meet one of the following conditions: (1) You're estrogen deficient and at risk for osteoporosis based off medical history and other findings; (2) Have a vertebral abnormality; (3) On glucocorticoid therapy for more than 3 months; (4) Have primary hyperparathyroidism; (5) On osteoporosis medications and need to assess response to drug therapy  · Last bone density test (DXA Scan): Not on file  5  HIV Screening: covered annually if you're between the age of 12-76  Also covered annually if you are younger than 13 and older than 72 with risk factors for HIV infection  For pregnant patients, it is covered up to 3 times per pregnancy      Immunizations:  Immunization Recommendations   Influenza Vaccine Annual influenza vaccination during flu season is recommended for all persons aged >= 6 months who do not have contraindications   Pneumococcal Vaccine (Prevnar and Pneumovax)  * Prevnar = PCV13  * Pneumovax = PPSV23   Adults 25-60 years old: 1-3 doses may be recommended based on certain risk factors  Adults 72 years old: Prevnar (PCV13) vaccine recommended followed by Pneumovax (PPSV23) vaccine  If already received PPSV23 since turning 65, then PCV13 recommended at least one year after PPSV23 dose  Hepatitis B Vaccine 3 dose series if at intermediate or high risk (ex: diabetes, end stage renal disease, liver disease)   Tetanus (Td) Vaccine - COST NOT COVERED BY MEDICARE PART B Following completion of primary series, a booster dose should be given every 10 years to maintain immunity against tetanus  Td may also be given as tetanus wound prophylaxis  Tdap Vaccine - COST NOT COVERED BY MEDICARE PART B Recommended at least once for all adults  For pregnant patients, recommended with each pregnancy  Shingles Vaccine (Shingrix) - COST NOT COVERED BY MEDICARE PART B  2 shot series recommended in those aged 48 and above     Health Maintenance Due:      Topic Date Due    Hepatitis C Screening  Never done    Colorectal Cancer Screening  Never done    Breast Cancer Screening: Mammogram  11/27/2019     Immunizations Due:      Topic Date Due    COVID-19 Vaccine (1) Never done    DTaP,Tdap,and Td Vaccines (1 - Tdap) Never done    Pneumococcal Vaccine: 65+ Years (2 of 2 - PPSV23) 11/09/2016    Influenza Vaccine (1) 09/01/2021     Advance Directives   What are advance directives? Advance directives are legal documents that state your wishes and plans for medical care  These plans are made ahead of time in case you lose your ability to make decisions for yourself  Advance directives can apply to any medical decision, such as the treatments you want, and if you want to donate organs  What are the types of advance directives?   There are many types of advance directives, and each state has rules about how to use them  You may choose a combination of any of the following:  · Living will: This is a written record of the treatment you want  You can also choose which treatments you do not want, which to limit, and which to stop at a certain time  This includes surgery, medicine, IV fluid, and tube feedings  · Durable power of  for healthcare [facility]): This is a written record that states who you want to make healthcare choices for you when you are unable to make them for yourself  This person, called a proxy, is usually a family member or a friend  You may choose more than 1 proxy  · Do not resuscitate (DNR) order:  A DNR order is used in case your heart stops beating or you stop breathing  It is a request not to have certain forms of treatment, such as CPR  A DNR order may be included in other types of advance directives  · Medical directive: This covers the care that you want if you are in a coma, near death, or unable to make decisions for yourself  You can list the treatments you want for each condition  Treatment may include pain medicine, surgery, blood transfusions, dialysis, IV or tube feedings, and a ventilator (breathing machine)  · Values history: This document has questions about your views, beliefs, and how you feel and think about life  This information can help others choose the care that you would choose  Why are advance directives important? An advance directive helps you control your care  Although spoken wishes may be used, it is better to have your wishes written down  Spoken wishes can be misunderstood, or not followed  Treatments may be given even if you do not want them  An advance directive may make it easier for your family to make difficult choices about your care  © Copyright [employer] 2018 Information is for End User's use only and may not be sold, redistributed or otherwise used for commercial purposes   All illustrations and images included in CareNotes® are the copyrighted property of A D A M , Inc  or [employer]

## 2021-07-13 NOTE — TELEPHONE ENCOUNTER
Patient is calling to request transportation for her appt 7/19 @ 1pm with Dr Monty Triplett in [place],  Emailed practice admin

## 2021-07-13 NOTE — PROGRESS NOTES
Assessment and Plan:     Problem List Items Addressed This Visit     None      Visit Diagnoses     Medicare annual wellness visit, subsequent    -  Primary    Relevant Orders    CBC and differential    Comprehensive metabolic panel    Lipid panel    TSH, 3rd generation    Hepatitis C antibody    Vitamin D 25 hydroxy    Colonoscopy refused        Standardized adult depression screening tool completed        BMI 22 0-22 9, adult        Vitamin D deficiency        Relevant Orders    Vitamin D 25 hydroxy    Need for hepatitis C screening test        Relevant Orders    Hepatitis C antibody    Hypertension, unspecified type        Relevant Orders    CBC and differential    Comprehensive metabolic panel    Lipid panel    TSH, 3rd generation    Hypothyroidism, unspecified type        Relevant Orders    CBC and differential    Comprehensive metabolic panel    Lipid panel    TSH, 3rd generation    Mammogram declined          Idania Padron will be getting her covid shot shortly so we will wait on the pneumovax 23-she is doing ok s/p cervical spine fusion-still in cervical collar but hopefully can take it off soon     Preventive health issues were discussed with patient, and age appropriate screening tests were ordered as noted in patient's After Visit Summary  Personalized health advice and appropriate referrals for health education or preventive services given if needed, as noted in patient's After Visit Summary       History of Present Illness:     Patient presents for Medicare Annual Wellness visit    Patient Care Team:  Akira Akhtar MD as PCP - General (Internal Medicine)     Problem List:     Patient Active Problem List   Diagnosis    Neck pain    Spondylosis of cervical region without myelopathy or radiculopathy    Occipital neuralgia of left side    Pre-op examination    Cervical vertebral fusion      Past Medical and Surgical History:     Past Medical History:   Diagnosis Date    Anxiety     Arthritis     shoulder  Carpal tunnel syndrome     Depression     Disease of thyroid gland     Diverticulitis     HBP (high blood pressure)     Hyperlipidemia     Hypertension     Hypothyroid     Pneumonia     2017    PONV (postoperative nausea and vomiting)     Psychiatric disorder     Depression    Sleep apnea     mild      Past Surgical History:   Procedure Laterality Date    APPENDECTOMY      BREAST BIOPSY Right     benign    BREAST CYST ASPIRATION Right     benign    CATARACT EXTRACTION Bilateral     CATARACT EXTRACTION, BILATERAL  2019    CERVICAL FUSION N/A 2021    Procedure: FUSION CERVICAL POSTERIOR C1-C2 with allograft and neuromonitoring);   Surgeon: Ana M Liriano MD;  [facility];  Service: Orthopedics    CHOLECYSTECTOMY      COLONOSCOPY      COLONOSCOPY W/ BIOPSIES      EGD      HAND SURGERY Right     MAMMO (HISTORICAL) Bilateral 2018    MAMMO (HISTORICAL) Bilateral 10/27/2017    MANDIBLE SURGERY      WISDOM TOOTH EXTRACTION      X4      Family History:     Family History   Problem Relation Age of Onset    Heart disease Mother     Hypertension Mother     Hyperlipidemia Mother     Heart failure Mother     Hypertension Sister       Social History:     Social History     Socioeconomic History    Marital status:      Spouse name: None    Number of children: None    Years of education: None    Highest education level: None   Occupational History    None   Tobacco Use    Smoking status: Former Smoker     Quit date:      Years since quittin 5    Smokeless tobacco: Never Used   Vaping Use    Vaping Use: Never used   Substance and Sexual Activity    Alcohol use: No     Comment: last drink 7 years ago    Drug use: No    Sexual activity: Not Currently   Other Topics Concern    None   Social History Narrative    Do you currently or have you served in the [employer]: No    Were you activated, into active duty, as a member of the [employer] or as a Reservist: No    Occupation: retired    Marital status:     Exercise level: Occasional treadmill    General stress level: Low    Alcohol intake: None stopped 7 years ago    Caffeine intake: Moderate    Chewing tobacco: none    Illicit drugs: none    Guns present in home: No    Seat belts used routinely: Yes    Sunscreen used routinely: Yes    Smoke alarm in home: Yes    Advance directive: Yes     Social Determinants of Health     Financial Resource Strain:     Difficulty of Paying Living Expenses:    Food Insecurity:     Worried About Running Out of Food in the Last Year:     Ran Out of Food in the Last Year:    Transportation Needs:     Lack of Transportation (Medical):      Lack of Transportation (Non-Medical):    Physical Activity:     Days of Exercise per Week:     Minutes of Exercise per Session:    Stress:     Feeling of Stress :    Social Connections:     Frequency of Communication with Friends and Family:     Frequency of Social Gatherings with Friends and Family:     Attends [employer] Services:     Active Member of Clubs or Organizations:     Attends Club or Organization Meetings:     Marital Status:    Intimate Partner Violence:     Fear of Current or Ex-Partner:     Emotionally Abused:     Physically Abused:     Sexually Abused:       Medications and Allergies:     Current Outpatient Medications   Medication Sig Dispense Refill    acetaminophen (TYLENOL) 500 mg tablet Take 1 tablet (500 mg total) by mouth every 6 (six) hours as needed for mild pain 30 tablet 0    ALPRAZolam (XANAX) 0 5 mg tablet Take 1 tablet (0 5 mg total) by mouth 2 (two) times a day as needed for anxiety 60 tablet 3    aspirin (ECOTRIN LOW STRENGTH) 81 mg EC tablet Take 81 mg by mouth daily      atorvastatin (LIPITOR) 10 mg tablet TAKE 1 TABLET BY MOUTH EVERY DAY AT BEDTIME 90 tablet 3    baclofen 10 mg tablet Take 1 tablet (10 mg total) by mouth 2 (two) times a day as needed for muscle spasms 30 tablet 5    Calcium Carb-Cholecalciferol (CALCIUM 600+D3) 600-200 MG-UNIT TABS Take 1 tablet by mouth daily      docusate sodium (COLACE) 100 mg capsule Take 1 capsule (100 mg total) by mouth 2 (two) times a day 10 capsule 0    escitalopram (LEXAPRO) 20 mg tablet       gabapentin (NEURONTIN) 300 mg capsule Take 1 capsule (300 mg total) by mouth 2 (two) times a day 60 capsule 0    levothyroxine 100 mcg tablet Take 1 tablet (100 mcg total) by mouth daily 90 tablet 3    Multiple Vitamins-Minerals (CENTRUM ADULTS PO) Take 1 tablet by mouth daily      naproxen (NAPROSYN) 500 mg tablet Take 1 tablet (500 mg total) by mouth 2 (two) times a day as needed for mild pain 60 tablet 1    oxyCODONE (ROXICODONE) 5 mg immediate release tablet Take 1 tablet (5 mg total) by mouth every 6 (six) hours as needed for severe painMax Daily Amount: 20 mg 24 tablet 0    ramipril (ALTACE) 10 MG capsule Take 1 capsule (10 mg total) by mouth 2 (two) times a day 180 capsule 3     No current facility-administered medications for this visit       No Known Allergies   Immunizations:     Immunization History   Administered Date(s) Administered    INFLUENZA 10/08/2018    Influenza, high dose seasonal 0 7 mL 09/16/2020    Pneumococcal Conjugate 13-Valent 11/09/2015    influenza, trivalent, adjuvanted 11/07/2019      Health Maintenance:         Topic Date Due    Hepatitis C Screening  Never done    Colorectal Cancer Screening  07/13/2022 (Originally 4/8/1998)    Breast Cancer Screening: Mammogram  07/13/2022 (Originally 11/27/2019)         Topic Date Due    COVID-19 Vaccine (1) Never done    Pneumococcal Vaccine: 65+ Years (2 of 2 - PPSV23) 11/09/2016    Influenza Vaccine (1) 09/01/2021      Medicare Health Risk Assessment:     /66 (BP Location: Left arm, Patient Position: Sitting, Cuff Size: Adult)   Pulse (!) 51   Temp (!) 97 2 °F (36 2 °C) (Temporal)   Resp 16   Ht 5' 3" (1 6 m)   Wt 58 7 kg (129 lb 6 oz)   SpO2 99% BMI 22 92 kg/m²      Eunice Basilio is here for her Subsequent Wellness visit  Last Medicare Wellness visit information reviewed, patient interviewed and updates made to the record today  Health Risk Assessment:   Patient rates overall health as good  Patient feels that their physical health rating is slightly worse  Patient is satisfied with their life  Eyesight was rated as much worse  Hearing was rated as slightly worse  Patient feels that their emotional and mental health rating is slightly worse  Patients states they are always angry  Patient states they are always unusually tired/fatigued  Pain experienced in the last 7 days has been none  Patient states that she has experienced no weight loss or gain in last 6 months  Depression Screening:   PHQ-2 Score: 2      Fall Risk Screening: In the past year, patient has experienced: history of falling in past year    Number of falls: 2 or more  Injured during fall?: Yes    Feels unsteady when standing or walking?: Yes    Worried about falling?: Yes      Urinary Incontinence Screening:   Patient has not leaked urine accidently in the last six months  Home Safety:  Patient has trouble with stairs inside or outside of their home  Patient has working smoke alarms and has no working carbon monoxide detector  Home safety hazards include: none  Medications:   Patient is currently taking over-the-counter supplements  OTC medications include: see medication list  Patient is able to manage medications  Activities of Daily Living (ADLs)/Instrumental Activities of Daily Living (IADLs):   Walk and transfer into and out of bed and chair?: Yes  Dress and groom yourself?: Yes    Bathe or shower yourself?: Yes    Feed yourself?  Yes  Do your laundry/housekeeping?: Yes  Manage your money, pay your bills and track your expenses?: Yes  Make your own meals?: Yes    Do your own shopping?: Yes    Previous Hospitalizations:   Any hospitalizations or ED visits within the last 12 months?: Yes    How many hospitalizations have you had in the last year?: 1-2    Advance Care Planning:   Living will: Yes    Durable POA for healthcare: Yes    Advanced directive: Yes      Cognitive Screening:   Provider or family/friend/caregiver concerned regarding cognition?: No    PREVENTIVE SCREENINGS      Cardiovascular Screening:    General: Screening Not Indicated and History Lipid Disorder      Diabetes Screening:     General: Screening Current      Colorectal Cancer Screening:     General: Patient Declines      Breast Cancer Screening:       Due for: Mammogram        Cervical Cancer Screening:    General: Screening Not Indicated      Osteoporosis Screening:      Due for: DXA Axial      Lung Cancer Screening:     General: Screening Not Indicated      Hepatitis C Screening:    General: Risks and Benefits Discussed    Hep C Screening Accepted: Yes      Screening, Brief Intervention, and Referral to Treatment (SBIRT)    Screening  Typical number of drinks in a day: 0  Typical number of drinks in a week: 0  Interpretation: Low risk drinking behavior      Review of Current Opioid Use  Opioid Risk Tool (ORT) Score: 1  Opioid Risk Tool (ORT) Interpretation: Score 0-3: Low risk for opioid misuse      Teja Bhatt MD

## 2021-07-14 ENCOUNTER — TELEPHONE (OUTPATIENT)
Dept: OBGYN CLINIC | Facility: HOSPITAL | Age: 71
End: 2021-07-14

## 2021-07-14 NOTE — TELEPHONE ENCOUNTER
KENRICK zambrano scheduled for 7/19/21 appointment with Dr Jesica Diaz will  patient at approximately 12 noon  Patient aware

## 2021-07-19 ENCOUNTER — HOSPITAL ENCOUNTER (OUTPATIENT)
Dept: RADIOLOGY | Facility: HOSPITAL | Age: 71
Discharge: HOME | End: 2021-07-19
Attending: ORTHOPAEDIC SURGERY
Payer: MEDICARE

## 2021-07-19 ENCOUNTER — OFFICE VISIT (OUTPATIENT)
Dept: OBGYN CLINIC | Facility: HOSPITAL | Age: 71
End: 2021-07-19

## 2021-07-19 VITALS
BODY MASS INDEX: 22.68 KG/M2 | SYSTOLIC BLOOD PRESSURE: 119 MMHG | HEART RATE: 70 BPM | WEIGHT: 128 LBS | DIASTOLIC BLOOD PRESSURE: 70 MMHG | HEIGHT: 63 IN

## 2021-07-19 DIAGNOSIS — M43.22 CERVICAL VERTEBRAL FUSION: ICD-10-CM

## 2021-07-19 DIAGNOSIS — M43.22 CERVICAL VERTEBRAL FUSION: Primary | ICD-10-CM

## 2021-07-19 PROCEDURE — 72040 X-RAY EXAM NECK SPINE 2-3 VW: CPT

## 2021-07-19 PROCEDURE — 99024 POSTOP FOLLOW-UP VISIT: CPT | Performed by: ORTHOPAEDIC SURGERY

## 2021-07-19 NOTE — PROGRESS NOTES
Assessment:   Diagnosis ICD-10-CM Associated Orders   1  Cervical vertebral fusion  M43 22 XR spine cervical 2 or 3 vw injury       Plan:  s/p posterior C1-C2 fixation done 5/25/2021, done for closed odontoid fracture with non-union overall doing well  No pain, imaging stable hardware, patient is neurologically stable  She is to continue in cervical collar for 6 more weeks, maintain cervical precautions  Imaging at next appt, will d/c collar at next appt  To do next visit:  Return in about 6 weeks (around 8/30/2021) for Recheck  The above stated was discussed in layman's terms and the patient expressed understanding  All questions were answered to the patient's satisfaction  Scribe Attestation    I,:  Layla Martínez am acting as a scribe while in the presence of the attending physician :       I,:  Tracy Leary MD personally performed the services described in this documentation    as scribed in my presence :             Subjective:   Domitila Mackey is a 66 y o  female who presents s/p posterior C1-C2 fixation done 5/25/2021, done for closed odontoid fracture with non-union  She presents in cervical collar and is following cervical spine precautions  She reports no pain  Denies any numbness or tingling in arms  Denies any fever,chills  Review of systems negative unless otherwise specified in HPI  Review of Systems   Constitutional: Negative for chills and fever  HENT: Negative for ear pain and sore throat  Eyes: Negative for pain and visual disturbance  Respiratory: Negative for cough and shortness of breath  Cardiovascular: Negative for chest pain and palpitations  Gastrointestinal: Negative for abdominal pain and vomiting  Genitourinary: Negative for dysuria and hematuria  Musculoskeletal: Negative for arthralgias and back pain  Skin: Negative for color change and rash  Neurological: Negative for seizures and syncope     All other systems reviewed and are negative  Past Medical History:   Diagnosis Date    Anxiety     Arthritis     shoulder    Carpal tunnel syndrome     Depression     Disease of thyroid gland     Diverticulitis     HBP (high blood pressure)     Hyperlipidemia     Hypertension     Hypothyroid     Pneumonia     2017    PONV (postoperative nausea and vomiting)     Psychiatric disorder     Depression    Sleep apnea     mild        Past Surgical History:   Procedure Laterality Date    APPENDECTOMY      BREAST BIOPSY Right     benign    BREAST CYST ASPIRATION Right     benign    CATARACT EXTRACTION Bilateral     CATARACT EXTRACTION, BILATERAL  2019    CERVICAL FUSION N/A 2021    Procedure: FUSION CERVICAL POSTERIOR C1-C2 with allograft and neuromonitoring);   Surgeon: Tisa Galeazzi, MD;  Location: [facility];  Service: Orthopedics    CHOLECYSTECTOMY      COLONOSCOPY      COLONOSCOPY W/ BIOPSIES      EGD      HAND SURGERY Right     MAMMO (HISTORICAL) Bilateral 2018    MAMMO (HISTORICAL) Bilateral 10/27/2017    MANDIBLE SURGERY      WISDOM TOOTH EXTRACTION      X4       Family History   Problem Relation Age of Onset    Heart disease Mother     Hypertension Mother     Hyperlipidemia Mother     Heart failure Mother     Hypertension Sister        Social History     Occupational History    Not on file   Tobacco Use    Smoking status: Former Smoker     Quit date:      Years since quittin 5    Smokeless tobacco: Never Used   Vaping Use    Vaping Use: Never used   Substance and Sexual Activity    Alcohol use: No     Comment: last drink 7 years ago    Drug use: No    Sexual activity: Not Currently         Current Outpatient Medications:     ALPRAZolam (XANAX) 0 5 mg tablet, Take 1 tablet (0 5 mg total) by mouth 2 (two) times a day as needed for anxiety, Disp: 60 tablet, Rfl: 3    aspirin (ECOTRIN LOW STRENGTH) 81 mg EC tablet, Take 81 mg by mouth daily, Disp: , Rfl:     atorvastatin (LIPITOR) 10 mg tablet, TAKE 1 TABLET BY MOUTH EVERY DAY AT BEDTIME, Disp: 90 tablet, Rfl: 3    baclofen 10 mg tablet, Take 1 tablet (10 mg total) by mouth 2 (two) times a day as needed for muscle spasms, Disp: 30 tablet, Rfl: 5    Calcium Carb-Cholecalciferol (CALCIUM 600+D3) 600-200 MG-UNIT TABS, Take 1 tablet by mouth daily, Disp: , Rfl:     escitalopram (LEXAPRO) 20 mg tablet, , Disp: , Rfl:     gabapentin (NEURONTIN) 300 mg capsule, Take 1 capsule (300 mg total) by mouth 2 (two) times a day, Disp: 60 capsule, Rfl: 0    levothyroxine 100 mcg tablet, Take 1 tablet (100 mcg total) by mouth daily, Disp: 90 tablet, Rfl: 3    Multiple Vitamins-Minerals (CENTRUM ADULTS PO), Take 1 tablet by mouth daily, Disp: , Rfl:     naproxen (NAPROSYN) 500 mg tablet, Take 1 tablet (500 mg total) by mouth 2 (two) times a day as needed for mild pain, Disp: 60 tablet, Rfl: 1    ramipril (ALTACE) 10 MG capsule, Take 1 capsule (10 mg total) by mouth 2 (two) times a day, Disp: 180 capsule, Rfl: 3    acetaminophen (TYLENOL) 500 mg tablet, Take 1 tablet (500 mg total) by mouth every 6 (six) hours as needed for mild pain (Patient not taking: Reported on 7/19/2021), Disp: 30 tablet, Rfl: 0    docusate sodium (COLACE) 100 mg capsule, Take 1 capsule (100 mg total) by mouth 2 (two) times a day (Patient not taking: Reported on 7/19/2021), Disp: 10 capsule, Rfl: 0    oxyCODONE (ROXICODONE) 5 mg immediate release tablet, Take 1 tablet (5 mg total) by mouth every 6 (six) hours as needed for severe painMax Daily Amount: 20 mg (Patient not taking: Reported on 7/19/2021), Disp: 24 tablet, Rfl: 0    No Known Allergies         Vitals:    07/19/21 1332   BP: 119/70   Pulse: 70       Objective:                    Back Exam     Comments:  Cervical spine  No acute distress, presents in cervical collar,incision clean, dry, intact with no evidence of infection  None tender to palpation  C2-T1 5/5 strength bilateral   C2-T1 sensation intact and symmetric Diagnostics, reviewed and taken today if performed as documented: The attending physician has personally reviewed the pertinent films in PACS and interpretation is as follows: xr cervical spine demonstrates stable posterior fusion of C1 and C2  Procedures, if performed today:    Procedures    None performed      Portions of the record may have been created with voice recognition software  Occasional wrong word or "sound a like" substitutions may have occurred due to the inherent limitations of voice recognition software  Read the chart carefully and recognize, using context, where substitutions have occurred

## 2021-08-03 DIAGNOSIS — Z76.0 MEDICATION REFILL: Primary | ICD-10-CM

## 2021-08-03 RX ORDER — ESCITALOPRAM OXALATE 20 MG/1
TABLET ORAL
Qty: 30 TABLET | Refills: 5 | Status: SHIPPED | OUTPATIENT
Start: 2021-08-03 | End: 2021-08-10 | Stop reason: SDUPTHER

## 2021-08-05 ENCOUNTER — TELEPHONE (OUTPATIENT)
Dept: OBGYN CLINIC | Facility: HOSPITAL | Age: 71
End: 2021-08-05

## 2021-08-05 NOTE — TELEPHONE ENCOUNTER
Left vm about moving appointment on 8/23 to [facility] Janette's schedule instead of [facility]  Same time slot just seeing the PA not the MD  If patient wants to reschedule they can but they are switched to PA's schedule

## 2021-08-10 DIAGNOSIS — F41.9 ANXIETY: Primary | ICD-10-CM

## 2021-08-10 DIAGNOSIS — Z76.0 MEDICATION REFILL: ICD-10-CM

## 2021-08-10 RX ORDER — ESCITALOPRAM OXALATE 20 MG/1
20 TABLET ORAL DAILY
Qty: 30 TABLET | Refills: 5 | Status: SHIPPED | OUTPATIENT
Start: 2021-08-10 | End: 2021-09-30 | Stop reason: ALTCHOICE

## 2021-08-15 DIAGNOSIS — M50.30 DDD (DEGENERATIVE DISC DISEASE), CERVICAL: ICD-10-CM

## 2021-08-15 DIAGNOSIS — Z76.0 MEDICATION REFILL: ICD-10-CM

## 2021-08-15 RX ORDER — NAPROXEN 500 MG/1
TABLET ORAL
Qty: 60 TABLET | Refills: 1 | Status: SHIPPED | OUTPATIENT
Start: 2021-08-15 | End: 2021-10-28

## 2021-08-17 ENCOUNTER — TELEPHONE (OUTPATIENT)
Dept: OBGYN CLINIC | Facility: HOSPITAL | Age: 71
End: 2021-08-17

## 2021-08-17 NOTE — TELEPHONE ENCOUNTER
Patient has an appointment schedule with Juan Cooper on Monday, 8/23 at 1:00 PM  Patient called requesting [employer] transportation for this appointment  Please call back at # [phone]

## 2021-08-17 NOTE — TELEPHONE ENCOUNTER
KENRICK zambrano scheduled  Patient informed Alessandro will  the patient at approximately noon on Monday  Kenrick will contact the patient a few minutes before arrival to her home

## 2021-08-23 ENCOUNTER — OFFICE VISIT (OUTPATIENT)
Dept: OBGYN CLINIC | Facility: HOSPITAL | Age: 71
End: 2021-08-23

## 2021-08-23 ENCOUNTER — HOSPITAL ENCOUNTER (OUTPATIENT)
Dept: RADIOLOGY | Facility: HOSPITAL | Age: 71
Discharge: HOME | End: 2021-08-23
Payer: MEDICARE

## 2021-08-23 VITALS
HEIGHT: 63 IN | WEIGHT: 128 LBS | HEART RATE: 68 BPM | SYSTOLIC BLOOD PRESSURE: 156 MMHG | DIASTOLIC BLOOD PRESSURE: 73 MMHG | BODY MASS INDEX: 22.68 KG/M2

## 2021-08-23 DIAGNOSIS — S12.9XXA FRACTURE OF CERVICAL VERTEBRAE, MULTIPLE, INITIAL ENCOUNTER (HCC): ICD-10-CM

## 2021-08-23 DIAGNOSIS — S12.9XXA FRACTURE OF CERVICAL VERTEBRAE, MULTIPLE, INITIAL ENCOUNTER (HCC): Primary | ICD-10-CM

## 2021-08-23 DIAGNOSIS — M43.22 CERVICAL VERTEBRAL FUSION: ICD-10-CM

## 2021-08-23 PROCEDURE — 99024 POSTOP FOLLOW-UP VISIT: CPT | Performed by: PHYSICIAN ASSISTANT

## 2021-08-23 PROCEDURE — 72040 X-RAY EXAM NECK SPINE 2-3 VW: CPT

## 2021-08-23 NOTE — PROGRESS NOTES
Assessment:    3 months status post C1-C2 fusion done for closed odontoid fracture with non-union 5/25/2021  Doing well  X-rays stable  Plan:    Weight bearing as tolerated all extremities  She can now discontinue use of cervical collar  Cervical spine precautions are lifted however she was instructed to avoid heavy overhead lifting when possible and avoid deep flexion extension of the cervical spine  Initiate outpatient physical therapy for gentle cervical range-of-motion exercises, massage, and upper extremity strengthening exercises  Follow-up 1 month for re-evaluation and new x-rays of the cervical spine  Problem List Items Addressed This Visit        Musculoskeletal and Integument    Cervical vertebral fusion      Other Visit Diagnoses     Fracture of cervical vertebrae, multiple, initial encounter ([facility] [address] )    -  Primary    Relevant Orders    XR spine cervical 2 or 3 vw injury                   Subjective:     Patient ID:  Magdiel Gomez is a 66 y o  female    HPI    3 months status post C1-C2 fusion done for closed odontoid fracture with non-union 5/25/2021  Today, the patient states overall she is doing well however she has significant headaches and shoulder soreness from the collar  She has been compliant with C-collar at all times  She denies any pain distally to the upper extremities  The following portions of the patient's history were reviewed and updated as appropriate: allergies, current medications, past family history, past medical history, past social history, past surgical history and problem list     Review of Systems     Objective:    Imaging:  Cervical spine x-rays performed today demonstrate  Stable C1-C2 fusion with hardware in unchanged position  Intact  Vitals:    08/23/21 1245   BP: 156/73   Pulse: 68           Physical Exam     No acute distress  Gait is normal without assistance  Posterior cervical incision is healed     Strength is 5/5 C5-T1 bilaterally, sensation equal intact   Palpable radial pulse bilaterally   Upper extremities are warm and well perfused

## 2021-09-01 ENCOUNTER — EVALUATION (OUTPATIENT)
Dept: PHYSICAL THERAPY | Facility: CLINIC | Age: 71
End: 2021-09-01
Payer: MEDICARE

## 2021-09-01 DIAGNOSIS — M43.22 CERVICAL VERTEBRAL FUSION: ICD-10-CM

## 2021-09-01 PROCEDURE — 97140 MANUAL THERAPY 1/> REGIONS: CPT | Performed by: PHYSICAL THERAPIST

## 2021-09-01 PROCEDURE — 97112 NEUROMUSCULAR REEDUCATION: CPT | Performed by: PHYSICAL THERAPIST

## 2021-09-01 PROCEDURE — 97162 PT EVAL MOD COMPLEX 30 MIN: CPT | Performed by: PHYSICAL THERAPIST

## 2021-09-01 NOTE — PROGRESS NOTES
PT Evaluation     Today's date: 2021  Patient name: Ursula Grimm  : 1950  MRN: [id number]  Referring provider: Kamala Alfaro  Dx:   Encounter Diagnosis     ICD-10-CM    1  Cervical vertebral fusion  M43 22 Ambulatory referral to Physical Therapy                  Assessment  Assessment details: Pt presents with post-operative impairments of cervical ROM, UE strength,  strength, postural stability, and pain  She will benefit from skilled PT services to address her impairments in order to decrease pain and restore function  Impairments: abnormal muscle firing, abnormal muscle tone, abnormal or restricted ROM, abnormal movement, activity intolerance, impaired physical strength, lacks appropriate home exercise program, pain with function, scapular dyskinesis, poor posture  and poor body mechanics  Understanding of Dx/Px/POC: good   Prognosis: fair    Goals  STG - 4 wks  1) pt will demonstrate BUE 4-/5 strength  2) pt will report no difficulty with light ADLs  3) pt will improve pain 25%    LTG - 8 wks  1) pt will demonstrate BUE 4/5 strength  2) pt will demonstrate 30 deg B/L c/s rotation AROM  3) pt will improve pain levels 50%    Plan  Planned modality interventions: low level laser therapy  Planned therapy interventions: manual therapy, body mechanics training, neuromuscular re-education, patient education, postural training, strengthening, stretching, therapeutic activities, therapeutic exercise, flexibility, functional ROM exercises and home exercise program  Frequency: 2x week  Duration in weeks: 8        Subjective Evaluation    History of Present Illness  Mechanism of injury: Pt is a 66 y o  female with PMHx significant for depression, anxiety, HTN, R CTS  She presents to PT s/p C1/2 fusion 21 secondary to non-union odontoid fx  She reports constant R>L upper c/s pain and L>R UE weakness but denies radicular symptoms, dropping items and notes improved balance compared to pre-op  She reports recent c/s collar d/c after 3 mos  Pain  Current pain ratin  At best pain ratin  At worst pain ratin  Relieving factors: rest and support  Aggravating factors: overhead activity and lifting  Progression: improved    Hand dominance: right      Diagnostic Tests  X-ray: abnormal (stable C1/2 fixation)  Treatments  Previous treatment: physical therapy, immobilization, injection treatment and medication  Current treatment: medication  Patient Goals  Patient goals for therapy: decreased pain, increased motion, increased strength and independence with ADLs/IADLs          Objective     Postural Observations  Seated posture: poor  Standing posture: poor  Correction of posture: has no consistent effect    Additional Postural Observation Details  Pt unable to maintain unsupported postural correction > 30 seconds    Palpation   Left   Hypertonic in the cervical paraspinals, levator scapulae, scalenes, suboccipitals and upper trapezius  Tenderness of the scalenes, suboccipitals and upper trapezius  Right   Hypertonic in the cervical paraspinals, levator scapulae, scalenes, suboccipitals and upper trapezius  Tenderness of the scalenes, suboccipitals and upper trapezius  Active Range of Motion   Cervical/Thoracic Spine       Cervical  Subcranial protraction:   Restriction level: minimal  Subcranial retraction: Active cervical subcranial retraction: unable to perform     Restriction level: maximal  Flexion:  Restriction level: maximal  Extension:  Restriction level: moderate  Left lateral flexion:  Restriction level: maximal  Right lateral flexion:  Restriction level maximal  Left rotation:  Restriction level: maximal  Right rotation:  Restriction level: maximal    Joint Play     Comments: Deferred    Strength/Myotome Testing   Cervical Spine   Neck extension: 3+  Neck flexion: 3-    Left   Interossei strength (t1): 3+  Neck lateral flexion (C3): 3+    Right   Interossei strength (t1): 4-  Neck lateral flexion (C3): 3+    Left Shoulder     Planes of Motion   Flexion: 3+   Abduction: 3+   External rotation at 0°: 3+     Right Shoulder     Planes of Motion   Flexion: 3+   Abduction: 3+   External rotation at 0°: 4-     Left Elbow   Flexion: 4-  Extension: 3    Right Elbow   Flexion: 4-  Extension: 3    Left Wrist/Hand   Wrist extension: 4-  Wrist flexion: 4-  Thumb extension: 3+    Right Wrist/Hand   Wrist extension: 4-  Wrist flexion: 3+  Thumb extension: 4-             Precautions:  PMHx: depression, CTS, anxiety, HTN  Avoid upper c/s flexion and extension end ROM  Dx: odontoid fx non-union s/p C1/2 fusion 5/25/21    Manuals 9/1            Gentle c/s PROM: B SB > ROT             Graston B UT, suboccipitals 10 min                                      Neuro Re-Ed             Seated posture correction 1 min            Seated scap retraction 5"x10            TB rows                                                                 Ther Ex             Seated B c/s rotation AROM 1x10            Seated B c/s SB AROM 1x10            TB triceps extension YTB  2x10            Pulleys: FF, ABD                                                                 Ther Activity                                       Gait Training                                       Modalities

## 2021-09-08 ENCOUNTER — OFFICE VISIT (OUTPATIENT)
Dept: PHYSICAL THERAPY | Facility: CLINIC | Age: 71
End: 2021-09-08
Payer: MEDICARE

## 2021-09-08 DIAGNOSIS — M43.22 CERVICAL VERTEBRAL FUSION: Primary | ICD-10-CM

## 2021-09-08 PROCEDURE — 97112 NEUROMUSCULAR REEDUCATION: CPT

## 2021-09-08 PROCEDURE — 97140 MANUAL THERAPY 1/> REGIONS: CPT

## 2021-09-08 PROCEDURE — 97110 THERAPEUTIC EXERCISES: CPT

## 2021-09-08 NOTE — PROGRESS NOTES
Daily Note     Today's date: 2021  Patient name: Adriel Bell  : 1950  MRN: [id number]  Referring provider: Radha Winters  Dx:   Encounter Diagnosis     ICD-10-CM    1  Cervical vertebral fusion  M43 22                   Subjective: Pt reports feeling mostly stiff along cervical spine  Some pain/soreness present along b/l UT and suboccipital musculature  Objective: See treatment diary below      Assessment: Tolerated treatment well  Initiated POC as outlined below  Visible limitations in available cervical ROM noted  Moderate levels of increased tone along b/l UT and suboccipital regions, L being more restricted than R  Patient would benefit from continued PT to further improve strength, increase available ROM, and maximize overall function  Plan: Continue per plan of care  Monitor response to initial treatment NV  Precautions:  PMHx: depression, CTS, anxiety, HTN  Avoid upper c/s flexion and extension end ROM  Dx: odontoid fx non-union s/p C1/2 fusion 21    Manuals            Gentle c/s PROM: B SB > ROT  5 min           Graston B UT, suboccipitals 10 min 10 min                                     Neuro Re-Ed             Seated posture correction 1 min 1 min           Seated scap retraction 5"x10 5"x10           TB rows  YTB  2x10                                                               Ther Ex             Seated B c/s rotation AROM 1x10 1x10           Seated B c/s SB AROM 1x10 1x10           TB triceps extension YTB  2x10 YTB  2x10           Pulleys: FF, ABD  5"x10 ea                                                               Ther Activity                                       Gait Training                                       Modalities

## 2021-09-10 ENCOUNTER — OFFICE VISIT (OUTPATIENT)
Dept: PHYSICAL THERAPY | Facility: CLINIC | Age: 71
End: 2021-09-10
Payer: MEDICARE

## 2021-09-10 DIAGNOSIS — M43.22 CERVICAL VERTEBRAL FUSION: Primary | ICD-10-CM

## 2021-09-10 PROCEDURE — 97110 THERAPEUTIC EXERCISES: CPT | Performed by: PHYSICAL THERAPIST

## 2021-09-10 PROCEDURE — 97112 NEUROMUSCULAR REEDUCATION: CPT | Performed by: PHYSICAL THERAPIST

## 2021-09-10 NOTE — PROGRESS NOTES
Daily Note     Today's date: 9/10/2021  Patient name: Chiqui Christianson  : 1950  MRN: [id number]  Referring provider: Zana Warren  Dx:   Encounter Diagnosis     ICD-10-CM    1  Cervical vertebral fusion  M43 22                   Subjective: Pt reports improved pain and stiffness following last visit  Objective: See treatment diary below    Assessment: Pt demonstrated improved tolerance to ROM, mildly improved postural awareness  Plan: Cont  POC  Precautions:  PMHx: depression, CTS, anxiety, HTN  Avoid upper c/s flexion and extension end ROM  Dx: odontoid fx non-union s/p C1/2 fusion 21    Manuals 9/1 9/8 9/10          Gentle c/s PROM: B SB > ROT  5 min 4 min          Graston B UT, suboccipitals 10 min 10 min 10 min                                    Neuro Re-Ed             Seated posture correction 1 min 1 min 2 min          Seated scap retraction 5"x10 5"x10 5"x15          TB rows  YTB  2x10 YTB  3x10                                                              Ther Ex             Seated B c/s rotation AROM 1x10 1x10 1x10          Seated B c/s SB AROM 1x10 1x10 1x10          TB triceps extension YTB  2x10 YTB  2x10 YTB  3x10          Pulleys: FF, ABD  5"x10 ea           Pulleys: scaption   5"x10 ea                                                 Ther Activity                                       Gait Training                                       Modalities

## 2021-09-14 ENCOUNTER — OFFICE VISIT (OUTPATIENT)
Dept: PHYSICAL THERAPY | Facility: CLINIC | Age: 71
End: 2021-09-14
Payer: MEDICARE

## 2021-09-14 DIAGNOSIS — M43.22 CERVICAL VERTEBRAL FUSION: Primary | ICD-10-CM

## 2021-09-14 PROCEDURE — 97110 THERAPEUTIC EXERCISES: CPT

## 2021-09-14 PROCEDURE — 97140 MANUAL THERAPY 1/> REGIONS: CPT

## 2021-09-14 PROCEDURE — 97112 NEUROMUSCULAR REEDUCATION: CPT

## 2021-09-14 NOTE — PROGRESS NOTES
Daily Note     Today's date: 2021  Patient name: Adriel Bell  : 1950  MRN: [id number]  Referring provider: Radha Winters  Dx:   Encounter Diagnosis     ICD-10-CM    1  Cervical vertebral fusion  M43 22        Start Time: 1100  Stop Time: 1138  Total time in clinic (min): 38 minutes    Subjective: Patient has no complaints of pain  Notes stiffness prior to therapy  L > R stiffness  Objective: See treatment diary below    Assessment: Patient has significant UT muscular tone L > R  Verbal cueing to perform TE with the proper form  Continue as per PT POC  Plan: Cont  POC  Precautions:  PMHx: depression, CTS, anxiety, HTN  Avoid upper c/s flexion and extension end ROM  Dx: odontoid fx non-union s/p C1/2 fusion 21    Manuals 9/1 9/8 9/10 9/14         Gentle c/s PROM: B SB > ROT  5 min 4 min 5 min         Graston B UT, suboccipitals 10 min 10 min 10 min 10 min                                   Neuro Re-Ed             Seated posture correction 1 min 1 min 2 min 2 min         Seated scap retraction 5"x10 5"x10 5"x15 5"x15         TB rows  YTB  2x10 YTB  3x10 YTB  3x10                                                             Ther Ex             Seated B c/s rotation AROM 1x10 1x10 1x10 1x10         Seated B c/s SB AROM 1x10 1x10 1x10 1x10         TB triceps extension YTB  2x10 YTB  2x10 YTB  3x10 YTB  3x10         Pulleys: FF, ABD  5"x10 ea  5"x10 ea         Pulleys: scaption   5"x10 ea 5"x10 ea                                                Ther Activity                                       Gait Training                                       Modalities

## 2021-09-17 ENCOUNTER — OFFICE VISIT (OUTPATIENT)
Dept: PHYSICAL THERAPY | Facility: CLINIC | Age: 71
End: 2021-09-17
Payer: MEDICARE

## 2021-09-17 DIAGNOSIS — M43.22 CERVICAL VERTEBRAL FUSION: Primary | ICD-10-CM

## 2021-09-17 PROCEDURE — 97110 THERAPEUTIC EXERCISES: CPT

## 2021-09-17 PROCEDURE — 97140 MANUAL THERAPY 1/> REGIONS: CPT

## 2021-09-17 PROCEDURE — 97112 NEUROMUSCULAR REEDUCATION: CPT

## 2021-09-17 NOTE — PROGRESS NOTES
Daily Note     Today's date: 2021  Patient name: Rich Camacho  : 1950  MRN: [id number]  Referring provider: Trinity Mosqueda  Dx:   Encounter Diagnosis     ICD-10-CM    1  Cervical vertebral fusion  M43 22            1:1 with PTA AB 11:00-11:15  Subjective: Several hours of relief post IASTM  Main complaint is stiffness with occasional left sided " soreness "   Objective: See treatment diary below    Assessment: Carries tension throughout her UT's requiring frequent cues to relax throughout treatment  A/PROM deficits persist  Continue as per PT POC  Plan: Cont  POC  Precautions: PMHx: depression, CTS, anxiety, HTN  Avoid upper c/s flexion and extension end ROM  Dx: odontoid fx non-union s/p C1/2 fusion 21    Manuals 9/1 9/8 9/10 9/14 9/17        Gentle c/s PROM: B SB > ROT  5 min 4 min 5 min 5 min        Graston B UT, suboccipitals 10 min 10 min 10 min 10 min 10 min                                  Neuro Re-Ed             Seated posture correction 1 min 1 min 2 min 2 min 2 min        Seated scap retraction 5"x10 5"x10 5"x15 5"x15 5"x15        TB rows  YTB  2x10 YTB  3x10 YTB  3x10 YTB  3x10                                                            Ther Ex             Seated B c/s rotation AROM 1x10 1x10 1x10 1x10 1x10        Seated B c/s SB AROM 1x10 1x10 1x10 1x10 1x10        TB triceps extension YTB  2x10 YTB  2x10 YTB  3x10 YTB  3x10 YTB  3x10        Pulleys: FF, ABD  5"x10 ea  5"x10 ea 5"x10 ea  Pulleys: scaption   5"x10 ea 5"x10 ea 5"x10 ea                                                  Ther Activity                                       Gait Training                                       Modalities

## 2021-09-20 ENCOUNTER — OFFICE VISIT (OUTPATIENT)
Dept: OBGYN CLINIC | Facility: HOSPITAL | Age: 71
End: 2021-09-20
Payer: MEDICARE

## 2021-09-20 ENCOUNTER — HOSPITAL ENCOUNTER (OUTPATIENT)
Dept: RADIOLOGY | Facility: HOSPITAL | Age: 71
Discharge: HOME | End: 2021-09-20
Attending: ORTHOPAEDIC SURGERY
Payer: MEDICARE

## 2021-09-20 VITALS
WEIGHT: 128 LBS | HEART RATE: 74 BPM | BODY MASS INDEX: 22.68 KG/M2 | DIASTOLIC BLOOD PRESSURE: 69 MMHG | SYSTOLIC BLOOD PRESSURE: 131 MMHG | HEIGHT: 63 IN

## 2021-09-20 DIAGNOSIS — S12.9XXA FRACTURE OF CERVICAL VERTEBRAE, MULTIPLE, INITIAL ENCOUNTER (HCC): Primary | ICD-10-CM

## 2021-09-20 DIAGNOSIS — S12.9XXA FRACTURE OF CERVICAL VERTEBRAE, MULTIPLE, INITIAL ENCOUNTER (HCC): ICD-10-CM

## 2021-09-20 PROCEDURE — 72040 X-RAY EXAM NECK SPINE 2-3 VW: CPT

## 2021-09-20 PROCEDURE — 99213 OFFICE O/P EST LOW 20 MIN: CPT | Performed by: ORTHOPAEDIC SURGERY

## 2021-09-20 NOTE — PROGRESS NOTES
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 performed on 2021    ·  activity as tolerated  ·  over-the-counter anti-inflammatories as needed  ·  continue physical therapy  ·  avoid heavy lifting  ·  follow-up as needed      History of Present Illness  Carmen Peacock is a 66 y o  female who presents  For continued postoperative evaluation status post C1-2 posterior fusion for a closed odontoid fracture with nonunion which was performed on 2021  Patient has been very happy not wearing a C-collar over the past month has had no neck pain aside from slight stiffness  She continues to participate in physical therapy and remains active  Denies any new weakness or ambulatory dysfunction  No bowel or bladder dysfunction  No other complaints  Review of Systems  Pertinent items are noted in HPI  All other systems were reviewed and are negative  Physical Exam  /69   Pulse 74   Ht 5' 3" (1 6 m)   Wt 58 1 kg (128 lb)   BMI 22 67 kg/m²   Cons: Appears well  No apparent distress  Psych: Alert  Oriented x3  Mood and affect normal   Eyes: PERRLA, EOMI  Resp: Normal effort  No audible wheezing or stridor  CV: Palpable pulse  No discernable arrhythmia  No LE edema  Lymph:  No palpable cervical, axillary, or inguinal lymphadenopathy  Skin: Warm  No palpable masses  No visible lesions  Neuro: Normal muscle tone  Normal and symmetric DTR's         Cervical spine exam  ·  posterior cervical incision well healed  ·  negative Mabel bilaterally  ·  sensation intact C5-T1 bilaterally  ·  5/5 motor C5-T1 bilaterally  ·  walks without the assistance of devices  ·  upper extremity is warm and well perfused    Studies Reviewed   repeat AP and lateral radiographs of the cervical spine were obtained today and personally reviewed by myself and Dr Anish Rios  Reveals stable   Instrumentation with continued bony union  Procedures  No procedures today  Medical, Surgical, Family, and Social History  The patient's medical history, family history, and social history, were reviewed and updated as appropriate  Past Medical History:   Diagnosis Date    Anxiety     Arthritis     shoulder    Carpal tunnel syndrome     Depression     Disease of thyroid gland     Diverticulitis     HBP (high blood pressure)     Hyperlipidemia     Hypertension     Hypothyroid     Pneumonia         PONV (postoperative nausea and vomiting)     Psychiatric disorder     Depression    Sleep apnea     mild        Past Surgical History:   Procedure Laterality Date    APPENDECTOMY      BREAST BIOPSY Right     benign    BREAST CYST ASPIRATION Right     benign    CATARACT EXTRACTION Bilateral     CATARACT EXTRACTION, BILATERAL  2019    CERVICAL FUSION N/A 2021    Procedure: FUSION CERVICAL POSTERIOR C1-C2 with allograft and neuromonitoring);   Surgeon: Telly Cartwright MD;  Location: [facility];  Service: Orthopedics    CHOLECYSTECTOMY      COLONOSCOPY      COLONOSCOPY W/ BIOPSIES      EGD      HAND SURGERY Right     MAMMO (HISTORICAL) Bilateral 2018    MAMMO (HISTORICAL) Bilateral 10/27/2017    MANDIBLE SURGERY      WISDOM TOOTH EXTRACTION      X4       Family History   Problem Relation Age of Onset    Heart disease Mother     Hypertension Mother     Hyperlipidemia Mother     Heart failure Mother     Hypertension Sister        Social History     Occupational History    Not on file   Tobacco Use    Smoking status: Former Smoker     Quit date: 2004     Years since quittin 7    Smokeless tobacco: Never Used   Vaping Use    Vaping Use: Never used   Substance and Sexual Activity    Alcohol use: No     Comment: last drink 7 years ago  Drug use: No    Sexual activity: Not Currently       No Known Allergies      Current Outpatient Medications:     ALPRAZolam (XANAX) 0 5 mg tablet, Take 1 tablet (0 5 mg total) by mouth 2 (two) times a day as needed for anxiety, Disp: 60 tablet, Rfl: 3    aspirin (ECOTRIN LOW STRENGTH) 81 mg EC tablet, Take 81 mg by mouth daily, Disp: , Rfl:     atorvastatin (LIPITOR) 10 mg tablet, TAKE 1 TABLET BY MOUTH EVERY DAY AT BEDTIME, Disp: 90 tablet, Rfl: 3    baclofen 10 mg tablet, Take 1 tablet (10 mg total) by mouth 2 (two) times a day as needed for muscle spasms, Disp: 30 tablet, Rfl: 5    Calcium Carb-Cholecalciferol (CALCIUM 600+D3) 600-200 MG-UNIT TABS, Take 1 tablet by mouth daily, Disp: , Rfl:     escitalopram (LEXAPRO) 20 mg tablet, Take 1 tablet (20 mg total) by mouth daily, Disp: 30 tablet, Rfl: 5    gabapentin (NEURONTIN) 300 mg capsule, Take 1 capsule (300 mg total) by mouth 2 (two) times a day, Disp: 60 capsule, Rfl: 0    levothyroxine 100 mcg tablet, Take 1 tablet (100 mcg total) by mouth daily, Disp: 90 tablet, Rfl: 3    Multiple Vitamins-Minerals (CENTRUM ADULTS PO), Take 1 tablet by mouth daily, Disp: , Rfl:     naproxen (NAPROSYN) 500 mg tablet, TAKE 1 TABLET(500 MG) BY MOUTH TWICE DAILY AS NEEDED FOR MILD PAIN, Disp: 60 tablet, Rfl: 1    ramipril (ALTACE) 10 MG capsule, Take 1 capsule (10 mg total) by mouth 2 (two) times a day, Disp: 180 capsule, Rfl: 3    acetaminophen (TYLENOL) 500 mg tablet, Take 1 tablet (500 mg total) by mouth every 6 (six) hours as needed for mild pain (Patient not taking: Reported on 7/19/2021), Disp: 30 tablet, Rfl: 0    docusate sodium (COLACE) 100 mg capsule, Take 1 capsule (100 mg total) by mouth 2 (two) times a day (Patient not taking: Reported on 7/19/2021), Disp: 10 capsule, Rfl: 0    oxyCODONE (ROXICODONE) 5 mg immediate release tablet, Take 1 tablet (5 mg total) by mouth every 6 (six) hours as needed for severe painMax Daily Amount: 20 mg (Patient not taking: Reported on 7/19/2021), Disp: 24 tablet, Rfl: 0      Santy Diehl MD    Scribe Attestation    I,:   am acting as a scribe while in the presence of the attending physician :       I,:   personally performed the services described in this documentation    as scribed in my presence :

## 2021-09-21 ENCOUNTER — OFFICE VISIT (OUTPATIENT)
Dept: PHYSICAL THERAPY | Facility: CLINIC | Age: 71
End: 2021-09-21
Payer: MEDICARE

## 2021-09-21 DIAGNOSIS — M43.22 CERVICAL VERTEBRAL FUSION: Primary | ICD-10-CM

## 2021-09-21 PROCEDURE — 97110 THERAPEUTIC EXERCISES: CPT

## 2021-09-21 PROCEDURE — 97112 NEUROMUSCULAR REEDUCATION: CPT

## 2021-09-21 PROCEDURE — 97140 MANUAL THERAPY 1/> REGIONS: CPT

## 2021-09-21 NOTE — PROGRESS NOTES
Daily Note     Today's date: 2021  Patient name: Clovis Issa  : 1950  MRN: [id number]  Referring provider: Chiara Freitas  Dx:   Encounter Diagnosis     ICD-10-CM    1  Cervical vertebral fusion  M43 22                   Subjective: Patient reports "my Dr  discharged me yesterday but he said still do therapy "      Objective: See treatment diary below      Assessment: Tolerated treatment well  Able to perform all exercises outlined below with no increased pain  Patient required VC for relaxation during PROM  Patient demonstrated fatigue post treatment and would benefit from continued PT      Plan: Continue per plan of care  Precautions: PMHx: depression, CTS, anxiety, HTN  Avoid upper c/s flexion and extension end ROM  Dx: odontoid fx non-union s/p C1/2 fusion 21    Manuals 9/1 9/8 9/10 9/14 9/17 9/21       Gentle c/s PROM: B SB > ROT  5 min 4 min 5 min 5 min 5 min       Graston B UT, suboccipitals 10 min 10 min 10 min 10 min 10 min 10 min                                 Neuro Re-Ed            Seated posture correction 1 min 1 min 2 min 2 min 2 min 2 min        Seated scap retraction 5"x10 5"x10 5"x15 5"x15 5"x15 5"x15       TB rows  YTB  2x10 YTB  3x10 YTB  3x10 YTB  3x10 YTB 3x10                                                           Ther Ex             Seated B c/s rotation AROM 1x10 1x10 1x10 1x10 1x10 1x10       Seated B c/s SB AROM 1x10 1x10 1x10 1x10 1x10 1x10       TB triceps extension YTB  2x10 YTB  2x10 YTB  3x10 YTB  3x10 YTB  3x10 YTB 3x10       Pulleys: FF, ABD  5"x10 ea  5"x10 ea 5"x10 ea  5"x10 ea       Pulleys: scaption   5"x10 ea 5"x10 ea 5"x10 ea    5" x10 ea                                              Ther Activity                                       Gait Training                                       Modalities

## 2021-09-24 ENCOUNTER — OFFICE VISIT (OUTPATIENT)
Dept: PHYSICAL THERAPY | Facility: CLINIC | Age: 71
End: 2021-09-24
Payer: MEDICARE

## 2021-09-24 DIAGNOSIS — M43.22 CERVICAL VERTEBRAL FUSION: Primary | ICD-10-CM

## 2021-09-24 PROCEDURE — 97110 THERAPEUTIC EXERCISES: CPT

## 2021-09-24 PROCEDURE — 97140 MANUAL THERAPY 1/> REGIONS: CPT

## 2021-09-24 PROCEDURE — 97112 NEUROMUSCULAR REEDUCATION: CPT

## 2021-09-24 NOTE — PROGRESS NOTES
Daily Note     Today's date: 2021  Patient name: Opal Levi  : 1950  MRN: [id number]  Referring provider: Becca Singleton  Dx:   Encounter Diagnosis     ICD-10-CM    1  Cervical vertebral fusion  M43 22        Start Time: 1100  Stop Time: 1140  Total time in clinic (min): 40 minutes    Subjective: Patient reports "My muscles are still tight"  Objective: See treatment diary below    Assessment: Tolerated treatment well  Able to perform all exercises outlined below with no increased pain  Patient required VC for relaxation during PROM  Patient demonstrated fatigue post treatment and would benefit from continued PT    Plan: Continue per plan of care  Precautions: PMHx: depression, CTS, anxiety, HTN  Avoid upper c/s flexion and extension end ROM  Dx: odontoid fx non-union s/p C1/2 fusion 21    Manuals 9/1 9/8 9/10 9/14 9/17 9/21 9/24      Gentle c/s PROM: B SB > ROT  5 min 4 min 5 min 5 min 5 min       Graston B UT, suboccipitals 10 min 10 min 10 min 10 min 10 min 10 min 10 min                                Neuro Re-Ed           Seated posture correction 1 min 1 min 2 min 2 min 2 min 2 min  2 min      Seated scap retraction 5"x10 5"x10 5"x15 5"x15 5"x15 5"x15 5"x15      TB rows  YTB  2x10 YTB  3x10 YTB  3x10 YTB  3x10 YTB 3x10 YTB  3x10                                                          Ther Ex             Seated B c/s rotation AROM 1x10 1x10 1x10 1x10 1x10 1x10 1x10      Seated B c/s SB AROM 1x10 1x10 1x10 1x10 1x10 1x10 1x10      TB triceps extension YTB  2x10 YTB  2x10 YTB  3x10 YTB  3x10 YTB  3x10 YTB 3x10 YTB  3x10      Pulleys: FF, ABD  5"x10 ea  5"x10 ea 5"x10 ea  5"x10 ea 5"x10 ea      Pulleys: scaption   5"x10 ea 5"x10 ea 5"x10 ea    5" x10 ea 5"x10  ea                                             Ther Activity                                       Gait Training                                       Modalities

## 2021-09-28 ENCOUNTER — OFFICE VISIT (OUTPATIENT)
Dept: PHYSICAL THERAPY | Facility: CLINIC | Age: 71
End: 2021-09-28
Payer: MEDICARE

## 2021-09-28 DIAGNOSIS — M43.22 CERVICAL VERTEBRAL FUSION: Primary | ICD-10-CM

## 2021-09-28 PROCEDURE — 97110 THERAPEUTIC EXERCISES: CPT

## 2021-09-28 PROCEDURE — 97140 MANUAL THERAPY 1/> REGIONS: CPT

## 2021-09-28 PROCEDURE — 97112 NEUROMUSCULAR REEDUCATION: CPT

## 2021-09-28 NOTE — PROGRESS NOTES
Daily Note     Today's date: 2021  Patient name: Anusha Mata  : 1950  MRN: [id number]  Referring provider: Claudia Cohen  Dx:   Encounter Diagnosis     ICD-10-CM    1  Cervical vertebral fusion  M43 22        Start Time: 1100  Stop Time: 1140  Total time in clinic (min): 40 minutes    Subjective: Patient reports "I think my neck muscles tighten up after I sleep"  Objective: See treatment diary below    Assessment: Tolerated treatment well  Patient tends to guard and required cueing to relax her neck during PROM  Able to progress scapular strengthening this visit with no adverse effect  Patient demonstrated fatigue post treatment and would benefit from continued PT    Plan: Continue per plan of care  Precautions: PMHx: depression, CTS, anxiety, HTN  Avoid upper c/s flexion and extension end ROM  Dx: odontoid fx non-union s/p C1/2 fusion 21    Manuals 9/1 9/8 9/10 9/14 9/17 9/21 9/24 9/28     Gentle c/s PROM: B SB > ROT  5 min 4 min 5 min 5 min 5 min  5 min     Graston B UT, suboccipitals 10 min 10 min 10 min 10 min 10 min 10 min 10 min 10 min                               Neuro Re-Ed          Seated posture correction 1 min 1 min 2 min 2 min 2 min 2 min  2 min 2 min     Seated scap retraction 5"x10 5"x10 5"x15 5"x15 5"x15 5"x15 5"x15 5"x15     TB rows  YTB  2x10 YTB  3x10 YTB  3x10 YTB  3x10 YTB 3x10 YTB  3x10 RTB  2x10                                                         Ther Ex             Seated B c/s rotation AROM 1x10 1x10 1x10 1x10 1x10 1x10 1x10 1x10     Seated B c/s SB AROM 1x10 1x10 1x10 1x10 1x10 1x10 1x10 1x10     TB triceps extension YTB  2x10 YTB  2x10 YTB  3x10 YTB  3x10 YTB  3x10 YTB 3x10 YTB  3x10 RTB  2x10     Pulleys: FF, ABD  5"x10 ea  5"x10 ea 5"x10 ea  5"x10 ea 5"x10 ea 5"x10 ea     Pulleys: scaption   5"x10 ea 5"x10 ea 5"x10 ea    5" x10 ea 5"x10  ea 5"x10 ea                                            Ther Activity Gait Training                                       Modalities

## 2021-09-29 ENCOUNTER — APPOINTMENT (OUTPATIENT)
Dept: LAB | Facility: HOSPITAL | Age: 71
End: 2021-09-29
Attending: INTERNAL MEDICINE
Payer: MEDICARE

## 2021-09-29 DIAGNOSIS — Z00.00 MEDICARE ANNUAL WELLNESS VISIT, SUBSEQUENT: ICD-10-CM

## 2021-09-29 DIAGNOSIS — I10 HYPERTENSION, UNSPECIFIED TYPE: ICD-10-CM

## 2021-09-29 DIAGNOSIS — E55.9 VITAMIN D DEFICIENCY: ICD-10-CM

## 2021-09-29 DIAGNOSIS — E03.9 HYPOTHYROIDISM, UNSPECIFIED TYPE: ICD-10-CM

## 2021-09-29 DIAGNOSIS — Z11.59 NEED FOR HEPATITIS C SCREENING TEST: ICD-10-CM

## 2021-09-29 LAB
25(OH)D3 SERPL-MCNC: 51.8 NG/ML (ref 30–100)
ALBUMIN SERPL BCP-MCNC: 4.4 G/DL (ref 3.4–4.8)
ALP SERPL-CCNC: 68.4 U/L (ref 35–140)
ALT SERPL W P-5'-P-CCNC: 26 U/L (ref 5–54)
ANION GAP SERPL CALCULATED.3IONS-SCNC: 6 MMOL/L (ref 4–13)
AST SERPL W P-5'-P-CCNC: 31 U/L (ref 15–41)
BASOPHILS # BLD AUTO: 0.07 THOUSANDS/ΜL (ref 0–0.1)
BASOPHILS NFR BLD AUTO: 1 % (ref 0–1)
BILIRUB SERPL-MCNC: 0.61 MG/DL (ref 0.3–1.2)
BUN SERPL-MCNC: 16 MG/DL (ref 6–20)
CALCIUM SERPL-MCNC: 10.5 MG/DL (ref 8.4–10.2)
CHLORIDE SERPL-SCNC: 104 MMOL/L (ref 96–108)
CHOLEST SERPL-MCNC: 163 MG/DL
CO2 SERPL-SCNC: 30 MMOL/L (ref 22–33)
CREAT SERPL-MCNC: 1.01 MG/DL (ref 0.4–1.1)
EOSINOPHIL # BLD AUTO: 0.6 THOUSAND/ΜL (ref 0–0.61)
EOSINOPHIL NFR BLD AUTO: 9 % (ref 0–6)
ERYTHROCYTE [DISTWIDTH] IN BLOOD BY AUTOMATED COUNT: 13.1 % (ref 11.6–15.1)
GFR SERPL CREATININE-BSD FRML MDRD: 55 ML/MIN/1.73SQ M
GLUCOSE P FAST SERPL-MCNC: 120 MG/DL (ref 70–105)
HCT VFR BLD AUTO: 41.7 % (ref 34.8–46.1)
HDLC SERPL-MCNC: 66 MG/DL
HGB BLD-MCNC: 13.8 G/DL (ref 11.5–15.4)
IMM GRANULOCYTES # BLD AUTO: 0.01 THOUSAND/UL (ref 0–0.2)
IMM GRANULOCYTES NFR BLD AUTO: 0 % (ref 0–2)
LDLC SERPL CALC-MCNC: 83 MG/DL (ref 0–100)
LYMPHOCYTES # BLD AUTO: 1.27 THOUSANDS/ΜL (ref 0.6–4.47)
LYMPHOCYTES NFR BLD AUTO: 20 % (ref 14–44)
MCH RBC QN AUTO: 30.3 PG (ref 26.8–34.3)
MCHC RBC AUTO-ENTMCNC: 33.1 G/DL (ref 31.4–37.4)
MCV RBC AUTO: 92 FL (ref 82–98)
MONOCYTES # BLD AUTO: 0.58 THOUSAND/ΜL (ref 0.17–1.22)
MONOCYTES NFR BLD AUTO: 9 % (ref 4–12)
NEUTROPHILS # BLD AUTO: 3.9 THOUSANDS/ΜL (ref 1.85–7.62)
NEUTS SEG NFR BLD AUTO: 61 % (ref 43–75)
NONHDLC SERPL-MCNC: 97 MG/DL
PLATELET # BLD AUTO: 349 THOUSANDS/UL (ref 149–390)
PMV BLD AUTO: 10 FL (ref 8.9–12.7)
POTASSIUM SERPL-SCNC: 4.1 MMOL/L (ref 3.5–5)
PROT SERPL-MCNC: 7.1 G/DL (ref 6.4–8.3)
RBC # BLD AUTO: 4.55 MILLION/UL (ref 3.81–5.12)
SODIUM SERPL-SCNC: 140 MMOL/L (ref 133–145)
TRIGL SERPL-MCNC: 68.8 MG/DL
TSH SERPL DL<=0.05 MIU/L-ACNC: 0.19 UIU/ML (ref 0.34–5.6)
WBC # BLD AUTO: 6.43 THOUSAND/UL (ref 4.31–10.16)

## 2021-09-29 PROCEDURE — 80061 LIPID PANEL: CPT

## 2021-09-29 PROCEDURE — 36415 COLL VENOUS BLD VENIPUNCTURE: CPT

## 2021-09-29 PROCEDURE — 84443 ASSAY THYROID STIM HORMONE: CPT

## 2021-09-29 PROCEDURE — 86803 HEPATITIS C AB TEST: CPT

## 2021-09-29 PROCEDURE — 85025 COMPLETE CBC W/AUTO DIFF WBC: CPT

## 2021-09-29 PROCEDURE — 80053 COMPREHEN METABOLIC PANEL: CPT

## 2021-09-29 PROCEDURE — 82306 VITAMIN D 25 HYDROXY: CPT

## 2021-09-30 ENCOUNTER — TELEPHONE (OUTPATIENT)
Dept: FAMILY MEDICINE CLINIC | Facility: CLINIC | Age: 71
End: 2021-09-30

## 2021-09-30 DIAGNOSIS — F41.9 ANXIETY: Primary | ICD-10-CM

## 2021-09-30 DIAGNOSIS — E03.9 HYPOTHYROIDISM, UNSPECIFIED TYPE: ICD-10-CM

## 2021-09-30 DIAGNOSIS — E03.9 HYPOTHYROIDISM, UNSPECIFIED TYPE: Primary | ICD-10-CM

## 2021-09-30 LAB — HCV AB SER QL: NORMAL

## 2021-09-30 RX ORDER — LEVOTHYROXINE SODIUM 88 UG/1
TABLET ORAL
Qty: 90 TABLET | Refills: 0 | Status: SHIPPED | OUTPATIENT
Start: 2021-09-30 | End: 2022-01-21

## 2021-09-30 RX ORDER — DESVENLAFAXINE 50 MG/1
50 TABLET, EXTENDED RELEASE ORAL DAILY
Qty: 30 TABLET | Refills: 1 | Status: SHIPPED | OUTPATIENT
Start: 2021-09-30 | End: 2021-12-13

## 2021-09-30 RX ORDER — LEVOTHYROXINE SODIUM 88 UG/1
88 TABLET ORAL DAILY
Qty: 30 TABLET | Refills: 2 | Status: SHIPPED | OUTPATIENT
Start: 2021-09-30 | End: 2021-09-30

## 2021-09-30 NOTE — TELEPHONE ENCOUNTER
She's on prn Xanax already  Larayne Chroman Larayne Chroman I don't know if she wants something on a daily basis, like ssri

## 2021-09-30 NOTE — TELEPHONE ENCOUNTER
Ok I guess we can do pristiq 50 mg 1 po daily-she should wean off of the 20 mg of lexapro-go down to 10 mg daily for 3-4 days, then 5 mg daily for 3-4 days then start pristiq

## 2021-09-30 NOTE — TELEPHONE ENCOUNTER
Patient wants to know if theres something we can give her for her nerves    She says with everything going on in her life she can use something to calm her down     ermias mckeon

## 2021-10-01 ENCOUNTER — OFFICE VISIT (OUTPATIENT)
Dept: PHYSICAL THERAPY | Facility: CLINIC | Age: 71
End: 2021-10-01
Payer: MEDICARE

## 2021-10-01 DIAGNOSIS — M43.22 CERVICAL VERTEBRAL FUSION: Primary | ICD-10-CM

## 2021-10-01 PROCEDURE — 97110 THERAPEUTIC EXERCISES: CPT | Performed by: PHYSICAL THERAPIST

## 2021-10-01 PROCEDURE — 97140 MANUAL THERAPY 1/> REGIONS: CPT | Performed by: PHYSICAL THERAPIST

## 2021-10-01 PROCEDURE — 97112 NEUROMUSCULAR REEDUCATION: CPT | Performed by: PHYSICAL THERAPIST

## 2021-10-08 ENCOUNTER — OFFICE VISIT (OUTPATIENT)
Dept: PHYSICAL THERAPY | Facility: CLINIC | Age: 71
End: 2021-10-08
Payer: MEDICARE

## 2021-10-08 DIAGNOSIS — M43.22 CERVICAL VERTEBRAL FUSION: Primary | ICD-10-CM

## 2021-10-08 PROCEDURE — 97140 MANUAL THERAPY 1/> REGIONS: CPT

## 2021-10-08 PROCEDURE — 97112 NEUROMUSCULAR REEDUCATION: CPT

## 2021-10-08 PROCEDURE — 97110 THERAPEUTIC EXERCISES: CPT

## 2021-10-13 ENCOUNTER — OFFICE VISIT (OUTPATIENT)
Dept: PHYSICAL THERAPY | Facility: CLINIC | Age: 71
End: 2021-10-13
Payer: MEDICARE

## 2021-10-13 DIAGNOSIS — M43.22 CERVICAL VERTEBRAL FUSION: Primary | ICD-10-CM

## 2021-10-13 PROCEDURE — 97112 NEUROMUSCULAR REEDUCATION: CPT

## 2021-10-13 PROCEDURE — 97110 THERAPEUTIC EXERCISES: CPT

## 2021-10-13 PROCEDURE — 97140 MANUAL THERAPY 1/> REGIONS: CPT

## 2021-10-15 ENCOUNTER — EVALUATION (OUTPATIENT)
Dept: PHYSICAL THERAPY | Facility: CLINIC | Age: 71
End: 2021-10-15
Payer: MEDICARE

## 2021-10-15 DIAGNOSIS — M43.22 CERVICAL VERTEBRAL FUSION: Primary | ICD-10-CM

## 2021-10-15 PROCEDURE — 97112 NEUROMUSCULAR REEDUCATION: CPT | Performed by: PHYSICAL THERAPIST

## 2021-10-15 PROCEDURE — 97110 THERAPEUTIC EXERCISES: CPT | Performed by: PHYSICAL THERAPIST

## 2021-10-15 PROCEDURE — 97140 MANUAL THERAPY 1/> REGIONS: CPT | Performed by: PHYSICAL THERAPIST

## 2021-10-26 ENCOUNTER — TELEPHONE (OUTPATIENT)
Dept: FAMILY MEDICINE CLINIC | Facility: CLINIC | Age: 71
End: 2021-10-26

## 2021-10-26 DIAGNOSIS — M54.2 NECK PAIN: Primary | ICD-10-CM

## 2021-10-26 RX ORDER — ESCITALOPRAM OXALATE 20 MG/1
20 TABLET ORAL DAILY
COMMUNITY
Start: 2021-10-15 | End: 2022-01-21

## 2021-10-28 DIAGNOSIS — M50.30 DDD (DEGENERATIVE DISC DISEASE), CERVICAL: ICD-10-CM

## 2021-10-28 DIAGNOSIS — Z76.0 MEDICATION REFILL: ICD-10-CM

## 2021-10-28 RX ORDER — NAPROXEN 500 MG/1
TABLET ORAL
Qty: 60 TABLET | Refills: 1 | Status: SHIPPED | OUTPATIENT
Start: 2021-10-28 | End: 2021-12-27

## 2021-11-12 DIAGNOSIS — F41.9 ANXIETY: ICD-10-CM

## 2021-11-12 RX ORDER — ALPRAZOLAM 0.5 MG/1
0.5 TABLET ORAL 2 TIMES DAILY PRN
Qty: 60 TABLET | Refills: 3 | Status: SHIPPED | OUTPATIENT
Start: 2021-11-12 | End: 2022-04-12 | Stop reason: SDUPTHER

## 2021-12-11 DIAGNOSIS — F41.9 ANXIETY: ICD-10-CM

## 2021-12-13 RX ORDER — DESVENLAFAXINE 50 MG/1
TABLET, EXTENDED RELEASE ORAL
Qty: 30 TABLET | Refills: 1 | Status: SHIPPED | OUTPATIENT
Start: 2021-12-13 | End: 2022-01-21

## 2021-12-15 ENCOUNTER — OFFICE VISIT (OUTPATIENT)
Dept: FAMILY MEDICINE CLINIC | Facility: CLINIC | Age: 71
End: 2021-12-15
Payer: MEDICARE

## 2021-12-15 VITALS
BODY MASS INDEX: 23.41 KG/M2 | OXYGEN SATURATION: 96 % | HEART RATE: 76 BPM | WEIGHT: 132.13 LBS | SYSTOLIC BLOOD PRESSURE: 118 MMHG | HEIGHT: 63 IN | DIASTOLIC BLOOD PRESSURE: 70 MMHG | RESPIRATION RATE: 16 BRPM | TEMPERATURE: 97.3 F

## 2021-12-15 DIAGNOSIS — I10 PRIMARY HYPERTENSION: ICD-10-CM

## 2021-12-15 DIAGNOSIS — F41.9 ANXIETY AND DEPRESSION: ICD-10-CM

## 2021-12-15 DIAGNOSIS — F41.9 ANXIETY: ICD-10-CM

## 2021-12-15 DIAGNOSIS — F32.A ANXIETY AND DEPRESSION: ICD-10-CM

## 2021-12-15 DIAGNOSIS — R13.10 DYSPHAGIA, UNSPECIFIED TYPE: ICD-10-CM

## 2021-12-15 DIAGNOSIS — F41.9 ANXIETY AND DEPRESSION: Primary | ICD-10-CM

## 2021-12-15 DIAGNOSIS — F32.A ANXIETY AND DEPRESSION: Primary | ICD-10-CM

## 2021-12-15 DIAGNOSIS — K21.9 GASTROESOPHAGEAL REFLUX DISEASE WITHOUT ESOPHAGITIS: ICD-10-CM

## 2021-12-15 PROCEDURE — 99214 OFFICE O/P EST MOD 30 MIN: CPT | Performed by: INTERNAL MEDICINE

## 2021-12-15 RX ORDER — OMEPRAZOLE 20 MG/1
20 CAPSULE, DELAYED RELEASE ORAL
Qty: 30 CAPSULE | Refills: 5 | Status: SHIPPED | OUTPATIENT
Start: 2021-12-15 | End: 2021-12-15

## 2021-12-15 RX ORDER — ESCITALOPRAM OXALATE 10 MG/1
10 TABLET ORAL DAILY
Qty: 30 TABLET | Refills: 5 | Status: SHIPPED | OUTPATIENT
Start: 2021-12-15 | End: 2021-12-15

## 2021-12-15 RX ORDER — ESCITALOPRAM OXALATE 10 MG/1
TABLET ORAL
Qty: 90 TABLET | Refills: 0 | Status: SHIPPED | OUTPATIENT
Start: 2021-12-15 | End: 2022-03-28

## 2021-12-15 RX ORDER — OMEPRAZOLE 20 MG/1
CAPSULE, DELAYED RELEASE ORAL
Qty: 90 CAPSULE | Refills: 0 | Status: SHIPPED | OUTPATIENT
Start: 2021-12-15 | End: 2022-04-12

## 2021-12-27 DIAGNOSIS — Z76.0 MEDICATION REFILL: ICD-10-CM

## 2021-12-27 DIAGNOSIS — M50.30 DDD (DEGENERATIVE DISC DISEASE), CERVICAL: ICD-10-CM

## 2021-12-27 DIAGNOSIS — E03.9 HYPOTHYROIDISM, UNSPECIFIED TYPE: ICD-10-CM

## 2021-12-27 RX ORDER — ATORVASTATIN CALCIUM 10 MG/1
TABLET, FILM COATED ORAL
Qty: 90 TABLET | Refills: 3 | Status: SHIPPED | OUTPATIENT
Start: 2021-12-27 | End: 2022-01-21 | Stop reason: SDUPTHER

## 2021-12-27 RX ORDER — NAPROXEN 500 MG/1
TABLET ORAL
Qty: 60 TABLET | Refills: 1 | Status: SHIPPED | OUTPATIENT
Start: 2021-12-27 | End: 2021-12-29 | Stop reason: SDUPTHER

## 2021-12-27 RX ORDER — ATORVASTATIN CALCIUM 10 MG/1
TABLET, FILM COATED ORAL
Qty: 90 TABLET | Refills: 3 | Status: SHIPPED | OUTPATIENT
Start: 2021-12-27

## 2021-12-27 RX ORDER — LEVOTHYROXINE SODIUM 0.1 MG/1
TABLET ORAL
Qty: 90 TABLET | Refills: 3 | Status: SHIPPED | OUTPATIENT
Start: 2021-12-27

## 2021-12-29 ENCOUNTER — TELEPHONE (OUTPATIENT)
Dept: FAMILY MEDICINE CLINIC | Facility: CLINIC | Age: 71
End: 2021-12-29

## 2022-01-09 NOTE — PROGRESS NOTES
[facility]  - Outpatient Follow-up Note  Maribel Macdonald 66 y o  female MRN: [id number]  Encounter: [id number]          ASSESSMENT AND PLAN:      1  Gastroesophageal reflux disease, unspecified whether esophagitis present  Patient reports worsening reflux, belching, intermittent dysphagia with solids over the last 2 months, symptoms resolved with the initiation of Prilosec 20 mg daily given by PCP  We will continue this and schedule EGD for further evaluation  Prep and procedure explained  Last EGD was in 2012 showing 2 antral ulcers and mild reflux esophagitis  Anti-reflux diet handout given  -     EGD; Future; Expected date: 01/10/2022    2  Diarrhea, unspecified type  3  Lower abdominal pain  4  Colon cancer screening  Patient reports moving her bowels daily morning, but then having to her bowels again urgently in the later mornings or afternoons and this is usually associated with some abdominal pain  She drinks 2-4 cups of coffee daily, discussed trying to decrease this as this may help with her symptoms  Also asked patient to follow up with PCP regarding her thyroid disease as well as last TSH was low and this may contribute to diarrhea as well  She should continue high fiber diet due to underlying history of diverticulosis, interna hemorrhoids  Abdominal pain associated with diarrhea suggestive of IBS, but will order colonoscopy along with EGD to exclude other underlying etiologies  OTC Miralax/dulcolax prep and procedure explained  -     Colonoscopy; Future; Expected date: 01/10/2022      ______________________________________________________________________    SUBJECTIVE:  Maribel Macdonald is a 66 y o  female known to Dr Sydnie Tolliver  She has a history of NSAID induced PUD, GERD, diverticulitis, internal hemorrhoids, and was last seen in our office in 2016  She presents today due to trouble swallowing and increased belching   Symptoms started 2 months ago with belching all the time and frequent heartburn, then noticed trouble swallowing about twice a week-meat, salad, spaghetti  She feels food getting stuck in the chest, and has to drink water to help it down  No coughing, choking, or trouble swallowing liquids  Recently has had some neck issues and underwent cervical fusion in May  She was started on Prilosec 20mg by her PCP 12/15 which resolved all her symptoms  Last EGD was in August 2012 showing 2 superficial antral ulcers and mild reflux esophagitis  Biopsies were negative for H pylori and Bernard's esophagus but did so microscopic features of GERD in the lower esophagus  She moves her bowels daily, sometimes has abdominal pain followed by loose stool in the afternoon as well  Denies any incontinence, blood in stool, no unintended weight loss  Last colonoscopy was in 2012 revealing internal hemorrhoids and diverticulosis  She has a lot of fiber in her diet  Denies family history of colon cancer  Denies alcohol use, former tobacco use, denies drug use  REVIEW OF SYSTEMS IS OTHERWISE NEGATIVE  Historical Information   Past Medical History:   Diagnosis Date    Anxiety     Arthritis     shoulder    Carpal tunnel syndrome     Depression     Disease of thyroid gland     Diverticulitis     HBP (high blood pressure)     Hyperlipidemia     Hypertension     Hypothyroid     Pneumonia     2017    PONV (postoperative nausea and vomiting)     Psychiatric disorder     Depression    Sleep apnea     mild      Past Surgical History:   Procedure Laterality Date    APPENDECTOMY      BREAST BIOPSY Right     benign    BREAST CYST ASPIRATION Right     benign    CATARACT EXTRACTION Bilateral     CATARACT EXTRACTION, BILATERAL  2019    CERVICAL FUSION N/A 5/25/2021    Procedure: FUSION CERVICAL POSTERIOR C1-C2 with allograft and neuromonitoring);   Surgeon: Calos Minaya MD;  Location: [facility];  Service: Orthopedics    CHOLECYSTECTOMY      COLONOSCOPY      COLONOSCOPY W/ BIOPSIES      EGD      HAND SURGERY Right     MAMMO (HISTORICAL) Bilateral 2018    MAMMO (HISTORICAL) Bilateral 10/27/2017    MANDIBLE SURGERY      WISDOM TOOTH EXTRACTION      X4     Social History   Social History     Substance and Sexual Activity   Alcohol Use No    Comment: last drink 7 years ago     Social History     Substance and Sexual Activity   Drug Use No     Social History     Tobacco Use   Smoking Status Former Smoker    Quit date:     Years since quittin 0   Smokeless Tobacco Never Used     Family History   Problem Relation Age of Onset    Heart disease Mother     Hypertension Mother     Hyperlipidemia Mother     Heart failure Mother     Hypertension Sister        Meds/Allergies       Current Outpatient Medications:     acetaminophen (TYLENOL) 500 mg tablet    ALPRAZolam (XANAX) 0 5 mg tablet    aspirin (ECOTRIN LOW STRENGTH) 81 mg EC tablet    atorvastatin (LIPITOR) 10 mg tablet    atorvastatin (LIPITOR) 10 mg tablet    Calcium Carb-Cholecalciferol (CALCIUM 600+D3) 600-200 MG-UNIT TABS    escitalopram (LEXAPRO) 10 mg tablet    levothyroxine 100 mcg tablet    Multiple Vitamins-Minerals (CENTRUM ADULTS PO)    omeprazole (PriLOSEC) 20 mg delayed release capsule    ramipril (ALTACE) 10 MG capsule    baclofen 10 mg tablet    desvenlafaxine succinate (PRISTIQ) 50 mg 24 hr tablet    escitalopram (LEXAPRO) 20 mg tablet    gabapentin (NEURONTIN) 300 mg capsule    levothyroxine 88 mcg tablet    naproxen (NAPROSYN) 500 mg tablet    oxyCODONE (ROXICODONE) 5 mg immediate release tablet    No Known Allergies        Objective     Blood pressure 157/80, pulse 78, height 5' 3" (1 6 m), weight 59 9 kg (132 lb)  Body mass index is 23 38 kg/m²  PHYSICAL EXAM:      General Appearance:   Alert, cooperative, no distress   HEENT:   Normocephalic, atraumatic, anicteric       Neck:  Supple, symmetrical, trachea midline   Lungs:   Clear to auscultation bilaterally; no rales, rhonchi or wheezing; respirations unlabored    Heart[de-identified]   Regular rate and rhythm; no murmur  Abdomen:   Soft, non-tender, non-distended; normal bowel sounds; no masses, no organomegaly    Genitalia:   Deferred    Rectal:   Deferred    Extremities:  No cyanosis, clubbing or edema    Skin:  No jaundice, rashes, or lesions    Lymph nodes:  No palpable cervical lymphadenopathy        Lab Results:   No visits with results within 1 Day(s) from this visit     Latest known visit with results is:   Appointment on 09/29/2021   Component Date Value    WBC 09/29/2021 6 43     RBC 09/29/2021 4 55     Hemoglobin 09/29/2021 13 8     Hematocrit 09/29/2021 41 7     MCV 09/29/2021 92     MCH 09/29/2021 30 3     MCHC 09/29/2021 33 1     RDW 09/29/2021 13 1     MPV 09/29/2021 10 0     Platelets 66/72/5031 349     Neutrophils Relative 09/29/2021 61     Immat GRANS % 09/29/2021 0     Lymphocytes Relative 09/29/2021 20     Monocytes Relative 09/29/2021 9     Eosinophils Relative 09/29/2021 9*    Basophils Relative 09/29/2021 1     Neutrophils Absolute 09/29/2021 3 90     Immature Grans Absolute 09/29/2021 0 01     Lymphocytes Absolute 09/29/2021 1 27     Monocytes Absolute 09/29/2021 0 58     Eosinophils Absolute 09/29/2021 0 60     Basophils Absolute 09/29/2021 0 07     Sodium 09/29/2021 140     Potassium 09/29/2021 4 1     Chloride 09/29/2021 104     CO2 09/29/2021 30     ANION GAP 09/29/2021 6     BUN 09/29/2021 16     Creatinine 09/29/2021 1 01     Glucose, Fasting 09/29/2021 120*    Calcium 09/29/2021 10 5*    AST 09/29/2021 31     ALT 09/29/2021 26     Alkaline Phosphatase 09/29/2021 68 4     Total Protein 09/29/2021 7 1     Albumin 09/29/2021 4 4     Total Bilirubin 09/29/2021 0 61     eGFR 09/29/2021 55     Cholesterol 09/29/2021 163     Triglycerides 09/29/2021 68 8     HDL, Direct 09/29/2021 66     LDL Calculated 09/29/2021 83     Non-HDL-Chol (CHOL-HDL) 09/29/2021 97     TSH 3RD [facility] 09/29/2021 0 187*    Hepatitis C Ab 09/29/2021 Non-reactive     Vit D, 25-Hydroxy 09/29/2021 51 8          Radiology Results:   No results found

## 2022-01-10 ENCOUNTER — OFFICE VISIT (OUTPATIENT)
Dept: GASTROENTEROLOGY | Facility: CLINIC | Age: 72
End: 2022-01-10
Payer: MEDICARE

## 2022-01-10 VITALS
WEIGHT: 132 LBS | HEIGHT: 63 IN | SYSTOLIC BLOOD PRESSURE: 157 MMHG | HEART RATE: 78 BPM | BODY MASS INDEX: 23.39 KG/M2 | DIASTOLIC BLOOD PRESSURE: 80 MMHG

## 2022-01-10 DIAGNOSIS — R10.30 LOWER ABDOMINAL PAIN: ICD-10-CM

## 2022-01-10 DIAGNOSIS — Z12.11 COLON CANCER SCREENING: ICD-10-CM

## 2022-01-10 DIAGNOSIS — R13.10 DYSPHAGIA, UNSPECIFIED TYPE: ICD-10-CM

## 2022-01-10 DIAGNOSIS — K21.9 GASTROESOPHAGEAL REFLUX DISEASE, UNSPECIFIED WHETHER ESOPHAGITIS PRESENT: Primary | ICD-10-CM

## 2022-01-10 DIAGNOSIS — R19.7 DIARRHEA, UNSPECIFIED TYPE: ICD-10-CM

## 2022-01-10 PROCEDURE — 99213 OFFICE O/P EST LOW 20 MIN: CPT | Performed by: NURSE PRACTITIONER

## 2022-01-10 NOTE — PATIENT INSTRUCTIONS
Irritable Bowel Syndrome, Ambulatory Care   GENERAL INFORMATION:   Irritable bowel syndrome (IBS)  is a condition that prevents food from moving through your intestines normally  The food may move through too slowly or too quickly  This causes bloating, increased gas, constipation, or diarrhea  Signs and symptoms of IBS may come and go  Symptoms can occur a few times a week to once a month  Your symptoms may worsen after you eat a big meal or if you do not eat enough healthy foods  Common symptoms include the following:   · Abdominal pain that disappears after you have a bowel movement    · Abdominal camps that are worse after you eat    · Gas    · Bloated abdomen    · Diarrhea, constipation, or both     · Feeling like you need to have a bowel movement after you just had one  Seek immediate care for the following symptoms:   · Severe abdominal pain    · Dark or bloody bowel movements  Treatment for IBS  may include diarrhea medicine or muscle relaxers  You may also need a bowel movement softener or laxative  Manage your symptoms:   · Keep a diary  of everything you eat and drink, and your symptoms, for 3 weeks  · Eat a variety of healthy foods  Healthy foods include fruits, vegetables, whole-grain breads, low-fat dairy products, beans, lean meats, and fish  Ask if you need to be on a special diet  · Drink liquids as directed  Ask how much liquid to drink each day and which liquids are best for you  Liquids will help prevent dehydration and soften your bowel movement  Follow up with your healthcare provider as directed:  Write down your questions so you remember to ask them during your visits  CARE AGREEMENT:   You have the right to help plan your care  Learn about your health condition and how it may be treated  Discuss treatment options with your caregivers to decide what care you want to receive  You always have the right to refuse treatment  The above information is an  only   It is not intended as medical advice for individual conditions or treatments  Talk to your doctor, nurse or pharmacist before following any medical regimen to see if it is safe and effective for you  © 2014 [address] is for End User's use only and may not be sold, redistributed or otherwise used for commercial purposes  All illustrations and images included in CareNotes® are the copyrighted property of A D A M [employer] Inc  or Diego Mcdaniel  GERD (Gastroesophageal Reflux Disease)   WHAT YOU NEED TO KNOW:   Gastroesophageal reflux disease (GERD) is reflux that occurs more than twice a week for a few weeks  Reflux means acid and food in the stomach back up into the esophagus  It usually causes heartburn and other symptoms  GERD can cause other health problems over time if it is not treated  DISCHARGE INSTRUCTIONS:   Call your local emergency number (911 in the [address] [facility]) if:   · You have severe chest pain and sudden trouble breathing  Return to the emergency department if:   · You have trouble breathing after you vomit  · You have trouble swallowing, or pain with swallowing  · Your bowel movements are black, bloody, or tarry-looking  · Your vomit looks like coffee grounds or has blood in it  Call your doctor or gastroenterologist if:   · You feel full and cannot burp or vomit  · You vomit large amounts, or you vomit often  · You are losing weight without trying  · Your symptoms get worse or do not improve with treatment  · You have questions or concerns about your condition or care  Medicines:   · Medicines  are used to decrease stomach acid  Medicine may also be used to help your lower esophageal sphincter and stomach contract (tighten) more  · Take your medicine as directed  Contact your healthcare provider if you think your medicine is not helping or if you have side effects  Tell him of her if you are allergic to any medicine   Keep a list of the medicines, vitamins, and herbs you take  Include the amounts, and when and why you take them  Bring the list or the pill bottles to follow-up visits  Carry your medicine list with you in case of an emergency  Manage GERD:       · Do not have foods or drinks that may increase heartburn  These include chocolate, peppermint, fried or fatty foods, drinks that contain caffeine, or carbonated drinks (soda)  Other foods include spicy foods, onions, tomatoes, and tomato-based foods  Do not have foods or drinks that can irritate your esophagus, such as citrus fruits, juices, and alcohol  · Do not eat large meals  When you eat a lot of food at one time, your stomach needs more acid to digest it  Eat 6 small meals each day instead of 3 large ones, and eat slowly  Do not eat meals 2 to 3 hours before bedtime  · Elevate the head of your bed  Place 6-inch blocks under the head of your bed frame  You may also use more than one pillow under your head and shoulders while you sleep  · Maintain a healthy weight  If you are overweight, weight loss may help relieve symptoms of GERD  · Do not smoke  Smoking weakens the lower esophageal sphincter and increases the risk of GERD  Ask your healthcare provider for information if you currently smoke and need help to quit  E-cigarettes or smokeless tobacco still contain nicotine  Talk to your healthcare provider before you use these products  · Do not wear clothing that is tight around your waist   Tight clothing can put pressure on your stomach and cause or worsen GERD symptoms  Follow up with your doctor or gastroenterologist as directed:  Write down your questions so you remember to ask them during your visits  © Copyright [employer] 2021 Information is for End User's use only and may not be sold, redistributed or otherwise used for commercial purposes   All illustrations and images included in CareNotes® are the copyrighted property of A D A M , Inc  or [facility]  The above information is an  only  It is not intended as medical advice for individual conditions or treatments  Talk to your doctor, nurse or pharmacist before following any medical regimen to see if it is safe and effective for you      Scheduled date of EGD/colonoscopy (as of today): January 24 2022  Physician performing EGD/colonoscopy: Dr Jenel Habermann  Location of EGD/colonoscopy: [address] [place] [address]  Desired bowel prep reviewed with patient: Miralax w/ Dulcolax  Instructions reviewed with patient by: Jose Del Toro

## 2022-01-11 ENCOUNTER — TELEPHONE (OUTPATIENT)
Dept: OBGYN CLINIC | Facility: OTHER | Age: 72
End: 2022-01-11

## 2022-01-13 ENCOUNTER — OFFICE VISIT (OUTPATIENT)
Dept: FAMILY MEDICINE CLINIC | Facility: CLINIC | Age: 72
End: 2022-01-13
Payer: MEDICARE

## 2022-01-13 VITALS
WEIGHT: 133 LBS | OXYGEN SATURATION: 98 % | DIASTOLIC BLOOD PRESSURE: 60 MMHG | RESPIRATION RATE: 16 BRPM | BODY MASS INDEX: 23.57 KG/M2 | TEMPERATURE: 98 F | SYSTOLIC BLOOD PRESSURE: 120 MMHG | HEART RATE: 76 BPM | HEIGHT: 63 IN

## 2022-01-13 DIAGNOSIS — E03.8 OTHER SPECIFIED HYPOTHYROIDISM: ICD-10-CM

## 2022-01-13 DIAGNOSIS — K21.9 GASTROESOPHAGEAL REFLUX DISEASE WITHOUT ESOPHAGITIS: ICD-10-CM

## 2022-01-13 DIAGNOSIS — F32.A ANXIETY AND DEPRESSION: Primary | ICD-10-CM

## 2022-01-13 DIAGNOSIS — Z23 23-POLYVALENT PNEUMOCOCCAL POLYSACCHARIDE VACCINE ADMINISTERED: ICD-10-CM

## 2022-01-13 DIAGNOSIS — F41.9 ANXIETY: ICD-10-CM

## 2022-01-13 DIAGNOSIS — Z12.11 COLON CANCER SCREENING: ICD-10-CM

## 2022-01-13 DIAGNOSIS — F41.9 ANXIETY AND DEPRESSION: Primary | ICD-10-CM

## 2022-01-13 DIAGNOSIS — I10 PRIMARY HYPERTENSION: ICD-10-CM

## 2022-01-13 PROCEDURE — 90732 PPSV23 VACC 2 YRS+ SUBQ/IM: CPT | Performed by: INTERNAL MEDICINE

## 2022-01-13 PROCEDURE — G0009 ADMIN PNEUMOCOCCAL VACCINE: HCPCS | Performed by: INTERNAL MEDICINE

## 2022-01-13 PROCEDURE — 99214 OFFICE O/P EST MOD 30 MIN: CPT | Performed by: INTERNAL MEDICINE

## 2022-01-13 NOTE — PROGRESS NOTES
Assessment/Plan:         Problem List Items Addressed This Visit        Digestive    Gastroesophageal reflux disease without esophagitis       Endocrine    Other specified hypothyroidism       Cardiovascular and Mediastinum    Primary hypertension     On ramipril            Other    Anxiety      Other Visit Diagnoses     Anxiety and depression    -  Primary    23-polyvalent pneumococcal polysaccharide vaccine administered        Relevant Orders    PNEUMOCOCCAL POLYSACCHARIDE VACCINE 23-VALENT =>3YO SQ IM    Colon cancer screening        Has colonoscopy and EGD in Jan 24th            Subjective:      Patient ID: Jasvir Carbone is a 66 y o  female  Patricia Ansari here with hx of HTN, GERD, HL, OA, cervical spine stenosis-bp good today, still has a lot of stress with her daughter-neck is doing ok-      The following portions of the patient's history were reviewed and updated as appropriate:   Past Medical History:  She has a past medical history of Anxiety, Arthritis, Carpal tunnel syndrome, Depression, Disease of thyroid gland, Diverticulitis, HBP (high blood pressure), Hyperlipidemia, Hypertension, Hypothyroid, Pneumonia, PONV (postoperative nausea and vomiting), Psychiatric disorder, and Sleep apnea ,  _______________________________________________________________________  Medical Problems:  does not have any pertinent problems on file ,  _______________________________________________________________________  Past Surgical History:   has a past surgical history that includes Breast cyst aspiration (Right); Breast biopsy (Right); Appendectomy; Cholecystectomy; Colonoscopy w/ biopsies (2011); Mandible surgery; [place] tooth extraction; EGD; Cataract extraction, bilateral (2019); Mammo (historical) (Bilateral, 11/27/2018); Mammo (historical) (Bilateral, 10/27/2017); Colonoscopy; Cataract extraction (Bilateral);  Hand surgery (Right); and Cervical fusion (N/A, 5/25/2021)  ,  _______________________________________________________________________  Family History:  family history includes Heart disease in her mother; Heart failure in her mother; Hyperlipidemia in her mother; Hypertension in her mother and sister  ,  _______________________________________________________________________  Social History:   reports that she quit smoking about 18 years ago  She has never used smokeless tobacco  She reports that she does not drink alcohol and does not use drugs  ,  _______________________________________________________________________  Allergies:  has No Known Allergies     _______________________________________________________________________  Current Outpatient Medications   Medication Sig Dispense Refill    acetaminophen (TYLENOL) 500 mg tablet Take 1 tablet (500 mg total) by mouth every 6 (six) hours as needed for mild pain 30 tablet 0    ALPRAZolam (XANAX) 0 5 mg tablet Take 1 tablet (0 5 mg total) by mouth 2 (two) times a day as needed for anxiety 60 tablet 3    aspirin (ECOTRIN LOW STRENGTH) 81 mg EC tablet Take 81 mg by mouth daily      atorvastatin (LIPITOR) 10 mg tablet TAKE 1 TABLET BY MOUTH EVERY DAY AT BEDTIME 90 tablet 3    atorvastatin (LIPITOR) 10 mg tablet TAKE 1 TABLET BY MOUTH EVERY DAY AT BEDTIME 90 tablet 3    Calcium Carb-Cholecalciferol (CALCIUM 600+D3) 600-200 MG-UNIT TABS Take 1 tablet by mouth daily      escitalopram (LEXAPRO) 10 mg tablet TAKE 1 TABLET(10 MG) BY MOUTH DAILY 90 tablet 0    levothyroxine 100 mcg tablet TAKE 1 TABLET(100 MCG) BY MOUTH DAILY 90 tablet 3    Multiple Vitamins-Minerals (CENTRUM ADULTS PO) Take 1 tablet by mouth daily      naproxen (NAPROSYN) 500 mg tablet Take 1 tablet (500 mg total) by mouth 2 (two) times a day with meals 60 tablet 1    omeprazole (PriLOSEC) 20 mg delayed release capsule TAKE 1 CAPSULE(20 MG) BY MOUTH DAILY BEFORE AND BREAKFAST 90 capsule 0    ramipril (ALTACE) 10 MG capsule Take 1 capsule (10 mg total) by mouth 2 (two) times a day 180 capsule 3    baclofen 10 mg tablet Take 1 tablet (10 mg total) by mouth 2 (two) times a day as needed for muscle spasms (Patient not taking: Reported on 12/15/2021 ) 30 tablet 5    desvenlafaxine succinate (PRISTIQ) 50 mg 24 hr tablet TAKE 1 TABLET(50 MG) BY MOUTH DAILY (Patient not taking: Reported on 12/15/2021) 30 tablet 1    escitalopram (LEXAPRO) 20 mg tablet Take 20 mg by mouth daily   (Patient not taking: Reported on 1/10/2022 )      gabapentin (NEURONTIN) 300 mg capsule Take 1 capsule (300 mg total) by mouth 2 (two) times a day (Patient not taking: Reported on 1/10/2022 ) 60 capsule 0    levothyroxine 88 mcg tablet TAKE 1 TABLET(88 MCG) BY MOUTH DAILY (Patient not taking: Reported on 1/10/2022) 90 tablet 0    oxyCODONE (ROXICODONE) 5 mg immediate release tablet Take 1 tablet (5 mg total) by mouth every 6 (six) hours as needed for severe painMax Daily Amount: 20 mg (Patient not taking: Reported on 7/19/2021) 24 tablet 0     No current facility-administered medications for this visit      _______________________________________________________________________  Review of Systems   Constitutional: Negative for fatigue and fever  Respiratory: Negative  Musculoskeletal: Positive for arthralgias  Neurological: Negative  Hematological: Negative  Psychiatric/Behavioral: The patient is nervous/anxious  Objective:  Vitals:    01/13/22 1309   BP: 120/60   BP Location: Left arm   Patient Position: Sitting   Cuff Size: Adult   Pulse: 76   Resp: 16   Temp: 98 °F (36 7 °C)   TempSrc: Temporal   SpO2: 98%   Weight: 60 3 kg (133 lb)   Height: 5' 3" (1 6 m)     Body mass index is 23 56 kg/m²  Physical Exam  Constitutional:       Appearance: Normal appearance  HENT:      Head: Normocephalic and atraumatic        Right Ear: External ear normal       Left Ear: External ear normal       Mouth/Throat:      Mouth: Mucous membranes are moist    Cardiovascular: Rate and Rhythm: Normal rate and regular rhythm  Pulmonary:      Effort: Pulmonary effort is normal       Breath sounds: Normal breath sounds  Abdominal:      General: Abdomen is flat  Musculoskeletal:         General: Normal range of motion  Neurological:      Mental Status: She is alert     Psychiatric:         Mood and Affect: Mood normal          Behavior: Behavior normal

## 2022-01-14 ENCOUNTER — TELEPHONE (OUTPATIENT)
Dept: GASTROENTEROLOGY | Facility: CLINIC | Age: 72
End: 2022-01-14

## 2022-01-14 NOTE — TELEPHONE ENCOUNTER
Patient called because she was seen by TIFFANIE and needs a flip  She was told to call her ortho that did her neck surgery because she didn't want to be flipped around during procedure  I told her they move the stretcher, she stays in one position during whole procedure  She was okay with that  Didn't want to be in a neck brace for 3 months again

## 2022-01-21 ENCOUNTER — ANESTHESIA EVENT (OUTPATIENT)
Dept: GASTROENTEROLOGY | Facility: AMBULATORY SURGERY CENTER | Age: 72
End: 2022-01-21

## 2022-01-21 ENCOUNTER — OFFICE VISIT (OUTPATIENT)
Dept: FAMILY MEDICINE CLINIC | Facility: CLINIC | Age: 72
End: 2022-01-21
Payer: MEDICARE

## 2022-01-21 ENCOUNTER — HOSPITAL ENCOUNTER (OUTPATIENT)
Dept: RADIOLOGY | Facility: HOSPITAL | Age: 72
Discharge: HOME | DRG: 308 | End: 2022-01-21
Attending: INTERNAL MEDICINE
Payer: MEDICARE

## 2022-01-21 VITALS
HEIGHT: 63 IN | WEIGHT: 133 LBS | OXYGEN SATURATION: 96 % | SYSTOLIC BLOOD PRESSURE: 122 MMHG | HEART RATE: 85 BPM | RESPIRATION RATE: 16 BRPM | TEMPERATURE: 97 F | DIASTOLIC BLOOD PRESSURE: 80 MMHG | BODY MASS INDEX: 23.57 KG/M2

## 2022-01-21 DIAGNOSIS — M25.421 ELBOW SWELLING, RIGHT: ICD-10-CM

## 2022-01-21 DIAGNOSIS — M25.421 ELBOW SWELLING, RIGHT: Primary | ICD-10-CM

## 2022-01-21 DIAGNOSIS — N18.30 STAGE 3 CHRONIC KIDNEY DISEASE, UNSPECIFIED WHETHER STAGE 3A OR 3B CKD (HCC): ICD-10-CM

## 2022-01-21 PROCEDURE — 99214 OFFICE O/P EST MOD 30 MIN: CPT | Performed by: INTERNAL MEDICINE

## 2022-01-21 PROCEDURE — 73070 X-RAY EXAM OF ELBOW: CPT

## 2022-01-21 RX ORDER — ACETAMINOPHEN AND CODEINE PHOSPHATE 300; 30 MG/1; MG/1
1 TABLET ORAL 2 TIMES DAILY PRN
Qty: 30 TABLET | Refills: 0 | Status: SHIPPED | OUTPATIENT
Start: 2022-01-21 | End: 2022-01-24 | Stop reason: ALTCHOICE

## 2022-01-21 RX ORDER — COLCHICINE 0.6 MG/1
0.6 TABLET ORAL DAILY
Qty: 30 TABLET | Refills: 5 | Status: SHIPPED | OUTPATIENT
Start: 2022-01-21

## 2022-01-21 RX ORDER — NAPROXEN 500 MG/1
500 TABLET ORAL 2 TIMES DAILY WITH MEALS
Qty: 60 TABLET | Refills: 5 | Status: SHIPPED | OUTPATIENT
Start: 2022-01-21 | End: 2022-01-28 | Stop reason: HOSPADM

## 2022-01-21 NOTE — PROGRESS NOTES
Assessment/Plan:Josee with sudden onset right elbow swelling and redness and tenderness-could be gout,pseudogout, arthritic change, no obvious infection or infectious etiology-will cover with nsaids, tylenol # 3 prn, and colchicine-advised her to ice off and on-try to get an elbow splint-if elbow gets worse or bigger will need aspiration-Xray ordered as well         Problem List Items Addressed This Visit        Genitourinary    RESOLVED: Stage 3 chronic kidney disease, unspecified whether stage 3a or 3b CKD ([facility] [address] )      Other Visit Diagnoses     Elbow swelling, right    -  Primary    Relevant Orders    XR elbow 2 vw right            Subjective:      Patient ID: Jacinta Ordonez is a 66 y o  female  Jabari Ha started with swelling and heat and redness of her right elbow last night-didn't drink any alcohol or eat antything unusual but elbow is hot and slightly red and swollen and tender-no fever      The following portions of the patient's history were reviewed and updated as appropriate:   Past Medical History:  She has a past medical history of Anxiety, Arthritis, Carpal tunnel syndrome, Depression, Disease of thyroid gland, Diverticulitis, HBP (high blood pressure), Hyperlipidemia, Hypertension, Hypothyroid, Pneumonia, PONV (postoperative nausea and vomiting), Psychiatric disorder, and Sleep apnea ,  _______________________________________________________________________  Medical Problems:  does not have any pertinent problems on file ,  _______________________________________________________________________  Past Surgical History:   has a past surgical history that includes Breast cyst aspiration (Right); Breast biopsy (Right); Appendectomy; Cholecystectomy; Colonoscopy w/ biopsies (2011); Mandible surgery; [place] tooth extraction; EGD; Cataract extraction, bilateral (2019); Mammo (historical) (Bilateral, 11/27/2018); Mammo (historical) (Bilateral, 10/27/2017); Colonoscopy; Cataract extraction (Bilateral);  Hand surgery (Right); and Cervical fusion (N/A, 5/25/2021)  ,  _______________________________________________________________________  Family History:  family history includes Heart disease in her mother; Heart failure in her mother; Hyperlipidemia in her mother; Hypertension in her mother and sister  ,  _______________________________________________________________________  Social History:   reports that she quit smoking about 18 years ago  She has never used smokeless tobacco  She reports that she does not drink alcohol and does not use drugs  ,  _______________________________________________________________________  Allergies:  has No Known Allergies     _______________________________________________________________________  Current Outpatient Medications   Medication Sig Dispense Refill    acetaminophen (TYLENOL) 500 mg tablet Take 1 tablet (500 mg total) by mouth every 6 (six) hours as needed for mild pain 30 tablet 0    ALPRAZolam (XANAX) 0 5 mg tablet Take 1 tablet (0 5 mg total) by mouth 2 (two) times a day as needed for anxiety 60 tablet 3    aspirin (ECOTRIN LOW STRENGTH) 81 mg EC tablet Take 81 mg by mouth daily      atorvastatin (LIPITOR) 10 mg tablet TAKE 1 TABLET BY MOUTH EVERY DAY AT BEDTIME 90 tablet 3    Calcium Carb-Cholecalciferol (CALCIUM 600+D3) 600-200 MG-UNIT TABS Take 1 tablet by mouth daily      escitalopram (LEXAPRO) 10 mg tablet TAKE 1 TABLET(10 MG) BY MOUTH DAILY 90 tablet 0    levothyroxine 100 mcg tablet TAKE 1 TABLET(100 MCG) BY MOUTH DAILY 90 tablet 3    Multiple Vitamins-Minerals (CENTRUM ADULTS PO) Take 1 tablet by mouth daily      naproxen (NAPROSYN) 500 mg tablet Take 1 tablet (500 mg total) by mouth 2 (two) times a day with meals 60 tablet 1    omeprazole (PriLOSEC) 20 mg delayed release capsule TAKE 1 CAPSULE(20 MG) BY MOUTH DAILY BEFORE AND BREAKFAST 90 capsule 0    ramipril (ALTACE) 10 MG capsule Take 1 capsule (10 mg total) by mouth 2 (two) times a day 180 capsule 3 No current facility-administered medications for this visit      _______________________________________________________________________  Review of Systems   Respiratory: Negative  Musculoskeletal: Positive for joint swelling  Negative for myalgias  Hematological: Negative  Objective:  Vitals:    01/21/22 1411   BP: 122/80   BP Location: Left arm   Patient Position: Sitting   Cuff Size: Adult   Pulse: 85   Resp: 16   Temp: (!) 97 °F (36 1 °C)   TempSrc: Temporal   SpO2: 96%   Weight: 60 3 kg (133 lb)   Height: 5' 3" (1 6 m)     Body mass index is 23 56 kg/m²  Physical Exam  Constitutional:       Appearance: Normal appearance  HENT:      Head: Normocephalic and atraumatic  Pulmonary:      Effort: Pulmonary effort is normal    Musculoskeletal:         General: Swelling and tenderness present  Comments: Redness, right elbow tender to touch and hot   Neurological:      Mental Status: She is alert

## 2022-01-22 ENCOUNTER — HOSPITAL ENCOUNTER (EMERGENCY)
Facility: HOSPITAL | Age: 72
Discharge: HOME | DRG: 308 | End: 2022-01-22
Attending: INTERNAL MEDICINE
Payer: MEDICARE

## 2022-01-22 VITALS
SYSTOLIC BLOOD PRESSURE: 165 MMHG | DIASTOLIC BLOOD PRESSURE: 91 MMHG | HEART RATE: 86 BPM | OXYGEN SATURATION: 100 % | RESPIRATION RATE: 18 BRPM | TEMPERATURE: 97.6 F

## 2022-01-22 DIAGNOSIS — L03.113 CELLULITIS OF RIGHT UPPER EXTREMITY: Primary | ICD-10-CM

## 2022-01-22 LAB
ANION GAP SERPL CALCULATED.3IONS-SCNC: 7 MMOL/L (ref 4–13)
BASOPHILS # BLD AUTO: 0.07 THOUSANDS/ΜL (ref 0–0.1)
BASOPHILS NFR BLD AUTO: 0 % (ref 0–1)
BUN SERPL-MCNC: 17 MG/DL (ref 6–20)
CALCIUM SERPL-MCNC: 10 MG/DL (ref 8.4–10.2)
CHLORIDE SERPL-SCNC: 102 MMOL/L (ref 96–108)
CO2 SERPL-SCNC: 27 MMOL/L (ref 22–33)
CREAT SERPL-MCNC: 0.91 MG/DL (ref 0.4–1.1)
EOSINOPHIL # BLD AUTO: 0.34 THOUSAND/ΜL (ref 0–0.61)
EOSINOPHIL NFR BLD AUTO: 2 % (ref 0–6)
ERYTHROCYTE [DISTWIDTH] IN BLOOD BY AUTOMATED COUNT: 12.5 % (ref 11.6–15.1)
GFR SERPL CREATININE-BSD FRML MDRD: 62 ML/MIN/1.73SQ M
GLUCOSE SERPL-MCNC: 142 MG/DL (ref 65–140)
HCT VFR BLD AUTO: 36.6 % (ref 34.8–46.1)
HGB BLD-MCNC: 12.2 G/DL (ref 11.5–15.4)
IMM GRANULOCYTES # BLD AUTO: 0.06 THOUSAND/UL (ref 0–0.2)
IMM GRANULOCYTES NFR BLD AUTO: 0 % (ref 0–2)
LYMPHOCYTES # BLD AUTO: 1.47 THOUSANDS/ΜL (ref 0.6–4.47)
LYMPHOCYTES NFR BLD AUTO: 9 % (ref 14–44)
MCH RBC QN AUTO: 30.7 PG (ref 26.8–34.3)
MCHC RBC AUTO-ENTMCNC: 33.3 G/DL (ref 31.4–37.4)
MCV RBC AUTO: 92 FL (ref 82–98)
MONOCYTES # BLD AUTO: 1.32 THOUSAND/ΜL (ref 0.17–1.22)
MONOCYTES NFR BLD AUTO: 8 % (ref 4–12)
NEUTROPHILS # BLD AUTO: 13.58 THOUSANDS/ΜL (ref 1.85–7.62)
NEUTS SEG NFR BLD AUTO: 81 % (ref 43–75)
NRBC BLD AUTO-RTO: 0 /100 WBCS
PLATELET # BLD AUTO: 264 THOUSANDS/UL (ref 149–390)
PMV BLD AUTO: 9.4 FL (ref 8.9–12.7)
POTASSIUM SERPL-SCNC: 3.9 MMOL/L (ref 3.5–5)
RBC # BLD AUTO: 3.97 MILLION/UL (ref 3.81–5.12)
SODIUM SERPL-SCNC: 136 MMOL/L (ref 133–145)
URATE SERPL-MCNC: 3 MG/DL (ref 2.6–8)
WBC # BLD AUTO: 16.84 THOUSAND/UL (ref 4.31–10.16)

## 2022-01-22 PROCEDURE — 99283 EMERGENCY DEPT VISIT LOW MDM: CPT

## 2022-01-22 PROCEDURE — 84550 ASSAY OF BLOOD/URIC ACID: CPT | Performed by: INTERNAL MEDICINE

## 2022-01-22 PROCEDURE — 99284 EMERGENCY DEPT VISIT MOD MDM: CPT | Performed by: INTERNAL MEDICINE

## 2022-01-22 PROCEDURE — 36415 COLL VENOUS BLD VENIPUNCTURE: CPT | Performed by: INTERNAL MEDICINE

## 2022-01-22 PROCEDURE — 87040 BLOOD CULTURE FOR BACTERIA: CPT | Performed by: INTERNAL MEDICINE

## 2022-01-22 PROCEDURE — 85025 COMPLETE CBC W/AUTO DIFF WBC: CPT | Performed by: INTERNAL MEDICINE

## 2022-01-22 PROCEDURE — 80048 BASIC METABOLIC PNL TOTAL CA: CPT | Performed by: INTERNAL MEDICINE

## 2022-01-22 RX ORDER — AMOXICILLIN AND CLAVULANATE POTASSIUM 875; 125 MG/1; MG/1
1 TABLET, FILM COATED ORAL EVERY 12 HOURS
Qty: 14 TABLET | Refills: 0 | Status: SHIPPED | OUTPATIENT
Start: 2022-01-22 | End: 2022-01-28 | Stop reason: HOSPADM

## 2022-01-22 RX ORDER — SULFAMETHOXAZOLE AND TRIMETHOPRIM 800; 160 MG/1; MG/1
1 TABLET ORAL ONCE
Status: COMPLETED | OUTPATIENT
Start: 2022-01-22 | End: 2022-01-22

## 2022-01-22 RX ORDER — SULFAMETHOXAZOLE AND TRIMETHOPRIM 800; 160 MG/1; MG/1
1 TABLET ORAL 2 TIMES DAILY
Qty: 14 TABLET | Refills: 0 | Status: SHIPPED | OUTPATIENT
Start: 2022-01-22 | End: 2022-01-28 | Stop reason: HOSPADM

## 2022-01-22 RX ORDER — AMOXICILLIN AND CLAVULANATE POTASSIUM 875; 125 MG/1; MG/1
1 TABLET, FILM COATED ORAL ONCE
Status: COMPLETED | OUTPATIENT
Start: 2022-01-22 | End: 2022-01-22

## 2022-01-22 RX ADMIN — AMOXICILLIN AND CLAVULANATE POTASSIUM 1 TABLET: 875; 125 TABLET, FILM COATED ORAL at 16:38

## 2022-01-22 RX ADMIN — SULFAMETHOXAZOLE AND TRIMETHOPRIM 1 TABLET: 800; 160 TABLET ORAL at 16:38

## 2022-01-22 NOTE — ED PROVIDER NOTES
History  Chief Complaint   Patient presents with    Elbow Swelling     elbow redness pain and swelling since thursday  increasing each day   had xrays yesterday     THIS IS A 66YEARS OLD CAME FOR HAVING RIGHT ELBOW PAIN  Patient stated that she wake up 3 days ago and she found that her elbow is swollen and slightly painful  Patient went to her PMD wall the x-ray for the elbow which were done yesterday which shows no fracture no joint effusion  Patient denies any fever  According to her PMD she told her that this could be gout and she started him on colchicine  Patient wake up this morning and she found that the reddish area around the elbow has extended into the right forearm in a streaky line  Also the redness goes up to the upper arm  Patient denies any fever, trauma, fall  Patient denies history of gout before  Patient able to do full range of motion of the right elbow  Prior to Admission Medications   Prescriptions Last Dose Informant Patient Reported? Taking?    ALPRAZolam (XANAX) 0 5 mg tablet  Self No No   Sig: Take 1 tablet (0 5 mg total) by mouth 2 (two) times a day as needed for anxiety   Calcium Carb-Cholecalciferol (CALCIUM 600+D3) 600-200 MG-UNIT TABS  Self Yes No   Sig: Take 1 tablet by mouth daily   Multiple Vitamins-Minerals (CENTRUM ADULTS PO)  Self Yes No   Sig: Take 1 tablet by mouth daily   acetaminophen (TYLENOL) 500 mg tablet  Self No No   Sig: Take 1 tablet (500 mg total) by mouth every 6 (six) hours as needed for mild pain   acetaminophen-codeine (TYLENOL with CODEINE #3) 300-30 MG per tablet   No No   Sig: Take 1 tablet by mouth 2 (two) times a day as needed for moderate pain   aspirin (ECOTRIN LOW STRENGTH) 81 mg EC tablet  Self Yes No   Sig: Take 81 mg by mouth daily   atorvastatin (LIPITOR) 10 mg tablet  Self No No   Sig: TAKE 1 TABLET BY MOUTH EVERY DAY AT BEDTIME   colchicine (COLCRYS) 0 6 mg tablet   No No   Sig: Take 1 tablet (0 6 mg total) by mouth daily escitalopram (LEXAPRO) 10 mg tablet  Self No No   Sig: TAKE 1 TABLET(10 MG) BY MOUTH DAILY   levothyroxine 100 mcg tablet  Self No No   Sig: TAKE 1 TABLET(100 MCG) BY MOUTH DAILY   naproxen (NAPROSYN) 500 mg tablet  Self No No   Sig: Take 1 tablet (500 mg total) by mouth 2 (two) times a day with meals   naproxen (Naprosyn) 500 mg tablet   No No   Sig: Take 1 tablet (500 mg total) by mouth 2 (two) times a day with meals   omeprazole (PriLOSEC) 20 mg delayed release capsule  Self No No   Sig: TAKE 1 CAPSULE(20 MG) BY MOUTH DAILY BEFORE AND BREAKFAST   ramipril (ALTACE) 10 MG capsule  Self No No   Sig: Take 1 capsule (10 mg total) by mouth 2 (two) times a day      Facility-Administered Medications: None       Past Medical History:   Diagnosis Date    Anxiety     Arthritis     shoulder    Carpal tunnel syndrome     Depression     Disease of thyroid gland     Diverticulitis     HBP (high blood pressure)     Hyperlipidemia     Hypertension     Hypothyroid     Pneumonia     2017    PONV (postoperative nausea and vomiting)     Psychiatric disorder     Depression    Sleep apnea     mild        Past Surgical History:   Procedure Laterality Date    APPENDECTOMY      BREAST BIOPSY Right     benign    BREAST CYST ASPIRATION Right     benign    CATARACT EXTRACTION Bilateral     CATARACT EXTRACTION, BILATERAL  2019    CERVICAL FUSION N/A 5/25/2021    Procedure: FUSION CERVICAL POSTERIOR C1-C2 with allograft and neuromonitoring);   Surgeon: Quinn Zhao MD;  Location: [facility];  Service: Orthopedics    CHOLECYSTECTOMY      COLONOSCOPY      COLONOSCOPY W/ BIOPSIES  2011    EGD      HAND SURGERY Right     MAMMO (HISTORICAL) Bilateral 11/27/2018    MAMMO (HISTORICAL) Bilateral 10/27/2017    MANDIBLE SURGERY      WISDOM TOOTH EXTRACTION      X4       Family History   Problem Relation Age of Onset    Heart disease Mother     Hypertension Mother     Hyperlipidemia Mother     Heart failure Mother  Hypertension Sister      I have reviewed and agree with the history as documented  E-Cigarette/Vaping    E-Cigarette Use Never User      E-Cigarette/Vaping Substances    Nicotine No     THC No     CBD No     Flavoring No     Other No     Unknown No      Social History     Tobacco Use    Smoking status: Former Smoker     Quit date:      Years since quittin 0    Smokeless tobacco: Never Used   Vaping Use    Vaping Use: Never used   Substance Use Topics    Alcohol use: No     Comment: last drink 7 years ago    Drug use: No       Review of Systems   Constitutional: Negative for fatigue and fever  HENT: Negative for congestion, ear discharge, ear pain, facial swelling, sinus pressure and sinus pain  Respiratory: Negative for cough and shortness of breath  Cardiovascular: Negative for chest pain and palpitations  Gastrointestinal: Negative for abdominal pain, diarrhea, nausea and vomiting  Genitourinary: Negative for difficulty urinating, dysuria, flank pain and frequency  Musculoskeletal: Positive for arthralgias  Negative for back pain, myalgias, neck pain and neck stiffness  Skin: Negative for color change, pallor and rash  Neurological: Negative for dizziness, light-headedness and headaches  Psychiatric/Behavioral: Negative for agitation and behavioral problems  Physical Exam  Physical Exam  Vitals and nursing note reviewed  Constitutional:       General: She is not in acute distress  Appearance: She is well-developed  She is not ill-appearing, toxic-appearing or diaphoretic  HENT:      Head: Normocephalic and atraumatic  Nose: Nose normal  No congestion or rhinorrhea  Mouth/Throat:      Mouth: Mucous membranes are moist       Pharynx: No oropharyngeal exudate or posterior oropharyngeal erythema  Eyes:      General: No scleral icterus  Right eye: No discharge  Left eye: No discharge        Extraocular Movements: Extraocular movements intact  Pupils: Pupils are equal, round, and reactive to light  Neck:      Vascular: No carotid bruit  Cardiovascular:      Rate and Rhythm: Normal rate and regular rhythm  Heart sounds: Normal heart sounds  No murmur heard  No friction rub  No gallop  Pulmonary:      Effort: Pulmonary effort is normal  No respiratory distress  Breath sounds: Normal breath sounds  No wheezing or rales  Abdominal:      General: Bowel sounds are normal  There is no distension  Palpations: Abdomen is soft  There is no mass  Tenderness: There is no abdominal tenderness  There is no right CVA tenderness or left CVA tenderness  Musculoskeletal:         General: Swelling and tenderness present  No deformity or signs of injury  Normal range of motion  Cervical back: Normal range of motion and neck supple  No rigidity or tenderness  Right lower leg: No edema  Left lower leg: No edema  Comments: Examination of the right elbow shows redness mild tenderness mild swelling  There is no joint effusion  There is also area of streaky line of tenderness on the right forearm extending all the way proximal to the wrist   There is also area of redness and tenderness at the distal upper arm  Lymphadenopathy:      Cervical: No cervical adenopathy  Skin:     General: Skin is warm and dry  Capillary Refill: Capillary refill takes less than 2 seconds  Neurological:      General: No focal deficit present  Mental Status: She is alert and oriented to person, place, and time     Psychiatric:         Behavior: Behavior normal          Vital Signs  ED Triage Vitals   Temperature Pulse Respirations Blood Pressure SpO2   01/22/22 1602 01/22/22 1649 01/22/22 1602 01/22/22 1602 01/22/22 1602   97 6 °F (36 4 °C) 86 18 165/91 100 %      Temp Source Heart Rate Source Patient Position - Orthostatic VS BP Location FiO2 (%)   01/22/22 1602 01/22/22 1602 01/22/22 1602 01/22/22 1602 --   Oral Monitor Sitting Left arm       Pain Score       01/22/22 1602       8           Vitals:    01/22/22 1602 01/22/22 1649   BP: 165/91    Pulse:  86   Patient Position - Orthostatic VS: Sitting          Visual Acuity      ED Medications  Medications   amoxicillin-clavulanate (AUGMENTIN) 875-125 mg per tablet 1 tablet (1 tablet Oral Given 1/22/22 1638)   sulfamethoxazole-trimethoprim (BACTRIM DS) 800-160 mg per tablet 1 tablet (1 tablet Oral Given 1/22/22 1638)       Diagnostic Studies  Results Reviewed     Procedure Component Value Units Date/Time    Blood culture [[id number]] Collected: 01/22/22 1631    Lab Status: Preliminary result Specimen: Blood from Arm, Left Updated: 01/22/22 2301     Blood Culture Received in Microbiology Lab  Culture in Progress      Basic metabolic panel [[id number]]  (Abnormal) Collected: 01/22/22 1631    Lab Status: Final result Specimen: Blood from Arm, Left Updated: 01/22/22 1658     Sodium 136 mmol/L      Potassium 3 9 mmol/L      Chloride 102 mmol/L      CO2 27 mmol/L      ANION GAP 7 mmol/L      BUN 17 mg/dL      Creatinine 0 91 mg/dL      Glucose 142 mg/dL      Calcium 10 0 mg/dL      eGFR 62 ml/min/1 73sq m     Narrative:      [facility] guidelines for Chronic Kidney Disease (CKD):     Stage 1 with normal or high GFR (GFR > 90 mL/min/1 73 square meters)    Stage 2 Mild CKD (GFR = 60-89 mL/min/1 73 square meters)    Stage 3A Moderate CKD (GFR = 45-59 mL/min/1 73 square meters)    Stage 3B Moderate CKD (GFR = 30-44 mL/min/1 73 square meters)    Stage 4 Severe CKD (GFR = 15-29 mL/min/1 73 square meters)    Stage 5 End Stage CKD (GFR <15 mL/min/1 73 square meters)  Note: GFR calculation is accurate only with a steady state creatinine    Uric acid [[id number]]  (Normal) Collected: 01/22/22 1631    Lab Status: Final result Specimen: Blood from Arm, Left Updated: 01/22/22 1658     Uric Acid 3 0 mg/dL     CBC and differential [[id number]]  (Abnormal) Collected: 01/22/22 1632    Lab Status: Final result Specimen: Blood from Arm, Left Updated: 01/22/22 1638     WBC 16 84 Thousand/uL      RBC 3 97 Million/uL      Hemoglobin 12 2 g/dL      Hematocrit 36 6 %      MCV 92 fL      MCH 30 7 pg      MCHC 33 3 g/dL      RDW 12 5 %      MPV 9 4 fL      Platelets 018 Thousands/uL      nRBC 0 /100 WBCs      Neutrophils Relative 81 %      Immat GRANS % 0 %      Lymphocytes Relative 9 %      Monocytes Relative 8 %      Eosinophils Relative 2 %      Basophils Relative 0 %      Neutrophils Absolute 13 58 Thousands/µL      Immature Grans Absolute 0 06 Thousand/uL      Lymphocytes Absolute 1 47 Thousands/µL      Monocytes Absolute 1 32 Thousand/µL      Eosinophils Absolute 0 34 Thousand/µL      Basophils Absolute 0 07 Thousands/µL                  No orders to display              Procedures  Procedures         ED Course  ED Course as of 01/23/22 0908   Sat Jan 22, 2022   1705 Final done I reviewed the x-ray of the elbow as was done yesterday and we reviewed the pictures  SBIRT 20yo+      Most Recent Value   SBIRT (24 yo +)    In order to provide better care to our patients, we are screening all of our patients for alcohol and drug use  Would it be okay to ask you these screening questions? No Filed at: 01/22/2022 1649                    MDM  Number of Diagnoses or Management Options  Diagnosis management comments: This is a 66years old came for having pain of the right elbow which is reddish slightly swollen  This is going on for 3 days  Patient today wake up and she found that there is a streaky line of redness on the forearm  Also there is redness on the distal 3rd of the right upper arm  This is looks infected  Patient she thing that this could be like a spider  X-ray done yesterday and I reviewed the x-ray done see any effusion on the joint  This most likely consistent with his infection  We did test for uric acid which came back only 3    This is very unlikely that it could be gout and patient already started on colchicine as pair her PMD   At this point we are going to discharge patient on 2 antibiotic Augmentin and Bactrim she received the 1st dose here in the ER  All question concerns of the patient have been addressed  Amount and/or Complexity of Data Reviewed  Clinical lab tests: ordered and reviewed    Risk of Complications, Morbidity, and/or Mortality  Presenting problems: moderate  Diagnostic procedures: moderate  Management options: moderate        Disposition  Final diagnoses:   Cellulitis of right upper extremity     Time reflects when diagnosis was documented in both MDM as applicable and the Disposition within this note     Time User Action Codes Description Comment    1/22/2022  5:08 PM Eugenia Grayson Add [L03 [id number]] Cellulitis     1/22/2022  5:09 PM Eugenia Grayson Remove [L03 [id number]] Cellulitis     1/22/2022  5:09 PM Eugenia Grayson Add [H20 [id number]] Cellulitis of right upper extremity       ED Disposition     ED Disposition Condition Date/Time Comment    Discharge Stable Sat Jan 22, 2022  5:11 PM Rosa Clinton discharge to home/self care              Follow-up Information     Follow up With Specialties Details Why Contact Info    Slick Delgado MD Internal Medicine, Pediatrics In 3 days  [address]  [address] [address]  [phone]            Discharge Medication List as of 1/22/2022  5:11 PM      START taking these medications    Details   amoxicillin-clavulanate (AUGMENTIN) 875-125 mg per tablet Take 1 tablet by mouth every 12 (twelve) hours for 7 days, Starting Sat 1/22/2022, Until Sat 1/29/2022, Normal      sulfamethoxazole-trimethoprim (BACTRIM DS) 800-160 mg per tablet Take 1 tablet by mouth 2 (two) times a day for 7 days smx-tmp DS (BACTRIM) 800-160 mg tabs (1tab q12 D10), Starting Sat 1/22/2022, Until Sat 1/29/2022, Normal         CONTINUE these medications which have NOT CHANGED    Details   acetaminophen (TYLENOL) 500 mg tablet Take 1 tablet (500 mg total) by mouth every 6 (six) hours as needed for mild pain, Starting Mon 2/8/2021, Normal      acetaminophen-codeine (TYLENOL with CODEINE #3) 300-30 MG per tablet Take 1 tablet by mouth 2 (two) times a day as needed for moderate pain, Starting Fri 1/21/2022, Normal      ALPRAZolam (XANAX) 0 5 mg tablet Take 1 tablet (0 5 mg total) by mouth 2 (two) times a day as needed for anxiety, Starting Fri 11/12/2021, Normal      aspirin (ECOTRIN LOW STRENGTH) 81 mg EC tablet Take 81 mg by mouth daily, Historical Med      atorvastatin (LIPITOR) 10 mg tablet TAKE 1 TABLET BY MOUTH EVERY DAY AT BEDTIME, Normal      Calcium Carb-Cholecalciferol (CALCIUM 600+D3) 600-200 MG-UNIT TABS Take 1 tablet by mouth daily, Historical Med      colchicine (COLCRYS) 0 6 mg tablet Take 1 tablet (0 6 mg total) by mouth daily, Starting Fri 1/21/2022, Normal      escitalopram (LEXAPRO) 10 mg tablet TAKE 1 TABLET(10 MG) BY MOUTH DAILY, Normal      levothyroxine 100 mcg tablet TAKE 1 TABLET(100 MCG) BY MOUTH DAILY, Normal      Multiple Vitamins-Minerals (CENTRUM ADULTS PO) Take 1 tablet by mouth daily, Historical Med      !! naproxen (NAPROSYN) 500 mg tablet Take 1 tablet (500 mg total) by mouth 2 (two) times a day with meals, Starting Wed 12/29/2021, Normal      !! naproxen (Naprosyn) 500 mg tablet Take 1 tablet (500 mg total) by mouth 2 (two) times a day with meals, Starting Fri 1/21/2022, Normal      omeprazole (PriLOSEC) 20 mg delayed release capsule TAKE 1 CAPSULE(20 MG) BY MOUTH DAILY BEFORE AND BREAKFAST, Normal      ramipril (ALTACE) 10 MG capsule Take 1 capsule (10 mg total) by mouth 2 (two) times a day, Starting Wed 4/28/2021, Normal       !! - Potential duplicate medications found  Please discuss with provider  No discharge procedures on file      PDMP Review       Value Time User    PDMP Reviewed  Yes 7/13/2021  1:05 PM Nany Choudhury ED Provider  Electronically Signed by           Red Anderson MD  01/23/22 3498

## 2022-01-22 NOTE — DISCHARGE INSTRUCTIONS
Take medication as prescribed  Follow-up with your PMD in 3 days  Keep the arm in the sling and take it off at night time  Labs Reviewed   CBC AND DIFFERENTIAL - Abnormal       Result Value Ref Range Status    WBC 16 84 (*) 4 31 - 10 16 Thousand/uL Final    RBC 3 97  3 81 - 5 12 Million/uL Final    Hemoglobin 12 2  11 5 - 15 4 g/dL Final    Hematocrit 36 6  34 8 - 46 1 % Final    MCV 92  82 - 98 fL Final    MCH 30 7  26 8 - 34 3 pg Final    MCHC 33 3  31 4 - 37 4 g/dL Final    RDW 12 5  11 6 - 15 1 % Final    MPV 9 4  8 9 - 12 7 fL Final    Platelets 283  616 - 390 Thousands/uL Final    nRBC 0  /100 WBCs Final    Neutrophils Relative 81 (*) 43 - 75 % Final    Immat GRANS % 0  0 - 2 % Final    Lymphocytes Relative 9 (*) 14 - 44 % Final    Monocytes Relative 8  4 - 12 % Final    Eosinophils Relative 2  0 - 6 % Final    Basophils Relative 0  0 - 1 % Final    Neutrophils Absolute 13 58 (*) 1 85 - 7 62 Thousands/µL Final    Immature Grans Absolute 0 06  0 00 - 0 20 Thousand/uL Final    Lymphocytes Absolute 1 47  0 60 - 4 47 Thousands/µL Final    Monocytes Absolute 1 32 (*) 0 17 - 1 22 Thousand/µL Final    Eosinophils Absolute 0 34  0 00 - 0 61 Thousand/µL Final    Basophils Absolute 0 07  0 00 - 0 10 Thousands/µL Final   BASIC METABOLIC PANEL - Abnormal    Sodium 136  133 - 145 mmol/L Final    Potassium 3 9  3 5 - 5 0 mmol/L Final    Chloride 102  96 - 108 mmol/L Final    CO2 27  22 - 33 mmol/L Final    ANION GAP 7  4 - 13 mmol/L Final    BUN 17  6 - 20 mg/dL Final    Creatinine 0 91  0 40 - 1 10 mg/dL Final    Comment: Standardized to IDMS reference method    Glucose 142 (*) 65 - 140 mg/dL Final    Comment: If the patient is fasting, the ADA then defines impaired fasting glucose as > 100 mg/dL and diabetes as > or equal to 123 mg/dL  Specimen collection should occur prior to Sulfasalazine administration due to the potential for falsely depressed results   Specimen collection should occur prior to Sulfapyridine administration due to the potential for falsely elevated results  Calcium 10 0  8 4 - 10 2 mg/dL Final    eGFR 62  ml/min/1 73sq m Final    Narrative:     [place] guidelines for Chronic Kidney Disease (CKD):     Stage 1 with normal or high GFR (GFR > 90 mL/min/1 73 square meters)    Stage 2 Mild CKD (GFR = 60-89 mL/min/1 73 square meters)    Stage 3A Moderate CKD (GFR = 45-59 mL/min/1 73 square meters)    Stage 3B Moderate CKD (GFR = 30-44 mL/min/1 73 square meters)    Stage 4 Severe CKD (GFR = 15-29 mL/min/1 73 square meters)    Stage 5 End Stage CKD (GFR <15 mL/min/1 73 square meters)  Note: GFR calculation is accurate only with a steady state creatinine   URIC ACID - Normal    Uric Acid 3 0  2 6 - 8 0 mg/dL Final    Comment: Specimen collection should occur prior to Metamizole administration due to the potential for falsely depressed results     BLOOD CULTURE

## 2022-01-24 ENCOUNTER — HOSPITAL ENCOUNTER (INPATIENT)
Facility: HOSPITAL | Age: 72
LOS: 4 days | Discharge: HOME | DRG: 308 | End: 2022-01-28
Attending: EMERGENCY MEDICINE | Admitting: INTERNAL MEDICINE
Payer: MEDICARE

## 2022-01-24 ENCOUNTER — ANESTHESIA (OUTPATIENT)
Dept: GASTROENTEROLOGY | Facility: AMBULATORY SURGERY CENTER | Age: 72
End: 2022-01-24

## 2022-01-24 ENCOUNTER — APPOINTMENT (EMERGENCY)
Dept: VASCULAR ULTRASOUND | Facility: HOSPITAL | Age: 72
DRG: 308 | End: 2022-01-24
Payer: MEDICARE

## 2022-01-24 ENCOUNTER — HOSPITAL ENCOUNTER (OUTPATIENT)
Dept: GASTROENTEROLOGY | Facility: AMBULATORY SURGERY CENTER | Age: 72
Discharge: HOME | End: 2022-01-24
Payer: MEDICARE

## 2022-01-24 VITALS
RESPIRATION RATE: 18 BRPM | HEIGHT: 63 IN | SYSTOLIC BLOOD PRESSURE: 108 MMHG | HEART RATE: 109 BPM | TEMPERATURE: 98.6 F | WEIGHT: 132 LBS | BODY MASS INDEX: 23.39 KG/M2 | OXYGEN SATURATION: 98 % | DIASTOLIC BLOOD PRESSURE: 64 MMHG

## 2022-01-24 DIAGNOSIS — I48.91 NEW ONSET A-FIB (HCC): Primary | ICD-10-CM

## 2022-01-24 DIAGNOSIS — L03.113 CELLULITIS OF RIGHT UPPER EXTREMITY: ICD-10-CM

## 2022-01-24 DIAGNOSIS — I48.91 ATRIAL FIBRILLATION WITH RVR (HCC): ICD-10-CM

## 2022-01-24 DIAGNOSIS — K21.9 GASTROESOPHAGEAL REFLUX DISEASE, UNSPECIFIED WHETHER ESOPHAGITIS PRESENT: ICD-10-CM

## 2022-01-24 DIAGNOSIS — R19.7 DIARRHEA, UNSPECIFIED TYPE: ICD-10-CM

## 2022-01-24 DIAGNOSIS — M54.2 NECK PAIN: ICD-10-CM

## 2022-01-24 DIAGNOSIS — Z12.11 COLON CANCER SCREENING: ICD-10-CM

## 2022-01-24 DIAGNOSIS — M70.21 OLECRANON BURSITIS OF RIGHT ELBOW: ICD-10-CM

## 2022-01-24 DIAGNOSIS — M25.421 ELBOW EFFUSION, RIGHT: ICD-10-CM

## 2022-01-24 DIAGNOSIS — R10.30 LOWER ABDOMINAL PAIN: ICD-10-CM

## 2022-01-24 PROBLEM — G47.30 SLEEP APNEA: Status: ACTIVE | Noted: 2022-01-24

## 2022-01-24 PROBLEM — L03.90 CELLULITIS: Status: ACTIVE | Noted: 2022-01-24

## 2022-01-24 PROBLEM — A41.9 SEPSIS (HCC): Status: ACTIVE | Noted: 2022-01-24

## 2022-01-24 PROBLEM — R11.2 PONV (POSTOPERATIVE NAUSEA AND VOMITING): Status: ACTIVE | Noted: 2022-01-24

## 2022-01-24 PROBLEM — E78.5 HYPERLIPIDEMIA: Status: ACTIVE | Noted: 2022-01-24

## 2022-01-24 PROBLEM — Z98.890 PONV (POSTOPERATIVE NAUSEA AND VOMITING): Status: ACTIVE | Noted: 2022-01-24

## 2022-01-24 LAB
2HR DELTA HS TROPONIN: -3 NG/L
4HR DELTA HS TROPONIN: -2 NG/L
ALBUMIN SERPL BCP-MCNC: 3.9 G/DL (ref 3.4–4.8)
ALP SERPL-CCNC: 182.6 U/L (ref 35–140)
ALT SERPL W P-5'-P-CCNC: 75 U/L (ref 5–54)
ANION GAP SERPL CALCULATED.3IONS-SCNC: 8 MMOL/L (ref 4–13)
AST SERPL W P-5'-P-CCNC: 64 U/L (ref 15–41)
BASOPHILS # BLD AUTO: 0.05 THOUSANDS/ΜL (ref 0–0.1)
BASOPHILS NFR BLD AUTO: 0 % (ref 0–1)
BILIRUB SERPL-MCNC: 0.81 MG/DL (ref 0.3–1.2)
BILIRUB UR QL STRIP: NEGATIVE
BUN SERPL-MCNC: 14 MG/DL (ref 6–20)
CALCIUM SERPL-MCNC: 9.6 MG/DL (ref 8.4–10.2)
CARDIAC TROPONIN I PNL SERPL HS: 10 NG/L
CARDIAC TROPONIN I PNL SERPL HS: 12 NG/L
CARDIAC TROPONIN I PNL SERPL HS: 9 NG/L
CHLORIDE SERPL-SCNC: 104 MMOL/L (ref 96–108)
CLARITY UR: CLEAR
CO2 SERPL-SCNC: 25 MMOL/L (ref 22–33)
COLOR UR: YELLOW
CREAT SERPL-MCNC: 1.05 MG/DL (ref 0.4–1.1)
EOSINOPHIL # BLD AUTO: 0.07 THOUSAND/ΜL (ref 0–0.61)
EOSINOPHIL NFR BLD AUTO: 0 % (ref 0–6)
ERYTHROCYTE [DISTWIDTH] IN BLOOD BY AUTOMATED COUNT: 12.6 % (ref 11.6–15.1)
GFR SERPL CREATININE-BSD FRML MDRD: 52 ML/MIN/1.73SQ M
GLUCOSE SERPL-MCNC: 130 MG/DL (ref 65–140)
GLUCOSE UR STRIP-MCNC: NEGATIVE MG/DL
HCT VFR BLD AUTO: 36.2 % (ref 34.8–46.1)
HGB BLD-MCNC: 11.8 G/DL (ref 11.5–15.4)
HGB UR QL STRIP.AUTO: NEGATIVE
HISTIOCYTES NFR FLD: 78 %
IMM GRANULOCYTES # BLD AUTO: 0.06 THOUSAND/UL (ref 0–0.2)
IMM GRANULOCYTES NFR BLD AUTO: 0 % (ref 0–2)
KETONES UR STRIP-MCNC: ABNORMAL MG/DL
LEUKOCYTE ESTERASE UR QL STRIP: NEGATIVE
LYMPHOCYTES # BLD AUTO: 0.72 THOUSANDS/ΜL (ref 0.6–4.47)
LYMPHOCYTES NFR BLD AUTO: 5 % (ref 14–44)
LYMPHOCYTES NFR BLD AUTO: 8 %
MAGNESIUM SERPL-MCNC: 1.9 MG/DL (ref 1.6–2.6)
MCH RBC QN AUTO: 30.6 PG (ref 26.8–34.3)
MCHC RBC AUTO-ENTMCNC: 32.6 G/DL (ref 31.4–37.4)
MCV RBC AUTO: 94 FL (ref 82–98)
MONOCYTES # BLD AUTO: 0.88 THOUSAND/ΜL (ref 0.17–1.22)
MONOCYTES NFR BLD AUTO: 1 %
MONOCYTES NFR BLD AUTO: 6 % (ref 4–12)
NEUTROPHILS # BLD AUTO: 13.79 THOUSANDS/ΜL (ref 1.85–7.62)
NEUTS SEG NFR BLD AUTO: 13 %
NEUTS SEG NFR BLD AUTO: 89 % (ref 43–75)
NITRITE UR QL STRIP: NEGATIVE
NRBC BLD AUTO-RTO: 0 /100 WBCS
PATHOLOGY REVIEW: NO
PH UR STRIP.AUTO: 6 [PH]
PHOSPHATE SERPL-MCNC: 2.2 MG/DL (ref 2.5–5)
PLATELET # BLD AUTO: 261 THOUSANDS/UL (ref 149–390)
PLATELET # BLD AUTO: 302 THOUSANDS/UL (ref 149–390)
PMV BLD AUTO: 10.1 FL (ref 8.9–12.7)
PMV BLD AUTO: 9.7 FL (ref 8.9–12.7)
POTASSIUM SERPL-SCNC: 3.9 MMOL/L (ref 3.5–5)
PROT SERPL-MCNC: 7.2 G/DL (ref 6.4–8.3)
PROT UR STRIP-MCNC: NEGATIVE MG/DL
RBC # BLD AUTO: 3.85 MILLION/UL (ref 3.81–5.12)
SODIUM SERPL-SCNC: 137 MMOL/L (ref 133–145)
SP GR UR STRIP.AUTO: 1.02 (ref 1–1.03)
TOTAL CELLS COUNTED SPEC: 100
UROBILINOGEN UR QL STRIP.AUTO: 0.2 E.U./DL
WBC # BLD AUTO: 15.57 THOUSAND/UL (ref 4.31–10.16)
WBC # FLD MANUAL: ABNORMAL /UL (ref 0–200)

## 2022-01-24 PROCEDURE — 81003 URINALYSIS AUTO W/O SCOPE: CPT | Performed by: PHYSICIAN ASSISTANT

## 2022-01-24 PROCEDURE — 85025 COMPLETE CBC W/AUTO DIFF WBC: CPT | Performed by: PHYSICIAN ASSISTANT

## 2022-01-24 PROCEDURE — 87040 BLOOD CULTURE FOR BACTERIA: CPT | Performed by: PHYSICIAN ASSISTANT

## 2022-01-24 PROCEDURE — 0R9L3ZZ DRAINAGE OF RIGHT ELBOW JOINT, PERCUTANEOUS APPROACH: ICD-10-PCS | Performed by: INTERNAL MEDICINE

## 2022-01-24 PROCEDURE — 99285 EMERGENCY DEPT VISIT HI MDM: CPT

## 2022-01-24 PROCEDURE — 84484 ASSAY OF TROPONIN QUANT: CPT | Performed by: PHYSICIAN ASSISTANT

## 2022-01-24 PROCEDURE — 80053 COMPREHEN METABOLIC PANEL: CPT | Performed by: PHYSICIAN ASSISTANT

## 2022-01-24 PROCEDURE — 96374 THER/PROPH/DIAG INJ IV PUSH: CPT

## 2022-01-24 PROCEDURE — 83735 ASSAY OF MAGNESIUM: CPT | Performed by: PHYSICIAN ASSISTANT

## 2022-01-24 PROCEDURE — 93970 EXTREMITY STUDY: CPT | Performed by: SURGERY

## 2022-01-24 PROCEDURE — 20605 DRAIN/INJ JOINT/BURSA W/O US: CPT | Performed by: PHYSICIAN ASSISTANT

## 2022-01-24 PROCEDURE — 99285 EMERGENCY DEPT VISIT HI MDM: CPT | Performed by: PHYSICIAN ASSISTANT

## 2022-01-24 PROCEDURE — 93970 EXTREMITY STUDY: CPT

## 2022-01-24 PROCEDURE — 89051 BODY FLUID CELL COUNT: CPT | Performed by: PHYSICIAN ASSISTANT

## 2022-01-24 PROCEDURE — 36415 COLL VENOUS BLD VENIPUNCTURE: CPT | Performed by: PHYSICIAN ASSISTANT

## 2022-01-24 PROCEDURE — 87186 SC STD MICRODIL/AGAR DIL: CPT | Performed by: PHYSICIAN ASSISTANT

## 2022-01-24 PROCEDURE — 96365 THER/PROPH/DIAG IV INF INIT: CPT

## 2022-01-24 PROCEDURE — 99222 1ST HOSP IP/OBS MODERATE 55: CPT | Performed by: PHYSICIAN ASSISTANT

## 2022-01-24 PROCEDURE — NC001 PR NO CHARGE: Performed by: NURSE ANESTHETIST, CERTIFIED REGISTERED

## 2022-01-24 PROCEDURE — 85049 AUTOMATED PLATELET COUNT: CPT | Performed by: NURSE PRACTITIONER

## 2022-01-24 PROCEDURE — 84100 ASSAY OF PHOSPHORUS: CPT | Performed by: PHYSICIAN ASSISTANT

## 2022-01-24 PROCEDURE — 87070 CULTURE OTHR SPECIMN AEROBIC: CPT | Performed by: PHYSICIAN ASSISTANT

## 2022-01-24 PROCEDURE — 87205 SMEAR GRAM STAIN: CPT | Performed by: PHYSICIAN ASSISTANT

## 2022-01-24 PROCEDURE — 99223 1ST HOSP IP/OBS HIGH 75: CPT | Performed by: INTERNAL MEDICINE

## 2022-01-24 RX ORDER — VANCOMYCIN HYDROCHLORIDE 1 G/200ML
15 INJECTION, SOLUTION INTRAVENOUS ONCE
Status: COMPLETED | OUTPATIENT
Start: 2022-01-24 | End: 2022-01-24

## 2022-01-24 RX ORDER — ONDANSETRON 2 MG/ML
4 INJECTION INTRAMUSCULAR; INTRAVENOUS EVERY 6 HOURS PRN
Status: DISCONTINUED | OUTPATIENT
Start: 2022-01-24 | End: 2022-01-28 | Stop reason: HOSPADM

## 2022-01-24 RX ORDER — MULTIVITAMIN/IRON/FOLIC ACID 18MG-0.4MG
1 TABLET ORAL DAILY
Status: DISCONTINUED | OUTPATIENT
Start: 2022-01-24 | End: 2022-01-28 | Stop reason: HOSPADM

## 2022-01-24 RX ORDER — PANTOPRAZOLE SODIUM 40 MG/1
40 TABLET, DELAYED RELEASE ORAL
Status: DISCONTINUED | OUTPATIENT
Start: 2022-01-25 | End: 2022-01-28 | Stop reason: HOSPADM

## 2022-01-24 RX ORDER — LEVOTHYROXINE SODIUM 0.1 MG/1
100 TABLET ORAL
Status: DISCONTINUED | OUTPATIENT
Start: 2022-01-25 | End: 2022-01-28 | Stop reason: HOSPADM

## 2022-01-24 RX ORDER — ALPRAZOLAM 0.5 MG/1
0.5 TABLET ORAL 2 TIMES DAILY PRN
Status: DISCONTINUED | OUTPATIENT
Start: 2022-01-24 | End: 2022-01-28 | Stop reason: HOSPADM

## 2022-01-24 RX ORDER — SODIUM CHLORIDE 9 MG/ML
20 INJECTION, SOLUTION INTRAVENOUS CONTINUOUS
Status: CANCELLED | OUTPATIENT
Start: 2022-01-24

## 2022-01-24 RX ORDER — DILTIAZEM HYDROCHLORIDE 5 MG/ML
10 INJECTION INTRAVENOUS ONCE
Status: COMPLETED | OUTPATIENT
Start: 2022-01-24 | End: 2022-01-24

## 2022-01-24 RX ORDER — ACETAMINOPHEN 325 MG/1
650 TABLET ORAL EVERY 6 HOURS PRN
Status: DISCONTINUED | OUTPATIENT
Start: 2022-01-24 | End: 2022-01-28 | Stop reason: HOSPADM

## 2022-01-24 RX ORDER — ATORVASTATIN CALCIUM 10 MG/1
10 TABLET, FILM COATED ORAL
Status: DISCONTINUED | OUTPATIENT
Start: 2022-01-24 | End: 2022-01-28 | Stop reason: HOSPADM

## 2022-01-24 RX ORDER — VANCOMYCIN HYDROCHLORIDE 1 G/200ML
15 INJECTION, SOLUTION INTRAVENOUS EVERY 24 HOURS
Status: DISCONTINUED | OUTPATIENT
Start: 2022-01-25 | End: 2022-01-25

## 2022-01-24 RX ORDER — VANCOMYCIN HYDROCHLORIDE 1 G/200ML
15 INJECTION, SOLUTION INTRAVENOUS EVERY 12 HOURS
Status: DISCONTINUED | OUTPATIENT
Start: 2022-01-24 | End: 2022-01-24

## 2022-01-24 RX ORDER — ESCITALOPRAM OXALATE 10 MG/1
10 TABLET ORAL DAILY
Status: DISCONTINUED | OUTPATIENT
Start: 2022-01-24 | End: 2022-01-28 | Stop reason: HOSPADM

## 2022-01-24 RX ORDER — SODIUM CHLORIDE 9 MG/ML
75 INJECTION, SOLUTION INTRAVENOUS CONTINUOUS
Status: DISCONTINUED | OUTPATIENT
Start: 2022-01-24 | End: 2022-01-25

## 2022-01-24 RX ORDER — SODIUM CHLORIDE 9 MG/ML
30 INJECTION, SOLUTION INTRAVENOUS CONTINUOUS
Status: DISCONTINUED | OUTPATIENT
Start: 2022-01-24 | End: 2022-01-28 | Stop reason: HOSPADM

## 2022-01-24 RX ORDER — CALCIUM CARBONATE 500(1250)
1 TABLET ORAL
Status: DISCONTINUED | OUTPATIENT
Start: 2022-01-25 | End: 2022-01-28 | Stop reason: HOSPADM

## 2022-01-24 RX ORDER — ASPIRIN 81 MG/1
81 TABLET ORAL DAILY
Status: DISCONTINUED | OUTPATIENT
Start: 2022-01-24 | End: 2022-01-27

## 2022-01-24 RX ADMIN — ASPIRIN 81 MG: 81 TABLET, COATED ORAL at 17:38

## 2022-01-24 RX ADMIN — VANCOMYCIN HYDROCHLORIDE 1000 MG: 1 INJECTION, SOLUTION INTRAVENOUS at 11:46

## 2022-01-24 RX ADMIN — Medication 1 TABLET: at 17:38

## 2022-01-24 RX ADMIN — ESCITALOPRAM OXALATE 10 MG: 20 TABLET ORAL at 17:38

## 2022-01-24 RX ADMIN — ATORVASTATIN CALCIUM 10 MG: 10 TABLET, FILM COATED ORAL at 21:32

## 2022-01-24 RX ADMIN — METOPROLOL TARTRATE 25 MG: 25 TABLET, FILM COATED ORAL at 11:49

## 2022-01-24 RX ADMIN — METOPROLOL TARTRATE 25 MG: 25 TABLET, FILM COATED ORAL at 20:35

## 2022-01-24 RX ADMIN — SODIUM CHLORIDE 125 ML/HR: 9 INJECTION, SOLUTION INTRAVENOUS at 11:28

## 2022-01-24 RX ADMIN — DILTIAZEM HYDROCHLORIDE 10 MG: 5 INJECTION, SOLUTION INTRAVENOUS at 11:51

## 2022-01-24 NOTE — ASSESSMENT & PLAN NOTE
· Recent x-ray 01/21/2022 showed no evidence of fracture, gaseous pattern  · Will continue patient on vancomycin  · Obtain MRSA  · Await orthopedic recommendations

## 2022-01-24 NOTE — ASSESSMENT & PLAN NOTE
· New onset suspect in setting of sepsis from cellulitis  · Patient currently on Cardizem drip  · Cardiology has been consulted recommendations appreciated  · Telemetry monitoring  · Echocardiogram  · Ongoing monitoring of vitals  · Currently patient is in sinus rhythm and will hold off on Cardizem drip  Will continue p o  Metoprolol  · Heart rate is controlled at 70s  · Will hold Lovenox therapeutic dose today as patient had aspiration of the right elbow abscess and will restart from tomorrow

## 2022-01-24 NOTE — PROCEDURES
Procedure:  After consent was obtained, using sterile technique the right olecranon bursa was aspirated  Using an 18G needle, the olecranon bursa was entered and 4 ml's of murky colored fluid was withdrawn and sent for cultures  The needle was withdrawn and the bursa was notably smaller in size  The procedure was tolerated well  Compression bandage applied  Watch for fever, or increased swelling or persistent pain in the bursa  Avoid leaning on the elbow

## 2022-01-24 NOTE — PROGRESS NOTES
Vancomycin Assessment    Dian French is a 66 y o  female who is currently ordered vancomycin 1000 mg IV q12h for skin-soft tissue infection  Relevant clinical data and objective history reviewed:  Creatinine   Date Value Ref Range Status   01/24/2022 1 05 0 40 - 1 10 mg/dL Final     Comment:     Standardized to IDMS reference method   01/22/2022 0 91 0 40 - 1 10 mg/dL Final     Comment:     Standardized to IDMS reference method   09/29/2021 1 01 0 40 - 1 10 mg/dL Final     Comment:     Standardized to IDMS reference method     /59   Pulse 64   Temp 98 5 °F (36 9 °C)   Resp 18   Ht 5' 2 5" (1 588 m)   Wt 58 7 kg (129 lb 6 6 oz)   SpO2 99%   BMI 23 29 kg/m²   No intake/output data recorded  Lab Results   Component Value Date/Time    BUN 14 01/24/2022 11:17 AM    WBC 15 57 (H) 01/24/2022 11:17 AM    HGB 11 8 01/24/2022 11:17 AM    HCT 36 2 01/24/2022 11:17 AM    MCV 94 01/24/2022 11:17 AM     01/24/2022 11:17 AM     Temp Readings from Last 3 Encounters:   01/24/22 98 5 °F (36 9 °C)   01/24/22 98 6 °F (37 °C) (Temporal)   01/22/22 97 6 °F (36 4 °C) (Oral)     Vancomycin Days of Therapy: 1    Assessment/Plan  The patient is currently on vancomycin utilizing scheduled dosing based on actual body weight  Baseline risks associated with therapy include: pre-existing renal impairment, concomitant nephrotoxic medications, and advanced age  The patient is currently ordered 1000 mg IV q12h and after clinical evaluation will be changed to 1000 mg IV q24h  Pharmacy will also follow closely for s/sx of nephrotoxicity, infusion reactions, and appropriateness of therapy  BMP and CBC will be ordered per protocol  Plan for trough as patient approaches steady state, prior to the 4th dose at approximately 11:00 on 1/27/22  Due to infection severity, will target a trough of 10-15 (mild infection/cellulitis)    Pharmacy will continue to follow the patients culture results and clinical progress daily     Omar Gin, Pharmacist

## 2022-01-24 NOTE — ASSESSMENT & PLAN NOTE
· Present admission evident by tachycardia leukocytosis in setting of right upper extremity cellulitis  · Continue vancomycin for now obtain MRSA  · Obtain blood cultures x2 and procalcitonin  · Check lactic acid and PT INR  · CBC BMP in a m  · Concern could be in the olecranon  bursitis further will consult Orthopedic surgery  · Will given IV bolus of normal saline  · Orthopedic surgery was able to aspirate thick pus from the right elbow abscess    Will hold Lovenox today and start from tomorrow as per Ortho recommendation

## 2022-01-24 NOTE — H&P
History and Physical - [facility]  Carol Kulkarni 66 y o  female MRN: [id number]                  HPI: Carol Kulkarni is a 66y o  year old female who presents for abdominal pain and change in bowel habit      REVIEW OF SYSTEMS: Per the HPI, and otherwise unremarkable  Historical Information   Past Medical History:   Diagnosis Date    Anxiety     Arthritis     shoulder    Carpal tunnel syndrome     Depression     Disease of thyroid gland     Diverticulitis     HBP (high blood pressure)     Hyperlipidemia     Hypertension     Hypothyroid     Pneumonia     2017    PONV (postoperative nausea and vomiting)     Psychiatric disorder     Depression    Sleep apnea     mild      Past Surgical History:   Procedure Laterality Date    APPENDECTOMY      BREAST BIOPSY Right     benign    BREAST CYST ASPIRATION Right     benign    CATARACT EXTRACTION Bilateral     CATARACT EXTRACTION, BILATERAL  2019    CERVICAL FUSION N/A 2021    Procedure: FUSION CERVICAL POSTERIOR C1-C2 with allograft and neuromonitoring);   Surgeon: Monica Costa MD;  [facility]: [facility];  Service: Orthopedics    CHOLECYSTECTOMY      COLONOSCOPY      COLONOSCOPY W/ BIOPSIES      EGD      HAND SURGERY Right     MAMMO (HISTORICAL) Bilateral 2018    MAMMO (HISTORICAL) Bilateral 10/27/2017    MANDIBLE SURGERY      WISDOM TOOTH EXTRACTION      X4     Social History   Social History     Substance and Sexual Activity   Alcohol Use No    Comment: last drink 7 years ago     Social History     Substance and Sexual Activity   Drug Use No     Social History     Tobacco Use   Smoking Status Former Smoker    Quit date:     Years since quittin 0   Smokeless Tobacco Never Used     Family History   Problem Relation Age of Onset    Heart disease Mother     Hypertension Mother     Hyperlipidemia Mother     Heart failure Mother     Hypertension Sister        Meds/Allergies     (Not in a hospital admission)      No Known Allergies    Objective     There were no vitals taken for this visit  PHYSICAL EXAM    Gen: NAD  CV: RRR  CHEST: Clear  ABD: soft, NT/ND  EXT: no edema      ASSESSMENT/PLAN:  This is a 66y o  year old female here for EGD and colonoscopy, she denies any chest pain, no shortness of breath, no history of AFib    On monitor her heart rate is around 130-140, irregular, it could be new onset of AFib, will cancel the procedure, I personally talked to emergency room nurse at [facility] and will send it to ER for 12 lead EKG and further evaluation for irregular heartbeat questionable AFib

## 2022-01-24 NOTE — ANESTHESIA PREPROCEDURE EVALUATION
Procedure:  COLONOSCOPY  EGD    Relevant Problems   ANESTHESIA   (+) PONV (postoperative nausea and vomiting)      CARDIO   (+) Hyperlipidemia   (+) Primary hypertension      ENDO   (+) Other specified hypothyroidism      GI/HEPATIC   (+) Gastroesophageal reflux disease without esophagitis      MUSCULOSKELETAL   (+) Spondylosis of cervical region without myelopathy or radiculopathy      NEURO/PSYCH   (+) Anxiety      PULMONARY   (+) Sleep apnea        Physical Exam    Airway    Mallampati score: II  TM Distance: >3 FB  Neck ROM: full     Dental       Cardiovascular  Rhythm: regular, Rate: abnormal,     Pulmonary  Pulmonary exam normal     Other Findings        Anesthesia Plan  ASA Score- 2     Anesthesia Type- IV sedation with anesthesia with ASA Monitors  Additional Monitors:   Airway Plan: NTT  Plan Factors-Exercise tolerance (METS): >4 METS  Chart reviewed  EKG reviewed  Patient summary reviewed  Induction- intravenous  Postoperative Plan-     Informed Consent- Anesthetic plan and risks discussed with patient

## 2022-01-24 NOTE — ASSESSMENT & PLAN NOTE
· Known history currently on Cardizem drip in setting of atrial fibrillation with RVR  · Blood pressure noted to be 118/68  · Continue medications per cardiology recommendation  · Routine vital monitoring

## 2022-01-24 NOTE — ED PROVIDER NOTES
History  Chief Complaint   Patient presents with    Rapid Heart Rate     Pt reports of rapid heart rate, was directed to come to the ED by her gastroenterologist due to rapid heart rate with concern for afib  Pt with Past Medical History: Anxiety, Shoulder arthritis, Carpal tunnel syndrome, Depression, Hypothyroid, Diverticulitis, HTN, Hyperlipidemia, Depression, mild Sleep apnea   Past Surgical History: APPENDECTOMY, B/L CATARACT EXTRACTION, FUSION CERVICAL POSTERIOR C1-C2, CHOLECYSTECTOMY, Right HAND SURGERY, ANDIBLE SURGERY, WISDOM TOOTH EXTRACTION x4  Was referred to ED from Dr Madrigal Sin office where she was ealier today for endoscopy/ colonoscopy was noted to have tachycardia, Afib  Patient denies chest pain, fever, shortness of breath  Patient does have some right lower extremity swelling was complaining of some left knee pain  Patient also noted to have pain, swelling, redness to right elbow, extending distally along complete forearm consistent with cellulitis  Patient thought it may have started as an insect bite; was seen by PCP on 1/21, had  x-rays, neg, then seen in this ED 1/22 was started on Augmentin and Bactrim which states she has been taking, sx continue to get worse          Prior to Admission Medications   Prescriptions Last Dose Informant Patient Reported? Taking?    ALPRAZolam (XANAX) 0 5 mg tablet  Self No No   Sig: Take 1 tablet (0 5 mg total) by mouth 2 (two) times a day as needed for anxiety   Calcium Carb-Cholecalciferol (CALCIUM 600+D3) 600-200 MG-UNIT TABS  Self Yes No   Sig: Take 1 tablet by mouth daily   Multiple Vitamins-Minerals (CENTRUM ADULTS PO)  Self Yes No   Sig: Take 1 tablet by mouth daily   acetaminophen (TYLENOL) 500 mg tablet  Self No No   Sig: Take 1 tablet (500 mg total) by mouth every 6 (six) hours as needed for mild pain   amoxicillin-clavulanate (AUGMENTIN) 875-125 mg per tablet   No No   Sig: Take 1 tablet by mouth every 12 (twelve) hours for 7 days   aspirin (ECOTRIN LOW STRENGTH) 81 mg EC tablet  Self Yes No   Sig: Take 81 mg by mouth daily   atorvastatin (LIPITOR) 10 mg tablet  Self No No   Sig: TAKE 1 TABLET BY MOUTH EVERY DAY AT BEDTIME   colchicine (COLCRYS) 0 6 mg tablet   No No   Sig: Take 1 tablet (0 6 mg total) by mouth daily   escitalopram (LEXAPRO) 10 mg tablet  Self No No   Sig: TAKE 1 TABLET(10 MG) BY MOUTH DAILY   levothyroxine 100 mcg tablet  Self No No   Sig: TAKE 1 TABLET(100 MCG) BY MOUTH DAILY   naproxen (NAPROSYN) 500 mg tablet  Self No No   Sig: Take 1 tablet (500 mg total) by mouth 2 (two) times a day with meals   naproxen (Naprosyn) 500 mg tablet   No No   Sig: Take 1 tablet (500 mg total) by mouth 2 (two) times a day with meals   omeprazole (PriLOSEC) 20 mg delayed release capsule  Self No No   Sig: TAKE 1 CAPSULE(20 MG) BY MOUTH DAILY BEFORE AND BREAKFAST   ramipril (ALTACE) 10 MG capsule  Self No No   Sig: Take 1 capsule (10 mg total) by mouth 2 (two) times a day   sulfamethoxazole-trimethoprim (BACTRIM DS) 800-160 mg per tablet   No No   Sig: Take 1 tablet by mouth 2 (two) times a day for 7 days smx-tmp DS (BACTRIM) 800-160 mg tabs (1tab q12 D10)      Facility-Administered Medications: None       Past Medical History:   Diagnosis Date    Anxiety     Arthritis     shoulder    Carpal tunnel syndrome     Depression     Disease of thyroid gland     Diverticulitis     HBP (high blood pressure)     Hyperlipidemia     Hypertension     Hypothyroid     Pneumonia     2017    PONV (postoperative nausea and vomiting)     Psychiatric disorder     Depression    Sleep apnea     mild        Past Surgical History:   Procedure Laterality Date    APPENDECTOMY      BREAST BIOPSY Right     benign    BREAST CYST ASPIRATION Right     benign    CATARACT EXTRACTION Bilateral     CATARACT EXTRACTION, BILATERAL  2019    CERVICAL FUSION N/A 5/25/2021    Procedure: FUSION CERVICAL POSTERIOR C1-C2 with allograft and neuromonitoring);   Surgeon: Nella Schaumann MD Janice;  [facility];  Service: Orthopedics    CHOLECYSTECTOMY      COLONOSCOPY      COLONOSCOPY W/ BIOPSIES      EGD      HAND SURGERY Right     MAMMO (HISTORICAL) Bilateral 2018    MAMMO (HISTORICAL) Bilateral 10/27/2017    MANDIBLE SURGERY      WISDOM TOOTH EXTRACTION      X4       Family History   Problem Relation Age of Onset    Heart disease Mother     Hypertension Mother     Hyperlipidemia Mother     Heart failure Mother     Hypertension Sister      I have reviewed and agree with the history as documented  E-Cigarette/Vaping    E-Cigarette Use Never User      E-Cigarette/Vaping Substances    Nicotine No     THC No     CBD No     Flavoring No     Other No     Unknown No      Social History     Tobacco Use    Smoking status: Former Smoker     Quit date:      Years since quittin 0    Smokeless tobacco: Never Used   Vaping Use    Vaping Use: Never used   Substance Use Topics    Alcohol use: No     Comment: last drink 7 years ago    Drug use: No       Review of Systems   Constitutional: Negative for chills and fever  HENT: Negative for hearing loss and sore throat  Eyes: Negative for visual disturbance  Respiratory: Negative for cough and shortness of breath  Cardiovascular: Negative for chest pain and leg swelling  Gastrointestinal: Negative for abdominal pain, constipation, diarrhea, nausea and vomiting  Genitourinary: Negative for dysuria, frequency and hematuria  Musculoskeletal: Positive for arthralgias, joint swelling and myalgias  Negative for gait problem  Skin: Positive for color change and wound  Negative for pallor  Neurological: Negative for dizziness, facial asymmetry, weakness and light-headedness  Psychiatric/Behavioral: Negative for behavioral problems  All other systems reviewed and are negative  Physical Exam  Physical Exam  Vitals and nursing note reviewed     Constitutional:       General: She is in acute distress  Appearance: She is well-developed  She is not ill-appearing  HENT:      Head: Normocephalic and atraumatic  Right Ear: External ear normal       Left Ear: External ear normal       Nose: Nose normal       Mouth/Throat:      Mouth: Mucous membranes are moist       Pharynx: Oropharynx is clear  Eyes:      Conjunctiva/sclera: Conjunctivae normal    Cardiovascular:      Rate and Rhythm: Tachycardia present  Rhythm irregular  Heart sounds: No murmur heard  Pulmonary:      Effort: Pulmonary effort is normal  No respiratory distress  Breath sounds: Normal breath sounds  No wheezing or rhonchi  Abdominal:      General: Bowel sounds are normal       Palpations: Abdomen is soft  Musculoskeletal:         General: Swelling and tenderness present  Normal range of motion  Cervical back: Normal range of motion  Comments: RLE appears larger then left  RUE:  Mild-mod diffuse tenderness, redness, warmth to right olecranon bursa which appears fluctuant, no drainage, subtle erythema and induration extends distally to complete forearm ending at ulnar styloid, full range of motion maintained, see pics   Skin:     General: Skin is warm and dry  Findings: Erythema and lesion present  Neurological:      General: No focal deficit present  Mental Status: She is alert and oriented to person, place, and time  Motor: No weakness     Psychiatric:         Behavior: Behavior normal                  Vital Signs  ED Triage Vitals   Temperature Pulse Respirations Blood Pressure SpO2   01/24/22 1034 01/24/22 1034 01/24/22 1034 01/24/22 1149 01/24/22 1034   98 5 °F (36 9 °C) (!) 146 20 118/68 97 %      Temp src Heart Rate Source Patient Position - Orthostatic VS BP Location FiO2 (%)   -- 01/24/22 1205 -- -- --    Monitor         Pain Score       --                  Vitals:    01/24/22 1034 01/24/22 1149 01/24/22 1205   BP:  118/68 113/71   Pulse: (!) 146 (!) 159 74         Visual Acuity      ED Medications  Medications   sodium chloride 0 9 % infusion (125 mL/hr Intravenous New Bag 1/24/22 1128)   vancomycin (VANCOCIN) IVPB (premix in dextrose) 1,000 mg 200 mL (1,000 mg Intravenous New Bag 1/24/22 1146)   diltiazem (CARDIZEM) 125 mg in sodium chloride 0 9 % 125 mL infusion (0 mg/hr Intravenous Hold 1/24/22 1156)   metoprolol tartrate (LOPRESSOR) tablet 25 mg (25 mg Oral Given 1/24/22 1149)   diltiazem (CARDIZEM) injection 10 mg (10 mg Intravenous Given 1/24/22 1151)       Diagnostic Studies  Results Reviewed     Procedure Component Value Units Date/Time    HS Troponin I 4hr [[id number]]     Lab Status: No result Specimen: Blood     HS Troponin I 2hr [[id number]]     Lab Status: No result Specimen: Blood     HS Troponin 0hr (reflex protocol) [[id number]] Collected: 01/24/22 1117    Lab Status: Final result Specimen: Blood from Arm, Left Updated: 01/24/22 1150     hs TnI 0hr 12 ng/L     Comprehensive metabolic panel [[id number]]  (Abnormal) Collected: 01/24/22 1117    Lab Status: Final result Specimen: Blood from Arm, Left Updated: 01/24/22 1140     Sodium 137 mmol/L      Potassium 3 9 mmol/L      Chloride 104 mmol/L      CO2 25 mmol/L      ANION GAP 8 mmol/L      BUN 14 mg/dL      Creatinine 1 05 mg/dL      Glucose 130 mg/dL      Calcium 9 6 mg/dL      AST 64 U/L      ALT 75 U/L      Alkaline Phosphatase 182 6 U/L      Total Protein 7 2 g/dL      Albumin 3 9 g/dL      Total Bilirubin 0 81 mg/dL      eGFR 52 ml/min/1 73sq m     Narrative:      [place] guidelines for Chronic Kidney Disease (CKD):     Stage 1 with normal or high GFR (GFR > 90 mL/min/1 73 square meters)    Stage 2 Mild CKD (GFR = 60-89 mL/min/1 73 square meters)    Stage 3A Moderate CKD (GFR = 45-59 mL/min/1 73 square meters)    Stage 3B Moderate CKD (GFR = 30-44 mL/min/1 73 square meters)    Stage 4 Severe CKD (GFR = 15-29 mL/min/1 73 square meters)    Stage 5 End Stage CKD (GFR <15 mL/min/1 73 square meters)  Note: GFR calculation is accurate only with a steady state creatinine    Phosphorus [[id number]]  (Abnormal) Collected: 01/24/22 1117    Lab Status: Final result Specimen: Blood from Arm, Left Updated: 01/24/22 1140     Phosphorus 2 2 mg/dL     Magnesium [[id number]]  (Normal) Collected: 01/24/22 1117    Lab Status: Final result Specimen: Blood from Arm, Left Updated: 01/24/22 1140     Magnesium 1 9 mg/dL     Blood culture #2 [[id number]] Collected: 01/24/22 1123    Lab Status: In process Specimen: Blood from Arm, Left Updated: 01/24/22 1130    CBC and differential [[id number]]  (Abnormal) Collected: 01/24/22 1117    Lab Status: Final result Specimen: Blood from Arm, Left Updated: 01/24/22 1123     WBC 15 57 Thousand/uL      RBC 3 85 Million/uL      Hemoglobin 11 8 g/dL      Hematocrit 36 2 %      MCV 94 fL      MCH 30 6 pg      MCHC 32 6 g/dL      RDW 12 6 %      MPV 10 1 fL      Platelets 873 Thousands/uL      nRBC 0 /100 WBCs      Neutrophils Relative 89 %      Immat GRANS % 0 %      Lymphocytes Relative 5 %      Monocytes Relative 6 %      Eosinophils Relative 0 %      Basophils Relative 0 %      Neutrophils Absolute 13 79 Thousands/µL      Immature Grans Absolute 0 06 Thousand/uL      Lymphocytes Absolute 0 72 Thousands/µL      Monocytes Absolute 0 88 Thousand/µL      Eosinophils Absolute 0 07 Thousand/µL      Basophils Absolute 0 05 Thousands/µL     Blood culture #1 [[id number]] Collected: 01/24/22 1117    Lab Status:  In process Specimen: Blood from Arm, Left Updated: 01/24/22 1121    UA w Reflex to Microscopic w Reflex to Culture [[id number]]     Lab Status: No result Specimen: Urine, Clean Catch                  VAS lower limb venous duplex study, complete bilateral    (Results Pending)              Procedures  Procedures         ED Course  ED Course as of 01/24/22 1242   Mon Jan 24, 2022   1210 VAS study neg for DVT   1210 After cardizem bolus, Pt HR 80s, appears Nsr, will order repeat ekg, will hold drip   1229 Repeat EK, NSR @ 65, incomplete, LBBB                                             MDM  Number of Diagnoses or Management Options  Cellulitis of right upper extremity  New onset a-fib ([address] )  Olecranon bursitis of right elbow  Diagnosis management comments: [place] text with Cardiology, Dr Missael Flores requests cardizem 10mg bolus and drip at 10mg/hr, metoprol 25mg BID  TT hospitalist, Dain Valenzuela  Pt converted after cardizem bolus, so drip help  Pt admitted       Amount and/or Complexity of Data Reviewed  Clinical lab tests: ordered and reviewed  Review and summarize past medical records: yes  Discuss the patient with other providers: yes        Disposition  Final diagnoses:   New onset a-fib ([address] )   Olecranon bursitis of right elbow   Cellulitis of right upper extremity     Time reflects when diagnosis was documented in both MDM as applicable and the Disposition within this note     Time User Action Codes Description Comment    2022 11:19 AM Eliel Hahn [I48 [id number]] New onset a-fib ([address] )     2022 12:34 PM Miroslava Ortega [M70 [id number]] Olecranon bursitis of right elbow     2022 12:35 PM Miroslava Ortega [[id number]] Cellulitis of right upper extremity       ED Disposition     ED Disposition Condition Date/Time Comment    Admit Stable  12:34 PM Case was discussed with Dain Valenzuela and the patient's admission status was agreed to be Admission Status: inpatient status to the service of Dr Dain Valenzuela   Follow-up Information    None         Patient's Medications   Discharge Prescriptions    No medications on file       No discharge procedures on file      PDMP Review       Value Time User    PDMP Reviewed  Yes 2021  1:05 PM Carlos Eduardo Pack          ED Provider  Electronically Signed by           Richard Torrez PA-C  [phone]

## 2022-01-24 NOTE — CONSULTS
Chief Complaint     Right elbow pain      History of the Present Illness     Margarita Savage is a 66 y o  female who presented to the ED today after being instructed to do so by her gastroenterologist as she was found to be in AFib when going to get a colonoscopy  We are being consulted for patient's right olecranon bursitis  Patient states this started Thursday  She denies known trauma to the area  States she thought this may have started as a spider bite, but there were no obvious wounds to the elbow  She was seen in the ED on 01/22/22 and prescribed Augmentin and Bactrim, which she did take  She states, however, that while her symptoms initially improved, her pain started to then increase  She is unsure if the elbow looks worse than it initially did  She states the pain is just located posteriorly along the elbow  She is able to move the elbow, but has a lot of pain when pressure is applied to the area  She states she did notice yesterday some light drainage from the wound, but she did not get a good look at it  Denies n/t  Past Medical History:   Diagnosis Date    Anxiety     Arthritis     shoulder    Carpal tunnel syndrome     Depression     Disease of thyroid gland     Diverticulitis     HBP (high blood pressure)     Hyperlipidemia     Hypertension     Hypothyroid     Pneumonia     2017    PONV (postoperative nausea and vomiting)     Psychiatric disorder     Depression    Sleep apnea     mild        Past Surgical History:   Procedure Laterality Date    APPENDECTOMY      BREAST BIOPSY Right     benign    BREAST CYST ASPIRATION Right     benign    CATARACT EXTRACTION Bilateral     CATARACT EXTRACTION, BILATERAL  2019    CERVICAL FUSION N/A 5/25/2021    Procedure: FUSION CERVICAL POSTERIOR C1-C2 with allograft and neuromonitoring);   Surgeon: Donato Rey MD;  Location: [facility];  Service: Orthopedics    CHOLECYSTECTOMY      COLONOSCOPY      COLONOSCOPY W/ BIOPSIES  2011    EGD      HAND SURGERY Right     MAMMO (HISTORICAL) Bilateral 11/27/2018    MAMMO (HISTORICAL) Bilateral 10/27/2017    MANDIBLE SURGERY      WISDOM TOOTH EXTRACTION      X4       No Known Allergies    Current Facility-Administered Medications on File Prior to Encounter   Medication Dose Route Frequency Provider Last Rate Last Admin    sodium chloride 0 9 % infusion  30 mL/hr Intravenous Continuous Russell Tamayo CRNA         Current Outpatient Medications on File Prior to Encounter   Medication Sig Dispense Refill    acetaminophen (TYLENOL) 500 mg tablet Take 1 tablet (500 mg total) by mouth every 6 (six) hours as needed for mild pain 30 tablet 0    ALPRAZolam (XANAX) 0 5 mg tablet Take 1 tablet (0 5 mg total) by mouth 2 (two) times a day as needed for anxiety 60 tablet 3    amoxicillin-clavulanate (AUGMENTIN) 875-125 mg per tablet Take 1 tablet by mouth every 12 (twelve) hours for 7 days 14 tablet 0    aspirin (ECOTRIN LOW STRENGTH) 81 mg EC tablet Take 81 mg by mouth daily      atorvastatin (LIPITOR) 10 mg tablet TAKE 1 TABLET BY MOUTH EVERY DAY AT BEDTIME 90 tablet 3    Calcium Carb-Cholecalciferol (CALCIUM 600+D3) 600-200 MG-UNIT TABS Take 1 tablet by mouth daily      colchicine (COLCRYS) 0 6 mg tablet Take 1 tablet (0 6 mg total) by mouth daily 30 tablet 5    escitalopram (LEXAPRO) 10 mg tablet TAKE 1 TABLET(10 MG) BY MOUTH DAILY 90 tablet 0    levothyroxine 100 mcg tablet TAKE 1 TABLET(100 MCG) BY MOUTH DAILY 90 tablet 3    Multiple Vitamins-Minerals (CENTRUM ADULTS PO) Take 1 tablet by mouth daily      naproxen (NAPROSYN) 500 mg tablet Take 1 tablet (500 mg total) by mouth 2 (two) times a day with meals 60 tablet 1    naproxen (Naprosyn) 500 mg tablet Take 1 tablet (500 mg total) by mouth 2 (two) times a day with meals 60 tablet 5    omeprazole (PriLOSEC) 20 mg delayed release capsule TAKE 1 CAPSULE(20 MG) BY MOUTH DAILY BEFORE AND BREAKFAST 90 capsule 0    ramipril (ALTACE) 10 MG capsule Take 1 capsule (10 mg total) by mouth 2 (two) times a day 180 capsule 3    sulfamethoxazole-trimethoprim (BACTRIM DS) 800-160 mg per tablet Take 1 tablet by mouth 2 (two) times a day for 7 days smx-tmp DS (BACTRIM) 800-160 mg tabs (1tab q12 D10) 14 tablet 0    [DISCONTINUED] acetaminophen-codeine (TYLENOL with CODEINE #3) 300-30 MG per tablet Take 1 tablet by mouth 2 (two) times a day as needed for moderate pain 30 tablet 0       Social History     Tobacco Use    Smoking status: Former Smoker     Quit date:      Years since quittin 0    Smokeless tobacco: Never Used   Vaping Use    Vaping Use: Never used   Substance Use Topics    Alcohol use: No     Comment: last drink 7 years ago    Drug use: No       Family History   Problem Relation Age of Onset    Heart disease Mother     Hypertension Mother     Hyperlipidemia Mother     Heart failure Mother     Hypertension Sister        Review of Systems     As stated in the HPI  All other systems were reviewed and are negative  Physical Exam     /59   Pulse 64   Temp 98 5 °F (36 9 °C)   Resp 18   Ht 5' 2 5" (1 588 m)   Wt 58 7 kg (129 lb 6 6 oz)   SpO2 99%   BMI 23 29 kg/m²     GENERAL: This is a well-developed, well-nourished, age-appropriate patient in no acute distress  The patient is alert and oriented x3  Pleasant and cooperative  Eyes: Anicteric sclerae  Extraocular movements appear intact  HENT: Nares are patent with no drainage  Lungs: There is equal chest rise on inspection  Breathing is non-labored with no audible wheezing  Cardiovascular: No cyanosis  No upper extremity lymphadema  Skin: Skin is warm to touch  No obvious skin lesions or rashes other than described below  Neurologic: No ataxia  Psychiatric: Mood and affect are appropriate  Right Upper Extremity:  Patient with +olecranon bursitis  + erythema and warmth  There is a small scab near the tip of the elbow, likely where it drained previously     No other lacerations/abrasions  Patient is severely ttp of this area  She has full flexion, extension limited by only approximately 10 degrees  Sensation and motor intact to m/r/u nerve distributions  Brisk capillary refill  Data Review     Results Reviewed     Procedure Component Value Units Date/Time    Blood culture #1 [[id number]] Collected: 01/24/22 1117    Lab Status: Preliminary result Specimen: Blood from Arm, Left Updated: 01/24/22 1601     Blood Culture Received in Microbiology Lab  Culture in Progress  Blood culture #2 [[id number]] Collected: 01/24/22 1123    Lab Status: Preliminary result Specimen: Blood from Arm, Left Updated: 01/24/22 1601     Blood Culture Received in Microbiology Lab  Culture in Progress      HS Troponin I 4hr [[id number]] Collected: 01/24/22 1513    Lab Status: Final result Specimen: Blood from Arm, Left Updated: 01/24/22 1548     hs TnI 4hr 10 ng/L      Delta 4hr hsTnI -2 ng/L     Platelet count [[id number]]     Lab Status: No result Specimen: Blood     MRSA culture [[id number]]     Lab Status: No result Specimen: Nares from Nose     HS Troponin I 2hr [[id number]] Collected: 01/24/22 1321    Lab Status: Final result Specimen: Blood from Arm, Left Updated: 01/24/22 1357     hs TnI 2hr 9 ng/L      Delta 2hr hsTnI -3 ng/L     HS Troponin 0hr (reflex protocol) [[id number]] Collected: 01/24/22 1117    Lab Status: Final result Specimen: Blood from Arm, Left Updated: 01/24/22 1150     hs TnI 0hr 12 ng/L     Comprehensive metabolic panel [[id number]]  (Abnormal) Collected: 01/24/22 1117    Lab Status: Final result Specimen: Blood from Arm, Left Updated: 01/24/22 1140     Sodium 137 mmol/L      Potassium 3 9 mmol/L      Chloride 104 mmol/L      CO2 25 mmol/L      ANION GAP 8 mmol/L      BUN 14 mg/dL      Creatinine 1 05 mg/dL      Glucose 130 mg/dL      Calcium 9 6 mg/dL      AST 64 U/L      ALT 75 U/L      Alkaline Phosphatase 182 6 U/L      Total Protein 7 2 g/dL      Albumin 3 9 g/dL      Total Bilirubin 0 81 mg/dL      eGFR 52 ml/min/1 73sq m     Narrative:      [place] guidelines for Chronic Kidney Disease (CKD):     Stage 1 with normal or high GFR (GFR > 90 mL/min/1 73 square meters)    Stage 2 Mild CKD (GFR = 60-89 mL/min/1 73 square meters)    Stage 3A Moderate CKD (GFR = 45-59 mL/min/1 73 square meters)    Stage 3B Moderate CKD (GFR = 30-44 mL/min/1 73 square meters)    Stage 4 Severe CKD (GFR = 15-29 mL/min/1 73 square meters)    Stage 5 End Stage CKD (GFR <15 mL/min/1 73 square meters)  Note: GFR calculation is accurate only with a steady state creatinine    Phosphorus [[id number]]  (Abnormal) Collected: 01/24/22 1117    Lab Status: Final result Specimen: Blood from Arm, Left Updated: 01/24/22 1140     Phosphorus 2 2 mg/dL     Magnesium [[id number]]  (Normal) Collected: 01/24/22 1117    Lab Status: Final result Specimen: Blood from Arm, Left Updated: 01/24/22 1140     Magnesium 1 9 mg/dL     CBC and differential [[id number]]  (Abnormal) Collected: 01/24/22 1117    Lab Status: Final result Specimen: Blood from Arm, Left Updated: 01/24/22 1123     WBC 15 57 Thousand/uL      RBC 3 85 Million/uL      Hemoglobin 11 8 g/dL      Hematocrit 36 2 %      MCV 94 fL      MCH 30 6 pg      MCHC 32 6 g/dL      RDW 12 6 %      MPV 10 1 fL      Platelets 122 Thousands/uL      nRBC 0 /100 WBCs      Neutrophils Relative 89 %      Immat GRANS % 0 %      Lymphocytes Relative 5 %      Monocytes Relative 6 %      Eosinophils Relative 0 %      Basophils Relative 0 %      Neutrophils Absolute 13 79 Thousands/µL      Immature Grans Absolute 0 06 Thousand/uL      Lymphocytes Absolute 0 72 Thousands/µL      Monocytes Absolute 0 88 Thousand/µL      Eosinophils Absolute 0 07 Thousand/µL      Basophils Absolute 0 05 Thousands/µL     UA w Reflex to Microscopic w Reflex to Culture [[id number]]     Lab Status: No result Specimen: Urine, Clean Catch              Imaging:  Xrays from 01/21/22 were personally reviewed and demonstrate no acute fracture/dislocation  Patient does have some chronic degeneration and enthesopathy noted along the posterior olecranon process  Assessment and Plan      66year old female with right septic olecranon bursitis  I discussed with the patient today that it does appear the bursa is infected  After discussion with Dr Krystal Paul, aspiration was performed which resulted in approximately 4cc of murky fluid being drained  This was sent for culture  Compressive ace wrap was applied to the elbow and patient should try to avoid leaning on this area  Continue IV abx per primary team, adjustments may have to be made based off of culture results  If patient discharged, we can have her follow up in the office later this week for reevaluation  If admitted, then we can follow up during her hospital stay  Procedure:  After consent was obtained, using sterile technique the right olecranon bursa was aspirated  Using an 18G needle, the olecranon bursa was entered and 4 ml's of murky colored fluid was withdrawn and sent for cultures  The needle was withdrawn and the bursa was notably smaller in size  The procedure was tolerated well  Watch for fever, or increased swelling or persistent pain in the bursa  Avoid leaning on the elbow

## 2022-01-25 ENCOUNTER — APPOINTMENT (INPATIENT)
Dept: RADIOLOGY | Facility: HOSPITAL | Age: 72
DRG: 308 | End: 2022-01-25
Payer: MEDICARE

## 2022-01-25 ENCOUNTER — APPOINTMENT (INPATIENT)
Dept: NON INVASIVE DIAGNOSTICS | Facility: HOSPITAL | Age: 72
DRG: 308 | End: 2022-01-25
Payer: MEDICARE

## 2022-01-25 LAB
ANION GAP SERPL CALCULATED.3IONS-SCNC: 7 MMOL/L (ref 4–13)
APICAL FOUR CHAMBER EJECTION FRACTION: 47 %
BUN SERPL-MCNC: 12 MG/DL (ref 6–20)
CALCIUM SERPL-MCNC: 9.6 MG/DL (ref 8.4–10.2)
CHLORIDE SERPL-SCNC: 106 MMOL/L (ref 96–108)
CO2 SERPL-SCNC: 24 MMOL/L (ref 22–33)
CREAT SERPL-MCNC: 0.82 MG/DL (ref 0.4–1.1)
ERYTHROCYTE [DISTWIDTH] IN BLOOD BY AUTOMATED COUNT: 12.5 % (ref 11.6–15.1)
FRACTIONAL SHORTENING: 29 % (ref 28–44)
GFR SERPL CREATININE-BSD FRML MDRD: 71 ML/MIN/1.73SQ M
GLUCOSE SERPL-MCNC: 115 MG/DL (ref 65–140)
HCT VFR BLD AUTO: 31.5 % (ref 34.8–46.1)
HGB BLD-MCNC: 10.3 G/DL (ref 11.5–15.4)
INTERVENTRICULAR SEPTUM IN DIASTOLE (PARASTERNAL SHORT AXIS VIEW): 1 CM (ref 0.49–0.91)
LAAS-AP4: 21.2 CM2
LEFT ATRIUM SIZE: 3.8 CM
LEFT INTERNAL DIMENSION IN SYSTOLE: 3 CM (ref 2.1–4)
LEFT VENTRICULAR INTERNAL DIMENSION IN DIASTOLE: 4.2 CM (ref 3.74–5.57)
LEFT VENTRICULAR POSTERIOR WALL IN END DIASTOLE: 1 CM (ref 0.48–0.9)
LEFT VENTRICULAR STROKE VOLUME: 43 ML
MCH RBC QN AUTO: 30.1 PG (ref 26.8–34.3)
MCHC RBC AUTO-ENTMCNC: 32.7 G/DL (ref 31.4–37.4)
MCV RBC AUTO: 92 FL (ref 82–98)
PLATELET # BLD AUTO: 292 THOUSANDS/UL (ref 149–390)
PMV BLD AUTO: 10.3 FL (ref 8.9–12.7)
POTASSIUM SERPL-SCNC: 4 MMOL/L (ref 3.5–5)
RA PRESSURE ESTIMATED: 3 MMHG
RBC # BLD AUTO: 3.42 MILLION/UL (ref 3.81–5.12)
RIGHT ATRIAL 2D VOLUME: 63 ML
RIGHT ATRIUM AREA SYSTOLE A4C: 20.9 CM2
RIGHT VENTRICLE ID DIMENSION: 3.3 CM
RV PSP: 51 MMHG
SL CV LV EF: 60
SL CV PED ECHO LEFT VENTRICLE DIASTOLIC VOLUME (MOD BIPLANE) 2D: 78 ML
SL CV PED ECHO LEFT VENTRICLE SYSTOLIC VOLUME (MOD BIPLANE) 2D: 34 ML
SODIUM SERPL-SCNC: 137 MMOL/L (ref 133–145)
TR MAX PG: 48 MMHG
TRICUSPID VALVE PEAK REGURGITATION VELOCITY: 3.91 M/S
WBC # BLD AUTO: 12.48 THOUSAND/UL (ref 4.31–10.16)
Z-SCORE OF INTERVENTRICULAR SEPTUM IN END DIASTOLE: 2.74
Z-SCORE OF LEFT VENTRICULAR DIMENSION IN END SYSTOLE: -0.84
Z-SCORE OF LEFT VENTRICULAR POSTERIOR WALL IN END DIASTOLE: 2.85

## 2022-01-25 PROCEDURE — 71045 X-RAY EXAM CHEST 1 VIEW: CPT

## 2022-01-25 PROCEDURE — 23931 I&D UPR A/E BURSA: CPT | Performed by: PHYSICIAN ASSISTANT

## 2022-01-25 PROCEDURE — 85027 COMPLETE CBC AUTOMATED: CPT | Performed by: NURSE PRACTITIONER

## 2022-01-25 PROCEDURE — 99222 1ST HOSP IP/OBS MODERATE 55: CPT | Performed by: INTERNAL MEDICINE

## 2022-01-25 PROCEDURE — 93306 TTE W/DOPPLER COMPLETE: CPT

## 2022-01-25 PROCEDURE — 99232 SBSQ HOSP IP/OBS MODERATE 35: CPT | Performed by: INTERNAL MEDICINE

## 2022-01-25 PROCEDURE — 99223 1ST HOSP IP/OBS HIGH 75: CPT | Performed by: INTERNAL MEDICINE

## 2022-01-25 PROCEDURE — 93306 TTE W/DOPPLER COMPLETE: CPT | Performed by: INTERNAL MEDICINE

## 2022-01-25 PROCEDURE — 0J9G0ZZ DRAINAGE OF RIGHT LOWER ARM SUBCUTANEOUS TISSUE AND FASCIA, OPEN APPROACH: ICD-10-PCS | Performed by: INTERNAL MEDICINE

## 2022-01-25 PROCEDURE — 99232 SBSQ HOSP IP/OBS MODERATE 35: CPT | Performed by: ORTHOPAEDIC SURGERY

## 2022-01-25 PROCEDURE — 80048 BASIC METABOLIC PNL TOTAL CA: CPT | Performed by: NURSE PRACTITIONER

## 2022-01-25 RX ORDER — LISINOPRIL 20 MG/1
40 TABLET ORAL DAILY
Status: DISCONTINUED | OUTPATIENT
Start: 2022-01-26 | End: 2022-01-28 | Stop reason: HOSPADM

## 2022-01-25 RX ORDER — ALBUTEROL SULFATE 2.5 MG/3ML
2.5 SOLUTION RESPIRATORY (INHALATION) EVERY 6 HOURS PRN
Status: DISCONTINUED | OUTPATIENT
Start: 2022-01-25 | End: 2022-01-28 | Stop reason: HOSPADM

## 2022-01-25 RX ADMIN — ESCITALOPRAM OXALATE 10 MG: 20 TABLET ORAL at 08:57

## 2022-01-25 RX ADMIN — METOPROLOL TARTRATE 25 MG: 25 TABLET, FILM COATED ORAL at 20:02

## 2022-01-25 RX ADMIN — ASPIRIN 81 MG: 81 TABLET, COATED ORAL at 08:56

## 2022-01-25 RX ADMIN — ENOXAPARIN SODIUM 60 MG: 60 INJECTION SUBCUTANEOUS at 20:03

## 2022-01-25 RX ADMIN — VANCOMYCIN HYDROCHLORIDE 1000 MG: 1 INJECTION, SOLUTION INTRAVENOUS at 15:26

## 2022-01-25 RX ADMIN — CALCIUM 1 TABLET: 500 TABLET ORAL at 08:56

## 2022-01-25 RX ADMIN — SODIUM CHLORIDE 75 ML/HR: 9 INJECTION, SOLUTION INTRAVENOUS at 04:41

## 2022-01-25 RX ADMIN — ACETAMINOPHEN 650 MG: 325 TABLET, FILM COATED ORAL at 17:31

## 2022-01-25 RX ADMIN — ENOXAPARIN SODIUM 60 MG: 60 INJECTION SUBCUTANEOUS at 08:56

## 2022-01-25 RX ADMIN — LEVOTHYROXINE SODIUM 100 MCG: 100 TABLET ORAL at 05:26

## 2022-01-25 RX ADMIN — ATORVASTATIN CALCIUM 10 MG: 10 TABLET, FILM COATED ORAL at 22:03

## 2022-01-25 RX ADMIN — Medication 1 TABLET: at 08:56

## 2022-01-25 RX ADMIN — METOPROLOL TARTRATE 25 MG: 25 TABLET, FILM COATED ORAL at 08:56

## 2022-01-25 RX ADMIN — PANTOPRAZOLE SODIUM 40 MG: 40 TABLET, DELAYED RELEASE ORAL at 05:26

## 2022-01-25 NOTE — PLAN OF CARE
Problem: MOBILITY - ADULT  Goal: Maintain or return to baseline ADL function  Description: INTERVENTIONS:  -  Assess patient's ability to carry out ADLs; assess patient's baseline for ADL function and identify physical deficits which impact ability to perform ADLs (bathing, care of mouth/teeth, toileting, grooming, dressing, etc )  - Assess/evaluate cause of self-care deficits   - Assess range of motion  - Assess patient's mobility; develop plan if impaired  - Assess patient's need for assistive devices and provide as appropriate  - Encourage maximum independence but intervene and supervise when necessary  - Involve family in performance of ADLs  - Assess for home care needs following discharge   - Consider OT consult to assist with ADL evaluation and planning for discharge  - Provide patient education as appropriate  Outcome: Not Progressing  Goal: Maintains/Returns to pre admission functional level  Description: INTERVENTIONS:  - Perform BMAT or MOVE assessment daily    - Set and communicate daily mobility goal to care team and patient/family/caregiver  - Collaborate with rehabilitation services on mobility goals if consulted  - Perform Range of Motion 2 times a day  - Reposition patient every 2 hours    - Dangle patient prn times a day  - Stand patient prn times a day  - Ambulate patient prn times a day  - Out of bed to chair prn times a day   - Out of bed for meals prn times a day  - Out of bed for toileting  - Record patient progress and toleration of activity level   Outcome: Not Progressing     Problem: Prexisting or High Potential for Compromised Skin Integrity  Goal: Skin integrity is maintained or improved  Description: INTERVENTIONS:  - Identify patients at risk for skin breakdown  - Assess and monitor skin integrity  - Assess and monitor nutrition and hydration status  - Monitor labs   - Assess for incontinence   - Turn and reposition patient  - Assist with mobility/ambulation  - Relieve pressure over bony prominences  - Avoid friction and shearing  - Provide appropriate hygiene as needed including keeping skin clean and dry  - Evaluate need for skin moisturizer/barrier cream  - Collaborate with interdisciplinary team   - Patient/family teaching  - Consider wound care consult   Outcome: Not Progressing

## 2022-01-25 NOTE — PROCEDURES
After sterile prep and drape with Betadine, 1% lidocaine was injected in the olecranon region and just lateral to the olecranon region injection of 5 mL 1% lidocaine olecranon region  Small incision made over the lateral aspect of the olecranon and large amount of purulent fluid was expressed as well as washed out with saline and hydrogen peroxide  Iodoform packing replaced and right olecranon wound  Sterile 4x4s ABD and 4 in Ace bandages placed  Patient tolerated procedure well

## 2022-01-25 NOTE — PROGRESS NOTES
[address]  Progress Note - Aliya Kulkarni 1950, 66 y o  female MRN: [id number]  Unit/Bed#: -01 Encounter: [id number]  Primary Care Provider: Sabiha Rodríguez MD   Date and time admitted to hospital: 1/24/2022 10:39 AM    * Sepsis [facility])  Assessment & Plan  · Present admission evident by tachycardia leukocytosis in setting of right upper extremity cellulitis  · Continue vancomycin for now obtain MRSA  · Obtain blood cultures x2 and procalcitonin  · Check lactic acid and PT INR  · CBC BMP in a m  · Concern could be in the olecranon  bursitis further will consult Orthopedic surgery  · Will given IV bolus of normal saline  · Orthopedic surgery was able to aspirate thick pus from the right elbow abscess  Will hold Lovenox today and start from tomorrow as per Ortho recommendation    01/25/2022  Sepsis resolved  Sepsis likely secondary to olecranon bursitis, infected  Currently on IV antibiotics  Reviewed Infectious Disease notes, patient probably would need I and D, reached out to on-call orthopedics by tiger text will follow-up recommendations    Cellulitis  Assessment & Plan  · Recent x-ray 01/21/2022 showed no evidence of fracture, gaseous pattern  · Will continue patient on vancomycin  · Obtain MRSA  · Await orthopedic recommendations    Atrial fibrillation with RVR ([address] )  Assessment & Plan  · New onset suspect in setting of sepsis from cellulitis  · Cardiology has been consulted recommendations appreciated  · Telemetry monitoring  · Echocardiogram-pending, follow-up report  · Currently patient is in sinus rhythm, Will continue p o  Metoprolol  · Currently on Lovenox, will transition to oral Eliquis once after patient has I&D of the right olecranon bursa        Hyperlipidemia  Assessment & Plan  · Continue statin    Primary hypertension  Assessment & Plan  · Known history currently on Cardizem drip in setting of atrial fibrillation with RVR  · Blood pressure noted to be 118/68  · Continue medications per cardiology recommendation  · Routine vital monitoring    Anxiety  Assessment & Plan  · Mood stable continue Xanax    Gastroesophageal reflux disease without esophagitis  Assessment & Plan  · Known history continue PPI            Subjective/Objective     Subjective:   Patient complains of right elbow pain, decreased range of motion due to pain, denies any fever, chills  Denies any chest pain, shortness of breath    Objective:  Vitals: Blood pressure 146/92, pulse 84, temperature 100 °F (37 8 °C), temperature source Tympanic, resp  rate 20, height 5' 2" (1 575 m), weight 58 5 kg (129 lb), SpO2 96 %  ,Body mass index is 23 59 kg/m²  Intake/Output Summary (Last 24 hours) at 1/25/2022 1506  Last data filed at 1/25/2022 8633  Gross per 24 hour   Intake 360 ml   Output --   Net 360 ml       Invasive Devices  Report    Peripheral Intravenous Line            Peripheral IV 01/24/22 Left Antecubital 1 day    Peripheral IV 01/24/22 Left Forearm 1 day                Physical Exam:  General appearance:  Patient not in acute distress  Eyes:  Pupils equal reacting to light  ENT:  Moist oral mucous membranes  CVS:  S1-S2 heard, regular rate and rhythm, no pedal edema  Chest:  Bilateral air entry present, clear to auscultation  Abdomen:  Soft, nontender, bowel sounds present  CNS:  No focal neurological deficits  Genitourinary: deferred  Skin:  No acute rash   psychiatric:  No psychosis  Musculoskeletal:  Right elbow olecranon bursa swelling, erythema, tender to palpation  Surrounding area is also erythematous  Lab, Imaging and other studies: I have personally reviewed pertinent reports  VTE Pharmacologic Prophylaxis: Enoxaparin (Lovenox)  VTE Mechanical Prophylaxis: sequential compression device     Discussed with the nursing staff, discussed with the patient, discussed with Cardiology  Requires further inpatient management    Possible discharge in the next 48 hours depending on I&D

## 2022-01-25 NOTE — PLAN OF CARE
Problem: MOBILITY - ADULT  Goal: Maintain or return to baseline ADL function  Description: INTERVENTIONS:  -  Assess patient's ability to carry out ADLs; assess patient's baseline for ADL function and identify physical deficits which impact ability to perform ADLs (bathing, care of mouth/teeth, toileting, grooming, dressing, etc )  - Assess/evaluate cause of self-care deficits   - Assess range of motion  - Assess patient's mobility; develop plan if impaired  - Assess patient's need for assistive devices and provide as appropriate  - Encourage maximum independence but intervene and supervise when necessary  - Involve family in performance of ADLs  - Assess for home care needs following discharge   - Consider OT consult to assist with ADL evaluation and planning for discharge  - Provide patient education as appropriate  Outcome: Progressing  Goal: Maintains/Returns to pre admission functional level  Description: INTERVENTIONS:  - Perform BMAT or MOVE assessment daily    - Set and communicate daily mobility goal to care team and patient/family/caregiver  - Collaborate with rehabilitation services on mobility goals if consulted  - Reposition patient every 2 hours    -- Out of bed for meals 3 times a day  - Out of bed for toileting  - Record patient progress and toleration of activity level   Outcome: Progressing     Problem: Potential for Falls  Goal: Patient will remain free of falls  Description: INTERVENTIONS:  - Educate patient/family on patient safety including physical limitations  - Instruct patient to call for assistance with activity   - Consult OT/PT to assist with strengthening/mobility   - Keep Call bell within reach  - Keep bed low and locked with side rails adjusted as appropriate  - Keep care items and personal belongings within reach  - Initiate and maintain comfort rounds  - Make Fall Risk Sign visible to staff  - Offer Toileting   - Initiate/Maintain bed alarm  - Obtain necessary fall risk management equipment    - Apply yellow socks and bracelet for high fall risk patients  - Consider moving patient to room near nurses station  Outcome: Progressing     Problem: Prexisting or High Potential for Compromised Skin Integrity  Goal: Skin integrity is maintained or improved  Description: INTERVENTIONS:  - Identify patients at risk for skin breakdown  - Assess and monitor skin integrity  - Assess and monitor nutrition and hydration status  - Monitor labs   - Assess for incontinence   - Turn and reposition patient  - Assist with mobility/ambulation  - Relieve pressure over bony prominences  - Avoid friction and shearing  - Provide appropriate hygiene as needed including keeping skin clean and dry  - Evaluate need for skin moisturizer/barrier cream  - Collaborate with interdisciplinary team   - Patient/family teaching  - Consider wound care consult   Outcome: Progressing

## 2022-01-25 NOTE — ASSESSMENT & PLAN NOTE
· Present admission evident by tachycardia leukocytosis in setting of right upper extremity cellulitis  · Continue vancomycin for now obtain MRSA  · Obtain blood cultures x2 and procalcitonin  · Check lactic acid and PT INR  · CBC BMP in a m  · Concern could be in the olecranon  bursitis further will consult Orthopedic surgery  · Will given IV bolus of normal saline  · Orthopedic surgery was able to aspirate thick pus from the right elbow abscess  Will hold Lovenox today and start from tomorrow as per Ortho recommendation    01/25/2022  Sepsis resolved  Sepsis likely secondary to olecranon bursitis, infected  Currently on IV antibiotics    Reviewed Infectious Disease notes, patient probably would need I and D, reached out to on-call orthopedics by tiger text will follow-up recommendations

## 2022-01-25 NOTE — PROGRESS NOTES
Progress Note - Orthopedics   Liz Burr 66 y o  female MRN: [id number]  Unit/Bed#: EA CAR NON INV      Subjective:    66 y  o female right olecranon septic bursitis  Patient states she does have pain posterior aspect of her elbow which is improved following her aspiration however this persist   She denies any numbness tingling radiation pain states pain is localized her right elbow denies any pain with motion of her right elbow    She denies any changes numbness tingling right lower extremity  Denies fevers chills, CP, SOB    Labs:  0   Lab Value Date/Time    HCT 31 5 (L) 01/25/2022 0439    HCT 36 2 01/24/2022 1117    HCT 36 6 01/22/2022 1632    HGB 10 3 (L) 01/25/2022 0439    HGB 11 8 01/24/2022 1117    HGB 12 2 01/22/2022 1632    INR 0 98 04/26/2021 1416    WBC 12 48 (H) 01/25/2022 0439    WBC 15 57 (H) 01/24/2022 1117    WBC 16 84 (H) 01/22/2022 1632       Meds:    Current Facility-Administered Medications:     acetaminophen (TYLENOL) tablet 650 mg, 650 mg, Oral, Q6H PRN, Bettyjane Lev, CRNP    albuterol inhalation solution 2 5 mg, 2 5 mg, Nebulization, Q6H PRN, Lazaro Johnson MD    ALPRAZolam Yariel Francois) tablet 0 5 mg, 0 5 mg, Oral, BID PRN, Bettyjane Lev, CRNP    aspirin (ECOTRIN LOW STRENGTH) EC tablet 81 mg, 81 mg, Oral, Daily, Bettyjane Lev, CRNP, 81 mg at 01/25/22 0856    atorvastatin (LIPITOR) tablet 10 mg, 10 mg, Oral, HS, Bettyjane Lev, CRNP, 10 mg at 01/24/22 2132    calcium carbonate (OYSTER SHELL,OSCAL) 500 mg tablet 1 tablet, 1 tablet, Oral, Daily With Breakfast, Bettyjane Lev, CRNP, 1 tablet at 01/25/22 0856    enoxaparin (LOVENOX) subcutaneous injection 60 mg, 1 mg/kg, Subcutaneous, Q12H [facility], Tami Torres MD, 60 mg at 01/25/22 0856    escitalopram (LEXAPRO) tablet 10 mg, 10 mg, Oral, Daily, CARLOS Dinero, 10 mg at 01/25/22 0857    levothyroxine tablet 100 mcg, 100 mcg, Oral, Early Morning, CARLOS Dinero, 100 mcg at 01/25/22 0526    [START ON 1/26/2022] lisinopril (ZESTRIL) tablet 40 mg, 40 mg, Oral, Daily, Bettyjane Lev, CRNP    metoprolol tartrate (LOPRESSOR) tablet 25 mg, 25 mg, Oral, Q12H [facility], Tami Torres MD, 25 mg at 01/25/22 0856    multivitamin-minerals (CENTRUM ADULTS) tablet 1 tablet, 1 tablet, Oral, Daily, Bettyjane Lev, CRNP, 1 tablet at 01/25/22 0856    ondansetron (ZOFRAN) injection 4 mg, 4 mg, Intravenous, Q6H PRN, Bettyjane Lev, CRNP    pantoprazole (PROTONIX) EC tablet 40 mg, 40 mg, Oral, Early Morning, Bettyjane Lev, CRNP, 40 mg at 01/25/22 0526    [START ON 1/26/2022] vancomycin (VANCOCIN) 1,250 mg in sodium chloride 0 9 % 250 mL IVPB, 20 mg/kg, Intravenous, Q24H, Bettyjane Lev, CRNP    Facility-Administered Medications Ordered in Other Encounters:     sodium chloride 0 9 % infusion, 30 mL/hr, Intravenous, Continuous, Alvarez Ardon CRNA    Blood Culture:   Lab Results   Component Value Date    BLOODCX No Growth at 24 hrs  01/24/2022       Wound Culture:   No results found for: WOUNDCULT    Ins and Outs:  I/O last 24 hours: In: 560 [P O :360; IV Piggyback:200]  Out: -           Physical:  Vitals:    01/25/22 1356   BP: 146/92   Pulse: 84   Resp:    Temp:    SpO2:      Musculoskeletal: right Upper Extremity  · Skin erythematous around the olecranon region  · TTP of the olecranon region  · Full range of motion without pain full elbow flexion extension rotation of the forearm distal pulses present  · SILT m/r/u  Motor intact ain/pin/m/r/u, 2+ radial pulse      Assessment:    66 y  o female right olecranon septic bursitis     Plan:  · WBAT right upper extremity  · Patient interviewed and examined by Dr Latoya Beck and myself  · Risk and benefits reviewed with the patient for incision and drainage with washout at bedside this is performed as per procedure note  · Continue antibiotics per primary team  · Pain control  · Dispo: Ortho will follow    Jairon Tom PA-C

## 2022-01-25 NOTE — CONSULTS
Consultation - Infectious Disease   Margarita Savage 66 y o  female MRN: [id number]  Unit/Bed#: -01 Encounter: [id number]      IMPRESSION & RECOMMENDATIONS:   1  SIRS versus Sepsis  POA with tachycardia and leukocytosis  Patient found to be in AFib with RVR responding to Cardizem  White cell count is trending down  Admission blood cultures negative to date  Source appears to be #2    -continue vancomycin IV  -Pharmacy on consult for vancomycin trough dosing management  -follow-up cultures and adjust antibiotics as indicated  -monitor temperature and hemodynamics  -serial exam  -orthopedic follow-up in plan as below  -recheck CBC and BMP in a m      2  Right olecranon bursitis/cellulitis  Without improvement on 2 days of Augmentin/Bactrim outpatient  Right olecranon bursa was aspirated and 4 mL of murky colored fluid with white cell count of 315,482  Gram stain showed 4+ polys and no organisms seen so far  Patient with notable tenderness and swelling on exam yet  -antibiotics as above  -follow-up aspirate cultures  -monitor temperature and hemodynamics  -arm elevation  -serial exam  -close orthopedic surgical follow-up  -may need further I and D  -recheck CBC and BMP     3  New onset AFib  In setting #1/2  Patient now returned to sinus rhythm  -Cardiology ongoing management  -volume management   -recheck BMP     4  GERD and change in bowel habits  Patient elective EGD/colonoscopy postponed secondary to above  -monitor symptoms  -symptomatic management per primary care team  -follow-up with GI outpatient    Antibiotics:  Vancomycin D2    I have discussed the above management plan in detail with patient, RN, and the primary service    Extensive review of the medical records in epic including review of the notes, radiographs, and laboratory results     HISTORY OF PRESENT ILLNESS:  Reason for Consult: 1  olecranon bursitis of right elbow 2   Cellulitis of right upper extremity    HPI: Margarita Savage is a 66 y o  year old female with hypertension, hyperlipidemia, GERD, who presented to the ED for evaluation of atrial fibrillation with rapid ventricular rate on 1/24/22  Patient was scheduled for colonoscopy 1/24 am where she was found to be in Afib  Patient stated that she had an episode of vomiting and was given colonoscopy prep and had diarrhea  Also patient reported she may have had a spider bite and noted pain, redness and swelling in the right elbow and also some redness in the right wrist area starting last Thursday  Patient was prescribed Augmentin and Bactrim on 01/22 and she took it for 2 days  Patient stated that she has been having increasing pain and limited joint movement  Patient denies of fever or chills  In the ER, patient was noted to be in AFib with RVR and leukocytosis  Patient was evaluated by Orthopedic surgery in the ED and the right elbow abscess was aspirated  Right olecranon bursa was aspirated and 4 mL of murky colored fluid was withdrawn  Blood cultures were obtained and patient was started empirically on vancomycin IV  Infectious Disease is now being consulted regarding evaluation and management of olecranon bursitis of right elbow and cellulitis of right upper extremity  Fluid aspirated from the elbow had a white cell count of 315,482  Gram stain showed 4+ polys and no organisms seen so far  Patient reports significant discomfort in right elbow area yet  Her hands are dry and her elbows little bit chapped skin 2 but she does not know of any definite injury to her right elbow  Patient was having the EGD and colonoscopy for some nausea and difficulty swallowing as well as bowel pattern changes with both constipation and diarrhea ongoing  Patient denies of any chest pain or shortness of breath or dizziness  Patient denies of any headache or blurry vision  REVIEW OF SYSTEMS:  A complete review of systems is negative other than that noted in the HPI      PAST MEDICAL HISTORY:  Past Medical History:   Diagnosis Date    Anxiety     Arthritis     shoulder    Carpal tunnel syndrome     Depression     Disease of thyroid gland     Diverticulitis     HBP (high blood pressure)     Hyperlipidemia     Hypertension     Hypothyroid     Pneumonia     2017    PONV (postoperative nausea and vomiting)     Psychiatric disorder     Depression    Sleep apnea     mild      Past Surgical History:   Procedure Laterality Date    APPENDECTOMY      BREAST BIOPSY Right     benign    BREAST CYST ASPIRATION Right     benign    CATARACT EXTRACTION Bilateral     CATARACT EXTRACTION, BILATERAL  2019    CERVICAL FUSION N/A 2021    Procedure: FUSION CERVICAL POSTERIOR C1-C2 with allograft and neuromonitoring); Surgeon: Loli Núñez MD;  Location: [facility];  Service: Orthopedics    CHOLECYSTECTOMY      COLONOSCOPY      COLONOSCOPY W/ BIOPSIES      EGD      HAND SURGERY Right     MAMMO (HISTORICAL) Bilateral 2018    MAMMO (HISTORICAL) Bilateral 10/27/2017    MANDIBLE SURGERY      WISDOM TOOTH EXTRACTION      X4       FAMILY HISTORY:  Non-contributory    SOCIAL HISTORY:  Social History   Social History     Substance and Sexual Activity   Alcohol Use None    Comment: last drink 7 years ago     Social History     Substance and Sexual Activity   Drug Use Not on file     Social History     Tobacco Use   Smoking Status Former Smoker    Quit date:     Years since quittin 0   Smokeless Tobacco Never Used       ALLERGIES:  No Known Allergies    MEDICATIONS:  All current active medications have been reviewed      PHYSICAL EXAM:  Temp:  [97 9 °F (36 6 °C)-100 °F (37 8 °C)] 100 °F (37 8 °C)  HR:  [64-90] 84  Resp:  [18-24] 20  BP: (100-163)/() 146/92  SpO2:  [96 %-100 %] 96 %  Temp (24hrs), Av 8 °F (37 1 °C), Min:97 9 °F (36 6 °C), Max:100 °F (37 8 °C)  Current: Temperature: 100 °F (37 8 °C)    Intake/Output Summary (Last 24 hours) at 2022 1323  Last data filed at 1/25/2022 5819  Gross per 24 hour   Intake 360 ml   Output --   Net 360 ml       General Appearance:  Patient is a 69-year-old pleasant elderly female, propped comfortably resting in bed, appearing nontoxic, and in no distress   Head:  Normocephalic, without obvious abnormality, atraumatic   Eyes:  Conjunctiva pink and sclera anicteric, both eyes   Nose: Nares normal, mucosa normal, no drainage   Throat: Oropharynx moist without lesions   Neck: Supple, symmetrical, no adenopathy, no tenderness/mass/nodules   Back:   Symmetric, no curvature, ROM normal, no CVA tenderness   Lungs:   Clear to auscultation bilaterally, respirations unlabored   Chest Wall:  No tenderness or deformity   Heart:  RRR; no murmur, rub or gallop   Abdomen:   Soft, non-tender, non-distended, positive bowel sounds    Extremities: No cyanosis, clubbing, + RUE warmth erythema and tenderness surrounding olecranon bursa       Patient has increased tenderness with active range of motion  Skin: No rashes or lesions  Arm IV site nontender   Lymph nodes: Cervical, supraclavicular nodes normal   Neurologic: Alert and oriented times 3, extremity strength 5/5 and symmetric       LABS, IMAGING, & OTHER STUDIES:  Lab Results:  I have personally reviewed pertinent labs  Results from last 7 days   Lab Units 01/25/22  0439 01/24/22  1734 01/24/22  1117 01/22/22  1632 01/22/22  1632   WBC Thousand/uL 12 48*  --  15 57*  --  16 84*   HEMOGLOBIN g/dL 10 3*  --  11 8  --  12 2   PLATELETS Thousands/uL 292 261 302   < > 264    < > = values in this interval not displayed       Results from last 7 days   Lab Units 01/25/22  0439 01/24/22  1117 01/24/22  1117 01/22/22  1631 01/22/22  1631   SODIUM mmol/L 137  --  137  --  136   POTASSIUM mmol/L 4 0   < > 3 9   < > 3 9   CHLORIDE mmol/L 106   < > 104   < > 102   CO2 mmol/L 24   < > 25   < > 27   BUN mg/dL 12   < > 14   < > 17   CREATININE mg/dL 0 82   < > 1 05   < > 0 91   EGFR ml/min/1 73sq m 71   < > 52   < > 62   CALCIUM mg/dL 9 6   < > 9 6   < > 10 0   AST U/L  --   --  64*  --   --    ALT U/L  --   --  75*  --   --    ALK PHOS U/L  --   --  182 6*  --   --     < > = values in this interval not displayed  Results from last 7 days   Lab Units 01/24/22  1729 01/24/22  1123 01/24/22  1117 01/22/22  1631   BLOOD CULTURE   --  Received in Microbiology Lab  Culture in Progress  Received in Microbiology Lab  Culture in Progress  No Growth at 48 hrs  GRAM STAIN RESULT  4+ Polys  No organisms seen  --   --   --                        Imaging Studies:   Studies personally reviewed in PACS     01/21/2022 right elbow x-ray:  No joint effusion seen at that time and no fracture    Other Studies:   I have personally reviewed pertinent reports  Wheelchair/Stroller

## 2022-01-25 NOTE — ASSESSMENT & PLAN NOTE
· New onset suspect in setting of sepsis from cellulitis  · Cardiology has been consulted recommendations appreciated  · Telemetry monitoring  · Echocardiogram-pending, follow-up report  · Currently patient is in sinus rhythm, Will continue p o  Metoprolol  · Currently on Lovenox, will transition to oral Eliquis once after patient has I&D of the right olecranon bursa

## 2022-01-25 NOTE — CONSULTS
Consultation - Cardiology   Lenore Eli 66 y o  female MRN: [id number]  Unit/Bed#: -01 Encounter: [id number]        Inpatient consult to Cardiology  Consult performed by: Lior Smart MD  Consult ordered by: Jess Argueta PA-C            Assessment/Plan   Principal Problem:    Sepsis [facility])  Active Problems:    Gastroesophageal reflux disease without esophagitis    Anxiety    Primary hypertension    Hyperlipidemia    Atrial fibrillation with RVR ([address] )    Cellulitis    New onset atrial fibrillation with rapid ventricular response:  Minimally symptomatic  Ventricular rate was 150 on presentation  Clinically now appears to be in sinus rhythm  Repeat EKG requested  Currently off telemetry  Continue Lopressor 25 mg twice a day  May increase the dose to 50 mg twice a day if blood pressure tolerates  Lovenox has been started  Can transition to oral anticoagulation with Eliquis 5 mg b i d  once no further invasive procedures planned due to her elevated chads Vasc score  Anticoagulation can be held 2 days prior to EGD and colonoscopy once that is rescheduled  Echocardiogram has been requested  Hypertension:  Stage I, controlled on the current medicines  Right upper extremity cellulitis and right elbow abscess:  Status post joint aspiration  On IV antibiotics  Clinically does not appear to be septic  Dyslipidemia:  On statins    Elevated ASCVD risk score:  Currently does not have angina  Continue risk factor modification  Physician Requesting Consult: Felice Allen MD    Reason for Consult / Principal Problem:   Chief Complaint   Patient presents with    Rapid Heart Rate     Pt reports of rapid heart rate, was directed to come to the ED by her gastroenterologist due to rapid heart rate with concern for afib  HPI: Lenore Eli is a 66y o  year old female  admitted from the emergency room with new onset atrial fibrillation with rapid ventricular response    She presented to her gastroenterologist yesterday for EGD and colonoscopy due to complaints of change in bowel habit and dysphagia  She was found to have an irregular pulse an EKG showed rapid atrial fibrillation  In addition she was found to have olecrenon  bursitis of the right elbow with right upper extremity cellulitis  In light of this finding she was admitted to the inpatient service  In emergency room, she was placed on IV Cardizem and p o  Lopressor and converted to normal sinus rhythm  She denies any chest pain or shortness of breath at the current time  She reports dysphagia  States that her food gets stuck in the lower chest   She also reports some constipation and diarrhea  She has no prior history of atrial fibrillation  No prior history of TIA or stroke  No bleeding disorder  No obvious contraindication of anticoagulation therapy  She reports chronic exertional dyspnea  She has no known coronary artery disease  She is a former smoker  She has a history of hypertension  She has had aspiration from right elbow abscess performed yesterday orthopedic surgery  Still reports some discomfort in the right elbow  This infection was felt to be related to spider bite  Currently she is not on telemetry  No EKG performed this morning  She is currently on Lovenox  The 10-year ASCVD risk score (Suzy Marques et al , 2013) is: 20 9%    Values used to calculate the score:      Age: 66 years      Sex: Female      Is Non- : No      Diabetic: No      Tobacco smoker: No      Systolic Blood Pressure: 717 mmHg      Is BP treated: Yes      HDL Cholesterol: 66 mg/dL      Total Cholesterol: 163 mg/dL      Review of Systems   Constitutional: Positive for fatigue and fever  Negative for unexpected weight change  HENT: Negative for congestion and trouble swallowing  Eyes: Negative for visual disturbance  Respiratory: Negative for chest tightness, shortness of breath and wheezing  Cardiovascular: Negative for chest pain, palpitations and leg swelling  Gastrointestinal: Positive for constipation and diarrhea  Negative for abdominal pain and blood in stool  Dysphagia   Endocrine: Negative for polyuria  Genitourinary: Negative for hematuria and vaginal bleeding  Musculoskeletal: Positive for arthralgias and joint swelling  Negative for myalgias  Skin: Positive for color change and rash  Neurological: Negative for dizziness, tremors and weakness  Hematological: Does not bruise/bleed easily  Psychiatric/Behavioral: Positive for sleep disturbance  Historical Information   Past Medical History:   Diagnosis Date    Anxiety     Arthritis     shoulder    Carpal tunnel syndrome     Depression     Disease of thyroid gland     Diverticulitis     HBP (high blood pressure)     Hyperlipidemia     Hypertension     Hypothyroid     Pneumonia     2017    PONV (postoperative nausea and vomiting)     Psychiatric disorder     Depression    Sleep apnea     mild      Past Surgical History:   Procedure Laterality Date    APPENDECTOMY      BREAST BIOPSY Right     benign    BREAST CYST ASPIRATION Right     benign    CATARACT EXTRACTION Bilateral     CATARACT EXTRACTION, BILATERAL  2019    CERVICAL FUSION N/A 5/25/2021    Procedure: FUSION CERVICAL POSTERIOR C1-C2 with allograft and neuromonitoring);   Surgeon: Dax Cunningham MD;  [facility]: [facility];  Service: Orthopedics    CHOLECYSTECTOMY      COLONOSCOPY      COLONOSCOPY W/ BIOPSIES  2011    EGD      HAND SURGERY Right     MAMMO (HISTORICAL) Bilateral 11/27/2018    MAMMO (HISTORICAL) Bilateral 10/27/2017    MANDIBLE SURGERY      WISDOM TOOTH EXTRACTION      X4     Social History     Substance and Sexual Activity   Alcohol Use None    Comment: last drink 7 years ago     Social History     Substance and Sexual Activity   Drug Use Not on file     Social History     Tobacco Use   Smoking Status Former Smoker  Quit date:     Years since quittin 0   Smokeless Tobacco Never Used     Family History   Problem Relation Age of Onset    Heart disease Mother     Hypertension Mother     Hyperlipidemia Mother     Heart failure Mother     Hypertension Sister        Allergies:  No Known Allergies    Medications:     Current Facility-Administered Medications:     acetaminophen (TYLENOL) tablet 650 mg, 650 mg, Oral, Q6H PRN, Mearl Grade, CRNP    ALPRAZolam Moses Glance) tablet 0 5 mg, 0 5 mg, Oral, BID PRN, Mearl Grade, CRNP    aspirin (ECOTRIN LOW STRENGTH) EC tablet 81 mg, 81 mg, Oral, Daily, Mearl Grade, CRNP, 81 mg at 22    atorvastatin (LIPITOR) tablet 10 mg, 10 mg, Oral, HS, Mearl Grade, CRNP, 10 mg at 22    calcium carbonate (OYSTER SHELL,OSCAL) 500 mg tablet 1 tablet, 1 tablet, Oral, Daily With Breakfast, Mearl Grade, CRNP    enoxaparin (LOVENOX) subcutaneous injection 60 mg, 1 mg/kg, Subcutaneous, Q12H [address], Juventino Guzman MD    escitalopram (LEXAPRO) tablet 10 mg, 10 mg, Oral, Daily, Mearl Grade, CRNP, 10 mg at 22    levothyroxine tablet 100 mcg, 100 mcg, Oral, Early Morning, Mearl Grade, CRNP, 100 mcg at 22    metoprolol tartrate (LOPRESSOR) tablet 25 mg, 25 mg, Oral, Q12H [address], Juventino Guzman MD, 25 mg at 22    multivitamin-minerals (CENTRUM ADULTS) tablet 1 tablet, 1 tablet, Oral, Daily, Mearl Grade, CRNP, 1 tablet at 22    ondansetron (ZOFRAN) injection 4 mg, 4 mg, Intravenous, Q6H PRN, Mearl Grade, CRNP    pantoprazole (PROTONIX) EC tablet 40 mg, 40 mg, Oral, Early Morning, Consuelo Quinteros, CRNP, 40 mg at 22 0526    sodium chloride 0 9 % infusion, 75 mL/hr, Intravenous, Continuous, Juventino Guzman MD, Last Rate: 75 mL/hr at 22 0441, 75 mL/hr at 22 0441    vancomycin (VANCOCIN) IVPB (premix in dextrose) 1,000 mg 200 mL, 15 mg/kg, Intravenous, Q24H, Jessica Alfredo, MD    Facility-Administered Medications Ordered in Other Encounters:     sodium chloride 0 9 % infusion, 30 mL/hr, Intravenous, Continuous, Jada Temple CRNA     Objective:   Vitals:   Vitals:    22 0739   BP: 146/92   Pulse: 84   Resp: 20   Temp: 100 °F (37 8 °C)   SpO2: 96%     Body mass index is 23 29 kg/m²  Orthostatic Blood Pressures      Most Recent Value   Blood Pressure 146/92 filed at 2022 8100   Patient Position - Orthostatic VS Lying filed at 2022 2319        Systolic (10HWJ), YFW:151 , Min:100 , PHI:589     Diastolic (49PFR), JY, Min:59, Max:100      Intake/Output Summary (Last 24 hours) at 2022 0809  Last data filed at 2022 0842  Gross per 24 hour   Intake 560 ml   Output --   Net 560 ml     Weight (last 2 days)     Date/Time Weight    22 1034 58 7 (129 41)        Invasive Devices  Report    Peripheral Intravenous Line            Peripheral IV 22 Left Antecubital <1 day    Peripheral IV 22 Left Forearm <1 day                Physical Exam  Constitutional:       General: She is not in acute distress  HENT:      Head: Normocephalic  Eyes:      Conjunctiva/sclera: Conjunctivae normal    Neck:      Vascular: No carotid bruit  Cardiovascular:      Rate and Rhythm: Normal rate and regular rhythm  Heart sounds: S1 normal and S2 normal  Murmur heard  Crescendo systolic murmur is present with a grade of 2/6  Pulmonary:      Effort: Pulmonary effort is normal       Breath sounds: Normal breath sounds  Abdominal:      General: Bowel sounds are normal  There is no distension  Tenderness: There is abdominal tenderness  Musculoskeletal:         General: Swelling and tenderness present  Right lower leg: No edema  Left lower leg: No edema  Comments: Right elbow and right arm feel warm  Mild erythema noted  Skin:     General: Skin is warm  Neurological:      General: No focal deficit present     Psychiatric:         Mood and Affect: Mood normal          Labs:       CBC with diff:   Results from last 7 days   Lab Units 01/25/22  0439 01/24/22  1734 01/24/22  1117 01/22/22  1632   WBC Thousand/uL 12 48*  --  15 57* 16 84*   HEMOGLOBIN g/dL 10 3*  --  11 8 12 2   HEMATOCRIT % 31 5*  --  36 2 36 6   MCV fL 92  --  94 92   PLATELETS Thousands/uL 292 261 302 264   MCH pg 30 1  --  30 6 30 7   MCHC g/dL 32 7  --  32 6 33 3   RDW % 12 5  --  12 6 12 5   MPV fL 10 3 9 7 10 1 9 4   NRBC AUTO /100 WBCs  --   --  0 0     CMP:  Results from last 7 days   Lab Units 01/25/22  0439 01/24/22  1117 01/22/22  1631   POTASSIUM mmol/L 4 0 3 9 3 9   CHLORIDE mmol/L 106 104 102   CO2 mmol/L 24 25 27   BUN mg/dL 12 14 17   CREATININE mg/dL 0 82 1 05 0 91   CALCIUM mg/dL 9 6 9 6 10 0   AST U/L  --  64*  --    ALT U/L  --  75*  --    ALK PHOS U/L  --  182 6*  --    EGFR ml/min/1 73sq m 71 52 62     NT-proBNP: No results found for: NTBNP   Magnesium:  Results from last 7 days   Lab Units 01/24/22  1117   MAGNESIUM mg/dL 1 9     Coags:    TSH:     Hemoglobin A1C:    Lipid Profile:   No results found for: CHOL  Lab Results   Component Value Date    HDL 66 09/29/2021    HDL 82 (H) 07/27/2016     Lab Results   Component Value Date    LDLCALC 83 09/29/2021    [address] 80 07/27/2016     Lab Results   Component Value Date    TRIG 68 8 09/29/2021    TRIG 42 07/27/2016       Imaging & Testing   Cardiac testing:     EKG reviewed personally:  Emergency room EKG showed atrial fibrillation with ventricular rate of 150, incomplete left bundle branch block pattern with poor  R-wave progression  Repeat EKG not performed after restoration of sinus rhythm  Currently off telemetry  Imaging:   I have personally reviewed pertinent films in PACS  XR elbow 2 vw right    Result Date: 1/21/2022  Narrative: RIGHT ELBOW INDICATION:   M25 421: Effusion, right elbow  Pain, swelling and redness over the olecranon process   COMPARISON:  None VIEWS:  XR ELBOW 2 VW RIGHT FINDINGS: There is no acute fracture or dislocation  There is no joint effusion  Mild to moderate degenerative changes throughout the elbow noted  Enthesopathy noted in the olecranon process  No lytic or blastic osseous lesion  Soft tissues are unremarkable  Impression: No acute osseous abnormality  Degenerative changes as described  Workstation performed: [id number]     VAS lower limb venous duplex study, complete bilateral    Result Date: 1/24/2022  Narrative:  [facility] REPORT [facility]INICAL: Indications: Patient c/o right calf swelling and left calf pain  No history of DVT/PE/CA/recent surgery or trauma  CONCLUSION:  Impression: RIGHT LOWER LIMB: No evidence of acute or chronic deep vein thrombosis  No evidence of superficial thrombophlebitis noted  LEFT LOWER LIMB: No evidence of acute or chronic deep vein thrombosis  No evidence of superficial thrombophlebitis noted  Technical findings were given to Kellen Finnegan PA-C immediately following exam   SIGNATURE: Electronically Signed by: Lorene Peabody, MD on 2022-01-24 01:25:25 PM      Georgia Barnes MD, [facility]      Portions of the record may have been created with voice recognition software  Occasional wrong word or "sound a like" substitutions may have occurred due to the inherent limitations of voice recognition software  Please read the chart carefully and recognize, using context, where substitutions have occurred  Please call the Brian Printers of the note for any clarifications

## 2022-01-25 NOTE — PROGRESS NOTES
Pt refusing breathing treatment, cxr completed, urine not needed, will await results from admitting collection

## 2022-01-26 LAB
ANION GAP SERPL CALCULATED.3IONS-SCNC: 7 MMOL/L (ref 4–13)
ATRIAL RATE: 167 BPM
BASOPHILS # BLD AUTO: 0.04 THOUSANDS/ΜL (ref 0–0.1)
BASOPHILS NFR BLD AUTO: 0 % (ref 0–1)
BUN SERPL-MCNC: 12 MG/DL (ref 6–20)
CALCIUM SERPL-MCNC: 9.2 MG/DL (ref 8.4–10.2)
CHLORIDE SERPL-SCNC: 103 MMOL/L (ref 96–108)
CO2 SERPL-SCNC: 25 MMOL/L (ref 22–33)
CREAT SERPL-MCNC: 0.73 MG/DL (ref 0.4–1.1)
EOSINOPHIL # BLD AUTO: 0.04 THOUSAND/ΜL (ref 0–0.61)
EOSINOPHIL NFR BLD AUTO: 0 % (ref 0–6)
ERYTHROCYTE [DISTWIDTH] IN BLOOD BY AUTOMATED COUNT: 12.5 % (ref 11.6–15.1)
GFR SERPL CREATININE-BSD FRML MDRD: 81 ML/MIN/1.73SQ M
GLUCOSE SERPL-MCNC: 173 MG/DL (ref 65–140)
HCT VFR BLD AUTO: 33.1 % (ref 34.8–46.1)
HGB BLD-MCNC: 11.1 G/DL (ref 11.5–15.4)
IMM GRANULOCYTES # BLD AUTO: 0.05 THOUSAND/UL (ref 0–0.2)
IMM GRANULOCYTES NFR BLD AUTO: 0 % (ref 0–2)
LYMPHOCYTES # BLD AUTO: 1 THOUSANDS/ΜL (ref 0.6–4.47)
LYMPHOCYTES NFR BLD AUTO: 9 % (ref 14–44)
MCH RBC QN AUTO: 30.7 PG (ref 26.8–34.3)
MCHC RBC AUTO-ENTMCNC: 33.5 G/DL (ref 31.4–37.4)
MCV RBC AUTO: 91 FL (ref 82–98)
MONOCYTES # BLD AUTO: 0.9 THOUSAND/ΜL (ref 0.17–1.22)
MONOCYTES NFR BLD AUTO: 8 % (ref 4–12)
NEUTROPHILS # BLD AUTO: 9.47 THOUSANDS/ΜL (ref 1.85–7.62)
NEUTS SEG NFR BLD AUTO: 83 % (ref 43–75)
NRBC BLD AUTO-RTO: 0 /100 WBCS
PLATELET # BLD AUTO: 302 THOUSANDS/UL (ref 149–390)
PMV BLD AUTO: 9.8 FL (ref 8.9–12.7)
POTASSIUM SERPL-SCNC: 3.9 MMOL/L (ref 3.5–5)
QRS AXIS: -28 DEGREES
QRSD INTERVAL: 120 MS
QT INTERVAL: 333 MS
QTC INTERVAL: 527 MS
RBC # BLD AUTO: 3.62 MILLION/UL (ref 3.81–5.12)
SODIUM SERPL-SCNC: 135 MMOL/L (ref 133–145)
T WAVE AXIS: 84 DEGREES
VENTRICULAR RATE: 150 BPM
WBC # BLD AUTO: 11.5 THOUSAND/UL (ref 4.31–10.16)

## 2022-01-26 PROCEDURE — 99233 SBSQ HOSP IP/OBS HIGH 50: CPT | Performed by: INTERNAL MEDICINE

## 2022-01-26 PROCEDURE — 99232 SBSQ HOSP IP/OBS MODERATE 35: CPT | Performed by: INTERNAL MEDICINE

## 2022-01-26 PROCEDURE — 93010 ELECTROCARDIOGRAM REPORT: CPT | Performed by: INTERNAL MEDICINE

## 2022-01-26 PROCEDURE — 80048 BASIC METABOLIC PNL TOTAL CA: CPT | Performed by: INTERNAL MEDICINE

## 2022-01-26 PROCEDURE — 99232 SBSQ HOSP IP/OBS MODERATE 35: CPT

## 2022-01-26 PROCEDURE — 85025 COMPLETE CBC W/AUTO DIFF WBC: CPT | Performed by: INTERNAL MEDICINE

## 2022-01-26 PROCEDURE — 99024 POSTOP FOLLOW-UP VISIT: CPT | Performed by: PHYSICIAN ASSISTANT

## 2022-01-26 RX ADMIN — ACETAMINOPHEN 650 MG: 325 TABLET, FILM COATED ORAL at 19:58

## 2022-01-26 RX ADMIN — METOPROLOL TARTRATE 25 MG: 25 TABLET, FILM COATED ORAL at 21:55

## 2022-01-26 RX ADMIN — ESCITALOPRAM OXALATE 10 MG: 20 TABLET ORAL at 08:56

## 2022-01-26 RX ADMIN — Medication 1 TABLET: at 08:56

## 2022-01-26 RX ADMIN — ATORVASTATIN CALCIUM 10 MG: 10 TABLET, FILM COATED ORAL at 21:56

## 2022-01-26 RX ADMIN — ENOXAPARIN SODIUM 60 MG: 60 INJECTION SUBCUTANEOUS at 21:56

## 2022-01-26 RX ADMIN — METOPROLOL TARTRATE 25 MG: 25 TABLET, FILM COATED ORAL at 08:56

## 2022-01-26 RX ADMIN — ONDANSETRON 4 MG: 2 INJECTION INTRAMUSCULAR; INTRAVENOUS at 00:24

## 2022-01-26 RX ADMIN — LEVOTHYROXINE SODIUM 100 MCG: 100 TABLET ORAL at 05:00

## 2022-01-26 RX ADMIN — VANCOMYCIN HYDROCHLORIDE 1250 MG: 1 INJECTION, POWDER, LYOPHILIZED, FOR SOLUTION INTRAVENOUS at 11:23

## 2022-01-26 RX ADMIN — PANTOPRAZOLE SODIUM 40 MG: 40 TABLET, DELAYED RELEASE ORAL at 05:00

## 2022-01-26 RX ADMIN — ACETAMINOPHEN 650 MG: 325 TABLET, FILM COATED ORAL at 08:54

## 2022-01-26 RX ADMIN — ASPIRIN 81 MG: 81 TABLET, COATED ORAL at 08:55

## 2022-01-26 RX ADMIN — CALCIUM 1 TABLET: 500 TABLET ORAL at 08:56

## 2022-01-26 RX ADMIN — LISINOPRIL 40 MG: 20 TABLET ORAL at 08:55

## 2022-01-26 RX ADMIN — ENOXAPARIN SODIUM 60 MG: 60 INJECTION SUBCUTANEOUS at 08:55

## 2022-01-26 NOTE — ASSESSMENT & PLAN NOTE
· New onset suspect in setting of sepsis  · Continue Telemetry monitoring  · Echocardiogram:  EF 60%  Mild MR  Moderate TR with mild to moderate pulmonary hypertension  · Currently patient is in sinus rhythm, Will continue p o  Metoprolol  · Currently on Lovenox, will transition to oral Eliquis after patient has I&D of the right olecranon bursa and there are no further indications for invasive procedures   · Cardiology consulted, appreciate recommendations  · Continue metoprolol  Long-term anticoagulation with Eliquis to lower stroke risk  Once Eliquis is started, aspirin can be discontinued to lower bleeding risk  Anticoagulation can be held 2 days prior to EGD/colonoscopy once that is rescheduled  Will need outpatient follow-up with Cardiology

## 2022-01-26 NOTE — ASSESSMENT & PLAN NOTE
· POA, Since Resolved  · SIRs: Tachycardia, and leukocytosis   · Sepsis likely secondary to olecranon bursitis vs right upper extremity cellulitis  · MRSA: Pending  · Blood cultures x2: Pending   · Procalcitonin/Lactic: Negative  · Wound Culture: Pending  · S/P  IV bolus of normal saline  · Continue with IV Vancomycin (Day 3)  · Orthopedic surgery consulted,  · S/P bedside aspiration of thick pus from the right elbow abscess  Will hold Lovenox today and start from tomorrow as per Ortho recommendation  S/P I&D bedside from 1/25  · Infectious Disease consulted  · Follow up cultures, can consider transitioning to oral antibiotics once culture sensitivities result     · Continue to monitor for worsening signs of infection  · Continue to monitor CBC/Fever curve  · Follow Up cultures

## 2022-01-26 NOTE — PROGRESS NOTES
Cardiology Progress Note - Manny Yo 66 y o  female MRN: [id number]    Unit/Bed#: -01 Encounter: [id number]        Hospital Problems:  Principal Problem:    Sepsis [facility])  Active Problems:    Gastroesophageal reflux disease without esophagitis    Anxiety    Primary hypertension    Hyperlipidemia    Atrial fibrillation with RVR (HCC)    Cellulitis      Assessment/Recommendations:    New onset atrial fibrillation with rapid ventricular response:   maintaining sinus rhythm now  Continue metoprolol  Currently on BID Lovenox  Long-term anticoagulation with Eliquis to lower stroke risk  Once Eliquis is started, aspirin can be discontinued to lower bleeding risk  Anticoagulation can be held 2 days prior to EGD and colonoscopy once that is rescheduled  Echocardiogram reviewed:  EF 60%  Mild MR  Moderate TR with mild-to-moderate pulmonary hypertension  Hypertension:  Stage I, controlled now      Right upper extremity cellulitis and right elbow abscess:  Status post incision and drainage  On IV antibiotics       Dyslipidemia:  On statins     Elevated ASCVD risk score:  No angina reported  Continue risk factor modification  Plan of care discussed with the patient  Outpatient Cardiology follow-up  Subjective:     Patient seen and examined  Overnight events reviewed  Denies palpitations  Denies chest pain  Maintain sinus rhythm  Telemetry reviewed:  Sinus rhythm    ECHO:    Result Date: 1/25/2022  Narrative: iLzz Nieves  Left Ventricle: Left ventricular cavity size is normal  The left ventricular ejection fraction is 60%  Systolic function is normal  Wall motion is normal  Diastolic function is normal    Aortic Valve: There is mild regurgitation    Mitral Valve: There is mild annular calcification  There is moderate regurgitation    Tricuspid Valve: There is moderate regurgitation  The estimated right ventricular systolic pressure is 69 85 mmHg         Review of System:  Review of ten system was conducted and was negative except for findings stated  elsewhere  Review of Systems   Constitutional: Positive for fatigue  Negative for fever and unexpected weight change  HENT: Negative for congestion and trouble swallowing  Eyes: Negative for visual disturbance  Respiratory: Negative for chest tightness, shortness of breath and wheezing  Cardiovascular: Negative for chest pain, palpitations and leg swelling  Gastrointestinal: Positive for constipation  Negative for abdominal pain and blood in stool  Endocrine: Negative for polyuria  Genitourinary: Negative for hematuria  Musculoskeletal: Positive for arthralgias, joint swelling and myalgias  Skin: Positive for color change and rash  Neurological: Negative for dizziness, tremors and weakness  Hematological: Does not bruise/bleed easily  Psychiatric/Behavioral: Negative for sleep disturbance              Current Facility-Administered Medications:     acetaminophen (TYLENOL) tablet 650 mg, 650 mg, Oral, Q6H PRN, Patsie Brittle, CRNP, 650 mg at 01/26/22 0854    albuterol inhalation solution 2 5 mg, 2 5 mg, Nebulization, Q6H PRN, Stella Gonzalez MD    ALPRAZolam Marvie See) tablet 0 5 mg, 0 5 mg, Oral, BID PRN, Patsie Brittle, CRNP    aspirin (ECOTRIN LOW STRENGTH) EC tablet 81 mg, 81 mg, Oral, Daily, Patsie Brittle, CRNP, 81 mg at 01/26/22 0855    atorvastatin (LIPITOR) tablet 10 mg, 10 mg, Oral, HS, Patsie Brittle, CRNP, 10 mg at 01/25/22 2203    calcium carbonate (OYSTER SHELL,OSCAL) 500 mg tablet 1 tablet, 1 tablet, Oral, Daily With Breakfast, Patjeanettee Britrenee, CRNP, 1 tablet at 01/26/22 0856    enoxaparin (LOVENOX) subcutaneous injection 60 mg, 1 mg/kg, Subcutaneous, Q12H [address], Shira Case MD, 60 mg at 01/26/22 0855    escitalopram (LEXAPRO) tablet 10 mg, 10 mg, Oral, Daily, Patsie Brittle, CRNP, 10 mg at 01/26/22 0856    levothyroxine tablet 100 mcg, 100 mcg, Oral, Early Morning, Patsie Brittle, CRNP, 100 mcg at 01/26/22 0500    lisinopril (ZESTRIL) tablet 40 mg, 40 mg, Oral, Daily, Mardel Marker, CRNP, 40 mg at 22 0855    metoprolol tartrate (LOPRESSOR) tablet 25 mg, 25 mg, Oral, Q12H [facility], Amy Manrique MD, 25 mg at 22 0856    multivitamin-minerals (CENTRUM ADULTS) tablet 1 tablet, 1 tablet, Oral, Daily, Mardel Marker, CRNP, 1 tablet at 22 0856    ondansetron (ZOFRAN) injection 4 mg, 4 mg, Intravenous, Q6H PRN, Mardel Marker, CRNP, 4 mg at 22 0024    pantoprazole (PROTONIX) EC tablet 40 mg, 40 mg, Oral, Early Morning, Mardel Marker, CRNP, 40 mg at 22 0500    vancomycin (VANCOCIN) 1,250 mg in sodium chloride 0 9 % 250 mL IVPB, 20 mg/kg, Intravenous, Q24H, Mardel Marker, CRNP    Facility-Administered Medications Ordered in Other Encounters:     sodium chloride 0 9 % infusion, 30 mL/hr, Intravenous, Continuous, Shahla Pratt CRNA     Objective:     Vitals:   Blood pressure 140/65, pulse 91, temperature 99 4 °F (37 4 °C), temperature source Tympanic, resp  rate 18, height 5' 2" (1 575 m), weight 58 5 kg (129 lb), SpO2 94 %  Body mass index is 23 59 kg/m²  Orthostatic Blood Pressures      Most Recent Value   Blood Pressure 140/65 filed at 2022 2300   Patient Position - Orthostatic VS Lying filed at 2022 9093         Systolic (94TRJ), ALP:555 , Min:130 , IB     Diastolic (07FHC), IXB:82, Min:65, Max:92    No intake or output data in the 24 hours ending 22 0911  Weight (last 2 days)     Date/Time Weight    22 1356 58 5 (129)    22 1034 58 7 (129 41)          Physical Exam:    GEN: No acute distress     EYES:  no icterus  ENT: no gross abnormality  HEART: regular rhythm, normal S1 and S2, grade 2 systolic murmurs  LUNGS:  No wheezing or crackles  ABDOMEN: non tender  EXTREMITIES:  Right arm in Ace wrap   decreased ROM  JOINTS:  Right elbow tenderness  NEURO:  no focal deficits  SKIN: warm, no lesions  PSYCH: Normal mood and affect       Labs & Results:        Results from last 7 days   Lab Units 01/26/22  0452 01/25/22  0439 01/24/22  1734 01/24/22  1117 01/24/22  1117   WBC Thousand/uL 11 50* 12 48*  --   --  15 57*   HEMOGLOBIN g/dL 11 1* 10 3*  --   --  11 8   HEMATOCRIT % 33 1* 31 5*  --   --  36 2   PLATELETS Thousands/uL 302 292 261   < > 302    < > = values in this interval not displayed  Results from last 7 days   Lab Units 01/26/22  0452 01/25/22  0439 01/24/22  1117   POTASSIUM mmol/L 3 9 4 0 3 9   CHLORIDE mmol/L 103 106 104   CO2 mmol/L 25 24 25   BUN mg/dL 12 12 14   CREATININE mg/dL 0 73 0 82 1 05   CALCIUM mg/dL 9 2 9 6 9 6   ALK PHOS U/L  --   --  182 6*   ALT U/L  --   --  75*   AST U/L  --   --  64*         Results from last 7 days   Lab Units 01/24/22  1117   MAGNESIUM mg/dL 1 9     No results for input(s): NTBNP in the last 72 hours  Imaging Studies:     XR chest portable    Result Date: 1/25/2022  Narrative: CHEST INDICATION:   Shortness of breath, wheezing  COMPARISON:  4/26/2021  EXAM PERFORMED/VIEWS:  XR CHEST PORTABLE Images:  1 FINDINGS: Cardiomediastinal silhouette appears unremarkable  There is mild central pulmonary vascular congestion  No focal airspace consolidation or effusions seen  No pneumothorax  Degenerative changes suspected in the thoracic spine  Impression: Mild central pulmonary vascular congestion  No focal airspace consolidation  Workstation performed: [id number]     XR elbow 2 vw right    Result Date: 1/21/2022  Narrative: RIGHT ELBOW INDICATION:   M25 421: Effusion, right elbow  Pain, swelling and redness over the olecranon process  COMPARISON:  None VIEWS:  XR ELBOW 2 VW RIGHT FINDINGS: There is no acute fracture or dislocation  There is no joint effusion  Mild to moderate degenerative changes throughout the elbow noted  Enthesopathy noted in the olecranon process  No lytic or blastic osseous lesion  Soft tissues are unremarkable  Impression: No acute osseous abnormality  Degenerative changes as described  Workstation performed: [id number]     VAS lower limb venous duplex study, complete bilateral    Result Date: 1/24/2022  Narrative:  [facility] REPORT CLINICAL: Indications: Patient c/o right calf swelling and left calf pain  No history of DVT/PE/CA/recent surgery or trauma  CONCLUSION:  Impression: RIGHT LOWER LIMB: No evidence of acute or chronic deep vein thrombosis  No evidence of superficial thrombophlebitis noted  LEFT LOWER LIMB: No evidence of acute or chronic deep vein thrombosis  No evidence of superficial thrombophlebitis noted  Technical findings were given to Kim Jacob PA-C immediately following exam   SIGNATURE: Electronically Signed by: Dina Cohen MD on 2022-01-24 01:25:25 PM              Available cardiology studies, imaging and lab results independently reviewed today  This note was completed in part utilizing m-modal fluency direct voice recognition software  Grammatical errors, random word insertion, spelling mistakes, occasional wrong word or "sound-alike" substitutions and incomplete sentences may be an occasional consequence of the system secondary to software limitations, ambient noise and hardware issues  At the time of dictation, efforts were made to edit, clarify and /or correct errors  Please read the chart carefully and recognize, using context, where substitutions have occurred  If you have any questions or concerns about the context, text or information contained within the body of this dictation, please contact myself, the provider, for further clarification

## 2022-01-26 NOTE — PROGRESS NOTES
[address]  Progress Note - Jose Chowdhury 1950, 66 y o  female MRN: [id number]  Unit/Bed#: -01 Encounter: [id number]  Primary Care Provider: Von Reed MD   Date and time admitted to hospital: 1/24/2022 10:39 AM    * Sepsis ([facility] [address] )  Assessment & Plan  · POA, Since Resolved  · SIRs: Tachycardia, and leukocytosis   · Sepsis likely secondary to olecranon bursitis vs right upper extremity cellulitis  · MRSA: Pending  · Blood cultures x2: Pending   · Procalcitonin/Lactic: Negative  · Wound Culture: Pending  · S/P  IV bolus of normal saline  · Continue with IV Vancomycin (Day 3)  · Orthopedic surgery consulted,  · S/P bedside aspiration of thick pus from the right elbow abscess  Will hold Lovenox today and start from tomorrow as per Ortho recommendation  S/P I&D bedside from 1/25  · Infectious Disease consulted  · Follow up cultures, can consider transitioning to oral antibiotics once culture sensitivities result  · Continue to monitor for worsening signs of infection  · Continue to monitor CBC/Fever curve  · Follow Up cultures    [facility]  Assessment & Plan  · New onset suspect in setting of sepsis  · Continue Telemetry monitoring  · Echocardiogram:  EF 60%  Mild MR  Moderate TR with mild to moderate pulmonary hypertension  · Currently patient is in sinus rhythm, Will continue p o  Metoprolol  · Currently on Lovenox, will transition to oral Eliquis after patient has I&D of the right olecranon bursa and there are no further indications for invasive procedures   · Cardiology consulted, appreciate recommendations  · Continue metoprolol  Long-term anticoagulation with Eliquis to lower stroke risk  Once Eliquis is started, aspirin can be discontinued to lower bleeding risk  Anticoagulation can be held 2 days prior to EGD/colonoscopy once that is rescheduled  Will need outpatient follow-up with Cardiology      Primary hypertension  Assessment & Plan  · Known history   · BP reviewed in the stable  · Continue medications per cardiology recommendation  · Lisinopril 40 mg daily and metoprolol 25 mg q 12  · Routine vital monitoring    Hyperlipidemia  Assessment & Plan  · Continue home medication: statin    Anxiety  Assessment & Plan  · Mood stable, slightly anxious  · Continue home medication Xanax    Gastroesophageal reflux disease without esophagitis  Assessment & Plan  · Known history   · Continue PPI      VTE Pharmacologic Prophylaxis: VTE Score: 4 Moderate Risk (Score 3-4) - Pharmacological DVT Prophylaxis Ordered: enoxaparin (Lovenox)  Patient Centered Rounds: I performed bedside rounds with nursing staff today  Discussions with Specialists or Other Care Team Provider:  Cardiology, Infectious Disease    Education and Discussions with Family / Patient: Updated  (sister) via phone  Time Spent for Care: 30 minutes  More than 50% of total time spent on counseling and coordination of care as described above  Current Length of Stay: 2 day(s)  Current Patient Status: Inpatient   Certification Statement: The patient will continue to require additional inpatient hospital stay due to Sepsis requiring IV antibiotics, follow-up culture results  Discharge Plan: Anticipate discharge in 24-48 hrs to home with home services  Code Status: Level 1 - Full Code    Subjective:   Patient states she feels overall okay today  Patient states she is feeling a little anxious  Patient complaining of right upper extremity tenderness and overall soreness  Patient denies any active chest pain, worsening shortness of breath, abdominal pain, nausea, vomiting, or diarrhea      Objective:     Vitals:   Temp (24hrs), Av 6 °F (37 °C), Min:97 7 °F (36 5 °C), Max:99 4 °F (37 4 °C)    Temp:  [97 7 °F (36 5 °C)-99 4 °F (37 4 °C)] 99 4 °F (37 4 °C)  HR:  [73-91] 91  Resp:  [18] 18  BP: (130-140)/(65-77) 140/65  SpO2:  [93 %-94 %] 94 %  Body mass index is 23 59 kg/m²  Input and Output Summary (last 24 hours):   No intake or output data in the 24 hours ending 01/26/22 1611    Physical Exam:   Physical Exam  Vitals and nursing note reviewed  Constitutional:       General: She is not in acute distress  Appearance: Normal appearance  She is normal weight  HENT:      Head: Normocephalic  Nose: Nose normal  No congestion  Mouth/Throat:      Mouth: Mucous membranes are moist       Pharynx: Oropharynx is clear  Eyes:      Pupils: Pupils are equal, round, and reactive to light  Cardiovascular:      Rate and Rhythm: Normal rate and regular rhythm  Pulses: Normal pulses  Heart sounds: No murmur heard  Pulmonary:      Effort: Pulmonary effort is normal  No respiratory distress  Breath sounds: Decreased breath sounds present  Abdominal:      General: Abdomen is flat  Bowel sounds are normal  There is no distension  Palpations: Abdomen is soft  Tenderness: There is no abdominal tenderness  Musculoskeletal:         General: Swelling (RUE) and tenderness (RUE) present  Normal range of motion  Cervical back: Normal range of motion  Right lower leg: No edema  Left lower leg: No edema  Skin:     General: Skin is warm and dry  Capillary Refill: Capillary refill takes less than 2 seconds  Neurological:      General: No focal deficit present  Mental Status: She is alert and oriented to person, place, and time  Mental status is at baseline  Motor: No weakness  Psychiatric:         Mood and Affect: Mood normal          Speech: Speech normal          Behavior: Behavior normal  Behavior is cooperative  Thought Content:  Thought content normal          Judgment: Judgment normal           Additional Data:     Labs:  Results from last 7 days   Lab Units 01/26/22  0452   WBC Thousand/uL 11 50*   HEMOGLOBIN g/dL 11 1*   HEMATOCRIT % 33 1*   PLATELETS Thousands/uL 302   NEUTROS PCT % 83*   LYMPHS PCT % 9*   MONOS PCT % 8   EOS PCT % 0     Results from last 7 days   Lab Units 01/26/22  0452 01/25/22  0439 01/24/22  1117   SODIUM mmol/L 135   < > 137   POTASSIUM mmol/L 3 9   < > 3 9   CHLORIDE mmol/L 103   < > 104   CO2 mmol/L 25   < > 25   BUN mg/dL 12   < > 14   CREATININE mg/dL 0 73   < > 1 05   ANION GAP mmol/L 7   < > 8   CALCIUM mg/dL 9 2   < > 9 6   ALBUMIN g/dL  --   --  3 9   TOTAL BILIRUBIN mg/dL  --   --  0 81   ALK PHOS U/L  --   --  182 6*   ALT U/L  --   --  75*   AST U/L  --   --  64*   GLUCOSE RANDOM mg/dL 173*   < > 130    < > = values in this interval not displayed  Lines/Drains:  Invasive Devices  Report    Peripheral Intravenous Line            Peripheral IV 01/25/22 Left;Upper;Ventral (anterior) Arm 1 day                      Imaging: Reviewed radiology reports from this admission including: chest xray and ECHO    Recent Cultures (last 7 days):   Results from last 7 days   Lab Units 01/24/22  1729 01/24/22  1123 01/24/22  1117 01/22/22  1631   BLOOD CULTURE   --  No Growth at 48 hrs  No Growth at 48 hrs  No Growth at 72 hrs     GRAM STAIN RESULT  4+ Polys  No organisms seen  --   --   --    BODY FLUID CULTURE, STERILE  4+ Growth of Staphylococcus aureus*  --   --   --        Last 24 Hours Medication List:   Current Facility-Administered Medications   Medication Dose Route Frequency Provider Last Rate    acetaminophen  650 mg Oral Q6H PRN CARLOS Shafer      albuterol  2 5 mg Nebulization Q6H PRN Dick Singletary MD      ALPRAZolam  0 5 mg Oral BID PRN CARLOS Shafer      aspirin  81 mg Oral Daily CARLOS Shafer      atorvastatin  10 mg Oral HS CARLOS Shafer      calcium carbonate  1 tablet Oral Daily With Breakfast CARLOS Shafer      enoxaparin  1 mg/kg Subcutaneous Q12H 8270  Ko García MD      escitalopram  10 mg Oral Daily CARLOS Shafer      levothyroxine  100 mcg Oral Early Morning CARLOS Shafer      lisinopril  40 mg Oral Daily CARLOS Bustillo      metoprolol tartrate  25 mg Oral Q12H [address], MD      multivitamin-minerals  1 tablet Oral Daily CARLOS Bustillo      ondansetron  4 mg Intravenous Q6H PRN CARLOS Bustillo      pantoprazole  40 mg Oral Early Morning CARLOS Bustillo      vancomycin  20 mg/kg Intravenous Q24H CARLOS Bustillo 1,250 mg (01/26/22 1123)     Facility-Administered Medications Ordered in Other Encounters   Medication Dose Route Frequency Provider Last Rate    sodium chloride  30 mL/hr Intravenous Continuous Louie KINGS Hilario          Today, Patient Was Seen By: CARLOS Ross    **Please Note: This note may have been constructed using a voice recognition system  **

## 2022-01-26 NOTE — PROGRESS NOTES
Progress Note - Infectious Disease   Liz Burr 66 y o  female MRN: [id number]  Unit/Bed#: -01 Encounter: [id number]      Impression/Plan:  1  SIRS versus Sepsis  POA with tachycardia and leukocytosis  Patient found to be in AFib with RVR responding to Cardizem  White cell count is trending down  Admission blood cultures negative to date  Source appears to be #2    -continue vancomycin IV  -Pharmacy on consult for vancomycin trough dosing management; next level 1/27/22 11 am  -follow-up cultures and adjust antibiotics as indicated  -monitor temperature and hemodynamics  -serial exam  -orthopedic follow-up in plan as below  -recheck CBC and BMP in a m      2  Septic right olecranon bursitis/cellulitis  Without improvement on 2 days of Augmentin/Bactrim outpatient  Right olecranon bursa was aspirated and 4 mL of murky colored fluid with white cell count of 315,482  1/24/22 Body fluid 4+ Staph aureus  S/p bedside I&D 1/25/22  -antibiotics as above  -follow-up aspirate culture sensitivities  -monitor temperature and hemodynamics  -arm elevation  -serial exam  -close orthopedic surgical follow-up  -recheck CBC and BMP      3  New onset AFib  In setting #1/2  Patient now returned to sinus rhythm  -Cardiology ongoing management  -volume management   -recheck BMP     4  GERD and change in bowel habits  Patient elective EGD/colonoscopy postponed secondary to above  -monitor symptoms  -symptomatic management per primary care team  -follow-up with GI outpatient     Antibiotics:  Vancomycin D3     Above impression and plan discussed in detail with patient, RN, and Nalini KHALIL  Subjective:  Patient has no fever, chills, sweats; no nausea, vomiting, diarrhea today; no cough, shortness of breath; + right elbow pain s/p I&D being managed  Patient appears to be tolerating IV antibiotics  No new symptoms      Objective:  Vitals:  Temp:  [97 7 °F (36 5 °C)-99 4 °F (37 4 °C)] 99 4 °F (37 4 °C)  HR:  Anais Baird 91  Resp:  [18] 18  BP: (130-146)/(65-92) 140/65  SpO2:  [93 %-94 %] 94 %  Temp (24hrs), Av 6 °F (37 °C), Min:97 7 °F (36 5 °C), Max:99 4 °F (37 4 °C)  Current: Temperature: 99 4 °F (37 4 °C)    Physical Exam:   General Appearance:  Alert, interactive, nontoxic, no acute distress  Throat: Oropharynx moist without lesions  Lungs:   Clear to auscultation bilaterally; no wheezes, rhonchi or rales; respirations unlabored   Heart:  RRR; no murmur   Abdomen:   Soft, non-tender, non-distended, positive bowel sounds  Extremities: No clubbing, cyanosis, right forearm/upper arm Ace wrapped with soft swelling at olecranon bend  Positive tenderness on active range of motion, no strike through drainage or spreading erythema outside ace wrap   : No garcia, no SPT   Skin: No new rashes or lesions  Left arm IV site nontender  Labs, Imaging, & Other studies:   All pertinent labs and imaging studies were personally reviewed  Results from last 7 days   Lab Units 22  0452 22  0439 22  1734 22  1117 22  1117   WBC Thousand/uL 11 50* 12 48*  --   --  15 57*   HEMOGLOBIN g/dL 11 1* 10 3*  --   --  11 8   PLATELETS Thousands/uL 302 292 261   < > 302    < > = values in this interval not displayed  Results from last 7 days   Lab Units 22  04522  0439 22  0439 22  1117 22  1117   SODIUM mmol/L 135  --  137  --  137   POTASSIUM mmol/L 3 9   < > 4 0   < > 3 9   CHLORIDE mmol/L 103   < > 106   < > 104   CO2 mmol/L 25   < > 24   < > 25   BUN mg/dL 12   < > 12   < > 14   CREATININE mg/dL 0 73   < > 0 82   < > 1 05   EGFR ml/min/1 73sq m 81   < > 71   < > 52   CALCIUM mg/dL 9 2   < > 9 6   < > 9 6   AST U/L  --   --   --   --  64*   ALT U/L  --   --   --   --  75*   ALK PHOS U/L  --   --   --   --  182 6*    < > = values in this interval not displayed       Results from last 7 days   Lab Units 22  1729 22  1123 22  1117 22  1631   BLOOD CULTURE   --  No Growth at 24 hrs  No Growth at 24 hrs  No Growth at 72 hrs     GRAM STAIN RESULT  4+ Polys  No organisms seen  --   --   --    BODY FLUID CULTURE, STERILE  4+ Growth of Staphylococcus aureus*  --   --   --

## 2022-01-26 NOTE — ASSESSMENT & PLAN NOTE
· Known history   · BP reviewed in the stable  · Continue medications per cardiology recommendation  · Lisinopril 40 mg daily and metoprolol 25 mg q 12  · Routine vital monitoring

## 2022-01-26 NOTE — PLAN OF CARE
Problem: MOBILITY - ADULT  Goal: Maintain or return to baseline ADL function  Description: INTERVENTIONS:  -  Assess patient's ability to carry out ADLs; assess patient's baseline for ADL function and identify physical deficits which impact ability to perform ADLs (bathing, care of mouth/teeth, toileting, grooming, dressing, etc )  - Assess/evaluate cause of self-care deficits   - Assess range of motion  - Assess patient's mobility; develop plan if impaired  - Assess patient's need for assistive devices and provide as appropriate  - Encourage maximum independence but intervene and supervise when necessary  - Involve family in performance of ADLs  - Assess for home care needs following discharge   - Consider OT consult to assist with ADL evaluation and planning for discharge  - Provide patient education as appropriate  Outcome: Adequate for Discharge  Goal: Maintains/Returns to pre admission functional level  Description: INTERVENTIONS:  - Perform BMAT or MOVE assessment daily    - Set and communicate daily mobility goal to care team and patient/family/caregiver  - Collaborate with rehabilitation services on mobility goals if consulted  - Reposition patient every 2 hours    - Out of bed for meals 3 times a day  - Out of bed for toileting  - Record patient progress and toleration of activity level   Outcome: Adequate for Discharge     Problem: Potential for Falls  Goal: Patient will remain free of falls  Description: INTERVENTIONS:  - Educate patient/family on patient safety including physical limitations  - Instruct patient to call for assistance with activity   - Consult OT/PT to assist with strengthening/mobility   - Keep Call bell within reach  - Keep bed low and locked with side rails adjusted as appropriate  - Keep care items and personal belongings within reach  - Initiate and maintain comfort rounds  - Make Fall Risk Sign visible to staff  - Offer Toileting  - Initiate/Maintain bed alarm  - Obtain necessary fall risk management equipment    - Apply yellow socks and bracelet for high fall risk patients  - Consider moving patient to room near nurses station  Outcome: Adequate for Discharge     Problem: Prexisting or High Potential for Compromised Skin Integrity  Goal: Skin integrity is maintained or improved  Description: INTERVENTIONS:  - Identify patients at risk for skin breakdown  - Assess and monitor skin integrity  - Assess and monitor nutrition and hydration status  - Monitor labs   - Assess for incontinence   - Turn and reposition patient  - Assist with mobility/ambulation  - Relieve pressure over bony prominences  - Avoid friction and shearing  - Provide appropriate hygiene as needed including keeping skin clean and dry  - Evaluate need for skin moisturizer/barrier cream  - Collaborate with interdisciplinary team   - Patient/family teaching  - Consider wound care consult   Outcome: Adequate for Discharge

## 2022-01-26 NOTE — PLAN OF CARE
Problem: MOBILITY - ADULT  Goal: Maintain or return to baseline ADL function  Description: INTERVENTIONS:  -  Assess patient's ability to carry out ADLs; assess patient's baseline for ADL function and identify physical deficits which impact ability to perform ADLs (bathing, care of mouth/teeth, toileting, grooming, dressing, etc )  - Assess/evaluate cause of self-care deficits   - Assess range of motion  - Assess patient's mobility; develop plan if impaired  - Assess patient's need for assistive devices and provide as appropriate  - Encourage maximum independence but intervene and supervise when necessary  - Involve family in performance of ADLs  - Assess for home care needs following discharge   - Consider OT consult to assist with ADL evaluation and planning for discharge  - Provide patient education as appropriate  Outcome: Progressing  Goal: Maintains/Returns to pre admission functional level  Description: INTERVENTIONS:  - Perform BMAT or MOVE assessment daily    - Set and communicate daily mobility goal to care team and patient/family/caregiver  - Collaborate with rehabilitation services on mobility goals if consulted  - Perform Range of Motion 2 times a day  - Reposition patient every 2 hours    - Dangle patient 2 times a day  - Stand patient 2 times a day  - Ambulate patient 2 times a day  - Out of bed to chair 2 times a day   - Out of bed for meals 2 times a day  - Out of bed for toileting  - Record patient progress and toleration of activity level   Outcome: Progressing     Problem: Potential for Falls  Goal: Patient will remain free of falls  Description: INTERVENTIONS:  - Educate patient/family on patient safety including physical limitations  - Instruct patient to call for assistance with activity   - Consult OT/PT to assist with strengthening/mobility   - Keep Call bell within reach  - Keep bed low and locked with side rails adjusted as appropriate  - Keep care items and personal belongings within reach  - Initiate and maintain comfort rounds  - Make Fall Risk Sign visible to staff  - Offer Toileting every 2 Hours, in advance of need  - Initiate/Maintain bed alarm  - Obtain necessary fall risk management equipment: call bell  - Apply yellow socks and bracelet for high fall risk patients  - Consider moving patient to room near nurses station  Outcome: Progressing     Problem: Prexisting or High Potential for Compromised Skin Integrity  Goal: Skin integrity is maintained or improved  Description: INTERVENTIONS:  - Identify patients at risk for skin breakdown  - Assess and monitor skin integrity  - Assess and monitor nutrition and hydration status  - Monitor labs   - Assess for incontinence   - Turn and reposition patient  - Assist with mobility/ambulation  - Relieve pressure over bony prominences  - Avoid friction and shearing  - Provide appropriate hygiene as needed including keeping skin clean and dry  - Evaluate need for skin moisturizer/barrier cream  - Collaborate with interdisciplinary team   - Patient/family teaching  - Consider wound care consult   Outcome: Progressing

## 2022-01-26 NOTE — PROGRESS NOTES
Progress Note - Orthopedics   Arlington Callow 66 y o  female MRN: [id number]  Unit/Bed#: EA CAR NON INV      Subjective:    66 y  o female status post incision drainage bedside right olecranon bursa region No acute events, no complaints  Pt doing well  Pain controlled  Denies fevers chills, CP, SOB    Patient states her pain is significantly improved denies any numbness tingling right upper extremity    Labs:  0   Lab Value Date/Time    HCT 33 1 (L) 01/26/2022 0452    HCT 31 5 (L) 01/25/2022 0439    HCT 36 2 01/24/2022 1117    HGB 11 1 (L) 01/26/2022 0452    HGB 10 3 (L) 01/25/2022 0439    HGB 11 8 01/24/2022 1117    INR 0 98 04/26/2021 1416    WBC 11 50 (H) 01/26/2022 0452    WBC 12 48 (H) 01/25/2022 0439    WBC 15 57 (H) 01/24/2022 1117       Meds:    Current Facility-Administered Medications:     acetaminophen (TYLENOL) tablet 650 mg, 650 mg, Oral, Q6H PRN, Antelope Guard, CRNP, 650 mg at 01/26/22 0854    albuterol inhalation solution 2 5 mg, 2 5 mg, Nebulization, Q6H PRN, Julian Colón MD    ALPRAZolam [place]) tablet 0 5 mg, 0 5 mg, Oral, BID PRN, Antelope Guard, CRNP    aspirin (ECOTRIN LOW STRENGTH) EC tablet 81 mg, 81 mg, Oral, Daily, Antelope Guard, CRNP, 81 mg at 01/26/22 0855    atorvastatin (LIPITOR) tablet 10 mg, 10 mg, Oral, HS, Antelope Guard, CRNP, 10 mg at 01/25/22 2203    calcium carbonate (OYSTER SHELL,OSCAL) 500 mg tablet 1 tablet, 1 tablet, Oral, Daily With Breakfast, Antelope Guard, CRNP, 1 tablet at 01/26/22 0856    enoxaparin (LOVENOX) subcutaneous injection 60 mg, 1 mg/kg, Subcutaneous, Q12H [address], Nilton Adkins MD, 60 mg at 01/26/22 0855    escitalopram (LEXAPRO) tablet 10 mg, 10 mg, Oral, Daily, Antelope Guard, CRNP, 10 mg at 01/26/22 0856    levothyroxine tablet 100 mcg, 100 mcg, Oral, Early Morning, Antelope Guard, CRNP, 100 mcg at 01/26/22 0500    lisinopril (ZESTRIL) tablet 40 mg, 40 mg, Oral, Daily, Antelope Guard, CRNP, 40 mg at 01/26/22 0855    metoprolol tartrate (LOPRESSOR) tablet 25 mg, 25 mg, Oral, Q12H [facility], Sonal Guzman MD, 25 mg at 01/26/22 0856    multivitamin-minerals (CENTRUM ADULTS) tablet 1 tablet, 1 tablet, Oral, Daily, Eakly Amadou, CRNP, 1 tablet at 01/26/22 0856    ondansetron (ZOFRAN) injection 4 mg, 4 mg, Intravenous, Q6H PRN, Tarsha Amadou, CRNP, 4 mg at 01/26/22 0024    pantoprazole (PROTONIX) EC tablet 40 mg, 40 mg, Oral, Early Morning, Eakly Amadou, CRNP, 40 mg at 01/26/22 0500    vancomycin (VANCOCIN) 1,250 mg in sodium chloride 0 9 % 250 mL IVPB, 20 mg/kg, Intravenous, Q24H, Tarsha Amadou, CRNP, Last Rate: 166 7 mL/hr at 01/26/22 1123, 1,250 mg at 01/26/22 1123    Facility-Administered Medications Ordered in Other Encounters:     sodium chloride 0 9 % infusion, 30 mL/hr, Intravenous, Continuous, China Abarca CRNA    Blood Culture:   Lab Results   Component Value Date    BLOODCX No Growth at 48 hrs  01/24/2022       Wound Culture:   No results found for: WOUNDCULT    Ins and Outs:  No intake/output data recorded  Physical:  Vitals:    01/25/22 2300   BP: 140/65   Pulse: 91   Resp: 18   Temp: 99 4 °F (37 4 °C)   SpO2: 94%     Musculoskeletal: right Upper Extremity  · Skin with minimal erythema no effusion iodine packing in place  · Dressing change with very minimal drainage noted on initial dressing    · SILT m/r/u  Motor intact ain/pin/m/r/u, 2+ radial pulse  ·         Assessment:    66 y  o female right olecranon bursitis    Plan:  · Weight-bearing as tolerated upper extremity  · Dressings right upper extremity as needed  · Continue antibiotics per primary team  · Pain control  · DVT ppx per primary team  · Dispo: Ortho signing off   · Patient may follow-up an outpatient basis as previously instructed    Vincent Reina PA-C

## 2022-01-27 PROBLEM — R74.01 TRANSAMINITIS: Status: ACTIVE | Noted: 2022-01-27

## 2022-01-27 LAB
ALBUMIN SERPL BCP-MCNC: 3.5 G/DL (ref 3.4–4.8)
ALP SERPL-CCNC: 217.5 U/L (ref 35–140)
ALT SERPL W P-5'-P-CCNC: 103 U/L (ref 5–54)
ANION GAP SERPL CALCULATED.3IONS-SCNC: 7 MMOL/L (ref 4–13)
AST SERPL W P-5'-P-CCNC: 93 U/L (ref 15–41)
BACTERIA BLD CULT: NORMAL
BASOPHILS # BLD AUTO: 0.1 THOUSANDS/ΜL (ref 0–0.1)
BASOPHILS NFR BLD AUTO: 1 % (ref 0–1)
BILIRUB SERPL-MCNC: 0.61 MG/DL (ref 0.3–1.2)
BUN SERPL-MCNC: 13 MG/DL (ref 6–20)
CALCIUM SERPL-MCNC: 9.5 MG/DL (ref 8.4–10.2)
CHLORIDE SERPL-SCNC: 104 MMOL/L (ref 96–108)
CO2 SERPL-SCNC: 28 MMOL/L (ref 22–33)
CREAT SERPL-MCNC: 0.87 MG/DL (ref 0.4–1.1)
EOSINOPHIL # BLD AUTO: 0.38 THOUSAND/ΜL (ref 0–0.61)
EOSINOPHIL NFR BLD AUTO: 4 % (ref 0–6)
ERYTHROCYTE [DISTWIDTH] IN BLOOD BY AUTOMATED COUNT: 12.4 % (ref 11.6–15.1)
GFR SERPL CREATININE-BSD FRML MDRD: 66 ML/MIN/1.73SQ M
GLUCOSE SERPL-MCNC: 97 MG/DL (ref 65–140)
HCT VFR BLD AUTO: 31.6 % (ref 34.8–46.1)
HGB BLD-MCNC: 10.5 G/DL (ref 11.5–15.4)
IMM GRANULOCYTES # BLD AUTO: 0.05 THOUSAND/UL (ref 0–0.2)
IMM GRANULOCYTES NFR BLD AUTO: 1 % (ref 0–2)
LYMPHOCYTES # BLD AUTO: 1.55 THOUSANDS/ΜL (ref 0.6–4.47)
LYMPHOCYTES NFR BLD AUTO: 15 % (ref 14–44)
MCH RBC QN AUTO: 30.5 PG (ref 26.8–34.3)
MCHC RBC AUTO-ENTMCNC: 33.2 G/DL (ref 31.4–37.4)
MCV RBC AUTO: 92 FL (ref 82–98)
MONOCYTES # BLD AUTO: 1 THOUSAND/ΜL (ref 0.17–1.22)
MONOCYTES NFR BLD AUTO: 10 % (ref 4–12)
NEUTROPHILS # BLD AUTO: 7.16 THOUSANDS/ΜL (ref 1.85–7.62)
NEUTS SEG NFR BLD AUTO: 69 % (ref 43–75)
NRBC BLD AUTO-RTO: 0 /100 WBCS
PLATELET # BLD AUTO: 303 THOUSANDS/UL (ref 149–390)
PMV BLD AUTO: 10.3 FL (ref 8.9–12.7)
POTASSIUM SERPL-SCNC: 4 MMOL/L (ref 3.5–5)
PROT SERPL-MCNC: 6.8 G/DL (ref 6.4–8.3)
RBC # BLD AUTO: 3.44 MILLION/UL (ref 3.81–5.12)
SODIUM SERPL-SCNC: 139 MMOL/L (ref 133–145)
VANCOMYCIN TROUGH SERPL-MCNC: 7.5 UG/ML (ref 10–20)
WBC # BLD AUTO: 10.24 THOUSAND/UL (ref 4.31–10.16)

## 2022-01-27 PROCEDURE — 99233 SBSQ HOSP IP/OBS HIGH 50: CPT | Performed by: INTERNAL MEDICINE

## 2022-01-27 PROCEDURE — 99232 SBSQ HOSP IP/OBS MODERATE 35: CPT

## 2022-01-27 PROCEDURE — 80202 ASSAY OF VANCOMYCIN: CPT | Performed by: INTERNAL MEDICINE

## 2022-01-27 PROCEDURE — 85025 COMPLETE CBC W/AUTO DIFF WBC: CPT

## 2022-01-27 PROCEDURE — 99232 SBSQ HOSP IP/OBS MODERATE 35: CPT | Performed by: INTERNAL MEDICINE

## 2022-01-27 PROCEDURE — 80053 COMPREHEN METABOLIC PANEL: CPT

## 2022-01-27 PROCEDURE — 99024 POSTOP FOLLOW-UP VISIT: CPT | Performed by: PHYSICIAN ASSISTANT

## 2022-01-27 RX ORDER — METOPROLOL TARTRATE 50 MG/1
50 TABLET, FILM COATED ORAL EVERY 12 HOURS SCHEDULED
Status: DISCONTINUED | OUTPATIENT
Start: 2022-01-27 | End: 2022-01-28 | Stop reason: HOSPADM

## 2022-01-27 RX ORDER — SENNOSIDES 8.6 MG
1 TABLET ORAL
Status: DISCONTINUED | OUTPATIENT
Start: 2022-01-27 | End: 2022-01-28 | Stop reason: HOSPADM

## 2022-01-27 RX ADMIN — STANDARDIZED SENNA CONCENTRATE 8.6 MG: 8.6 TABLET ORAL at 21:58

## 2022-01-27 RX ADMIN — LISINOPRIL 40 MG: 20 TABLET ORAL at 08:09

## 2022-01-27 RX ADMIN — PANTOPRAZOLE SODIUM 40 MG: 40 TABLET, DELAYED RELEASE ORAL at 05:41

## 2022-01-27 RX ADMIN — LEVOTHYROXINE SODIUM 100 MCG: 100 TABLET ORAL at 05:41

## 2022-01-27 RX ADMIN — VANCOMYCIN HYDROCHLORIDE 1250 MG: 1 INJECTION, POWDER, LYOPHILIZED, FOR SOLUTION INTRAVENOUS at 12:02

## 2022-01-27 RX ADMIN — ENOXAPARIN SODIUM 60 MG: 60 INJECTION SUBCUTANEOUS at 21:57

## 2022-01-27 RX ADMIN — METOPROLOL TARTRATE 25 MG: 25 TABLET, FILM COATED ORAL at 11:07

## 2022-01-27 RX ADMIN — ENOXAPARIN SODIUM 60 MG: 60 INJECTION SUBCUTANEOUS at 08:17

## 2022-01-27 RX ADMIN — Medication 1 TABLET: at 08:08

## 2022-01-27 RX ADMIN — CALCIUM 1 TABLET: 500 TABLET ORAL at 08:08

## 2022-01-27 RX ADMIN — ASPIRIN 81 MG: 81 TABLET, COATED ORAL at 08:08

## 2022-01-27 RX ADMIN — ESCITALOPRAM OXALATE 10 MG: 20 TABLET ORAL at 08:09

## 2022-01-27 RX ADMIN — METOPROLOL TARTRATE 50 MG: 50 TABLET, FILM COATED ORAL at 21:58

## 2022-01-27 RX ADMIN — METOPROLOL TARTRATE 25 MG: 25 TABLET, FILM COATED ORAL at 08:08

## 2022-01-27 RX ADMIN — ATORVASTATIN CALCIUM 10 MG: 10 TABLET, FILM COATED ORAL at 21:58

## 2022-01-27 NOTE — ASSESSMENT & PLAN NOTE
· POA, Since Resolved  · SIRs: Tachycardia, and leukocytosis   · Sepsis likely secondary to olecranon bursitis vs right upper extremity cellulitis  · MRSA: Pending  · Blood cultures x2: No growth at 72 hours  · Procalcitonin/Lactic: Negative  · Wound Culture: Preliminary results, Staph Aureus  · S/P  IV bolus of normal saline  · Continue with IV Vancomycin (Day 4)  · Orthopedic surgery consulted,  · S/P bedside aspiration of thick pus from the right elbow abscess  S/P I&D bedside from 1/25  Weightbear as tolerated in the upper extremity  Orthopedic surgery signing off, will need outpatient follow-up  · Infectious Disease consulted  · Continue vancomycin for now at current dose  Follow up cultures, can consider transitioning to oral antibiotics once culture sensitivities result     · Continue pharmacy consult  · Continue to monitor for worsening signs of infection  · Continue to monitor CBC/Fever curve  · Follow Up cultures

## 2022-01-27 NOTE — PLAN OF CARE
Problem: MOBILITY - ADULT  Goal: Maintain or return to baseline ADL function  Description: INTERVENTIONS:  -  Assess patient's ability to carry out ADLs; assess patient's baseline for ADL function and identify physical deficits which impact ability to perform ADLs (bathing, care of mouth/teeth, toileting, grooming, dressing, etc )  - Assess/evaluate cause of self-care deficits   - Assess range of motion  - Assess patient's mobility; develop plan if impaired  - Assess patient's need for assistive devices and provide as appropriate  - Encourage maximum independence but intervene and supervise when necessary  - Involve family in performance of ADLs  - Assess for home care needs following discharge   - Consider OT consult to assist with ADL evaluation and planning for discharge  - Provide patient education as appropriate  Outcome: Progressing  Goal: Maintains/Returns to pre admission functional level  Description: INTERVENTIONS:  - Perform BMAT or MOVE assessment daily    - Set and communicate daily mobility goal to care team and patient/family/caregiver  - Collaborate with rehabilitation services on mobility goals if consulted  - Perform Range of Motion  times a day  - Reposition patient every  hours    - Dangle patient  times a day  - Stand patient  times a day  - Ambulate patient  times a day  - Out of bed to chair  times a day   - Out of bed for meals  times a day  - Out of bed for toileting  - Record patient progress and toleration of activity level   Outcome: Progressing     Problem: Potential for Falls  Goal: Patient will remain free of falls  Description: INTERVENTIONS:  - Educate patient/family on patient safety including physical limitations  - Instruct patient to call for assistance with activity   - Consult OT/PT to assist with strengthening/mobility   - Keep Call bell within reach  - Keep bed low and locked with side rails adjusted as appropriate  - Keep care items and personal belongings within reach  - Initiate and maintain comfort rounds  - Make Fall Risk Sign visible to staff  - Offer Toileting every 2 Hours, in advance of need  - Initiate/Maintain alarm  - Obtain necessary fall risk management equipment:   - Apply yellow socks and bracelet for high fall risk patients  - Consider moving patient to room near nurses station  Outcome: Progressing     Problem: Prexisting or High Potential for Compromised Skin Integrity  Goal: Skin integrity is maintained or improved  Description: INTERVENTIONS:  - Identify patients at risk for skin breakdown  - Assess and monitor skin integrity  - Assess and monitor nutrition and hydration status  - Monitor labs   - Assess for incontinence   - Turn and reposition patient  - Assist with mobility/ambulation  - Relieve pressure over bony prominences  - Avoid friction and shearing  - Provide appropriate hygiene as needed including keeping skin clean and dry  - Evaluate need for skin moisturizer/barrier cream  - Collaborate with interdisciplinary team   - Patient/family teaching  - Consider wound care consult   Outcome: Progressing

## 2022-01-27 NOTE — PROGRESS NOTES
[address]  Progress Note - Austin Senior 1950, 66 y o  female MRN: [id number]  Unit/Bed#: -01 Encounter: [id number]  Primary Care Provider: Rolando Wong MD   Date and time admitted to hospital: 1/24/2022 10:39 AM    * Sepsis ([address] )  Assessment & Plan  · POA, Since Resolved  · SIRs: Tachycardia, and leukocytosis   · Sepsis likely secondary to olecranon bursitis vs right upper extremity cellulitis  · MRSA: Pending  · Blood cultures x2: No growth at 72 hours  · Procalcitonin/Lactic: Negative  · Wound Culture: Preliminary results, Staph Aureus  · S/P  IV bolus of normal saline  · Continue with IV Vancomycin (Day 4)  · Orthopedic surgery consulted,  · S/P bedside aspiration of thick pus from the right elbow abscess  S/P I&D bedside from 1/25  Weightbear as tolerated in the upper extremity  Orthopedic surgery signing off, will need outpatient follow-up  · Infectious Disease consulted  · Continue vancomycin for now at current dose  Follow up cultures, can consider transitioning to oral antibiotics once culture sensitivities result  · Continue pharmacy consult  · Continue to monitor for worsening signs of infection  · Continue to monitor CBC/Fever curve  · Follow Up cultures    Atrial fibrillation with RVR (HCC)  Assessment & Plan  · New onset suspect in setting of sepsis  · Echocardiogram:  EF 60%  Mild MR  Moderate TR with mild to moderate pulmonary hypertension  · Currently patient is in sinus rhythm, Will continue p o  Metoprolol  · Currently on Lovenox, will transition to oral Eliquis after patient has I&D of the right olecranon bursa and there are no further indications for invasive procedures   · Cardiology consulted, appreciate recommendations  · Rhythm appeared irregular on exam   Off telemetry, EKG requested  Metoprolol increased  Long-term anticoagulation with Eliquis to lower stroke risk    Once Eliquis is started, aspirin can be discontinued to lower bleeding risk   Anticoagulation can be held 2 days prior to EGD/colonoscopy once that is rescheduled  Will need outpatient follow-up with Cardiology  Primary hypertension  Assessment & Plan  · Known history   · BP reviewed in the stable  · Continue medications per cardiology recommendation  · Lisinopril 40 mg daily and metoprolol 50 mg q 12  · Routine vital monitoring    Transaminitis  Assessment & Plan  · POA  · Upon admission: AST 64, ALT 75, Alk Phos 182  · Most recent: AST 93, , Alk Phos 217  · Will continue to trend    Hyperlipidemia  Assessment & Plan  · Continue home medication:   · Statin    Anxiety  Assessment & Plan  · Mood stable, slightly anxious  · Continue home medication:  · Xanax    Gastroesophageal reflux disease without esophagitis  Assessment & Plan  · Chronic  · Continue PPI      VTE Pharmacologic Prophylaxis: VTE Score: 4 Moderate Risk (Score 3-4) - Pharmacological DVT Prophylaxis Ordered: enoxaparin (Lovenox)  Patient Centered Rounds: I performed bedside rounds with nursing staff today  Discussions with Specialists or Other Care Team Provider: Cardiology, Infectious Disease    Education and Discussions with Family / Patient: Patient declined call to   Time Spent for Care: 30 minutes  More than 50% of total time spent on counseling and coordination of care as described above  Current Length of Stay: 3 day(s)  Current Patient Status: Inpatient   Certification Statement: The patient will continue to require additional inpatient hospital stay due to Wound culture results requiring sensitivities for antibiotic choice  Discharge Plan: Anticipate discharge in 24-48 hrs to home with home services  Code Status: Level 1 - Full Code    Subjective:   Patient states she feels tired today  Patient denies any active chest pain, worsening shortness of breath, abdominal pain, nausea, vomiting, or diarrhea      Objective:     Vitals:   Temp (24hrs), Av 8 °F (37 1 °C), Min:98 2 °F (36 8 °C), Max:100 2 °F (37 9 °C)    Temp:  [98 2 °F (36 8 °C)-100 2 °F (37 9 °C)] 98 2 °F (36 8 °C)  HR:  [79-89] 79  Resp:  [18] 18  BP: (100-141)/(53-93) 121/53  SpO2:  [94 %-100 %] 100 %  Body mass index is 23 59 kg/m²  Input and Output Summary (last 24 hours): Intake/Output Summary (Last 24 hours) at 1/27/2022 1751  Last data filed at 1/27/2022 0601  Gross per 24 hour   Intake 480 ml   Output --   Net 480 ml       Physical Exam:   Physical Exam  Vitals and nursing note reviewed  Constitutional:       General: She is not in acute distress  Appearance: Normal appearance  She is ill-appearing (Chronically)  HENT:      Head: Normocephalic  Nose: Nose normal  No congestion  Mouth/Throat:      Mouth: Mucous membranes are dry  Pharynx: Oropharynx is clear  Cardiovascular:      Rate and Rhythm: Normal rate  Rhythm irregular  Pulses: Normal pulses  Heart sounds: Normal heart sounds  No murmur heard  Pulmonary:      Effort: Pulmonary effort is normal  No respiratory distress  Breath sounds: Normal breath sounds  Abdominal:      General: Abdomen is flat  Bowel sounds are normal  There is no distension  Palpations: Abdomen is soft  Tenderness: There is no abdominal tenderness  Musculoskeletal:         General: Swelling (RUE) and tenderness (RUE) present  Normal range of motion  Cervical back: Normal range of motion  Skin:     General: Skin is warm and dry  Capillary Refill: Capillary refill takes less than 2 seconds  Neurological:      General: No focal deficit present  Mental Status: She is alert and oriented to person, place, and time  Mental status is at baseline  Motor: No weakness  Psychiatric:         Mood and Affect: Mood is anxious  Speech: Speech normal          Behavior: Behavior normal  Behavior is cooperative  Thought Content:  Thought content normal          Judgment: Judgment normal  Additional Data:     Labs:  Results from last 7 days   Lab Units 01/27/22  0534   WBC Thousand/uL 10 24*   HEMOGLOBIN g/dL 10 5*   HEMATOCRIT % 31 6*   PLATELETS Thousands/uL 303   NEUTROS PCT % 69   LYMPHS PCT % 15   MONOS PCT % 10   EOS PCT % 4     Results from last 7 days   Lab Units 01/27/22  0534   SODIUM mmol/L 139   POTASSIUM mmol/L 4 0   CHLORIDE mmol/L 104   CO2 mmol/L 28   BUN mg/dL 13   CREATININE mg/dL 0 87   ANION GAP mmol/L 7   CALCIUM mg/dL 9 5   ALBUMIN g/dL 3 5   TOTAL BILIRUBIN mg/dL 0 61   ALK PHOS U/L 217 5*   ALT U/L 103*   AST U/L 93*   GLUCOSE RANDOM mg/dL 97                       Lines/Drains:  Invasive Devices  Report    Peripheral Intravenous Line            Peripheral IV 01/25/22 Left;Upper;Ventral (anterior) Arm 2 days                      Imaging: No pertinent imaging reviewed  Recent Cultures (last 7 days):   Results from last 7 days   Lab Units 01/24/22  1729 01/24/22  1123 01/24/22  1117 01/22/22  1631   BLOOD CULTURE   --  No Growth at 72 hrs  No Growth at 72 hrs  No Growth After 4 Days     GRAM STAIN RESULT  4+ Polys  No organisms seen  --   --   --    BODY FLUID CULTURE, STERILE  4+ Growth of Staphylococcus aureus*  --   --   --        Last 24 Hours Medication List:   Current Facility-Administered Medications   Medication Dose Route Frequency Provider Last Rate    acetaminophen  650 mg Oral Q6H PRN Sharlet Glaze, CRNP      albuterol  2 5 mg Nebulization Q6H PRN Nikko Wilder MD      ALPRAZolam  0 5 mg Oral BID PRN Sharlet Glaze, CRNP      atorvastatin  10 mg Oral HS Sharlet Glaze, CRNP      calcium carbonate  1 tablet Oral Daily With Breakfast Sharlet Glaze, CRNP      enoxaparin  1 mg/kg Subcutaneous Q12H 6152  Ko García MD      escitalopram  10 mg Oral Daily Sharlet Glaze, CRNP      levothyroxine  100 mcg Oral Early Morning Sharlet Glaze, CRNP      lisinopril  40 mg Oral Daily Sharlet Glaze, CRNP      metoprolol tartrate  50 mg Oral Q12H [facility] Selena Lux MD      multivitamin-minerals  1 tablet Oral Daily CARLOS Perez      ondansetron  4 mg Intravenous Q6H PRN CARLOS Perez      pantoprazole  40 mg Oral Early Morning CARLOS Perez      [START ON 1/28/2022] vancomycin  1,750 mg Intravenous Q24H Estela Mathews MD       Facility-Administered Medications Ordered in Other Encounters   Medication Dose Route Frequency Provider Last Rate    sodium chloride  30 mL/hr Intravenous Continuous Leonides Chavarria CRNA          Today, Patient Was Seen By: CARLOS Nicholson    **Please Note: This note may have been constructed using a voice recognition system  **

## 2022-01-27 NOTE — ASSESSMENT & PLAN NOTE
· Known history   · BP reviewed in the stable  · Continue medications per cardiology recommendation  · Lisinopril 40 mg daily and metoprolol 50 mg q 12  · Routine vital monitoring

## 2022-01-27 NOTE — PLAN OF CARE
Problem: MOBILITY - ADULT  Goal: Maintain or return to baseline ADL function  Description: INTERVENTIONS:  -  Assess patient's ability to carry out ADLs; assess patient's baseline for ADL function and identify physical deficits which impact ability to perform ADLs (bathing, care of mouth/teeth, toileting, grooming, dressing, etc )  - Assess/evaluate cause of self-care deficits   - Assess range of motion  - Assess patient's mobility; develop plan if impaired  - Assess patient's need for assistive devices and provide as appropriate  - Encourage maximum independence but intervene and supervise when necessary  - Involve family in performance of ADLs  - Assess for home care needs following discharge   - Consider OT consult to assist with ADL evaluation and planning for discharge  - Provide patient education as appropriate  Outcome: Progressing  Goal: Maintains/Returns to pre admission functional level  Description: INTERVENTIONS:  - Perform BMAT or MOVE assessment daily    - Set and communicate daily mobility goal to care team and patient/family/caregiver  - Collaborate with rehabilitation services on mobility goals if consulted  - Perform Range of Motion 2 times a day  - Reposition patient every prn hours    - Dangle patient prn times a day  - Stand patient prn times a day  - Ambulate patient prn times a day  - Out of bed to chair prn times a day   - Out of bed for meals  prn times a day  - Out of bed for toileting  - Record patient progress and toleration of activity level   Outcome: Progressing

## 2022-01-27 NOTE — ASSESSMENT & PLAN NOTE
· POA  · Upon admission: AST 64, ALT 75, Alk Phos 182  · Most recent: AST 93, , Alk Phos 217  · Will continue to trend

## 2022-01-27 NOTE — PROGRESS NOTES
Vancomycin Assessment    Liam Frausto is a 66 y o  female who is currently receiving vancomycin 1250mg IV q24hrs for skin-soft tissue infection  Goal trough 10-15  Vanco trough today: 7 5 ug/mL    Relevant clinical data and objective history reviewed:  Creatinine   Date Value Ref Range Status   01/27/2022 0 87 0 40 - 1 10 mg/dL Final     Comment:     Standardized to IDMS reference method   01/26/2022 0 73 0 40 - 1 10 mg/dL Final     Comment:     Standardized to IDMS reference method   01/25/2022 0 82 0 40 - 1 10 mg/dL Final     Comment:     Standardized to IDMS reference method     /93   Pulse 89   Temp 98 4 °F (36 9 °C) (Tympanic)   Resp 18   Ht 5' 2" (1 575 m)   Wt 58 5 kg (129 lb)   SpO2 96%   BMI 23 59 kg/m²   I/O last 3 completed shifts: In: 480 [P O :480]  Out: -   Lab Results   Component Value Date/Time    BUN 13 01/27/2022 05:34 AM    WBC 10 24 (H) 01/27/2022 05:34 AM    HGB 10 5 (L) 01/27/2022 05:34 AM    HCT 31 6 (L) 01/27/2022 05:34 AM    MCV 92 01/27/2022 05:34 AM     01/27/2022 05:34 AM     Temp Readings from Last 3 Encounters:   01/27/22 98 4 °F (36 9 °C) (Tympanic)   01/24/22 98 6 °F (37 °C) (Temporal)   01/22/22 97 6 °F (36 4 °C) (Oral)     Vancomycin Days of Therapy: 4    Assessment/Plan  The patient is currently on vancomycin utilizing scheduled dosing  Baseline risks associated with therapy include: concomitant nephrotoxic medications and advanced age  The patient is receiving 1250mg IV q24hrs with the most recent vancomycin level being at steady-state and sub-therapeutic based on a goal of 10-15 (mild infection/cellulitis) ; therefore, after clinical evaluation will be changed to 1750mg IV q24hrs   Pharmacy will continue to follow closely for s/sx of nephrotoxicity, infusion reactions, and appropriateness of therapy  BMP and CBC will be ordered per protocol  Plan for trough as patient approaches steady state, prior to the other  dose at approximately 0400 on 01/30/22  Pharmacy will continue to follow the patients culture results and clinical progress daily      Veverly Kailee, Pharmacist

## 2022-01-27 NOTE — PROGRESS NOTES
Cardiology Progress Note - Teofilo Angelucci 66 y o  female MRN: [id number]    Unit/Bed#: -01 Encounter: [id number]        Hospital Problems:  Principal Problem:    Sepsis [facility])  Active Problems:    Gastroesophageal reflux disease without esophagitis    Anxiety    Primary hypertension    Hyperlipidemia    Atrial fibrillation with RVR ([facility])      Assessment/Recommendations:    New onset atrial fibrillation with rapid ventricular response:   was maintaining sinus rhythm yesterday but rhythm irregular on exam today  Off telemetry  EKG requested  Metoprolol increased  Currently on BID Lovenox  Can switch to Eliquis once no further invasive procedures planned  Stop aspirin  Anticoagulation can be held 2 days prior to EGD and colonoscopy once that is rescheduled   Echocardiogram reviewed:  EF 60%  Mild MR  Moderate TR with mild-to-moderate pulmonary hypertension  no clinical heart failure      Hypertension: controlled      Right upper extremity cellulitis and right elbow abscess:   slowly improving status post incision and drainage       Dyslipidemia:  On statins     Elevated ASCVD risk score:  No angina reported    Continue risk factor modification  No indication for aspirin      Outpatient Cardiology follow-up  Subjective:     Patient seen and examined  Overnight events reviewed  No angina reported  No palpitations  Right arm improving  Denies palpitations  HR 70-90 overnight  Review of System:  Review of ten system was conducted and was negative except for findings stated  elsewhere  Review of Systems   Constitutional: Negative for fever and unexpected weight change  HENT: Negative for congestion and trouble swallowing  Eyes: Negative for visual disturbance  Respiratory: Negative for chest tightness, shortness of breath and wheezing  Cardiovascular: Negative for chest pain, palpitations and leg swelling  Gastrointestinal: Negative for abdominal pain and blood in stool  Endocrine: Negative for polyuria  Genitourinary: Negative for hematuria  Musculoskeletal: Positive for arthralgias, joint swelling and myalgias  Skin: Positive for color change  Negative for rash  Neurological: Negative for dizziness, tremors and weakness  Hematological: Does not bruise/bleed easily  Psychiatric/Behavioral: Positive for sleep disturbance              Current Facility-Administered Medications:     acetaminophen (TYLENOL) tablet 650 mg, 650 mg, Oral, Q6H PRN, Jerlyn Schilder, CRNP, 650 mg at 01/26/22 1958    albuterol inhalation solution 2 5 mg, 2 5 mg, Nebulization, Q6H PRN, Tom Clark MD    ALPRAZolam Aguilar Gambino) tablet 0 5 mg, 0 5 mg, Oral, BID PRN, Jerlyn Schilder, CRNP    aspirin (ECOTRIN LOW STRENGTH) EC tablet 81 mg, 81 mg, Oral, Daily, Jerlyn Schilder, CRNP, 81 mg at 01/27/22 0808    atorvastatin (LIPITOR) tablet 10 mg, 10 mg, Oral, HS, Jerlyn Schilder, CRNP, 10 mg at 01/26/22 2156    calcium carbonate (OYSTER SHELL,OSCAL) 500 mg tablet 1 tablet, 1 tablet, Oral, Daily With Breakfast, Jerlyn Schilder, CRNP, 1 tablet at 01/27/22 0808    enoxaparin (LOVENOX) subcutaneous injection 60 mg, 1 mg/kg, Subcutaneous, Q12H [address], Yandy Tyler MD, 60 mg at 01/27/22 0817    escitalopram (LEXAPRO) tablet 10 mg, 10 mg, Oral, Daily, Jerlyn Schilder, CRNP, 10 mg at 01/27/22 0809    levothyroxine tablet 100 mcg, 100 mcg, Oral, Early Morning, Jerlyn Schilder, CRNP, 100 mcg at 01/27/22 0541    lisinopril (ZESTRIL) tablet 40 mg, 40 mg, Oral, Daily, Jerlyn Schilder, CRNP, 40 mg at 01/27/22 0809    metoprolol tartrate (LOPRESSOR) tablet 25 mg, 25 mg, Oral, Q12H [address], Yandy Tyler MD, 25 mg at 01/27/22 0808    multivitamin-minerals (CENTRUM ADULTS) tablet 1 tablet, 1 tablet, Oral, Daily, Jerlyn Schilder, CRNP, 1 tablet at 01/27/22 0808    ondansetron (ZOFRAN) injection 4 mg, 4 mg, Intravenous, Q6H PRN, Jerlyn Schilder, CRNP, 4 mg at 01/26/22 0024    pantoprazole (PROTONIX) EC tablet 40 mg, 40 mg, Oral, Early Morning, CARLOS Sofia, 40 mg at 22 0541    vancomycin (VANCOCIN) 1,250 mg in sodium chloride 0 9 % 250 mL IVPB, 20 mg/kg, Intravenous, Q24H, CARLOS Sofia, Last Rate: 166 7 mL/hr at 22 1123, 1,250 mg at 22 1123    Facility-Administered Medications Ordered in Other Encounters:     sodium chloride 0 9 % infusion, 30 mL/hr, Intravenous, Continuous, China Abarca CRNA     Objective:     Vitals:   Blood pressure 121/93, pulse 89, temperature 98 4 °F (36 9 °C), temperature source Tympanic, resp  rate 18, height 5' 2" (1 575 m), weight 58 5 kg (129 lb), SpO2 96 %  Body mass index is 23 59 kg/m²  Orthostatic Blood Pressures      Most Recent Value   Blood Pressure 121/93 filed at 2022 0808   Patient Position - Orthostatic VS Sitting filed at 2022 9591         Systolic (24FBV), FI , Min:100 , CUQ:538     Diastolic (00UEL), MTD:67, Min:59, Max:93      Intake/Output Summary (Last 24 hours) at 2022 0919  Last data filed at 2022 0601  Gross per 24 hour   Intake 480 ml   Output --   Net 480 ml     Weight (last 2 days)     Date/Time Weight    22 1356 58 5 (129)          Physical Exam:    GEN: No acute distress     EYES:  no icterus  ENT: no gross abnormality  HEART: irregular rhythm, normal S1 and S2, grade 2 systolic murmur  LUNGS:  No wheezing or crackles  ABDOMEN: non tender  EXTREMITIES:  Right arm in dressing  JOINTS:  Right elbow tender, decreased ROM  NEURO:  no focal deficits  SKIN: warm, no lesions  PSYCH: Normal mood and affect       Labs & Results:        Results from last 7 days   Lab Units 22  0534 22  0452 22  0439   WBC Thousand/uL 10 24* 11 50* 12 48*   HEMOGLOBIN g/dL 10 5* 11 1* 10 3*   HEMATOCRIT % 31 6* 33 1* 31 5*   PLATELETS Thousands/uL 303 302 292         Results from last 7 days   Lab Units 22  0534 22  0452 22  0439 22  1117 22  1117   POTASSIUM mmol/L 4 0 3 9 4 0   < > 3 9   CHLORIDE mmol/L 104 103 106   < > 104   CO2 mmol/L 28 25 24   < > 25   BUN mg/dL 13 12 12   < > 14   CREATININE mg/dL 0 87 0 73 0 82   < > 1 05   CALCIUM mg/dL 9 5 9 2 9 6   < > 9 6   ALK PHOS U/L 217 5*  --   --   --  182 6*   ALT U/L 103*  --   --   --  75*   AST U/L 93*  --   --   --  64*    < > = values in this interval not displayed  Results from last 7 days   Lab Units 01/24/22  1117   MAGNESIUM mg/dL 1 9     No results for input(s): NTBNP in the last 72 hours  Imaging Studies:     XR chest portable    Result Date: 1/25/2022  Narrative: CHEST INDICATION:   Shortness of breath, wheezing  COMPARISON:  4/26/2021  EXAM PERFORMED/VIEWS:  XR CHEST PORTABLE Images:  1 FINDINGS: Cardiomediastinal silhouette appears unremarkable  There is mild central pulmonary vascular congestion  No focal airspace consolidation or effusions seen  No pneumothorax  Degenerative changes suspected in the thoracic spine  Impression: Mild central pulmonary vascular congestion  No focal airspace consolidation  Workstation performed: [id number]     XR elbow 2 vw right    Result Date: 1/21/2022  Narrative: RIGHT ELBOW INDICATION:   [id number]: Effusion, right elbow  Pain, swelling and redness over the olecranon process  COMPARISON:  None VIEWS:  XR ELBOW 2 VW RIGHT FINDINGS: There is no acute fracture or dislocation  There is no joint effusion  Mild to moderate degenerative changes throughout the elbow noted  Enthesopathy noted in the olecranon process  No lytic or blastic osseous lesion  Soft tissues are unremarkable  Impression: No acute osseous abnormality  Degenerative changes as described  Workstation performed: [id number]     VAS lower limb venous duplex study, complete bilateral    Result Date: 1/24/2022  Narrative:  [facility] REPORT [facility]INICAL: Indications: Patient c/o right calf swelling and left calf pain  No history of DVT/PE/CA/recent surgery or trauma     CONCLUSION:  Impression: RIGHT LOWER LIMB: No evidence of acute or chronic deep vein thrombosis  No evidence of superficial thrombophlebitis noted  LEFT LOWER LIMB: No evidence of acute or chronic deep vein thrombosis  No evidence of superficial thrombophlebitis noted  Technical findings were given to Angela Wilkins PA-C immediately following exam   SIGNATURE: Electronically Signed by: Terence Nagel MD on 2022-01-24 01:25:25 PM    Echo complete w/ contrast if indicated    Result Date: 1/25/2022  Narrative: Kary Dick  Left Ventricle: Left ventricular cavity size is normal  The left ventricular ejection fraction is 60%  Systolic function is normal  Wall motion is normal  Diastolic function is normal    Aortic Valve: There is mild regurgitation    Mitral Valve: There is mild annular calcification  There is moderate regurgitation    Tricuspid Valve: There is moderate regurgitation  The estimated right ventricular systolic pressure is 37 51 mmHg  Available cardiology studies, imaging and lab results independently reviewed today  This note was completed in part utilizing [employer] direct voice recognition software  Grammatical errors, random word insertion, spelling mistakes, occasional wrong word or "sound-alike" substitutions and incomplete sentences may be an occasional consequence of the system secondary to software limitations, ambient noise and hardware issues  At the time of dictation, efforts were made to edit, clarify and /or correct errors  Please read the chart carefully and recognize, using context, where substitutions have occurred  If you have any questions or concerns about the context, text or information contained within the body of this dictation, please contact myself, the provider, for further clarification

## 2022-01-27 NOTE — ASSESSMENT & PLAN NOTE
· New onset suspect in setting of sepsis  · Echocardiogram:  EF 60%  Mild MR  Moderate TR with mild to moderate pulmonary hypertension  · Currently patient is in sinus rhythm, Will continue p o  Metoprolol  · Currently on Lovenox, will transition to oral Eliquis after patient has I&D of the right olecranon bursa and there are no further indications for invasive procedures   · Cardiology consulted, appreciate recommendations  · Rhythm appeared irregular on exam   Off telemetry, EKG requested  Metoprolol increased  Long-term anticoagulation with Eliquis to lower stroke risk  Once Eliquis is started, aspirin can be discontinued to lower bleeding risk  Anticoagulation can be held 2 days prior to EGD/colonoscopy once that is rescheduled  Will need outpatient follow-up with Cardiology

## 2022-01-27 NOTE — PROGRESS NOTES
Progress Note - Infectious Disease   Mouna Martiens 66 y o  female MRN: [id number]  Unit/Bed#: -01 Encounter: [id number]      Impression/Plan:  1  SIRS versus Sepsis  POA with tachycardia and leukocytosis   Patient found to be in AFib with RVR responding to Cardizem   White cell count is trending down   Admission blood cultures negative to date   Source appears to be #2    -continue vancomycin IV for now  -Pharmacy on consult for vancomycin trough dosing management; next level 1/27/22 11 am  -follow-up cultures and adjust antibiotics as indicated  -monitor temperature and hemodynamics  -serial exam     2  Septic right olecranon bursitis/cellulitis   Without improvement on 2 days of Augmentin/Bactrim outpatient  Right olecranon bursa was aspirated and 4 mL of murky colored fluid with white cell count of 887,303  1/24/22 Body fluid 4+ Staph aureus  S/p bedside I&D 1/25/22  Clinically improving  -antibiotics as above  -follow-up aspirate culture sensitivities  -monitor temperature and hemodynamics  -arm elevation  -serial exam  -daily dressing change per Orthopedics  -anticipate transition to p o  antibiotics once sensitivities are available     3  New onset AFib   In setting #1/2   Patient now returned to sinus rhythm  -Cardiology ongoing management  -volume management   -recheck BMP     4   GERD and change in bowel habits   Patient elective EGD/colonoscopy postponed secondary to above  -monitor symptoms  -symptomatic management per primary care team  -follow-up with GI outpatient     Antibiotics:  Vancomycin D4     Above impression and plan discussed in detail with patient, RN, and LACEY Snider      Subjective:  Patient has no fever, chills, sweats; no nausea, vomiting, diarrhea today; no cough, shortness of breath; + right elbow pain s/p I&D improving  Patient appears to be tolerating IV antibiotics  No new symptoms      Objective:  Vitals:  Temp:  [98 4 °F (36 9 °C)-100 2 °F (37 9 °C)] 98 4 °F (36 9 °C)  HR:  [82-89] 89  Resp:  [18] 18  BP: (100-141)/(59-93) 121/93  SpO2:  [94 %-96 %] 96 %  Temp (24hrs), Av °F (37 2 °C), Min:98 4 °F (36 9 °C), Max:100 2 °F (37 9 °C)  Current: Temperature: 98 4 °F (36 9 °C)    Physical Exam:   General Appearance:  Alert, interactive, nontoxic, no acute distress  Ambulatory to the bathroom   Throat: Oropharynx moist without lesions  Lungs:   respirations unlabored   Heart:  RR   Abdomen:   Soft, non-tender, non-distended, positive bowel sounds  Extremities: No clubbing, cyanosis or LE edema, right forearm/upper arm Ace wrapped with increased range of motion and less tenderness, no strike through drainage or spreading erythema outside Ace wrap   : No Polo, no SPT   Skin: No new rashes or lesions  Left upper arm IV site nontender  Labs, Imaging, & Other studies:   All pertinent labs and imaging studies were personally reviewed  Results from last 7 days   Lab Units 22  0534 22  0452 22  0439   WBC Thousand/uL 10 24* 11 50* 12 48*   HEMOGLOBIN g/dL 10 5* 11 1* 10 3*   PLATELETS Thousands/uL 303 302 292     Results from last 7 days   Lab Units 22  0534 22  0452 22  0452 22  0439 22  0439 22  1117 22  1117   SODIUM mmol/L 139  --  135  --  137   < > 137   POTASSIUM mmol/L 4 0   < > 3 9   < > 4 0   < > 3 9   CHLORIDE mmol/L 104   < > 103   < > 106   < > 104   CO2 mmol/L 28   < > 25   < > 24   < > 25   BUN mg/dL 13   < > 12   < > 12   < > 14   CREATININE mg/dL 0 87   < > 0 73   < > 0 82   < > 1 05   EGFR ml/min/1 73sq m 66   < > 81   < > 71   < > 52   CALCIUM mg/dL 9 5   < > 9 2   < > 9 6   < > 9 6   AST U/L 93*  --   --   --   --   --  64*   ALT U/L 103*  --   --   --   --    < > 75*   ALK PHOS U/L 217 5*  --   --   --   --    < > 182 6*    < > = values in this interval not displayed       Results from last 7 days   Lab Units 22  1729 22  1123 22  1117 22  1631   BLOOD CULTURE   -- No Growth at 48 hrs  No Growth at 48 hrs  No Growth After 4 Days     GRAM STAIN RESULT  4+ Polys  No organisms seen  --   --   --    BODY FLUID CULTURE, STERILE  4+ Growth of Staphylococcus aureus*  --   --   --

## 2022-01-27 NOTE — PROGRESS NOTES
Progress Note - Orthopedics   Belinda Gonzalez 66 y o  female MRN: [id number]  Unit/Bed#: EA CAR NON INV      Subjective:    66 y  o female right olecranon septic bursitis  Patient notes improving symptoms she has been compliant with her dressing  Patient denies any pain numbness tingling with movement of her right elbow or any issues with the right upper extremity No acute events, no complaints  Pt doing well  Pain controlled   Denies fevers chills, CP, SOB    Labs:  0   Lab Value Date/Time    HCT 31 6 (L) 01/27/2022 0534    HCT 33 1 (L) 01/26/2022 0452    HCT 31 5 (L) 01/25/2022 0439    HGB 10 5 (L) 01/27/2022 0534    HGB 11 1 (L) 01/26/2022 0452    HGB 10 3 (L) 01/25/2022 0439    INR 0 98 04/26/2021 1416    WBC 10 24 (H) 01/27/2022 0534    WBC 11 50 (H) 01/26/2022 0452    WBC 12 48 (H) 01/25/2022 0439       Meds:    Current Facility-Administered Medications:     acetaminophen (TYLENOL) tablet 650 mg, 650 mg, Oral, Q6H PRN, CARLOS Reinoso, 650 mg at 01/26/22 1958    albuterol inhalation solution 2 5 mg, 2 5 mg, Nebulization, Q6H PRN, Pearl Mcgovern MD    ALPRAZolam [place]) tablet 0 5 mg, 0 5 mg, Oral, BID PRN, HARSHIL ReinosoNP    aspirin (ECOTRIN LOW STRENGTH) EC tablet 81 mg, 81 mg, Oral, Daily, HARSHIL ReinosoNP, 81 mg at 01/26/22 0855    atorvastatin (LIPITOR) tablet 10 mg, 10 mg, Oral, HS, CARLOS Reinoso, 10 mg at 01/26/22 2156    calcium carbonate (OYSTER SHELL,OSCAL) 500 mg tablet 1 tablet, 1 tablet, Oral, Daily With Breakfast, CARLOS Reinoso, 1 tablet at 01/26/22 0856    enoxaparin (LOVENOX) subcutaneous injection 60 mg, 1 mg/kg, Subcutaneous, Q12H [address], Adriel Evans MD, 60 mg at 01/26/22 2156    escitalopram (LEXAPRO) tablet 10 mg, 10 mg, Oral, Daily, CARLOS Reinoso, 10 mg at 01/26/22 0856    levothyroxine tablet 100 mcg, 100 mcg, Oral, Early Morning, CARLOS Reinoso, 100 mcg at 01/27/22 0541    lisinopril (ZESTRIL) tablet 40 mg, 40 mg, Oral, Daily, Kendall End, CRNP, 40 mg at 01/26/22 0855    metoprolol tartrate (LOPRESSOR) tablet 25 mg, 25 mg, Oral, Q12H [address], Melva Henry MD, 25 mg at 01/26/22 2155    multivitamin-minerals (CENTRUM ADULTS) tablet 1 tablet, 1 tablet, Oral, Daily, Kendall End, CRNP, 1 tablet at 01/26/22 0856    ondansetron (ZOFRAN) injection 4 mg, 4 mg, Intravenous, Q6H PRN, Kendall End, CRNP, 4 mg at 01/26/22 0024    pantoprazole (PROTONIX) EC tablet 40 mg, 40 mg, Oral, Early Morning, Kendall End, CRNP, 40 mg at 01/27/22 0541    vancomycin (VANCOCIN) 1,250 mg in sodium chloride 0 9 % 250 mL IVPB, 20 mg/kg, Intravenous, Q24H, Kendall End, CRNP, Last Rate: 166 7 mL/hr at 01/26/22 1123, 1,250 mg at 01/26/22 1123    Facility-Administered Medications Ordered in Other Encounters:     sodium chloride 0 9 % infusion, 30 mL/hr, Intravenous, Continuous, Jac Dorantes CRNA    Blood Culture:   Lab Results   Component Value Date    BLOODCX No Growth at 48 hrs  01/24/2022       Wound Culture:   No results found for: WOUNDCULT    Ins and Outs:  I/O last 24 hours: In: 480 [P O :480]  Out: -           Physical:  Vitals:    01/26/22 2152   BP: 141/71   Pulse: 83   Resp: 18   Temp: 100 2 °F (37 9 °C)   SpO2: 94%     Musculoskeletal: right Upper Extremity  · Skin minimal erythema surrounding the olecranon bursa region minimal tenderness to palpation no active drainage no fluctuance no fluid collection  · Iodoform gauze removed dressings placed  · TTP minimal tenderness to palpation of the olecranon bursa region right elbow  · Full elbow range of motion flexion extension supination pronation sensation tacked distal pulses present right upper extremity  · SILT m/r/u  Motor intact ain/pin/m/r/u, 2+ radial pulse  ·   ·     Assessment:    66 y  o female right septic olecranon bursitis status post incision and drainage bedside doing well     Plan:  · WB as tolerated upper extremity  · Daily dressing changes for evaluation right upper extremity  · PT/OT  · Pain control  · DVT ppx  · Dispo: Ortho signing off   · Follow-up an outpatient basis regarding patient's right olecranon bursa region    Abdulaziz Emanuel PA-C

## 2022-01-28 VITALS
SYSTOLIC BLOOD PRESSURE: 138 MMHG | HEIGHT: 62 IN | BODY MASS INDEX: 23.74 KG/M2 | WEIGHT: 129 LBS | OXYGEN SATURATION: 97 % | TEMPERATURE: 98.2 F | RESPIRATION RATE: 18 BRPM | HEART RATE: 66 BPM | DIASTOLIC BLOOD PRESSURE: 67 MMHG

## 2022-01-28 LAB
ALBUMIN SERPL BCP-MCNC: 3.5 G/DL (ref 3.4–4.8)
ALP SERPL-CCNC: 210.2 U/L (ref 35–140)
ALT SERPL W P-5'-P-CCNC: 97 U/L (ref 5–54)
ANION GAP SERPL CALCULATED.3IONS-SCNC: 7 MMOL/L (ref 4–13)
AST SERPL W P-5'-P-CCNC: 77 U/L (ref 15–41)
BACTERIA SPEC BFLD CULT: ABNORMAL
BASOPHILS # BLD AUTO: 0.11 THOUSANDS/ΜL (ref 0–0.1)
BASOPHILS NFR BLD AUTO: 1 % (ref 0–1)
BILIRUB SERPL-MCNC: 0.49 MG/DL (ref 0.3–1.2)
BUN SERPL-MCNC: 13 MG/DL (ref 6–20)
CALCIUM SERPL-MCNC: 9.6 MG/DL (ref 8.4–10.2)
CHLORIDE SERPL-SCNC: 103 MMOL/L (ref 96–108)
CO2 SERPL-SCNC: 29 MMOL/L (ref 22–33)
CREAT SERPL-MCNC: 0.87 MG/DL (ref 0.4–1.1)
EOSINOPHIL # BLD AUTO: 0.56 THOUSAND/ΜL (ref 0–0.61)
EOSINOPHIL NFR BLD AUTO: 6 % (ref 0–6)
ERYTHROCYTE [DISTWIDTH] IN BLOOD BY AUTOMATED COUNT: 12.4 % (ref 11.6–15.1)
GFR SERPL CREATININE-BSD FRML MDRD: 66 ML/MIN/1.73SQ M
GLUCOSE SERPL-MCNC: 93 MG/DL (ref 65–140)
GRAM STN SPEC: ABNORMAL
GRAM STN SPEC: ABNORMAL
HCT VFR BLD AUTO: 32.8 % (ref 34.8–46.1)
HGB BLD-MCNC: 10.7 G/DL (ref 11.5–15.4)
IMM GRANULOCYTES # BLD AUTO: 0.11 THOUSAND/UL (ref 0–0.2)
IMM GRANULOCYTES NFR BLD AUTO: 1 % (ref 0–2)
LYMPHOCYTES # BLD AUTO: 1.9 THOUSANDS/ΜL (ref 0.6–4.47)
LYMPHOCYTES NFR BLD AUTO: 19 % (ref 14–44)
MCH RBC QN AUTO: 30.1 PG (ref 26.8–34.3)
MCHC RBC AUTO-ENTMCNC: 32.6 G/DL (ref 31.4–37.4)
MCV RBC AUTO: 92 FL (ref 82–98)
MONOCYTES # BLD AUTO: 1.09 THOUSAND/ΜL (ref 0.17–1.22)
MONOCYTES NFR BLD AUTO: 11 % (ref 4–12)
NEUTROPHILS # BLD AUTO: 6.23 THOUSANDS/ΜL (ref 1.85–7.62)
NEUTS SEG NFR BLD AUTO: 62 % (ref 43–75)
NRBC BLD AUTO-RTO: 0 /100 WBCS
PLATELET # BLD AUTO: 351 THOUSANDS/UL (ref 149–390)
PMV BLD AUTO: 10.3 FL (ref 8.9–12.7)
POTASSIUM SERPL-SCNC: 3.5 MMOL/L (ref 3.5–5)
PROT SERPL-MCNC: 6.9 G/DL (ref 6.4–8.3)
RBC # BLD AUTO: 3.56 MILLION/UL (ref 3.81–5.12)
SODIUM SERPL-SCNC: 139 MMOL/L (ref 133–145)
WBC # BLD AUTO: 10 THOUSAND/UL (ref 4.31–10.16)

## 2022-01-28 PROCEDURE — 80053 COMPREHEN METABOLIC PANEL: CPT

## 2022-01-28 PROCEDURE — 99239 HOSP IP/OBS DSCHRG MGMT >30: CPT | Performed by: INTERNAL MEDICINE

## 2022-01-28 PROCEDURE — 85025 COMPLETE CBC W/AUTO DIFF WBC: CPT

## 2022-01-28 PROCEDURE — 99232 SBSQ HOSP IP/OBS MODERATE 35: CPT | Performed by: INTERNAL MEDICINE

## 2022-01-28 PROCEDURE — 99233 SBSQ HOSP IP/OBS HIGH 50: CPT | Performed by: INTERNAL MEDICINE

## 2022-01-28 RX ORDER — CEPHALEXIN 500 MG/1
500 CAPSULE ORAL EVERY 6 HOURS SCHEDULED
Qty: 24 CAPSULE | Refills: 0 | Status: SHIPPED | OUTPATIENT
Start: 2022-01-28 | End: 2022-02-03

## 2022-01-28 RX ORDER — ACETAMINOPHEN 500 MG
500 TABLET ORAL EVERY 6 HOURS PRN
Qty: 20 TABLET | Refills: 0 | Status: SHIPPED | OUTPATIENT
Start: 2022-01-28

## 2022-01-28 RX ADMIN — CALCIUM 1 TABLET: 500 TABLET ORAL at 08:14

## 2022-01-28 RX ADMIN — PANTOPRAZOLE SODIUM 40 MG: 40 TABLET, DELAYED RELEASE ORAL at 05:06

## 2022-01-28 RX ADMIN — ESCITALOPRAM OXALATE 10 MG: 20 TABLET ORAL at 08:14

## 2022-01-28 RX ADMIN — Medication 1 TABLET: at 08:13

## 2022-01-28 RX ADMIN — ENOXAPARIN SODIUM 60 MG: 60 INJECTION SUBCUTANEOUS at 08:14

## 2022-01-28 RX ADMIN — VANCOMYCIN HYDROCHLORIDE 1750 MG: 1 INJECTION, POWDER, LYOPHILIZED, FOR SOLUTION INTRAVENOUS at 04:54

## 2022-01-28 RX ADMIN — LISINOPRIL 40 MG: 20 TABLET ORAL at 08:14

## 2022-01-28 RX ADMIN — LEVOTHYROXINE SODIUM 100 MCG: 100 TABLET ORAL at 05:06

## 2022-01-28 RX ADMIN — METOPROLOL TARTRATE 50 MG: 50 TABLET, FILM COATED ORAL at 08:14

## 2022-01-28 NOTE — PLAN OF CARE
Problem: MOBILITY - ADULT  Goal: Maintain or return to baseline ADL function  Description: INTERVENTIONS:  -  Assess patient's ability to carry out ADLs; assess patient's baseline for ADL function and identify physical deficits which impact ability to perform ADLs (bathing, care of mouth/teeth, toileting, grooming, dressing, etc )  - Assess/evaluate cause of self-care deficits   - Assess range of motion  - Assess patient's mobility; develop plan if impaired  - Assess patient's need for assistive devices and provide as appropriate  - Encourage maximum independence but intervene and supervise when necessary  - Involve family in performance of ADLs  - Assess for home care needs following discharge   - Consider OT consult to assist with ADL evaluation and planning for discharge  - Provide patient education as appropriate  Outcome: Progressing  Goal: Maintains/Returns to pre admission functional level  Description: INTERVENTIONS:  - Perform BMAT or MOVE assessment daily    - Set and communicate daily mobility goal to care team and patient/family/caregiver  - Collaborate with rehabilitation services on mobility goals if consulted  - Perform Range of Motion 2 times a day  - Reposition patient every 2 hours    - Dangle patient 2 times a day  - Stand patient 2 times a day  - Ambulate patient 2 times a day  - Out of bed to chair 2 times a day   - Out of bed for meals 2 times a day  - Out of bed for toileting  - Record patient progress and toleration of activity level   Outcome: Progressing     Problem: Potential for Falls  Goal: Patient will remain free of falls  Description: INTERVENTIONS:  - Educate patient/family on patient safety including physical limitations  - Instruct patient to call for assistance with activity   - Consult OT/PT to assist with strengthening/mobility   - Keep Call bell within reach  - Keep bed low and locked with side rails adjusted as appropriate  - Keep care items and personal belongings within reach  - Initiate and maintain comfort rounds  - Make Fall Risk Sign visible to staff  - Offer Toileting every 2 Hours, in advance of need  - Initiate/Maintain bed alarm  - Obtain necessary fall risk management equipment: call bel  - Apply yellow socks and bracelet for high fall risk patients  - Consider moving patient to room near nurses station  Outcome: Progressing     Problem: Prexisting or High Potential for Compromised Skin Integrity  Goal: Skin integrity is maintained or improved  Description: INTERVENTIONS:  - Identify patients at risk for skin breakdown  - Assess and monitor skin integrity  - Assess and monitor nutrition and hydration status  - Monitor labs   - Assess for incontinence   - Turn and reposition patient  - Assist with mobility/ambulation  - Relieve pressure over bony prominences  - Avoid friction and shearing  - Provide appropriate hygiene as needed including keeping skin clean and dry  - Evaluate need for skin moisturizer/barrier cream  - Collaborate with interdisciplinary team   - Patient/family teaching  - Consider wound care consult   Outcome: Progressing

## 2022-01-28 NOTE — PLAN OF CARE
Problem: MOBILITY - ADULT  Goal: Maintain or return to baseline ADL function  Description: INTERVENTIONS:  -  Assess patient's ability to carry out ADLs; assess patient's baseline for ADL function and identify physical deficits which impact ability to perform ADLs (bathing, care of mouth/teeth, toileting, grooming, dressing, etc )  - Assess/evaluate cause of self-care deficits   - Assess range of motion  - Assess patient's mobility; develop plan if impaired  - Assess patient's need for assistive devices and provide as appropriate  - Encourage maximum independence but intervene and supervise when necessary  - Involve family in performance of ADLs  - Assess for home care needs following discharge   - Consider OT consult to assist with ADL evaluation and planning for discharge  - Provide patient education as appropriate  Outcome: Progressing  Goal: Maintains/Returns to pre admission functional level  Description: INTERVENTIONS:  - Perform BMAT or MOVE assessment daily    - Set and communicate daily mobility goal to care team and patient/family/caregiver  - Collaborate with rehabilitation services on mobility goals if consulted  - Perform Range of Motion  times a day  - Reposition patient every 2 hours    - Dangle patient  times a day  - Stand patient  times a day  - Ambulate patient  times a day  - Out of bed to chair  times a day   - Out of bed for meals  times a day  - Out of bed for toileting  - Record patient progress and toleration of activity level   Outcome: Progressing     Problem: Potential for Falls  Goal: Patient will remain free of falls  Description: INTERVENTIONS:  - Educate patient/family on patient safety including physical limitations  - Instruct patient to call for assistance with activity   - Consult OT/PT to assist with strengthening/mobility   - Keep Call bell within reach  - Keep bed low and locked with side rails adjusted as appropriate  - Keep care items and personal belongings within reach  - Initiate and maintain comfort rounds  - Make Fall Risk Sign visible to staff  - Offer Toileting every 2 Hours, in advance of need  - Initiate/Maintain alarm  - Obtain necessary fall risk management equipment:   - Apply yellow socks and bracelet for high fall risk patients  - Consider moving patient to room near nurses station  Outcome: Progressing     Problem: Prexisting or High Potential for Compromised Skin Integrity  Goal: Skin integrity is maintained or improved  Description: INTERVENTIONS:  - Identify patients at risk for skin breakdown  - Assess and monitor skin integrity  - Assess and monitor nutrition and hydration status  - Monitor labs   - Assess for incontinence   - Turn and reposition patient  - Assist with mobility/ambulation  - Relieve pressure over bony prominences  - Avoid friction and shearing  - Provide appropriate hygiene as needed including keeping skin clean and dry  - Evaluate need for skin moisturizer/barrier cream  - Collaborate with interdisciplinary team   - Patient/family teaching  - Consider wound care consult   Outcome: Progressing

## 2022-01-28 NOTE — DISCHARGE INSTRUCTIONS
A-fib (Atrial Fibrillation)   WHAT YOU NEED TO KNOW:   A-fib may come and go, or it may be a long-term condition  A-fib can cause blood clots, stroke, or heart failure  These conditions may become life-threatening  It is important to treat and manage A-fib to help prevent a blood clot, stroke, or heart failure  DISCHARGE INSTRUCTIONS:   Call your local emergency number (911 in the [address] [facility]) or have someone call if:   · You have any of the following signs of a heart attack:      ? Squeezing, pressure, or pain in your chest    ? You may  also have any of the following:     § Discomfort or pain in your back, neck, jaw, stomach, or arm    § Shortness of breath    § Nausea or vomiting    § Lightheadedness or a sudden cold sweat    · You have any of the following signs of a stroke:      ? Numbness or drooping on one side of your face     ? Weakness in an arm or leg    ? Confusion or difficulty speaking    ? Dizziness, a severe headache, or vision loss    Call your doctor or cardiologist if:   · Your arm or leg feels warm, tender, and painful  It may look swollen and red  · Your heart rate is more than 110 beats per minute  · You have new or worsening swelling in your legs, feet, ankles, or abdomen  · You are short of breath, even at rest     · You have questions or concerns about your condition or care  Medicines: You may need any of the following:  · Heart medicines  help control your heart rate or rhythm  You may need more than one medicine to treat your symptoms  · Blood thinners  help prevent blood clots  Clots can cause strokes, heart attacks, and death  The following are general safety guidelines to follow while you are taking a blood thinner:    ? Watch for bleeding and bruising while you take blood thinners  Watch for bleeding from your gums or nose  Watch for blood in your urine and bowel movements  Use a soft washcloth on your skin, and a soft toothbrush to brush your teeth   This can keep your skin and gums from bleeding  If you shave, use an electric shaver  Do not play contact sports  ? Tell your dentist and other healthcare providers that you take a blood thinner  Wear a bracelet or necklace that says you take this medicine  ? Do not start or stop any other medicines unless your healthcare provider tells you to  Many medicines cannot be used with blood thinners  ? Take your blood thinner exactly as prescribed by your healthcare provider  Do not skip does or take less than prescribed  Tell your provider right away if you forget to take your blood thinner, or if you take too much  ? Warfarin  is a blood thinner that you may need to take  The following are things you should be aware of if you take warfarin:     § Foods and medicines can affect the amount of warfarin in your blood  Do not make major changes to your diet while you take warfarin  Warfarin works best when you eat about the same amount of vitamin K every day  Vitamin K is found in green leafy vegetables and certain other foods  Ask for more information about what to eat when you are taking warfarin  § You will need to see your healthcare provider for follow-up visits when you are on warfarin  You will need regular blood tests  These tests are used to decide how much medicine you need  · Antiplatelets , such as aspirin, help prevent blood clots  Take your antiplatelet medicine exactly as directed  These medicines make it more likely for you to bleed or bruise  If you are told to take aspirin, do not take acetaminophen or ibuprofen instead  · Take your medicine as directed  Contact your healthcare provider if you think your medicine is not helping or if you have side effects  Tell him or her if you are allergic to any medicine  Keep a list of the medicines, vitamins, and herbs you take  Include the amounts, and when and why you take them  Bring the list or the pill bottles to follow-up visits   Carry your medicine list with you in case of an emergency  Manage A-fib:   · Know your target heart rate  Learn how to check your pulse and monitor your heart rate  · Know the risks if you choose to drink alcohol  Alcohol can increase your risk for A-fib or make A-fib harder to manage  Ask your healthcare provider if it is okay for you to drink any alcohol  He or she can help you set limits for the number of drinks you have in 24 hours and in a week  A drink of alcohol is 12 ounces of beer, 5 ounces of wine, or 1½ ounces of liquor  · Do not smoke  Nicotine can cause heart damage and make it more difficult to manage your A-fib  Do not use e-cigarettes or smokeless tobacco in place of cigarettes or to help you quit  They still contain nicotine  Ask your healthcare provider for information if you currently smoke and need help quitting  · Eat heart-healthy foods  Heart healthy foods will help keep your cholesterol low  These include fruits, vegetables, whole-grain breads, low-fat dairy products, beans, lean meats, and fish  Replace butter and margarine with heart-healthy oils such as olive oil and canola oil  · Maintain a healthy weight  Ask your healthcare provider what a healthy weight is for you  Ask him or her to help you create a safe weight loss plan if you are overweight  Even a small goal of a 10% weight loss can improve your heart health  · Get regular physical activity  Physical activity helps improve your heart health  Get at least 150 minutes of moderate aerobic physical activity each week  Your healthcare provider can help you create an activity plan  · Manage other health conditions  This includes high blood pressure or cholesterol, sleep apnea, diabetes, and other heart conditions  Take medicine as directed and follow your treatment plan  Your healthcare provider may need to change a medicine you are taking if it is causing your A-fib   Do not  stop taking any medicine unless directed by your provider  Follow up with your doctor or cardiologist as directed: You will need regular blood tests and monitoring  Write down your questions so you remember to ask them during your visits  © Copyright "[employer] 2021 Information is for End User's use only and may not be sold, redistributed or otherwise used for commercial purposes  All illustrations and images included in CareNotes® are the copyrighted property of [employer]  or [employer]   The above information is an  only  It is not intended as medical advice for individual conditions or treatments  Talk to your doctor, nurse or pharmacist before following any medical regimen to see if it is safe and effective for you  Acute Neck Pain   WHAT YOU NEED TO KNOW:   Acute neck pain starts suddenly, increases quickly, and goes away in a few days  The pain may come and go, or be worse with certain movements  The pain may be only in your neck, or it may move to your arms, back, or shoulders  You may also have pain that starts in another body area and moves to your neck  DISCHARGE INSTRUCTIONS:   Return to the emergency department if:   · You have an injury that causes neck pain and shooting pain down your arms or legs  · Your neck pain suddenly becomes severe  · You have neck pain along with numbness, tingling, or weakness in your arms or legs  · You have a stiff neck, a headache, and a fever  Call your doctor if:   · You have new or worsening symptoms  · Your symptoms continue even after treatment  · You have questions or concerns about your condition or care  Medicines: You may need any of the following:  · NSAIDs , such as ibuprofen, help decrease swelling, pain, and fever  This medicine is available with or without a doctor's order  NSAIDs can cause stomach bleeding or kidney problems in certain people   If you take blood thinner medicine, always ask your healthcare provider if NSAIDs are safe for you  Always read the medicine label and follow directions  · Acetaminophen  decreases pain and fever  It is available without a doctor's order  Ask how much to take and how often to take it  Follow directions  Read the labels of all other medicines you are using to see if they also contain acetaminophen, or ask your doctor or pharmacist  Acetaminophen can cause liver damage if not taken correctly  Do not use more than 4 grams (4,000 milligrams) total of acetaminophen in one day  · Steroid medicine  may be used to reduce inflammation  This can help relieve pain caused by swelling  · Muscle relaxers  help relax tense muscles and can prevent muscle spasms  · Nerve medicine  may be given if your pain is caused by a nerve problem  · Take your medicine as directed  Contact your healthcare provider if you think your medicine is not helping or if you have side effects  Tell him or her if you are allergic to any medicine  Keep a list of the medicines, vitamins, and herbs you take  Include the amounts, and when and why you take them  Bring the list or the pill bottles to follow-up visits  Carry your medicine list with you in case of an emergency  Manage or prevent acute neck pain:   · Rest your neck as directed  Do not make sudden movements, such as turning your head quickly  Your healthcare provider may recommend you wear a cervical collar for a short time  The collar will prevent you from moving your head  This will give your neck time to heal if an injury is causing your neck pain  Ask your healthcare provider when you can return to sports or other normal daily activities  · Apply heat as directed  Heat helps relieve pain and swelling  Use a heat wrap, or soak a small towel in warm water  Wring out the extra water  Apply the heat wrap or towel for 20 minutes every hour, or as directed  · Apply ice as directed    Ice helps relieve pain and swelling, and can help prevent tissue damage  Use an ice pack, or put ice in a bag  Cover the ice pack or back with a towel before you apply it to your neck  Apply the ice pack or ice for 15 minutes every hour, or as directed  Your healthcare provider can tell you how often to apply ice  · Do neck exercises as directed  Neck exercises help strengthen the muscles and increase range of motion  Your healthcare provider will tell you which exercises are right for you  He or she may give you instructions or recommend that you work with a physical therapist  Your healthcare provider or therapist can make sure you are doing the exercises correctly  · Maintain good posture  Try to keep your head and shoulders lifted when you sit  If you work in front of a computer, make sure the monitor is at the right level  You should not need to look up down to see the screen  You should also not have to lean forward to be able to read what is on the screen  Make sure your keyboard, mouse, and other computer items are placed where you do not have to extend your shoulder to reach them  Get up often if you work in front of a computer or sit for long periods of time  Stretch or walk around to keep your neck muscles loose  Follow up with your doctor as directed:  He or she may refer you to a specialist if your pain does not get better with treatment  Write down your questions so you remember to ask them during your visits  © Copyright [employer] 2021 Information is for End User's use only and may not be sold, redistributed or otherwise used for commercial purposes  All illustrations and images included in CareNotes® are the copyrighted property of [employer]  or [employer]   The above information is an  only  It is not intended as medical advice for individual conditions or treatments  Talk to your doctor, nurse or pharmacist before following any medical regimen to see if it is safe and effective for you        Elbow Bursitis   WHAT YOU NEED TO KNOW:   What is elbow bursitis? Elbow bursitis is inflammation of the bursa in your elbow  The bursa is a fluid-filled sac that acts as a cushion between a bone and a tendon  A tendon is a cord of strong tissue that connects muscles to bones  The bursa is located right under the point of your elbow  What increases my risk for elbow bursitis? · An injury, such as a fall    · Overuse of your elbow, such as when you play tennis, vacuum, or swing a hammer    · Pressure on your elbows, such as when you lean on your elbows    · Bacterial infection    · Medical conditions, such as rheumatoid arthritis or gout    What are the signs and symptoms of elbow bursitis? · Pain or tenderness when you touch or move your elbow    · Redness or swelling on or around the point of your elbow    · Decreased movement of your elbow    · Warmth of the skin over your elbow    · A grating or grinding sound or feeling when you move your elbow    How is elbow bursitis diagnosed? Your healthcare provider will examine your elbow and ask about your injury or activities  You may need any of the following:  · Blood tests  may be used to check for signs of infection  Healthcare providers may also check for diseases that may be causing your bursitis, such as rheumatoid arthritis  · X-ray or MRI  pictures may show bone position problems, arthritis, or a fracture  You may be given contrast liquid to help your knee show up better in the pictures  Tell the healthcare provider if you have ever had an allergic reaction to contrast liquid  Do not enter the MRI room with anything metal  Metal can cause serious injury  Tell the healthcare provider if you have any metal in or on your body  · A sample of fluid  from your knee may tested for signs of infection  Removal of bursa fluid may also help relieve your symptoms  How is elbow bursitis treated? · Medicines:      ?  NSAIDs , such as ibuprofen, help decrease swelling, pain, and fever  NSAIDs can cause stomach bleeding or kidney problems in certain people  If you take blood thinner medicine, always ask your healthcare provider if NSAIDs are safe for you  Always read the medicine label and follow directions  ? Aspirin  helps relieve pain and swelling  Take aspirin exactly as directed by your healthcare provider  ? Antibiotics  help fight an infection caused by bacteria  ? Steroids  help decrease pain and swelling  Steroid injections are given directly into the painful area  Steroid pills may be given for a short time to relieve acute pain  · Surgery  may be needed to remove your bursa or part of your elbow bone  Surgery is only done when other treatments do not work  How can I manage elbow bursitis? · Rest your elbow as much as possible to decrease pain and swelling  Slowly start to do more each day  Return to your daily activities as directed  · Apply ice to help decrease swelling and pain  Use an ice pack, or put crushed ice in a plastic bag  Cover the bag with a towel before you place it on your elbow  Apply ice for 15 to 20 minutes, 3 to 4 times each day, as directed  · Use compression to help decrease swelling  Healthcare providers may wrap your arm with tape or an elastic bandage  Loosen the elastic bandage if you start to lose feeling in your fingers  · Elevate (raise) your elbow above the level of your heart as often as you can  This will help decrease swelling and pain  Prop your elbow on pillows or blankets to keep it elevated comfortably  · Go to physical therapy, if directed  A physical therapist teaches you exercises to help improve movement and strength, and to decrease pain  How can I prevent elbow bursitis? · Avoid injury and pressure to your elbows  Wear elbow pads or protectors when you play sports  Do not lean on your elbows or clench your fists  Do not tightly  small items, such as tools or pens      · Stretch, warm up, and cool down  Always stretch and do warmup and cool-down exercises before and after you exercise  This will help loosen your muscles and decrease stress on your elbows  Rest between workouts  When should I call my doctor? · Your pain and swelling increase  · Your symptoms do not improve after 10 days of treatment  · You have a fever  · You have questions or concerns about your condition or care  CARE AGREEMENT:   You have the right to help plan your care  Learn about your health condition and how it may be treated  Discuss treatment options with your healthcare providers to decide what care you want to receive  You always have the right to refuse treatment  The above information is an  only  It is not intended as medical advice for individual conditions or treatments  Talk to your doctor, nurse or pharmacist before following any medical regimen to see if it is safe and effective for you  © Copyright [employer] 2021 Information is for End User's use only and may not be sold, redistributed or otherwise used for commercial purposes   All illustrations and images included in CareNotes® are the copyrighted property of A D A M , Inc  or [address] [place] [address]

## 2022-01-28 NOTE — NURSING NOTE
Patient is discharged home  Family member picked up the patient to take her home via private car  All the belongings are with the patient  [place] is given to the patient  Medication list is given to the patient and explained the schedule  Patient was in no distress upon discharge

## 2022-01-28 NOTE — PROGRESS NOTES
Cardiology Progress Note - Mouna Martines 66 y o  female MRN: [id number]    Unit/Bed#: -01 Encounter: [id number]        Hospital Problems:  Principal Problem:    Sepsis [facility])  Active Problems:    Gastroesophageal reflux disease without esophagitis    Anxiety    Primary hypertension    Hyperlipidemia    Atrial fibrillation with RVR (HCC)    Transaminitis      Assessment/Recommendations:    New onset atrial fibrillation with rapid ventricular response:   Clinically maintaining sinus rhythm  Rhythm was irregular on exam yesterday  EKG performed during that time showed sinus rhythm with evidence of prior anteroseptal infarction  No other dysrhythmias documented  Remains off telemetry  Anxious about going home  Continue metoprolol 50 mg twice a day at discharge  Can switch to Eliquis at discharge  Outpatient Cardiology follow-up       Hypertension: controlled      Right upper extremity cellulitis and right elbow abscess:   slowly improving status post incision and drainage       Dyslipidemia:  Continue atorvastatin     Elevated ASCVD risk score:  No angina reported    Continue risk factor modification  No current indication for aspirin  Stopped yesterday      Plan of care discussed with the patient  Outpatient Cardiology follow-up  Thank you for allowing us to participate in the care of this patient  If there are any further questions regarding this patient please feel free to contact us  Subjective:     Patient seen and examined  Overnight events reviewed  Anxious about going home  Denies any palpitations  Heart rate controlled  EKG yesterday showed sinus rhythm  Review of System:  Review of ten system was conducted and was negative except for findings stated  elsewhere  Review of Systems   Constitutional: Negative for appetite change, fever and unexpected weight change  HENT: Positive for hearing loss  Negative for congestion and trouble swallowing      Eyes: Negative for visual disturbance  Respiratory: Negative for chest tightness, shortness of breath and wheezing  Cardiovascular: Negative for chest pain, palpitations and leg swelling  Gastrointestinal: Negative for abdominal pain and blood in stool  Endocrine: Negative for polyuria  Genitourinary: Negative for hematuria  Musculoskeletal: Positive for arthralgias, joint swelling and myalgias  Skin: Positive for color change  Negative for rash  Neurological: Negative for dizziness, tremors and weakness  Hematological: Does not bruise/bleed easily  Psychiatric/Behavioral: Positive for sleep disturbance              Current Facility-Administered Medications:     acetaminophen (TYLENOL) tablet 650 mg, 650 mg, Oral, Q6H PRN, Winifred Peat, CRNP, 650 mg at 01/26/22 1958    albuterol inhalation solution 2 5 mg, 2 5 mg, Nebulization, Q6H PRN, Kendell Aguillon MD    ALPRAZolam Delford Samaria) tablet 0 5 mg, 0 5 mg, Oral, BID PRN, Winifred Peat, CRNP    atorvastatin (LIPITOR) tablet 10 mg, 10 mg, Oral, HS, Winifred Peat, CRNP, 10 mg at 01/27/22 2158    calcium carbonate (OYSTER SHELL,OSCAL) 500 mg tablet 1 tablet, 1 tablet, Oral, Daily With Breakfast, Winifred Peat, CRNP, 1 tablet at 01/28/22 0814    enoxaparin (LOVENOX) subcutaneous injection 60 mg, 1 mg/kg, Subcutaneous, Q12H [address], Arik Mascorro MD, 60 mg at 01/28/22 0814    escitalopram (LEXAPRO) tablet 10 mg, 10 mg, Oral, Daily, Winifred Peat, CRNP, 10 mg at 01/28/22 2001    levothyroxine tablet 100 mcg, 100 mcg, Oral, Early Morning, Winifred Peat, CRNP, 100 mcg at 01/28/22 0506    lisinopril (ZESTRIL) tablet 40 mg, 40 mg, Oral, Daily, Winifred Peat, CRNP, 40 mg at 01/28/22 0814    metoprolol tartrate (LOPRESSOR) tablet 50 mg, 50 mg, Oral, Q12H [address], Evan Kat MD, 50 mg at 01/28/22 3615    multivitamin-minerals (CENTRUM ADULTS) tablet 1 tablet, 1 tablet, Oral, Daily, Winifred Peat, CRNP, 1 tablet at 01/28/22 0813    ondansetron (ZOFRAN) injection 4 mg, 4 mg, Intravenous, Q6H PRN, Fercho Bras, CRNP, 4 mg at 01/26/22 0024    pantoprazole (PROTONIX) EC tablet 40 mg, 40 mg, Oral, Early Morning, Fercho Bras, CRNP, 40 mg at 01/28/22 0506    senna (SENOKOT) tablet 8 6 mg, 1 tablet, Oral, HS, Tylor Greenfield, CRNP, 8 6 mg at 01/27/22 2158    vancomycin (VANCOCIN) 1,750 mg in sodium chloride 0 9 % 500 mL IVPB, 1,750 mg, Intravenous, Q24H, Felice Allen MD, Last Rate: 250 mL/hr at 01/28/22 0454, 1,750 mg at 01/28/22 0454     Objective:     Vitals:   Blood pressure 138/67, pulse 66, temperature 98 2 °F (36 8 °C), temperature source Tympanic, resp  rate 18, height 5' 2" (1 575 m), weight 58 5 kg (129 lb), SpO2 97 %  Body mass index is 23 59 kg/m²  Orthostatic Blood Pressures      Most Recent Value   Blood Pressure 138/67 filed at 01/28/2022 0747   Patient Position - Orthostatic VS Sitting filed at 01/28/2022 4365         Systolic (17AIS), PXC:986 , Min:121 , RQH:023     Diastolic (93AIB), RVY:90, Min:53, Max:71    No intake or output data in the 24 hours ending 01/28/22 0856  Weight (last 2 days)     None          Physical Exam:    GEN: No acute distress     EYES:  no icterus  ENT: no gross abnormality  HEART: regular rhythm, normal S1 and S2, faint systolic murmur  LUNGS:  No wheezing or crackles  ABDOMEN: non tender  EXTREMITIES:  Right arm in Ace wrap  JOINTS:  Right elbow mildly tender, decreased ROM  NEURO:  no focal deficits  SKIN: warm, no lesions  PSYCH: Normal mood and affect          Labs & Results:        Results from last 7 days   Lab Units 01/28/22 0449 01/27/22  0534 01/26/22 0452   WBC Thousand/uL 10 00 10 24* 11 50*   HEMOGLOBIN g/dL 10 7* 10 5* 11 1*   HEMATOCRIT % 32 8* 31 6* 33 1*   PLATELETS Thousands/uL 351 303 302         Results from last 7 days   Lab Units 01/28/22  0449 01/27/22  0534 01/26/22  0452 01/25/22  0439 01/24/22  1117   POTASSIUM mmol/L 3 5 4 0 3 9   < > 3 9   CHLORIDE mmol/L 103 104 103   < > 104   CO2 mmol/L 29 28 25   < > 25   BUN mg/dL 13 13 12   < > 14   CREATININE mg/dL 0 87 0 87 0 73   < > 1 05   CALCIUM mg/dL 9 6 9 5 9 2   < > 9 6   ALK PHOS U/L 210 2* 217 5*  --   --  182 6*   ALT U/L 97* 103*  --   --  75*   AST U/L 77* 93*  --   --  64*    < > = values in this interval not displayed  Results from last 7 days   Lab Units 01/24/22  1117   MAGNESIUM mg/dL 1 9     No results for input(s): NTBNP in the last 72 hours  Imaging Studies:     XR chest portable    Result Date: 1/25/2022  Narrative: CHEST INDICATION:   Shortness of breath, wheezing  COMPARISON:  4/26/2021  EXAM PERFORMED/VIEWS:  XR CHEST PORTABLE Images:  1 FINDINGS: Cardiomediastinal silhouette appears unremarkable  There is mild central pulmonary vascular congestion  No focal airspace consolidation or effusions seen  No pneumothorax  Degenerative changes suspected in the thoracic spine  Impression: Mild central pulmonary vascular congestion  No focal airspace consolidation  Workstation performed: [id number]     XR elbow 2 vw right    Result Date: 1/21/2022  Narrative: RIGHT ELBOW INDICATION:   M25 421: Effusion, right elbow  Pain, swelling and redness over the olecranon process  COMPARISON:  None VIEWS:  XR ELBOW 2 VW RIGHT FINDINGS: There is no acute fracture or dislocation  There is no joint effusion  Mild to moderate degenerative changes throughout the elbow noted  Enthesopathy noted in the olecranon process  No lytic or blastic osseous lesion  Soft tissues are unremarkable  Impression: No acute osseous abnormality  Degenerative changes as described  Workstation performed: [id number]     VAS lower limb venous duplex study, complete bilateral    Result Date: 1/24/2022  Narrative:  [facility] REPORT CLINICAL: Indications: Patient c/o right calf swelling and left calf pain  No history of DVT/PE/CA/recent surgery or trauma  CONCLUSION:  Impression: RIGHT LOWER LIMB: No evidence of acute or chronic deep vein thrombosis   No evidence of superficial thrombophlebitis noted  LEFT LOWER LIMB: No evidence of acute or chronic deep vein thrombosis  No evidence of superficial thrombophlebitis noted  Technical findings were given to Tamia Lopes PA-C immediately following exam   SIGNATURE: Electronically Signed by: Bill Lucas MD on 2022-01-24 01:25:25 PM    Echo complete w/ contrast if indicated    Result Date: 1/25/2022  Narrative: Exie [place]  Left Ventricle: Left ventricular cavity size is normal  The left ventricular ejection fraction is 60%  Systolic function is normal  Wall motion is normal  Diastolic function is normal    Aortic Valve: There is mild regurgitation    Mitral Valve: There is mild annular calcification  There is moderate regurgitation    Tricuspid Valve: There is moderate regurgitation  The estimated right ventricular systolic pressure is 23 95 mmHg  Available cardiology studies, imaging and lab results independently reviewed today  This note was completed in part utilizing [employer] direct voice recognition software  Grammatical errors, random word insertion, spelling mistakes, occasional wrong word or "sound-alike" substitutions and incomplete sentences may be an occasional consequence of the system secondary to software limitations, ambient noise and hardware issues  At the time of dictation, efforts were made to edit, clarify and /or correct errors  Please read the chart carefully and recognize, using context, where substitutions have occurred  If you have any questions or concerns about the context, text or information contained within the body of this dictation, please contact myself, the provider, for further clarification

## 2022-01-28 NOTE — DISCHARGE SUMMARY
Discharge Summary - Mouna Martines 66 y o  female MRN: [id number]    Unit/Bed#: -01 Encounter: [id number]    Admission Date:   Admission Orders (From admission, onward)     Ordered        01/24/22 [facility]  Once                        Admitting Diagnosis: Rapid heart rate [R00 0]  New onset a-fib ([address] ) [I48 91]  Olecranon bursitis of right elbow [M70 21]  Cellulitis of right upper extremity [K27 852]  Elbow effusion, right [M25 421]   MSSA right elbow bursitis, surrounding cellulitis status post I&D    HPI: Mouna Martines is a 66 y o  female with a PMH hypertension, hyperlipidemia, GERD, presented to the ED atrial fibrillation with rapid ventricular rate  Patient was scheduled for colonoscopy this morning and was seen by Gastroenterology and was noted to be in AFib which is new onset and was sent into the ER for further evaluation  Patient stated that she had episode of vomiting and was given colonoscopy prep and had diarrhea  Patient states that she had likely spider bite and noted redness and swelling in the right elbow and also some redness in the right wrist area  Patient was prescribed Augmentin and Bactrim on 01/22 and she took it for 2 days  Patient stated that she has been having increasing pain and limited joint movement  Patient denies of fever or chills  Patient denies of any chest pain or shortness of breath or dizziness  Patient denies of any headache or blurry vision  Patient was noted to be in AFib with RVR and was given Cardizem bolus and converted to sinus rhythm  Currently heart rate is in 70s  Patient was evaluated by Orthopedic surgery in the ED and the right elbow abscess was aspirated    Right olecranon bursa was aspirated and 4 mL of murky colored fluid was withdrawn    Procedures Performed:   Orders Placed This Encounter   Procedures    Arthrocentesis    Incision and Drainage       Summary of Hospital Course:  Patient presented to the hospital for elective colonoscopy, noted to be in AFib and was sent to the emergency department for further evaluation  Patient was also complaining of right elbow pain and swelling, erythema  Diagnosed of cellulitis and acute bursitis  Seen by orthopedics team and underwent I and D, cultures showed MSSA  Patient seen by infectious disease team, recommended Keflex till February 3rd  For AFib patient was seen by Cardiology, received metoprolol and was started on Lovenox initially, subsequently transition to Eliquis  Patient feels much better today, denies any pain in her right of the elbow, denies any fever, chills  Denies any chest pain, shortness of breath  Significant Findings, Care, Treatment and Services Provided:  As mentioned above    Complications:  None    Discharge Diagnosis:   MSSA  right elbow acute bursitis status post I and D  New onset atrial fibrillation, currently in sinus rhythm  Sepsis secondary to MSSA infection, resolved  Essential hypertension  Transaminitis-related to stay  Anxiety  GERD      Medical Problems             Resolved Problems  Date Reviewed: 1/27/2022    None                Condition at Discharge: good      Blood pressure 138/67, pulse 66, temperature 98 2 °F (36 8 °C), temperature source Tympanic, resp  rate 18, height 5' 2" (1 575 m), weight 58 5 kg (129 lb), SpO2 97 %  General appearance:  Patient not in acute distress  Eyes:  Pupils equal reacting to light  ENT:  Moist oral mucous membranes  CVS:  S1-S2 heard, regular rate and rhythm, no pedal edema  Chest:  Bilateral air entry present, clear to auscultation  Abdomen:  Soft, nontender, bowel sounds present  CNS:  No focal neurological deficits  Genitourinary: deferred  Skin:  No acute rash   psychiatric:  No psychosis  Musculoskeletal:  Right elbow currently on dressing    Discharge instructions/Information to patient and family:   See after visit summary for information provided to patient and family        Provisions for Follow-Up Care:  See after visit summary for information related to follow-up care and any pertinent home health orders  PCP: Annmarie Palumbo MD    Disposition: Home    Planned Readmission: No      Discharge Statement   I spent 35 minutes discharging the patient  This time was spent on the day of discharge  I had direct contact with the patient on the day of discharge  Additional documentation is required if more than 30 minutes were spent on discharge  Discharge Medications:  See after visit summary for reconciled discharge medications provided to patient and family

## 2022-01-29 LAB
BACTERIA BLD CULT: NORMAL
BACTERIA BLD CULT: NORMAL

## 2022-01-31 ENCOUNTER — TRANSITIONAL CARE MANAGEMENT (OUTPATIENT)
Dept: FAMILY MEDICINE CLINIC | Facility: CLINIC | Age: 72
End: 2022-01-31

## 2022-01-31 ENCOUNTER — TELEPHONE (OUTPATIENT)
Dept: FAMILY MEDICINE CLINIC | Facility: CLINIC | Age: 72
End: 2022-01-31

## 2022-01-31 NOTE — TELEPHONE ENCOUNTER
Was in the hospital & they put her on Eliquis and it's WAY too expensive  Asking if there is something else that she can be put on

## 2022-01-31 NOTE — TELEPHONE ENCOUNTER
I called pt and made her aware  She states she has quite a bit of Eliquis left yet, so she doesn't need any samples right now  I scheduled her for a TCM for 2/3 at 2 PM and I told her at that time she can discuss switching rx to Xarelto with Dr Gray Fitting

## 2022-02-01 ENCOUNTER — TELEPHONE (OUTPATIENT)
Dept: OBGYN CLINIC | Facility: HOSPITAL | Age: 72
End: 2022-02-01

## 2022-02-01 NOTE — TELEPHONE ENCOUNTER
Hello,  Please advise if the following patient can be forced onto the schedule:    Patient:  Chryl Spatz    : 46    MRN:  [id number]    Call back #:[phone]    Insurance:    Reason for appointment: Septic R Elbow    Requested doctor/location:  [facility] -      Thank you

## 2022-02-02 NOTE — TELEPHONE ENCOUNTER
Dr Jagdish Gatica said we can add her on this Friday  Once it becomes 8:30 I will call her to schedule

## 2022-02-03 ENCOUNTER — OFFICE VISIT (OUTPATIENT)
Dept: FAMILY MEDICINE CLINIC | Facility: CLINIC | Age: 72
End: 2022-02-03
Payer: MEDICARE

## 2022-02-03 VITALS
OXYGEN SATURATION: 99 % | SYSTOLIC BLOOD PRESSURE: 120 MMHG | BODY MASS INDEX: 23.21 KG/M2 | TEMPERATURE: 97 F | DIASTOLIC BLOOD PRESSURE: 60 MMHG | HEIGHT: 62 IN | RESPIRATION RATE: 16 BRPM | HEART RATE: 76 BPM | WEIGHT: 126.13 LBS

## 2022-02-03 DIAGNOSIS — Z76.89 ENCOUNTER FOR SUPPORT AND COORDINATION OF TRANSITION OF CARE: Primary | ICD-10-CM

## 2022-02-03 DIAGNOSIS — I48.91 ATRIAL FIBRILLATION WITH RVR (HCC): ICD-10-CM

## 2022-02-03 DIAGNOSIS — M70.21 OLECRANON BURSITIS OF RIGHT ELBOW: ICD-10-CM

## 2022-02-03 DIAGNOSIS — I48.91 ATRIAL FIBRILLATION, UNSPECIFIED TYPE (HCC): ICD-10-CM

## 2022-02-03 PROCEDURE — 93000 ELECTROCARDIOGRAM COMPLETE: CPT | Performed by: INTERNAL MEDICINE

## 2022-02-03 PROCEDURE — 99496 TRANSJ CARE MGMT HIGH F2F 7D: CPT | Performed by: INTERNAL MEDICINE

## 2022-02-03 NOTE — ASSESSMENT & PLAN NOTE
Fluid grew out Staph aureus and was on Vancomycin while in the hospital and then keflex as an outpatient-no fever now, and has good ROM elbow but area is still red and hot-we've reached out to ID to see if we should conitinue a course of a different antibiotic based on susceptibilities and Miranda Box sees Orthopedics tomorrow for an appointtment-advised to shower with hibiclens wash-for now per ID we'll hold off on another course of antibiotics and see what orthopedics thinks tommorrow

## 2022-02-03 NOTE — ASSESSMENT & PLAN NOTE
Wilmer found to be in new onset atrial fib while in the hospital, possibly related to infection vs anxiety-TTE was done showing good EF at 60% with normal diastolic function-no thrombus noted-was started on metoprolol bid in the hospital and eliquis-tolerating both ok but eliquis is "outrageously expensive" per patient-she's good on samples right now but we need to get her referred to Cardiology for advice on long term management-will do EKG today as it sounds like she's in sinus rhythm-EKG done today and Wilmer in NSR rate about 75-I told her this was good, but that I can't d/c or change any of her meds without the blessings of Cardiology-pt referred to [facility] with Dr Ute Cline (saw Dr Ina Rosario in the hospital but Wilmer says she can't drive to [place] for follow up)

## 2022-02-03 NOTE — PROGRESS NOTES
TCM Call (since 1/3/2022)     Date and time call was made  1/31/2022  1:59 PM    Hospital care reviewed  Records reviewed        Patient was hospitialized at  [address]        Date of Admission  01/24/22    Date of discharge  01/28/22    Diagnosis  Rapid heart rate, Sepsis, New On-set A-fib, Bursitis, Cellulitis    Disposition  Home    Were the patients medications reviewed and updated  Yes    Current Symptoms  --  RT Elbow pain/drainage      TCM Call (since 1/3/2022)     Post hospital issues  None    Should patient be enrolled in anticoag monitoring? No    Scheduled for follow up? Yes    Patients specialists  --  None    Referrals needed  Will be given Cardiology referral at [facility] appt    Did you obtain your prescribed medications  Yes    Do you need help managing your prescriptions or medications  No    Is transportation to your appointment needed  No    I have advised the patient to call PCP with any new or worsening symptoms  Janeth Rivero, [place]    The type of support provided  Emotional    Do you have social support  Yes, as much as I need    Are you recieving any outpatient services  No    Are you recieving home care services  No    Are you using any community resources  No    Have you fallen in the last 12 months  Yes    How many times  2+    Interperter language line needed  No    Counseling  Patient    Counseling topics  Diagnostic results; Prognosis          Assessment/Plan:    No problem-specific Assessment & Plan notes found for this encounter  Diagnoses and all orders for this visit:    Encounter for support and coordination of transition of care    Atrial fibrillation, unspecified type [facility])  -     Ambulatory Referral to Cardiology; Future    Atrial fibrillation with RVR (HCC)    Olecranon bursitis of right elbow          Subjective:      Patient ID: Belinda Gonzalez is a 66 y o  female      Channing Eastman here for TCM visit s/p hospitalization for olecranon bursitis and new onset atrial fib-had incision and drainage done of the right elbow and found to have elevated wbc count in the fluid which ultimately grew out 4+ Staph aureus-was on IV Vancomycin and then transitioned over to oral keflex, which she's on now-was started on metoprolol bid for rate control and eliquis for anticoagulation-elbow is far less swollen but still red, hot, no fever-no drainage from elbow-we are going to send some pictures to ID to see what their recommendations are in terms of how the elbow looks and antibiotics -she will see Orthopedics tomorrow with Dr Khari Dumont      The following portions of the patient's history were reviewed and updated as appropriate: current medications, past medical history, past social history, past surgical history and problem list     Review of Systems   Constitutional: Positive for fatigue  Negative for fever  HENT: Negative  Respiratory: Negative  Cardiovascular: Negative  Musculoskeletal:        Right elbow tenderness with redness   Neurological: Negative  Objective: There were no vitals taken for this visit  Physical Exam  Constitutional:       Appearance: Normal appearance  HENT:      Head: Normocephalic and atraumatic  Right Ear: External ear normal       Left Ear: External ear normal       Mouth/Throat:      Mouth: Mucous membranes are moist    Eyes:      Pupils: Pupils are equal, round, and reactive to light  Cardiovascular:      Rate and Rhythm: Normal rate and regular rhythm  Heart sounds: Normal heart sounds  Pulmonary:      Effort: Pulmonary effort is normal       Breath sounds: Normal breath sounds  Musculoskeletal:      Cervical back: Normal range of motion  Right lower leg: No edema  Left lower leg: No edema        Comments: Right elbow still with olecranon swelling, redness, hot to touch and some tenderness-ROM wnl    Neurological:      General: No focal deficit present  Mental Status: She is alert and oriented to person, place, and time  Psychiatric:         Mood and Affect: Mood normal          Thought Content:  Thought content normal          Judgment: Judgment normal

## 2022-02-04 ENCOUNTER — OFFICE VISIT (OUTPATIENT)
Dept: OBGYN CLINIC | Facility: CLINIC | Age: 72
End: 2022-02-04

## 2022-02-04 VITALS
BODY MASS INDEX: 23 KG/M2 | HEART RATE: 85 BPM | DIASTOLIC BLOOD PRESSURE: 70 MMHG | HEIGHT: 62 IN | WEIGHT: 125 LBS | SYSTOLIC BLOOD PRESSURE: 165 MMHG

## 2022-02-04 DIAGNOSIS — M71.121 SEPTIC OLECRANON BURSITIS OF RIGHT ELBOW: Primary | ICD-10-CM

## 2022-02-04 PROCEDURE — 99024 POSTOP FOLLOW-UP VISIT: CPT | Performed by: ORTHOPAEDIC SURGERY

## 2022-02-04 RX ORDER — SULFAMETHOXAZOLE AND TRIMETHOPRIM 800; 160 MG/1; MG/1
1 TABLET ORAL EVERY 12 HOURS SCHEDULED
Qty: 14 TABLET | Refills: 0 | Status: SHIPPED | OUTPATIENT
Start: 2022-02-04 | End: 2022-02-11

## 2022-02-04 NOTE — PROGRESS NOTES
Assessment/Plan:  1  Septic olecranon bursitis of right elbow  sulfamethoxazole-trimethoprim (BACTRIM DS) 800-160 mg per tablet       Scribe Attestation    I,:  Kortney Gautam am acting as a scribe while in the presence of the attending physician :       I,:  Daily Collado MD personally performed the services described in this documentation    as scribed in my presence :           66 y o  female 10 days s/p right elbow olecranon bursitis I&D perfromed on 1/25/22 in the hospital due to MSSA infection  Right elbow olecranon bursitis I&D in the OR was discussed at length including risks and benefits  Symptoms appear to be improving at this time  She may choose to continue oral ABX and see if symptoms improve  She may have wound healing complications after surgery or require repeat I&D  She will be placed on ABX after surgery  Noelle Fuentes wishes to continue oral ABX for the next week to see if symptoms improve prior to surgical  ABX will be switched from Keflex to Bactrim  Bactrim was prescribed and sent to her pharmacy electronically, to be taken 2x a day for the next 7 days  She is not certain about how often 4 for how long she has been taking the Keflex  I believe that she has some confusion about this medication  Hopefully, the change to a twice per day medication will be easier for her and more effective at completely eradicating the infection  The elbow does appear to be better today than it looked on the pictures I saw the elbow when we were initially consulted and I spoke with the physician assistants about this  Her swelling has gone down significantly although she still has some erythema right at the olecranon  She has full range of motion about the right elbow with no pain  Her pain at palpation is significantly decreased in comparison to the notes from her hospitalization  Follow up in 1 weeks time for re-evaluation           Subjective:   Carol Kulkarni is a 66 y o  female who presents to the office today for a follow up regarding her right elbow  She underwent right elbow olecranon bursitis I&D in the hospital on 1/25/22 perfromed by Bert Guallpa PA-C due to MSSA infection  Overall she notes swelling has improved  She feels her elbow is still warm to touch and now she has dry peeling skin  She was on Keflex, which should have been completed yesterday 2/3/22, patient is unsure if this is finished or not  Infectious disease placed her on Keflex  She is unhappy that the hospital placed her on Eliquis as it is expensive and she is unable to take her arthritis medication while on Eliquis  Eliquis is due to Afib  Review of Systems   Constitutional: Negative for chills, fever and unexpected weight change  HENT: Negative for hearing loss, nosebleeds and sore throat  Eyes: Negative for pain, redness and visual disturbance  Respiratory: Negative for cough, shortness of breath and wheezing  Cardiovascular: Negative for chest pain, palpitations and leg swelling  Gastrointestinal: Negative for abdominal pain, nausea and vomiting  Endocrine: Negative for polydipsia and polyuria  Genitourinary: Negative for difficulty urinating and hematuria  Musculoskeletal: Negative for arthralgias, joint swelling and myalgias  Skin: Negative for rash and wound  Neurological: Negative for dizziness, numbness and headaches  Psychiatric/Behavioral: Negative for decreased concentration, dysphoric mood and suicidal ideas  The patient is not nervous/anxious            Past Medical History:   Diagnosis Date    Anxiety     Arthritis     shoulder    Carpal tunnel syndrome     Depression     Disease of thyroid gland     Diverticulitis     HBP (high blood pressure)     Hyperlipidemia     Hypertension     Hypothyroid     Pneumonia     2017    PONV (postoperative nausea and vomiting)     Psychiatric disorder     Depression    Sleep apnea     mild        Past Surgical History:   Procedure Laterality Date    APPENDECTOMY      BREAST BIOPSY Right     benign    BREAST CYST ASPIRATION Right     benign    CATARACT EXTRACTION Bilateral     CATARACT EXTRACTION, BILATERAL  2019    CERVICAL FUSION N/A 2021    Procedure: FUSION CERVICAL POSTERIOR C1-C2 with allograft and neuromonitoring);   Surgeon: Iraj Taylor MD;  Location: [facility];  Service: Orthopedics    CHOLECYSTECTOMY      COLONOSCOPY      COLONOSCOPY W/ BIOPSIES      EGD      HAND SURGERY Right     MAMMO (HISTORICAL) Bilateral 2018    MAMMO (HISTORICAL) Bilateral 10/27/2017    MANDIBLE SURGERY      WISDOM TOOTH EXTRACTION      X4       Family History   Problem Relation Age of Onset    Heart disease Mother     Hypertension Mother     Hyperlipidemia Mother     Heart failure Mother     Hypertension Sister        Social History     Occupational History    Not on file   Tobacco Use    Smoking status: Former Smoker     Quit date:      Years since quittin 1    Smokeless tobacco: Never Used   Vaping Use    Vaping Use: Never used   Substance and Sexual Activity    Alcohol use: Not on file     Comment: last drink 7 years ago    Drug use: Not on file    Sexual activity: Not Currently         Current Outpatient Medications:     acetaminophen (TYLENOL) 500 mg tablet, Take 1 tablet (500 mg total) by mouth every 6 (six) hours as needed for moderate pain, Disp: 20 tablet, Rfl: 0    ALPRAZolam (XANAX) 0 5 mg tablet, Take 1 tablet (0 5 mg total) by mouth 2 (two) times a day as needed for anxiety, Disp: 60 tablet, Rfl: 3    apixaban (Eliquis) 5 mg, Take 1 tablet (5 mg total) by mouth 2 (two) times a day, Disp: 60 tablet, Rfl: 0    atorvastatin (LIPITOR) 10 mg tablet, TAKE 1 TABLET BY MOUTH EVERY DAY AT BEDTIME, Disp: 90 tablet, Rfl: 3    Calcium Carb-Cholecalciferol (CALCIUM 600+D3) 600-200 MG-UNIT TABS, Take 1 tablet by mouth daily, Disp: , Rfl:     colchicine (COLCRYS) 0 6 mg tablet, Take 1 tablet (0 6 mg total) by mouth daily, Disp: 30 tablet, Rfl: 5    escitalopram (LEXAPRO) 10 mg tablet, TAKE 1 TABLET(10 MG) BY MOUTH DAILY, Disp: 90 tablet, Rfl: 0    levothyroxine 100 mcg tablet, TAKE 1 TABLET(100 MCG) BY MOUTH DAILY, Disp: 90 tablet, Rfl: 3    Multiple Vitamins-Minerals (CENTRUM ADULTS PO), Take 1 tablet by mouth daily, Disp: , Rfl:     omeprazole (PriLOSEC) 20 mg delayed release capsule, TAKE 1 CAPSULE(20 MG) BY MOUTH DAILY BEFORE AND BREAKFAST, Disp: 90 capsule, Rfl: 0    ramipril (ALTACE) 10 MG capsule, Take 1 capsule (10 mg total) by mouth 2 (two) times a day, Disp: 180 capsule, Rfl: 3    sulfamethoxazole-trimethoprim (BACTRIM DS) 800-160 mg per tablet, Take 1 tablet by mouth every 12 (twelve) hours for 7 days, Disp: 14 tablet, Rfl: 0    No Known Allergies    Objective:  Vitals:    02/04/22 1459   BP: 165/70   Pulse: 85       Right Elbow Exam     Range of Motion   Extension:  0 normal   Flexion:  140 normal     Other   Erythema: present  Scars: absent  Sensation: normal  Pulse: present    Comments:  Olecranon bursa mildly tender to palpation   No active drainage   Blanching erythema directly over the olecranon, appears to be improving as her skin is peeling   Sensation intact to light touch   2+ radial pulse           Physical Exam  Vitals and nursing note reviewed  Constitutional:       Appearance: She is well-developed  HENT:      Head: Normocephalic and atraumatic  Eyes:      Pupils: Pupils are equal, round, and reactive to light  Pulmonary:      Effort: Pulmonary effort is normal       Breath sounds: Normal breath sounds  Musculoskeletal:      Cervical back: Neck supple  Comments: As noted in HPI     Skin:     General: Skin is warm and dry  Neurological:      Mental Status: She is alert and oriented to person, place, and time  I have personally reviewed pertinent films in PACS and my interpretation is as follows:   No imaging to review

## 2022-02-08 ENCOUNTER — TELEPHONE (OUTPATIENT)
Dept: FAMILY MEDICINE CLINIC | Facility: CLINIC | Age: 72
End: 2022-02-08

## 2022-02-08 NOTE — TELEPHONE ENCOUNTER
Osvaldo Chloe can try taking pepto bismol, or immodium and eating a bland diet and using probiotics-if it continues, especially since she was on antibiotics in the hospital and orals to finish we will have to get a stool specimen and rule out C diff-may just be antibiotic associated diarrhea, in which case probiotics and time and anti diarrheals should work

## 2022-02-08 NOTE — TELEPHONE ENCOUNTER
Patient called stating that she has had diarrhea since she came home from the hospital   I asked her if she tried imodium but we aren't sure she can take that with eliquis    Any suggestions

## 2022-02-11 ENCOUNTER — OFFICE VISIT (OUTPATIENT)
Dept: OBGYN CLINIC | Facility: CLINIC | Age: 72
End: 2022-02-11
Payer: MEDICARE

## 2022-02-11 VITALS
SYSTOLIC BLOOD PRESSURE: 130 MMHG | DIASTOLIC BLOOD PRESSURE: 65 MMHG | HEIGHT: 62 IN | HEART RATE: 80 BPM | BODY MASS INDEX: 23 KG/M2 | WEIGHT: 125 LBS

## 2022-02-11 DIAGNOSIS — M71.121 SEPTIC OLECRANON BURSITIS OF RIGHT ELBOW: Primary | ICD-10-CM

## 2022-02-11 PROCEDURE — 99212 OFFICE O/P EST SF 10 MIN: CPT | Performed by: ORTHOPAEDIC SURGERY

## 2022-02-11 NOTE — PROGRESS NOTES
Assessment/Plan:  1  Septic olecranon bursitis of right elbow         Scribe Attestation    I,:  Silvia Gautam MA am acting as a scribe while in the presence of the attending physician :       I,:  Renee Hyatt MD personally performed the services described in this documentation    as scribed in my presence :             Jocleyn Clinton is doing well  There is significantly improved swelling and erythema on exam today  Patient was advised to continue with the antibiotics as prescribed  She was advised to get probiotics OTC she should take 2 times a day  She was advised to take these for a week or until her stool normalizes  She may follow up with me as needed  Subjective:   Jamal Florence is a 66 y o  female who presents to the office today for follow up evaluation of her right elbow  Patient underwent right olecranon bursitis I&D performed by Gustabo Thomas PA-C on 1/25/22  Patient states she is doing well  She states her swelling has been improving  She states her pain has also been improving  She has been compliant with Antibiotics  She states the warmth has also decreased about the elbow  She does note diarrhea which has been ongoing since she was in the hospital         Review of Systems   Constitutional: Negative for chills and fever  HENT: Negative for drooling and sneezing  Eyes: Negative for redness  Respiratory: Negative for cough and wheezing  Gastrointestinal: Negative for nausea and vomiting  Musculoskeletal: Negative for arthralgias, joint swelling and myalgias  Neurological: Negative for weakness and numbness  Psychiatric/Behavioral: Negative for behavioral problems  The patient is not nervous/anxious            Past Medical History:   Diagnosis Date    Anxiety     Arthritis     shoulder    Carpal tunnel syndrome     Depression     Disease of thyroid gland     Diverticulitis     HBP (high blood pressure)     Hyperlipidemia     Hypertension     Hypothyroid     Pneumonia     2017    PONV (postoperative nausea and vomiting)     Psychiatric disorder     Depression    Sleep apnea     mild        Past Surgical History:   Procedure Laterality Date    APPENDECTOMY      BREAST BIOPSY Right     benign    BREAST CYST ASPIRATION Right     benign    CATARACT EXTRACTION Bilateral     CATARACT EXTRACTION, BILATERAL  2019    CERVICAL FUSION N/A 2021    Procedure: FUSION CERVICAL POSTERIOR C1-C2 with allograft and neuromonitoring);   Surgeon: Sari Ramirez MD;  Location: [facility];  Service: Orthopedics    CHOLECYSTECTOMY      COLONOSCOPY      COLONOSCOPY W/ BIOPSIES      EGD      HAND SURGERY Right     MAMMO (HISTORICAL) Bilateral 2018    MAMMO (HISTORICAL) Bilateral 10/27/2017    MANDIBLE SURGERY      WISDOM TOOTH EXTRACTION      X4       Family History   Problem Relation Age of Onset    Heart disease Mother     Hypertension Mother     Hyperlipidemia Mother     Heart failure Mother     Hypertension Sister        Social History     Occupational History    Not on file   Tobacco Use    Smoking status: Former Smoker     Quit date:      Years since quittin 1    Smokeless tobacco: Never Used   Vaping Use    Vaping Use: Never used   Substance and Sexual Activity    Alcohol use: Not on file     Comment: last drink 7 years ago    Drug use: Not on file    Sexual activity: Not Currently         Current Outpatient Medications:     acetaminophen (TYLENOL) 500 mg tablet, Take 1 tablet (500 mg total) by mouth every 6 (six) hours as needed for moderate pain, Disp: 20 tablet, Rfl: 0    ALPRAZolam (XANAX) 0 5 mg tablet, Take 1 tablet (0 5 mg total) by mouth 2 (two) times a day as needed for anxiety, Disp: 60 tablet, Rfl: 3    apixaban (Eliquis) 5 mg, Take 1 tablet (5 mg total) by mouth 2 (two) times a day, Disp: 60 tablet, Rfl: 0    atorvastatin (LIPITOR) 10 mg tablet, TAKE 1 TABLET BY MOUTH EVERY DAY AT BEDTIME, Disp: 90 tablet, Rfl: 3   Calcium Carb-Cholecalciferol (CALCIUM 600+D3) 600-200 MG-UNIT TABS, Take 1 tablet by mouth daily, Disp: , Rfl:     colchicine (COLCRYS) 0 6 mg tablet, Take 1 tablet (0 6 mg total) by mouth daily, Disp: 30 tablet, Rfl: 5    escitalopram (LEXAPRO) 10 mg tablet, TAKE 1 TABLET(10 MG) BY MOUTH DAILY, Disp: 90 tablet, Rfl: 0    levothyroxine 100 mcg tablet, TAKE 1 TABLET(100 MCG) BY MOUTH DAILY, Disp: 90 tablet, Rfl: 3    Multiple Vitamins-Minerals (CENTRUM ADULTS PO), Take 1 tablet by mouth daily, Disp: , Rfl:     omeprazole (PriLOSEC) 20 mg delayed release capsule, TAKE 1 CAPSULE(20 MG) BY MOUTH DAILY BEFORE AND BREAKFAST, Disp: 90 capsule, Rfl: 0    ramipril (ALTACE) 10 MG capsule, Take 1 capsule (10 mg total) by mouth 2 (two) times a day, Disp: 180 capsule, Rfl: 3    sulfamethoxazole-trimethoprim (BACTRIM DS) 800-160 mg per tablet, Take 1 tablet by mouth every 12 (twelve) hours for 7 days, Disp: 14 tablet, Rfl: 0    No Known Allergies    Objective:  Vitals:    02/11/22 1306   BP: 130/65   Pulse: 80       Right Elbow Exam     Range of Motion   Extension: 0   Flexion: 130   Right elbow pronation: 90  Right elbow supination: 90      Other   Sensation: normal  Pulse: present    Comments:  Mild TTP olecranon   Significantly improved swelling and erythema  Decreased warmth             Physical Exam  Constitutional:       Appearance: She is well-developed  HENT:      Head: Normocephalic and atraumatic  Eyes:      General:         Right eye: No discharge  Left eye: No discharge  Conjunctiva/sclera: Conjunctivae normal    Cardiovascular:      Rate and Rhythm: Normal rate  Pulmonary:      Effort: Pulmonary effort is normal  No respiratory distress  Musculoskeletal:      Cervical back: Normal range of motion and neck supple  Comments: As noted in HPI   Skin:     General: Skin is warm and dry  Neurological:      Mental Status: She is alert and oriented to person, place, and time  Psychiatric:         Behavior: Behavior normal          Thought Content:  Thought content normal          Judgment: Judgment normal

## 2022-02-24 ENCOUNTER — OFFICE VISIT (OUTPATIENT)
Dept: CARDIOLOGY CLINIC | Facility: CLINIC | Age: 72
End: 2022-02-24
Payer: MEDICARE

## 2022-02-24 VITALS
DIASTOLIC BLOOD PRESSURE: 70 MMHG | SYSTOLIC BLOOD PRESSURE: 150 MMHG | HEIGHT: 62 IN | OXYGEN SATURATION: 92 % | BODY MASS INDEX: 23.19 KG/M2 | HEART RATE: 96 BPM | WEIGHT: 126 LBS

## 2022-02-24 DIAGNOSIS — E78.5 HYPERLIPIDEMIA, UNSPECIFIED HYPERLIPIDEMIA TYPE: ICD-10-CM

## 2022-02-24 DIAGNOSIS — I10 PRIMARY HYPERTENSION: ICD-10-CM

## 2022-02-24 DIAGNOSIS — I48.0 PAROXYSMAL ATRIAL FIBRILLATION (HCC): Primary | ICD-10-CM

## 2022-02-24 PROCEDURE — 99214 OFFICE O/P EST MOD 30 MIN: CPT | Performed by: INTERNAL MEDICINE

## 2022-02-24 PROCEDURE — 93000 ELECTROCARDIOGRAM COMPLETE: CPT | Performed by: INTERNAL MEDICINE

## 2022-02-24 RX ORDER — METOPROLOL TARTRATE 50 MG/1
50 TABLET, FILM COATED ORAL EVERY 12 HOURS SCHEDULED
Qty: 180 TABLET | Refills: 1 | Status: SHIPPED | OUTPATIENT
Start: 2022-02-24 | End: 2022-04-25

## 2022-02-24 NOTE — PROGRESS NOTES
[facility]    CHIEF COMPLAINT:   Chief Complaint   Patient presents with   [facility] Patient Visit    Atrial Fibrillation       HPI:  Tarsha Flor is a 66 y o  female with a past medical history of hypertension, hyperlipidemia, recently diagnosed atrial fibrillation who presents today to follow-up for atrial fibrillation  Briefly, she presented to the emergency department on 01/24/2022 after she had initially presented for an EGD and colonoscopy due to a change in bowel habits and dysphagia  She was found to be in rapid atrial fibrillation  She had no prior diagnosis of this in the past   She was treated with IV Cardizem and oral Lopressor and converted to normal sinus rhythm  She was diagnosed with cellulitis/acute bursitis of her right elbow  She underwent I&D and cultures revealed MSSA  She was treated with cephalexin  TTE revealed preserved LVEF  Interval history:  Since discharge she has been taking her Eliquis without any missed doses  She is not taking metoprolol  Reviewed her discharge summary it does not appear that she was given a prescription for this on discharge  She is back in atrial fibrillation today  She is asymptomatic and denies any fever, chills, fatigue, visual changes, lightheadedness, syncope, chest pain, palpitations, shortness of breath with exertion, orthopnea, PND, dark or bright red blood in the stools, lower extremity swelling  She does have a history of dysphagia and reports food gets stuck in her lower chest  Voices concern over cost for Eliquis, $526/month      The following portions of the patient's history were reviewed and updated as appropriate: allergies, current medications, past family history, past medical history, past social history, past surgical history, and problem list     SINCE LAST OV I REVIEWED WITH THE PATIENT THE INTERIM LABS, TEST RESULTS, CONSULTANT(S) NOTES AND PERFORMED AN INTERIM REVIEW OF HISTORY    Past Medical History: Diagnosis Date    Anxiety     Arthritis     shoulder    Carpal tunnel syndrome     Depression     Disease of thyroid gland     Diverticulitis     HBP (high blood pressure)     Hyperlipidemia     Hypertension     Hypothyroid     Pneumonia     2017    PONV (postoperative nausea and vomiting)     Psychiatric disorder     Depression    Sleep apnea     mild        Past Surgical History:   Procedure Laterality Date    APPENDECTOMY      BREAST BIOPSY Right     benign    BREAST CYST ASPIRATION Right     benign    CATARACT EXTRACTION Bilateral     CATARACT EXTRACTION, BILATERAL  2019    CERVICAL FUSION N/A 2021    Procedure: FUSION CERVICAL POSTERIOR C1-C2 with allograft and neuromonitoring);   Surgeon: Dax Cunningham MD;  Location: [facility];  Service: Orthopedics    CHOLECYSTECTOMY      COLONOSCOPY      COLONOSCOPY W/ BIOPSIES      EGD      HAND SURGERY Right     MAMMO (HISTORICAL) Bilateral 2018    MAMMO (HISTORICAL) Bilateral 10/27/2017    MANDIBLE SURGERY      WISDOM TOOTH EXTRACTION      X4       Social History     Socioeconomic History    Marital status:      Spouse name: Not on file    Number of children: Not on file    Years of education: Not on file    Highest education level: Not on file   Occupational History    Not on file   Tobacco Use    Smoking status: Former Smoker     Quit date:      Years since quittin 1    Smokeless tobacco: Never Used   Vaping Use    Vaping Use: Never used   Substance and Sexual Activity    Alcohol use: Not on file     Comment: last drink 7 years ago   Vicente Carlson Drug use: Not on file    Sexual activity: Not Currently   Other Topics Concern    Not on file   Social History Narrative    Do you currently or have you served in the [employer]: No    Were you activated, into active duty, as a member of the [employer] or as a Reservist: No    Occupation: retired    Marital status:     Exercise level: Occasional treadmill    General stress level: Low    Alcohol intake: None stopped 7 years ago    Caffeine intake:  Moderate    Chewing tobacco: none    Illicit drugs: none    Guns present in home: No    Seat belts used routinely: Yes    Sunscreen used routinely: Yes    Smoke alarm in home: Yes    Advance directive: Yes     Social Determinants of Health     Financial Resource Strain: Not on file   Food Insecurity: Not on file   Transportation Needs: Not on file   Physical Activity: Not on file   Stress: Not on file   Social Connections: Not on file   Intimate Partner Violence: Not on file   Housing Stability: Not on file       Family History   Problem Relation Age of Onset    Heart disease Mother     Hypertension Mother     Hyperlipidemia Mother     Heart failure Mother     Hypertension Sister        No Known Allergies    Current Outpatient Medications   Medication Sig Dispense Refill    acetaminophen (TYLENOL) 500 mg tablet Take 1 tablet (500 mg total) by mouth every 6 (six) hours as needed for moderate pain 20 tablet 0    ALPRAZolam (XANAX) 0 5 mg tablet Take 1 tablet (0 5 mg total) by mouth 2 (two) times a day as needed for anxiety 60 tablet 3    apixaban (Eliquis) 5 mg Take 1 tablet (5 mg total) by mouth 2 (two) times a day 60 tablet 0    atorvastatin (LIPITOR) 10 mg tablet TAKE 1 TABLET BY MOUTH EVERY DAY AT BEDTIME 90 tablet 3    Calcium Carb-Cholecalciferol (CALCIUM 600+D3) 600-200 MG-UNIT TABS Take 1 tablet by mouth daily      colchicine (COLCRYS) 0 6 mg tablet Take 1 tablet (0 6 mg total) by mouth daily 30 tablet 5    escitalopram (LEXAPRO) 10 mg tablet TAKE 1 TABLET(10 MG) BY MOUTH DAILY 90 tablet 0    levothyroxine 100 mcg tablet TAKE 1 TABLET(100 MCG) BY MOUTH DAILY 90 tablet 3    Multiple Vitamins-Minerals (CENTRUM ADULTS PO) Take 1 tablet by mouth daily      omeprazole (PriLOSEC) 20 mg delayed release capsule TAKE 1 CAPSULE(20 MG) BY MOUTH DAILY BEFORE AND BREAKFAST 90 capsule 0    ramipril (ALTACE) 10 MG capsule Take 1 capsule (10 mg total) by mouth 2 (two) times a day 180 capsule 3     No current facility-administered medications for this visit  /70 (BP Location: Left arm, Patient Position: Sitting, Cuff Size: Standard)   Pulse 96   Ht 5' 2" (1 575 m)   Wt 57 2 kg (126 lb)   SpO2 92%   BMI 23 05 kg/m²     Review of Systems   All other systems reviewed and are negative  Physical Exam  Vitals reviewed  Constitutional:       General: She is not in acute distress  Appearance: Normal appearance  She is not ill-appearing, toxic-appearing or diaphoretic  HENT:      Head: Normocephalic and atraumatic  Eyes:      General: No scleral icterus  Extraocular Movements: Extraocular movements intact  Cardiovascular:      Rate and Rhythm: Normal rate  Rhythm irregular  Pulses: Normal pulses  Heart sounds: Normal heart sounds  No murmur heard  No gallop  Pulmonary:      Effort: Pulmonary effort is normal  No respiratory distress  Breath sounds: Normal breath sounds  No wheezing or rales  Abdominal:      General: Abdomen is flat  Bowel sounds are normal  There is no distension  Palpations: Abdomen is soft  Tenderness: There is no abdominal tenderness  There is no guarding  Musculoskeletal:      Right lower leg: No edema  Left lower leg: No edema  Skin:     General: Skin is warm and dry  Capillary Refill: Capillary refill takes less than 2 seconds  Coloration: Skin is not jaundiced or pale  Neurological:      Mental Status: She is alert     Psychiatric:         Mood and Affect: Mood normal          Behavior: Behavior normal           Lab Results   Component Value Date    K 3 5 01/28/2022     01/28/2022    CO2 29 01/28/2022    BUN 13 01/28/2022    CREATININE 0 87 01/28/2022    CALCIUM 9 6 01/28/2022    ALT 97 (H) 01/28/2022    AST 77 (H) 01/28/2022    INR 0 98 04/26/2021       Lab Results   Component Value Date    HDL 66 09/29/2021 LDLCALC 83 09/29/2021    TRIG 68 8 09/29/2021       Lab Results   Component Value Date    WBC 10 00 01/28/2022    HGB 10 7 (L) 01/28/2022    HCT 32 8 (L) 01/28/2022     01/28/2022       Lab Results   Component Value Date     04/26/2021    HGBA1C 5 8 (H) 04/26/2021     Cardiac studies:   Results for orders placed or performed in visit on 02/24/22   POCT ECG    Impression    Atrial fibrillation   Septal infarct, age undetermined      TTE - 1/25/22:  Left Ventricle Left ventricular cavity size is normal  Wall thickness is normal  The left ventricular ejection fraction is 60%  Systolic function is normal   Wall motion is normal  Diastolic function is normal    Right Ventricle Right ventricular cavity size is normal  Systolic function is normal  Wall thickness is normal    Left Atrium The atrium is normal in size  Right Atrium The atrium is normal in size  Aortic Valve The aortic valve is trileaflet  The leaflets are not thickened  The leaflets are not calcified  The leaflets exhibit normal mobility  There is mild regurgitation  There is no evidence of stenosis  Mitral Valve The mitral valve has normal function  There is mild annular calcification  There is moderate regurgitation  There is no evidence of stenosis  The valve has normal function  Tricuspid Valve Tricuspid valve structure is normal  There is moderate regurgitation  There is no evidence of stenosis  The estimated right ventricular systolic pressure is 02 85 mmHg  Pulmonic Valve Pulmonic valve structure is normal  There is no evidence of regurgitation  There is no evidence of stenosis  Ascending Aorta The aortic root is normal in size  IVC/SVC The right atrial pressure is estimated at 3 0 mmHg  The inferior vena cava is normal in size  Pericardium There is no pericardial effusion  The pericardium is normal in appearance  ASSESSMENT AND PLAN:  Emily Fern was seen today for new patient visit and atrial fibrillation      Diagnoses and all orders for this visit:    Paroxysmal atrial fibrillation [facility]):  Diagnosed with new atrial fibrillation on 01/24/2022 after she had presented for EGD and colonoscopy  She was treated with IV Cardizem and oral Lopressor which resulted in conversion to normal sinus rhythm  Unclear why she was not discharged on any AV rose blocking agents  Her ECG today reveals atrial fibrillation with rates mid 90s  Restart metoprolol tartrate 50 mg b i d  She is asymptomatic with her atrial fibrillation  Eliquis costs about $526 per month and she would like to switch to something more affordable  We did discuss warfarin as well as the pros and cons compared to direct oral anticoagulants  She is okay with blood draws and the need for monitoring  I will send a message to our anticoagulation clinic  For now she should continue Eliquis 5 mg b i d  Reji Benitez -     Ambulatory Referral to Cardiology  -     POCT ECG  -     metoprolol tartrate (LOPRESSOR) 50 mg tablet; Take 1 tablet (50 mg total) by mouth every 12 (twelve) hours    Primary hypertension:  Blood pressure remains elevated despite ramipril 10 mg b i d  Begin metoprolol tartrate 50 mg b i d  as noted above and reassess  ?sleep apnea    Hyperlipidemia, unspecified hyperlipidemia type:  Lipids are well controlled  Continue atorvastatin at current dose      Akash Cantrell MD

## 2022-02-24 NOTE — PATIENT INSTRUCTIONS
You were seen today in the Cardiology office for atrial fibrillation  Please continue taking your Eliquis  I will have our anticoagulation clinic contact you to start warfarin  I have restarted you on a medication called metoprolol  Please take 1 tablet (50 mg) twice daily  Please continue your current cardiac medications as prescribed  Thank you for choosing [address]  Please call our office or use [employer] with any questions

## 2022-02-25 DIAGNOSIS — I48.91 ATRIAL FIBRILLATION WITH RVR (HCC): Primary | ICD-10-CM

## 2022-02-25 DIAGNOSIS — I48.91 ATRIAL FIBRILLATION WITH RVR (HCC): ICD-10-CM

## 2022-02-25 RX ORDER — WARFARIN SODIUM 2 MG/1
TABLET ORAL
Qty: 60 TABLET | Refills: 11 | Status: SHIPPED | OUTPATIENT
Start: 2022-02-25 | End: 2022-02-28

## 2022-02-28 RX ORDER — WARFARIN SODIUM 2 MG/1
TABLET ORAL
Qty: 180 TABLET | Refills: 1 | Status: SHIPPED | OUTPATIENT
Start: 2022-02-28 | End: 2022-05-16

## 2022-03-03 ENCOUNTER — TELEPHONE (OUTPATIENT)
Dept: FAMILY MEDICINE CLINIC | Facility: CLINIC | Age: 72
End: 2022-03-03

## 2022-03-03 ENCOUNTER — ANTICOAG VISIT (OUTPATIENT)
Dept: CARDIOLOGY CLINIC | Facility: CLINIC | Age: 72
End: 2022-03-03

## 2022-03-03 DIAGNOSIS — J32.9 SINUSITIS, UNSPECIFIED CHRONICITY, UNSPECIFIED LOCATION: Primary | ICD-10-CM

## 2022-03-03 DIAGNOSIS — I48.91 ATRIAL FIBRILLATION WITH RVR (HCC): Primary | ICD-10-CM

## 2022-03-03 RX ORDER — AZITHROMYCIN 250 MG/1
TABLET, FILM COATED ORAL
Qty: 6 TABLET | Refills: 0 | Status: SHIPPED | OUTPATIENT
Start: 2022-03-03 | End: 2022-03-08

## 2022-03-03 NOTE — TELEPHONE ENCOUNTER
Renée Duong is asking for medication for "a terrible sinus infection for 3 days now    BAD runny nose, ha, pressure & burning in eyes, no fever, otc NOT helping, not sleeping"  [facility]

## 2022-03-07 ENCOUNTER — ANTICOAG VISIT (OUTPATIENT)
Dept: CARDIOLOGY CLINIC | Facility: CLINIC | Age: 72
End: 2022-03-07

## 2022-03-07 ENCOUNTER — APPOINTMENT (OUTPATIENT)
Dept: LAB | Facility: HOSPITAL | Age: 72
End: 2022-03-07
Attending: INTERNAL MEDICINE
Payer: MEDICARE

## 2022-03-07 DIAGNOSIS — I48.91 ATRIAL FIBRILLATION WITH RVR (HCC): Primary | ICD-10-CM

## 2022-03-10 ENCOUNTER — ANTICOAG VISIT (OUTPATIENT)
Dept: CARDIOLOGY CLINIC | Facility: CLINIC | Age: 72
End: 2022-03-10

## 2022-03-10 ENCOUNTER — APPOINTMENT (OUTPATIENT)
Dept: LAB | Facility: HOSPITAL | Age: 72
End: 2022-03-10
Attending: INTERNAL MEDICINE
Payer: MEDICARE

## 2022-03-10 DIAGNOSIS — I48.91 ATRIAL FIBRILLATION WITH RVR (HCC): Primary | ICD-10-CM

## 2022-03-10 DIAGNOSIS — I48.91 ATRIAL FIBRILLATION WITH RVR (HCC): ICD-10-CM

## 2022-03-10 LAB
INR PPP: 1.64 (ref 0.84–1.19)
PROTHROMBIN TIME: 19.1 SECONDS (ref 11.6–14.5)

## 2022-03-10 PROCEDURE — 85610 PROTHROMBIN TIME: CPT

## 2022-03-10 PROCEDURE — 36415 COLL VENOUS BLD VENIPUNCTURE: CPT

## 2022-03-14 ENCOUNTER — ANTICOAG VISIT (OUTPATIENT)
Dept: CARDIOLOGY CLINIC | Facility: CLINIC | Age: 72
End: 2022-03-14

## 2022-03-14 ENCOUNTER — APPOINTMENT (OUTPATIENT)
Dept: LAB | Facility: HOSPITAL | Age: 72
End: 2022-03-14
Attending: INTERNAL MEDICINE
Payer: MEDICARE

## 2022-03-14 DIAGNOSIS — I48.91 ATRIAL FIBRILLATION WITH RVR (HCC): ICD-10-CM

## 2022-03-14 DIAGNOSIS — I48.91 ATRIAL FIBRILLATION WITH RVR (HCC): Primary | ICD-10-CM

## 2022-03-17 ENCOUNTER — APPOINTMENT (OUTPATIENT)
Dept: LAB | Facility: HOSPITAL | Age: 72
End: 2022-03-17
Attending: INTERNAL MEDICINE
Payer: MEDICARE

## 2022-03-17 ENCOUNTER — ANTICOAG VISIT (OUTPATIENT)
Dept: CARDIOLOGY CLINIC | Facility: CLINIC | Age: 72
End: 2022-03-17

## 2022-03-17 DIAGNOSIS — I48.91 ATRIAL FIBRILLATION WITH RVR (HCC): ICD-10-CM

## 2022-03-17 DIAGNOSIS — I48.91 ATRIAL FIBRILLATION WITH RVR (HCC): Primary | ICD-10-CM

## 2022-03-17 LAB
INR PPP: 2.15 (ref 0.84–1.19)
PROTHROMBIN TIME: 23.5 SECONDS (ref 11.6–14.5)

## 2022-03-17 PROCEDURE — 85610 PROTHROMBIN TIME: CPT

## 2022-03-17 PROCEDURE — 36415 COLL VENOUS BLD VENIPUNCTURE: CPT

## 2022-03-21 ENCOUNTER — APPOINTMENT (OUTPATIENT)
Dept: LAB | Facility: HOSPITAL | Age: 72
End: 2022-03-21
Attending: INTERNAL MEDICINE
Payer: MEDICARE

## 2022-03-21 ENCOUNTER — ANTICOAG VISIT (OUTPATIENT)
Dept: CARDIOLOGY CLINIC | Facility: CLINIC | Age: 72
End: 2022-03-21

## 2022-03-21 DIAGNOSIS — I48.91 ATRIAL FIBRILLATION WITH RVR (HCC): Primary | ICD-10-CM

## 2022-03-21 DIAGNOSIS — I48.91 ATRIAL FIBRILLATION WITH RVR (HCC): ICD-10-CM

## 2022-03-21 LAB
INR PPP: 1.84 (ref 0.84–1.19)
PROTHROMBIN TIME: 20.9 SECONDS (ref 11.6–14.5)

## 2022-03-21 PROCEDURE — 85610 PROTHROMBIN TIME: CPT

## 2022-03-21 PROCEDURE — 36415 COLL VENOUS BLD VENIPUNCTURE: CPT

## 2022-03-28 DIAGNOSIS — F32.A ANXIETY AND DEPRESSION: Primary | ICD-10-CM

## 2022-03-28 DIAGNOSIS — F41.9 ANXIETY AND DEPRESSION: Primary | ICD-10-CM

## 2022-03-28 RX ORDER — ESCITALOPRAM OXALATE 20 MG/1
20 TABLET ORAL DAILY
Qty: 90 TABLET | Refills: 1 | Status: SHIPPED | OUTPATIENT
Start: 2022-03-28 | End: 2022-04-12

## 2022-03-28 RX ORDER — ESCITALOPRAM OXALATE 20 MG/1
TABLET ORAL
COMMUNITY
Start: 2022-02-25 | End: 2022-03-28 | Stop reason: SDUPTHER

## 2022-04-04 ENCOUNTER — ANTICOAG VISIT (OUTPATIENT)
Dept: CARDIOLOGY CLINIC | Facility: CLINIC | Age: 72
End: 2022-04-04

## 2022-04-04 ENCOUNTER — APPOINTMENT (OUTPATIENT)
Dept: LAB | Facility: HOSPITAL | Age: 72
End: 2022-04-04
Attending: INTERNAL MEDICINE
Payer: MEDICARE

## 2022-04-04 DIAGNOSIS — I48.91 ATRIAL FIBRILLATION WITH RVR (HCC): ICD-10-CM

## 2022-04-04 DIAGNOSIS — I48.91 ATRIAL FIBRILLATION WITH RVR (HCC): Primary | ICD-10-CM

## 2022-04-04 LAB
INR PPP: 1.67 (ref 0.84–1.19)
PROTHROMBIN TIME: 19.3 SECONDS (ref 11.6–14.5)

## 2022-04-04 PROCEDURE — 85610 PROTHROMBIN TIME: CPT

## 2022-04-04 PROCEDURE — 36415 COLL VENOUS BLD VENIPUNCTURE: CPT

## 2022-04-12 ENCOUNTER — OFFICE VISIT (OUTPATIENT)
Dept: FAMILY MEDICINE CLINIC | Facility: CLINIC | Age: 72
End: 2022-04-12
Payer: MEDICARE

## 2022-04-12 VITALS
TEMPERATURE: 97.3 F | SYSTOLIC BLOOD PRESSURE: 122 MMHG | RESPIRATION RATE: 16 BRPM | WEIGHT: 127.5 LBS | OXYGEN SATURATION: 99 % | HEART RATE: 95 BPM | DIASTOLIC BLOOD PRESSURE: 64 MMHG | HEIGHT: 62 IN | BODY MASS INDEX: 23.46 KG/M2

## 2022-04-12 DIAGNOSIS — R19.7 DIARRHEA, UNSPECIFIED TYPE: Primary | ICD-10-CM

## 2022-04-12 DIAGNOSIS — F41.9 ANXIETY: ICD-10-CM

## 2022-04-12 DIAGNOSIS — I10 PRIMARY HYPERTENSION: ICD-10-CM

## 2022-04-12 DIAGNOSIS — R26.89 IMBALANCE: ICD-10-CM

## 2022-04-12 DIAGNOSIS — K21.9 GASTROESOPHAGEAL REFLUX DISEASE WITHOUT ESOPHAGITIS: ICD-10-CM

## 2022-04-12 DIAGNOSIS — L30.9 DERMATITIS: ICD-10-CM

## 2022-04-12 DIAGNOSIS — E78.5 HYPERLIPIDEMIA, UNSPECIFIED HYPERLIPIDEMIA TYPE: ICD-10-CM

## 2022-04-12 DIAGNOSIS — I48.91 ATRIAL FIBRILLATION WITH RVR (HCC): ICD-10-CM

## 2022-04-12 DIAGNOSIS — M54.2 NECK PAIN: ICD-10-CM

## 2022-04-12 DIAGNOSIS — M43.22 CERVICAL VERTEBRAL FUSION: ICD-10-CM

## 2022-04-12 PROCEDURE — 99214 OFFICE O/P EST MOD 30 MIN: CPT | Performed by: INTERNAL MEDICINE

## 2022-04-12 RX ORDER — OMEPRAZOLE 20 MG/1
CAPSULE, DELAYED RELEASE ORAL
Qty: 90 CAPSULE | Refills: 1 | Status: SHIPPED | OUTPATIENT
Start: 2022-04-12

## 2022-04-12 RX ORDER — OMEPRAZOLE 20 MG/1
20 CAPSULE, DELAYED RELEASE ORAL
Qty: 30 CAPSULE | Refills: 5 | Status: SHIPPED | OUTPATIENT
Start: 2022-04-12 | End: 2022-04-12

## 2022-04-12 RX ORDER — TRIAMCINOLONE ACETONIDE 5 MG/G
CREAM TOPICAL 2 TIMES DAILY
Qty: 30 G | Refills: 5 | Status: SHIPPED | OUTPATIENT
Start: 2022-04-12

## 2022-04-12 RX ORDER — ALPRAZOLAM 0.5 MG/1
0.5 TABLET ORAL 2 TIMES DAILY PRN
Qty: 60 TABLET | Refills: 3 | Status: SHIPPED | OUTPATIENT
Start: 2022-04-12

## 2022-04-12 RX ORDER — FAMOTIDINE 20 MG/1
20 TABLET, FILM COATED ORAL DAILY
Qty: 30 TABLET | Refills: 5 | Status: SHIPPED | OUTPATIENT
Start: 2022-04-12 | End: 2022-04-12

## 2022-04-12 RX ORDER — FAMOTIDINE 20 MG/1
TABLET, FILM COATED ORAL
Qty: 90 TABLET | Refills: 1 | Status: SHIPPED | OUTPATIENT
Start: 2022-04-12

## 2022-04-12 NOTE — PROGRESS NOTES
Assessment/Plan:         Problem List Items Addressed This Visit        Digestive    Gastroesophageal reflux disease without esophagitis     Did refer Jas Vegas to GI again for EGD/colonoscopy-I also restarted her omeprazole and her pepcid  May have to ask Cardiology for green light in terms of holding coumadin prior to procedure            Cardiovascular and Mediastinum    Primary hypertension    Atrial fibrillation with RVR ([address] )     Followed by Cardiology and is on metoprolol and warfarin-she told me she falls a lot at home and in her yard, not sure why, and I strongly advised she get a cane or a walker especially with her being on warfarin-I did refer her to Neurology and Physical Therapy to help with her balance, gait training etc-rx for cane given            Musculoskeletal and Integument    Cervical vertebral fusion       Other    Neck pain     S/p cervical spine operation, has a lot of pain and decreased ROM there         Anxiety     Discussed Cognitive Behavioral Therapy-Josee didn't think the lexapro worked for her and had stopped it, so we discussed adding another different SSRI-will try Zoloft with overlapping prn Xanax and have her follow up         Relevant Medications    sertraline (Zoloft) 50 mg tablet    Hyperlipidemia      Other Visit Diagnoses     Diarrhea, unspecified type    -  Primary    Relevant Orders    Ambulatory Referral to Gastroenterology    Imbalance        Referred to neuro and PT-may need MRI and needs PT as well for fall prevention    Relevant Orders    Cane    Ambulatory Referral to Neurology    Ambulatory Referral to Physical Therapy    Dermatitis        Relevant Medications    triamcinolone (KENALOG) 0 5 % cream            Subjective:      Patient ID: Eddie Verduzco is a 74 y o  female      Jas Dub here with a hx of HTN, anxiety,HL, newer onset PAF, recent hx of right elbow cellulitis with MSSA-Josee came in today to discuss several different things: she has a rash, rather contact dermatitis appearing,on the back of her left leg-very itchy-she is now taking warfarin and metoprolol and her atrial fib is rate (if not rhythm) controlled-she is following with Cardiologist Dr Joo Pascual doesn't like the warfarin, but says the DOACs would have been 500 dollars a month and she absolutely can't afford it  Bill Gonzalez says she's been falling a lot-went out in her yard the other day to clean up dog poop and when she leaned forward she fell    had a hard time getting up-she had her daughter Delvin Nearusha bring out a walker but when she finally used that to pull herself up, she pulled her right shoulder out-she's been having a lot of stress, anxiety, etc for awhile, as well as bad heartburn  Has not been taking her GERD meds-we had to re-refer her to GI today as her last EGD/colonoscopy were canceled because of her cellulitis and atrial fib      The following portions of the patient's history were reviewed and updated as appropriate:   Past Medical History:  She has a past medical history of Anxiety, Arthritis, Carpal tunnel syndrome, Depression, Disease of thyroid gland, Diverticulitis, HBP (high blood pressure), Hyperlipidemia, Hypertension, Hypothyroid, Pneumonia, PONV (postoperative nausea and vomiting), Psychiatric disorder, and Sleep apnea ,  _______________________________________________________________________  Medical Problems:  does not have any pertinent problems on file ,  _______________________________________________________________________  Past Surgical History:   has a past surgical history that includes Breast cyst aspiration (Right); Breast biopsy (Right); Appendectomy; Cholecystectomy; Colonoscopy w/ biopsies (2011); Mandible surgery; [place] tooth extraction; EGD; Cataract extraction, bilateral (2019); Mammo (historical) (Bilateral, 11/27/2018); Mammo (historical) (Bilateral, 10/27/2017); Colonoscopy; Cataract extraction (Bilateral);  Hand surgery (Right); and Cervical fusion (N/A, 5/25/2021)  ,  _______________________________________________________________________  Family History:  family history includes Heart disease in her mother; Heart failure in her mother; Hyperlipidemia in her mother; Hypertension in her mother and sister  ,  _______________________________________________________________________  Social History:   reports that she quit smoking about 18 years ago  She has never used smokeless tobacco  No history on file for alcohol use and drug use ,  _______________________________________________________________________  Allergies:  has No Known Allergies     _______________________________________________________________________  Current Outpatient Medications   Medication Sig Dispense Refill    acetaminophen (TYLENOL) 500 mg tablet Take 1 tablet (500 mg total) by mouth every 6 (six) hours as needed for moderate pain 20 tablet 0    ALPRAZolam (XANAX) 0 5 mg tablet Take 1 tablet (0 5 mg total) by mouth 2 (two) times a day as needed for anxiety 60 tablet 3    atorvastatin (LIPITOR) 10 mg tablet TAKE 1 TABLET BY MOUTH EVERY DAY AT BEDTIME 90 tablet 3    Calcium Carb-Cholecalciferol (CALCIUM 600+D3) 600-200 MG-UNIT TABS Take 1 tablet by mouth daily      colchicine (COLCRYS) 0 6 mg tablet Take 1 tablet (0 6 mg total) by mouth daily 30 tablet 5    levothyroxine 100 mcg tablet TAKE 1 TABLET(100 MCG) BY MOUTH DAILY 90 tablet 3    metoprolol tartrate (LOPRESSOR) 50 mg tablet Take 1 tablet (50 mg total) by mouth every 12 (twelve) hours 180 tablet 1    Multiple Vitamins-Minerals (CENTRUM ADULTS PO) Take 1 tablet by mouth daily      ramipril (ALTACE) 10 MG capsule Take 1 capsule (10 mg total) by mouth 2 (two) times a day 180 capsule 3    warfarin (COUMADIN) 2 mg tablet TAKE 1 AND 1/2 TO 2 TABLETS BY MOUTH DAILY OR AS DIRECTED BY PRESCRIBER (Patient taking differently: 2 tabs m-t-w and 1 5 the rest of the week until Saturday ) 180 tablet 1    famotidine (PEPCID) 20 mg tablet TAKE 1 TABLET(20 MG) BY MOUTH DAILY 90 tablet 1    omeprazole (PriLOSEC) 20 mg delayed release capsule TAKE 1 CAPSULE(20 MG) BY MOUTH DAILY BEFORE BREAKFAST 90 capsule 1    sertraline (Zoloft) 50 mg tablet Take 1 tablet (50 mg total) by mouth daily 30 tablet 5    triamcinolone (KENALOG) 0 5 % cream Apply topically 2 (two) times a day 30 g 5     No current facility-administered medications for this visit      _______________________________________________________________________  Review of Systems   Constitutional: Negative for fatigue  Respiratory: Negative  Cardiovascular: Negative  Genitourinary: Negative  Musculoskeletal: Positive for arthralgias and neck stiffness  Hematological: Negative  Psychiatric/Behavioral: Positive for dysphoric mood  The patient is nervous/anxious and is hyperactive  Objective:  Vitals:    04/12/22 1327   BP: 122/64   BP Location: Left arm   Patient Position: Sitting   Cuff Size: Adult   Pulse: 95   Resp: 16   Temp: (!) 97 3 °F (36 3 °C)   TempSrc: Temporal   SpO2: 99%   Weight: 57 8 kg (127 lb 8 oz)   Height: 5' 2" (1 575 m)     Body mass index is 23 32 kg/m²  Physical Exam  Constitutional:       Appearance: Normal appearance  HENT:      Head: Normocephalic and atraumatic  Right Ear: External ear normal       Left Ear: External ear normal       Nose: Nose normal       Mouth/Throat:      Mouth: Mucous membranes are moist    Eyes:      Extraocular Movements: Extraocular movements intact  Cardiovascular:      Rate and Rhythm: Normal rate  Pulses: Normal pulses  Pulmonary:      Effort: Pulmonary effort is normal       Breath sounds: Normal breath sounds  Abdominal:      General: Abdomen is flat  Palpations: Abdomen is soft  Musculoskeletal:         General: Normal range of motion  Cervical back: Rigidity and tenderness present  Neurological:      General: No focal deficit present        Mental Status: She is alert and oriented to person, place, and time        Gait: Gait abnormal

## 2022-04-12 NOTE — ASSESSMENT & PLAN NOTE
Followed by Cardiology and is on metoprolol and warfarin-she told me she falls a lot at home and in her yard, not sure why, and I strongly advised she get a cane or a walker especially with her being on warfarin-I did refer her to Neurology and Physical Therapy to help with her balance, gait training etc-rx for cane given

## 2022-04-12 NOTE — ASSESSMENT & PLAN NOTE
Discussed Cognitive Behavioral Therapy-Josee didn't think the lexapro worked for her and had stopped it, so we discussed adding another different SSRI-will try Zoloft with overlapping prn Xanax and have her follow up

## 2022-04-12 NOTE — ASSESSMENT & PLAN NOTE
Did refer Gladis Randolph to GI again for EGD/colonoscopy-I also restarted her omeprazole and her pepcid    May have to ask Cardiology for green light in terms of holding coumadin prior to procedure

## 2022-04-25 ENCOUNTER — OFFICE VISIT (OUTPATIENT)
Dept: CARDIOLOGY CLINIC | Facility: CLINIC | Age: 72
End: 2022-04-25
Payer: MEDICARE

## 2022-04-25 ENCOUNTER — ANTICOAG VISIT (OUTPATIENT)
Dept: CARDIOLOGY CLINIC | Facility: CLINIC | Age: 72
End: 2022-04-25

## 2022-04-25 ENCOUNTER — APPOINTMENT (OUTPATIENT)
Dept: LAB | Facility: HOSPITAL | Age: 72
End: 2022-04-25
Attending: INTERNAL MEDICINE
Payer: MEDICARE

## 2022-04-25 VITALS
BODY MASS INDEX: 23.92 KG/M2 | SYSTOLIC BLOOD PRESSURE: 140 MMHG | OXYGEN SATURATION: 99 % | WEIGHT: 130 LBS | HEART RATE: 47 BPM | HEIGHT: 62 IN | DIASTOLIC BLOOD PRESSURE: 80 MMHG

## 2022-04-25 DIAGNOSIS — R00.1 SINUS BRADYCARDIA: ICD-10-CM

## 2022-04-25 DIAGNOSIS — I48.0 PAROXYSMAL ATRIAL FIBRILLATION (HCC): Primary | ICD-10-CM

## 2022-04-25 DIAGNOSIS — I10 PRIMARY HYPERTENSION: ICD-10-CM

## 2022-04-25 DIAGNOSIS — I48.91 ATRIAL FIBRILLATION WITH RVR (HCC): Primary | ICD-10-CM
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
## 2022-04-25 PROCEDURE — 93000 ELECTROCARDIOGRAM COMPLETE: CPT | Performed by: INTERNAL MEDICINE

## 2022-04-25 PROCEDURE — 99213 OFFICE O/P EST LOW 20 MIN: CPT | Performed by: INTERNAL MEDICINE

## 2022-04-25 RX ORDER — METOPROLOL TARTRATE 50 MG/1
25 TABLET, FILM COATED ORAL EVERY 12 HOURS SCHEDULED
Qty: 180 TABLET | Refills: 1 | Status: SHIPPED | OUTPATIENT
Start: 2022-04-25 | End: 2022-04-25 | Stop reason: DRUGHIGH

## 2022-04-25 NOTE — PROGRESS NOTES
[facility]    CHIEF COMPLAINT:   Chief Complaint   Patient presents with    Follow-up       HPI:  Roxanne Dunham is a 74 y o  female with a past medical history of hypertension, hyperlipidemia, recently diagnosed atrial fibrillation who presents today for follow-up  She presented to the emergency department on 01/24/2022 after arriving for an EGD/colonoscopy due to a change in bowel habits and dysphagia  She was found to be in rapid atrial fibrillation  She had no prior diagnosis of this in the past   She was treated with IV Cardizem and oral Lopressor and converted to normal sinus rhythm  She was diagnosed with cellulitis/acute bursitis of her right elbow  She underwent I&D and cultures revealed MSSA  She was treated with cephalexin  TTE revealed preserved LVEF  Interval history:  She was switched to warfarin after our last office visit due to high cost of Eliquis  She was also restarted on her metoprolol  Leaned over to pick something up off the ground and she falls forward  Has been going on for the past 3 weeks  Feels like she loses balance  She denies any preceding lightheadedness, chest pain, shortness of breath  Never had loss of consciousness  She will be seeing Neurology and physical therapy for ambulatory dysfunction  Does complain of feeling tired  + complaints of dysphagia and food getting stuck and diarrhea  Hasn't followed up with GI yet      The following portions of the patient's history were reviewed and updated as appropriate: allergies, current medications, past family history, past medical history, past social history, past surgical history, and problem list     SINCE LAST OV I REVIEWED WITH THE PATIENT THE INTERIM LABS, TEST RESULTS, CONSULTANT(S) NOTES AND PERFORMED AN INTERIM REVIEW OF HISTORY    Past Medical History:   Diagnosis Date    Anxiety     Arthritis     shoulder    Carpal tunnel syndrome     Depression     Disease of thyroid gland     Diverticulitis     HBP (high blood pressure)     Hyperlipidemia     Hypertension     Hypothyroid     Pneumonia     2017    PONV (postoperative nausea and vomiting)     Psychiatric disorder     Depression    Sleep apnea     mild        Past Surgical History:   Procedure Laterality Date    APPENDECTOMY      BREAST BIOPSY Right     benign    BREAST CYST ASPIRATION Right     benign    CATARACT EXTRACTION Bilateral     CATARACT EXTRACTION, BILATERAL  2019    CERVICAL FUSION N/A 2021    Procedure: FUSION CERVICAL POSTERIOR C1-C2 with allograft and neuromonitoring); Surgeon: Johana Hicks MD;  [facility];  Service: Orthopedics    CHOLECYSTECTOMY      COLONOSCOPY      COLONOSCOPY W/ BIOPSIES      EGD      HAND SURGERY Right     MAMMO (HISTORICAL) Bilateral 2018    MAMMO (HISTORICAL) Bilateral 10/27/2017    MANDIBLE SURGERY      WISDOM TOOTH EXTRACTION      X4       Social History     Socioeconomic History    Marital status:      Spouse name: Not on file    Number of children: Not on file    Years of education: Not on file    Highest education level: Not on file   Occupational History    Not on file   Tobacco Use    Smoking status: Former Smoker     Quit date:      Years since quittin 3    Smokeless tobacco: Never Used   Vaping Use    Vaping Use: Never used   Substance and Sexual Activity    Alcohol use: Not on file     Comment: last drink 7 years ago   Leonard Flower Drug use: Not on file    Sexual activity: Not Currently   Other Topics Concern    Not on file   Social History Narrative    Do you currently or have you served in the [employer]: No    Were you activated, into active duty, as a member of the [employer] or as a Reservist: No    Occupation: retired    Marital status:     Exercise level: Occasional treadmill    General stress level: Low    Alcohol intake: None stopped 7 years ago    Caffeine intake:  Moderate    Chewing tobacco: none    Illicit drugs: none    Guns present in home: No    Seat belts used routinely: Yes    Sunscreen used routinely: Yes    Smoke alarm in home: Yes    Advance directive: Yes     Social Determinants of Health     Financial Resource Strain: Not on file   Food Insecurity: Not on file   Transportation Needs: Not on file   Physical Activity: Not on file   Stress: Not on file   Social Connections: Not on file   Intimate Partner Violence: Not on file   Housing Stability: Not on file       Family History   Problem Relation Age of Onset    Heart disease Mother     Hypertension Mother     Hyperlipidemia Mother     Heart failure Mother     Hypertension Sister        No Known Allergies    Current Outpatient Medications   Medication Sig Dispense Refill    acetaminophen (TYLENOL) 500 mg tablet Take 1 tablet (500 mg total) by mouth every 6 (six) hours as needed for moderate pain 20 tablet 0    ALPRAZolam (XANAX) 0 5 mg tablet Take 1 tablet (0 5 mg total) by mouth 2 (two) times a day as needed for anxiety 60 tablet 3    atorvastatin (LIPITOR) 10 mg tablet TAKE 1 TABLET BY MOUTH EVERY DAY AT BEDTIME 90 tablet 3    Calcium Carb-Cholecalciferol (CALCIUM 600+D3) 600-200 MG-UNIT TABS Take 1 tablet by mouth daily      famotidine (PEPCID) 20 mg tablet TAKE 1 TABLET(20 MG) BY MOUTH DAILY 90 tablet 1    metoprolol tartrate (LOPRESSOR) 50 mg tablet Take 1 tablet (50 mg total) by mouth every 12 (twelve) hours 180 tablet 1    Multiple Vitamins-Minerals (CENTRUM ADULTS PO) Take 1 tablet by mouth daily      omeprazole (PriLOSEC) 20 mg delayed release capsule TAKE 1 CAPSULE(20 MG) BY MOUTH DAILY BEFORE BREAKFAST 90 capsule 1    ramipril (ALTACE) 10 MG capsule Take 1 capsule (10 mg total) by mouth 2 (two) times a day 180 capsule 3    sertraline (Zoloft) 50 mg tablet Take 1 tablet (50 mg total) by mouth daily 30 tablet 5    warfarin (COUMADIN) 2 mg tablet TAKE 1 AND 1/2 TO 2 TABLETS BY MOUTH DAILY OR AS DIRECTED BY PRESCRIBER (Patient taking differently: 2 tabs m-t-w and 1 5 the rest of the week until Saturday ) 180 tablet 1    colchicine (COLCRYS) 0 6 mg tablet Take 1 tablet (0 6 mg total) by mouth daily (Patient not taking: Reported on 4/25/2022 ) 30 tablet 5    levothyroxine 100 mcg tablet TAKE 1 TABLET(100 MCG) BY MOUTH DAILY (Patient not taking: Reported on 4/25/2022) 90 tablet 3    triamcinolone (KENALOG) 0 5 % cream Apply topically 2 (two) times a day (Patient not taking: Reported on 4/25/2022 ) 30 g 5     No current facility-administered medications for this visit  /80 (BP Location: Left arm, Patient Position: Sitting, Cuff Size: Standard)   Pulse (!) 47   Ht 5' 2" (1 575 m)   Wt 59 kg (130 lb)   SpO2 99%   BMI 23 78 kg/m²     Review of Systems   All other systems reviewed and are negative  Physical Exam  Vitals reviewed  Constitutional:       General: She is not in acute distress  Appearance: Normal appearance  She is not ill-appearing  HENT:      Head: Normocephalic and atraumatic  Eyes:      General: No scleral icterus  Cardiovascular:      Rate and Rhythm: Regular rhythm  Bradycardia present  Pulses: Normal pulses  Heart sounds: Normal heart sounds  No murmur heard  No gallop  Pulmonary:      Effort: Pulmonary effort is normal  No respiratory distress  Breath sounds: Normal breath sounds  No wheezing or rales  Abdominal:      General: Abdomen is flat  Bowel sounds are normal  There is no distension  Palpations: Abdomen is soft  Tenderness: There is no abdominal tenderness  There is no guarding  Musculoskeletal:      Right lower leg: No edema  Left lower leg: No edema  Skin:     General: Skin is warm and dry  Capillary Refill: Capillary refill takes less than 2 seconds  Coloration: Skin is not jaundiced or pale  Neurological:      Mental Status: She is alert     Psychiatric:         Mood and Affect: Mood normal          Behavior: Behavior normal           Lab Results   Component Value Date    K 3 5 01/28/2022     01/28/2022    CO2 29 01/28/2022    BUN 13 01/28/2022    CREATININE 0 87 01/28/2022    CALCIUM 9 6 01/28/2022    ALT 97 (H) 01/28/2022    AST 77 (H) 01/28/2022    INR 2 47 (H) 04/25/2022       Lab Results   Component Value Date    HDL 66 09/29/2021    LDLCALC 83 09/29/2021    TRIG 68 8 09/29/2021       Lab Results   Component Value Date    WBC 10 00 01/28/2022    HGB 10 7 (L) 01/28/2022    HCT 32 8 (L) 01/28/2022     01/28/2022       Lab Results   Component Value Date     04/26/2021    HGBA1C 5 8 (H) 04/26/2021     Cardiac studies:   No results found for this visit on 04/25/22  TTE - 1/25/22:  Left Ventricle Left ventricular cavity size is normal  Wall thickness is normal  The left ventricular ejection fraction is 60%  Systolic function is normal   Wall motion is normal  Diastolic function is normal    Right Ventricle Right ventricular cavity size is normal  Systolic function is normal  Wall thickness is normal    Left Atrium The atrium is normal in size  Right Atrium The atrium is normal in size  Aortic Valve The aortic valve is trileaflet  The leaflets are not thickened  The leaflets are not calcified  The leaflets exhibit normal mobility  There is mild regurgitation  There is no evidence of stenosis  Mitral Valve The mitral valve has normal function  There is mild annular calcification  There is moderate regurgitation  There is no evidence of stenosis  The valve has normal function  Tricuspid Valve Tricuspid valve structure is normal  There is moderate regurgitation  There is no evidence of stenosis  The estimated right ventricular systolic pressure is 06 10 mmHg  Pulmonic Valve Pulmonic valve structure is normal  There is no evidence of regurgitation  There is no evidence of stenosis  Ascending Aorta The aortic root is normal in size  IVC/SVC The right atrial pressure is estimated at 3 0 mmHg   The inferior vena cava is normal in size  Pericardium There is no pericardial effusion  The pericardium is normal in appearance  ASSESSMENT AND PLAN:  Bill Gonzalez was seen today for follow-up  Diagnoses and all orders for this visit: #  Paroxysmal atrial fibrillation ([facility] [address] ):   #  Sinus bradycardia: Diagnosed with new atrial fibrillation on 01/24/2022 after she had presented for an EGD and colonoscopy  She was treated with IV Cardizem and oral Lopressor which resulted in conversion to normal sinus rhythm  -Maintaining NSR with beta blocker  She has sinus bradycardia on ECG today  Low suspicion that this is the reason for her falls and she denies any preceding symptoms of lightheadedness  No LOC  She does complain of fatigue which is not a new complaint but can worsen with BB  I will decrease metoprolol tartrate to 25 mg BID  INR 2 47 today - therapeutic  DOACs too expensive    -Needs EGD/colonscopy for the above mentioned complaints  From an anticoagulation perspective, there is no contraindication to holding warfarin as needed for the procedure  She can then resume it afterwards  This can be coordinated with the anticoagulation clinic once her procedure(s) has been scheduled  -     Discontinue: metoprolol tartrate (LOPRESSOR) 50 mg tablet; Take 0 5 tablets (25 mg total) by mouth every 12 (twelve) hours  -     metoprolol tartrate (LOPRESSOR) 25 mg tablet; Take 1 tablet (25 mg total) by mouth every 12 (twelve) hours    Fall, subsequent encounter: Likely due to ambulatory dysfunction  Low suspicion for bradycardia as the etiology but I have decreased metoprolol  She will be seeing Neurology and physical therapy  -     POCT ECG    Primary hypertension: Blood pressure is adequately controlled        Russell Bray MD

## 2022-04-25 NOTE — PATIENT INSTRUCTIONS
You were seen today in the Cardiology office for atrial fibrillation  Please decrease your metoprolol tartrate (Lopressor) to 25 mg every 12 hours  You can return your old prescription to the pharmacy  Please continue your coumadin as ordered  Thank you for choosing [address]  Please call our office or use [employer] with any questions

## 2022-04-26 DIAGNOSIS — I10 HYPERTENSION, UNSPECIFIED TYPE: ICD-10-CM

## 2022-04-26 RX ORDER — RAMIPRIL 10 MG/1
CAPSULE ORAL
Qty: 180 CAPSULE | Refills: 3 | Status: SHIPPED | OUTPATIENT
Start: 2022-04-26

## 2022-05-06 ENCOUNTER — TELEPHONE (OUTPATIENT)
Dept: FAMILY MEDICINE CLINIC | Facility: CLINIC | Age: 72
End: 2022-05-06

## 2022-05-06 ENCOUNTER — TELEPHONE (OUTPATIENT)
Dept: GASTROENTEROLOGY | Facility: CLINIC | Age: 72
End: 2022-05-06

## 2022-05-06 NOTE — TELEPHONE ENCOUNTER
Patients GI provider:  Dr Mita Nguyen    Number to return call: [phone]    Reason for call: Pt calling to schedule colonoscopy and EGD      Scheduled procedure/appointment date if applicable:  N/A

## 2022-05-10 ENCOUNTER — TELEPHONE (OUTPATIENT)
Dept: GASTROENTEROLOGY | Facility: CLINIC | Age: 72
End: 2022-05-10

## 2022-05-10 DIAGNOSIS — M25.511 CHRONIC RIGHT SHOULDER PAIN: Primary | ICD-10-CM

## 2022-05-10 DIAGNOSIS — G89.29 CHRONIC RIGHT SHOULDER PAIN: Primary | ICD-10-CM

## 2022-05-10 NOTE — TELEPHONE ENCOUNTER
Scheduled date of EGD/colonoscopy (as of today): 6/7/22  Physician performing EGD/colonoscopy: Dr Kiana Medina  Location of EGD/colonoscopy: [facility]  Desired bowel prep reviewed with patient: Miralax w/ mag & dul   Instructions reviewed with patient by:ilene  Clearances:   Will fax cardiac/Warafin clearance to Dr Joseph Nevarez

## 2022-05-10 NOTE — TELEPHONE ENCOUNTER
Faxed cardiac/warfarin clearance to Dr Nita Rees [phone]  Will call their office in one week to make sure received [phone]

## 2022-05-10 NOTE — TELEPHONE ENCOUNTER
[facility] Assessment    Name: Eloise Casas  YOB: 1950  Last Height: 5' 2" (1 575 m)  Last weight: 59 kg (130 lb)  BMI: 23 78 kg/m²  Procedure: Colon/EGD  Diagnosis: screening/GERD w/ esophagitis/antral ulcer  Date of procedure: 6/7/22  Prep: miralax w/ mag & dul  Responsible : yes  Phone#: [phone]  Name completing form: Gabriel Aguillon  Date form completed: 05/10/22      If the patient answers yes to any of these questions, schedule in a hospital  Are you pregnant: No  Do you rely on a wheelchair for mobility: No  Have you been diagnosed with End Stage Renal Disease (ESRD): No  Do you need oxygen during the day: No  Have you had a heart attack or stroke within the past three months: No  Have you had a seizure within the past three months: No  Have you ever been informed by anesthesia that you have a difficult airway: No  Additional Questions  Have you had any cardiac testing or are under the care of a Cardiologist (see cardiac list): Yes (Comment: Obtain Cardiac Clearance)  Cardiac list:   Do you have an implanted cardiac defibrillator: No (Comment:  This patient should be scheduled in the hospital)    Have any bleeding problems, such as anemia or hemophilia (If patient has H&H result below 8, schedule in hospital   H&H must be within 30 days of procedure): No    Had an organ transplant within the past 3 months: No    Do you have any present infections: No  Do you get short of breath when walking a few blocks: No  Have you been diagnosed with diabetes: No  Comments (provide cardiac provider information if applicable): 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

## 2022-05-14 DIAGNOSIS — I48.91 ATRIAL FIBRILLATION WITH RVR (HCC): ICD-10-CM

## 2022-05-16 ENCOUNTER — HOSPITAL ENCOUNTER (OUTPATIENT)
Dept: RADIOLOGY | Facility: HOSPITAL | Age: 72
Discharge: HOME | End: 2022-05-16
Payer: MEDICARE

## 2022-05-16 ENCOUNTER — OFFICE VISIT (OUTPATIENT)
Dept: OBGYN CLINIC | Facility: HOSPITAL | Age: 72
End: 2022-05-16
Payer: MEDICARE

## 2022-05-16 VITALS — HEIGHT: 62 IN | WEIGHT: 130 LBS | BODY MASS INDEX: 23.92 KG/M2

## 2022-05-16 DIAGNOSIS — G89.29 CHRONIC RIGHT SHOULDER PAIN: ICD-10-CM

## 2022-05-16 DIAGNOSIS — M25.511 CHRONIC RIGHT SHOULDER PAIN: ICD-10-CM

## 2022-05-16 DIAGNOSIS — M19.011 ARTHROPATHY OF RIGHT SHOULDER: Primary | ICD-10-CM

## 2022-05-16 PROCEDURE — 99213 OFFICE O/P EST LOW 20 MIN: CPT | Performed by: PHYSICIAN ASSISTANT

## 2022-05-16 PROCEDURE — 72040 X-RAY EXAM NECK SPINE 2-3 VW: CPT

## 2022-05-16 PROCEDURE — 73030 X-RAY EXAM OF SHOULDER: CPT

## 2022-05-16 RX ORDER — WARFARIN SODIUM 2 MG/1
TABLET ORAL
Qty: 180 TABLET | Refills: 1 | Status: SHIPPED | OUTPATIENT
Start: 2022-05-16 | End: 2022-07-18

## 2022-05-16 NOTE — PROGRESS NOTES
Assessment:    Right shoulder pain status post pulling injury 5 weeks ago   Suspect rotator cuff arthropathy/tear    History of cervical fusion C1-C2 performed by Dr Chanel Perdue in 2021  Hardware intact  Plan:    Initiate formal outpatient physical therapy for shoulder treatment, range of motion and strengthening   Over-the-counter pain medications as needed  Follow-up with Dr Raul Gupta in 4 weeks for re-evaluation   May need advanced imaging in the future             Visit Diagnoses     Chronic right shoulder pain                       Subjective:     Patient ID:  Balbir Carr is a 74 y o  female    HPI    38-year-old female presenting for evaluation of her right neck and shoulder  According to the patient, about 4-5 weeks ago, she was outside in the yard with her dog and she leaned forward to clean up dog waste, and fell gently forward  She states she did not land on her right shoulder  She had a difficult time standing up, and her daughter brought out a walker to assist her with this, and when she was hoisting herself up, she felt something pull in her right posterior shoulder  Since that time, she has had right posterior shoulder pain with decreased range of motion that has not improved with rest   She has tried Tylenol with minimal improvement  She denies any posterior neck pain  She denies any associated numbness and tingling in the right upper extremity  Of note, she has a history of C1-C2 fusion procedure performed by Dr Chanel Perdue 5/25/2021 for odontoid fracture with non-union  She states she had a recent history of right elbow infection earlier this year that has since cleared up  She denies any fever or chills recently      The following portions of the patient's history were reviewed and updated as appropriate: allergies, current medications, past family history, past medical history, past social history, past surgical history and problem list     Review of Systems Objective:    Imaging:  Cervical spine x-rays demonstrate stable C1-C2 fusion, hardware intact, in unchanged alignment    Right shoulder x-rays no acute fracture or dislocation      Physical Exam     Orthopedic Examination:  Right shoulder     Inspection:  No open wounds or erythema  No obvious deformity  No ecchymosis    Palpation:  The trapezius muscle and shoulder musculature tender to palpation    The clavicle and scapula are nontender to palpation    Range-of-motion:  0 to 90 forward flexion shoulder abduction   Rotation is limited due to pain    Strength:  4/5 deltoid supraspinatus infraspinatus subscapularis    Sensation:  Intact axillary muscular cutaneous median radial ulnar nerve distribution    Special Tests: +drop arm   Positive empty can

## 2022-05-17 ENCOUNTER — ANTICOAG VISIT (OUTPATIENT)
Dept: CARDIOLOGY CLINIC | Facility: CLINIC | Age: 72
End: 2022-05-17

## 2022-05-17 ENCOUNTER — APPOINTMENT (OUTPATIENT)
Dept: LAB | Facility: HOSPITAL | Age: 72
End: 2022-05-17
Attending: INTERNAL MEDICINE
Payer: MEDICARE

## 2022-05-17 DIAGNOSIS — I48.91 ATRIAL FIBRILLATION WITH RVR (HCC): Primary | ICD-10-CM

## 2022-05-17 NOTE — TELEPHONE ENCOUNTER
Called Dr Eris Carroll office [phone] spoke to Neri Santana, nurse whom informed that she does not see clearance request and to refax to [phone]  Will refax

## 2022-05-24 NOTE — TELEPHONE ENCOUNTER
I lmom for Dr Sugar Alatorre nurse to please call me back to let me know if cardiac/warfarin clearance request was received  Will call again tomorrow if do not hear back from anyone

## 2022-05-24 NOTE — TELEPHONE ENCOUNTER
I received call back from  Cardiology informing that clearance request was received and Liz Coley is just waiting for doctor to sign  Will watch for clearance request and if not received, will call again in one week

## 2022-05-27 ENCOUNTER — TELEPHONE (OUTPATIENT)
Dept: CARDIOLOGY CLINIC | Facility: CLINIC | Age: 72
End: 2022-05-27

## 2022-05-27 NOTE — TELEPHONE ENCOUNTER
Letter started in chart for clearance  Will check again to see if completed  If not received or completed in a few days, will call to obtain status

## 2022-05-27 NOTE — TELEPHONE ENCOUNTER
Pt is scheduled for a EGD/Colon on 6/7/22  CC letter in     [place] on vacation     Pt on coumadin     Please advise

## 2022-05-31 NOTE — TELEPHONE ENCOUNTER
Received Cardiac/Warfarin clearance back from Dr Dina Coppola  Pt is cleared for the procedure and may hold her coumadin 4 days prior to the procedure  Called and spoke to pt and informed of this  She will hold 4 days prior

## 2022-06-01 ENCOUNTER — EVALUATION (OUTPATIENT)
Dept: PHYSICAL THERAPY | Facility: REHABILITATION | Age: 72
End: 2022-06-01
Payer: MEDICARE

## 2022-06-01 DIAGNOSIS — M19.011 ARTHROPATHY OF RIGHT SHOULDER: ICD-10-CM

## 2022-06-01 PROCEDURE — 97161 PT EVAL LOW COMPLEX 20 MIN: CPT | Performed by: PHYSICAL THERAPIST

## 2022-06-01 PROCEDURE — 97110 THERAPEUTIC EXERCISES: CPT | Performed by: PHYSICAL THERAPIST

## 2022-06-01 NOTE — PROGRESS NOTES
PT Evaluation     Today's date: 2022  Patient name: Wali Mueller  : 1950  MRN: [id number]  Referring provider: Da Treviño  Dx:   Encounter Diagnosis     ICD-10-CM    1  Arthropathy of right shoulder  M19 [id number] Ambulatory Referral to Physical Therapy       Start Time: 1530  Stop Time: 1620  Total time in clinic (min): 50 minutes    Assessment  Assessment details: Wali Mueller is a 74 y o  female present with:   Arthropathy of right shoulder    Guillaume Lehman has the above listed impairments and will benefit from skilled Physical Therapist management to improve deficits to return to prior level of function  Suspect RTC pathology based on clinical exam, regardless will benefit from increased range of motion and strength as able if surgery does or does not become a necessity     Impairments: abnormal muscle firing, abnormal or restricted ROM, activity intolerance, impaired physical strength, lacks appropriate home exercise program and pain with function  Barriers to therapy: Age, female  Understanding of Dx/Px/POC: good   Prognosis: good    Goals  Impairment Goals  - Decrease pain 0/10  - Improve ROM to 140 degrees flexion  - Increase strength to 5/5 throughout    Functional Goals  - Return to Prior Level of Function  - Increase Functional Status Measure to: 40  - Patient will be independent with HEP  -Patient will be able to return to using right arm during activities of daily living     Plan  Patient would benefit from: skilled PT  Planned therapy interventions: joint mobilization, manual therapy, patient education, postural training, activity modification, abdominal trunk stabilization, body mechanics training, flexibility, functional ROM exercises, graded exercise, home exercise program, neuromuscular re-education, strengthening, stretching, therapeutic activities and therapeutic exercise  Frequency: 2x week  Duration in weeks: 6  Plan of Care beginning date: 2022  Plan of Care expiration date: 2022  Treatment plan discussed with: patient        Subjective Evaluation    History of Present Illness  Mechanism of injury: Gurmeet Tellez reports falling a few weeks ago and hurting her shoulder while trying to stand up using a walker  She notes currently having trouble with lifting her right arm, notes pain even at rest and night  Denies any injections by NAMAN will be seeing Dr Artur Decker  Currently pain radiates into her arm down to the elbow, difficulty bathing, dressing and driving do to the pain  Pain  Current pain ratin  At worst pain rating: 10  Location: R shoulder  Quality: throbbing  Relieving factors: heat, medications and support    Social Support    Employment status: not working (retired worked at [employer])  Patient Goals  Patient goals for therapy: decreased pain, increased motion and increased strength  Patient goal: Cutting grass, pulling weeds        Objective     Active Range of Motion   Left Shoulder   Flexion: 140 degrees   Abduction: 90 degrees with pain    Right Shoulder   Flexion: 35 degrees with pain  Abduction: 25 degrees with pain    Passive Range of Motion     Right Shoulder   Flexion: 80 degrees with pain  Abduction: 60 degrees with pain  External rotation 45°: 55 degrees with pain    Strength/Myotome Testing     Right Shoulder     Planes of Motion   Flexion: 3- (Pain)   External rotation at 0°: 3- (Pain)   Internal rotation at 0°: 3- (Pain)     Isolated Muscles   Biceps: 3 (Pain)   Triceps: 3     Additional Strength Details  mmt at joint neutral    Tests     Right Shoulder   Positive drop arm                    Precautions: HLD, HTN, hypthyroidism, taking coumadin,     Daily Treatment Diary:    Manuals 2022           Passive range of motion nv           STM nv                       Neuro RE-ed                        Ant/post deltoid iso* 10x5" ea           RTC IR/ER iso* 10x5" ea           Rows            Low row elbows extended            TB IR/ER             TB Bilateral ER w retraction            TB Horizontal Abduction             Standing 90-90 w TB            Prone I            Prone T            Prone Y            Prone 90-90                        SL flexion            SL ER                        Therapeutic Ex            Pendulums            Pulleys flx, scap nv           Cane ER*            Cane flx            Table slides flx* 10x5"x2                                   Therapeutic Activity            Wall Ball rolls            Standing Scaption                                                                                                            * = on HEP

## 2022-06-07 ENCOUNTER — ANESTHESIA (OUTPATIENT)
Dept: GASTROENTEROLOGY | Facility: AMBULATORY SURGERY CENTER | Age: 72
End: 2022-06-07

## 2022-06-07 ENCOUNTER — ANESTHESIA EVENT (OUTPATIENT)
Dept: GASTROENTEROLOGY | Facility: AMBULATORY SURGERY CENTER | Age: 72
End: 2022-06-07

## 2022-06-07 ENCOUNTER — HOSPITAL ENCOUNTER (OUTPATIENT)
Dept: GASTROENTEROLOGY | Facility: AMBULATORY SURGERY CENTER | Age: 72
Discharge: HOME | End: 2022-06-07
Payer: MEDICARE

## 2022-06-07 VITALS
HEIGHT: 64 IN | HEART RATE: 60 BPM | DIASTOLIC BLOOD PRESSURE: 72 MMHG | BODY MASS INDEX: 21 KG/M2 | WEIGHT: 123 LBS | SYSTOLIC BLOOD PRESSURE: 146 MMHG | TEMPERATURE: 97.7 F | OXYGEN SATURATION: 95 % | RESPIRATION RATE: 18 BRPM

## 2022-06-07 DIAGNOSIS — Z12.11 COLON CANCER SCREENING: ICD-10-CM

## 2022-06-07 DIAGNOSIS — K21.00 GASTROESOPHAGEAL REFLUX DISEASE WITH ESOPHAGITIS, UNSPECIFIED WHETHER HEMORRHAGE: ICD-10-CM

## 2022-06-07 DIAGNOSIS — K25.9 ULCER OF PYLORIC ANTRUM, UNSPECIFIED CHRONICITY: ICD-10-CM

## 2022-06-07 PROCEDURE — 43239 EGD BIOPSY SINGLE/MULTIPLE: CPT | Performed by: INTERNAL MEDICINE

## 2022-06-07 PROCEDURE — G0121 COLON CA SCRN NOT HI RSK IND: HCPCS | Performed by: INTERNAL MEDICINE

## 2022-06-07 RX ORDER — PROPOFOL 10 MG/ML
INJECTION, EMULSION INTRAVENOUS AS NEEDED
Status: DISCONTINUED | OUTPATIENT
Start: 2022-06-07 | End: 2022-06-07

## 2022-06-07 RX ORDER — SODIUM CHLORIDE 9 MG/ML
INJECTION, SOLUTION INTRAVENOUS CONTINUOUS PRN
Status: DISCONTINUED | OUTPATIENT
Start: 2022-06-07 | End: 2022-06-07

## 2022-06-07 RX ORDER — SODIUM CHLORIDE 9 MG/ML
20 INJECTION, SOLUTION INTRAVENOUS CONTINUOUS
Status: DISCONTINUED | OUTPATIENT
Start: 2022-06-07 | End: 2022-06-11 | Stop reason: HOSPADM

## 2022-06-07 RX ORDER — LIDOCAINE HYDROCHLORIDE 10 MG/ML
INJECTION, SOLUTION EPIDURAL; INFILTRATION; INTRACAUDAL; PERINEURAL AS NEEDED
Status: DISCONTINUED | OUTPATIENT
Start: 2022-06-07 | End: 2022-06-07

## 2022-06-07 RX ADMIN — PROPOFOL 20 MG: 10 INJECTION, EMULSION INTRAVENOUS at 10:41

## 2022-06-07 RX ADMIN — PROPOFOL 20 MG: 10 INJECTION, EMULSION INTRAVENOUS at 10:53

## 2022-06-07 RX ADMIN — PROPOFOL 20 MG: 10 INJECTION, EMULSION INTRAVENOUS at 10:49

## 2022-06-07 RX ADMIN — PROPOFOL 20 MG: 10 INJECTION, EMULSION INTRAVENOUS at 10:47

## 2022-06-07 RX ADMIN — PROPOFOL 20 MG: 10 INJECTION, EMULSION INTRAVENOUS at 10:51

## 2022-06-07 RX ADMIN — PROPOFOL 20 MG: 10 INJECTION, EMULSION INTRAVENOUS at 10:55

## 2022-06-07 RX ADMIN — PROPOFOL 20 MG: 10 INJECTION, EMULSION INTRAVENOUS at 10:45

## 2022-06-07 RX ADMIN — PROPOFOL 20 MG: 10 INJECTION, EMULSION INTRAVENOUS at 10:43

## 2022-06-07 RX ADMIN — SODIUM CHLORIDE: 9 INJECTION, SOLUTION INTRAVENOUS at 10:35

## 2022-06-07 RX ADMIN — LIDOCAINE HYDROCHLORIDE 50 MG: 10 INJECTION, SOLUTION EPIDURAL; INFILTRATION; INTRACAUDAL; PERINEURAL at 10:39

## 2022-06-07 RX ADMIN — PROPOFOL 80 MG: 10 INJECTION, EMULSION INTRAVENOUS at 10:39

## 2022-06-07 NOTE — H&P
History and Physical - [facility]  Tiny Allen 74 y o  female MRN: [id number]                  HPI: Tiny Allen is a 74y o  year old female who presents for  reflux symptoms and screening colonoscopy, history of colon polyps      REVIEW OF SYSTEMS: Per the HPI, and otherwise unremarkable  Historical Information   Past Medical History:   Diagnosis Date    Anxiety     Arthritis     shoulder    Carpal tunnel syndrome     Colon polyp     Depression     Disease of thyroid gland     Diverticulitis     Esophageal stricture     HBP (high blood pressure)     Hyperlipidemia     Hypertension     Hypothyroid     Irregular heart beat     Afib    Pneumonia     2017    PONV (postoperative nausea and vomiting)     Psychiatric disorder     Depression    Sleep apnea     mild      Past Surgical History:   Procedure Laterality Date    APPENDECTOMY      BREAST BIOPSY Right     benign    BREAST CYST ASPIRATION Right     benign    CATARACT EXTRACTION Bilateral     CATARACT EXTRACTION, BILATERAL  2019    CERVICAL FUSION N/A 2021    Procedure: FUSION CERVICAL POSTERIOR C1-C2 with allograft and neuromonitoring);   Surgeon: Aristeo Calles MD;  [facility]: [facility];  Service: Orthopedics    CHOLECYSTECTOMY      COLONOSCOPY      COLONOSCOPY W/ BIOPSIES      EGD      HAND SURGERY Right     MAMMO (HISTORICAL) Bilateral 2018    MAMMO (HISTORICAL) Bilateral 10/27/2017    MANDIBLE SURGERY      MANDIBLE SURGERY      WISDOM TOOTH EXTRACTION      X4     Social History   Social History     Substance and Sexual Activity   Alcohol Use None    Comment: last drink 7 years ago     Social History     Substance and Sexual Activity   Drug Use Not on file     Social History     Tobacco Use   Smoking Status Former Smoker    Quit date: 2004    Years since quittin 4   Smokeless Tobacco Never Used     Family History   Problem Relation Age of Onset    Heart disease Mother    [facility] Hypertension Mother     Hyperlipidemia Mother     Heart failure Mother     Hypertension Sister        Meds/Allergies     (Not in a hospital admission)      No Known Allergies    Objective     /79   Pulse 78   Temp 97 7 °F (36 5 °C) (Temporal)   Resp 18   Ht 5' 4" (1 626 m)   Wt 55 8 kg (123 lb)   SpO2 99%   BMI 21 11 kg/m²       PHYSICAL EXAM    Gen: NAD  CV: RRR  CHEST: Clear  ABD: soft, NT/ND  EXT: no edema      ASSESSMENT/PLAN:  This is a 74y o  year old female here for EGD and colonoscopy, and she is stable and optimized for her procedure

## 2022-06-07 NOTE — ANESTHESIA PREPROCEDURE EVALUATION
Procedure:  COLONOSCOPY  EGD    Relevant Problems   ANESTHESIA  bilious emesis during 6/2022 colonoscopy   (+) PONV (postoperative nausea and vomiting)      CARDIO   (+) Atrial fibrillation with RVR (HCC)   (+) Hyperlipidemia   (+) Primary hypertension      ENDO   (+) Other specified hypothyroidism      GI/HEPATIC   (+) Gastroesophageal reflux disease without esophagitis      MUSCULOSKELETAL   (+) Spondylosis of cervical region without myelopathy or radiculopathy      NEURO/PSYCH   (+) Anxiety      PULMONARY   (+) Sleep apnea      VALERIANO is mild  Physical Exam    Airway    Mallampati score: II  TM Distance: >3 FB  Neck ROM: full     Dental   No notable dental hx     Cardiovascular      Pulmonary      Other Findings        Anesthesia Plan  ASA Score- 2     Anesthesia Type- IV sedation with anesthesia with ASA Monitors  Additional Monitors:   Airway Plan:     Comment: Last of PO bowel prep: before MN  Plan Factors-Exercise tolerance (METS): <4 METS  Chart reviewed  Patient summary reviewed  Patient is not a current smoker  Induction- intravenous  Postoperative Plan-     Informed Consent- Anesthetic plan and risks discussed with patient  I personally reviewed this patient with the CRNA  Discussed and agreed on the Anesthesia Plan with the CRNA  Darletta Pac

## 2022-06-07 NOTE — ANESTHESIA POSTPROCEDURE EVALUATION
Post-Op Assessment Note    CV Status:  Stable  Pain Score: 0    Pain management: adequate     Mental Status:  Alert and awake   Hydration Status:  Euvolemic   PONV Controlled:  Controlled   Airway Patency:  Patent      Post Op Vitals Reviewed: Yes      Staff: CRNA         No complications documented      BP   122/56   Temp 97   Pulse 74   Resp 12   SpO2 96

## 2022-06-09 ENCOUNTER — APPOINTMENT (OUTPATIENT)
Dept: PHYSICAL THERAPY | Facility: REHABILITATION | Age: 72
End: 2022-06-09
Payer: MEDICARE

## 2022-06-14 ENCOUNTER — TELEPHONE (OUTPATIENT)
Dept: FAMILY MEDICINE CLINIC | Facility: CLINIC | Age: 72
End: 2022-06-14

## 2022-06-14 ENCOUNTER — OFFICE VISIT (OUTPATIENT)
Dept: PHYSICAL THERAPY | Facility: REHABILITATION | Age: 72
End: 2022-06-14
Payer: MEDICARE

## 2022-06-14 DIAGNOSIS — M19.011 ARTHROPATHY OF RIGHT SHOULDER: Primary | ICD-10-CM

## 2022-06-14 PROCEDURE — 97110 THERAPEUTIC EXERCISES: CPT | Performed by: PHYSICAL THERAPIST

## 2022-06-14 PROCEDURE — 97112 NEUROMUSCULAR REEDUCATION: CPT | Performed by: PHYSICAL THERAPIST

## 2022-06-14 PROCEDURE — 97140 MANUAL THERAPY 1/> REGIONS: CPT | Performed by: PHYSICAL THERAPIST

## 2022-06-14 NOTE — TELEPHONE ENCOUNTER
The scope was done on the 7th, so it is possible to still have some irritation there- I think this is more of a question for the Gastroenterology team, but if she wants us to see her we can

## 2022-06-14 NOTE — PROGRESS NOTES
Daily Note     Today's date: 2022  Patient name: Nicki Arguello  : 1950  MRN: [id number]  Referring provider: Jose Alejandro Oscar  Dx:   Encounter Diagnosis     ICD-10-CM    1  Arthropathy of right shoulder  M19 011        Start Time: 171  Stop Time: 174  Total time in clinic (min): 39 minutes    Subjective: Dahiana Jarrett reports that her shoulder is painful, notes not being able to do her home exercises due to pain  Objective: See treatment diary below      Assessment: Tolerated treatment well  Patient demonstrated fatigue post treatment, exhibited good technique with therapeutic exercises and would benefit from continued PT  Cues required to not push through pain, only going until the point of pain and then coming back  Reported feeling better at the end of today's session  Plan: Continue per plan of care           Precautions: HLD, HTN, hypthyroidism, taking coumadin,     Daily Treatment Diary:    Manuals 2022          Passive range of motion nv done          STM R UT nv done                                  Neuro RE-ed                        Ant/post deltoid iso* 10x5" ea 10x5" ant          RTC IR/ER iso* 10x5" ea 10x5"ea          Rows  Pink 3x10          Low row elbows extended            TB IR/ER             TB Bilateral ER w retraction            TB Horizontal Abduction             Standing 90-90 w TB            Prone I            Prone T            Prone Y            Prone 90-90                        SL flexion            SL ER                        Therapeutic Ex            Pendulums            Pulleys flx, scap nv 3' flx          Cane ER*  nv          Cane flx            Table slides flx* 10x5"x2 10x5"x2                                  Therapeutic Activity            Wall Ball rolls            Standing Scaption                                                                                                            * = on HEP

## 2022-06-14 NOTE — TELEPHONE ENCOUNTER
Patient continues to have problem after her colonoscopy  She has diarrhea again & no matter what she does (used Imodium), it just doesn't stop  She's very frustrated & doesn't know what to do

## 2022-06-16 ENCOUNTER — OFFICE VISIT (OUTPATIENT)
Dept: PHYSICAL THERAPY | Facility: REHABILITATION | Age: 72
End: 2022-06-16
Payer: MEDICARE

## 2022-06-16 DIAGNOSIS — M19.011 ARTHROPATHY OF RIGHT SHOULDER: Primary | ICD-10-CM

## 2022-06-16 PROCEDURE — 97140 MANUAL THERAPY 1/> REGIONS: CPT | Performed by: PHYSICAL THERAPIST

## 2022-06-16 PROCEDURE — 97112 NEUROMUSCULAR REEDUCATION: CPT | Performed by: PHYSICAL THERAPIST

## 2022-06-16 PROCEDURE — 97110 THERAPEUTIC EXERCISES: CPT | Performed by: PHYSICAL THERAPIST

## 2022-06-16 NOTE — PROGRESS NOTES
Daily Note     Today's date: 2022  Patient name: Zonia Soliz  : 1950  MRN: [id number]  Referring provider: Laura Kaufman  Dx:   Encounter Diagnosis     ICD-10-CM    1  Arthropathy of right shoulder  M19 011        Start Time: 1615  Stop Time: 1700  Total time in clinic (min): 45 minutes    Subjective: Giacomo Mensah reports that her shoulder feels slightly improved  Objective: See treatment diary below      Assessment: Tolerated treatment well  Patient demonstrated fatigue post treatment, exhibited good technique with therapeutic exercises and would benefit from continued PT  Cues required to avoid pushing to point of pain  Plan: Continue per plan of care           Precautions: HLD, HTN, hypthyroidism, taking coumadin,     Daily Treatment Diary:    Manuals 2022         Passive range of motion nv done done         STM R UT nv done                                  Neuro RE-ed                        Ant/post deltoid iso* 10x5" ea 10x5" ant 10x5" x2 ant         RTC IR/ER iso* 10x5" ea 10x5"ea 10x5" ea         Rows*  Pink 3x10 Pink 3x12         Low row elbows extended   nv         TB IR/ER             TB Bilateral ER w retraction            TB Horizontal Abduction             Standing 90-90 w TB            Prone I            Prone T            Prone Y            Prone 90-90                        SL flexion            SL ER                        Therapeutic Ex            Pendulums            Pulleys flx, scap nv 3' flx 3' flx         Cane ER*  nv 10x5"x2         Cane flx            Table slides flx* 10x5"x2 10x5"x2 2x10x5"                                 Therapeutic Activity            Wall Ball rolls            Standing Scaption                                                                                                            * = on HEP

## 2022-06-20 ENCOUNTER — TELEPHONE (OUTPATIENT)
Dept: FAMILY MEDICINE CLINIC | Facility: CLINIC | Age: 72
End: 2022-06-20

## 2022-06-20 DIAGNOSIS — R05.9 COUGH: ICD-10-CM

## 2022-06-20 DIAGNOSIS — R09.81 CONGESTION OF NASAL SINUS: Primary | ICD-10-CM

## 2022-06-20 RX ORDER — AZITHROMYCIN 250 MG/1
TABLET, FILM COATED ORAL
Qty: 6 TABLET | Refills: 0 | Status: SHIPPED | OUTPATIENT
Start: 2022-06-20 | End: 2022-06-25

## 2022-06-20 NOTE — TELEPHONE ENCOUNTER
Patient called complaining of a Cough, nasal congestion, coughing up green and clear    No sore throat, no fever just a lot of congestion

## 2022-06-20 NOTE — TELEPHONE ENCOUNTER
I called and LM for pt making her aware and that she should test herself for COVID if she didn't already

## 2022-06-21 ENCOUNTER — PREP FOR PROCEDURE (OUTPATIENT)
Dept: OTHER | Facility: HOSPITAL | Age: 72
End: 2022-06-21

## 2022-06-21 ENCOUNTER — APPOINTMENT (OUTPATIENT)
Dept: PHYSICAL THERAPY | Facility: REHABILITATION | Age: 72
End: 2022-06-21
Payer: MEDICARE

## 2022-06-23 ENCOUNTER — OFFICE VISIT (OUTPATIENT)
Dept: PHYSICAL THERAPY | Facility: REHABILITATION | Age: 72
End: 2022-06-23
Payer: MEDICARE

## 2022-06-23 DIAGNOSIS — M19.011 ARTHROPATHY OF RIGHT SHOULDER: Primary | ICD-10-CM

## 2022-06-23 PROCEDURE — 97110 THERAPEUTIC EXERCISES: CPT

## 2022-06-23 PROCEDURE — 97112 NEUROMUSCULAR REEDUCATION: CPT

## 2022-06-23 PROCEDURE — 97140 MANUAL THERAPY 1/> REGIONS: CPT

## 2022-06-23 NOTE — PROGRESS NOTES
Daily Note     Today's date: 2022  Patient name: Digna Frank  : 1950  MRN: [id number]  Referring provider: Machelle Toledo  Dx:   Encounter Diagnosis     ICD-10-CM    1  Arthropathy of right shoulder  M19 011        Start Time: 1700  Stop Time: 1745  Total time in clinic (min): 45 minutes    Subjective: Patient reporting shoulder as "terrible" at start of session stating "it's the weather "       Objective: See treatment diary below      Assessment: Tolerated treatment well  Patient demonstrated fatigue post treatment, exhibited good technique with therapeutic exercises and would benefit from continued PT Cues required for pain free range with all TE  Plan: Continue per plan of care  Progress treatment as tolerated          Precautions: HLD, HTN, hypthyroidism, taking coumadin,     Daily Treatment Diary:    Manuals 2022        Passive range of motion nv done done Done         STM R UT nv done                                  Neuro RE-ed                        Ant/post deltoid iso* 10x5" ea 10x5" ant 10x5" x2 ant 2x10x5'' ant        RTC IR/ER iso* 10x5" ea 10x5"ea 10x5" ea 10x5'' ea        Rows*  Pink 3x10 Pink 3x12 OTB 3x12        Low row elbows extended   nv PTB 2x10        TB IR/ER             TB Bilateral ER w retraction            TB Horizontal Abduction             Standing 90-90 w TB            Prone I            Prone T            Prone Y            Prone 90-90                        SL flexion            SL ER                        Therapeutic Ex            Pendulums            Pulleys flx, scap nv 3' flx 3' flx 3' flx        Cane ER*  nv 10x5"x2 2x10x5''        Cane flx            Table slides flx* 10x5"x2 10x5"x2 2x10x5" 2x10x5''                                Therapeutic Activity            Wall Ball rolls            Standing Scaption                                                                                                            * = on HEP

## 2022-06-24 ENCOUNTER — ANTICOAG VISIT (OUTPATIENT)
Dept: CARDIOLOGY CLINIC | Facility: CLINIC | Age: 72
End: 2022-06-24

## 2022-06-24 ENCOUNTER — APPOINTMENT (OUTPATIENT)
Dept: LAB | Facility: HOSPITAL | Age: 72
End: 2022-06-24
Attending: INTERNAL MEDICINE
Payer: MEDICARE

## 2022-06-24 DIAGNOSIS — I48.91 ATRIAL FIBRILLATION WITH RVR (HCC): Primary | ICD-10-CM

## 2022-06-27 ENCOUNTER — OFFICE VISIT (OUTPATIENT)
Dept: OBGYN CLINIC | Facility: CLINIC | Age: 72
End: 2022-06-27
Payer: MEDICARE

## 2022-06-27 VITALS
SYSTOLIC BLOOD PRESSURE: 126 MMHG | DIASTOLIC BLOOD PRESSURE: 65 MMHG | BODY MASS INDEX: 21.85 KG/M2 | WEIGHT: 128 LBS | HEART RATE: 66 BPM | HEIGHT: 64 IN

## 2022-06-27 DIAGNOSIS — M75.81 ROTATOR CUFF TENDONITIS, RIGHT: Primary | ICD-10-CM

## 2022-06-27 PROCEDURE — 99214 OFFICE O/P EST MOD 30 MIN: CPT | Performed by: PHYSICIAN ASSISTANT

## 2022-06-27 NOTE — PROGRESS NOTES
Assessment/Plan:  1  Rotator cuff tendonitis, right  MRI shoulder right wo contrast     Given the patient's mechanism of injury I do have concern for possible traumatic rotator cuff tear  Patient has significant weakness and limitations in range of motion of her shoulder on examination today  She has failed to make progress with physical therapy  I do feel an MRI is warranted to rule out rotator cuff tear  She will follow up after the MRI to discuss the results and how to proceed  She may ice and take over-the-counter medications as needed for discomfort in the meantime  We did briefly discuss rotator cuff repair if a tear is found  Subjective:   Wali Mueller is a 74 y o  female who presents today for evaluation of her right shoulder  She sustained a fall about 6 weeks ago and when she was trying to pull herself up she significantly injured the shoulder  She did have x-rays prior to seeing us which showed some mild glenohumeral joint arthritis but were otherwise negative  I am able to view these today  She has been doing physical therapy since the beginning of this month, with no improvements, and slight worsening of her symptoms  She notes ongoing pain about the lateral shoulder which can radiate down the upper arm at times  She also notes significant weakness and limitations in range of motion of the shoulder  She is right-hand dominant and notes she had full function with this arm prior to this injury  Review of Systems   Constitutional: Negative  Negative for chills and fever  HENT: Negative  Negative for ear pain and sore throat  Eyes: Negative  Negative for pain and redness  Respiratory: Negative  Negative for shortness of breath and wheezing  Cardiovascular: Negative for chest pain and palpitations  Gastrointestinal: Negative  Negative for abdominal pain and blood in stool  Endocrine: Negative  Negative for polydipsia and polyuria  Genitourinary: Negative    Negative for difficulty urinating and dysuria  Musculoskeletal:        As noted in HPI   Skin: Negative  Negative for pallor and rash  Neurological: Negative  Negative for dizziness and numbness  Hematological: Negative  Negative for adenopathy  Does not bruise/bleed easily  Psychiatric/Behavioral: Negative  Negative for confusion and suicidal ideas  Past Medical History:   Diagnosis Date    A-fib [facility])     Anxiety     Arthritis     shoulder    Carpal tunnel syndrome     Colon polyp     Depression     Disease of thyroid gland     Diverticulitis     Esophageal stricture     HBP (high blood pressure)     Hiatal hernia 06/07/2022    Small hiatal hernia    Hyperlipidemia     Hypertension     Hypothyroid     Pneumonia     2017    PONV (postoperative nausea and vomiting)     Schatzki's ring 06/07/2022    Schatzki's ring in the lower esophagus    Sleep apnea     mild        Past Surgical History:   Procedure Laterality Date    APPENDECTOMY      BREAST BIOPSY Right     benign    BREAST CYST ASPIRATION Right     benign    CATARACT EXTRACTION Bilateral     CATARACT EXTRACTION, BILATERAL  2019    CERVICAL FUSION N/A 05/25/2021    Procedure: FUSION CERVICAL POSTERIOR C1-C2 with allograft and neuromonitoring);   Surgeon: Juan Regan MD;  [facility];  Service: Orthopedics    CHOLECYSTECTOMY      COLONOSCOPY  06/07/2022    COLONOSCOPY W/ BIOPSIES  2011    EGD  06/07/2022    HAND SURGERY Right     MAMMO (HISTORICAL) Bilateral 11/27/2018    MAMMO (HISTORICAL) Bilateral 10/27/2017    MANDIBLE SURGERY      MANDIBLE SURGERY      WISDOM TOOTH EXTRACTION      X4       Family History   Problem Relation Age of Onset    Heart disease Mother     Hypertension Mother     Hyperlipidemia Mother     Heart failure Mother     Hypertension Sister        Social History     Occupational History    Not on file   Tobacco Use    Smoking status: Former Smoker     Quit date: 2004     Years since quittin 4    Smokeless tobacco: Never Used   Vaping Use    Vaping Use: Never used   Substance and Sexual Activity    Alcohol use: Not on file     Comment: last drink 7 years ago    Drug use: Not on file    Sexual activity: Not Currently         Current Outpatient Medications:     acetaminophen (TYLENOL) 500 mg tablet, Take 1 tablet (500 mg total) by mouth every 6 (six) hours as needed for moderate pain, Disp: 20 tablet, Rfl: 0    ALPRAZolam (XANAX) 0 5 mg tablet, Take 1 tablet (0 5 mg total) by mouth 2 (two) times a day as needed for anxiety, Disp: 60 tablet, Rfl: 3    atorvastatin (LIPITOR) 10 mg tablet, TAKE 1 TABLET BY MOUTH EVERY DAY AT BEDTIME, Disp: 90 tablet, Rfl: 3    Calcium Carb-Cholecalciferol 600-200 MG-UNIT TABS, Take 1 tablet by mouth daily, Disp: , Rfl:     famotidine (PEPCID) 20 mg tablet, TAKE 1 TABLET(20 MG) BY MOUTH DAILY, Disp: 90 tablet, Rfl: 1    levothyroxine 100 mcg tablet, TAKE 1 TABLET(100 MCG) BY MOUTH DAILY, Disp: 90 tablet, Rfl: 3    metoprolol tartrate (LOPRESSOR) 25 mg tablet, Take 1 tablet (25 mg total) by mouth every 12 (twelve) hours, Disp: 180 tablet, Rfl: 3    Multiple Vitamins-Minerals (CENTRUM ADULTS PO), Take 1 tablet by mouth daily, Disp: , Rfl:     omeprazole (PriLOSEC) 20 mg delayed release capsule, TAKE 1 CAPSULE(20 MG) BY MOUTH DAILY BEFORE BREAKFAST, Disp: 90 capsule, Rfl: 1    ramipril (ALTACE) 10 MG capsule, TAKE 1 CAPSULE(10 MG) BY MOUTH TWICE DAILY, Disp: 180 capsule, Rfl: 3    sertraline (Zoloft) 50 mg tablet, Take 1 tablet (50 mg total) by mouth daily, Disp: 30 tablet, Rfl: 5    triamcinolone (KENALOG) 0 5 % cream, Apply topically 2 (two) times a day, Disp: 30 g, Rfl: 5    warfarin (COUMADIN) 2 mg tablet, TAKE 1 AND 1/2 TO 2 TABLETS BY MOUTH DAILY OR AS DIRECTED BY PRESCRIBER, Disp: 180 tablet, Rfl: 1    colchicine (COLCRYS) 0 6 mg tablet, Take 1 tablet (0 6 mg total) by mouth daily (Patient not taking: No sig reported), Disp: 30 tablet, Rfl: 5    No Known Allergies    Objective:  Vitals:    06/27/22 1334   BP: 126/65   Pulse: 66       Right Shoulder Exam     Tenderness   The patient is experiencing no tenderness  Range of Motion   External rotation: 40   Forward flexion: 70   Right shoulder internal rotation 0 degrees: right buttock  Muscle Strength   Abduction: 3/5   Internal rotation: 5/5   External rotation: 4/5     Tests   Drop arm: positive    Other   Erythema: absent  Sensation: normal  Pulse: present            Physical Exam  Constitutional:       General: She is not in acute distress  Appearance: She is well-developed  HENT:      Head: Normocephalic and atraumatic  Eyes:      General: No scleral icterus  Conjunctiva/sclera: Conjunctivae normal    Neck:      Vascular: No JVD  Cardiovascular:      Rate and Rhythm: Normal rate  Pulmonary:      Effort: Pulmonary effort is normal  No respiratory distress  Skin:     General: Skin is warm  Neurological:      Mental Status: She is alert and oriented to person, place, and time  Coordination: Coordination normal          I have personally reviewed pertinent films in PACS and my interpretation is as follows:  X-rays right shoulder:  From 5/16/2022:   Mild glenohumeral joint arthritis  No acute osseous abnormality

## 2022-06-28 ENCOUNTER — TELEPHONE (OUTPATIENT)
Dept: FAMILY MEDICINE CLINIC | Facility: CLINIC | Age: 72
End: 2022-06-28

## 2022-06-28 ENCOUNTER — OFFICE VISIT (OUTPATIENT)
Dept: PHYSICAL THERAPY | Facility: REHABILITATION | Age: 72
End: 2022-06-28
Payer: MEDICARE

## 2022-06-28 DIAGNOSIS — M19.011 ARTHROPATHY OF RIGHT SHOULDER: Primary | ICD-10-CM

## 2022-06-28 PROCEDURE — 97112 NEUROMUSCULAR REEDUCATION: CPT | Performed by: PHYSICAL THERAPIST

## 2022-06-28 PROCEDURE — 97140 MANUAL THERAPY 1/> REGIONS: CPT | Performed by: PHYSICAL THERAPIST

## 2022-06-28 PROCEDURE — 97110 THERAPEUTIC EXERCISES: CPT | Performed by: PHYSICAL THERAPIST

## 2022-06-28 NOTE — TELEPHONE ENCOUNTER
Pt called she had finish the Zpak  She currently still is congested and coughing up mucus  She stated is there something strong  She is urinating more now due to coughing

## 2022-06-28 NOTE — PROGRESS NOTES
Daily Note     Today's date: 2022  Patient name: Eddie Verduzco  : 1950  MRN: [id number]  Referring provider: Abebe Ruggiero  Dx:   Encounter Diagnosis     ICD-10-CM    1  Arthropathy of right shoulder  M19 011        Start Time: 1445  Stop Time: 1525  Total time in clinic (min): 40 minutes    Subjective: Jas Vegas reports that her shoulder is feeling sore  Notes seeing ortho yesterday who ordered an MRI which is scheduled for   Objective: See treatment diary below      Assessment: Tolerated treatment well  Patient demonstrated fatigue post treatment, exhibited good technique with therapeutic exercises and would benefit from continued PT  Plan: Continue per plan of care           Precautions: HLD, HTN, hypthyroidism, taking coumadin,     Daily Treatment Diary:    Manuals 2022       Passive range of motion nv done done Done  done       STM R UT nv done   done                               Neuro RE-ed                        Ant/post deltoid iso* 10x5" ea 10x5" ant 10x5" x2 ant 2x10x5'' ant 2x10x5" ant       RTC IR/ER iso* @ wall 10x5" ea 10x5"ea 10x5" ea 10x5'' ea 10x5" ea       Rows*  Pink 3x10 Pink 3x12 OTB 3x12 OTB 3x12       Low row elbows extended   nv PTB 2x10 np       TB IR/ER             TB Bilateral ER w retraction            TB Horizontal Abduction             Standing 90-90 w TB            Prone I            Prone T            Prone Y            Prone 90-90                        SL flexion            SL ER                        Therapeutic Ex            Pendulums            Pulleys flx, scap nv 3' flx 3' flx 3' flx 2' flx       Cane ER*  nv 10x5"x2 2x10x5'' 2x10x5"       Cane flx            Table slides flx* 10x5"x2 10x5"x2 2x10x5" 2x10x5'' 2x10x5"                               Therapeutic Activity            Wall Ball rolls            Standing Scaption * = on HEP

## 2022-06-29 ENCOUNTER — OFFICE VISIT (OUTPATIENT)
Dept: FAMILY MEDICINE CLINIC | Facility: CLINIC | Age: 72
End: 2022-06-29
Payer: MEDICARE

## 2022-06-29 VITALS
OXYGEN SATURATION: 97 % | HEART RATE: 72 BPM | RESPIRATION RATE: 16 BRPM | DIASTOLIC BLOOD PRESSURE: 50 MMHG | WEIGHT: 129 LBS | TEMPERATURE: 97.7 F | HEIGHT: 64 IN | SYSTOLIC BLOOD PRESSURE: 104 MMHG | BODY MASS INDEX: 22.02 KG/M2

## 2022-06-29 DIAGNOSIS — M25.511 CHRONIC RIGHT SHOULDER PAIN: ICD-10-CM

## 2022-06-29 DIAGNOSIS — J30.1 SEASONAL ALLERGIC RHINITIS DUE TO POLLEN: Primary | ICD-10-CM

## 2022-06-29 DIAGNOSIS — M54.2 NECK PAIN: ICD-10-CM

## 2022-06-29 DIAGNOSIS — G89.29 CHRONIC RIGHT SHOULDER PAIN: ICD-10-CM

## 2022-06-29 DIAGNOSIS — I10 PRIMARY HYPERTENSION: ICD-10-CM

## 2022-06-29 PROCEDURE — 99213 OFFICE O/P EST LOW 20 MIN: CPT | Performed by: FAMILY MEDICINE

## 2022-06-29 RX ORDER — CETIRIZINE HYDROCHLORIDE 10 MG/1
10 TABLET ORAL DAILY
Qty: 30 TABLET | Refills: 0 | Status: SHIPPED | OUTPATIENT
Start: 2022-06-29

## 2022-06-29 RX ORDER — GUAIFENESIN 600 MG
1200 TABLET, EXTENDED RELEASE 12 HR ORAL EVERY 12 HOURS SCHEDULED
Qty: 30 TABLET | Refills: 0 | Status: SHIPPED | OUTPATIENT
Start: 2022-06-29

## 2022-06-29 NOTE — PROGRESS NOTES
Subjective:      Patient ID: Loly Biggs is a 74 y o  female  With history of hypertension hyperlipidemia, gout, anxiety presents with 1 week of cough and congestion  Patient has been recently treated with azithromycin and states that her symptoms have improved but she still has a cough  She does have seasonal allergies and states that her Maple trees blooming and which may be causing the symptoms    She also reports chronic shoulder and back and neck pain    Cough  Pertinent negatives include no chest pain, eye redness, fever, headaches, myalgias, rash, rhinorrhea, sore throat, shortness of breath or wheezing  Past Medical History:   Diagnosis Date    A-fib [facility])     Anxiety     Arthritis     shoulder    Carpal tunnel syndrome     Colon polyp     Depression     Disease of thyroid gland     Diverticulitis     Esophageal stricture     HBP (high blood pressure)     Hiatal hernia 06/07/2022    Small hiatal hernia    Hyperlipidemia     Hypertension     Hypothyroid     Pneumonia     2017    PONV (postoperative nausea and vomiting)     Schatzki's ring 06/07/2022    Schatzki's ring in the lower esophagus    Sleep apnea     mild        Family History   Problem Relation Age of Onset    Heart disease Mother     Hypertension Mother     Hyperlipidemia Mother     Heart failure Mother     Hypertension Sister        Past Surgical History:   Procedure Laterality Date    APPENDECTOMY      BREAST BIOPSY Right     benign    BREAST CYST ASPIRATION Right     benign    CATARACT EXTRACTION Bilateral     CATARACT EXTRACTION, BILATERAL  2019    CERVICAL FUSION N/A 05/25/2021    Procedure: FUSION CERVICAL POSTERIOR C1-C2 with allograft and neuromonitoring);   Surgeon: Joanie Negrete MD;  [facility];  Service: Orthopedics    CHOLECYSTECTOMY      COLONOSCOPY  06/07/2022    COLONOSCOPY W/ BIOPSIES  2011    EGD  06/07/2022    HAND SURGERY Right     MAMMO (HISTORICAL) Bilateral 11/27/2018    MAMMO (HISTORICAL) Bilateral 10/27/2017    MANDIBLE SURGERY      MANDIBLE SURGERY      WISDOM TOOTH EXTRACTION      X4        reports that she quit smoking about 18 years ago   She has never used smokeless tobacco       Current Outpatient Medications:     acetaminophen (TYLENOL) 500 mg tablet, Take 1 tablet (500 mg total) by mouth every 6 (six) hours as needed for moderate pain, Disp: 20 tablet, Rfl: 0    ALPRAZolam (XANAX) 0 5 mg tablet, Take 1 tablet (0 5 mg total) by mouth 2 (two) times a day as needed for anxiety, Disp: 60 tablet, Rfl: 3    atorvastatin (LIPITOR) 10 mg tablet, TAKE 1 TABLET BY MOUTH EVERY DAY AT BEDTIME, Disp: 90 tablet, Rfl: 3    Calcium Carb-Cholecalciferol 600-200 MG-UNIT TABS, Take 1 tablet by mouth daily, Disp: , Rfl:     cetirizine (ZyrTEC) 10 mg tablet, Take 1 tablet (10 mg total) by mouth daily, Disp: 30 tablet, Rfl: 0    colchicine (COLCRYS) 0 6 mg tablet, Take 1 tablet (0 6 mg total) by mouth daily, Disp: 30 tablet, Rfl: 5    famotidine (PEPCID) 20 mg tablet, TAKE 1 TABLET(20 MG) BY MOUTH DAILY, Disp: 90 tablet, Rfl: 1    guaiFENesin (MUCINEX) 600 mg 12 hr tablet, Take 2 tablets (1,200 mg total) by mouth every 12 (twelve) hours, Disp: 30 tablet, Rfl: 0    levothyroxine 100 mcg tablet, TAKE 1 TABLET(100 MCG) BY MOUTH DAILY, Disp: 90 tablet, Rfl: 3    metoprolol tartrate (LOPRESSOR) 25 mg tablet, Take 1 tablet (25 mg total) by mouth every 12 (twelve) hours, Disp: 180 tablet, Rfl: 3    Multiple Vitamins-Minerals (CENTRUM ADULTS PO), Take 1 tablet by mouth daily, Disp: , Rfl:     omeprazole (PriLOSEC) 20 mg delayed release capsule, TAKE 1 CAPSULE(20 MG) BY MOUTH DAILY BEFORE BREAKFAST, Disp: 90 capsule, Rfl: 1    ramipril (ALTACE) 10 MG capsule, TAKE 1 CAPSULE(10 MG) BY MOUTH TWICE DAILY, Disp: 180 capsule, Rfl: 3    sertraline (Zoloft) 50 mg tablet, Take 1 tablet (50 mg total) by mouth daily, Disp: 30 tablet, Rfl: 5    triamcinolone (KENALOG) 0 5 % cream, Apply topically 2 (two) times a day, Disp: 30 g, Rfl: 5    warfarin (COUMADIN) 2 mg tablet, TAKE 1 AND 1/2 TO 2 TABLETS BY MOUTH DAILY OR AS DIRECTED BY PRESCRIBER, Disp: 180 tablet, Rfl: 1    The following portions of the patient's history were reviewed and updated as appropriate: allergies, current medications, past family history, past medical history, past social history, past surgical history and problem list     Review of Systems   Constitutional: Negative for fatigue and fever  HENT: Positive for congestion  Negative for facial swelling, mouth sores, rhinorrhea, sore throat and trouble swallowing  Eyes: Negative for pain and redness  Respiratory: Positive for cough  Negative for shortness of breath and wheezing  Cardiovascular: Negative for chest pain, palpitations and leg swelling  Gastrointestinal: Negative for abdominal pain, blood in stool, constipation, diarrhea and nausea  Genitourinary: Negative for dysuria, hematuria and urgency  Musculoskeletal: Positive for arthralgias and back pain  Negative for myalgias  Skin: Negative for rash and wound  Neurological: Negative for seizures, syncope and headaches  Hematological: Negative for adenopathy  Psychiatric/Behavioral: Negative for agitation and behavioral problems  PHQ-2/9 Depression Screening               Objective:    /50 (BP Location: Left arm, Patient Position: Sitting, Cuff Size: Adult)   Pulse 72   Temp 97 7 °F (36 5 °C) (Temporal)   Resp 16   Ht 5' 4" (1 626 m)   Wt 58 5 kg (129 lb)   SpO2 97%   BMI 22 14 kg/m²      Physical Exam  Vitals and nursing note reviewed  Constitutional:       Appearance: Normal appearance  She is well-developed  She is not ill-appearing  HENT:      Head: Normocephalic and atraumatic  Right Ear: External ear normal       Left Ear: External ear normal       Nose: Congestion present        Mouth/Throat:      Mouth: Mucous membranes are moist       Pharynx: No oropharyngeal exudate or posterior oropharyngeal erythema  Eyes:      General: No scleral icterus  Right eye: No discharge  Left eye: No discharge  Conjunctiva/sclera: Conjunctivae normal    Cardiovascular:      Rate and Rhythm: Normal rate  Heart sounds: No murmur heard  No gallop  Pulmonary:      Effort: Pulmonary effort is normal  No respiratory distress  Breath sounds: Normal breath sounds  No stridor  No wheezing, rhonchi or rales  Abdominal:      Palpations: Abdomen is soft  Tenderness: There is no abdominal tenderness  Musculoskeletal:         General: No tenderness or deformity  Skin:     Findings: No erythema or rash  Neurological:      Mental Status: She is alert  Mental status is at baseline  Psychiatric:         Behavior: Behavior normal          Judgment: Judgment normal                Assessment/Plan:         Diagnoses and all orders for this visit:    Seasonal allergic rhinitis due to pollen  -     cetirizine (ZyrTEC) 10 mg tablet; Take 1 tablet (10 mg total) by mouth daily  -     guaiFENesin (MUCINEX) 600 mg 12 hr tablet; Take 2 tablets (1,200 mg total) by mouth every 12 (twelve) hours    Primary hypertension    Chronic right shoulder pain    Neck pain        I do suspect that her symptoms are secondary to allergy rhinitis  I have advised her to take cetirizine 10 mg oral daily  She may take Mucinex every 12 hours as needed for cough  I do not see the need for any antibiotics or steroid treatment at this time  She will follow up with the symptoms do not improve  Have communicated this with her primary care physician  I have advised her to avoid decongestants due to history of hypertension  She will continue to follow-up with her primary care physician and specialist for her chronic problem  Read package inserts for all medications before starting a new medications, call me if you have any questions    Patient was given opportunity to ask questions and all questions were answered  Portions of the record may have been created with voice recognition software  Occasional wrong word or "sound a like" substitutions may have occurred due to the inherent limitations of voice recognition software  Read the chart carefully and recognize, using context, where substitutions have occurred

## 2022-06-30 ENCOUNTER — OFFICE VISIT (OUTPATIENT)
Dept: PHYSICAL THERAPY | Facility: REHABILITATION | Age: 72
End: 2022-06-30
Payer: MEDICARE

## 2022-06-30 DIAGNOSIS — M19.011 ARTHROPATHY OF RIGHT SHOULDER: Primary | ICD-10-CM

## 2022-06-30 PROCEDURE — 97140 MANUAL THERAPY 1/> REGIONS: CPT | Performed by: PHYSICAL THERAPIST

## 2022-06-30 PROCEDURE — 97110 THERAPEUTIC EXERCISES: CPT | Performed by: PHYSICAL THERAPIST

## 2022-06-30 PROCEDURE — 97112 NEUROMUSCULAR REEDUCATION: CPT | Performed by: PHYSICAL THERAPIST

## 2022-06-30 NOTE — PROGRESS NOTES
Daily Note     Today's date: 2022  Patient name: Eddie Verduzco   : 1950  MRN: [id number]  Referring provider: Abebe Ruggiero  Dx:   Encounter Diagnosis     ICD-10-CM    1  Arthropathy of right shoulder  M19 011        Start Time: 1450  Stop Time: 1530  Total time in clinic (min): 40 minutes    Subjective: Jas Dub denies any significant changes since previous session  Objective: See treatment diary below      Assessment: Tolerated treatment well  Noted guarding towards mid range for shoulder flexion and abduction during manual therapy  Demonstrates good understanding of exercises performed throughout session  Patient demonstrated appropriate level of challenge and muscular fatigue throughout session and noted good status at end of visit  Patient demonstrated fatigue post treatment, exhibited good technique with therapeutic exercises and would benefit from continued PT  Plan: Continue per plan of care  Progress treatment as tolerated         Precautions: HLD, HTN, hypthyroidism, taking coumadin,     Daily Treatment Diary:    Manuals 2022      Passive range of motion nv done done Done  done done      STM R UT nv done   done done                              Neuro RE-ed                        Ant/post deltoid iso* 10x5" ea 10x5" ant 10x5" x2 ant 2x10x5'' ant 2x10x5" ant 2x10x5" ant      RTC IR/ER iso* @ wall 10x5" ea 10x5"ea 10x5" ea 10x5'' ea 10x5" ea 10x5" ea      Rows*  Pink 3x10 Pink 3x12 OTB 3x12 OTB 3x12 Pink 3x12      Low row elbows extended   nv PTB 2x10 np       TB IR/ER             TB Bilateral ER w retraction            TB Horizontal Abduction             Standing 90-90 w TB            Prone I            Prone T            Prone Y            Prone 90-90                        SL flexion            SL ER                        Therapeutic Ex            Pendulums            Pulleys flx, scap nv 3' flx 3' flx 3' flx 2' flx 3' flex/scap      Cane ER* nv 10x5"x2 2x10x5'' 2x10x5" 2x10x5"      Cane flx            Table slides flx* 10x5"x2 10x5"x2 2x10x5" 2x10x5'' 2x10x5" 2x10x5"                              Therapeutic Activity            Wall Ball rolls            Standing Scaption                                                                                                            * = on HEP

## 2022-07-03 ENCOUNTER — HOSPITAL ENCOUNTER (EMERGENCY)
Facility: HOSPITAL | Age: 72
Discharge: HOME | End: 2022-07-03
Attending: EMERGENCY MEDICINE | Admitting: EMERGENCY MEDICINE
Payer: MEDICARE

## 2022-07-03 VITALS
HEIGHT: 63 IN | BODY MASS INDEX: 22.97 KG/M2 | RESPIRATION RATE: 18 BRPM | HEART RATE: 58 BPM | OXYGEN SATURATION: 96 % | WEIGHT: 129.63 LBS | SYSTOLIC BLOOD PRESSURE: 125 MMHG | DIASTOLIC BLOOD PRESSURE: 62 MMHG | TEMPERATURE: 98.6 F

## 2022-07-03 DIAGNOSIS — J06.9 VIRAL URI WITH COUGH: Primary | ICD-10-CM

## 2022-07-03 LAB
FLUAV RNA RESP QL NAA+PROBE: NEGATIVE
FLUBV RNA RESP QL NAA+PROBE: NEGATIVE
RSV RNA RESP QL NAA+PROBE: NEGATIVE
SARS-COV-2 RNA RESP QL NAA+PROBE: NEGATIVE

## 2022-07-03 PROCEDURE — 0241U HB NFCT DS VIR RESP RNA 4 TRGT: CPT | Performed by: EMERGENCY MEDICINE

## 2022-07-03 PROCEDURE — 99283 EMERGENCY DEPT VISIT LOW MDM: CPT

## 2022-07-03 PROCEDURE — 99282 EMERGENCY DEPT VISIT SF MDM: CPT | Performed by: EMERGENCY MEDICINE

## 2022-07-03 NOTE — ED PROVIDER NOTES
History  Chief Complaint   Patient presents with    Cough     (+) home COVID test last night "I want to find out how to get rid of it", cold symptoms x 1 week, treated for "sinus infection" by PCP     5 days of cough, sputum production, myalgias, malaise  No f/c/n/v/d  No cp/sob  No abdo pain/urinary symp  PMH DJD, HTN  Ex-smoker, no known COPD  Prior to Admission Medications   Prescriptions Last Dose Informant Patient Reported? Taking?    ALPRAZolam (XANAX) 0 5 mg tablet   No No   Sig: Take 1 tablet (0 5 mg total) by mouth 2 (two) times a day as needed for anxiety   Calcium Carb-Cholecalciferol 600-200 MG-UNIT TABS  Self Yes No   Sig: Take 1 tablet by mouth daily   Multiple Vitamins-Minerals (CENTRUM ADULTS PO)  Self Yes No   Sig: Take 1 tablet by mouth daily   acetaminophen (TYLENOL) 500 mg tablet  Self No No   Sig: Take 1 tablet (500 mg total) by mouth every 6 (six) hours as needed for moderate pain   atorvastatin (LIPITOR) 10 mg tablet  Self No No   Sig: TAKE 1 TABLET BY MOUTH EVERY DAY AT BEDTIME   cetirizine (ZyrTEC) 10 mg tablet   No No   Sig: Take 1 tablet (10 mg total) by mouth daily   colchicine (COLCRYS) 0 6 mg tablet   No No   Sig: Take 1 tablet (0 6 mg total) by mouth daily   famotidine (PEPCID) 20 mg tablet   No No   Sig: TAKE 1 TABLET(20 MG) BY MOUTH DAILY   guaiFENesin (MUCINEX) 600 mg 12 hr tablet   No No   Sig: Take 2 tablets (1,200 mg total) by mouth every 12 (twelve) hours   levothyroxine 100 mcg tablet   No No   Sig: TAKE 1 TABLET(100 MCG) BY MOUTH DAILY   metoprolol tartrate (LOPRESSOR) 25 mg tablet   No No   Sig: Take 1 tablet (25 mg total) by mouth every 12 (twelve) hours   omeprazole (PriLOSEC) 20 mg delayed release capsule   No No   Sig: TAKE 1 CAPSULE(20 MG) BY MOUTH DAILY BEFORE BREAKFAST   ramipril (ALTACE) 10 MG capsule   No No   Sig: TAKE 1 CAPSULE(10 MG) BY MOUTH TWICE DAILY   sertraline (Zoloft) 50 mg tablet   No No   Sig: Take 1 tablet (50 mg total) by mouth daily triamcinolone (KENALOG) 0 5 % cream   No No   Sig: Apply topically 2 (two) times a day   warfarin (COUMADIN) 2 mg tablet   No No   Sig: TAKE 1 AND 1/2 TO 2 TABLETS BY MOUTH DAILY OR AS DIRECTED BY PRESCRIBER      Facility-Administered Medications: None       Past Medical History:   Diagnosis Date    A-fib ([facility] 73 )     Anxiety     Arthritis     shoulder    Carpal tunnel syndrome     Colon polyp     Depression     Disease of thyroid gland     Diverticulitis     Esophageal stricture     HBP (high blood pressure)     Hiatal hernia 06/07/2022    Small hiatal hernia    Hyperlipidemia     Hypertension     Hypothyroid     Pneumonia     2017    PONV (postoperative nausea and vomiting)     Schatzki's ring 06/07/2022    Schatzki's ring in the lower esophagus    Sleep apnea     mild        Past Surgical History:   Procedure Laterality Date    APPENDECTOMY      BREAST BIOPSY Right     benign    BREAST CYST ASPIRATION Right     benign    CATARACT EXTRACTION Bilateral     CATARACT EXTRACTION, BILATERAL  2019    CERVICAL FUSION N/A 05/25/2021    Procedure: FUSION CERVICAL POSTERIOR C1-C2 with allograft and neuromonitoring); Surgeon: Faustina Anne MD;  [facility];  Service: Orthopedics    CHOLECYSTECTOMY      COLONOSCOPY  06/07/2022    COLONOSCOPY W/ BIOPSIES  2011    EGD  06/07/2022    HAND SURGERY Right     MAMMO (HISTORICAL) Bilateral 11/27/2018    MAMMO (HISTORICAL) Bilateral 10/27/2017    MANDIBLE SURGERY      MANDIBLE SURGERY      WISDOM TOOTH EXTRACTION      X4       Family History   Problem Relation Age of Onset    Heart disease Mother     Hypertension Mother     Hyperlipidemia Mother     Heart failure Mother     Hypertension Sister      I have reviewed and agree with the history as documented      E-Cigarette/Vaping    E-Cigarette Use Never User      E-Cigarette/Vaping Substances    Nicotine No     THC No     CBD No     Flavoring No     Other No     Unknown No Social History     Tobacco Use    Smoking status: Former Smoker     Quit date:      Years since quittin 5    Smokeless tobacco: Never Used   Vaping Use    Vaping Use: Never used   Substance Use Topics    Alcohol use: Not Currently     Comment: last drink 7 years ago   [employer] Drug use: Not Currently       Review of Systems   Constitutional: Positive for activity change, appetite change and fatigue  Negative for fever  HENT: Positive for sneezing  Negative for rhinorrhea  Eyes: Negative for visual disturbance  Respiratory: Positive for cough  Negative for shortness of breath  Cardiovascular: Negative for chest pain  Gastrointestinal: Negative for abdominal pain, diarrhea and vomiting  Endocrine: Negative for polydipsia  Genitourinary: Negative for dysuria, frequency and hematuria  Musculoskeletal: Positive for myalgias  Negative for neck stiffness  Skin: Negative for rash  Allergic/Immunologic: Negative for immunocompromised state  Neurological: Negative for speech difficulty, weakness and numbness  Psychiatric/Behavioral: Negative for suicidal ideas  Physical Exam  Physical Exam  Constitutional:       General: She is not in acute distress  HENT:      Head: Normocephalic and atraumatic  Right Ear: External ear normal       Left Ear: External ear normal       Nose: Nose normal       Mouth/Throat:      Pharynx: Oropharynx is clear  Eyes:      Conjunctiva/sclera: Conjunctivae normal    Cardiovascular:      Rate and Rhythm: Normal rate and regular rhythm  Heart sounds: Normal heart sounds  No murmur heard  Pulmonary:      Effort: Pulmonary effort is normal       Breath sounds: Normal breath sounds  Abdominal:      General: Bowel sounds are normal       Palpations: Abdomen is soft  There is no mass  Tenderness: There is no abdominal tenderness  There is no guarding  Musculoskeletal:         General: No swelling or tenderness        Cervical back: Normal range of motion and neck supple  Right lower leg: No edema  Left lower leg: No edema  Skin:     General: Skin is warm and dry  Capillary Refill: Capillary refill takes less than 2 seconds  Neurological:      General: No focal deficit present  Mental Status: She is alert and oriented to person, place, and time  Psychiatric:         Mood and Affect: Mood normal          Vital Signs  ED Triage Vitals [07/03/22 1220]   Temperature Pulse Respirations Blood Pressure SpO2   98 6 °F (37 °C) 58 20 132/55 99 %      Temp Source Heart Rate Source Patient Position - Orthostatic VS BP Location FiO2 (%)   Oral Monitor Sitting Left arm --      Pain Score       No Pain           Vitals:    07/03/22 1220   BP: 132/55   Pulse: 58   Patient Position - Orthostatic VS: Sitting         Visual Acuity      ED Medications  Medications - No data to display    Diagnostic Studies  Results Reviewed     Procedure Component Value Units Date/Time    COVID/FLU/RSV - 2 hour TAT [[id number]]     Lab Status: No result Specimen: Nasopharyngeal Swab                  No orders to display              Procedures  Procedures         ED Course                                             MDM  Number of Diagnoses or Management Options  Viral URI with cough  Diagnosis management comments: Negative viral swab, acute viral syndrome no indication for hospitalization, normal exam dc home pmd f/u      Disposition  Final diagnoses:   None     ED Disposition     None      Follow-up Information    None         Patient's Medications   Discharge Prescriptions    No medications on file       No discharge procedures on file      PDMP Review       Value Time User    PDMP Reviewed  Yes 7/13/2021  1:05 PM Hodan Ray          ED Provider  Electronically Signed by           Clovis Roldan MD  07/03/22 2019

## 2022-07-12 ENCOUNTER — RA CDI HCC (OUTPATIENT)
Dept: OTHER | Facility: HOSPITAL | Age: 72
End: 2022-07-12

## 2022-07-12 NOTE — PROGRESS NOTES
[id number]  [facility] 75  coding opportunities          Chart Reviewed number of suggestions sent to Provider: 1     Patients Insurance     Medicare Insurance: [employer]

## 2022-07-13 ENCOUNTER — OFFICE VISIT (OUTPATIENT)
Dept: FAMILY MEDICINE CLINIC | Facility: CLINIC | Age: 72
End: 2022-07-13
Payer: MEDICARE

## 2022-07-13 VITALS
HEIGHT: 63 IN | BODY MASS INDEX: 23.28 KG/M2 | RESPIRATION RATE: 16 BRPM | DIASTOLIC BLOOD PRESSURE: 60 MMHG | HEART RATE: 58 BPM | TEMPERATURE: 96.6 F | WEIGHT: 131.4 LBS | SYSTOLIC BLOOD PRESSURE: 120 MMHG | OXYGEN SATURATION: 97 %

## 2022-07-13 DIAGNOSIS — E78.5 HYPERLIPIDEMIA, UNSPECIFIED HYPERLIPIDEMIA TYPE: ICD-10-CM

## 2022-07-13 DIAGNOSIS — Z71.89 ADVANCED CARE PLANNING/COUNSELING DISCUSSION: ICD-10-CM

## 2022-07-13 DIAGNOSIS — K21.9 GASTROESOPHAGEAL REFLUX DISEASE WITHOUT ESOPHAGITIS: ICD-10-CM

## 2022-07-13 DIAGNOSIS — M70.21 OLECRANON BURSITIS OF RIGHT ELBOW: ICD-10-CM

## 2022-07-13 DIAGNOSIS — Z00.00 MEDICARE ANNUAL WELLNESS VISIT, SUBSEQUENT: Primary | ICD-10-CM

## 2022-07-13 DIAGNOSIS — I10 PRIMARY HYPERTENSION: ICD-10-CM

## 2022-07-13 DIAGNOSIS — Z53.20 OSTEOPOROSIS SCREENING DECLINED: ICD-10-CM

## 2022-07-13 DIAGNOSIS — E03.8 OTHER SPECIFIED HYPOTHYROIDISM: ICD-10-CM

## 2022-07-13 DIAGNOSIS — I48.91 ATRIAL FIBRILLATION WITH RVR (HCC): ICD-10-CM

## 2022-07-13 PROBLEM — R11.2 PONV (POSTOPERATIVE NAUSEA AND VOMITING): Status: RESOLVED | Noted: 2022-01-24 | Resolved: 2022-07-13

## 2022-07-13 PROBLEM — Z98.890 PONV (POSTOPERATIVE NAUSEA AND VOMITING): Status: RESOLVED | Noted: 2022-01-24 | Resolved: 2022-07-13

## 2022-07-13 PROCEDURE — 99497 ADVNCD CARE PLAN 30 MIN: CPT | Performed by: INTERNAL MEDICINE

## 2022-07-13 PROCEDURE — 99214 OFFICE O/P EST MOD 30 MIN: CPT | Performed by: INTERNAL MEDICINE

## 2022-07-13 PROCEDURE — G0438 PPPS, INITIAL VISIT: HCPCS | Performed by: INTERNAL MEDICINE

## 2022-07-13 NOTE — PROGRESS NOTES
Assessment and Plan:     Problem List Items Addressed This Visit        Digestive    Gastroesophageal reflux disease without esophagitis       Endocrine    Other specified hypothyroidism       Cardiovascular and Mediastinum    Primary hypertension    Relevant Orders    Comprehensive metabolic panel    Atrial fibrillation with RVR (HCC)    Relevant Orders    CBC and differential       Musculoskeletal and Integument    Olecranon bursitis of right elbow       Other    Hyperlipidemia    Relevant Orders    Lipid panel    TSH, 3rd generation      Other Visit Diagnoses     Medicare annual wellness visit, subsequent    -  Primary    Osteoporosis screening declined          Luis Hirsch doing pretty well really, still driving, keeping busy, declines a lot of screening stuff-holding off on 2nd covid booster     Preventive health issues were discussed with patient, and age appropriate screening tests were ordered as noted in patient's After Visit Summary  Personalized health advice and appropriate referrals for health education or preventive services given if needed, as noted in patient's After Visit Summary       History of Present Illness:     Patient presents for a Medicare Wellness Visit    HPI   Patient Care Team:  Val Barragan MD as PCP - General (Internal Medicine)     Review of Systems:     Review of Systems     Problem List:     Patient Active Problem List   Diagnosis    Neck pain    Spondylosis of cervical region without myelopathy or radiculopathy    Occipital neuralgia of left side    Pre-op examination    Cervical vertebral fusion    Gastroesophageal reflux disease without esophagitis    Anxiety    Primary hypertension    Other specified hypothyroidism    Hyperlipidemia    Sleep apnea    Atrial fibrillation with RVR (HCC)    Cellulitis    Sepsis ([address] )    Transaminitis    Olecranon bursitis of right elbow      Past Medical and Surgical History:     Past Medical History:   Diagnosis Date    A-fib [facility] [address] )  Anxiety     Arthritis     shoulder    Carpal tunnel syndrome     Colon polyp     Depression     Disease of thyroid gland     Diverticulitis     Esophageal stricture     HBP (high blood pressure)     Hiatal hernia 2022    Small hiatal hernia    Hyperlipidemia     Hypertension     Hypothyroid     Pneumonia         PONV (postoperative nausea and vomiting)     Schatzki's ring 2022    Schatzki's ring in the lower esophagus    Sleep apnea     mild      Past Surgical History:   Procedure Laterality Date    APPENDECTOMY      BREAST BIOPSY Right     benign    BREAST CYST ASPIRATION Right     benign    CATARACT EXTRACTION Bilateral     CATARACT EXTRACTION, BILATERAL  2019    CERVICAL FUSION N/A 2021    Procedure: FUSION CERVICAL POSTERIOR C1-C2 with allograft and neuromonitoring);   Surgeon: Vicki Schultz MD;  [facility];  Service: Orthopedics    CHOLECYSTECTOMY      COLONOSCOPY  2022    COLONOSCOPY W/ BIOPSIES      EGD  2022    HAND SURGERY Right     MAMMO (HISTORICAL) Bilateral 2018    MAMMO (HISTORICAL) Bilateral 10/27/2017    MANDIBLE SURGERY      MANDIBLE SURGERY      WISDOM TOOTH EXTRACTION      X4      Family History:     Family History   Problem Relation Age of Onset    Heart disease Mother     Hypertension Mother     Hyperlipidemia Mother     Heart failure Mother     Hypertension Sister       Social History:     Social History     Socioeconomic History    Marital status:      Spouse name: None    Number of children: None    Years of education: None    Highest education level: None   Occupational History    None   Tobacco Use    Smoking status: Former Smoker     Quit date: 2004     Years since quittin 5    Smokeless tobacco: Never Used   Vaping Use    Vaping Use: Never used   Substance and Sexual Activity    Alcohol use: Not Currently     Comment: last drink 7 years ago    Drug use: Not Currently  Sexual activity: Not Currently   Other Topics Concern    None   Social History Narrative    Do you currently or have you served in the [employer]: No    Were you activated, into active duty, as a member of the [employer] or as a Reservist: No    Occupation: retired    Marital status:     Exercise level: Occasional treadmill    General stress level: Low    Alcohol intake: None stopped 7 years ago    Caffeine intake:  Moderate    Chewing tobacco: none    Illicit drugs: none    Guns present in home: No    Seat belts used routinely: Yes    Sunscreen used routinely: Yes    Smoke alarm in home: Yes    Advance directive: Yes     Social Determinants of Health     Financial Resource Strain: Not on file   Food Insecurity: Not on file   Transportation Needs: Not on file   Physical Activity: Not on file   Stress: Not on file   Social Connections: Not on file   Intimate Partner Violence: Not on file   Housing Stability: Not on file      Medications and Allergies:     Current Outpatient Medications   Medication Sig Dispense Refill    acetaminophen (TYLENOL) 500 mg tablet Take 1 tablet (500 mg total) by mouth every 6 (six) hours as needed for moderate pain 20 tablet 0    ALPRAZolam (XANAX) 0 5 mg tablet Take 1 tablet (0 5 mg total) by mouth 2 (two) times a day as needed for anxiety 60 tablet 3    atorvastatin (LIPITOR) 10 mg tablet TAKE 1 TABLET BY MOUTH EVERY DAY AT BEDTIME 90 tablet 3    Calcium Carb-Cholecalciferol 600-200 MG-UNIT TABS Take 1 tablet by mouth daily      cetirizine (ZyrTEC) 10 mg tablet Take 1 tablet (10 mg total) by mouth daily 30 tablet 0    colchicine (COLCRYS) 0 6 mg tablet Take 1 tablet (0 6 mg total) by mouth daily 30 tablet 5    famotidine (PEPCID) 20 mg tablet TAKE 1 TABLET(20 MG) BY MOUTH DAILY 90 tablet 1    guaiFENesin (MUCINEX) 600 mg 12 hr tablet Take 2 tablets (1,200 mg total) by mouth every 12 (twelve) hours 30 tablet 0    levothyroxine 100 mcg tablet TAKE 1 TABLET(100 MCG) BY MOUTH DAILY 90 tablet 3    metoprolol tartrate (LOPRESSOR) 25 mg tablet Take 1 tablet (25 mg total) by mouth every 12 (twelve) hours 180 tablet 3    Multiple Vitamins-Minerals (CENTRUM ADULTS PO) Take 1 tablet by mouth daily      omeprazole (PriLOSEC) 20 mg delayed release capsule TAKE 1 CAPSULE(20 MG) BY MOUTH DAILY BEFORE BREAKFAST 90 capsule 1    ramipril (ALTACE) 10 MG capsule TAKE 1 CAPSULE(10 MG) BY MOUTH TWICE DAILY 180 capsule 3    sertraline (Zoloft) 50 mg tablet Take 1 tablet (50 mg total) by mouth daily 30 tablet 5    triamcinolone (KENALOG) 0 5 % cream Apply topically 2 (two) times a day 30 g 5    warfarin (COUMADIN) 2 mg tablet TAKE 1 AND 1/2 TO 2 TABLETS BY MOUTH DAILY OR AS DIRECTED BY PRESCRIBER 180 tablet 1     No current facility-administered medications for this visit  No Known Allergies   Immunizations:     Immunization History   Administered Date(s) Administered    COVID-19 PFIZER VACCINE 0 3 ML IM 07/14/2021, 08/04/2021, 02/06/2022    INFLUENZA 10/08/2018, 11/17/2021    Influenza, high dose seasonal 0 7 mL 09/16/2020    Pneumococcal Conjugate 13-Valent 11/09/2015    Pneumococcal Polysaccharide PPV23 01/13/2022    influenza, trivalent, adjuvanted 11/07/2019      Health Maintenance:         Topic Date Due    Breast Cancer Screening: Mammogram  07/13/2022 (Originally 11/27/2019)    Hepatitis C Screening  Completed    Colorectal Cancer Screening  Discontinued         Topic Date Due    COVID-19 Vaccine (4 - Booster for [place] Peter series) 06/06/2022    Influenza Vaccine (1) 09/01/2022      Medicare Screening Tests and Risk Assessments:     Madison Vera is here for her Subsequent Wellness visit  Health Risk Assessment:   Patient rates overall health as fair  Patient feels that their physical health rating is slightly worse  Patient is satisfied with their life  Eyesight was rated as same  Hearing was rated as slightly worse   Patient feels that their emotional and mental health rating is slightly worse  Patients states they are never, rarely angry  Patient states they are often unusually tired/fatigued  Pain experienced in the last 7 days has been a lot  Patient's pain rating has been 10/10  Patient states that she has experienced no weight loss or gain in last 6 months  Depression Screening:   PHQ-2 Score: 2      Fall Risk Screening: In the past year, patient has experienced: history of falling in past year    Number of falls: 2 or more  Injured during fall?: No    Feels unsteady when standing or walking?: Yes    Worried about falling?: Yes      Urinary Incontinence Screening:   Patient has not leaked urine accidently in the last six months  Home Safety:  Patient has trouble with stairs inside or outside of their home  Patient has working smoke alarms and has no working carbon monoxide detector  Home safety hazards include: none  Nutrition:   Current diet is Regular  Medications:   Patient is currently taking over-the-counter supplements  OTC medications include: see medication list  Patient is able to manage medications  Activities of Daily Living (ADLs)/Instrumental Activities of Daily Living (IADLs):   Walk and transfer into and out of bed and chair?: Yes  Dress and groom yourself?: Yes    Bathe or shower yourself?: Yes    Feed yourself? Yes  Do your laundry/housekeeping?: Yes  Manage your money, pay your bills and track your expenses?: Yes  Make your own meals?: Yes    Do your own shopping?: Yes    Previous Hospitalizations:   Any hospitalizations or ED visits within the last 12 months?: Yes      Advance Care Planning:   Living will: Yes    Durable POA for healthcare:  Yes    Advanced directive: Yes    Five wishes given: No    End of Life Decisions reviewed with patient: Yes    Provider agrees with end of life decisions: Yes      Cognitive Screening:   Provider or family/friend/caregiver concerned regarding cognition?: No    PREVENTIVE SCREENINGS Cardiovascular Screening:    General: Screening Not Indicated and History Lipid Disorder      Diabetes Screening:     General: Screening Current      Colorectal Cancer Screening:     General: Screening Current      Breast Cancer Screening:     General: Patient Declines      Cervical Cancer Screening:    General: Screening Not Indicated      Osteoporosis Screening:    General: Patient Declines      Abdominal Aortic Aneurysm (AAA) Screening:        General: Screening Not Indicated      Lung Cancer Screening:     General: Screening Not Indicated      Hepatitis C Screening:    General: Screening Current    Screening, Brief Intervention, and Referral to Treatment (SBIRT)    Screening  Typical number of drinks in a day: 0  Typical number of drinks in a week: 0  Interpretation: Low risk drinking behavior  Single Item Drug Screening:  How often have you used an illegal drug (including marijuana) or a prescription medication for non-medical reasons in the past year? never    Single Item Drug Screen Score: 0  Interpretation: Negative screen for possible drug use disorder    No exam data present     Physical Exam:     /60 (BP Location: Left arm, Patient Position: Sitting, Cuff Size: Adult)   Pulse 58   Temp (!) 96 6 °F (35 9 °C) (Temporal)   Resp 16   Ht 5' 3" (1 6 m)   Wt 59 6 kg (131 lb 6 4 oz)   SpO2 97%   BMI 23 28 kg/m²     Physical Exam  Constitutional:       Appearance: Normal appearance  HENT:      Head: Normocephalic and atraumatic  Right Ear: External ear normal       Left Ear: External ear normal       Nose: Nose normal       Mouth/Throat:      Mouth: Mucous membranes are moist    Eyes:      Extraocular Movements: Extraocular movements intact  Cardiovascular:      Rate and Rhythm: Regular rhythm  Heart sounds: Normal heart sounds  Pulmonary:      Effort: Pulmonary effort is normal       Breath sounds: Normal breath sounds  Abdominal:      General: Abdomen is flat        Palpations: Abdomen is soft  Musculoskeletal:         General: Normal range of motion  Cervical back: Rigidity present  Skin:     General: Skin is warm  Capillary Refill: Capillary refill takes less than 2 seconds  Neurological:      General: No focal deficit present  Mental Status: She is alert and oriented to person, place, and time  Psychiatric:         Behavior: Behavior normal          Serious Illness Conversation    1  What is your understanding now of where you are with your illness? Prognostic Understanding: appropriate understanding of prognosis     2  How much information about what is likely to be ahead with your illness would you like to have? Information: patient wants to be fully informed     3  What did you (clinician) communicate to the patient? 4  If your health situation worsens, what are your most important goals? Goals: be at home, be physically comfortable, be mentally aware     5  What are the biggest fears and worries about the future and your health? Fears/Worries: ability to care for others - children, ill spouse, pain     6  What abilities are so critical to your life that you cannot imagine living without them? Unacceptable Function: being chronically confused or not being myself, being unable to talk     7  What gives you strength as you think about the future with your illness? 8  If you become sicker, how much are you willing to go through for the possibility of gaining more time? Have a feeding tube: No   Be on a ventilator: No    Be on dialysis: No       9  How much does your proxy and family know about your priorities and wishes? Discussion Discussion: some discussion but incomplete        How does this plan sound to you? I will do everything I can to help you through this  Advanced directives         Carrillo Delgado MD

## 2022-07-13 NOTE — PATIENT INSTRUCTIONS
Medicare Preventive Visit Patient Instructions  Thank you for completing your Welcome to Medicare Visit or Medicare Annual Wellness Visit today  Your next wellness visit will be due in one year (7/14/2023)  The screening/preventive services that you may require over the next 5-10 years are detailed below  Some tests may not apply to you based off risk factors and/or age  Screening tests ordered at today's visit but not completed yet may show as past due  Also, please note that scanned in results may not display below  Preventive Screenings:  Service Recommendations Previous Testing/Comments   Colorectal Cancer Screening  * Colonoscopy    * Fecal Occult Blood Test (FOBT)/Fecal Immunochemical Test (FIT)  * Fecal DNA/Cologuard Test  * Flexible Sigmoidoscopy Age: 54-65 years old   Colonoscopy: every 10 years (may be performed more frequently if at higher risk)  OR  FOBT/FIT: every 1 year  OR  Cologuard: every 3 years  OR  Sigmoidoscopy: every 5 years  Screening may be recommended earlier than age 48 if at higher risk for colorectal cancer  Also, an individualized decision between you and your healthcare provider will decide whether screening between the ages of 74-80 would be appropriate  Colonoscopy: 06/07/2022  FOBT/FIT: Not on file  Cologuard: Not on file  Sigmoidoscopy: Not on file          Breast Cancer Screening Age: 36 years old  Frequency: every 1-2 years  Not required if history of left and right mastectomy Mammogram: 11/27/2018        Cervical Cancer Screening Between the ages of 21-29, pap smear recommended once every 3 years  Between the ages of 33-67, can perform pap smear with HPV co-testing every 5 years     Recommendations may differ for women with a history of total hysterectomy, cervical cancer, or abnormal pap smears in past  Pap Smear: Not on file        Hepatitis C Screening Once for adults born between [facility][1]  More frequently in patients at high risk for Hepatitis C Hep C Antibody: 09/29/2021        Diabetes Screening 1-2 times per year if you're at risk for diabetes or have pre-diabetes Fasting glucose: 120 mg/dL   A1C: 5 8 %        Cholesterol Screening Once every 5 years if you don't have a lipid disorder  May order more often based on risk factors  Lipid panel: 09/29/2021          Other Preventive Screenings Covered by Medicare:  1  Abdominal Aortic Aneurysm (AAA) Screening: covered once if your at risk  You're considered to be at risk if you have a family history of AAA  2  Lung Cancer Screening: covers low dose CT scan once per year if you meet all of the following conditions: (1) Age 50-69; (2) No signs or symptoms of lung cancer; (3) Current smoker or have quit smoking within the last 15 years; (4) You have a tobacco smoking history of at least 30 pack years (packs per day multiplied by number of years you smoked); (5) You get a written order from a healthcare provider  3  Glaucoma Screening: covered annually if you're considered high risk: (1) You have diabetes OR (2) Family history of glaucoma OR (3)  aged 48 and older OR (3)  American aged 72 and older  3  Osteoporosis Screening: covered every 2 years if you meet one of the following conditions: (1) You're estrogen deficient and at risk for osteoporosis based off medical history and other findings; (2) Have a vertebral abnormality; (3) On glucocorticoid therapy for more than 3 months; (4) Have primary hyperparathyroidism; (5) On osteoporosis medications and need to assess response to drug therapy  · Last bone density test (DXA Scan): Not on file  5  HIV Screening: covered annually if you're between the age of 12-76  Also covered annually if you are younger than 13 and older than 72 with risk factors for HIV infection  For pregnant patients, it is covered up to 3 times per pregnancy      Immunizations:  Immunization Recommendations   Influenza Vaccine Annual influenza vaccination during flu season is recommended for all persons aged >= 6 months who do not have contraindications   Pneumococcal Vaccine (Prevnar and Pneumovax)  * Prevnar = PCV13  * Pneumovax = PPSV23   Adults 25-60 years old: 1-3 doses may be recommended based on certain risk factors  Adults 72 years old: Prevnar (PCV13) vaccine recommended followed by Pneumovax (PPSV23) vaccine  If already received PPSV23 since turning 65, then PCV13 recommended at least one year after PPSV23 dose  Hepatitis B Vaccine 3 dose series if at intermediate or high risk (ex: diabetes, end stage renal disease, liver disease)   Tetanus (Td) Vaccine - COST NOT COVERED BY MEDICARE PART B Following completion of primary series, a booster dose should be given every 10 years to maintain immunity against tetanus  Td may also be given as tetanus wound prophylaxis  Tdap Vaccine - COST NOT COVERED BY MEDICARE PART B Recommended at least once for all adults  For pregnant patients, recommended with each pregnancy  Shingles Vaccine (Shingrix) - COST NOT COVERED BY MEDICARE PART B  2 shot series recommended in those aged 48 and above     Health Maintenance Due:      Topic Date Due    Breast Cancer Screening: Mammogram  07/13/2022 (Originally 11/27/2019)    Hepatitis C Screening  Completed    Colorectal Cancer Screening  Discontinued     Immunizations Due:      Topic Date Due    COVID-19 Vaccine (4 - Booster for [place] series) 06/06/2022    Influenza Vaccine (1) 09/01/2022     Advance Directives   What are advance directives? Advance directives are legal documents that state your wishes and plans for medical care  These plans are made ahead of time in case you lose your ability to make decisions for yourself  Advance directives can apply to any medical decision, such as the treatments you want, and if you want to donate organs  What are the types of advance directives? There are many types of advance directives, and each state has rules about how to use them   You may choose a combination of any of the following:  · Living will: This is a written record of the treatment you want  You can also choose which treatments you do not want, which to limit, and which to stop at a certain time  This includes surgery, medicine, IV fluid, and tube feedings  · Durable power of  for healthcare [place] [facility]): This is a written record that states who you want to make healthcare choices for you when you are unable to make them for yourself  This person, called a proxy, is usually a family member or a friend  You may choose more than 1 proxy  · Do not resuscitate (DNR) order:  A DNR order is used in case your heart stops beating or you stop breathing  It is a request not to have certain forms of treatment, such as CPR  A DNR order may be included in other types of advance directives  · Medical directive: This covers the care that you want if you are in a coma, near death, or unable to make decisions for yourself  You can list the treatments you want for each condition  Treatment may include pain medicine, surgery, blood transfusions, dialysis, IV or tube feedings, and a ventilator (breathing machine)  · Values history: This document has questions about your views, beliefs, and how you feel and think about life  This information can help others choose the care that you would choose  Why are advance directives important? An advance directive helps you control your care  Although spoken wishes may be used, it is better to have your wishes written down  Spoken wishes can be misunderstood, or not followed  Treatments may be given even if you do not want them  An advance directive may make it easier for your family to make difficult choices about your care  © Copyright [employer] 2018 Information is for End User's use only and may not be sold, redistributed or otherwise used for commercial purposes   All illustrations and images included in CareNotes® are the copyrighted property of [employer]  or [address] [place] [address]

## 2022-07-16 ENCOUNTER — HOSPITAL ENCOUNTER (OUTPATIENT)
Dept: RADIOLOGY | Facility: HOSPITAL | Age: 72
Discharge: HOME | End: 2022-07-16
Payer: MEDICARE

## 2022-07-16 DIAGNOSIS — M75.81 ROTATOR CUFF TENDONITIS, RIGHT: ICD-10-CM

## 2022-07-16 PROCEDURE — G1004 CDSM NDSC: HCPCS

## 2022-07-16 PROCEDURE — 73221 MRI JOINT UPR EXTREM W/O DYE: CPT

## 2022-07-19 ENCOUNTER — APPOINTMENT (OUTPATIENT)
Dept: LAB | Facility: HOSPITAL | Age: 72
End: 2022-07-19
Attending: INTERNAL MEDICINE
Payer: MEDICARE

## 2022-07-19 ENCOUNTER — ANTICOAG VISIT (OUTPATIENT)
Dept: CARDIOLOGY CLINIC | Facility: CLINIC | Age: 72
End: 2022-07-19

## 2022-07-19 DIAGNOSIS — I48.91 ATRIAL FIBRILLATION WITH RVR (HCC): Primary | ICD-10-CM

## 2022-07-22 ENCOUNTER — TELEPHONE (OUTPATIENT)
Dept: OBGYN CLINIC | Facility: HOSPITAL | Age: 72
End: 2022-07-22

## 2022-07-22 ENCOUNTER — TELEPHONE (OUTPATIENT)
Dept: FAMILY MEDICINE CLINIC | Facility: CLINIC | Age: 72
End: 2022-07-22

## 2022-07-22 NOTE — TELEPHONE ENCOUNTER
I called and made pt aware of results and to follow up with her Ortho doctor who is actually the one who ordered the MRI and should have contacted her with the results

## 2022-07-22 NOTE — TELEPHONE ENCOUNTER
Patient had an MRI of right shoulder and has not hear any results, can you call her with the results    Patient Ph # [phone]

## 2022-07-22 NOTE — TELEPHONE ENCOUNTER
Patient states that she received her MRI results  What should she do next? Please advise      CB# [phone]

## 2022-07-28 NOTE — PROGRESS NOTES
PT Discharge    Today's date: 2022  Patient name: Balbir Carr  : 1950  MRN: [id number]  Referring provider: Nehal Rodney  Dx:   Encounter Diagnosis     ICD-10-CM    1  Arthropathy of right shoulder  M19 [id number]        Start Time: 1450  Stop Time: 1530  Total time in clinic (min): 40 minutes    Assessment/Plan  Balbir Carr has not returned since last appointment, unable to perform objective measures since then  Per dept policy of not having inactive charts beyond 30 days, at this time, Balbir Carr will be discharged from physical therapy services      Subjective    Objective    Flowsheet Rows    Flowsheet Row Most Recent Value   PT/OT G-Codes    Current Score 27   Projected Score 58

## 2022-08-14 DIAGNOSIS — F41.9 ANXIETY: ICD-10-CM

## 2022-08-15 RX ORDER — ALPRAZOLAM 0.5 MG/1
TABLET ORAL
Qty: 60 TABLET | Refills: 5 | Status: SHIPPED | OUTPATIENT
Start: 2022-08-15

## 2022-08-17 ENCOUNTER — APPOINTMENT (OUTPATIENT)
Dept: LAB | Facility: HOSPITAL | Age: 72
End: 2022-08-17
Attending: INTERNAL MEDICINE
Payer: MEDICARE

## 2022-08-17 ENCOUNTER — ANTICOAG VISIT (OUTPATIENT)
Dept: CARDIOLOGY CLINIC | Facility: CLINIC | Age: 72
End: 2022-08-17

## 2022-08-17 DIAGNOSIS — I10 PRIMARY HYPERTENSION: ICD-10-CM

## 2022-08-17 DIAGNOSIS — I48.91 ATRIAL FIBRILLATION WITH RVR (HCC): ICD-10-CM

## 2022-08-17 DIAGNOSIS — I48.91 ATRIAL FIBRILLATION WITH RVR (HCC): Primary | ICD-10-CM

## 2022-08-17 DIAGNOSIS — E78.5 HYPERLIPIDEMIA, UNSPECIFIED HYPERLIPIDEMIA TYPE: ICD-10-CM

## 2022-08-17 LAB
ALBUMIN SERPL BCP-MCNC: 3.8 G/DL (ref 3.5–5)
ALP SERPL-CCNC: 73 U/L (ref 34–104)
ALT SERPL W P-5'-P-CCNC: 28 U/L (ref 7–52)
ANION GAP SERPL CALCULATED.3IONS-SCNC: 7 MMOL/L (ref 4–13)
AST SERPL W P-5'-P-CCNC: 32 U/L (ref 13–39)
BASOPHILS # BLD AUTO: 0.07 THOUSANDS/ΜL (ref 0–0.1)
BASOPHILS NFR BLD AUTO: 1 % (ref 0–1)
BILIRUB SERPL-MCNC: 0.46 MG/DL (ref 0.2–1)
BUN SERPL-MCNC: 18 MG/DL (ref 5–25)
CALCIUM SERPL-MCNC: 9.6 MG/DL (ref 8.4–10.2)
CHLORIDE SERPL-SCNC: 101 MMOL/L (ref 96–108)
CHOLEST SERPL-MCNC: 143 MG/DL
CO2 SERPL-SCNC: 28 MMOL/L (ref 21–32)
CREAT SERPL-MCNC: 0.85 MG/DL (ref 0.6–1.3)
EOSINOPHIL # BLD AUTO: 0.36 THOUSAND/ΜL (ref 0–0.61)
EOSINOPHIL NFR BLD AUTO: 5 % (ref 0–6)
ERYTHROCYTE [DISTWIDTH] IN BLOOD BY AUTOMATED COUNT: 13 % (ref 11.6–15.1)
GFR SERPL CREATININE-BSD FRML MDRD: 67 ML/MIN/1.73SQ M
GLUCOSE SERPL-MCNC: 100 MG/DL (ref 65–140)
HCT VFR BLD AUTO: 35.7 % (ref 34.8–46.1)
HDLC SERPL-MCNC: 51 MG/DL
HGB BLD-MCNC: 11.5 G/DL (ref 11.5–15.4)
IMM GRANULOCYTES # BLD AUTO: 0.03 THOUSAND/UL (ref 0–0.2)
IMM GRANULOCYTES NFR BLD AUTO: 0 % (ref 0–2)
LDLC SERPL CALC-MCNC: 67 MG/DL (ref 0–100)
LYMPHOCYTES # BLD AUTO: 1.35 THOUSANDS/ΜL (ref 0.6–4.47)
LYMPHOCYTES NFR BLD AUTO: 19 % (ref 14–44)
MCH RBC QN AUTO: 31.1 PG (ref 26.8–34.3)
MCHC RBC AUTO-ENTMCNC: 32.2 G/DL (ref 31.4–37.4)
MCV RBC AUTO: 97 FL (ref 82–98)
MONOCYTES # BLD AUTO: 0.69 THOUSAND/ΜL (ref 0.17–1.22)
MONOCYTES NFR BLD AUTO: 10 % (ref 4–12)
NEUTROPHILS # BLD AUTO: 4.63 THOUSANDS/ΜL (ref 1.85–7.62)
NEUTS SEG NFR BLD AUTO: 65 % (ref 43–75)
NONHDLC SERPL-MCNC: 92 MG/DL
NRBC BLD AUTO-RTO: 0 /100 WBCS
PLATELET # BLD AUTO: 308 THOUSANDS/UL (ref 149–390)
PMV BLD AUTO: 9.4 FL (ref 8.9–12.7)
POTASSIUM SERPL-SCNC: 3.9 MMOL/L (ref 3.5–5.3)
PROT SERPL-MCNC: 6.8 G/DL (ref 6.4–8.4)
RBC # BLD AUTO: 3.7 MILLION/UL (ref 3.81–5.12)
SODIUM SERPL-SCNC: 136 MMOL/L (ref 135–147)
TRIGL SERPL-MCNC: 125 MG/DL
TSH SERPL DL<=0.05 MIU/L-ACNC: 0.31 UIU/ML (ref 0.45–4.5)
WBC # BLD AUTO: 7.13 THOUSAND/UL (ref 4.31–10.16)

## 2022-08-17 PROCEDURE — 80061 LIPID PANEL: CPT

## 2022-08-17 PROCEDURE — 84443 ASSAY THYROID STIM HORMONE: CPT

## 2022-08-17 PROCEDURE — 80053 COMPREHEN METABOLIC PANEL: CPT

## 2022-08-17 PROCEDURE — 85025 COMPLETE CBC W/AUTO DIFF WBC: CPT

## 2022-08-19 DIAGNOSIS — E03.9 HYPOTHYROIDISM, UNSPECIFIED TYPE: Primary | ICD-10-CM

## 2022-08-19 DIAGNOSIS — R74.8 ELEVATED LIVER ENZYMES: ICD-10-CM

## 2022-08-19 DIAGNOSIS — R74.8 ELEVATED ALKALINE PHOSPHATASE LEVEL: ICD-10-CM

## 2022-08-19 RX ORDER — LEVOTHYROXINE SODIUM 0.07 MG/1
75 TABLET ORAL
Qty: 90 TABLET | Refills: 1 | Status: SHIPPED | OUTPATIENT
Start: 2022-08-19 | End: 2022-09-09

## 2022-08-24 ENCOUNTER — OFFICE VISIT (OUTPATIENT)
Dept: OBGYN CLINIC | Facility: CLINIC | Age: 72
End: 2022-08-24
Payer: MEDICARE

## 2022-08-24 VITALS
SYSTOLIC BLOOD PRESSURE: 161 MMHG | DIASTOLIC BLOOD PRESSURE: 72 MMHG | HEIGHT: 63 IN | BODY MASS INDEX: 23.21 KG/M2 | HEART RATE: 82 BPM | WEIGHT: 131 LBS

## 2022-08-24 DIAGNOSIS — Z01.812 ENCOUNTER FOR PRE-OPERATIVE LABORATORY TESTING: ICD-10-CM

## 2022-08-24 DIAGNOSIS — M75.101 TEAR OF RIGHT ROTATOR CUFF, UNSPECIFIED TEAR EXTENT, UNSPECIFIED WHETHER TRAUMATIC: Primary | ICD-10-CM

## 2022-08-24 PROCEDURE — 99214 OFFICE O/P EST MOD 30 MIN: CPT | Performed by: ORTHOPAEDIC SURGERY

## 2022-08-24 NOTE — PROGRESS NOTES
Assessment/Plan:  1  Tear of right rotator cuff, unspecified tear extent, unspecified whether traumatic  Sling    Ambulatory Referral to Physical Therapy    Case request operating room: ARTHROSCOPIC ROTATOR CUFF REPAIR OF SUBSCAPULARIS    CBC and differential    APTT    Basic metabolic panel    Protime-INR    EKG 12 lead    Ambulatory referral to [facility]    Ambulatory referral to Cardiology    Case request operating room: ARTHROSCOPIC ROTATOR CUFF REPAIR OF SUBSCAPULARIS   2  Encounter for pre-operative laboratory testing  CBC and differential    APTT    Basic metabolic panel    Protime-INR    EKG 12 lead    Ambulatory referral to [facility]    Ambulatory referral to Cardiology       Scribe Attestation    I,:  Jaspreet Alcala MA am acting as a scribe while in the presence of the attending physician :       I,:  Belia Rodriguez MD personally performed the services described in this documentation    as scribed in my presence :             Upon review of the MRI and physical exam Wesley Frederick is presenting with signs and symptoms consistent with a right shoulder rotator cuff tear  The MRI was reviewed in the office today which demonstrates a high grade partial tear of the upper boarder of the subscapularis  I had a long discission with the patient  Continued non operative versus surgical intervention was discussed  The patient has tried and failed non operative treatment options in the form of activity modification, OTC medication, and formal therapy  Surgical intervention was discussed at length in the form of a right shoulder arthroscopy with rotator cuff repair  Risks and benefits were discussed at length  Risks of the surgery are inclusive of but not limited to bleeding, infection, nerve injury, blood clot, worsening of symptoms, not achieving the anticipated results, persistent stiffness, weakness and the need for additional surgery   The patient verbally stated they understood those risks and would like to proceed with the surgery  Surgical consent was signed in the office today  We did discuss she will remain in a a sling 6 weeks postoperatively  She was fitted and provided with a postoperative sling in the office today  A referral was also provided to formal therapy which she will start appx 2 weeks postoperatively  She will require preoperative blood work, EKG, PCP, and cardiac clearance  She will also need to hold her Coumadin which will be discussed by her cardiologist  I will see her the day of surgery  Subjective:   Tanisha Nagel is a 74 y o  female who presents to the office today for follow up evaluation of her right shoulder pain  This has been ongoing for the past 2 months  The patient states she had a fall and was using a walker to pull herself up when she injured her shoulder  She did do formal therapy which she states increased her pain  She was evaluated by Celestino Batres and a MRI was ordered to evaluate for a rotator cuff tear  She presents to the office to review there MRI  She notes continued pain to the anterior aspect of her shoulder that radiates down to her elbow  She states the pain is a constant and shooting  She notes increased pain with reaching overhead and lifting  She has been taking Tylenol OTC for pain  She has been taking Tylenol OTC for pain  Review of Systems   Constitutional: Negative for chills and fever  HENT: Negative for drooling and sneezing  Eyes: Negative for redness  Respiratory: Negative for cough and wheezing  Gastrointestinal: Negative for nausea and vomiting  Musculoskeletal: Negative for arthralgias, joint swelling and myalgias  Neurological: Negative for weakness and numbness  Psychiatric/Behavioral: Negative for behavioral problems  The patient is not nervous/anxious            Past Medical History:   Diagnosis Date    A-fib [facility])     Anxiety     Arthritis     shoulder    Carpal tunnel syndrome     Colon polyp     Depression     Disease of thyroid gland     Diverticulitis     Esophageal stricture     HBP (high blood pressure)     Hiatal hernia 2022    Small hiatal hernia    Hyperlipidemia     Hypertension     Hypothyroid     Pneumonia         PONV (postoperative nausea and vomiting)     Schatzki's ring 2022    Schatzki's ring in the lower esophagus    Sleep apnea     mild        Past Surgical History:   Procedure Laterality Date    APPENDECTOMY      BREAST BIOPSY Right     benign    BREAST CYST ASPIRATION Right     benign    CATARACT EXTRACTION Bilateral     CATARACT EXTRACTION, BILATERAL  2019    CERVICAL FUSION N/A 2021    Procedure: FUSION CERVICAL POSTERIOR C1-C2 with allograft and neuromonitoring);   Surgeon: Rocky Kim MD;  [facility];  Service: Orthopedics    CHOLECYSTECTOMY      COLONOSCOPY  2022    COLONOSCOPY W/ BIOPSIES      EGD  2022    HAND SURGERY Right     MAMMO (HISTORICAL) Bilateral 2018    MAMMO (HISTORICAL) Bilateral 10/27/2017    MANDIBLE SURGERY      MANDIBLE SURGERY      WISDOM TOOTH EXTRACTION      X4       Family History   Problem Relation Age of Onset    Heart disease Mother     Hypertension Mother     Hyperlipidemia Mother     Heart failure Mother     Hypertension Sister        Social History     Occupational History    Not on file   Tobacco Use    Smoking status: Former Smoker     Quit date:      Years since quittin 6    Smokeless tobacco: Never Used   Vaping Use    Vaping Use: Never used   Substance and Sexual Activity    Alcohol use: Not Currently     Comment: last drink 7 years ago    Drug use: Not Currently    Sexual activity: Not Currently         Current Outpatient Medications:     acetaminophen (TYLENOL) 500 mg tablet, Take 1 tablet (500 mg total) by mouth every 6 (six) hours as needed for moderate pain, Disp: 20 tablet, Rfl: 0    ALPRAZolam (XANAX) 0 5 mg tablet, TAKE 1 TABLET(0 5 MG) BY MOUTH TWICE DAILY AS NEEDED FOR ANXIETY, Disp: 60 tablet, Rfl: 5    atorvastatin (LIPITOR) 10 mg tablet, TAKE 1 TABLET BY MOUTH EVERY DAY AT BEDTIME, Disp: 90 tablet, Rfl: 3    Calcium Carb-Cholecalciferol 600-200 MG-UNIT TABS, Take 1 tablet by mouth daily, Disp: , Rfl:     cetirizine (ZyrTEC) 10 mg tablet, Take 1 tablet (10 mg total) by mouth daily, Disp: 30 tablet, Rfl: 0    colchicine (COLCRYS) 0 6 mg tablet, Take 1 tablet (0 6 mg total) by mouth daily, Disp: 30 tablet, Rfl: 5    famotidine (PEPCID) 20 mg tablet, TAKE 1 TABLET(20 MG) BY MOUTH DAILY, Disp: 90 tablet, Rfl: 1    guaiFENesin (MUCINEX) 600 mg 12 hr tablet, Take 2 tablets (1,200 mg total) by mouth every 12 (twelve) hours, Disp: 30 tablet, Rfl: 0    levothyroxine (Synthroid) 75 mcg tablet, Take 1 tablet (75 mcg total) by mouth daily in the early morning, Disp: 90 tablet, Rfl: 1    metoprolol tartrate (LOPRESSOR) 25 mg tablet, Take 1 tablet (25 mg total) by mouth every 12 (twelve) hours, Disp: 180 tablet, Rfl: 3    Multiple Vitamins-Minerals (CENTRUM ADULTS PO), Take 1 tablet by mouth daily, Disp: , Rfl:     omeprazole (PriLOSEC) 20 mg delayed release capsule, TAKE 1 CAPSULE(20 MG) BY MOUTH DAILY BEFORE BREAKFAST, Disp: 90 capsule, Rfl: 1    ramipril (ALTACE) 10 MG capsule, TAKE 1 CAPSULE(10 MG) BY MOUTH TWICE DAILY, Disp: 180 capsule, Rfl: 3    sertraline (Zoloft) 50 mg tablet, Take 1 tablet (50 mg total) by mouth daily, Disp: 30 tablet, Rfl: 5    triamcinolone (KENALOG) 0 5 % cream, Apply topically 2 (two) times a day, Disp: 30 g, Rfl: 5    warfarin (COUMADIN) 2 mg tablet, TAKE 1 AND 1/2 TO 2 TABLETS BY MOUTH DAILY OR AS DIRECTED BY PRESCRIBER, Disp: 180 tablet, Rfl: 3    No Known Allergies    Objective:  Vitals:    08/24/22 1725   BP: 161/72   Pulse: 82       Right Shoulder Exam     Range of Motion   Active abduction: 80   External rotation: 60   Internal rotation 90 degrees: 40     Muscle Strength   Abduction: 2/5   Internal rotation: 3/5 External rotation: 4/5     Other   Erythema: absent  Sensation: normal  Pulse: present            Physical Exam  Constitutional:       Appearance: She is well-developed  Eyes:      Pupils: Pupils are equal, round, and reactive to light  Cardiovascular:      Heart sounds: Normal heart sounds  Pulmonary:      Effort: Pulmonary effort is normal       Breath sounds: Normal breath sounds  Musculoskeletal:      Comments: As noted in HPI   Skin:     General: Skin is warm and dry  Neurological:      Mental Status: She is alert and oriented to person, place, and time  Psychiatric:         Behavior: Behavior normal          I have personally reviewed pertinent films in PACS and my interpretation is as follows:MRI right shoulder performed on 7/16/22 demonstrates a high grade partial tear of the upper boarder of the subscapularis

## 2022-08-26 ENCOUNTER — HOSPITAL ENCOUNTER (OUTPATIENT)
Dept: ULTRASOUND IMAGING | Facility: HOSPITAL | Age: 72
End: 2022-08-26
Attending: INTERNAL MEDICINE
Payer: MEDICARE

## 2022-08-26 DIAGNOSIS — R74.8 ELEVATED LIVER ENZYMES: ICD-10-CM

## 2022-08-26 DIAGNOSIS — R74.8 ELEVATED ALKALINE PHOSPHATASE LEVEL: ICD-10-CM

## 2022-08-26 PROCEDURE — 76700 US EXAM ABDOM COMPLETE: CPT

## 2022-09-01 ENCOUNTER — TELEPHONE (OUTPATIENT)
Dept: OBGYN CLINIC | Facility: CLINIC | Age: 72
End: 2022-09-01

## 2022-09-01 NOTE — TELEPHONE ENCOUNTER
Attempted to reach Danielle Perez to provide her the appointment date & time for her medical & cardiac clearance(s)  Left voice message requesting a call back

## 2022-09-06 ENCOUNTER — ANTICOAG VISIT (OUTPATIENT)
Dept: CARDIOLOGY CLINIC | Facility: CLINIC | Age: 72
End: 2022-09-06

## 2022-09-06 ENCOUNTER — TELEPHONE (OUTPATIENT)
Dept: FAMILY MEDICINE CLINIC | Facility: CLINIC | Age: 72
End: 2022-09-06

## 2022-09-06 ENCOUNTER — APPOINTMENT (OUTPATIENT)
Dept: LAB | Facility: HOSPITAL | Age: 72
End: 2022-09-06
Payer: MEDICARE

## 2022-09-06 DIAGNOSIS — M75.101 TEAR OF RIGHT ROTATOR CUFF, UNSPECIFIED TEAR EXTENT, UNSPECIFIED WHETHER TRAUMATIC: ICD-10-CM

## 2022-09-06 DIAGNOSIS — I48.91 ATRIAL FIBRILLATION WITH RVR (HCC): Primary | ICD-10-CM

## 2022-09-06 DIAGNOSIS — Z01.812 ENCOUNTER FOR PRE-OPERATIVE LABORATORY TESTING: ICD-10-CM

## 2022-09-06 LAB
ANION GAP SERPL CALCULATED.3IONS-SCNC: 4 MMOL/L (ref 4–13)
APTT PPP: 49 SECONDS (ref 23–37)
BASOPHILS # BLD AUTO: 0.07 THOUSANDS/ΜL (ref 0–0.1)
BASOPHILS NFR BLD AUTO: 1 % (ref 0–1)
BUN SERPL-MCNC: 16 MG/DL (ref 5–25)
CALCIUM SERPL-MCNC: 9.6 MG/DL (ref 8.3–10.1)
CHLORIDE SERPL-SCNC: 106 MMOL/L (ref 96–108)
CO2 SERPL-SCNC: 31 MMOL/L (ref 21–32)
CREAT SERPL-MCNC: 0.91 MG/DL (ref 0.6–1.3)
EOSINOPHIL # BLD AUTO: 0.41 THOUSAND/ΜL (ref 0–0.61)
EOSINOPHIL NFR BLD AUTO: 6 % (ref 0–6)
ERYTHROCYTE [DISTWIDTH] IN BLOOD BY AUTOMATED COUNT: 12.9 % (ref 11.6–15.1)
GFR SERPL CREATININE-BSD FRML MDRD: 62 ML/MIN/1.73SQ M
GLUCOSE P FAST SERPL-MCNC: 104 MG/DL (ref 65–99)
HCT VFR BLD AUTO: 38.5 % (ref 34.8–46.1)
HGB BLD-MCNC: 12.1 G/DL (ref 11.5–15.4)
IMM GRANULOCYTES # BLD AUTO: 0.02 THOUSAND/UL (ref 0–0.2)
IMM GRANULOCYTES NFR BLD AUTO: 0 % (ref 0–2)
LYMPHOCYTES # BLD AUTO: 1.35 THOUSANDS/ΜL (ref 0.6–4.47)
LYMPHOCYTES NFR BLD AUTO: 20 % (ref 14–44)
MCH RBC QN AUTO: 30 PG (ref 26.8–34.3)
MCHC RBC AUTO-ENTMCNC: 31.4 G/DL (ref 31.4–37.4)
MCV RBC AUTO: 96 FL (ref 82–98)
MONOCYTES # BLD AUTO: 0.56 THOUSAND/ΜL (ref 0.17–1.22)
MONOCYTES NFR BLD AUTO: 8 % (ref 4–12)
NEUTROPHILS # BLD AUTO: 4.33 THOUSANDS/ΜL (ref 1.85–7.62)
NEUTS SEG NFR BLD AUTO: 65 % (ref 43–75)
NRBC BLD AUTO-RTO: 0 /100 WBCS
PLATELET # BLD AUTO: 330 THOUSANDS/UL (ref 149–390)
PMV BLD AUTO: 9 FL (ref 8.9–12.7)
POTASSIUM SERPL-SCNC: 4.2 MMOL/L (ref 3.5–5.3)
RBC # BLD AUTO: 4.03 MILLION/UL (ref 3.81–5.12)
SODIUM SERPL-SCNC: 141 MMOL/L (ref 135–147)
WBC # BLD AUTO: 6.74 THOUSAND/UL (ref 4.31–10.16)

## 2022-09-06 PROCEDURE — 36415 COLL VENOUS BLD VENIPUNCTURE: CPT

## 2022-09-06 PROCEDURE — 85730 THROMBOPLASTIN TIME PARTIAL: CPT

## 2022-09-06 PROCEDURE — 80048 BASIC METABOLIC PNL TOTAL CA: CPT

## 2022-09-06 PROCEDURE — 85025 COMPLETE CBC W/AUTO DIFF WBC: CPT

## 2022-09-07 ENCOUNTER — CONSULT (OUTPATIENT)
Dept: CARDIOLOGY CLINIC | Facility: CLINIC | Age: 72
End: 2022-09-07
Payer: MEDICARE

## 2022-09-07 ENCOUNTER — RA CDI HCC (OUTPATIENT)
Dept: OTHER | Facility: HOSPITAL | Age: 72
End: 2022-09-07

## 2022-09-07 VITALS
HEIGHT: 63 IN | WEIGHT: 131 LBS | HEART RATE: 59 BPM | DIASTOLIC BLOOD PRESSURE: 64 MMHG | BODY MASS INDEX: 23.21 KG/M2 | OXYGEN SATURATION: 97 % | TEMPERATURE: 98 F | SYSTOLIC BLOOD PRESSURE: 128 MMHG

## 2022-09-07 DIAGNOSIS — I48.0 PAROXYSMAL ATRIAL FIBRILLATION (HCC): ICD-10-CM

## 2022-09-07 DIAGNOSIS — R00.1 SINUS BRADYCARDIA: ICD-10-CM

## 2022-09-07 DIAGNOSIS — Z01.810 PRE-OPERATIVE CARDIOVASCULAR EXAMINATION: Primary | ICD-10-CM

## 2022-09-07 DIAGNOSIS — M75.101 TEAR OF RIGHT ROTATOR CUFF, UNSPECIFIED TEAR EXTENT, UNSPECIFIED WHETHER TRAUMATIC: ICD-10-CM

## 2022-09-07 PROCEDURE — 93000 ELECTROCARDIOGRAM COMPLETE: CPT | Performed by: INTERNAL MEDICINE

## 2022-09-07 PROCEDURE — 99214 OFFICE O/P EST MOD 30 MIN: CPT | Performed by: INTERNAL MEDICINE

## 2022-09-07 NOTE — PROGRESS NOTES
[facility] [address]  Phone#  [phone]  Fax#  [phone]                                               Cardiology Office Consult  Aristeo Henry, 74 y o  female  YOB: 1950  MRN: [id number] Encounter: [id number]      PCP - Ivonne Shaw MD  Referring Provider - Darwin Wei*    Chief Complaint   Patient presents with    Pre-op Clearance     Rotator cuff surgery     paroxysmal     Atrial Fibrillation       Assessment  Pre-operative cardiovascular examination  Paroxysmal atrial fibrillation  Sinus bradycardia  Mitral regurgitation, moderate  Multiple falls    Plan  Pre-operative cardiovascular evaluation  Planned procedure: ARTHROSCOPIC ROTATOR CUFF REPAIR OF SUBSCAPULARIS (Right Shoulder) (Dr Rose)  Active cardiac complaints: None  Cardiac co-morbidities: Paroxysmal Atrial Fibrillation   Functional status: Active, able to walk up 1 flight of stairs without symptoms  No known h/o CAD, heart failure  Vitals - reviewed and stable  ECG - sinus bradycardia, no acute ST-T changes  Echo - 1/2022 - LVEF 60%, mild AI, moderate MR, mild-moderate pulmonary hypertension (PASP 51)  Okay to proceed with planned procedure as  cardiac risk for a moderate risk surgery, without any further cardiac testing  she is medically as optimized as able from the cardiac standpoint  Mery-operative cardiac medication management  Anticoagulation/anti-platelets  On coumadin for AFib, no h/o DVT/PE, no h/o TIA/stroke --> okay to hold Coumadin for 3 days preoperatively and resume postoperatively at earliest acceptable time    Continue beta-blockers mery-operatively    Paroxysmal Atrial Fibrillation  Single episode in January 2022, incidentally noted at the time of her colonoscopy and she was asymptomatic throughout  She was started on metoprolol, and has since spontaneously converted back to sinus rhythm  Currently sinus rhythm on ECG today  Continue metoprolol 25 mg b i d , Coumadin for stroke prophylaxis    Results for orders placed or performed in visit on 09/07/22   POCT ECG    Impression    Sinus bradycardia (HR 59 bpm)  Cannot rule out old septal infarct  No acute ST-T changes       Orders Placed This Encounter   Procedures    POCT ECG     Follow up with Olivier Uribe in 3 months - already has appointment for next month    Return in about 3 months (around 12/7/2022), or if symptoms worsen or fail to improve  History of Present Illness   74 y o  female comes in for preoperative consultation prior to upcoming right shoulder surgery  She normally sees Dr Abiodun Wick and is seeing me today only for preoperative evaluation  No current symptoms of chest pain, shortness of breath, palpitations or dizziness  No complains of recent near-syncope or syncope  She was originally incidentally diagnosed with atrial fibrillation in early January 2022, when she went for a colonoscopy  She was subsequently started on metoprolol and Eliquis, his furnace 600 back to sinus rhythm  Eliquis was too expensive and she was switched to Coumadin subsequently  She continues to be asymptomatic from this standpoint    She reports having ongoing issues with diarrhea over the past few months, and had a fall leading to shoulder injury recently    She is now awaiting surgery for this    Historical Information   Past Medical History:   Diagnosis Date    [facility]fib [facility])     Anxiety     Arthritis     shoulder    Carpal tunnel syndrome     Colon polyp     Depression     Disease of thyroid gland     Diverticulitis     Esophageal stricture     HBP (high blood pressure)     Hiatal hernia 06/07/2022    Small hiatal hernia    Hyperlipidemia     Hypertension     Hypothyroid     Pneumonia     2017    PONV (postoperative nausea and vomiting)     Schatzki's ring 06/07/2022    Schatzki's ring in the lower esophagus    Sleep apnea     mild      Past Surgical History:   Procedure Laterality Date    APPENDECTOMY      BREAST BIOPSY Right     benign    BREAST CYST ASPIRATION Right     benign    CATARACT EXTRACTION Bilateral     CATARACT EXTRACTION, BILATERAL  2019    CERVICAL FUSION N/A 05/25/2021    Procedure: FUSION CERVICAL POSTERIOR C1-C2 with allograft and neuromonitoring);   Surgeon: Simon Farris MD;  [facility];  Service: Orthopedics    CHOLECYSTECTOMY      COLONOSCOPY  06/07/2022    COLONOSCOPY W/ BIOPSIES  2011    EGD  06/07/2022    HAND SURGERY Right     MAMMO (HISTORICAL) Bilateral 11/27/2018    MAMMO (HISTORICAL) Bilateral 10/27/2017    MANDIBLE SURGERY      MANDIBLE SURGERY      WISDOM TOOTH EXTRACTION      X4     Family History   Problem Relation Age of Onset    Heart disease Mother     Hypertension Mother     Hyperlipidemia Mother     Heart failure Mother     Hypertension Sister      Current Outpatient Medications on File Prior to Visit   Medication Sig Dispense Refill    acetaminophen (TYLENOL) 500 mg tablet Take 1 tablet (500 mg total) by mouth every 6 (six) hours as needed for moderate pain 20 tablet 0    ALPRAZolam (XANAX) 0 5 mg tablet TAKE 1 TABLET(0 5 MG) BY MOUTH TWICE DAILY AS NEEDED FOR ANXIETY 60 tablet 5    atorvastatin (LIPITOR) 10 mg tablet TAKE 1 TABLET BY MOUTH EVERY DAY AT BEDTIME 90 tablet 3    Calcium Carb-Cholecalciferol 600-200 MG-UNIT TABS Take 1 tablet by mouth daily      cetirizine (ZyrTEC) 10 mg tablet Take 1 tablet (10 mg total) by mouth daily 30 tablet 0    colchicine (COLCRYS) 0 6 mg tablet Take 1 tablet (0 6 mg total) by mouth daily 30 tablet 5    famotidine (PEPCID) 20 mg tablet TAKE 1 TABLET(20 MG) BY MOUTH DAILY 90 tablet 1    guaiFENesin (MUCINEX) 600 mg 12 hr tablet Take 2 tablets (1,200 mg total) by mouth every 12 (twelve) hours 30 tablet 0    levothyroxine (Synthroid) 75 mcg tablet Take 1 tablet (75 mcg total) by mouth daily in the early morning 90 tablet 1    metoprolol tartrate (LOPRESSOR) 25 mg tablet Take 1 tablet (25 mg total) by mouth every 12 (twelve) hours 180 tablet 3    Multiple Vitamins-Minerals (CENTRUM ADULTS PO) Take 1 tablet by mouth daily      omeprazole (PriLOSEC) 20 mg delayed release capsule TAKE 1 CAPSULE(20 MG) BY MOUTH DAILY BEFORE BREAKFAST 90 capsule 1    ramipril (ALTACE) 10 MG capsule TAKE 1 CAPSULE(10 MG) BY MOUTH TWICE DAILY 180 capsule 3    sertraline (Zoloft) 50 mg tablet Take 1 tablet (50 mg total) by mouth daily 30 tablet 5    triamcinolone (KENALOG) 0 5 % cream Apply topically 2 (two) times a day 30 g 5    warfarin (COUMADIN) 2 mg tablet TAKE 1 AND 1/2 TO 2 TABLETS BY MOUTH DAILY OR AS DIRECTED BY PRESCRIBER 180 tablet 3     No current facility-administered medications on file prior to visit  No Known Allergies  Social History     Socioeconomic History    Marital status:      Spouse name: None    Number of children: None    Years of education: None    Highest education level: None   Occupational History    None   Tobacco Use    Smoking status: Former Smoker     Quit date:      Years since quittin 6    Smokeless tobacco: Never Used   Vaping Use    Vaping Use: Never used   Substance and Sexual Activity    Alcohol use: Not Currently     Comment: last drink 7 years ago    Drug use: Not Currently    Sexual activity: Not Currently   Other Topics Concern    None   Social History Narrative    Do you currently or have you served in the [employer]: No    Were you activated, into active duty, as a member of the [employer] or as a Reservist: No    Occupation: retired    Marital status:     Exercise level: Occasional treadmill    General stress level: Low    Alcohol intake: None stopped 7 years ago    Caffeine intake:  Moderate    Chewing tobacco: none    Illicit drugs: none    Guns present in home: No    Seat belts used routinely: Yes    Sunscreen used routinely: Yes    Smoke alarm in home: Yes    Advance directive: Yes     Social Determinants of Health     Financial Resource Strain: Not on file   Food Insecurity: Not on file   Transportation Needs: Not on file   Physical Activity: Not on file   Stress: Not on file   Social Connections: Not on file   Intimate Partner Violence: Not on file   Housing Stability: Not on file        Review of Systems      Vitals:  Vitals:    09/07/22 1449   BP: 128/64   BP Location: Left arm   Patient Position: Sitting   Cuff Size: Standard   Pulse: 59   Temp: 98 °F (36 7 °C)   SpO2: 97%   Weight: 59 4 kg (131 lb)   Height: 5' 3" (1 6 m)     BMI - Body mass index is 23 21 kg/m²    Wt Readings from Last 7 Encounters:   09/07/22 59 4 kg (131 lb)   08/24/22 59 4 kg (131 lb)   07/13/22 59 6 kg (131 lb 6 4 oz)   07/03/22 58 8 kg (129 lb 10 1 oz)   06/29/22 58 5 kg (129 lb)   06/27/22 58 1 kg (128 lb)   06/07/22 55 8 kg (123 lb)       Physical Exam      Labs:  CBC:   Lab Results   Component Value Date    WBC 6 74 09/06/2022    RBC 4 03 09/06/2022    HGB 12 1 09/06/2022    HCT 38 5 09/06/2022    MCV 96 09/06/2022     09/06/2022    RDW 12 9 09/06/2022       CMP:   Lab Results   Component Value Date    K 4 2 09/06/2022     09/06/2022    CO2 31 09/06/2022    BUN 16 09/06/2022    CREATININE 0 91 09/06/2022    EGFR 62 09/06/2022    CALCIUM 9 6 09/06/2022    AST 32 08/17/2022    ALT 28 08/17/2022    ALKPHOS 73 08/17/2022       Magnesium:  Lab Results   Component Value Date    MG 1 9 01/24/2022       Lipid Profile:   Lab Results   Component Value Date    HDL 51 08/17/2022    TRIG 125 08/17/2022    LDLCALC 67 08/17/2022       Thyroid Studies:   Lab Results   Component Value Date    TQF4XEPLCJJQ 0 310 (L) 08/17/2022       A1c:  No components found for: HGA1C    INR:  Lab Results   Component Value Date    INR 3 13 (H) 09/06/2022    INR 2 86 (H) 08/17/2022    INR 2 47 (H) 07/19/2022   5    Imaging: US abdomen complete    Result Date: 8/29/2022  Narrative: ABDOMEN ULTRASOUND, COMPLETE INDICATION:   R74 8: Abnormal levels of other serum enzymes  COMPARISON:  None TECHNIQUE:   Real-time ultrasound of the abdomen was performed with a curvilinear transducer with both volumetric sweeps and still imaging techniques  FINDINGS: PANCREAS:  Visualized portions of the pancreas are within normal limits  AORTA AND IVC:  Visualized portions are normal for patient age  LIVER: Size:  Within normal range  The liver measures 11 4 cm in the midclavicular line  Contour:  Surface contour is smooth  Parenchyma:  Echogenicity and echotexture are within normal limits  No liver mass identified  Limited imaging of the main portal vein shows it to be patent and hepatopetal  BILIARY: Gallbladder surgically removed No intrahepatic biliary dilatation  CBD measures 5 0 mm  No choledocholithiasis  KIDNEY: Right kidney measures 10 2 x 4 1 x 3 6  cm  Volume 78 4 mL 1 5 cm parapelvic cyst  Left kidney measures 10 0 x 4 5 x 4 5 cm  Volume 106 3 mL Kidney within normal limits  SPLEEN: Measures 7 6 x 6 4 x 3 2 cm  Volume 82 5 mL Within normal limits  ASCITES:  None  Impression: Cholecystectomy Workstation performed: [id number]       Cardiac testing:   No results found for this or any previous visit  No results found for this or any previous visit  No results found for this or any previous visit  No results found for this or any previous visit  US abdomen complete  Narrative: ABDOMEN ULTRASOUND, COMPLETE     INDICATION:   R74 8: Abnormal levels of other serum enzymes  COMPARISON:  None    TECHNIQUE:   Real-time ultrasound of the abdomen was performed with a curvilinear transducer with both volumetric sweeps and still imaging techniques  FINDINGS:    PANCREAS:  Visualized portions of the pancreas are within normal limits  AORTA AND IVC:  Visualized portions are normal for patient age  LIVER:  Size:  Within normal range  The liver measures 11 4 cm in the midclavicular line  Contour:  Surface contour is smooth    Parenchyma:  Echogenicity and echotexture are within normal limits  No liver mass identified  Limited imaging of the main portal vein shows it to be patent and hepatopetal     BILIARY:  Gallbladder surgically removed  No intrahepatic biliary dilatation  CBD measures 5 0 mm  No choledocholithiasis  KIDNEY:   Right kidney measures 10 2 x 4 1 x 3 6  cm  Volume 78 4 mL  1 5 cm parapelvic cyst     Left kidney measures 10 0 x 4 5 x 4 5 cm  Volume 106 3 mL  Kidney within normal limits  SPLEEN:   Measures 7 6 x 6 4 x 3 2 cm  Volume 82 5 mL  Within normal limits  ASCITES:  None    Impression: Cholecystectomy    Workstation performed: [id number]

## 2022-09-07 NOTE — PROGRESS NOTES
[facility]  coding opportunities          Chart Reviewed number of suggestions sent to Provider: 1     Patients Insurance     Medicare Insurance: [employer]

## 2022-09-09 ENCOUNTER — OFFICE VISIT (OUTPATIENT)
Dept: FAMILY MEDICINE CLINIC | Facility: CLINIC | Age: 72
End: 2022-09-09
Payer: MEDICARE

## 2022-09-09 VITALS
RESPIRATION RATE: 16 BRPM | HEIGHT: 63 IN | HEART RATE: 79 BPM | OXYGEN SATURATION: 97 % | SYSTOLIC BLOOD PRESSURE: 118 MMHG | BODY MASS INDEX: 23.59 KG/M2 | WEIGHT: 133.13 LBS | TEMPERATURE: 97.5 F | DIASTOLIC BLOOD PRESSURE: 62 MMHG

## 2022-09-09 DIAGNOSIS — E03.9 HYPOTHYROIDISM, UNSPECIFIED TYPE: ICD-10-CM

## 2022-09-09 DIAGNOSIS — R15.9 INCONTINENCE OF FECES, UNSPECIFIED FECAL INCONTINENCE TYPE: Primary | ICD-10-CM

## 2022-09-09 DIAGNOSIS — Z12.31 ENCOUNTER FOR SCREENING MAMMOGRAM FOR MALIGNANT NEOPLASM OF BREAST: ICD-10-CM

## 2022-09-09 DIAGNOSIS — R19.7 DIARRHEA, UNSPECIFIED TYPE: ICD-10-CM

## 2022-09-09 DIAGNOSIS — Z28.21 INFLUENZA VACCINATION DECLINED: ICD-10-CM

## 2022-09-09 PROCEDURE — 99214 OFFICE O/P EST MOD 30 MIN: CPT | Performed by: INTERNAL MEDICINE

## 2022-09-09 RX ORDER — LEVOTHYROXINE SODIUM 0.05 MG/1
50 TABLET ORAL
Qty: 30 TABLET | Refills: 5 | Status: SHIPPED | OUTPATIENT
Start: 2022-09-09 | End: 2022-09-09

## 2022-09-09 RX ORDER — DIPHENOXYLATE HYDROCHLORIDE AND ATROPINE SULFATE 2.5; .025 MG/1; MG/1
1 TABLET ORAL 4 TIMES DAILY PRN
Qty: 30 TABLET | Refills: 0 | Status: SHIPPED | OUTPATIENT
Start: 2022-09-09 | End: 2022-09-15 | Stop reason: SDUPTHER

## 2022-09-09 RX ORDER — LEVOTHYROXINE SODIUM 0.05 MG/1
TABLET ORAL
Qty: 90 TABLET | Refills: 0 | Status: SHIPPED | OUTPATIENT
Start: 2022-09-09

## 2022-09-09 NOTE — PROGRESS NOTES
Assessment/Plan:Not sure what is causing this fecal incontinence issue with Josee-thought maybe it could be related to too much levothryoxine so we dropped dose to 50 mcg daily today  My other concern is that this could be lumbosacral spine issue, with discs slipped and putting pressure on nerves that supply that area-will order Xray LS spine, and would like to possibly consider MRI LS spine  Colonoscopy last month was unrevealing, and she will follow up with GI as well  Will try lomotil    Some ot this may be anxiety related too  Did not order stool cultures but that too is a consideration         Problem List Items Addressed This Visit    None     Visit Diagnoses     Incontinence of feces, unspecified fecal incontinence type    -  Primary    Relevant Orders    XR spine lumbar minimum 4 views non injury    Diarrhea, unspecified type        Relevant Medications    diphenoxylate-atropine (LOMOTIL) 2 5-0 025 mg per tablet    Encounter for screening mammogram for malignant neoplasm of breast        Relevant Orders    Mammo screening bilateral w 3d & cad    Influenza vaccination declined        Hypothyroidism, unspecified type        Relevant Medications    levothyroxine (Synthroid) 50 mcg tablet            Subjective:      Patient ID: Johana Garcia is a 74 y o  female  Patsie Lied here to discuss her GI issues-very unfortunately, she is experiencing episodes of basically fecal incontinence  She says she's ok to get up and have a regular BM, but then she'll go outside and sit on her patio and when she goes to get up to walk back inside she says she has soiled herself, and she feels it going down her legs etc   She says she's tired of "throwing out pairs of underwear"  She had EGD/colonoscopy in May and colonoscopy just showed some scattered diverticulae    She denies constipation, vomiting, but does feel weak in general   She did just have labwork done and it did show a low TSH, so we had lowered her dosage of levothyroxine-I went ahead and lowered it again to 50 mcg today  She's actually no longer taking the zoloft, and I did review her meds in detail with her  She is continent of urine, although has some stress incontienence  The following portions of the patient's history were reviewed and updated as appropriate:   Past Medical History:  She has a past medical history of A-fib ([address] ), Anxiety, Arthritis, Carpal tunnel syndrome, Colon polyp, Depression, Disease of thyroid gland, Diverticulitis, Esophageal stricture, HBP (high blood pressure), Hiatal hernia (06/07/2022), Hyperlipidemia, Hypertension, Hypothyroid, Pneumonia, PONV (postoperative nausea and vomiting), Schatzki's ring (06/07/2022), and Sleep apnea ,  _______________________________________________________________________  Medical Problems:  does not have any pertinent problems on file ,  _______________________________________________________________________  Past Surgical History:   has a past surgical history that includes Breast cyst aspiration (Right); Breast biopsy (Right); Appendectomy; Cholecystectomy; Colonoscopy w/ biopsies (2011); Mandible surgery; [place] tooth extraction; EGD (06/07/2022); Cataract extraction, bilateral (2019); Mammo (historical) (Bilateral, 11/27/2018); Mammo (historical) (Bilateral, 10/27/2017); Colonoscopy (06/07/2022); Cataract extraction (Bilateral); Hand surgery (Right); Cervical fusion (N/A, 05/25/2021); and Mandible surgery  ,  _______________________________________________________________________  Family History:  family history includes Heart disease in her mother; Heart failure in her mother; Hyperlipidemia in her mother; Hypertension in her mother and sister  ,  _______________________________________________________________________  Social History:   reports that she quit smoking about 18 years ago  She has never used smokeless tobacco  She reports previous alcohol use   She reports previous drug use ,  _______________________________________________________________________  Allergies:  has No Known Allergies     _______________________________________________________________________  Current Outpatient Medications   Medication Sig Dispense Refill    acetaminophen (TYLENOL) 500 mg tablet Take 1 tablet (500 mg total) by mouth every 6 (six) hours as needed for moderate pain 20 tablet 0    ALPRAZolam (XANAX) 0 5 mg tablet TAKE 1 TABLET(0 5 MG) BY MOUTH TWICE DAILY AS NEEDED FOR ANXIETY 60 tablet 5    atorvastatin (LIPITOR) 10 mg tablet TAKE 1 TABLET BY MOUTH EVERY DAY AT BEDTIME 90 tablet 3    diphenoxylate-atropine (LOMOTIL) 2 5-0 025 mg per tablet Take 1 tablet by mouth 4 (four) times a day as needed for diarrhea 30 tablet 0    levothyroxine (Synthroid) 50 mcg tablet Take 1 tablet (50 mcg total) by mouth daily in the early morning 30 tablet 5    metoprolol tartrate (LOPRESSOR) 25 mg tablet Take 1 tablet (25 mg total) by mouth every 12 (twelve) hours 180 tablet 3    Multiple Vitamins-Minerals (CENTRUM ADULTS PO) Take 1 tablet by mouth daily      omeprazole (PriLOSEC) 20 mg delayed release capsule TAKE 1 CAPSULE(20 MG) BY MOUTH DAILY BEFORE BREAKFAST 90 capsule 1    ramipril (ALTACE) 10 MG capsule TAKE 1 CAPSULE(10 MG) BY MOUTH TWICE DAILY 180 capsule 3    warfarin (COUMADIN) 2 mg tablet TAKE 1 AND 1/2 TO 2 TABLETS BY MOUTH DAILY OR AS DIRECTED BY PRESCRIBER 180 tablet 3     No current facility-administered medications for this visit      _______________________________________________________________________  Review of Systems   Constitutional: Negative  Respiratory: Negative  Gastrointestinal: Negative for abdominal pain, blood in stool, constipation, nausea and vomiting  Neurological: Positive for weakness           Objective:  Vitals:    09/09/22 1128   BP: 118/62   BP Location: Left arm   Patient Position: Sitting   Cuff Size: Adult   Pulse: 79   Resp: 16   Temp: 97 5 °F (36 4 °C) TempSrc: Temporal   SpO2: 97%   Weight: 60 4 kg (133 lb 2 oz)   Height: 5' 3" (1 6 m)     Body mass index is 23 58 kg/m²  Physical Exam  Constitutional:       Appearance: Normal appearance  HENT:      Head: Normocephalic and atraumatic  Cardiovascular:      Rate and Rhythm: Normal rate and regular rhythm  Pulmonary:      Effort: Pulmonary effort is normal       Breath sounds: Normal breath sounds  Abdominal:      General: Abdomen is flat  There is no distension  Palpations: Abdomen is soft  There is no mass  Tenderness: There is no abdominal tenderness  Musculoskeletal:         General: Normal range of motion  Skin:     General: Skin is warm  Neurological:      General: No focal deficit present  Mental Status: She is alert and oriented to person, place, and time  Cranial Nerves: No cranial nerve deficit  Motor: No weakness  Gait: Gait abnormal       Comments: No weakness, has +2-+3 reflexes b/l, does seem to have some atrophy lower extremities   Psychiatric:         Mood and Affect: Mood normal          Thought Content:  Thought content normal          Judgment: Judgment normal

## 2022-09-14 ENCOUNTER — TELEPHONE (OUTPATIENT)
Dept: OBGYN CLINIC | Facility: CLINIC | Age: 72
End: 2022-09-14
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
## 2022-09-15 ENCOUNTER — OFFICE VISIT (OUTPATIENT)
Dept: FAMILY MEDICINE CLINIC | Facility: CLINIC | Age: 72
End: 2022-09-15
Payer: MEDICARE

## 2022-09-15 VITALS
BODY MASS INDEX: 23.39 KG/M2 | TEMPERATURE: 97.3 F | SYSTOLIC BLOOD PRESSURE: 118 MMHG | WEIGHT: 132 LBS | DIASTOLIC BLOOD PRESSURE: 70 MMHG | HEART RATE: 56 BPM | HEIGHT: 63 IN | OXYGEN SATURATION: 98 %

## 2022-09-15 DIAGNOSIS — R19.7 DIARRHEA, UNSPECIFIED TYPE: ICD-10-CM

## 2022-09-15 DIAGNOSIS — Z01.818 PRE-OP EXAMINATION: Primary | ICD-10-CM

## 2022-09-15 PROCEDURE — 99214 OFFICE O/P EST MOD 30 MIN: CPT

## 2022-09-15 RX ORDER — DIPHENOXYLATE HYDROCHLORIDE AND ATROPINE SULFATE 2.5; .025 MG/1; MG/1
1 TABLET ORAL 4 TIMES DAILY PRN
Qty: 120 TABLET | Refills: 1 | Status: SHIPPED | OUTPATIENT
Start: 2022-09-15 | End: 2022-10-15

## 2022-09-15 NOTE — ASSESSMENT & PLAN NOTE
Normal exam, labs completed  May proceed with surgery as planned, has cardiac clearance and aware to hold coumadin 3 days prior to surgery
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
## 2022-09-15 NOTE — TELEPHONE ENCOUNTER
Needs refill  Diphenoxylate-atropine  1 tablet 4x daily   #120     No longer driving so need this in on by Friday so it can get delivered

## 2022-09-20 NOTE — PRE-PROCEDURE INSTRUCTIONS
My Surgical Experience    The following information was developed to assist you to prepare for your operation  What do I need to do before coming to the hospital?   Arrange for a responsible person to drive you to and from the hospital    Arrange care for your children at home  Children are not allowed in the recovery areas of the hospital   Plan to wear clothing that is easy to put on and take off  If you are having shoulder surgery, wear a shirt that buttons or zippers in the front  Bathing  o Shower the evening before and the morning of your surgery with an antibacterial soap  Please refer to the Pre Op Showering Instructions for Surgery Patients Sheet   o Remove nail polish and all body piercing jewelry  o Do not shave any body part for at least 24 hours before surgery-this includes face, arms, legs and upper body  Food  o Nothing to eat or drink after midnight the night before your surgery  This includes candy and chewing gum  o Exception: If your surgery is after 12:00pm (noon), you may have clear liquids such as 7-Up®, ginger ale, apple or cranberry juice, Jell-O®, water, or clear broth until 8:00 am  o Do not drink milk or juice with pulp on the morning before surgery  o Do not drink alcohol 24 hours before surgery  Medicine  o Follow instructions you received from your surgeon about which medicines you may take on the day of surgery  o If instructed to take medicine on the morning of surgery, take pills with just a small sip of water  Call your prescribing doctor for specific infroamtion on what to do if you take insulin    What should I bring to the hospital?    Bring:  Dalton Chavez or a walker, if you have them, for foot or knee surgery   A list of the daily medicines, vitamins, minerals, herbals and nutritional supplements you take   Include the dosages of medicines and the time you take them each day   Glasses, dentures or hearing aids   Minimal clothing; you will be wearing hospital sleepwear   Photo ID; required to verify your identity   If you have a Living Will or Power of , bring a copy of the documents   If you have an ostomy, bring an extra pouch and any supplies you use    Do not bring   Medicines or inhalers   Money, valuables or jewelry    What other information should I know about the day of surgery?  Notify your surgeons if you develop a cold, sore throat, cough, fever, rash or any other illness   Report to the Ambulatory Surgical/Same Day Surgery Unit   You will be instructed to stop at Registration only if you have not been pre-registered   Inform your  fi they do not stay that they will be asked by the staff to leave a phone number where they can be reached   Be available to be reached before surgery  In the event the operating room schedule changes, you may be asked to come in earlier or later than expected    *It is important to tell your doctor and others involved in your health care if you are taking or have been taking any non-prescription drugs, vitamins, minerals, herbals or other nutritional supplements  Any of these may interact with some food or medicines and cause a reaction      Pre-Surgery Instructions:   Medication Instructions    ALPRAZolam (XANAX) 0 5 mg tablet Take day of surgery   atorvastatin (LIPITOR) 10 mg tablet Take night before surgery    diphenoxylate-atropine (LOMOTIL) 2 5-0 025 mg per tablet Uses PRN- OK to take day of surgery    levothyroxine 50 mcg tablet Take day of surgery   metoprolol tartrate (LOPRESSOR) 25 mg tablet Take day of surgery   Multiple Vitamins-Minerals (CENTRUM ADULTS PO) Hold day of surgery   omeprazole (PriLOSEC) 20 mg delayed release capsule Take day of surgery   ramipril (ALTACE) 10 MG capsule Hold day of surgery      warfarin (COUMADIN) 2 mg tablet to hold 4 days pre-op    To bring the sling and wear a large, loose top

## 2022-09-23 ENCOUNTER — TELEPHONE (OUTPATIENT)
Dept: OBGYN CLINIC | Facility: CLINIC | Age: 72
End: 2022-09-23
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
## 2022-09-23 NOTE — TELEPHONE ENCOUNTER
----- Message from Gurwinder Hu PA-C sent at 9/7/2022  9:58 AM EDT -----  Regarding: RE: Coumadin, PT/INR & Cardiac clearance  We should have this done the day before surgery  ----- Message -----  From: Bhavya Howard  Sent: 9/6/2022   4:21 PM EDT  To: Gurwinder Hu PA-C  Subject: RE: Coumadin, PT/INR & Cardiac clearance         Ok, how many days prior to surgery did you want it re-drawn?      ----- Message -----  From: Gurwinder Hu PA-C  Sent: 9/6/2022   3:41 PM EDT  To: Bhavya Howard  Subject: RE: Coumadin, PT/INR & Cardiac clearance         I am just saying when she stops her coumadin pre-op we should get  repeat INR to make sure this is down prior to surgery   ----- Message -----  From: Bhavya Howard  Sent: 9/6/2022   3:35 PM EDT  To: Marisela Modi MD, #  Subject: FW: Coumadin, PT/INR & Cardiac clearance         Please see response below  Mariela Ochoa    ----- Message -----  From: Tarah Oliva MD  Sent: 9/6/2022   3:34 PM EDT  To: Bhavya Howard, Arabella Teague MD  Subject: RE: Coumadin, PT/INR & Cardiac clearance         Our coumadin clinic already took care of it  Thanks     ----- Message -----  From: Bhavya Howard  Sent: 9/6/2022   1:10 PM EDT  To: Tarah Oliva MD, #  Subject: Coumadin, PT/INR & Cardiac clearance             Dylon Chandler,    Are you able to address this with Bethany Watts? She does have a cardiac clearance appointment scheduled tomorrow, 9/7 with you  Mariela Ochoa  Surgery Coordinator  ----- Message -----  From: Gurwinder Hu PA-C  Sent: 9/6/2022  12:07 PM EDT  To: Bhavya Howard    She is currently on coumadin, which will need to be stopped prior to surgery  And Recheck of PT/INR   THanks   ----- Message -----  From: Lab, Background User  Sent: 9/6/2022  11:45 AM EDT  To: Marisela Modi MD

## 2022-09-26 ENCOUNTER — APPOINTMENT (OUTPATIENT)
Dept: LAB | Facility: HOSPITAL | Age: 72
End: 2022-09-26
Payer: MEDICARE

## 2022-09-26 ENCOUNTER — ANTICOAG VISIT (OUTPATIENT)
Dept: CARDIOLOGY CLINIC | Facility: CLINIC | Age: 72
End: 2022-09-26

## 2022-09-26 ENCOUNTER — ANESTHESIA EVENT (OUTPATIENT)
Dept: PERIOP | Facility: HOSPITAL | Age: 72
End: 2022-09-26
Payer: MEDICARE

## 2022-09-26 DIAGNOSIS — M75.101 TEAR OF RIGHT ROTATOR CUFF, UNSPECIFIED TEAR EXTENT, UNSPECIFIED WHETHER TRAUMATIC: ICD-10-CM

## 2022-09-26 DIAGNOSIS — I48.91 ATRIAL FIBRILLATION WITH RVR (HCC): Primary | ICD-10-CM

## 2022-09-26 DIAGNOSIS — Z01.812 ENCOUNTER FOR PREPROCEDURAL LABORATORY EXAMINATION: ICD-10-CM

## 2022-09-27 ENCOUNTER — ANESTHESIA (OUTPATIENT)
Dept: PERIOP | Facility: HOSPITAL | Age: 72
End: 2022-09-27
Payer: MEDICARE

## 2022-09-27 ENCOUNTER — HOSPITAL ENCOUNTER (OUTPATIENT)
Facility: HOSPITAL | Age: 72
Setting detail: OUTPATIENT SURGERY
Discharge: HOME | End: 2022-09-27
Attending: ORTHOPAEDIC SURGERY | Admitting: ORTHOPAEDIC SURGERY
Payer: MEDICARE

## 2022-09-27 VITALS
DIASTOLIC BLOOD PRESSURE: 76 MMHG | OXYGEN SATURATION: 97 % | SYSTOLIC BLOOD PRESSURE: 149 MMHG | TEMPERATURE: 98.6 F | BODY MASS INDEX: 23.11 KG/M2 | HEART RATE: 85 BPM | WEIGHT: 130.4 LBS | HEIGHT: 63 IN | RESPIRATION RATE: 18 BRPM

## 2022-09-27 DIAGNOSIS — Z98.890 S/P ARTHROSCOPY OF RIGHT SHOULDER: Primary | ICD-10-CM

## 2022-09-27 PROCEDURE — C1713 ANCHOR/SCREW BN/BN,TIS/BN: HCPCS | Performed by: ORTHOPAEDIC SURGERY

## 2022-09-27 PROCEDURE — 29828 SHO ARTHRS SRG BICP TENODSIS: CPT | Performed by: ORTHOPAEDIC SURGERY

## 2022-09-27 PROCEDURE — 29827 SHO ARTHRS SRG RT8TR CUF RPR: CPT | Performed by: ORTHOPAEDIC SURGERY

## 2022-09-27 PROCEDURE — 99024 POSTOP FOLLOW-UP VISIT: CPT | Performed by: PHYSICIAN ASSISTANT

## 2022-09-27 PROCEDURE — 29827 SHO ARTHRS SRG RT8TR CUF RPR: CPT | Performed by: PHYSICIAN ASSISTANT

## 2022-09-27 PROCEDURE — C9290 INJ, BUPIVACAINE LIPOSOME: HCPCS | Performed by: ANESTHESIOLOGY

## 2022-09-27 PROCEDURE — 29828 SHO ARTHRS SRG BICP TENODSIS: CPT | Performed by: PHYSICIAN ASSISTANT

## 2022-09-27 PROCEDURE — 29823 SHO ARTHRS SRG XTNSV DBRDMT: CPT | Performed by: PHYSICIAN ASSISTANT

## 2022-09-27 PROCEDURE — 29823 SHO ARTHRS SRG XTNSV DBRDMT: CPT | Performed by: ORTHOPAEDIC SURGERY

## 2022-09-27 DEVICE — BIO-COMP SWVLK C, CLD 4.75X19.1MM
Type: IMPLANTABLE DEVICE | Site: SHOULDER | Status: FUNCTIONAL
Brand: ARTHREX®

## 2022-09-27 RX ORDER — LIDOCAINE HYDROCHLORIDE 10 MG/ML
INJECTION, SOLUTION EPIDURAL; INFILTRATION; INTRACAUDAL; PERINEURAL AS NEEDED
Status: DISCONTINUED | OUTPATIENT
Start: 2022-09-27 | End: 2022-09-27

## 2022-09-27 RX ORDER — PROPOFOL 10 MG/ML
INJECTION, EMULSION INTRAVENOUS AS NEEDED
Status: DISCONTINUED | OUTPATIENT
Start: 2022-09-27 | End: 2022-09-27

## 2022-09-27 RX ORDER — BUPIVACAINE HYDROCHLORIDE 5 MG/ML
INJECTION, SOLUTION PERINEURAL
Status: COMPLETED | OUTPATIENT
Start: 2022-09-27 | End: 2022-09-27

## 2022-09-27 RX ORDER — FENTANYL CITRATE/PF 50 MCG/ML
50 SYRINGE (ML) INJECTION
Status: DISCONTINUED | OUTPATIENT
Start: 2022-09-27 | End: 2022-09-27 | Stop reason: HOSPADM

## 2022-09-27 RX ORDER — CEFAZOLIN SODIUM 2 G/50ML
2000 SOLUTION INTRAVENOUS ONCE
Status: COMPLETED | OUTPATIENT
Start: 2022-09-27 | End: 2022-09-27

## 2022-09-27 RX ORDER — PROPOFOL 10 MG/ML
INJECTION, EMULSION INTRAVENOUS CONTINUOUS PRN
Status: DISCONTINUED | OUTPATIENT
Start: 2022-09-27 | End: 2022-09-27

## 2022-09-27 RX ORDER — OXYCODONE HYDROCHLORIDE 5 MG/1
5 TABLET ORAL EVERY 4 HOURS PRN
Qty: 15 TABLET | Refills: 0 | Status: SHIPPED | OUTPATIENT
Start: 2022-09-27

## 2022-09-27 RX ORDER — SODIUM CHLORIDE, SODIUM LACTATE, POTASSIUM CHLORIDE, CALCIUM CHLORIDE 600; 310; 30; 20 MG/100ML; MG/100ML; MG/100ML; MG/100ML
100 INJECTION, SOLUTION INTRAVENOUS CONTINUOUS
Status: DISCONTINUED | OUTPATIENT
Start: 2022-09-27 | End: 2022-09-27 | Stop reason: HOSPADM

## 2022-09-27 RX ORDER — LIDOCAINE HYDROCHLORIDE 10 MG/ML
INJECTION, SOLUTION EPIDURAL; INFILTRATION; INTRACAUDAL; PERINEURAL
Status: COMPLETED | OUTPATIENT
Start: 2022-09-27 | End: 2022-09-27

## 2022-09-27 RX ORDER — SODIUM CHLORIDE, SODIUM LACTATE, POTASSIUM CHLORIDE, CALCIUM CHLORIDE 600; 310; 30; 20 MG/100ML; MG/100ML; MG/100ML; MG/100ML
75 INJECTION, SOLUTION INTRAVENOUS CONTINUOUS
Status: CANCELLED | OUTPATIENT
Start: 2022-09-27

## 2022-09-27 RX ORDER — MIDAZOLAM HYDROCHLORIDE 2 MG/2ML
INJECTION, SOLUTION INTRAMUSCULAR; INTRAVENOUS
Status: COMPLETED | OUTPATIENT
Start: 2022-09-27 | End: 2022-09-27

## 2022-09-27 RX ORDER — ONDANSETRON 2 MG/ML
4 INJECTION INTRAMUSCULAR; INTRAVENOUS ONCE AS NEEDED
Status: DISCONTINUED | OUTPATIENT
Start: 2022-09-27 | End: 2022-09-27 | Stop reason: HOSPADM

## 2022-09-27 RX ORDER — GLYCOPYRROLATE 0.2 MG/ML
INJECTION INTRAMUSCULAR; INTRAVENOUS AS NEEDED
Status: DISCONTINUED | OUTPATIENT
Start: 2022-09-27 | End: 2022-09-27

## 2022-09-27 RX ADMIN — MIDAZOLAM 2 MG: 1 INJECTION INTRAMUSCULAR; INTRAVENOUS at 14:00

## 2022-09-27 RX ADMIN — CEFAZOLIN SODIUM 2000 MG: 2 SOLUTION INTRAVENOUS at 14:28

## 2022-09-27 RX ADMIN — SODIUM CHLORIDE, SODIUM LACTATE, POTASSIUM CHLORIDE, AND CALCIUM CHLORIDE 100 ML/HR: .6; .31; .03; .02 INJECTION, SOLUTION INTRAVENOUS at 12:57

## 2022-09-27 RX ADMIN — BUPIVACAINE HYDROCHLORIDE 15 ML: 5 INJECTION, SOLUTION PERINEURAL at 14:05

## 2022-09-27 RX ADMIN — LIDOCAINE HYDROCHLORIDE 1 ML: 10 INJECTION, SOLUTION EPIDURAL; INFILTRATION; INTRACAUDAL; PERINEURAL at 14:05

## 2022-09-27 RX ADMIN — LIDOCAINE HYDROCHLORIDE 5 ML: 10 INJECTION, SOLUTION EPIDURAL; INFILTRATION; INTRACAUDAL; PERINEURAL at 14:33

## 2022-09-27 RX ADMIN — BUPIVACAINE 10 ML: 13.3 INJECTION, SUSPENSION, LIPOSOMAL INFILTRATION at 14:05

## 2022-09-27 RX ADMIN — GLYCOPYRROLATE 0.2 MCG: 0.2 INJECTION, SOLUTION INTRAMUSCULAR; INTRAVENOUS at 14:37

## 2022-09-27 RX ADMIN — PROPOFOL 120 MCG/KG/MIN: 10 INJECTION, EMULSION INTRAVENOUS at 14:33

## 2022-09-27 RX ADMIN — PROPOFOL 30 MG: 10 INJECTION, EMULSION INTRAVENOUS at 14:41

## 2022-09-27 NOTE — DISCHARGE INSTRUCTIONS
Instruction Sheet Following RTC repair     Sling:   Wear your sling at all times after your surgery (this includes sleeping), except for when you are doing pendulum exercises, showering, or physical therapy  Additionally, you should not carry anything heavier than a pencil in your hand  Dressing:   Remove all cotton and yellow gauze 48 hours after your surgery  You do not need to put a new dressing on your wound; place Band-Aids on your wound  Showering: You may shower 48 hours after surgery  Please use CAUTION!! Be careful not to slip and fall  The effects of anesthesia and/or medication may make you drowsy or light-headed  Do not soak in a bathtub, hot tub, or pool until the doctor tells you it is O K , to do so  Once you are done showering pat the wound dry and apply a Band-Aid  While in the shower you must keep the arm across the front of the body as if it were still in the sling  Sleeping:   You will most likely have difficulty sleeping in the first few weeks after surgery  Most people find it more comfortable to sleep in a reclining position  You can either sleep in a recliner chair or create this position with pillows  Ice:   You can ice the shoulder to reduce swelling and discomfort  Do not ice the shoulder more than 20 minutes at a time  Let the shoulder warm up before reapplication  Avoid getting you wound wet  If you have a Cryocuff you may keep this on continuously  Follow-up visit:   You need to see the doctor about one week following surgery for your first post-op visit  At that time your sutures (stitches) will be removed  You will be given a prescription to begin physical therapy if you were not already given one  Common concerns:   Bruising and/or swelling of the shoulder, arm, or hand are common after surgery  To relieve this discomfort it is best to ice the shoulder  Please call if:   Any oozing or redness of the wound, fevers (>101 3°F), or chills       2  Any difficulty breathing or heaviness in the chest      REMEMBER - these are only guidelines for what to expect  If you have any questions or concerns, please do not hesitate to call the office  [phone]

## 2022-09-27 NOTE — OP NOTE
OPERATIVE REPORT  PATIENT NAME: Gisselle Blue    :  1950  MRN: [id number]  Pt Location: [facility] OR ROOM 04    SURGERY DATE: 2022    Surgeon(s) and Role:     * Ngozi Vivas MD - Primary     * Nicole Mckeon PA-C - Assisting necessary for the procedure for assistance with improved visualization due to the minimally invasive techniques utilized for the operation for assistance with utilization of the camera and shaver and bur as well as in suture management and repair techniques for the rotator cuff and biceps and for debridement  Noemi DUFFY  And \[facility]\"" 2nd assistant    Preop Diagnosis:  Tear of right rotator cuff, unspecified tear extent, unspecified whether traumatic [M75 101]    Post-Op Diagnosis Codes:     * Tear of right rotator cuff, unspecified tear extent, unspecified whether traumatic [M75 101] of both supraspinatus and subscapularis and long head of biceps tendon tearing and glenohumeral articular cartilage significant degenerative changes and extensive synovitis and subacromial bursitis    Procedure:  Right shoulder arthroscopy with rotator cuff repair of both subscapularis and supraspinatus each using speed fix technique from [employer] with 4 75 mm SwiveLock anchor x2 and 2 FiberTape sutures with a fiber link suture for the supraspinatus tendon repair and biceps tenodesis utilizing 2 FiberWire sutures and the subscapularis speed fix anchor and extensive debridement glenoid and humeral head articular cartilage lesions and of the synovitis and of the subacromial bursitis    Specimen(s):  * No specimens in log *    Estimated Blood Loss:   Minimal    Drains:  * No LDAs found *    Anesthesia Type:   Regional with Sedation    Operative Indications:  Tear of right rotator cuff, unspecified tear extent, unspecified whether traumatic Carroll Morrow is a 58-year-old female who has been suffering with right shoulder pain  MRI demonstrated a subscapularis tendon tear    She had failed non operative measures and wished to undergo a right shoulder arthroscopy with rotator cuff repair of the subscapularis tendon  She understood the risks and benefits of that procedure wished to go ahead  The risks are inclusive of but not limited to infection, stiffness, nerve or blood vessel injury causing numbness pain and weakness, worsening of symptoms, failure to regain full strength and ability, failure to achieve anticipated results, recurrent tear, and need for further surgery  Operative Findings:  Right shoulder exam under anesthesia demonstrated forward flexion to 100 and 50° with abduction to 130° external rotation was to 60° and internal rotation was to 50°  Intra-articular findings demonstrated grade 3-4 changes in broad regions of both the humeral head and the glenoid  The labrum had multiple degenerative tears that were debrided with a shaver to a stable rim of tissue  The subscapularis tendon had high-grade partial upper border tear requiring repair with a speed fix  The long head of biceps tendon had high-grade partial tearing with fairly poor tissue  We did perform a tenodesis on this  There was also an anterior leading edge near full-thickness tear of the supraspinatus tendon requiring repair with a speed fix technique  Subacromial space had significant bursitis and the intra-articular space also had significant synovitis  With stable repairs of both rotator cuff tendons as well as of the long head of biceps tendon although the tissue was quite poor for the long head of biceps tendon  Complications:   None    Procedure and Technique:  Channing Eastman was taken to the operating room and placed supine on the OR table  She was given preoperative IV antibiotics was preoperative regional anesthesia by attending anesthesiologist   She was brought comfortably safely into the beach chair position with all parts well padded and the head neutral in the head salter    She had sedation administered by Anesthesia  The right shoulder was taken through exam under anesthesia as described above demonstrating some stiffness  Later we would find that this is due to some significant arthritic change in the shoulder  We then prepped and draped the right upper extremity in usual sterile fashion  A surgical time-out was taken  We created the posterior portal 11 blade  Diagnostic arthroscopy begun an anterior portal was made in the rotator interval with 11 blade  We demonstrated significant glenohumeral articular cartilage changes requiring debridement with a shaver to a stable rim of tissue  These were grade 3/4  The labrum also had degenerative tearing circumferentially requiring debridement with a shaver to a stable rim of tissue  The long head of biceps tendon had high-grade partial-thickness tearing  Supraspinatus tendon had a near complete tear along the anterior border of the supraspinatus tendon which is the leading edge  The upper border subscapularis also had a high-grade partial-thickness upper border tear  These would all require repair  We then created a lateral portal 11 blade  We did debride the supraspinatus tendon back to stable rim of tissue although the tissue was quite poor utilizing a shaver  We then whipstitched the long head of biceps tendon with FiberWire suture and tenotomized that with use the [place]  We were then able to debride the significant synovitis that was found intra-articularly with use of the Apollo  We then began our repair of the subscapularis tendon  We did pass a FiberTape suture in horizontal mattress configuration and then fixated that to a bleeding bed of bone that we had created with use of a bur  We utilized a 4 75 mm SwiveLock anchor which repaired the subscapularis tendon quite readily  We then tied the FiberWire suture from that anchor to the FiberWire suture that was utilized for the whip stitch of the long head of biceps tendon    Once again, the tissue from the long head of biceps tendon was quite poor  Is uncertain as to how readily the suture will hold in this badly torn biceps tendon long head  We then turned our attention to the subacromial space we demonstrated dense bursitis and did debride that with use of the [place]  We then debrided the greater tuberosity to a bleeding bed of bone use of a bur  We began our repair technique for the supraspinatus tendon which was a speed fix technique  We passed a FiberTape suture in horizontal mattress configuration as well as a fiber link suture for a rip stop technique  Once again, this tissue was fairly poor of the supraspinatus tendon  We did however fixate these sutures to a bleeding bed of bone utilizing a speed fix technique with a 4 75 mm SwiveLock anchor  This did bring the tendon back down to a bleeding bed of bone quite nicely  We had a very secure repair of the supraspinatus tendon at the end of this part of the procedure  We then removed arthroscopic equipment and closed the portals with 4-0 nylon suture  Dry, sterile dressings were applied with a sling  She tolerated procedure well and transferred to recovery room stable condition  She will follow up in 1 week for suture removal   She will be on the combined rotator cuff repair with biceps tenodesis rehabilitation protocol     I was present for the entire procedure and A qualified resident physician was not available    Patient Disposition:  PACU         SIGNATURE: [unfilled]  DATE: September 27, 2022  TIME: 3:39 PM

## 2022-09-27 NOTE — H&P
Assessment/Plan:  The patient would like to proceed with right shoulder arthroscopy with subscapularis tendon repair  We discussed the procedure and risks at length, including but not limited to, infection, bleeding, wound issues, nerve injury, blood clot, stiffness, failure of procedure, and need for additional surgery  We will see the patient back at the time of surgery  Subjective:   Hali Lynn is a 74 y o  female with right shoulder subscapularis tendon tear who notes ongoing pain about the shoulder  Review of Systems   Constitutional: Negative  Negative for chills and fever  HENT: Negative  Negative for ear pain and sore throat  Eyes: Negative  Negative for pain and redness  Respiratory: Negative  Negative for shortness of breath and wheezing  Cardiovascular: Negative for chest pain and palpitations  Gastrointestinal: Negative  Negative for abdominal pain and blood in stool  Endocrine: Negative  Negative for polydipsia and polyuria  Genitourinary: Negative  Negative for difficulty urinating and dysuria  Musculoskeletal:        As noted in HPI   Skin: Negative  Negative for pallor and rash  Neurological: Negative  Negative for dizziness and numbness  Hematological: Negative  Negative for adenopathy  Does not bruise/bleed easily  Psychiatric/Behavioral: Negative  Negative for confusion and suicidal ideas           Past Medical History:   Diagnosis Date    A-fib [facility])     Anxiety     Arthritis     shoulder    Carpal tunnel syndrome     Colon polyp     Depression     Disease of thyroid gland     Diverticulitis     Esophageal stricture     HBP (high blood pressure)     Hiatal hernia 06/07/2022    Small hiatal hernia    Hyperlipidemia     Hypertension     Hypothyroid     Pneumonia     2017    PONV (postoperative nausea and vomiting)     Schatzki's ring 06/07/2022    Schatzki's ring in the lower esophagus    Sleep apnea     mild        Past Surgical History:   Procedure Laterality Date    APPENDECTOMY      BREAST BIOPSY Right     benign    BREAST CYST ASPIRATION Right     benign    CATARACT EXTRACTION Bilateral     CATARACT EXTRACTION, BILATERAL  2019    CERVICAL FUSION N/A 2021    Procedure: FUSION CERVICAL POSTERIOR C1-C2 with allograft and neuromonitoring); Surgeon: Charlie Street MD;  [facility];  Service: Orthopedics    CHOLECYSTECTOMY      COLONOSCOPY  2022    COLONOSCOPY W/ BIOPSIES      EGD  2022    HAND SURGERY Right     MAMMO (HISTORICAL) Bilateral 2018    MAMMO (HISTORICAL) Bilateral 10/27/2017    MANDIBLE SURGERY      MANDIBLE SURGERY      WISDOM TOOTH EXTRACTION      X4       Family History   Problem Relation Age of Onset    Heart disease Mother     Hypertension Mother     Hyperlipidemia Mother     Heart failure Mother     Hypertension Sister        Social History     Occupational History    Not on file   Tobacco Use    Smoking status: Former Smoker     Quit date:      Years since quittin 7    Smokeless tobacco: Never Used   Vaping Use    Vaping Use: Never used   Substance and Sexual Activity    Alcohol use: Not Currently     Comment: last drink 7 years ago    Drug use: Not Currently    Sexual activity: Not Currently         Current Facility-Administered Medications:     bupivacaine liposomal (EXPAREL) 1 3 % injection 10 mL, 10 mL, Infiltration, Once, Lashell Courtney MD    lactated ringers infusion, 100 mL/hr, Intravenous, Continuous, Lashell Courtney MD, Last Rate: 100 mL/hr at 22 1257, 100 mL/hr at 22 1257    No Known Allergies    Objective:  Vitals:    22 1247   BP: 146/67   Pulse: 84   Resp: 18   Temp:    SpO2: 99%       Ortho Exam    Physical Exam  Constitutional:       General: She is not in acute distress  Appearance: She is well-developed  HENT:      Head: Normocephalic and atraumatic  Eyes:      General: No scleral icterus  Conjunctiva/sclera: Conjunctivae normal    Neck:      Vascular: No JVD  Cardiovascular:      Rate and Rhythm: Normal rate  Pulmonary:      Effort: Pulmonary effort is normal  No respiratory distress  Skin:     General: Skin is warm  Neurological:      Mental Status: She is alert and oriented to person, place, and time  Coordination: Coordination normal        Right shoulder: 4/5 strength internal rotation  This document was created using speech voice recognition software  Grammatical errors, random word insertions, pronoun errors, and incomplete sentences are an occasional consequence of this system due to software limitations, ambient noise, and hardware issues  Any formal questions or concerns about content, text, or information contained within the body of this dictation should be directly addressed to the provider for clarification

## 2022-09-27 NOTE — ANESTHESIA PROCEDURE NOTES
Peripheral Block    Patient location during procedure: holding area  Start time: 9/27/2022 2:06 PM  Reason for block: at surgeon's request and post-op pain management  Staffing  Performed: Anesthesiologist   Preanesthetic Checklist  Completed: patient identified, IV checked, site marked, risks and benefits discussed, surgical consent, monitors and equipment checked, pre-op evaluation and timeout performed  Peripheral Block  Patient position: supine  Prep: ChloraPrep  Patient monitoring: continuous pulse ox and frequent blood pressure checks  Block type: interscalene  Laterality: right  Injection technique: single-shot  Procedures: ultrasound guided, Ultrasound guidance required for the procedure to increase accuracy and safety of medication placement and decrease risk of complications  and nerve stimulator  Ultrasound permanent image savedlidocaine (PF) (XYLOCAINE-MPF) 1 % - Infiltration   1 mL - 9/27/2022 2:05:00 PM  bupivacaine (MARCAINE) 0 5 % - Perineural   15 mL - 9/27/2022 2:05:00 PM  midazolam (VERSED) 2 mg/2 mL - Intravenous   2 mg - 9/27/2022 2:00:00 PM  Needle  Needle type: [employer]   Needle gauge: 22 G  Needle length: 5 cm  Needle localization: ultrasound guidance and nerve stimulator  Needle insertion depth: 4 cm  Test dose: negative  Assessment  Injection assessment: incremental injection, local visualized surrounding nerve on ultrasound, negative aspiration for CSF, negative aspiration for heme and no paresthesia on injection  Paresthesia pain: none  Heart rate change: no  Slow fractionated injection: yes  patient tolerated the procedure well with no immediate complications  Additional Notes  0 5% Bupivacaine 15 ml was mixed with Exparel 10 ml for the block

## 2022-09-27 NOTE — ANESTHESIA POSTPROCEDURE EVALUATION
Post-Op Assessment Note    CV Status:  Stable       Mental Status:  Sleepy   Hydration Status:  Stable   PONV Controlled:  Controlled   Airway Patency:  Patent      Post Op Vitals Reviewed: Yes      Staff: CRNA         No complications documented      BP   111/78   Temp      Pulse  80   Resp 20   SpO2 97

## 2022-09-27 NOTE — ANESTHESIA PREPROCEDURE EVALUATION
Procedure:  ARTHROSCOPIC ROTATOR CUFF REPAIR OF SUBSCAPULARIS BICEPS TENODESIS EXTENSIVE DEBRIDEMENT (Right Shoulder)    Relevant Problems   ANESTHESIA (within normal limits)      CARDIO   (+) Atrial fibrillation with RVR (HCC)   (+) Hyperlipidemia   (+) Primary hypertension      ENDO   (+) Other specified hypothyroidism      GI/HEPATIC   (+) Gastroesophageal reflux disease without esophagitis      MUSCULOSKELETAL   (+) Spondylosis of cervical region without myelopathy or radiculopathy      NEURO/PSYCH   (+) Anxiety      PULMONARY   (+) Sleep apnea        Physical Exam    Airway    Mallampati score: II  TM Distance: >3 FB  Neck ROM: full     Dental   No notable dental hx     Cardiovascular  Rhythm: regular, Rate: normal,     Pulmonary  Breath sounds clear to auscultation,     Other Findings        Anesthesia Plan  ASA Score- 2     Anesthesia Type- regional with ASA Monitors  Additional Monitors:   Airway Plan:           Plan Factors-Exercise tolerance (METS): >4 METS  Chart reviewed  EKG reviewed  Existing labs reviewed  Patient summary reviewed  Patient is not a current smoker  Induction-     Postoperative Plan-     Informed Consent- Anesthetic plan and risks discussed with patient  I personally reviewed this patient with the CRNA  Discussed and agreed on the Anesthesia Plan with the CRNA  Chandrika Cardenas

## 2022-10-03 ENCOUNTER — TELEPHONE (OUTPATIENT)
Dept: FAMILY MEDICINE CLINIC | Facility: CLINIC | Age: 72
End: 2022-10-03

## 2022-10-03 DIAGNOSIS — Z47.1 AFTERCARE FOLLOWING RIGHT SHOULDER JOINT REPLACEMENT SURGERY: Primary | ICD-10-CM

## 2022-10-03 DIAGNOSIS — Z96.611 AFTERCARE FOLLOWING RIGHT SHOULDER JOINT REPLACEMENT SURGERY: Primary | ICD-10-CM

## 2022-10-03 DIAGNOSIS — Z74.2 NEEDS ASSISTANCE WHILE AT HOME: ICD-10-CM

## 2022-10-03 NOTE — TELEPHONE ENCOUNTER
Patient recently had surgery but is struggling to get her sling back on arm  She's asking if you could order visiting nurse to come to her house to help her with this    Peggi Clutter her daughter can't do it)

## 2022-10-03 NOTE — TELEPHONE ENCOUNTER
Can we put in a referral for [facility]? She had shoulder surgery   I would have thought Ortho would have ordered this but not sure

## 2022-10-05 ENCOUNTER — OFFICE VISIT (OUTPATIENT)
Dept: OBGYN CLINIC | Facility: CLINIC | Age: 72
End: 2022-10-05

## 2022-10-05 VITALS — TEMPERATURE: 98.7 F

## 2022-10-05 DIAGNOSIS — Z98.890 S/P RIGHT ROTATOR CUFF REPAIR: Primary | ICD-10-CM

## 2022-10-05 PROCEDURE — 99024 POSTOP FOLLOW-UP VISIT: CPT | Performed by: PHYSICIAN ASSISTANT

## 2022-10-05 NOTE — PROGRESS NOTES
Assessment/Plan:  1  S/P right rotator cuff repair  Ambulatory Referral to Physical Therapy     I did adjust the patient's sling for her today and showed her how to get this on off easily  She will start physical therapy in week on the rotator cuff repair protocol for both supraspinatus and infraspinatus tendons, as well as biceps tenodesis protocol  She will remain in her sling for the first 6 weeks post op  She will FU in 6 weeks for repeat evaluation  Subjective:   Jose Chowdhury is a 74 y o  female who presents today for follow-up of her right shoulder, now about a week status post arthroscopic rotator cuff repair of supraspinatus and subscapularis tendon as well as biceps tenodesis  She has been compliant with wearing her sling, though she has had significant trouble getting this on and off  She still notes discomfort about the shoulder  She notes good sensation of the right upper extremity  Review of Systems      Past Medical History:   Diagnosis Date    A-fib [facility])     Anxiety     Arthritis     shoulder    Carpal tunnel syndrome     Colon polyp     Depression     Disease of thyroid gland     Diverticulitis     Esophageal stricture     HBP (high blood pressure)     Hiatal hernia 06/07/2022    Small hiatal hernia    Hyperlipidemia     Hypertension     Hypothyroid     Pneumonia     2017    PONV (postoperative nausea and vomiting)     Schatzki's ring 06/07/2022    Schatzki's ring in the lower esophagus    Sleep apnea     mild        Past Surgical History:   Procedure Laterality Date    APPENDECTOMY      BREAST BIOPSY Right     benign    BREAST CYST ASPIRATION Right     benign    CATARACT EXTRACTION Bilateral     CATARACT EXTRACTION, BILATERAL  2019    CERVICAL FUSION N/A 05/25/2021    Procedure: FUSION CERVICAL POSTERIOR C1-C2 with allograft and neuromonitoring);   Surgeon: Faustina Anne MD;  [facility];  Service: Orthopedics    CHOLECYSTECTOMY      COLONOSCOPY 2022    COLONOSCOPY W/ BIOPSIES      EGD  2022    HAND SURGERY Right     MAMMO (HISTORICAL) Bilateral 2018    MAMMO (HISTORICAL) Bilateral 10/27/2017    MANDIBLE SURGERY      MANDIBLE SURGERY      OR SHLDR ARTHROSCOP,SURG,W/ROTAT CUFF REPR Right 2022    Procedure: ARTHROSCOPIC ROTATOR CUFF REPAIR OF SUBSCAPULARIS BICEPS TENODESIS EXTENSIVE DEBRIDEMENT;  Surgeon: Marisela Modi MD;  Location: [facility];  Service: Orthopedics    WISDOM TOOTH EXTRACTION      X4       Family History   Problem Relation Age of Onset    Heart disease Mother     Hypertension Mother     Hyperlipidemia Mother     Heart failure Mother     Hypertension Sister        Social History     Occupational History    Not on file   Tobacco Use    Smoking status: Former Smoker     Quit date:      Years since quittin 7    Smokeless tobacco: Never Used   Vaping Use    Vaping Use: Never used   Substance and Sexual Activity    Alcohol use: Not Currently     Comment: last drink 7 years ago    Drug use: Not Currently    Sexual activity: Not Currently         Current Outpatient Medications:     acetaminophen (TYLENOL) 500 mg tablet, Take 1 tablet (500 mg total) by mouth every 6 (six) hours as needed for moderate pain, Disp: 20 tablet, Rfl: 0    ALPRAZolam (XANAX) 0 5 mg tablet, TAKE 1 TABLET(0 5 MG) BY MOUTH TWICE DAILY AS NEEDED FOR ANXIETY, Disp: 60 tablet, Rfl: 5    atorvastatin (LIPITOR) 10 mg tablet, TAKE 1 TABLET BY MOUTH EVERY DAY AT BEDTIME, Disp: 90 tablet, Rfl: 3    diphenoxylate-atropine (LOMOTIL) 2 5-0 025 mg per tablet, Take 1 tablet by mouth 4 (four) times a day as needed for diarrhea, Disp: 120 tablet, Rfl: 1    levothyroxine 50 mcg tablet, TAKE 1 TABLET(50 MCG) BY MOUTH DAILY EARLY MORNING, Disp: 90 tablet, Rfl: 0    metoprolol tartrate (LOPRESSOR) 25 mg tablet, Take 1 tablet (25 mg total) by mouth every 12 (twelve) hours, Disp: 180 tablet, Rfl: 3    Multiple Vitamins-Minerals (CENTRUM ADULTS PO), Take 1 tablet by mouth daily, Disp: , Rfl:     omeprazole (PriLOSEC) 20 mg delayed release capsule, TAKE 1 CAPSULE(20 MG) BY MOUTH DAILY BEFORE BREAKFAST, Disp: 90 capsule, Rfl: 1    oxyCODONE (Roxicodone) 5 immediate release tablet, Take 1 tablet (5 mg total) by mouth every 4 (four) hours as needed for moderate pain for up to 15 doses Max Daily Amount: 30 mg, Disp: 15 tablet, Rfl: 0    ramipril (ALTACE) 10 MG capsule, TAKE 1 CAPSULE(10 MG) BY MOUTH TWICE DAILY, Disp: 180 capsule, Rfl: 3    warfarin (COUMADIN) 2 mg tablet, TAKE 1 AND 1/2 TO 2 TABLETS BY MOUTH DAILY OR AS DIRECTED BY PRESCRIBER (Patient taking differently: TAKE 1 AND 1/2 TO 2 TABLETS BY MOUTH DAILY OR AS DIRECTED BY PRESCRIBER  last dose 9/22/00), Disp: 180 tablet, Rfl: 3    No Known Allergies    Objective:  Vitals:    10/05/22 1447   Temp: 98 7 °F (37 1 °C)       Ortho Exam    Physical Exam    Right shoulder:  Sutures removed  Incisions clean dry and intact  No erythema appreciated  Shoulder range of motion and strength testing deferred  Patient moves all fingers freely  Sensation intact right upper extremity  2+ radial pulse      This document was created using speech voice recognition software  Grammatical errors, random word insertions, pronoun errors, and incomplete sentences are an occasional consequence of this system due to software limitations, ambient noise, and hardware issues  Any formal questions or concerns about content, text, or information contained within the body of this dictation should be directly addressed to the provider for clarification

## 2022-10-10 NOTE — PROGRESS NOTES
PT Evaluation     Today's date: 10/11/2022  Patient name: Pernell Hicks  : 1950  MRN: [id number]  Referring provider: Eda Page*  Dx:   Encounter Diagnosis     ICD-10-CM    1  Tear of right rotator cuff, unspecified tear extent, unspecified whether traumatic  M75 101 Ambulatory Referral to Physical Therapy   2  S/P shoulder surgery  [id number]        Start Time: 1405  Stop Time: 1450  Total time in clinic (min): 45 minutes    Assessment  Assessment details: Pernell Hicks is a 74y o  year old female who presents with acute right shoulder pain s/p RTC (supraspinatus and subscapularis) repair with biceps tenodesis performed by Dr Mohsen Brian on 22    Current deficits include impaired A/PROM, impaired RUE strength, pain, and inability to perform lifting/reaching/carrying based tasks which impaired her ability to perform all ADLs, household activities, and social/recreational activities  Signs and symptoms are as anticipated following  Recommend skilled PT services to address previously stated deficits to promote progress towards PLOF  Guarding noted during manual therapy this session with frequent verbal cuing required for relaxation with little carryover  Discussed importance of sling compliance and HEP compliance with patient verbalizing understanding  Impairments: abnormal muscle firing, abnormal muscle tone, abnormal or restricted ROM, activity intolerance, impaired physical strength, lacks appropriate home exercise program, pain with function, scapular dyskinesis, poor posture  and poor body mechanics  Barriers to therapy: Limited transportation  Understanding of Dx/Px/POC: good   Prognosis: good    Goals  STG (4 weeks):  1  L shoulder flexion PROM to 125* to promote ability to progress to next phase of post-op protocol  2  L shoulder ER PROM to 40* in scapular plane to promote ability to progress to next phase of post-op protocol    3  Decrease pain at worst from 5/10 to 3/10 or less for improved QoL  STG (6 weeks):  1  L shoulder PROM WFL to promote progress towards PLOF  2  Able to sleep through the night without waking secondary to pain to promote improved sleep quality  LTG (14 weeks):  1  Full L shoulder AROM to promote progress towards PLOF  2  Able to reach overhead with 0-1/10 pain to promote ability to perform ADLs and household tasks  3  Independent with HEP  Plan  Plan details: Thank you for referring Jasvir Carbone to Physical Therapy at [place] [id number] and for the opportunity to coordinate care  Patient would benefit from: skilled physical therapy  Planned modality interventions: cryotherapy, electrical stimulation/Russian stimulation, TENS, thermotherapy: hydrocollator packs and unattended electrical stimulation  Planned therapy interventions: abdominal trunk stabilization, activity modification, ADL retraining, balance, balance/weight bearing training, behavior modification, body mechanics training, breathing training, fine motor coordination training, flexibility, functional ROM exercises, gait training, graded activity, graded exercise, graded motor, home exercise program, work reintegration, transfer training, therapeutic training, therapeutic exercise, therapeutic activities, stretching, strengthening, self care, postural training, patient education, neuromuscular re-education, muscle pump exercises, motor coordination training, Rob taping, manual therapy, joint mobilization and IADL retraining  Frequency: 2x week  Duration in visits: 10  Plan of Care beginning date: 10/11/2022  Treatment plan discussed with: patient        Subjective Evaluation    History of Present Illness  Date of surgery: 9/27/2022  Mechanism of injury: surgery  Mechanism of injury: 10/11/22:  Jasvir Carbone is a 74 y o  female patient presenting s/p RTC (supraspinatus and subscapularis) repair with biceps tenodesis performed by Dr Tai Hermosillo on 9/27/22   Patricia Ansari currently having difficulty with dressing, lifting her arm, donning the sling, washing/self hygiene, laying down, sleeping  Pain is relieved or reduced with Tylenol, prescription pain medicine  Pt would like to return to being able to all typical activities  Next follow up with the physician is 22  Pain  Current pain ratin  At best pain ratin  At worst pain ratin  Location: R shoulder  Quality: sharp    Social Support  Lives with: adult children    Employment status: not working  Hand dominance: right    Treatments  Previous treatment: physical therapy  Current treatment: physical therapy  Patient Goals  Patient goal: 100% for the shoulder        Objective     Postural Observations    Additional Postural Observation Details  Rounded shoulders, forward head    Active Range of Motion   Cervical/Thoracic Spine       Cervical    Flexion:  Restriction level: minimal  Extension:  Restriction level: moderate  Left lateral flexion:  Restriction level: moderate  Right lateral flexion:  Restriction level moderate  Left Shoulder   Flexion: 140 degrees   Abduction: 155 degrees   External rotation BTH: T2   Internal rotation BTB: T12     Left Elbow   Flexion: WFL  Extension: WFL    Additional Active Range of Motion Details  R shoulder AROM to be assessed when appropriate  Passive Range of Motion     Right Shoulder   Flexion: 70 degrees with pain    Strength/Myotome Testing     Left Shoulder     Planes of Motion   Flexion: 4+   Abduction: 4   External rotation at 0°: 4   Internal rotation at 0°: 4     Left Elbow   Flexion: 4+  Extension: 4-    Additional Strength Details  R shoulder strength to be assessed when appropriate  Tests     Additional Tests Details  Special testing deferring secondary to post-op status         Flowsheet Rows    Flowsheet Row Most Recent Value   PT/OT G-Codes    Current Score 4   Projected Score 54           Precautions: Afib, anxiety, arthritis, depression, HTN, hiatal hernia, HLD, hypothyroidism, hx C1-2 fusion    * indicates given as part of HEP  HEP code: [id number]     Daily Treatment Diary    Manuals 10/11          R shoulder PROM done          R elbow PROM                                 Neuro Re-Ed           Scapular retraction with ER           Chin tuck           Prone I's           Prone T's           Prone Y's           Resisted W's           Sustained YWT's           Serratus push up           Plank shoulder taps           Plank to down dog                      Ther Ex           Patient education done          Week 1-4           UT stretch           Levator scap stretch           Scapular retraction           Elbow flex/ext* 2x10 R          Wrist flex/ext* 30x R          Gripping* Blue 2'          Table slides - flexion           Table slides - scaption           Shoulder ER with cane           Shoulder isometrics against wall (week 4)                      Week 5-7           Pulleys - flexion/scaption           Supine shoulder flexion with cane           TB rows           Bicep curls (week 6)           SL shoulder flexion           SL shoulder abduction           SL shoulder ER           Standing scaption w/ strap                      Week 8+           Foam roller up wall           TB lat pull down           Band pull aparts           IR/ER isometric walkouts           Active IR/ER           Band wall walks horizontal           Band wall walks vertical           Ball wall circles                                 Ther Activity           1 arm row           1 arm press                      Suitcase carry           90/90 carry           OH carry                                            Modalities

## 2022-10-11 ENCOUNTER — EVALUATION (OUTPATIENT)
Dept: PHYSICAL THERAPY | Facility: REHABILITATION | Age: 72
End: 2022-10-11
Payer: MEDICARE

## 2022-10-11 DIAGNOSIS — M75.101 TEAR OF RIGHT ROTATOR CUFF, UNSPECIFIED TEAR EXTENT, UNSPECIFIED WHETHER TRAUMATIC: Primary | ICD-10-CM

## 2022-10-11 DIAGNOSIS — Z98.890 S/P SHOULDER SURGERY: ICD-10-CM

## 2022-10-11 PROCEDURE — 97110 THERAPEUTIC EXERCISES: CPT | Performed by: PHYSICAL THERAPIST

## 2022-10-11 PROCEDURE — 97161 PT EVAL LOW COMPLEX 20 MIN: CPT | Performed by: PHYSICAL THERAPIST

## 2022-10-12 PROBLEM — A41.9 SEPSIS (HCC): Status: RESOLVED | Noted: 2022-01-24 | Resolved: 2022-10-12

## 2022-10-17 ENCOUNTER — OFFICE VISIT (OUTPATIENT)
Dept: PHYSICAL THERAPY | Facility: REHABILITATION | Age: 72
End: 2022-10-17
Payer: MEDICARE

## 2022-10-17 DIAGNOSIS — M75.101 TEAR OF RIGHT ROTATOR CUFF, UNSPECIFIED TEAR EXTENT, UNSPECIFIED WHETHER TRAUMATIC: Primary | ICD-10-CM

## 2022-10-17 DIAGNOSIS — Z98.890 S/P SHOULDER SURGERY: ICD-10-CM

## 2022-10-17 PROCEDURE — 97140 MANUAL THERAPY 1/> REGIONS: CPT | Performed by: PHYSICAL THERAPIST

## 2022-10-17 PROCEDURE — 97110 THERAPEUTIC EXERCISES: CPT | Performed by: PHYSICAL THERAPIST

## 2022-10-17 NOTE — PROGRESS NOTES
Daily Note     Today's date: 10/17/2022  Patient name: Pepe Eric  : 1950  MRN: [id number]  Referring provider: Bharath Finnegan*  Dx:   Encounter Diagnosis     ICD-10-CM    1  Tear of right rotator cuff, unspecified tear extent, unspecified whether traumatic  M75 101    2  S/P shoulder surgery  [id number]        Start Time: 1400  Stop Time: 1448  Total time in clinic (min): 48 minutes    Subjective: Tre Chou reports she's doing well at start of session  Objective: See treatment diary below      Assessment: Tolerated treatment well  Able to achieve shoulder flexion PROM to 90* this session without pain  Verbal cuing required to minimize guarding with fair carryover  Good tolerance to initiation of POC this session with reported improvement in symptoms with increased reps of gentle scapular retraction  Updated and reviewed HEP with patient; patient verbalized and demonstrated understanding of all exercises  Patient demonstrated fatigue post treatment, exhibited good technique with therapeutic exercises and would benefit from continued PT  Plan: Continue per plan of care  Progress treatment as tolerated         Precautions: Afib, anxiety, arthritis, depression, HTN, hiatal hernia, HLD, hypothyroidism, hx C1-2 fusion    * indicates given as part of HEP  HEP code: [facility]     Daily Treatment Diary    Manuals 10/11 10/17         R shoulder PROM done done         R elbow PROM                                 Neuro Re-Ed           Scapular retraction with ER           Chin tuck           Prone I's           Prone T's           Prone Y's           Resisted W's           Sustained YWT's           Serratus push up           Plank shoulder taps           Plank to down dog                      Ther Ex           Patient education done          Week 1-4           UT stretch*  10x10" ea         Levator scap stretch*  10x10" ea         Shoulder shrugs  3x10         Scapular retraction*  Gentle 3x10 Elbow flex/ext* 2x10 R 30x R         Wrist flex/ext* 30x R 30x         Gripping* Blue 2' Blue 2'         Table slides - flexion           Table slides - scaption           Shoulder ER with cane           Shoulder isometrics against wall (week 4)                      Week 5-7           Pulleys - flexion/scaption           Supine shoulder flexion with cane           TB rows           Bicep curls (week 6)           SL shoulder flexion           SL shoulder abduction           SL shoulder ER           Standing scaption w/ strap                      Week 8+           Foam roller up wall           TB lat pull down           Band pull aparts           IR/ER isometric walkouts           Active IR/ER           Band wall walks horizontal           Band wall walks vertical           Ball wall circles                                 Ther Activity           1 arm row           1 arm press                      Suitcase carry           90/90 carry           OH carry                                            Modalities           R shoulder CP  10' seated, post

## 2022-10-20 ENCOUNTER — OFFICE VISIT (OUTPATIENT)
Dept: PHYSICAL THERAPY | Facility: REHABILITATION | Age: 72
End: 2022-10-20
Payer: MEDICARE

## 2022-10-20 DIAGNOSIS — Z98.890 S/P SHOULDER SURGERY: ICD-10-CM

## 2022-10-20 DIAGNOSIS — M75.101 TEAR OF RIGHT ROTATOR CUFF, UNSPECIFIED TEAR EXTENT, UNSPECIFIED WHETHER TRAUMATIC: Primary | ICD-10-CM

## 2022-10-20 PROCEDURE — 97140 MANUAL THERAPY 1/> REGIONS: CPT | Performed by: PHYSICAL THERAPIST

## 2022-10-20 PROCEDURE — 97110 THERAPEUTIC EXERCISES: CPT | Performed by: PHYSICAL THERAPIST

## 2022-10-20 NOTE — PROGRESS NOTES
Daily Note     Today's date: 10/20/2022  Patient name: Esha Macias  : 1950  MRN: [id number]  Referring provider: Rashida Dillon*  Dx:   Encounter Diagnosis     ICD-10-CM    1  Tear of right rotator cuff, unspecified tear extent, unspecified whether traumatic  M75 101    2  S/P shoulder surgery  [id number]        Start Time: 8  Stop Time: 1528  Total time in clinic (min): 43 minutes    Subjective: Seferino Patterson reports she's doing well at start of session  Reports good compliance with HEP  Objective: See treatment diary below      Assessment: Tolerated treatment well  Improved tolerance to PROM this session with decreased guarding  Moderate verbal and tactile cuing required for proper scapular retraction and to minimize shoulder shrugging; fair ability to incorporate feedback into performance  Patient demonstrated appropriate level of challenge and muscular fatigue throughout session and noted good status at end of visit  Patient demonstrated fatigue post treatment, exhibited good technique with therapeutic exercises and would benefit from continued PT  Plan: Continue per plan of care  Progress treatment as tolerated         Precautions: Afib, anxiety, arthritis, depression, HTN, hiatal hernia, HLD, hypothyroidism, hx C1-2 fusion    * indicates given as part of HEP  HEP code: [facility]     Daily Treatment Diary    Manuals 10/11 10/17 10/20        R shoulder PROM done done done        R elbow PROM                                 Neuro Re-Ed           Scapular retraction with ER           Chin tuck           Prone I's           Prone T's           Prone Y's           Resisted W's           Sustained YWT's           Serratus push up           Plank shoulder taps           Plank to down dog                      Ther Ex           Patient education done          Week 1-4           UT stretch*  10x10" ea 10x10" ea        Levator scap stretch*  10x10" ea 10x10" ea        Shoulder shrugs  3x10 3x10 Scapular retraction*  Gentle 3x10 Gentle 3x10        Elbow flex/ext* 2x10 R 30x R 30x R        Wrist flex/ext* 30x R 30x 30x        Gripping* Blue 2' Blue 2' Blue 2'x2        Table slides - flexion   nv        Table slides - scaption           Shoulder ER with cane           Shoulder isometrics against wall (week 4)                      Week 5-7           Pulleys - flexion/scaption           Supine shoulder flexion with cane           TB rows           Bicep curls (week 6)           SL shoulder flexion           SL shoulder abduction           SL shoulder ER           Standing scaption w/ strap                      Week 8+           Foam roller up wall           TB lat pull down           Band pull aparts           IR/ER isometric walkouts           Active IR/ER           Band wall walks horizontal           Band wall walks vertical           Ball wall circles                                 Ther Activity           1 arm row           1 arm press                      Suitcase carry           90/90 carry           OH carry                                            Modalities           R shoulder CP  10' seated, post

## 2022-10-24 ENCOUNTER — OFFICE VISIT (OUTPATIENT)
Dept: PHYSICAL THERAPY | Facility: REHABILITATION | Age: 72
End: 2022-10-24
Payer: MEDICARE

## 2022-10-24 DIAGNOSIS — M75.101 TEAR OF RIGHT ROTATOR CUFF, UNSPECIFIED TEAR EXTENT, UNSPECIFIED WHETHER TRAUMATIC: Primary | ICD-10-CM

## 2022-10-24 DIAGNOSIS — Z98.890 S/P SHOULDER SURGERY: ICD-10-CM

## 2022-10-24 PROCEDURE — 97140 MANUAL THERAPY 1/> REGIONS: CPT | Performed by: PHYSICAL THERAPIST

## 2022-10-24 PROCEDURE — 97110 THERAPEUTIC EXERCISES: CPT | Performed by: PHYSICAL THERAPIST

## 2022-10-24 NOTE — PROGRESS NOTES
Daily Note     Today's date: 10/24/2022  Patient name: Luisito Gonsalves  : 1950  MRN: [id number]  Referring provider: Yvonne Lindsey*  Dx:   Encounter Diagnosis     ICD-10-CM    1  Tear of right rotator cuff, unspecified tear extent, unspecified whether traumatic  M75 101    2  S/P shoulder surgery  [id number]        Start Time: 1115  Stop Time: 1205  Total time in clinic (min): 50 minutes    Subjective: Kevin Quezada reports good compliance with HEP since last session  Does complain of R elbow and forearm pain which she attributes to the sling  Also reports difficulty sleeping due to the sling  Objective: See treatment diary below      Assessment: Tolerated treatment well  Continues to require verbal cuing for relaxation during PROM with limited carryover  Cuing also required to avoid shoulder ER with wrist flexion/extension with fair carryover  Patient demonstrated appropriate level of challenge and muscular fatigue throughout session and noted good status at end of visit  Patient demonstrated fatigue post treatment, exhibited good technique with therapeutic exercises and would benefit from continued PT  Plan: Continue per plan of care  Progress treatment as tolerated  Progress treament per protocol        Precautions: Afib, anxiety, arthritis, depression, HTN, hiatal hernia, HLD, hypothyroidism, hx C1-2 fusion    * indicates given as part of HEP  HEP code: [facility]     Daily Treatment Diary    Manuals 10/11 10/17 10/20 10/24       R shoulder PROM done done done done       R elbow PROM    done                             Neuro Re-Ed           Scapular retraction with ER           Chin tuck           Prone I's           Prone T's           Prone Y's           Resisted W's           Sustained YWT's           Serratus push up           Plank shoulder taps           Plank to down dog                      Ther Ex           Patient education done          Week 1-4           UT stretch*  10x10" ea 10x10" ea 10x10" ea       Levator scap stretch*  10x10" ea 10x10" ea 10x10" ea       Shoulder shrugs  3x10 3x10        Scapular retraction*  Gentle 3x10 Gentle 3x10 Gentle 3x10       Elbow flex/ext* 2x10 R 30x R 30x R 30x R       Wrist flex/ext* 30x R 30x 30x 30x       Gripping* Blue 2' Blue 2' Blue 2'x2        Table slides - flexion*   nv 20x5"       Table slides - scaption*    20x5"       Shoulder ER with cane           Shoulder isometrics against wall (week 4)    nv                  Week 5-7           Pulleys - flexion/scaption           Supine shoulder flexion with cane           TB rows           Bicep curls (week 6)           SL shoulder flexion           SL shoulder abduction           SL shoulder ER           Standing scaption w/ strap                      Week 8+           Foam roller up wall           TB lat pull down           Band pull aparts           IR/ER isometric walkouts           Active IR/ER           Band wall walks horizontal           Band wall walks vertical           Ball wall circles                                 Ther Activity           1 arm row           1 arm press                      Suitcase carry           90/90 carry           OH carry                                            Modalities           R shoulder CP  10' seated, post

## 2022-10-26 ENCOUNTER — OFFICE VISIT (OUTPATIENT)
Dept: CARDIOLOGY CLINIC | Facility: CLINIC | Age: 72
End: 2022-10-26
Payer: MEDICARE

## 2022-10-26 VITALS
SYSTOLIC BLOOD PRESSURE: 120 MMHG | OXYGEN SATURATION: 98 % | TEMPERATURE: 98 F | BODY MASS INDEX: 22.61 KG/M2 | WEIGHT: 127.6 LBS | HEIGHT: 63 IN | HEART RATE: 70 BPM | DIASTOLIC BLOOD PRESSURE: 60 MMHG

## 2022-10-26 DIAGNOSIS — Z79.01 CHRONIC ANTICOAGULATION: ICD-10-CM

## 2022-10-26 DIAGNOSIS — I10 PRIMARY HYPERTENSION: Primary | ICD-10-CM

## 2022-10-26 DIAGNOSIS — I48.0 PAF (PAROXYSMAL ATRIAL FIBRILLATION) (HCC): ICD-10-CM

## 2022-10-26 DIAGNOSIS — E78.5 HYPERLIPIDEMIA, UNSPECIFIED HYPERLIPIDEMIA TYPE: ICD-10-CM

## 2022-10-26 PROCEDURE — 99213 OFFICE O/P EST LOW 20 MIN: CPT | Performed by: INTERNAL MEDICINE

## 2022-10-26 NOTE — PROGRESS NOTES
[facility]    CHIEF COMPLAINT:   Chief Complaint   Patient presents with   • Follow-up     HPI:  Linda Bush is a 74 y o  female with a past medical history of hypertension, hyperlipidemia, paroxysmal atrial fibrillation, and warfarin anticoagulation who presents for routine follow up  She presented to the emergency department on 01/24/2022 after arriving for an EGD/colonoscopy due to a change in bowel habits and dysphagia  She was found to be in rapid atrial fibrillation  She had no prior diagnosis of this in the past   She was treated with IV Cardizem and oral Lopressor and converted to normal sinus rhythm  She was diagnosed with cellulitis/acute bursitis of her right elbow  She underwent I&D and cultures revealed MSSA  She was treated with cephalexin  TTE revealed preserved LVEF  Subsequently in the office we had switched her to warfarin due to the high cost of Eliquis  Interval history:  She did have her EGD and colonoscopy performed in June 2022  EGD revealed small sliding hiatal hernia and mild localized erythematous mucosa in the body of the stomach and antrum, Schatzki ring in lower 3rd of the esophagus and GE junction  Colonoscopy revealed multiple small scattered diverticula, internal hemorrhoids with no bleeding  She underwent right shoulder arthroscopy with rotator cuff repair subscapularis and supraspinatus on 09/27/2022  Coumadin was held 3 days prior to the procedure  She restarted her Coumadin shortly after surgery but has not had a repeat PT INR because she has been unable to drive  She is undergoing physical therapy and her girlfriend drives her to appointments  During our last office visit she did have some complaints of ambulatory dysfunction and a mechanical fall but no syncope  She has not had any recurrence of fall since then      Overall she feels well and denies any lightheadedness, visual changes, chest pain, palpitations, shortness of breath, orthopnea, PND, lower extremity swelling, bruising, bleeding  The following portions of the patient's history were reviewed and updated as appropriate: allergies, current medications, past family history, past medical history, past social history, past surgical history, and problem list     SINCE LAST OV I REVIEWED WITH THE PATIENT THE INTERIM LABS, TEST RESULTS, CONSULTANT(S) NOTES AND PERFORMED AN INTERIM REVIEW OF HISTORY    Past Medical History:   Diagnosis Date   • A-fib [facility])    • Anxiety    • Arthritis     shoulder   • Carpal tunnel syndrome    • Colon polyp    • Depression    • Disease of thyroid gland    • Diverticulitis    • Esophageal stricture    • HBP (high blood pressure)    • Hiatal hernia 06/07/2022    Small hiatal hernia   • Hyperlipidemia    • Hypertension    • Hypothyroid    • Pneumonia     2017   • PONV (postoperative nausea and vomiting)    • Schatzki's ring 06/07/2022    Schatzki's ring in the lower esophagus   • Sleep apnea     mild        Past Surgical History:   Procedure Laterality Date   • APPENDECTOMY     • BREAST BIOPSY Right     benign   • BREAST CYST ASPIRATION Right     benign   • CATARACT EXTRACTION Bilateral    • CATARACT EXTRACTION, BILATERAL  2019   • CERVICAL FUSION N/A 05/25/2021    Procedure: FUSION CERVICAL POSTERIOR C1-C2 with allograft and neuromonitoring);   Surgeon: Shalini Sena MD;  Location:  MAIN OR;  Service: Orthopedics   • CHOLECYSTECTOMY     • COLONOSCOPY  06/07/2022   • COLONOSCOPY W/ BIOPSIES  2011   • EGD  06/07/2022   • HAND SURGERY Right    • MAMMO (HISTORICAL) Bilateral 11/27/2018   • MAMMO (HISTORICAL) Bilateral 10/27/2017   • MANDIBLE SURGERY     • MANDIBLE SURGERY     • MT SHLDR ARTHROSCOP,SURG,W/ROTAT CUFF REPR Right 9/27/2022    Procedure: ARTHROSCOPIC ROTATOR CUFF REPAIR OF SUBSCAPULARIS BICEPS TENODESIS EXTENSIVE DEBRIDEMENT;  Surgeon: Jeana Gifford MD;  Location: [facility] OR;  Service: Orthopedics   • WISDOM TOOTH EXTRACTION X4       Social History     Socioeconomic History   • Marital status:      Spouse name: Not on file   • Number of children: Not on file   • Years of education: Not on file   • Highest education level: Not on file   Occupational History   • Not on file   Tobacco Use   • Smoking status: Former Smoker     Quit date:      Years since quittin 8   • Smokeless tobacco: Never Used   Vaping Use   • Vaping Use: Never used   Substance and Sexual Activity   • Alcohol use: Not Currently     Comment: last drink 7 years ago   • Drug use: Not Currently   • Sexual activity: Not Currently   Other Topics Concern   • Not on file   Social History Narrative    Do you currently or have you served in [employer]: No    Were you activated, into active duty, as a member of the [employer] or as a Reservist: No    Occupation: retired    Marital status:     Exercise level: Occasional treadmill    General stress level: Low    Alcohol intake: None stopped 7 years ago    Caffeine intake:  Moderate    Chewing tobacco: none    Illicit drugs: none    Guns present in home: No    Seat belts used routinely: Yes    Sunscreen used routinely: Yes    Smoke alarm in home: Yes    Advance directive: Yes     Social Determinants of Health     Financial Resource Strain: Not on file   Food Insecurity: Not on file   Transportation Needs: Not on file   Physical Activity: Not on file   Stress: Not on file   Social Connections: Not on file   Intimate Partner Violence: Not on file   Housing Stability: Not on file       Family History   Problem Relation Age of Onset   • Heart disease Mother    • Hypertension Mother    • Hyperlipidemia Mother    • Heart failure Mother    • Hypertension Sister        No Known Allergies    Current Outpatient Medications   Medication Sig Dispense Refill   • acetaminophen (TYLENOL) 500 mg tablet Take 1 tablet (500 mg total) by mouth every 6 (six) hours as needed for moderate pain 20 tablet 0   • ALPRAZolam (XANAX) 0 5 mg tablet TAKE 1 TABLET(0 5 MG) BY MOUTH TWICE DAILY AS NEEDED FOR ANXIETY 60 tablet 5   • atorvastatin (LIPITOR) 10 mg tablet TAKE 1 TABLET BY MOUTH EVERY DAY AT BEDTIME 90 tablet 3   • levothyroxine 50 mcg tablet TAKE 1 TABLET(50 MCG) BY MOUTH DAILY EARLY MORNING 90 tablet 0   • metoprolol tartrate (LOPRESSOR) 25 mg tablet Take 1 tablet (25 mg total) by mouth every 12 (twelve) hours 180 tablet 3   • Multiple Vitamins-Minerals (CENTRUM ADULTS PO) Take 1 tablet by mouth daily     • omeprazole (PriLOSEC) 20 mg delayed release capsule TAKE 1 CAPSULE(20 MG) BY MOUTH DAILY BEFORE BREAKFAST 90 capsule 1   • oxyCODONE (Roxicodone) 5 immediate release tablet Take 1 tablet (5 mg total) by mouth every 4 (four) hours as needed for moderate pain for up to 15 doses Max Daily Amount: 30 mg 15 tablet 0   • ramipril (ALTACE) 10 MG capsule TAKE 1 CAPSULE(10 MG) BY MOUTH TWICE DAILY 180 capsule 3   • warfarin (COUMADIN) 2 mg tablet TAKE 1 AND 1/2 TO 2 TABLETS BY MOUTH DAILY OR AS DIRECTED BY PRESCRIBER (Patient taking differently: TAKE 1 AND 1/2 TO 2 TABLETS BY MOUTH DAILY OR AS DIRECTED BY PRESCRIBER  last dose 9/22/00) 180 tablet 3     No current facility-administered medications for this visit  /60 (BP Location: Right arm, Patient Position: Sitting, Cuff Size: Standard)   Pulse 70   Temp 98 °F (36 7 °C)   Ht 5' 3" (1 6 m)   Wt 57 9 kg (127 lb 9 6 oz)   SpO2 98%   BMI 22 60 kg/m²     Review of Systems   All other systems reviewed and are negative  Physical Exam  Vitals reviewed  Constitutional:       General: She is not in acute distress  Appearance: She is not toxic-appearing  HENT:      Head: Normocephalic and atraumatic  Cardiovascular:      Rate and Rhythm: Normal rate and regular rhythm  Pulses: Normal pulses  Heart sounds: Normal heart sounds  No murmur heard  No gallop  Pulmonary:      Effort: Pulmonary effort is normal  No respiratory distress  Breath sounds: Normal breath sounds  No wheezing or rales  Abdominal:      General: Abdomen is flat  Bowel sounds are normal  There is no distension  Palpations: Abdomen is soft  Tenderness: There is no abdominal tenderness  There is no guarding  Musculoskeletal:      Right lower leg: No edema  Left lower leg: No edema  Skin:     General: Skin is warm and dry  Capillary Refill: Capillary refill takes less than 2 seconds  Coloration: Skin is not jaundiced or pale  Neurological:      Mental Status: She is alert  Psychiatric:         Mood and Affect: Mood normal          Behavior: Behavior normal           Lab Results   Component Value Date    K 4 2 09/06/2022     09/06/2022    CO2 31 09/06/2022    BUN 16 09/06/2022    CREATININE 0 91 09/06/2022    CALCIUM 9 6 09/06/2022    ALT 28 08/17/2022    AST 32 08/17/2022    INR 1 11 09/26/2022       Lab Results   Component Value Date    HDL 51 08/17/2022    LDLCALC 67 08/17/2022    TRIG 125 08/17/2022       Lab Results   Component Value Date    WBC 6 74 09/06/2022    HGB 12 1 09/06/2022    HCT 38 5 09/06/2022     09/06/2022       Lab Results   Component Value Date     04/26/2021    HGBA1C 5 8 (H) 04/26/2021     Cardiac studies:   No results found for this visit on 10/26/22  TTE - 1/25/22:  Left Ventricle Left ventricular cavity size is normal  Wall thickness is normal  The left ventricular ejection fraction is 60%  Systolic function is normal   Wall motion is normal  Diastolic function is normal    Right Ventricle Right ventricular cavity size is normal  Systolic function is normal  Wall thickness is normal    Left Atrium The atrium is normal in size  Right Atrium The atrium is normal in size  Aortic Valve The aortic valve is trileaflet  The leaflets are not thickened  The leaflets are not calcified  The leaflets exhibit normal mobility  There is mild regurgitation  There is no evidence of stenosis     Mitral Valve The mitral valve has normal function  There is mild annular calcification  There is moderate regurgitation  There is no evidence of stenosis  The valve has normal function  Tricuspid Valve Tricuspid valve structure is normal  There is moderate regurgitation  There is no evidence of stenosis  The estimated right ventricular systolic pressure is 80 93 mmHg  Pulmonic Valve Pulmonic valve structure is normal  There is no evidence of regurgitation  There is no evidence of stenosis  Ascending Aorta The aortic root is normal in size  IVC/SVC The right atrial pressure is estimated at 3 0 mmHg  The inferior vena cava is normal in size  Pericardium There is no pericardial effusion  The pericardium is normal in appearance  ASSESSMENT AND PLAN:  Mundo Pelletier was seen today for follow-up  Diagnoses and all orders for this visit:    #  Primary hypertension: Blood pressure is adequately controlled  Continue ramipril 10 mg daily  #  PAF (paroxysmal atrial fibrillation) ([facility] [address] )  #  Chronic anticoagulation  Nonvalvular  Diagnosed with new paroxysmal atrial fibrillation on 01/24/2022 after she had presented for an EGD and colonoscopy  She was treated with IV Cardizem and oral Lopressor which resulted in conversion to normal sinus rhythm  Rhythm is regular on exam today  Her metoprolol was previously decreased to 25 mg b i d  for complaints of fatigue  This seems to have improved after reducing the dose  Eliquis was unaffordable and she was switched over to warfarin  Continue metoprolol and warfarin at same dose  She is due for a repeat PT INR  #  Hyperlipidemia, unspecified hyperlipidemia type:  Lipids are very well controlled  Most recent lipid panel from 08/17/2022 with total cholesterol 143, triglycerides 125, HDL 51, LDL 67  Continue atorvastatin 10 mg daily      Dasia Grayson MD

## 2022-10-26 NOTE — PATIENT INSTRUCTIONS
You were seen today in the Cardiology office for follow up  Please continue your metoprolol tartrate 25 mg twice daily and your warfarin  You are due for blood work to check your PT INR or how thin your blood is  Thank you for choosing [address]  Please call our office or use [employer] with any questions

## 2022-10-28 ENCOUNTER — ANTICOAG VISIT (OUTPATIENT)
Dept: CARDIOLOGY CLINIC | Facility: CLINIC | Age: 72
End: 2022-10-28

## 2022-10-28 ENCOUNTER — OFFICE VISIT (OUTPATIENT)
Dept: PHYSICAL THERAPY | Facility: REHABILITATION | Age: 72
End: 2022-10-28
Payer: MEDICARE

## 2022-10-28 ENCOUNTER — APPOINTMENT (OUTPATIENT)
Dept: LAB | Facility: HOSPITAL | Age: 72
End: 2022-10-28
Attending: INTERNAL MEDICINE
Payer: MEDICARE

## 2022-10-28 DIAGNOSIS — I48.0 PAF (PAROXYSMAL ATRIAL FIBRILLATION) (HCC): Primary | ICD-10-CM

## 2022-10-28 DIAGNOSIS — Z98.890 S/P SHOULDER SURGERY: ICD-10-CM

## 2022-10-28 DIAGNOSIS — M75.101 TEAR OF RIGHT ROTATOR CUFF, UNSPECIFIED TEAR EXTENT, UNSPECIFIED WHETHER TRAUMATIC: Primary | ICD-10-CM

## 2022-10-28 PROCEDURE — 97140 MANUAL THERAPY 1/> REGIONS: CPT

## 2022-10-28 PROCEDURE — 97110 THERAPEUTIC EXERCISES: CPT

## 2022-10-28 NOTE — PROGRESS NOTES
Daily Note     Today's date: 10/28/2022  Patient name: Lester Bird  : 1950  MRN: [id number]  Referring provider: Cecy Cline*  Dx:   Encounter Diagnosis     ICD-10-CM    1  Tear of right rotator cuff, unspecified tear extent, unspecified whether traumatic  M75 101    2  S/P shoulder surgery  [id number] [id number]                   Subjective: Patient reports shoulder and elbow stiffness  Poor sleep quality  Objective: See treatment diary below      Assessment: Tolerated treatment fair  Frequent cuing to stay on task/corrections, fair carryover  Good execution of isometrics, cuing for posture  Global guarding with PROM, with frequent cuing to avoid actively helping with shoulder motions, it is difficult to achieve allowed ROM at this stage of recovery  Progress as able  Plan: Continue per plan of care         Precautions: Afib, anxiety, arthritis, depression, HTN, hiatal hernia, HLD, hypothyroidism, hx C1-2 fusion    * indicates given as part of HEP  HEP code: [facility]     Daily Treatment Diary    Manuals 10/11 10/17 10/20 10/24 10/28      R shoulder PROM done done done done done      R elbow PROM    done done                            Neuro Re-Ed           Scapular retraction with ER           Chin tuck           Prone I's           Prone T's           Prone Y's           Resisted W's           Sustained YWT's           Serratus push up           Plank shoulder taps           Plank to down dog                      Ther Ex           Patient education done          Week 1-4           UT stretch*  10x10" ea 10x10" ea 10x10" ea 10x10" ea      Levator scap stretch*  10x10" ea 10x10" ea 10x10" ea 10x10" ea      Shoulder shrugs  3x10 3x10        Scapular retraction*  Gentle 3x10 Gentle 3x10 Gentle 3x10 Gentle 3x10      Elbow flex/ext* 2x10 R 30x R 30x R 30x R 30x R      Wrist flex/ext* 30x R 30x 30x 30x 30x      Gripping* Blue 2' Blue 2' Blue 2'x2  Blue 2'x2      Table slides - flexion*   nv 20x5" 20x5"      Table slides - scaption*    20x5" 20x5"      Shoulder ER with cane           Shoulder isometrics against wall (week 4)    nv 10x5"                 Week 5-7           Pulleys - flexion/scaption           Supine shoulder flexion with cane           TB rows           Bicep curls (week 6)           SL shoulder flexion           SL shoulder abduction           SL shoulder ER           Standing scaption w/ strap                      Week 8+           Foam roller up wall           TB lat pull down           Band pull aparts           IR/ER isometric walkouts           Active IR/ER           Band wall walks horizontal           Band wall walks vertical           Ball wall circles                                 Ther Activity           1 arm row           1 arm press                      Suitcase carry           90/90 carry           OH carry                                            Modalities           R shoulder CP  10' seated, post

## 2022-11-02 ENCOUNTER — OFFICE VISIT (OUTPATIENT)
Dept: PHYSICAL THERAPY | Facility: REHABILITATION | Age: 72
End: 2022-11-02

## 2022-11-02 DIAGNOSIS — M75.101 TEAR OF RIGHT ROTATOR CUFF, UNSPECIFIED TEAR EXTENT, UNSPECIFIED WHETHER TRAUMATIC: Primary | ICD-10-CM

## 2022-11-02 DIAGNOSIS — Z98.890 S/P SHOULDER SURGERY: ICD-10-CM

## 2022-11-02 NOTE — PROGRESS NOTES
Daily Note     Today's date: 2022  Patient name: Kristian Acosta  : 1950  MRN: [id number]  Referring provider: Lexi Serra*  Dx:   Encounter Diagnosis     ICD-10-CM    1  Tear of right rotator cuff, unspecified tear extent, unspecified whether traumatic  M75 101    2  S/P shoulder surgery  [id number] [id number]        Start Time: 1050  Stop Time: 6026  Total time in clinic (min): 55 minutes    Subjective: Temi Parks reports she's doing well at start of session  Notes compliance with sling and HEP  Objective: See treatment diary below      Assessment: Tolerated treatment well  Introduced pulleys this session with good tolerance, no reports of pain  Slow performance of exercises throughout session but demonstrates good form  Discussed weaning out of sling per post-op protocol with patient verbalizing understanding  Patient demonstrated appropriate level of challenge and muscular fatigue throughout session and noted good status at end of visit  Patient demonstrated fatigue post treatment, exhibited good technique with therapeutic exercises and would benefit from continued PT  Plan: Continue per plan of care  Progress treatment as tolerated         Precautions: Afib, anxiety, arthritis, depression, HTN, hiatal hernia, HLD, hypothyroidism, hx C1-2 fusion    * indicates given as part of HEP  HEP code: [facility]     Daily Treatment Diary    Manuals 10/11 10/17 10/20 10/24 10/28 11/2     R shoulder PROM done done done done done done     R elbow PROM    done done done                           Neuro Re-Ed           Scapular retraction with ER           Chin tuck           Prone I's           Prone T's           Prone Y's           Resisted W's           Sustained YWT's           Serratus push up           Plank shoulder taps           Plank to down dog                      Ther Ex           Patient education done          Week 1-4           UT stretch*  10x10" ea 10x10" ea 10x10" ea 10x10" ea      Levator scap stretch*  10x10" ea 10x10" ea 10x10" ea 10x10" ea      Shoulder shrugs  3x10 3x10        Scapular retraction*  Gentle 3x10 Gentle 3x10 Gentle 3x10 Gentle 3x10      Elbow flex/ext* 2x10 R 30x R 30x R 30x R 30x R      Wrist flex/ext* 30x R 30x 30x 30x 30x      Gripping* Blue 2' Blue 2' Blue 2'x2  Blue 2'x2      Table slides - flexion*   nv 20x5" 20x5" 20x5"     Table slides - scaption*    20x5" 20x5" 20x5"     Shoulder ER with cane           Shoulder isometrics against wall (week 4)    nv 10x5" 10x5"                Week 5-7           Pulleys - flexion/scaption      3'/3'     Supine shoulder flexion with cane           TB rows           Bicep curls (week 6)           SL shoulder flexion           SL shoulder abduction           SL shoulder ER           Standing scaption w/ strap                      Week 8+           Foam roller up wall           TB lat pull down           Band pull aparts           IR/ER isometric walkouts           Active IR/ER           Band wall walks horizontal           Band wall walks vertical           Ball wall circles                                 Ther Activity           1 arm row           1 arm press                      Suitcase carry           90/90 carry           OH carry                                            Modalities           R shoulder CP  10' seated, post

## 2022-11-04 ENCOUNTER — OFFICE VISIT (OUTPATIENT)
Dept: PHYSICAL THERAPY | Facility: REHABILITATION | Age: 72
End: 2022-11-04

## 2022-11-04 DIAGNOSIS — M75.101 TEAR OF RIGHT ROTATOR CUFF, UNSPECIFIED TEAR EXTENT, UNSPECIFIED WHETHER TRAUMATIC: Primary | ICD-10-CM

## 2022-11-04 DIAGNOSIS — Z98.890 S/P SHOULDER SURGERY: ICD-10-CM

## 2022-11-04 NOTE — PROGRESS NOTES
Daily Note     Today's date: 2022  Patient name: Lester Bird  : 1950  MRN: [id number]  Referring provider: Cecy Cline*  Dx:   Encounter Diagnosis     ICD-10-CM    1  Tear of right rotator cuff, unspecified tear extent, unspecified whether traumatic  M75 101    2  S/P shoulder surgery  [id number] [id number]        Start Time: 1104  Stop Time: 1157  Total time in clinic (min): 53 minutes    Subjective: Wilmer states she hasn't worn her sling much since last session but has still be wearing the sling periodically  Objective: See treatment diary below      Assessment: Tolerated treatment well  Gradual improvement in PROM but still limited, most limited in R shoulder abduction  Continued slow performance of exercises this session  No reports of pain throughout session and noted good status at end of visit  Patient demonstrated fatigue post treatment, exhibited good technique with therapeutic exercises and would benefit from continued PT  Plan: Continue per plan of care  Progress treatment as tolerated         Precautions: Afib, anxiety, arthritis, depression, HTN, hiatal hernia, HLD, hypothyroidism, hx C1-2 fusion    * indicates given as part of HEP  HEP code: [facility]     Daily Treatment Diary    Manuals 10/11 10/17 10/20 10/24 10/28 11/2 11/4    R shoulder PROM done done done done done done done    R elbow PROM    done done done                           Neuro Re-Ed           Scapular retraction with ER           Chin tuck           Prone I's           Prone T's           Prone Y's           Resisted W's           Sustained YWT's           Serratus push up           Plank shoulder taps           Plank to down dog                      Ther Ex           Patient education done          Week 1-4           UT stretch*  10x10" ea 10x10" ea 10x10" ea 10x10" ea      Levator scap stretch*  10x10" ea 10x10" ea 10x10" ea 10x10" ea      Shoulder shrugs  3x10 3x10        Scapular retraction*  Gentle 3x10 Gentle 3x10 Gentle 3x10 Gentle 3x10      Elbow flex/ext* 2x10 R 30x R 30x R 30x R 30x R      Wrist flex/ext* 30x R 30x 30x 30x 30x      Gripping* Blue 2' Blue 2' Blue 2'x2  Blue 2'x2      Table slides - flexion*   nv 20x5" 20x5" 20x5" 20x5"    Table slides - scaption*    20x5" 20x5" 20x5" 20x5"    Shoulder ER with cane       20x5" seated w/ wedge    Shoulder isometrics against wall (week 4)    nv 10x5" 10x5" 10x5"               Week 5-7           Pulleys - flexion/scaption      3'/3' 3'/3'    Supine shoulder flexion with cane           TB rows           Bicep curls (week 6)           SL shoulder flexion           SL shoulder abduction           SL shoulder ER           Standing scaption w/ strap                      Week 8+           Foam roller up wall           TB lat pull down           Band pull aparts           IR/ER isometric walkouts           Active IR/ER           Band wall walks horizontal           Band wall walks vertical           Ball wall circles                                 Ther Activity           1 arm row           1 arm press                      Suitcase carry           90/90 carry           OH carry                                            Modalities           R shoulder CP  10' seated, post

## 2022-11-07 ENCOUNTER — RA CDI HCC (OUTPATIENT)
Dept: OTHER | Facility: HOSPITAL | Age: 72
End: 2022-11-07

## 2022-11-07 NOTE — PROGRESS NOTES
[id number]  [facility]  coding opportunities          Chart Reviewed number of suggestions sent to Provider: 1     Patients Insurance     Medicare Insurance: [employer]

## 2022-11-09 ENCOUNTER — OFFICE VISIT (OUTPATIENT)
Dept: PHYSICAL THERAPY | Facility: REHABILITATION | Age: 72
End: 2022-11-09

## 2022-11-09 DIAGNOSIS — M75.101 TEAR OF RIGHT ROTATOR CUFF, UNSPECIFIED TEAR EXTENT, UNSPECIFIED WHETHER TRAUMATIC: Primary | ICD-10-CM

## 2022-11-09 DIAGNOSIS — Z98.890 S/P SHOULDER SURGERY: ICD-10-CM

## 2022-11-09 NOTE — PROGRESS NOTES
Daily Note     Today's date: 2022  Patient name: Digna Frank  : 1950  MRN: [id number]  Referring provider: Deja Bernard*  Dx:   Encounter Diagnosis     ICD-10-CM    1  Tear of right rotator cuff, unspecified tear extent, unspecified whether traumatic  M75 101    2  S/P shoulder surgery  [id number]        Start Time: 1052  Stop Time: 8454  Total time in clinic (min): 50 minutes    Subjective: Supriya Albert reports a lot of shoulder stiffness yesterday and today and states "I just can't seem to lift it "      Objective: See treatment diary below      Assessment: Tolerated treatment well  Steady, gradual improvement in R shoulder PROM compared to previous sessions  Introduced AAROM shoulder flexion this session per post-op protocol with good tolerance  Patient demonstrated appropriate level of challenge and muscular fatigue throughout session and noted good status at end of visit  Updated and reviewed HEP with patient; patient verbalized and demonstrated understanding of all exercises  Patient demonstrated fatigue post treatment, exhibited good technique with therapeutic exercises and would benefit from continued PT  Plan: Continue per plan of care  Progress treatment as tolerated         Precautions: Afib, anxiety, arthritis, depression, HTN, hiatal hernia, HLD, hypothyroidism, hx C1-2 fusion    * indicates given as part of HEP  HEP code: [facility]     Daily Treatment Diary    Manuals 10/11 10/17 10/20 10/24 10/28 11/2 11/4 11/9   R shoulder PROM done done done done done done done done   R elbow PROM    done done done                           Neuro Re-Ed           Scapular retraction with ER           Chin tuck           Prone I's           Prone T's           Prone Y's           Resisted W's           Sustained YWT's           Serratus push up           Plank shoulder taps           Plank to down dog                      Ther Ex           Patient education done          Week 1-4           UT stretch*  10x10" ea 10x10" ea 10x10" ea 10x10" ea      Levator scap stretch*  10x10" ea 10x10" ea 10x10" ea 10x10" ea      Shoulder shrugs  3x10 3x10        Scapular retraction*  Gentle 3x10 Gentle 3x10 Gentle 3x10 Gentle 3x10      Elbow flex/ext* 2x10 R 30x R 30x R 30x R 30x R      Wrist flex/ext* 30x R 30x 30x 30x 30x      Gripping* Blue 2' Blue 2' Blue 2'x2  Blue 2'x2      Table slides - flexion*   nv 20x5" 20x5" 20x5" 20x5" 20x5"   Table slides - scaption*    20x5" 20x5" 20x5" 20x5" 20x5"   Shoulder ER with cane       20x5" seated w/ wedge 20x5" seated w/ wedge   Shoulder isometrics against wall (week 4)    nv 10x5" 10x5" 10x5"               Week 5-7           Pulleys - flexion/scaption      3'/3' 3'/3' 3'/3'   Supine shoulder flexion with cane*        2x10x5"   TB rows        Pink TB 3x10   Bicep curls (week 6)           SL shoulder flexion           SL shoulder abduction           SL shoulder ER           Standing scaption w/ strap                      Week 8+           Foam roller up wall           TB lat pull down           Band pull aparts           IR/ER isometric walkouts           Active IR/ER           Band wall walks horizontal           Band wall walks vertical           Ball wall circles                                 Ther Activity           1 arm row           1 arm press                      Suitcase carry           90/90 carry           OH carry                                            Modalities           R shoulder CP  10' seated, post

## 2022-11-10 ENCOUNTER — TELEPHONE (OUTPATIENT)
Dept: OBGYN CLINIC | Facility: CLINIC | Age: 72
End: 2022-11-10

## 2022-11-10 NOTE — TELEPHONE ENCOUNTER
I called patient and YUMIKO stating we scheduled her [employer] Ride  will be at 1:00 Pm on 11/16/2022 her appointment is at 2:00 PM  Advised this is not a guarantee that someone will  her ride, but we did schedule  If she has any questions she can give our office a call

## 2022-11-11 ENCOUNTER — OFFICE VISIT (OUTPATIENT)
Dept: PHYSICAL THERAPY | Facility: REHABILITATION | Age: 72
End: 2022-11-11

## 2022-11-11 DIAGNOSIS — Z98.890 S/P SHOULDER SURGERY: ICD-10-CM

## 2022-11-11 DIAGNOSIS — M75.101 TEAR OF RIGHT ROTATOR CUFF, UNSPECIFIED TEAR EXTENT, UNSPECIFIED WHETHER TRAUMATIC: Primary | ICD-10-CM

## 2022-11-11 NOTE — PROGRESS NOTES
Daily Note     Today's date: 2022  Patient name: Cierra Whitfield  : 1950  MRN: [id number]  Referring provider: Andres Duong*  Dx:   Encounter Diagnosis     ICD-10-CM    1  Tear of right rotator cuff, unspecified tear extent, unspecified whether traumatic  M75 101    2  S/P shoulder surgery  [id number]        Start Time: 1032  Stop Time: 009  Total time in clinic (min): 40 minutes    Subjective: Nan Nuñez reports R shoulder stiffness at start of session  Objective: See treatment diary below      Assessment: Tolerated treatment well  Demonstrates improving PROM with self stretches this session with no reports of pain or discomfort  Introduced prone Is for improved scapular strength and stability  Patient demonstrated appropriate level of challenge and muscular fatigue throughout session and noted good status at end of visit  Patient demonstrated fatigue post treatment, exhibited good technique with therapeutic exercises and would benefit from continued PT  Plan: Continue per plan of care  Progress treatment as tolerated         Precautions: Afib, anxiety, arthritis, depression, HTN, hiatal hernia, HLD, hypothyroidism, hx C1-2 fusion    * indicates given as part of HEP  HEP code: [facility]     Daily Treatment Diary    Manuals 11/11  10/20 10/24 10/28 11/2 11/4 11/9   R shoulder PROM   done done done done done done   R elbow PROM    done done done                           Neuro Re-Ed           Scapular retraction with ER           Chin tuck           Prone I's 3x10 R arm off table          Prone T's           Prone Y's           Resisted W's           Sustained YWT's           Serratus push up           Plank shoulder taps           Plank to down dog                      Ther Ex           Patient education           Week 1-4           UT stretch*   10x10" ea 10x10" ea 10x10" ea      Levator scap stretch*   10x10" ea 10x10" ea 10x10" ea      Shoulder shrugs   3x10        Scapular retraction* Gentle 3x10 Gentle 3x10 Gentle 3x10      Elbow flex/ext*   30x R 30x R 30x R      Wrist flex/ext*   30x 30x 30x      Gripping*   Blue 2'x2  Blue 2'x2      Table slides - flexion* 20x5"  nv 20x5" 20x5" 20x5" 20x5" 20x5"   Table slides - scaption* 20x5"   20x5" 20x5" 20x5" 20x5" 20x5"   Shoulder ER with cane 20x5"      20x5" seated w/ wedge 20x5" seated w/ wedge   Shoulder isometrics against wall (week 4)    nv 10x5" 10x5" 10x5"               Week 5-7           Pulleys - flexion/scaption 3'/3'     3'/3' 3'/3' 3'/3'   Supine shoulder flexion with cane* 20x5"       2x10x5"   TB rows Pink TB 3x10       Pink TB 3x10   Bicep curls (week 6)           SL shoulder flexion           SL shoulder abduction           SL shoulder ER           Standing scaption w/ strap                      Week 8+           Foam roller up wall           TB lat pull down           Band pull aparts           IR/ER isometric walkouts           Active IR/ER           Band wall walks horizontal           Band wall walks vertical           Ball wall circles                                 Ther Activity           1 arm row           1 arm press                      Suitcase carry           90/90 carry           OH carry                                            Modalities           R shoulder CP

## 2022-11-14 ENCOUNTER — EVALUATION (OUTPATIENT)
Dept: PHYSICAL THERAPY | Facility: REHABILITATION | Age: 72
End: 2022-11-14

## 2022-11-14 DIAGNOSIS — Z98.890 S/P SHOULDER SURGERY: ICD-10-CM

## 2022-11-14 DIAGNOSIS — M75.101 TEAR OF RIGHT ROTATOR CUFF, UNSPECIFIED TEAR EXTENT, UNSPECIFIED WHETHER TRAUMATIC: Primary | ICD-10-CM

## 2022-11-14 NOTE — PROGRESS NOTES
PT Re-Evaluation     Today's date: 2022  Patient name: Teofilo Angelucci  : 1950  MRN: [id number]  Referring provider: Richard Lockhart*  Dx:   Encounter Diagnosis     ICD-10-CM    1  Tear of right rotator cuff, unspecified tear extent, unspecified whether traumatic  M75 101    2  S/P shoulder surgery  [id number]        Start Time: 1183  Stop Time: 1213  Total time in clinic (min): 38 minutes    Assessment  Assessment details: Per patient report and clinical findings, Teofilo Angelucci has made fair progress since starting skilled physical therapy services  Teofilo Angelucci has been compliant with PT POC and HEP since initial evaluation  To this date, Teofilo Angelucci has completed 10 visits of skilled physical therapy  Teofilo Angelucci demonstrates improvements in objective data however, continues to be limited compared to her prior level of function  Remaining deficits include limited P/AROM, impaired RUE strength, and inability to perform lifting, carrying, pushing, pulling based tasks  Recommend continued skilled physical therapy services to address remaining deficits to promote progress towards her prior level of function  Impairments: abnormal muscle firing, abnormal muscle tone, abnormal or restricted ROM, activity intolerance, impaired physical strength, lacks appropriate home exercise program, pain with function, scapular dyskinesis, poor posture  and poor body mechanics  Barriers to therapy: Limited transportation  Understanding of Dx/Px/POC: good   Prognosis: good    Goals  STG (4 weeks):  1  L shoulder flexion PROM to 125* to promote ability to progress to next phase of post-op protocol  - progressing  2  L shoulder ER PROM to 40* in scapular plane to promote ability to progress to next phase of post-op protocol  - met  3  Decrease pain at worst from 5/10 to 3/10 or less for improved QoL  - progressing    STG (6 weeks):  1  L shoulder PROM WFL to promote progress towards PLOF   - progressing  2  Able to sleep through the night without waking secondary to pain to promote improved sleep quality  - progressing    LTG (14 weeks):  1  Full L shoulder AROM to promote progress towards PLOF  - progressing  2  Able to reach overhead with 0-1/10 pain to promote ability to perform ADLs and household tasks  - progressing  3  Independent with HEP  - progressing    Plan  Plan details: Thank you for referring Gisselle Blue to Physical Therapy at [place] [address] and for the opportunity to coordinate care  Patient would benefit from: skilled physical therapy  Planned modality interventions: cryotherapy, electrical stimulation/Russian stimulation, TENS, thermotherapy: hydrocollator packs and unattended electrical stimulation  Planned therapy interventions: abdominal trunk stabilization, activity modification, ADL retraining, balance, balance/weight bearing training, behavior modification, body mechanics training, breathing training, fine motor coordination training, flexibility, functional ROM exercises, gait training, graded activity, graded exercise, graded motor, home exercise program, work reintegration, transfer training, therapeutic training, therapeutic exercise, therapeutic activities, stretching, strengthening, self care, postural training, patient education, neuromuscular re-education, muscle pump exercises, motor coordination training, Rob taping, manual therapy, joint mobilization and IADL retraining  Frequency: 2x week  Duration in visits: 10  Plan of Care beginning date: 11/14/2022  Treatment plan discussed with: patient        Subjective Evaluation    History of Present Illness  Date of surgery: 9/27/2022  Mechanism of injury: surgery  Mechanism of injury:   11/14/22:  Gisselle Blue reports feeling she has made 80% progress since IE  Neftali Nixon reports improvements in ability to lift her arm to get dressed, cooking/baking, ability to go grocery shopping, motion   Neftali Nixon reports good compliance with HEP  Despite improvements, Osvaldo Corona reports continued difficulty with lifting the arm, ability to drive, and ability to lift and carry things  10/11/22:  Arlington Callow is a 74 y o  female patient presenting s/p RTC (supraspinatus and subscapularis) repair with biceps tenodesis performed by Dr Paloma Lewis on 22  Osvaldo Corona currently having difficulty with dressing, lifting her arm, donning the sling, washing/self hygiene, laying down, sleeping  Pain is relieved or reduced with Tylenol, prescription pain medicine  Pt would like to return to being able to all typical activities  Next follow up with the physician is 22  Pain  Current pain ratin  At best pain ratin  At worst pain ratin  Location: R shoulder  Quality: sharp (soreness)    Social Support  Lives with: adult children    Employment status: not working  Hand dominance: right    Treatments  Previous treatment: physical therapy  Current treatment: physical therapy  Patient Goals  Patient goal: 100% for the shoulder        Objective     Postural Observations    Additional Postural Observation Details  Rounded shoulders, forward head    Active Range of Motion   Cervical/Thoracic Spine       Cervical    Flexion:  Restriction level: minimal  Extension:  Restriction level: moderate  Left lateral flexion:  Restriction level: moderate  Right lateral flexion:  Restriction level moderate  Left Shoulder   Flexion: 140 degrees   Abduction: 155 degrees   External rotation BTH: T2   Internal rotation BTB: T12     Left Elbow   Flexion: WFL  Extension: WFL    Additional Active Range of Motion Details  R shoulder AROM to be assessed when appropriate      Passive Range of Motion     Right Shoulder   Flexion: 120 degrees   Abduction: 110 degrees   External rotation 90°: 45 degrees     Strength/Myotome Testing     Left Shoulder     Planes of Motion   Flexion: 4+   Abduction: 4   External rotation at 0°: 4   Internal rotation at 0°: 4     Left Elbow Flexion: 4+  Extension: 4-    Additional Strength Details  R shoulder strength to be assessed when appropriate  Tests     Additional Tests Details  Special testing deferring secondary to post-op status              Precautions: Afib, anxiety, arthritis, depression, HTN, hiatal hernia, HLD, hypothyroidism, hx C1-2 fusion    * indicates given as part of HEP  HEP code: [facility]     Daily Treatment Diary    Manuals 11/11 11/14  10/24 10/28 11/2 11/4 11/9   R shoulder PROM  done  done done done done done   R elbow PROM    done done done                           Neuro Re-Ed           Scapular retraction with ER           Chin tuck           Prone I's 3x10 R arm off table          Prone T's           Prone Y's           Resisted W's           Sustained YWT's           Serratus push up           Plank shoulder taps           Plank to down dog                      Ther Ex           Patient education           Week 1-4           UT stretch*    10x10" ea 10x10" ea      Levator scap stretch*    10x10" ea 10x10" ea      Shoulder shrugs           Scapular retraction*    Gentle 3x10 Gentle 3x10      Elbow flex/ext*    30x R 30x R      Wrist flex/ext*    30x 30x      Gripping*     Blue 2'x2      Table slides - flexion* 20x5" 20x5"  20x5" 20x5" 20x5" 20x5" 20x5"   Table slides - scaption* 20x5" 20x5"  20x5" 20x5" 20x5" 20x5" 20x5"   Shoulder ER with cane 20x5" 20x5"     20x5" seated w/ wedge 20x5" seated w/ wedge   Shoulder isometrics against wall (week 4)    nv 10x5" 10x5" 10x5"               Week 5-7           Pulleys - flexion/scaption 3'/3' 3'/3'    3'/3' 3'/3' 3'/3'   Supine shoulder flexion with cane* 20x5" 20x5"      2x10x5"   TB rows Pink TB 3x10 Pink TB 3x12      Pink TB 3x10   Bicep curls (week 6)           SL shoulder flexion           SL shoulder abduction           SL shoulder ER           Standing scaption w/ strap                      Week 8+           Foam roller up wall           TB lat pull down Band pull aparts           IR/ER isometric walkouts           Active IR/ER           Band wall walks horizontal           Band wall walks vertical           Ball wall circles                                 Ther Activity           1 arm row           1 arm press                      Suitcase carry           90/90 carry           OH carry                                            Modalities           R shoulder CP

## 2022-11-15 ENCOUNTER — OFFICE VISIT (OUTPATIENT)
Dept: FAMILY MEDICINE CLINIC | Facility: CLINIC | Age: 72
End: 2022-11-15

## 2022-11-15 VITALS
TEMPERATURE: 96.8 F | HEART RATE: 62 BPM | SYSTOLIC BLOOD PRESSURE: 102 MMHG | BODY MASS INDEX: 22.15 KG/M2 | HEIGHT: 63 IN | OXYGEN SATURATION: 99 % | DIASTOLIC BLOOD PRESSURE: 60 MMHG | WEIGHT: 125 LBS | RESPIRATION RATE: 16 BRPM

## 2022-11-15 DIAGNOSIS — I10 PRIMARY HYPERTENSION: Primary | ICD-10-CM

## 2022-11-15 DIAGNOSIS — Z12.39 ENCOUNTER FOR SCREENING FOR MALIGNANT NEOPLASM OF BREAST, UNSPECIFIED SCREENING MODALITY: ICD-10-CM

## 2022-11-15 DIAGNOSIS — M81.0 AGE-RELATED OSTEOPOROSIS WITHOUT CURRENT PATHOLOGICAL FRACTURE: ICD-10-CM

## 2022-11-15 DIAGNOSIS — Z13.820 OSTEOPOROSIS SCREENING: ICD-10-CM

## 2022-11-15 DIAGNOSIS — Z79.01 CHRONIC ANTICOAGULATION: ICD-10-CM

## 2022-11-15 DIAGNOSIS — E03.8 OTHER SPECIFIED HYPOTHYROIDISM: ICD-10-CM

## 2022-11-15 DIAGNOSIS — M47.812 SPONDYLOSIS OF CERVICAL REGION WITHOUT MYELOPATHY OR RADICULOPATHY: ICD-10-CM

## 2022-11-15 DIAGNOSIS — I48.91 ATRIAL FIBRILLATION WITH RVR (HCC): ICD-10-CM

## 2022-11-15 DIAGNOSIS — E78.5 HYPERLIPIDEMIA, UNSPECIFIED HYPERLIPIDEMIA TYPE: ICD-10-CM

## 2022-11-15 DIAGNOSIS — I48.0 PAF (PAROXYSMAL ATRIAL FIBRILLATION) (HCC): ICD-10-CM

## 2022-11-15 DIAGNOSIS — Z23 INFLUENZA VACCINE ADMINISTERED: ICD-10-CM

## 2022-11-15 DIAGNOSIS — E03.9 HYPOTHYROIDISM, UNSPECIFIED TYPE: ICD-10-CM

## 2022-11-15 DIAGNOSIS — Z28.21 COVID-19 VACCINE DOSE DECLINED: ICD-10-CM

## 2022-11-15 RX ORDER — TRIAMCINOLONE ACETONIDE 1 MG/G
1 CREAM TOPICAL 2 TIMES DAILY
COMMUNITY

## 2022-11-15 NOTE — PROGRESS NOTES
Name: Jim Frank      : 1950      MRN: [id number]  Encounter Provider: Kaushik Reno MD  Encounter Date: 11/15/2022   Encounter department: [address] [place] [address] MEDICINE    Assessment & Plan     1  Primary hypertension  Assessment & Plan:  BP well controlled continue meds      2  Hyperlipidemia, unspecified hyperlipidemia type    3  Atrial fibrillation with RVR ([address] )    4  Influenza vaccine administered  -     influenza vaccine, high-dose, PF 0 7 mL (FLUZONE HIGH-DOSE)    5  Hypothyroidism, unspecified type    6  Osteoporosis screening    7  COVID-19 vaccine dose declined    8  Other specified hypothyroidism  Assessment & Plan: On lower dose of levothyroxine and diarrhea is better      9  PAF (paroxysmal atrial fibrillation) (HCC)  Assessment & Plan:  Rate controlled and is on warfarin      10  Spondylosis of cervical region without myelopathy or radiculopathy    11  Chronic anticoagulation    12  Encounter for screening for malignant neoplasm of breast, unspecified screening modality  Comments:  Mammogram ordered but pt currently can't drive so won't go for that immediately         Subjective      Pt with hx of HTN, HL, OA, atrial fib with RVR, hypothyroid-here for follow up- says fecal incontinence is better now that her thyroid dosage is lower and she's taking lomotil prn-had an accident in Sept after her rotator cuff locked up and she hasn't been driving now    Review of Systems   Constitutional: Negative  Respiratory: Negative  Cardiovascular: Negative  Gastrointestinal: Negative  Musculoskeletal: Positive for arthralgias and neck pain  Neurological: Negative  Hematological: Negative  Psychiatric/Behavioral: Negative          Current Outpatient Medications on File Prior to Visit   Medication Sig   • acetaminophen (TYLENOL) 500 mg tablet Take 1 tablet (500 mg total) by mouth every 6 (six) hours as needed for moderate pain   • ALPRAZolam (XANAX) 0 5 mg tablet TAKE 1 TABLET(0 5 MG) BY MOUTH TWICE DAILY AS NEEDED FOR ANXIETY   • atorvastatin (LIPITOR) 10 mg tablet TAKE 1 TABLET BY MOUTH EVERY DAY AT BEDTIME   • levothyroxine 50 mcg tablet TAKE 1 TABLET(50 MCG) BY MOUTH DAILY EARLY MORNING   • metoprolol tartrate (LOPRESSOR) 25 mg tablet Take 1 tablet (25 mg total) by mouth every 12 (twelve) hours   • Multiple Vitamins-Minerals (CENTRUM ADULTS PO) Take 1 tablet by mouth daily   • omeprazole (PriLOSEC) 20 mg delayed release capsule TAKE 1 CAPSULE(20 MG) BY MOUTH DAILY BEFORE BREAKFAST   • triamcinolone (KENALOG) 0 1 % cream Apply 1 application topically 2 (two) times a day   • warfarin (COUMADIN) 2 mg tablet TAKE 1 AND 1/2 TO 2 TABLETS BY MOUTH DAILY OR AS DIRECTED BY PRESCRIBER (Patient taking differently: TAKE 1 AND 1/2 TO 2 TABLETS BY MOUTH DAILY OR AS DIRECTED BY PRESCRIBER  last dose 9/22/00)   • ramipril (ALTACE) 10 MG capsule TAKE 1 CAPSULE(10 MG) BY MOUTH TWICE DAILY   • [DISCONTINUED] oxyCODONE (Roxicodone) 5 immediate release tablet Take 1 tablet (5 mg total) by mouth every 4 (four) hours as needed for moderate pain for up to 15 doses Max Daily Amount: 30 mg (Patient not taking: Reported on 11/15/2022)       Objective     /60 (BP Location: Left arm, Patient Position: Sitting, Cuff Size: Adult)   Pulse 62   Temp (!) 96 8 °F (36 °C) (Temporal)   Resp 16   Ht 5' 3" (1 6 m)   Wt 56 7 kg (125 lb)   SpO2 99%   BMI 22 14 kg/m²     Physical Exam  Constitutional:       Appearance: Normal appearance  HENT:      Head: Normocephalic and atraumatic  Right Ear: External ear normal       Left Ear: External ear normal       Nose: Nose normal       Mouth/Throat:      Mouth: Mucous membranes are moist    Eyes:      Extraocular Movements: Extraocular movements intact  Cardiovascular:      Rate and Rhythm: Normal rate and regular rhythm  Heart sounds: Normal heart sounds     Pulmonary:      Effort: Pulmonary effort is normal       Breath sounds: Normal breath sounds  Musculoskeletal:         General: Tenderness and signs of injury present  Cervical back: Normal range of motion and neck supple  Skin:     General: Skin is warm  Capillary Refill: Capillary refill takes less than 2 seconds  Neurological:      General: No focal deficit present  Mental Status: She is alert and oriented to person, place, and time  Psychiatric:         Mood and Affect: Mood normal          Thought Content: Thought content normal          Judgment: Judgment normal        Loly Lyles MD     Falls Plan of Care: Balance, strength, and gait training instructions were provided  Patient referred to physical therapy

## 2022-11-15 NOTE — PATIENT INSTRUCTIONS
Fall Prevention   AMBULATORY CARE:   Fall prevention  includes ways to make your home and other areas safer  It also includes ways you can move more carefully to prevent a fall  Health conditions that cause changes in your blood pressure, vision, or muscle strength and coordination may increase your risk for falls  Medicines may also increase your risk for falls if they make you dizzy, weak, or sleepy  Call 911 or have someone else call if:   · You have fallen and are unconscious  · You have fallen and cannot move part of your body  Contact your healthcare provider if:   · You have fallen and have pain or a headache  · You have questions or concerns about your condition or care  Fall prevention tips:   · Stand or sit up slowly  This may help you keep your balance and prevent falls  · Use assistive devices as directed  Your healthcare provider may suggest that you use a cane or walker to help you keep your balance  You may need to have grab bars put in your bathroom near the toilet or in the shower  · Wear shoes that fit well and have soles that   Wear shoes both inside and outside  Use slippers with good   Do not wear shoes with high heels  · Wear a personal alarm  This is a device that allows you to call 911 if you fall and need help  Ask your healthcare provider for more information  · Stay active  Exercise can help strengthen your muscles and improve your balance  Your healthcare provider may recommend water aerobics or walking  He or she may also recommend physical therapy to improve your coordination  Never start an exercise program without talking to your healthcare provider first          · Manage your medical conditions  Keep all appointments with your healthcare providers  Visit your eye doctor as directed  Home safety tips:       · Add items to prevent falls in the bathroom  Put nonslip strips on your bath or shower floor to prevent you from slipping   Use a bath mat if you do not have carpet in the bathroom  This will prevent you from falling when you step out of the bath or shower  Use a shower seat so you do not need to stand while you shower  Sit on the toilet or a chair in your bathroom to dry yourself and put on clothing  This will prevent you from losing your balance from drying or dressing yourself while you are standing  · Keep paths clear  Remove books, shoes, and other objects from walkways and stairs  Place cords for telephones and lamps out of the way so that you do not need to walk over them  Tape them down if you cannot move them  Remove small rugs  If you cannot remove a rug, secure it with double-sided tape  This will prevent you from tripping  · Install bright lights in your home  Use night lights to help light paths to the bathroom or kitchen  Always turn on the light before you start walking  · Keep items you use often on shelves within reach  Do not use a step stool to help you reach an item  · Paint or place reflective tape on the edges of your stairs  This will help you see the stairs better  Follow up with your doctor as directed:  Write down your questions so you remember to ask them during your visits  © Copyright [employer] 2022 Information is for End User's use only and may not be sold, redistributed or otherwise used for commercial purposes  All illustrations and images included in CareNotes® are the copyrighted property of [employer] D A M [employer]  or [employer]   The above information is an  only  It is not intended as medical advice for individual conditions or treatments  Talk to your doctor, nurse or pharmacist before following any medical regimen to see if it is safe and effective for you

## 2022-11-16 ENCOUNTER — OFFICE VISIT (OUTPATIENT)
Dept: OBGYN CLINIC | Facility: CLINIC | Age: 72
End: 2022-11-16

## 2022-11-16 DIAGNOSIS — Z98.890 S/P RIGHT ROTATOR CUFF REPAIR: Primary | ICD-10-CM

## 2022-11-16 NOTE — PROGRESS NOTES
Assessment/Plan:  1  S/P right rotator cuff repair          Scribe Attestation    I,:  Nunu Lofton am acting as a scribe while in the presence of the attending physician :       I,:  Kristen Little MD personally performed the services described in this documentation    as scribed in my presence :         Jabari Oneillcarlos upon examination and review of the physical therapy notes for her right shoulder is doing well now 7 weeks status post right shoulder arthroscopy with rotator cuff repair of the subscapularis and supraspinatus tendon utilizing SpeedFix technique for each and biceps tenodesis  At this point she may discontinue use of the postoperative sling  I would like her to continue physical therapy as per protocol  She may begin to drive now that she is out of the postoperative sling  I did advise that she should be cautious with use of her right upper extremity with the upcoming holidays  Jabari Bonner verbalized understanding and had no further questions  I would like to see her back in 6 weeks for repeat clinical evaluation  Subjective:   Jacinta Ordonez is a 74 y o  female who presents evaluation of her right shoulder  She is 7 weeks status post right shoulder arthroscopy with rotator cuff repair of the subscapularis and supraspinatus tendon utilizing a speed fix technique for both  Biceps tenodesis was incorporated into the subscapularis repair  She states that she is quite stiff and does localize intermittent and mild to moderate aching and soreness about the lateral aspect of her shoulder and upper arm  She does have physical therapy has been beneficial in improving function  She does feel that her shoulder is quite stiff particularly with overhead and reaching activities  Today she denies any distal paresthesias  Review of Systems   Constitutional: Negative for chills, fever and unexpected weight change  HENT: Negative for hearing loss, nosebleeds and sore throat      Eyes: Negative for pain, redness and visual disturbance  Respiratory: Negative for cough, shortness of breath and wheezing  Cardiovascular: Negative for chest pain, palpitations and leg swelling  Gastrointestinal: Negative for abdominal pain, nausea and vomiting  Endocrine: Negative for polydipsia and polyuria  Genitourinary: Negative for dysuria and hematuria  Musculoskeletal: Positive for arthralgias and myalgias  See HPI   Skin: Negative for rash and wound  Neurological: Negative for dizziness, numbness and headaches  Psychiatric/Behavioral: Negative for decreased concentration and suicidal ideas  The patient is not nervous/anxious  Past Medical History:   Diagnosis Date   • A-fib [facility])    • Anxiety    • Arthritis     shoulder   • Carpal tunnel syndrome    • Colon polyp    • Depression    • Disease of thyroid gland    • Diverticulitis    • Esophageal stricture    • HBP (high blood pressure)    • Hiatal hernia 06/07/2022    Small hiatal hernia   • Hyperlipidemia    • Hypertension    • Hypothyroid    • Pneumonia     2017   • PONV (postoperative nausea and vomiting)    • Schatzki's ring 06/07/2022    Schatzki's ring in the lower esophagus   • Sleep apnea     mild        Past Surgical History:   Procedure Laterality Date   • APPENDECTOMY     • BREAST BIOPSY Right     benign   • BREAST CYST ASPIRATION Right     benign   • CATARACT EXTRACTION Bilateral    • CATARACT EXTRACTION, BILATERAL  2019   • CERVICAL FUSION N/A 05/25/2021    Procedure: FUSION CERVICAL POSTERIOR C1-C2 with allograft and neuromonitoring);   Surgeon: Eliecer Paredes MD;  Location: [facility];  Service: Orthopedics   • CHOLECYSTECTOMY     • COLONOSCOPY  06/07/2022   • COLONOSCOPY W/ BIOPSIES  2011   • EGD  06/07/2022   • HAND SURGERY Right    • MAMMO (HISTORICAL) Bilateral 11/27/2018   • MAMMO (HISTORICAL) Bilateral 10/27/2017   • MANDIBLE SURGERY     • MANDIBLE SURGERY     • VA SHLDR ARTHROSCOP,SURG,W/ROTAT CUFF REPR Right 9/27/2022 Procedure: ARTHROSCOPIC ROTATOR CUFF REPAIR OF SUBSCAPULARIS BICEPS TENODESIS EXTENSIVE DEBRIDEMENT;  Surgeon: Renee Hyatt MD;  Location: [facility];  Service: Orthopedics   • WISDOM TOOTH EXTRACTION      X4       Family History   Problem Relation Age of Onset   • Heart disease Mother    • Hypertension Mother    • Hyperlipidemia Mother    • Heart failure Mother    • Hypertension Sister        Social History     Occupational History   • Not on file   Tobacco Use   • Smoking status: Former     Types: Cigarettes     Quit date:      Years since quittin 8   • Smokeless tobacco: Never   Vaping Use   • Vaping Use: Never used   Substance and Sexual Activity   • Alcohol use: Not Currently     Comment: last drink 7 years ago   • Drug use: Not Currently   • Sexual activity: Not Currently         Current Outpatient Medications:   •  acetaminophen (TYLENOL) 500 mg tablet, Take 1 tablet (500 mg total) by mouth every 6 (six) hours as needed for moderate pain, Disp: 20 tablet, Rfl: 0  •  ALPRAZolam (XANAX) 0 5 mg tablet, TAKE 1 TABLET(0 5 MG) BY MOUTH TWICE DAILY AS NEEDED FOR ANXIETY, Disp: 60 tablet, Rfl: 5  •  atorvastatin (LIPITOR) 10 mg tablet, TAKE 1 TABLET BY MOUTH EVERY DAY AT BEDTIME, Disp: 90 tablet, Rfl: 3  •  levothyroxine 50 mcg tablet, TAKE 1 TABLET(50 MCG) BY MOUTH DAILY EARLY MORNING, Disp: 90 tablet, Rfl: 0  •  metoprolol tartrate (LOPRESSOR) 25 mg tablet, Take 1 tablet (25 mg total) by mouth every 12 (twelve) hours, Disp: 180 tablet, Rfl: 3  •  Multiple Vitamins-Minerals (CENTRUM ADULTS PO), Take 1 tablet by mouth daily, Disp: , Rfl:   •  omeprazole (PriLOSEC) 20 mg delayed release capsule, TAKE 1 CAPSULE(20 MG) BY MOUTH DAILY BEFORE BREAKFAST, Disp: 90 capsule, Rfl: 1  •  ramipril (ALTACE) 10 MG capsule, TAKE 1 CAPSULE(10 MG) BY MOUTH TWICE DAILY, Disp: 180 capsule, Rfl: 3  •  triamcinolone (KENALOG) 0 1 % cream, Apply 1 application topically 2 (two) times a day, Disp: , Rfl:   •  warfarin (COUMADIN) 2 mg tablet, TAKE 1 AND 1/2 TO 2 TABLETS BY MOUTH DAILY OR AS DIRECTED BY PRESCRIBER (Patient taking differently: TAKE 1 AND 1/2 TO 2 TABLETS BY MOUTH DAILY OR AS DIRECTED BY PRESCRIBER  last dose 9/22/00), Disp: 180 tablet, Rfl: 3    No Known Allergies    Objective: There were no vitals filed for this visit  Right Shoulder Exam     Range of Motion   Active abduction: 80     Muscle Strength   Abduction: 3/5     Other   Erythema: absent  Scars: present (portal incisions are CDI)  Sensation: normal  Pulse: present            Physical Exam  Vitals reviewed  HENT:      Head: Normocephalic and atraumatic  Eyes:      General:         Right eye: No discharge  Left eye: No discharge  Conjunctiva/sclera: Conjunctivae normal       Pupils: Pupils are equal, round, and reactive to light  Cardiovascular:      Rate and Rhythm: Normal rate  Pulmonary:      Effort: Pulmonary effort is normal  No respiratory distress  Musculoskeletal:      Cervical back: Normal range of motion and neck supple  Comments: As noted in HPI   Skin:     General: Skin is warm and dry  Neurological:      Mental Status: She is alert and oriented to person, place, and time  I have personally reviewed pertinent films in PACS and my interpretation is as follows: No images reviewed today    This document was created using speech voice recognition software  Grammatical errors, random word insertions, pronoun errors, and incomplete sentences are an occasional consequence of this system due to software limitations, ambient noise, and hardware issues  Any formal questions or concerns about content, text, or information contained within the body of this dictation should be directly addressed to the provider for clarification

## 2022-11-17 ENCOUNTER — APPOINTMENT (OUTPATIENT)
Dept: PHYSICAL THERAPY | Facility: REHABILITATION | Age: 72
End: 2022-11-17

## 2022-11-21 ENCOUNTER — OFFICE VISIT (OUTPATIENT)
Dept: PHYSICAL THERAPY | Facility: REHABILITATION | Age: 72
End: 2022-11-21

## 2022-11-21 DIAGNOSIS — M75.101 TEAR OF RIGHT ROTATOR CUFF, UNSPECIFIED TEAR EXTENT, UNSPECIFIED WHETHER TRAUMATIC: ICD-10-CM

## 2022-11-21 DIAGNOSIS — Z98.890 S/P SHOULDER SURGERY: Primary | ICD-10-CM

## 2022-11-21 NOTE — PROGRESS NOTES
Daily Note     Today's date: 2022  Patient name: Zeynep Bar  : 1950  MRN: [id number]  Referring provider: Fany Manriquez*  Dx:   Encounter Diagnosis     ICD-10-CM    1  S/P shoulder surgery  Z98 890       2  Tear of right rotator cuff, unspecified tear extent, unspecified whether traumatic  M75 [id number]           Start Time: 1130  Stop Time: 1218  Total time in clinic (min): 48 minutes    Subjective: Ruben Butt states she still finds challenge most in abduction  She states biceps and triceps is sore prior to today's session  Objective: See treatment diary below      Assessment: Tolerated treatment well  Patient pain free in all planes during PROM  Patient demonstrated fatigue post treatment, exhibited good technique with therapeutic exercises and would benefit from continued PT  Plan: Continue per plan of care        Precautions: Afib, anxiety, arthritis, depression, HTN, hiatal hernia, HLD, hypothyroidism, hx C1-2 fusion    * indicates given as part of HEP  HEP code: [facility]     Daily Treatment Diary    Manuals 11/11 11/14 11/21  10/28 11/2 11/4 11/9   R shoulder PROM  done done  done done done done   R elbow PROM     done done                           Neuro Re-Ed           Scapular retraction with ER           Chin tuck           Prone I's 3x10 R arm off table          Prone T's           Prone Y's           Resisted W's           Sustained YWT's           Serratus push up           Plank shoulder taps           Plank to down dog                      Ther Ex           Patient education           Week 1-4           UT stretch*     10x10" ea      Levator scap stretch*     10x10" ea      Shoulder shrugs           Scapular retraction*     Gentle 3x10      Elbow flex/ext*     30x R      Wrist flex/ext*     30x      Gripping*     Blue 2'x2      Table slides - flexion* 20x5" 20x5" 20x10"  20x5" 20x5" 20x5" 20x5"   Table slides - scaption* 20x5" 20x5" 20x10"  20x5" 20x5" 20x5" 20x5"   Shoulder ER with cane 20x5" 20x5" 20x5"    20x5" seated w/ wedge 20x5" seated w/ wedge   Shoulder isometrics against wall (week 4)     10x5" 10x5" 10x5"               Week 5-7           Pulleys - flexion/scaption 3'/3' 3'/3' 3'/3'   3'/3' 3'/3' 3'/3'   Supine shoulder flexion with cane* 20x5" 20x5" 20x5"     2x10x5"   TB rows Pink TB 3x10 Pink TB 3x12 Pink TB 3x12     Pink TB 3x10   Bicep curls (week 6)           SL shoulder flexion           SL shoulder abduction           SL shoulder ER           Standing scaption w/ strap                      Week 8+           Foam roller up wall           TB lat pull down           Band pull aparts           IR/ER isometric walkouts           Active IR/ER           Band wall walks horizontal           Band wall walks vertical           Ball wall circles                                 Ther Activity           1 arm row           1 arm press                      Suitcase carry           90/90 carry           OH carry                                            Modalities           R shoulder CP

## 2022-11-21 NOTE — PLAN OF CARE
-- DO NOT REPLY / DO NOT REPLY ALL --  -- Message is from [facility] [employer] ([facility]) --    General Patient Message  The patient requests a call from the doctor because her GI doctor says that doctor Cory put the wrong PIN on the referral. She has an appointment with Juan Pablo on 11/22/22 at 2 pm     Caller Information       Type Contact Phone/Fax    11/21/2022 04:49 PM CST Phone (Incoming) Soniya Hawthorne (Self) [phone] (M)        Alternative phone number: none    Can a detailed message be left? Yes    Message Turnaround:     Is it Working Hours? No - After Hours/Weekend/Holiday     Did the caller agree that this message can wait until the office reopens in the morning? YES - The Message Can Wait - Send a message to the provider's clinical support pool     [place]:    Please give this turnaround time to the caller:   \"This message will be sent to [state Provider's name]. The clinical team will fulfill your request as soon as they review your message.\"                 Problem: OCCUPATIONAL THERAPY ADULT  Goal: Performs self-care activities at highest level of function for planned discharge setting  See evaluation for individualized goals  Description: Treatment Interventions: ADL retraining, Functional transfer training, Endurance training, Cognitive reorientation, Patient/family training, Equipment evaluation/education, Compensatory technique education, Energy conservation, Activityengagement          See flowsheet documentation for full assessment, interventions and recommendations  Outcome: Progressing  Note: Limitation: Decreased ADL status, Decreased Safe judgement during ADL, Decreased cognition, Decreased endurance, Decreased self-care trans, Decreased high-level ADLs  Prognosis: Good  Assessment: Pt was seen this date for OT tx session focusing on self care tasks, sit to stand progressions, standing tolerance, tranfers, functional mobility, safety awareness, compensatory techniques, energy conservation,  and overall activity tolerance  Pt presents  Seated OOB in chiar , completes previously mentioned tasks at documented assist levels please see above in flow sheet  Pt has made notable progress in transfers and mobility however continues to require increased assist for self care tasks  Pt resting OOB in chiar at end of session with all needs in reach  Would benefit from continued OT tx to imporve overall functional abilites  Continue t ofollow with [facility]      OT Discharge Recommendation: Post acute rehabilitation services  OT - OK to Discharge:  Yes

## 2022-11-28 ENCOUNTER — OFFICE VISIT (OUTPATIENT)
Dept: FAMILY MEDICINE CLINIC | Facility: CLINIC | Age: 72
End: 2022-11-28

## 2022-11-28 VITALS
BODY MASS INDEX: 22.15 KG/M2 | DIASTOLIC BLOOD PRESSURE: 50 MMHG | TEMPERATURE: 97.3 F | WEIGHT: 125 LBS | HEART RATE: 64 BPM | SYSTOLIC BLOOD PRESSURE: 90 MMHG | HEIGHT: 63 IN | RESPIRATION RATE: 14 BRPM

## 2022-11-28 DIAGNOSIS — Z79.01 CHRONIC ANTICOAGULATION: ICD-10-CM

## 2022-11-28 DIAGNOSIS — M25.571 ACUTE RIGHT ANKLE PAIN: Primary | ICD-10-CM

## 2022-11-28 DIAGNOSIS — M54.2 CERVICALGIA: ICD-10-CM

## 2022-11-28 DIAGNOSIS — M25.511 CHRONIC RIGHT SHOULDER PAIN: ICD-10-CM

## 2022-11-28 DIAGNOSIS — G89.29 CHRONIC RIGHT SHOULDER PAIN: ICD-10-CM

## 2022-11-28 DIAGNOSIS — I48.0 PAF (PAROXYSMAL ATRIAL FIBRILLATION) (HCC): ICD-10-CM

## 2022-11-28 RX ORDER — TIZANIDINE 2 MG/1
2 TABLET ORAL EVERY 8 HOURS PRN
Qty: 20 TABLET | Refills: 0 | Status: SHIPPED | OUTPATIENT
Start: 2022-11-28

## 2022-11-28 RX ORDER — LIDOCAINE 40 MG/G
CREAM TOPICAL AS NEEDED
Qty: 30 G | Refills: 0 | Status: SHIPPED | OUTPATIENT
Start: 2022-11-28

## 2022-11-28 NOTE — PROGRESS NOTES
Subjective:      Patient ID: Eloise Casas is a 74 y o  female  States that she has right ankle pain and swelling which happens towards the end of the day, gets better in the morning after she lays down for few hours  Denies any falls or injury  She takes tylenol 1000 mg every 6-8 hours prn without benefit  She is on chronic anticoagulation due to Paroxysmal atrial fibrillation and hence avoids NSAIDs  Doing PT for her shoulder-post OP, insurance is not covering any PT since she has met her limit for the year      Past Medical History:   Diagnosis Date   • A-fib [facility])    • Anxiety    • Arthritis     shoulder   • Carpal tunnel syndrome    • Colon polyp    • Depression    • Disease of thyroid gland    • Diverticulitis    • Esophageal stricture    • HBP (high blood pressure)    • Hiatal hernia 06/07/2022    Small hiatal hernia   • Hyperlipidemia    • Hypertension    • Hypothyroid    • Pneumonia     2017   • PONV (postoperative nausea and vomiting)    • Schatzki's ring 06/07/2022    Schatzki's ring in the lower esophagus   • Sleep apnea     mild        Family History   Problem Relation Age of Onset   • Heart disease Mother    • Hypertension Mother    • Hyperlipidemia Mother    • Heart failure Mother    • Hypertension Sister        Past Surgical History:   Procedure Laterality Date   • APPENDECTOMY     • BREAST BIOPSY Right     benign   • BREAST CYST ASPIRATION Right     benign   • CATARACT EXTRACTION Bilateral    • CATARACT EXTRACTION, BILATERAL  2019   • CERVICAL FUSION N/A 05/25/2021    Procedure: FUSION CERVICAL POSTERIOR C1-C2 with allograft and neuromonitoring);   Surgeon: Marilyn Gramajo MD;  Location: [facility];  Service: Orthopedics   • CHOLECYSTECTOMY     • COLONOSCOPY  06/07/2022   • COLONOSCOPY W/ BIOPSIES  2011   • EGD  06/07/2022   • HAND SURGERY Right    • MAMMO (HISTORICAL) Bilateral 11/27/2018   • MAMMO (HISTORICAL) Bilateral 10/27/2017   • MANDIBLE SURGERY     • MANDIBLE SURGERY     • VA SHLDR ARTHROSCOP,SURG,W/ROTAT CUFF REPR Right 9/27/2022    Procedure: ARTHROSCOPIC ROTATOR CUFF REPAIR OF SUBSCAPULARIS BICEPS TENODESIS EXTENSIVE DEBRIDEMENT;  Surgeon: Bhupendra Sen MD;  Location: [address] [place] [address];  Service: Orthopedics   • WISDOM TOOTH EXTRACTION      X4        reports that she quit smoking about 18 years ago  She has never used smokeless tobacco  She reports that she does not currently use alcohol  She reports that she does not currently use drugs        Current Outpatient Medications:   •  acetaminophen (TYLENOL) 500 mg tablet, Take 1 tablet (500 mg total) by mouth every 6 (six) hours as needed for moderate pain, Disp: 20 tablet, Rfl: 0  •  ALPRAZolam (XANAX) 0 5 mg tablet, TAKE 1 TABLET(0 5 MG) BY MOUTH TWICE DAILY AS NEEDED FOR ANXIETY, Disp: 60 tablet, Rfl: 5  •  atorvastatin (LIPITOR) 10 mg tablet, TAKE 1 TABLET BY MOUTH EVERY DAY AT BEDTIME, Disp: 90 tablet, Rfl: 3  •  Diclofenac Sodium (VOLTAREN) 1 %, Apply 2 g topically 4 (four) times a day, Disp: 100 g, Rfl: 0  •  levothyroxine 50 mcg tablet, TAKE 1 TABLET(50 MCG) BY MOUTH DAILY EARLY MORNING, Disp: 90 tablet, Rfl: 0  •  lidocaine (LMX) 4 % cream, Apply topically as needed for mild pain, Disp: 30 g, Rfl: 0  •  metoprolol tartrate (LOPRESSOR) 25 mg tablet, Take 1 tablet (25 mg total) by mouth every 12 (twelve) hours, Disp: 180 tablet, Rfl: 3  •  Multiple Vitamins-Minerals (CENTRUM ADULTS PO), Take 1 tablet by mouth daily, Disp: , Rfl:   •  omeprazole (PriLOSEC) 20 mg delayed release capsule, TAKE 1 CAPSULE(20 MG) BY MOUTH DAILY BEFORE BREAKFAST, Disp: 90 capsule, Rfl: 1  •  ramipril (ALTACE) 10 MG capsule, TAKE 1 CAPSULE(10 MG) BY MOUTH TWICE DAILY, Disp: 180 capsule, Rfl: 3  •  tiZANidine (ZANAFLEX) 2 mg tablet, Take 1 tablet (2 mg total) by mouth every 8 (eight) hours as needed for muscle spasms, Disp: 20 tablet, Rfl: 0  •  triamcinolone (KENALOG) 0 1 % cream, Apply 1 application topically 2 (two) times a day, Disp: , Rfl:   •  warfarin (COUMADIN) 2 mg tablet, TAKE 1 AND 1/2 TO 2 TABLETS BY MOUTH DAILY OR AS DIRECTED BY PRESCRIBER (Patient taking differently: TAKE 1 AND 1/2 TO 2 TABLETS BY MOUTH DAILY OR AS DIRECTED BY PRESCRIBER  last dose 9/22/00), Disp: 180 tablet, Rfl: 3    The following portions of the patient's history were reviewed and updated as appropriate: allergies, current medications, past family history, past medical history, past social history, past surgical history and problem list     Review of Systems   Constitutional: Negative for fatigue and fever  HENT: Negative for congestion, facial swelling, mouth sores, rhinorrhea, sore throat and trouble swallowing  Eyes: Negative for pain and redness  Respiratory: Negative for cough, shortness of breath and wheezing  Cardiovascular: Negative for chest pain, palpitations and leg swelling  Gastrointestinal: Negative for abdominal pain, blood in stool, constipation, diarrhea and nausea  Genitourinary: Negative for dysuria, hematuria and urgency  Musculoskeletal: Positive for arthralgias and neck pain  Negative for back pain, gait problem and myalgias  Skin: Negative for rash and wound  Neurological: Negative for seizures, syncope and headaches  Hematological: Negative for adenopathy  Psychiatric/Behavioral: Negative for agitation and behavioral problems  Objective:    BP 90/50 (BP Location: Left arm, Patient Position: Sitting, Cuff Size: Adult)   Pulse 64   Temp (!) 97 3 °F (36 3 °C) (Temporal)   Resp 14   Ht 5' 3" (1 6 m)   Wt 56 7 kg (125 lb)   BMI 22 14 kg/m²      Physical Exam  Vitals and nursing note reviewed  Constitutional:       Appearance: Normal appearance  She is well-developed  She is not ill-appearing  HENT:      Head: Normocephalic and atraumatic        Right Ear: External ear normal       Left Ear: External ear normal       Nose: Nose normal       Mouth/Throat:      Mouth: Mucous membranes are moist       Pharynx: No oropharyngeal exudate or posterior oropharyngeal erythema  Eyes:      General: No scleral icterus  Right eye: No discharge  Left eye: No discharge  Conjunctiva/sclera: Conjunctivae normal    Cardiovascular:      Rate and Rhythm: Normal rate  Heart sounds: No murmur heard  No gallop  Pulmonary:      Effort: Pulmonary effort is normal  No respiratory distress  Breath sounds: Normal breath sounds  No stridor  No wheezing, rhonchi or rales  Musculoskeletal:         General: Tenderness (over right lateral malleolus-mild,no swelling , normal overlying skin) present  No deformity or signs of injury  Right lower leg: No edema  Left lower leg: No edema  Skin:     Findings: No erythema or rash  Neurological:      Mental Status: She is alert  Mental status is at baseline  Psychiatric:         Behavior: Behavior normal          Judgment: Judgment normal            Recent Results (from the past 8736 hour(s))   Basic metabolic panel    Collection Time: 01/22/22  4:31 PM   Result Value Ref Range    Sodium 136 133 - 145 mmol/L    Potassium 3 9 3 5 - 5 0 mmol/L    Chloride 102 96 - 108 mmol/L    CO2 27 22 - 33 mmol/L    ANION GAP 7 4 - 13 mmol/L    BUN 17 6 - 20 mg/dL    Creatinine 0 91 0 40 - 1 10 mg/dL    Glucose 142 (H) 65 - 140 mg/dL    Calcium 10 0 8 4 - 10 2 mg/dL    eGFR 62 ml/min/1 73sq m   Uric acid    Collection Time: 01/22/22  4:31 PM   Result Value Ref Range    Uric Acid 3 0 2 6 - 8 0 mg/dL   Blood culture    Collection Time: 01/22/22  4:31 PM    Specimen: Arm, Left; Blood   Result Value Ref Range    Blood Culture No Growth After 5 Days      CBC and differential    Collection Time: 01/22/22  4:32 PM   Result Value Ref Range    WBC 16 84 (H) 4 31 - 10 16 Thousand/uL    RBC 3 97 3 81 - 5 12 Million/uL    Hemoglobin 12 2 11 5 - 15 4 g/dL    Hematocrit 36 6 34 8 - 46 1 %    MCV 92 82 - 98 fL    MCH 30 7 26 8 - 34 3 pg    MCHC 33 3 31 4 - 37 4 g/dL    RDW 12 5 11 6 - 15 1 %    MPV 9 4 8 9 - 12 7 fL    Platelets 081 615 - 935 Thousands/uL    nRBC 0 /100 WBCs    Neutrophils Relative 81 (H) 43 - 75 %    Immat GRANS % 0 0 - 2 %    Lymphocytes Relative 9 (L) 14 - 44 %    Monocytes Relative 8 4 - 12 %    Eosinophils Relative 2 0 - 6 %    Basophils Relative 0 0 - 1 %    Neutrophils Absolute 13 58 (H) 1 85 - 7 62 Thousands/µL    Immature Grans Absolute 0 06 0 00 - 0 20 Thousand/uL    Lymphocytes Absolute 1 47 0 60 - 4 47 Thousands/µL    Monocytes Absolute 1 32 (H) 0 17 - 1 22 Thousand/µL    Eosinophils Absolute 0 34 0 00 - 0 61 Thousand/µL    Basophils Absolute 0 07 0 00 - 0 10 Thousands/µL   ECG 12 lead    Collection Time: 01/24/22 10:46 AM   Result Value Ref Range    Ventricular Rate 150 BPM    Atrial Rate 167 BPM    VA Interval  ms    QRSD Interval 120 ms    QT Interval 333 ms    QTC Interval 527 ms    P Axis  degrees    QRS Axis -28 degrees    T Wave Axis 84 degrees   CBC and differential    Collection Time: 01/24/22 11:17 AM   Result Value Ref Range    WBC 15 57 (H) 4 31 - 10 16 Thousand/uL    RBC 3 85 3 81 - 5 12 Million/uL    Hemoglobin 11 8 11 5 - 15 4 g/dL    Hematocrit 36 2 34 8 - 46 1 %    MCV 94 82 - 98 fL    MCH 30 6 26 8 - 34 3 pg    MCHC 32 6 31 4 - 37 4 g/dL    RDW 12 6 11 6 - 15 1 %    MPV 10 1 8 9 - 12 7 fL    Platelets 995 884 - 471 Thousands/uL    nRBC 0 /100 WBCs    Neutrophils Relative 89 (H) 43 - 75 %    Immat GRANS % 0 0 - 2 %    Lymphocytes Relative 5 (L) 14 - 44 %    Monocytes Relative 6 4 - 12 %    Eosinophils Relative 0 0 - 6 %    Basophils Relative 0 0 - 1 %    Neutrophils Absolute 13 79 (H) 1 85 - 7 62 Thousands/µL    Immature Grans Absolute 0 06 0 00 - 0 20 Thousand/uL    Lymphocytes Absolute 0 72 0 60 - 4 47 Thousands/µL    Monocytes Absolute 0 88 0 17 - 1 22 Thousand/µL    Eosinophils Absolute 0 07 0 00 - 0 61 Thousand/µL    Basophils Absolute 0 05 0 00 - 0 10 Thousands/µL   Comprehensive metabolic panel    Collection Time: 01/24/22 11:17 AM   Result Value Ref Range Sodium 137 133 - 145 mmol/L    Potassium 3 9 3 5 - 5 0 mmol/L    Chloride 104 96 - 108 mmol/L    CO2 25 22 - 33 mmol/L    ANION GAP 8 4 - 13 mmol/L    BUN 14 6 - 20 mg/dL    Creatinine 1 05 0 40 - 1 10 mg/dL    Glucose 130 65 - 140 mg/dL    Calcium 9 6 8 4 - 10 2 mg/dL    AST 64 (H) 15 - 41 U/L    ALT 75 (H) 5 - 54 U/L    Alkaline Phosphatase 182 6 (H) 35 - 140 U/L    Total Protein 7 2 6 4 - 8 3 g/dL    Albumin 3 9 3 4 - 4 8 g/dL    Total Bilirubin 0 81 0 30 - 1 20 mg/dL    eGFR 52 ml/min/1 73sq m   Phosphorus    Collection Time: 01/24/22 11:17 AM   Result Value Ref Range    Phosphorus 2 2 (L) 2 5 - 5 0 mg/dL   Magnesium    Collection Time: 01/24/22 11:17 AM   Result Value Ref Range    Magnesium 1 9 1 6 - 2 6 mg/dL   HS Troponin 0hr (reflex protocol)    Collection Time: 01/24/22 11:17 AM   Result Value Ref Range    hs TnI 0hr 12 "Refer to ACS Flowchart" - see link ng/L   Blood culture #1    Collection Time: 01/24/22 11:17 AM    Specimen: Arm, Left; Blood   Result Value Ref Range    Blood Culture No Growth After 5 Days  Blood culture #2    Collection Time: 01/24/22 11:23 AM    Specimen: Arm, Left; Blood   Result Value Ref Range    Blood Culture No Growth After 5 Days  HS Troponin I 2hr    Collection Time: 01/24/22  1:21 PM   Result Value Ref Range    hs TnI 2hr 9  "Refer to ACS Flowchart" - see link ng/L    Delta 2hr hsTnI -3 ng/L   HS Troponin I 4hr    Collection Time: 01/24/22  3:13 PM   Result Value Ref Range    hs TnI 4hr 10 "Refer to ACS Flowchart" - see link ng/L    Delta 4hr hsTnI -2 ng/L   Body fluid culture and Gram stain    Collection Time: 01/24/22  5:29 PM    Specimen: Joint, Right Elbow;  Body Fluid   Result Value Ref Range    Body Fluid Culture, Sterile 4+ Growth of Staphylococcus aureus (A)     Gram Stain Result 4+ Polys     Gram Stain Result No organisms seen        Susceptibility    Staphylococcus aureus - LUL     Ampicillin ($$) >8 00 Resistant ug/ml     Cefazolin ($) <=4 00 Susceptible ug/ml Clindamycin ($) <=0 25 Susceptible ug/ml     Erythromycin ($$$$) >4 00 Resistant ug/ml     Gentamicin ($$) <=1 Susceptible ug/ml     Oxacillin 1 00 Susceptible ug/ml     Tetracycline <=2 Susceptible ug/ml     Trimethoprim + Sulfamethoxazole ($$$) <=0 5/9 5 Susceptible ug/ml     Vancomycin ($) 1 00 Susceptible ug/ml   Platelet count    Collection Time: 01/24/22  5:34 PM   Result Value Ref Range    Platelets 070 615 - 156 Thousands/uL    MPV 9 7 8 9 - 12 7 fL   Body fluid white cell count with differential    Collection Time: 01/24/22  5:45 PM   Result Value Ref Range    Site      WBC, Fluid 315,482 (H) 0 - 200 /ul   Body Fluid Diff    Collection Time: 01/24/22  5:45 PM   Result Value Ref Range    Total Counted 100     Neutrophils % (Fluid) 13 %    Lymphs % (Fluid) 8 %    Histiocyte % (Fluid) 78 %    Monocytes % (Fluid) 1 %    Pathology Review No No   UA w Reflex to Microscopic w Reflex to Culture    Collection Time: 01/24/22  6:51 PM    Specimen: Urine, Clean Catch   Result Value Ref Range    Color, UA Yellow Yellow    Clarity, UA Clear Clear    Specific [place], UA 1 025 1 001 - 1 030    pH, UA 6 0 5 0, 5 5, 6 0, 6 5, 7 0, 7 5, 8 0    Leukocytes, UA Negative Negative    Nitrite, UA Negative Negative    Protein, UA Negative Negative, Interference- unable to analyze mg/dl    Glucose, UA Negative Negative mg/dl    Ketones, UA 15 (1+) (A) Negative mg/dl    Urobilinogen, UA 0 2 0 2, 1 0 E U /dl E U /dl    Bilirubin, UA Negative Negative    Occult Blood, UA Negative Negative   Basic metabolic panel    Collection Time: 01/25/22  4:39 AM   Result Value Ref Range    Sodium 137 133 - 145 mmol/L    Potassium 4 0 3 5 - 5 0 mmol/L    Chloride 106 96 - 108 mmol/L    CO2 24 22 - 33 mmol/L    ANION GAP 7 4 - 13 mmol/L    BUN 12 6 - 20 mg/dL    Creatinine 0 82 0 40 - 1 10 mg/dL    Glucose 115 65 - 140 mg/dL    Calcium 9 6 8 4 - 10 2 mg/dL    eGFR 71 ml/min/1 73sq m   CBC (With Platelets)    Collection Time: 01/25/22  4:39 AM Result Value Ref Range    WBC 12 48 (H) 4 31 - 10 16 Thousand/uL    RBC 3 42 (L) 3 81 - 5 12 Million/uL    Hemoglobin 10 3 (L) 11 5 - 15 4 g/dL    Hematocrit 31 5 (L) 34 8 - 46 1 %    MCV 92 82 - 98 fL    MCH 30 1 26 8 - 34 3 pg    MCHC 32 7 31 4 - 37 4 g/dL    RDW 12 5 11 6 - 15 1 %    Platelets 782 067 - 226 Thousands/uL    MPV 10 3 8 9 - 12 7 fL   Echo complete w/ contrast if indicated    Collection Time: 01/25/22  1:56 PM   Result Value Ref Range    LA size 3 8 cm    Triscuspid Valve Regurgitation Peak Gradient 48 0 mmHg    Tricuspid valve peak regurgitation velocity 3 91 m/s    LVPWd 1 00 0 48 - 0 90 cm    Left Atrium Area-systolic Four Chamber 23 5 cm2    RA 2D Volume 63 0 mL    IVSd 1 00 0 49 - 9 23 cm    LV DIASTOLIC VOLUME (MOD BIPLANE) 2D 78 mL    LEFT VENTRICLE SYSTOLIC VOLUME (MOD BIPLANE) 2D 34 mL    Left ventricular stroke volume (2D) 43 00 mL    A4C EF 47 %    LVIDd 4 20 3 74 - 5 57 cm    LVIDS 3 00 2 1 - 4 0 cm    FS 29 28 - 44 %    RVID d 3 3 cm    RAA A4C 20 9 cm2    ZLVPWD 2 85     ZLVIDS -0 84     ZIVSD 2 74     LV EF 60     Est  RA pres 3 0 mmHg    Right Ventricular Peak Systolic Pressure 12 57 mmHg   CBC and differential    Collection Time: 01/26/22  4:52 AM   Result Value Ref Range    WBC 11 50 (H) 4 31 - 10 16 Thousand/uL    RBC 3 62 (L) 3 81 - 5 12 Million/uL    Hemoglobin 11 1 (L) 11 5 - 15 4 g/dL    Hematocrit 33 1 (L) 34 8 - 46 1 %    MCV 91 82 - 98 fL    MCH 30 7 26 8 - 34 3 pg    MCHC 33 5 31 4 - 37 4 g/dL    RDW 12 5 11 6 - 15 1 %    MPV 9 8 8 9 - 12 7 fL    Platelets 942 153 - 770 Thousands/uL    nRBC 0 /100 WBCs    Neutrophils Relative 83 (H) 43 - 75 %    Immat GRANS % 0 0 - 2 %    Lymphocytes Relative 9 (L) 14 - 44 %    Monocytes Relative 8 4 - 12 %    Eosinophils Relative 0 0 - 6 %    Basophils Relative 0 0 - 1 %    Neutrophils Absolute 9 47 (H) 1 85 - 7 62 Thousands/µL    Immature Grans Absolute 0 05 0 00 - 0 20 Thousand/uL    Lymphocytes Absolute 1 00 0 60 - 4 47 Thousands/µL Monocytes Absolute 0 90 0 17 - 1 22 Thousand/µL    Eosinophils Absolute 0 04 0 00 - 0 61 Thousand/µL    Basophils Absolute 0 04 0 00 - 0 10 Thousands/µL   Basic metabolic panel    Collection Time: 01/26/22  4:52 AM   Result Value Ref Range    Sodium 135 133 - 145 mmol/L    Potassium 3 9 3 5 - 5 0 mmol/L    Chloride 103 96 - 108 mmol/L    CO2 25 22 - 33 mmol/L    ANION GAP 7 4 - 13 mmol/L    BUN 12 6 - 20 mg/dL    Creatinine 0 73 0 40 - 1 10 mg/dL    Glucose 173 (H) 65 - 140 mg/dL    Calcium 9 2 8 4 - 10 2 mg/dL    eGFR 81 ml/min/1 73sq m   Comprehensive metabolic panel    Collection Time: 01/27/22  5:34 AM   Result Value Ref Range    Sodium 139 133 - 145 mmol/L    Potassium 4 0 3 5 - 5 0 mmol/L    Chloride 104 96 - 108 mmol/L    CO2 28 22 - 33 mmol/L    ANION GAP 7 4 - 13 mmol/L    BUN 13 6 - 20 mg/dL    Creatinine 0 87 0 40 - 1 10 mg/dL    Glucose 97 65 - 140 mg/dL    Calcium 9 5 8 4 - 10 2 mg/dL    AST 93 (H) 15 - 41 U/L     (H) 5 - 54 U/L    Alkaline Phosphatase 217 5 (H) 35 - 140 U/L    Total Protein 6 8 6 4 - 8 3 g/dL    Albumin 3 5 3 4 - 4 8 g/dL    Total Bilirubin 0 61 0 30 - 1 20 mg/dL    eGFR 66 ml/min/1 73sq m   CBC and differential    Collection Time: 01/27/22  5:34 AM   Result Value Ref Range    WBC 10 24 (H) 4 31 - 10 16 Thousand/uL    RBC 3 44 (L) 3 81 - 5 12 Million/uL    Hemoglobin 10 5 (L) 11 5 - 15 4 g/dL    Hematocrit 31 6 (L) 34 8 - 46 1 %    MCV 92 82 - 98 fL    MCH 30 5 26 8 - 34 3 pg    MCHC 33 2 31 4 - 37 4 g/dL    RDW 12 4 11 6 - 15 1 %    MPV 10 3 8 9 - 12 7 fL    Platelets 965 068 - 172 Thousands/uL    nRBC 0 /100 WBCs    Neutrophils Relative 69 43 - 75 %    Immat GRANS % 1 0 - 2 %    Lymphocytes Relative 15 14 - 44 %    Monocytes Relative 10 4 - 12 %    Eosinophils Relative 4 0 - 6 %    Basophils Relative 1 0 - 1 %    Neutrophils Absolute 7 16 1 85 - 7 62 Thousands/µL    Immature Grans Absolute 0 05 0 00 - 0 20 Thousand/uL    Lymphocytes Absolute 1 55 0 60 - 4 47 Thousands/µL Monocytes Absolute 1 00 0 17 - 1 22 Thousand/µL    Eosinophils Absolute 0 38 0 00 - 0 61 Thousand/µL    Basophils Absolute 0 10 0 00 - 0 10 Thousands/µL   Vancomycin, trough Draw trough immediately before dose is due, do not hold dose waiting for results  Call Pharmacy with any questions      Collection Time: 01/27/22 11:31 AM   Result Value Ref Range    Vancomycin Tr 7 5 (L) 10 0 - 20 0 ug/mL   Comprehensive metabolic panel    Collection Time: 01/28/22  4:49 AM   Result Value Ref Range    Sodium 139 133 - 145 mmol/L    Potassium 3 5 3 5 - 5 0 mmol/L    Chloride 103 96 - 108 mmol/L    CO2 29 22 - 33 mmol/L    ANION GAP 7 4 - 13 mmol/L    BUN 13 6 - 20 mg/dL    Creatinine 0 87 0 40 - 1 10 mg/dL    Glucose 93 65 - 140 mg/dL    Calcium 9 6 8 4 - 10 2 mg/dL    AST 77 (H) 15 - 41 U/L    ALT 97 (H) 5 - 54 U/L    Alkaline Phosphatase 210 2 (H) 35 - 140 U/L    Total Protein 6 9 6 4 - 8 3 g/dL    Albumin 3 5 3 4 - 4 8 g/dL    Total Bilirubin 0 49 0 30 - 1 20 mg/dL    eGFR 66 ml/min/1 73sq m   CBC and differential    Collection Time: 01/28/22  4:49 AM   Result Value Ref Range    WBC 10 00 4 31 - 10 16 Thousand/uL    RBC 3 56 (L) 3 81 - 5 12 Million/uL    Hemoglobin 10 7 (L) 11 5 - 15 4 g/dL    Hematocrit 32 8 (L) 34 8 - 46 1 %    MCV 92 82 - 98 fL    MCH 30 1 26 8 - 34 3 pg    MCHC 32 6 31 4 - 37 4 g/dL    RDW 12 4 11 6 - 15 1 %    MPV 10 3 8 9 - 12 7 fL    Platelets 250 556 - 904 Thousands/uL    nRBC 0 /100 WBCs    Neutrophils Relative 62 43 - 75 %    Immat GRANS % 1 0 - 2 %    Lymphocytes Relative 19 14 - 44 %    Monocytes Relative 11 4 - 12 %    Eosinophils Relative 6 0 - 6 %    Basophils Relative 1 0 - 1 %    Neutrophils Absolute 6 23 1 85 - 7 62 Thousands/µL    Immature Grans Absolute 0 11 0 00 - 0 20 Thousand/uL    Lymphocytes Absolute 1 90 0 60 - 4 47 Thousands/µL    Monocytes Absolute 1 09 0 17 - 1 22 Thousand/µL    Eosinophils Absolute 0 56 0 00 - 0 61 Thousand/µL    Basophils Absolute 0 11 (H) 0 00 - 0 10 Thousands/µL   Protime-INR    Collection Time: 03/07/22  9:56 AM   Result Value Ref Range    Protime 16 5 (H) 11 6 - 14 5 seconds    INR 1 35 (H) 0 84 - 1 19   Protime-INR    Collection Time: 03/10/22  9:33 AM   Result Value Ref Range    Protime 19 1 (H) 11 6 - 14 5 seconds    INR 1 64 (H) 0 84 - 1 19   Protime-INR    Collection Time: 03/14/22 10:33 AM   Result Value Ref Range    Protime 21 4 (H) 11 6 - 14 5 seconds    INR 1 91 (H) 0 84 - 1 19   Protime-INR    Collection Time: 03/17/22 11:16 AM   Result Value Ref Range    Protime 23 5 (H) 11 6 - 14 5 seconds    INR 2 15 (H) 0 84 - 1 19   Protime-INR    Collection Time: 03/21/22  1:38 PM   Result Value Ref Range    Protime 20 9 (H) 11 6 - 14 5 seconds    INR 1 84 (H) 0 84 - 1 19   Protime-INR    Collection Time: 04/04/22 12:44 PM   Result Value Ref Range    Protime 19 3 (H) 11 6 - 14 5 seconds    INR 1 67 (H) 0 84 - 1 19   Protime-INR    Collection Time: 04/25/22 11:13 AM   Result Value Ref Range    Protime 26 1 (H) 11 6 - 14 5 seconds    INR 2 47 (H) 0 84 - 1 19   Protime-INR    Collection Time: 05/17/22 11:53 AM   Result Value Ref Range    Protime 28 7 (H) 11 6 - 14 5 seconds    INR 2 79 (H) 0 84 - 1 19   Tissue Exam    Collection Time: 06/07/22 10:47 AM   Result Value Ref Range    Case Report       Surgical Pathology Report                         Case: [id number]                                   Authorizing Provider:  Radha Ding MD Collected:           06/07/2022 1047              Ordering Location:     [address] [place] [address] Received:            06/08/2022 [address] [place] [address]                                                                  Pathologist:           Charlie Baker MD                                                                 Specimens:   A) - Stomach, cold bx antrum erythema/ hx of ulcer eval for hpy                                     B) - Esophagus, cold bx lower esophagus reflux Final Diagnosis       A  Stomach, antrum, erythema, biopsy:  - Superficial, detached gastric foveolar epithelium with marked artifact   - Negative for dysplasia  Note: Special stain for alcian blue/PAS highlights benign glands  B  Lower esophagus, biopsy:  - Squamocolumnar junction mucosa  - Negative for goblet cell intestinal metaplasia  - No epithelial dysplasia and no evidence of malignancy  Note       Interpretation performed at [facility], [address] [place], [place] Man[place], [address]        Additional Information       All reported additional testing was performed with appropriately reactive controls  These tests were developed and their performance characteristics determined by [facility] or appropriate performing facility, though some tests may be performed on tissues which have not been validated for performance characteristics (such as staining performed on alcohol exposed cell blocks and decalcified tissues)  Results should be interpreted with caution and in the context of the patients’ clinical condition  These tests may not be cleared or approved by the [place]  Food and Drug Administration, though the FDA has determined that such clearance or approval is not necessary  These tests are used for clinical purposes and they should not be regarded as investigational or for research  This laboratory has been approved by CLIA 88, designated as a high-complexity laboratory and is qualified to perform these tests  [facility] Pr[facility]hak[facility] Description          A  The specimen is received in formalin, labeled with the patient's name and hospital number, and is designated "cold biopsy antrum erythema/history of ulcer eval for hp " It consists of 2 tan tissue fragments that measure 0 2 x 0 2 x 0 1 cm and 0 3 x 0 2 x 0 1 cm  The specimen is submitted in toto in screened cassette A1          B   The specimen is received in formalin, labeled with the patient's name and hospital number, and is designated "cold biopsy lower esophagus reflux " It consists of multiple pink-tan tissue fragments that range from less than 0 1 x 0 1 cm to 0 2 x 0 2 cm  The specimen is submitted in toto in screened cassette B1  Note: The estimated total formalin fixation time based upon information provided by the submitting clinician and the standard processing schedule is under 72 hours      [facility]ster       Protime-INR    Collection Time: 06/24/22  1:34 PM   Result Value Ref Range    Protime 26 7 (H) 11 6 - 14 5 seconds    INR 2 54 (H) 0 84 - 1 19   COVID/FLU/RSV - 2 hour TAT    Collection Time: 07/03/22 12:42 PM    Specimen: Nasopharyngeal Swab   Result Value Ref Range    SARS-CoV-2 Negative Negative    INFLUENZA A PCR Negative Negative    INFLUENZA B PCR Negative Negative    RSV PCR Negative Negative   Protime-INR    Collection Time: 07/19/22 11:11 AM   Result Value Ref Range    Protime 27 2 (H) 11 6 - 14 5 seconds    INR 2 47 (H) 0 84 - 1 19   Protime-INR    Collection Time: 08/17/22 10:55 AM   Result Value Ref Range    Protime 30 5 (H) 11 6 - 14 5 seconds    INR 2 86 (H) 0 84 - 1 19   CBC and differential    Collection Time: 08/17/22 10:55 AM   Result Value Ref Range    WBC 7 13 4 31 - 10 16 Thousand/uL    RBC 3 70 (L) 3 81 - 5 12 Million/uL    Hemoglobin 11 5 11 5 - 15 4 g/dL    Hematocrit 35 7 34 8 - 46 1 %    MCV 97 82 - 98 fL    MCH 31 1 26 8 - 34 3 pg    MCHC 32 2 31 4 - 37 4 g/dL    RDW 13 0 11 6 - 15 1 %    MPV 9 4 8 9 - 12 7 fL    Platelets 512 912 - 744 Thousands/uL    nRBC 0 /100 WBCs    Neutrophils Relative 65 43 - 75 %    Immat GRANS % 0 0 - 2 %    Lymphocytes Relative 19 14 - 44 %    Monocytes Relative 10 4 - 12 %    Eosinophils Relative 5 0 - 6 %    Basophils Relative 1 0 - 1 %    Neutrophils Absolute 4 63 1 85 - 7 62 Thousands/µL    Immature Grans Absolute 0 03 0 00 - 0 20 Thousand/uL    Lymphocytes Absolute 1 35 0 60 - 4 47 Thousands/µL    Monocytes Absolute 0 69 0 17 - 1 22 Thousand/µL    Eosinophils Absolute 0 36 0 00 - 0 61 Thousand/µL    Basophils Absolute 0 07 0 00 - 0 10 Thousands/µL   Comprehensive metabolic panel    Collection Time: 08/17/22 10:55 AM   Result Value Ref Range    Sodium 136 135 - 147 mmol/L    Potassium 3 9 3 5 - 5 3 mmol/L    Chloride 101 96 - 108 mmol/L    CO2 28 21 - 32 mmol/L    ANION GAP 7 4 - 13 mmol/L    BUN 18 5 - 25 mg/dL    Creatinine 0 85 0 60 - 1 30 mg/dL    Glucose 100 65 - 140 mg/dL    Calcium 9 6 8 4 - 10 2 mg/dL    AST 32 13 - 39 U/L    ALT 28 7 - 52 U/L    Alkaline Phosphatase 73 34 - 104 U/L    Total Protein 6 8 6 4 - 8 4 g/dL    Albumin 3 8 3 5 - 5 0 g/dL    Total Bilirubin 0 46 0 20 - 1 00 mg/dL    eGFR 67 ml/min/1 73sq m   Lipid panel    Collection Time: 08/17/22 10:55 AM   Result Value Ref Range    Cholesterol 143 See Comment mg/dL    Triglycerides 125 See Comment mg/dL    HDL, Direct 51 >=50 mg/dL    LDL Calculated 67 0 - 100 mg/dL    Non-HDL-Chol (CHOL-HDL) 92 mg/dl   TSH, 3rd generation    Collection Time: 08/17/22 10:55 AM   Result Value Ref Range    TSH 3RD GENERATON 0 310 (L) 0 450 - 4 500 uIU/mL   Protime-INR    Collection Time: 09/06/22 10:28 AM   Result Value Ref Range    Protime 32 2 (H) 11 6 - 14 5 seconds    INR 3 13 (H) 0 84 - 1 19   CBC and differential    Collection Time: 09/06/22 10:28 AM   Result Value Ref Range    WBC 6 74 4 31 - 10 16 Thousand/uL    RBC 4 03 3 81 - 5 12 Million/uL    Hemoglobin 12 1 11 5 - 15 4 g/dL    Hematocrit 38 5 34 8 - 46 1 %    MCV 96 82 - 98 fL    MCH 30 0 26 8 - 34 3 pg    MCHC 31 4 31 4 - 37 4 g/dL    RDW 12 9 11 6 - 15 1 %    MPV 9 0 8 9 - 12 7 fL    Platelets 232 406 - 012 Thousands/uL    nRBC 0 /100 WBCs    Neutrophils Relative 65 43 - 75 %    Immat GRANS % 0 0 - 2 %    Lymphocytes Relative 20 14 - 44 %    Monocytes Relative 8 4 - 12 %    Eosinophils Relative 6 0 - 6 %    Basophils Relative 1 0 - 1 %    Neutrophils Absolute 4 33 1 85 - 7 62 Thousands/µL    Immature Grans Absolute 0 02 0 00 - 0 20 Thousand/uL    Lymphocytes Absolute 1 35 0 60 - 4 47 Thousands/µL    Monocytes Absolute 0 56 0 17 - 1 22 Thousand/µL    Eosinophils Absolute 0 41 0 00 - 0 61 Thousand/µL    Basophils Absolute 0 07 0 00 - 0 10 Thousands/µL   APTT    Collection Time: 09/06/22 10:28 AM   Result Value Ref Range    PTT 49 (H) 23 - 37 seconds   Basic metabolic panel    Collection Time: 09/06/22 10:28 AM   Result Value Ref Range    Sodium 141 135 - 147 mmol/L    Potassium 4 2 3 5 - 5 3 mmol/L    Chloride 106 96 - 108 mmol/L    CO2 31 21 - 32 mmol/L    ANION GAP 4 4 - 13 mmol/L    BUN 16 5 - 25 mg/dL    Creatinine 0 91 0 60 - 1 30 mg/dL    Glucose, Fasting 104 (H) 65 - 99 mg/dL    Calcium 9 6 8 3 - 10 1 mg/dL    eGFR 62 ml/min/1 73sq m   Protime-INR    Collection Time: 09/26/22 10:06 AM   Result Value Ref Range    Protime 14 4 11 6 - 14 5 seconds    INR 1 11 0 84 - 1 19   Protime-INR    Collection Time: 10/28/22 11:50 AM   Result Value Ref Range    Protime 23 6 (H) 11 6 - 14 5 seconds    INR 2 06 (H) 0 84 - 1 19       Laboratory Results: I have personally reviewed the pertinent laboratory results/reports            Assessment/Plan:  Problem List Items Addressed This Visit        Cardiovascular and Mediastinum    PAF (paroxysmal atrial fibrillation) (HCC)       Other    Chronic anticoagulation   Other Visit Diagnoses     Acute right ankle pain    -  Primary    Relevant Medications    Diclofenac Sodium (VOLTAREN) 1 %    lidocaine (LMX) 4 % cream    tiZANidine (ZANAFLEX) 2 mg tablet    Cervicalgia        Relevant Medications    tiZANidine (ZANAFLEX) 2 mg tablet    Chronic right shoulder pain        Relevant Medications    tiZANidine (ZANAFLEX) 2 mg tablet          Start taking tizanidine prn for ankle pain,discussed sedation potential- advised not to drive after taking  Can continue tylenol prn, topical diclofenac gel tid prn for ankle pain      Advised leg elevation, no suspicion for fractures, likely has arthritis, consider imaging if does not improve  Read package inserts for all medications before starting a new medications, call me if you have any questions  Patient was given opportunity to ask questions and all questions were answered  Disclaimer: Portions of the record may have been created with voice recognition software  Occasional wrong word or "sound a like" substitutions may have occurred due to the inherent limitations of voice recognition software  Read the chart carefully and recognize, using context, where substitutions have occurred  I have used the Epic copy/forward function to compose this note  I have reviewed my current note to ensure it reflects the current patient status, exam, assessment and plan

## 2022-11-30 ENCOUNTER — OFFICE VISIT (OUTPATIENT)
Dept: PHYSICAL THERAPY | Facility: REHABILITATION | Age: 72
End: 2022-11-30

## 2022-11-30 DIAGNOSIS — M75.101 TEAR OF RIGHT ROTATOR CUFF, UNSPECIFIED TEAR EXTENT, UNSPECIFIED WHETHER TRAUMATIC: ICD-10-CM

## 2022-11-30 DIAGNOSIS — Z98.890 S/P SHOULDER SURGERY: Primary | ICD-10-CM

## 2022-11-30 NOTE — PROGRESS NOTES
Daily Note     Today's date: 2022  Patient name: Wali Mueller  : 1950  MRN: [id number]  Referring provider: Luis Schmid*  Dx:   Encounter Diagnosis     ICD-10-CM    1  S/P shoulder surgery  Z98 890       2  Tear of right rotator cuff, unspecified tear extent, unspecified whether traumatic  M75 101           Start Time: 1400  Stop Time: 1450  Total time in clinic (min): 50 minutes    Subjective: Patient reports shoulder as "the same" at start of session, reporting stiffness and soreness  She states she is to follow up with MD         Objective: See treatment diary below      Assessment: Tolerated treatment well  Patient demonstrated fatigue post treatment, exhibited good technique with therapeutic exercises and would benefit from continued PT Significant fatigue noted with isometrics  Patient educated on importance of HEP to improve shoulder mobility and strength, patient reporting understanding  Plan: Continue per plan of care  Progress treatment as tolerated          Precautions: Afib, anxiety, arthritis, depression, HTN, hiatal hernia, HLD, hypothyroidism, hx C1-2 fusion    * indicates given as part of HEP  HEP code: [facility]     Daily Treatment Diary    Manuals    R shoulder PROM  done done done  done done done   R elbow PROM      done                           Neuro Re-Ed           Scapular retraction with ER           Chin tuck           Prone I's 3x10 R arm off table          Prone T's           Prone Y's           Resisted W's           Sustained YWT's           Serratus push up           Plank shoulder taps           Plank to down dog                      Ther Ex           Patient education           Week 1-4           UT stretch*           Levator scap stretch*           Shoulder shrugs           Scapular retraction*           Elbow flex/ext*           Wrist flex/ext*           Gripping*           Table slides - flexion* 20x5" 20x5" 20x10" 20x10''  20x5" 20x5" 20x5"   Table slides - scaption* 20x5" 20x5" 20x10" 20x10''  20x5" 20x5" 20x5"   Shoulder ER with cane 20x5" 20x5" 20x5" 20x5''   20x5" seated w/ wedge 20x5" seated w/ wedge   Shoulder isometrics against wall (week 4)    10x5'' ea  10x5" 10x5"               Week 5-7           Pulleys - flexion/scaption 3'/3' 3'/3' 3'/3' 3'/3'  3'/3' 3'/3' 3'/3'   Supine shoulder flexion with cane* 20x5" 20x5" 20x5" 20x5''    2x10x5"   TB rows Pink TB 3x10 Pink TB 3x12 Pink TB 3x12 Pink TB 3x15    Pink TB 3x10   Bicep curls (week 6)           SL shoulder flexion           SL shoulder abduction           SL shoulder ER           Standing scaption w/ strap                      Week 8+           Foam roller up wall           TB lat pull down           Band pull aparts           IR/ER isometric walkouts           Active IR/ER           Band wall walks horizontal           Band wall walks vertical           Ball wall circles                                 Ther Activity           1 arm row           1 arm press                      Suitcase carry           90/90 carry           OH carry                                            Modalities           R shoulder CP

## 2022-12-05 ENCOUNTER — TELEPHONE (OUTPATIENT)
Dept: FAMILY MEDICINE CLINIC | Facility: CLINIC | Age: 72
End: 2022-12-05

## 2022-12-05 DIAGNOSIS — E03.9 HYPOTHYROIDISM, UNSPECIFIED TYPE: ICD-10-CM

## 2022-12-05 RX ORDER — LEVOTHYROXINE SODIUM 0.05 MG/1
50 TABLET ORAL DAILY
Qty: 90 TABLET | Refills: 0 | Status: SHIPPED | OUTPATIENT
Start: 2022-12-05 | End: 2023-03-16

## 2022-12-07 ENCOUNTER — OFFICE VISIT (OUTPATIENT)
Dept: PHYSICAL THERAPY | Facility: REHABILITATION | Age: 72
End: 2022-12-07

## 2022-12-07 DIAGNOSIS — M75.101 TEAR OF RIGHT ROTATOR CUFF, UNSPECIFIED TEAR EXTENT, UNSPECIFIED WHETHER TRAUMATIC: ICD-10-CM

## 2022-12-07 DIAGNOSIS — Z98.890 S/P SHOULDER SURGERY: Primary | ICD-10-CM

## 2022-12-07 NOTE — PROGRESS NOTES
Daily Note     Today's date: 2022  Patient name: Dian French  : 1950  MRN: [id number]  Referring provider: Janelle Catherine*  Dx:   Encounter Diagnosis     ICD-10-CM    1  S/P shoulder surgery  Z98 890       2  Tear of right rotator cuff, unspecified tear extent, unspecified whether traumatic  M75 101           Start Time: 1530  Stop Time: 1625  Total time in clinic (min): 55 minutes    Subjective: Patient reporting shoulder as "sore, hurts" at start of session  Objective: See treatment diary below      Assessment: Tolerated treatment well  Patient demonstrated fatigue post treatment, exhibited good technique with therapeutic exercises and would benefit from continued PT Trialed additional sidelying TE/TB TE this session with patient tolerating well, cues required for tolerable ranges with all TE performed  Plan: Continue per plan of care  Progress treatment as tolerated          Precautions: Afib, anxiety, arthritis, depression, HTN, hiatal hernia, HLD, hypothyroidism, hx C1-2 fusion    * indicates given as part of HEP  HEP code: [facility]     Daily Treatment Diary    Manuals    R shoulder PROM  done done done done  done done   R elbow PROM                                 Neuro Re-Ed           Scapular retraction with ER           Chin tuck           Prone I's 3x10 R arm off table          Prone T's           Prone Y's           Resisted W's           Sustained YWT's           Serratus push up           Plank shoulder taps           Plank to down dog                      Ther Ex           Patient education           Week 1-4           UT stretch*           Levator scap stretch*           Shoulder shrugs           Scapular retraction*           Elbow flex/ext*           Wrist flex/ext*           Gripping*           Table slides - flexion* 20x5" 20x5" 20x10" 20x10''   20x5" 20x5"   Table slides - scaption* 20x5" 20x5" 20x10" 20x10''   20x5" 20x5" Shoulder ER with cane 20x5" 20x5" 20x5" 20x5'' 20x5''  20x5" seated w/ wedge 20x5" seated w/ wedge   Shoulder isometrics against wall (week 4)    10x5'' ea ER  20x5''  10x5"               Week 5-7           Pulleys - flexion/scaption 3'/3' 3'/3' 3'/3' 3'/3' 3'/3'  3'/3' 3'/3'   Supine shoulder flexion with cane* 20x5" 20x5" 20x5" 20x5'' 20x5''   2x10x5"   TB rows Pink TB 3x10 Pink TB 3x12 Pink TB 3x12 Pink TB 3x15 GTB 3x10    Pink TB 3x10   Bicep curls (week 6)           SL shoulder flexion     3x10      SL shoulder abduction     3x10      SL shoulder ER     3x10       Standing scaption w/ strap     nv? Week 8+           Foam roller up wall           TB lat pull down     PTB 2x10      Band pull aparts           IR/ER isometric walkouts     nv?       Active IR/ER           Band wall walks horizontal           Band wall walks vertical           Ball wall circles                                 Ther Activity           1 arm row           1 arm press                      Suitcase carry           90/90 carry           OH carry                                            Modalities           R shoulder CP

## 2022-12-09 ENCOUNTER — APPOINTMENT (OUTPATIENT)
Dept: PHYSICAL THERAPY | Facility: REHABILITATION | Age: 72
End: 2022-12-09

## 2022-12-09 ENCOUNTER — TELEMEDICINE (OUTPATIENT)
Dept: FAMILY MEDICINE CLINIC | Facility: CLINIC | Age: 72
End: 2022-12-09

## 2022-12-09 VITALS — WEIGHT: 125 LBS | HEIGHT: 63 IN | BODY MASS INDEX: 22.15 KG/M2

## 2022-12-09 DIAGNOSIS — F41.9 ANXIETY: Primary | ICD-10-CM

## 2022-12-09 RX ORDER — ESCITALOPRAM OXALATE 5 MG/1
5 TABLET ORAL DAILY
Qty: 30 TABLET | Refills: 5 | Status: SHIPPED | OUTPATIENT
Start: 2022-12-09
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
## 2022-12-12 ENCOUNTER — ANTICOAG VISIT (OUTPATIENT)
Dept: CARDIOLOGY CLINIC | Facility: CLINIC | Age: 72
End: 2022-12-12

## 2022-12-12 ENCOUNTER — APPOINTMENT (OUTPATIENT)
Dept: LAB | Facility: HOSPITAL | Age: 72
End: 2022-12-12
Attending: INTERNAL MEDICINE

## 2022-12-12 DIAGNOSIS — I48.0 PAF (PAROXYSMAL ATRIAL FIBRILLATION) (HCC): Primary | ICD-10-CM

## 2022-12-13 ENCOUNTER — OFFICE VISIT (OUTPATIENT)
Dept: PHYSICAL THERAPY | Facility: REHABILITATION | Age: 72
End: 2022-12-13

## 2022-12-13 DIAGNOSIS — M75.101 TEAR OF RIGHT ROTATOR CUFF, UNSPECIFIED TEAR EXTENT, UNSPECIFIED WHETHER TRAUMATIC: ICD-10-CM

## 2022-12-13 DIAGNOSIS — Z98.890 S/P SHOULDER SURGERY: Primary | ICD-10-CM

## 2022-12-13 NOTE — PROGRESS NOTES
Daily Note     Today's date: 2022  Patient name: Lenore Eli  : 1950  MRN: [id number]  Referring provider: Marc Encarnacion*  Dx:   Encounter Diagnosis     ICD-10-CM    1  S/P shoulder surgery  Z98 890       2  Tear of right rotator cuff, unspecified tear extent, unspecified whether traumatic  M75 101           Start Time: 4879  Stop Time: 0932  Total time in clinic (min): 42 minutes    Subjective: Donavon Harvey states she laid on her R shoulder to sleep the other night and it has been sore since  Objective: See treatment diary below      Assessment: Tolerated treatment well  Initially required verbal and tactile cuing for proper arm positioning with first few reps of isometric IR/ER walkout with good ability to incorporate feedback into performance  Patient demonstrated appropriate level of challenge and muscular fatigue throughout session and noted good status at end of visit  Patient demonstrated fatigue post treatment, exhibited good technique with therapeutic exercises and would benefit from continued PT  Plan: Continue per plan of care  Progress treatment as tolerated         Precautions: Afib, anxiety, arthritis, depression, HTN, hiatal hernia, HLD, hypothyroidism, hx C1-2 fusion    * indicates given as part of HEP  HEP code: [facility]     Daily Treatment Diary    Manuals    R shoulder PROM  done done done done done  done   R elbow PROM                                 Neuro Re-Ed           Scapular retraction with ER           Chin tuck           Prone I's 3x10 R arm off table          Prone T's           Prone Y's           Resisted W's           Sustained YWT's           Serratus push up           Plank shoulder taps           Plank to down dog                      Ther Ex           Patient education           Week 1-4           UT stretch*           Levator scap stretch*           Shoulder shrugs           Scapular retraction*           Elbow flex/ext*           Wrist flex/ext*           Gripping*           Table slides - flexion* 20x5" 20x5" 20x10" 20x10''    20x5"   Table slides - scaption* 20x5" 20x5" 20x10" 20x10''    20x5"   Shoulder ER with cane 20x5" 20x5" 20x5" 20x5'' 20x5''   20x5" seated w/ wedge   Shoulder isometrics against wall (week 4)    10x5'' ea ER  20x5''                 Week 5-7           Pulleys - flexion/scaption 3'/3' 3'/3' 3'/3' 3'/3' 3'/3'   3'/3'   Supine shoulder flexion with cane* 20x5" 20x5" 20x5" 20x5'' 20x5'' 20x10"  2x10x5"   TB rows Pink TB 3x10 Pink TB 3x12 Pink TB 3x12 Pink TB 3x15 GTB 3x10  GTB 3x12  Pink TB 3x10   Bicep curls (week 6)           SL shoulder flexion     3x10 3x10     SL shoulder abduction     3x10 3x10     SL shoulder ER     3x10  3x10     Standing scaption w/ strap     nv? 2x10                Week 8+           Foam roller up wall           TB lat pull down     PTB 2x10      Band pull aparts           IR/ER isometric walkouts     nv?  Pink TB 10x     Active IR/ER           Band wall walks horizontal           Band wall walks vertical           Ball wall circles                                 Ther Activity           1 arm row           1 arm press                      Suitcase carry           90/90 carry           OH carry                                            Modalities           R shoulder CP

## 2022-12-15 ENCOUNTER — APPOINTMENT (OUTPATIENT)
Dept: PHYSICAL THERAPY | Facility: REHABILITATION | Age: 72
End: 2022-12-15

## 2022-12-16 ENCOUNTER — APPOINTMENT (OUTPATIENT)
Dept: PHYSICAL THERAPY | Facility: REHABILITATION | Age: 72
End: 2022-12-16

## 2022-12-18 ENCOUNTER — RA CDI HCC (OUTPATIENT)
Dept: OTHER | Facility: HOSPITAL | Age: 72
End: 2022-12-18

## 2022-12-18 NOTE — PROGRESS NOTES
[id number]  [facility] 75  coding opportunities          Chart Reviewed number of suggestions sent to Provider: 1     Patients Insurance     Medicare Insurance: [employer]

## 2022-12-21 ENCOUNTER — OFFICE VISIT (OUTPATIENT)
Dept: OBGYN CLINIC | Facility: CLINIC | Age: 72
End: 2022-12-21

## 2022-12-21 DIAGNOSIS — M75.101 TEAR OF RIGHT ROTATOR CUFF, UNSPECIFIED TEAR EXTENT, UNSPECIFIED WHETHER TRAUMATIC: ICD-10-CM

## 2022-12-21 DIAGNOSIS — Z98.890 S/P RIGHT ROTATOR CUFF REPAIR: Primary | ICD-10-CM

## 2022-12-21 NOTE — PROGRESS NOTES
Assessment/Plan:  1  S/P right rotator cuff repair  Ambulatory Referral to Physical Therapy      2  Tear of right rotator cuff, unspecified tear extent, unspecified whether traumatic  Ambulatory Referral to Physical Therapy        Scribe Attestation    I,:  Angela Padilla am acting as a scribe while in the presence of the attending physician :       I,:  Laura Morrow MD personally performed the services described in this documentation    as scribed in my presence :             Petros Ring appears to be doing well now 12 weeks status post shoulder arthroscopy with rotator cuff repair, biceps tenodesis and extensive debridement  I did explain that she did have debridement and is repaired at the time of her surgery  She is doing quite well and does demonstrate functional improvements with range of motion and strengthening  I would like her to continue physical therapy  I did remark that it is difficult to participate in therapy for 4 to 6 months after surgery  I did also reiterate that this can take up to 1 year to feel back to normal   Petros Ring verbalized understanding and had no further questions  I will see her back in 6 weeks for repeat clinical evaluation  Subjective:   Astrid Shi is a 74 y o  female who presents for follow-up evaluation of her right shoulder  She is 12 weeks status post right shoulder arthroscopy with rotator cuff repair of the subscapularis and supraspinatus tendon utilizing a speed fix technique and biceps tenodesis incorporated into the subscapularis repair  He has been participating in physical therapy and has been experiencing functional improvements but does still experience some limitations in overall strength and some pain into the shoulder  She notes that she did lay on her shoulder and has had some symptoms since  She does feel that her progress is slow, but does feel that there is improvements with physical therapy   Today she denies any distal paresthesias  Review of Systems   Constitutional: Negative for chills, fever and unexpected weight change  HENT: Negative for hearing loss, nosebleeds and sore throat  Eyes: Negative for pain, redness and visual disturbance  Respiratory: Negative for cough, shortness of breath and wheezing  Cardiovascular: Negative for chest pain, palpitations and leg swelling  Gastrointestinal: Negative for abdominal pain, nausea and vomiting  Endocrine: Negative for polydipsia and polyuria  Genitourinary: Negative for dysuria and hematuria  Musculoskeletal:        See HPI   Skin: Negative for rash and wound  Neurological: Negative for dizziness, numbness and headaches  Psychiatric/Behavioral: Negative for decreased concentration and suicidal ideas  The patient is not nervous/anxious  Past Medical History:   Diagnosis Date   • A-fib [facility])    • Anxiety    • Arthritis     shoulder   • Carpal tunnel syndrome    • Colon polyp    • Depression    • Disease of thyroid gland    • Diverticulitis    • Esophageal stricture    • HBP (high blood pressure)    • Hiatal hernia 06/07/2022    Small hiatal hernia   • Hyperlipidemia    • Hypertension    • Hypothyroid    • Pneumonia     2017   • PONV (postoperative nausea and vomiting)    • Schatzki's ring 06/07/2022    Schatzki's ring in the lower esophagus   • Sleep apnea     mild        Past Surgical History:   Procedure Laterality Date   • APPENDECTOMY     • BREAST BIOPSY Right     benign   • BREAST CYST ASPIRATION Right     benign   • CATARACT EXTRACTION Bilateral    • CATARACT EXTRACTION, BILATERAL  2019   • CERVICAL FUSION N/A 05/25/2021    Procedure: FUSION CERVICAL POSTERIOR C1-C2 with allograft and neuromonitoring);   Surgeon: Rommel Euceda MD;  Location: [facility];  Service: Orthopedics   • CHOLECYSTECTOMY     • COLONOSCOPY  06/07/2022   • COLONOSCOPY W/ BIOPSIES  2011   • EGD  06/07/2022   • HAND SURGERY Right    • MAMMO (HISTORICAL) Bilateral 2018   • MAMMO (HISTORICAL) Bilateral 10/27/2017   • MANDIBLE SURGERY     • MANDIBLE SURGERY     • DC SHLDR ARTHROSCOP,SURG,W/ROTAT CUFF REPR Right 2022    Procedure: ARTHROSCOPIC ROTATOR CUFF REPAIR OF SUBSCAPULARIS BICEPS TENODESIS EXTENSIVE DEBRIDEMENT;  Surgeon: Dixon Liu MD;  Location: [facility];  Service: Orthopedics   • WISDOM TOOTH EXTRACTION      X4       Family History   Problem Relation Age of Onset   • Heart disease Mother    • Hypertension Mother    • Hyperlipidemia Mother    • Heart failure Mother    • Hypertension Sister        Social History     Occupational History   • Not on file   Tobacco Use   • Smoking status: Former     Types: Cigarettes     Quit date:      Years since quittin 9   • Smokeless tobacco: Never   Vaping Use   • Vaping Use: Never used   Substance and Sexual Activity   • Alcohol use: Not Currently     Comment: last drink 7 years ago   • Drug use: Not Currently   • Sexual activity: Not Currently         Current Outpatient Medications:   •  acetaminophen (TYLENOL) 500 mg tablet, Take 1 tablet (500 mg total) by mouth every 6 (six) hours as needed for moderate pain, Disp: 20 tablet, Rfl: 0  •  ALPRAZolam (XANAX) 0 5 mg tablet, TAKE 1 TABLET(0 5 MG) BY MOUTH TWICE DAILY AS NEEDED FOR ANXIETY, Disp: 60 tablet, Rfl: 5  •  atorvastatin (LIPITOR) 10 mg tablet, TAKE 1 TABLET BY MOUTH EVERY DAY AT BEDTIME, Disp: 90 tablet, Rfl: 3  •  Diclofenac Sodium (VOLTAREN) 1 %, Apply 2 g topically 4 (four) times a day, Disp: 100 g, Rfl: 0  •  escitalopram (LEXAPRO) 5 mg tablet, Take 1 tablet (5 mg total) by mouth daily, Disp: 30 tablet, Rfl: 5  •  levothyroxine 50 mcg tablet, Take 1 tablet (50 mcg total) by mouth daily, Disp: 90 tablet, Rfl: 0  •  lidocaine (LMX) 4 % cream, Apply topically as needed for mild pain, Disp: 30 g, Rfl: 0  •  metoprolol tartrate (LOPRESSOR) 25 mg tablet, Take 1 tablet (25 mg total) by mouth every 12 (twelve) hours, Disp: 180 tablet, Rfl: 3  •  Multiple Vitamins-Minerals (CENTRUM ADULTS PO), Take 1 tablet by mouth daily, Disp: , Rfl:   •  omeprazole (PriLOSEC) 20 mg delayed release capsule, TAKE 1 CAPSULE(20 MG) BY MOUTH DAILY BEFORE BREAKFAST, Disp: 90 capsule, Rfl: 1  •  ramipril (ALTACE) 10 MG capsule, TAKE 1 CAPSULE(10 MG) BY MOUTH TWICE DAILY, Disp: 180 capsule, Rfl: 3  •  tiZANidine (ZANAFLEX) 2 mg tablet, Take 1 tablet (2 mg total) by mouth every 8 (eight) hours as needed for muscle spasms, Disp: 20 tablet, Rfl: 0  •  triamcinolone (KENALOG) 0 1 % cream, Apply 1 application topically 2 (two) times a day, Disp: , Rfl:   •  warfarin (COUMADIN) 2 mg tablet, TAKE 1 AND 1/2 TO 2 TABLETS BY MOUTH DAILY OR AS DIRECTED BY PRESCRIBER (Patient taking differently: TAKE 1 AND 1/2 TO 2 TABLETS BY MOUTH DAILY OR AS DIRECTED BY PRESCRIBER  last dose 9/22/00), Disp: 180 tablet, Rfl: 3    No Known Allergies    Objective: There were no vitals filed for this visit  Right Shoulder Exam     Tenderness   The patient is experiencing no tenderness  Range of Motion   Active abduction: 110   External rotation: 50   Internal rotation 0 degrees: L5   Internal rotation 90 degrees: 30     Muscle Strength   Abduction: 4/5   Internal rotation: 5/5   External rotation: 5/5     Other   Erythema: absent  Scars: present  Sensation: normal  Pulse: present            Physical Exam  Vitals reviewed  HENT:      Head: Normocephalic and atraumatic  Eyes:      General:         Right eye: No discharge  Left eye: No discharge  Conjunctiva/sclera: Conjunctivae normal       Pupils: Pupils are equal, round, and reactive to light  Cardiovascular:      Rate and Rhythm: Normal rate  Pulmonary:      Effort: Pulmonary effort is normal  No respiratory distress  Musculoskeletal:      Cervical back: Normal range of motion and neck supple  Comments: As noted in HPI   Skin:     General: Skin is warm and dry     Neurological:      Mental Status: She is alert and oriented to person, place, and time  I have personally reviewed pertinent films in PACS and my interpretation is as follows: No images reviewed today      This document was created using speech voice recognition software  Grammatical errors, random word insertions, pronoun errors, and incomplete sentences are an occasional consequence of this system due to software limitations, ambient noise, and hardware issues  Any formal questions or concerns about content, text, or information contained within the body of this dictation should be directly addressed to the provider for clarification

## 2022-12-22 ENCOUNTER — OFFICE VISIT (OUTPATIENT)
Dept: FAMILY MEDICINE CLINIC | Facility: CLINIC | Age: 72
End: 2022-12-22

## 2022-12-22 VITALS
SYSTOLIC BLOOD PRESSURE: 126 MMHG | HEIGHT: 63 IN | HEART RATE: 80 BPM | OXYGEN SATURATION: 98 % | RESPIRATION RATE: 16 BRPM | TEMPERATURE: 97.2 F | DIASTOLIC BLOOD PRESSURE: 84 MMHG | WEIGHT: 125.13 LBS | BODY MASS INDEX: 22.17 KG/M2

## 2022-12-22 DIAGNOSIS — N63.25 MASS OVERLAPPING MULTIPLE QUADRANTS OF LEFT BREAST: Primary | ICD-10-CM

## 2022-12-22 NOTE — PROGRESS NOTES
Assessment/Plan:Not sure what the breast discomfort /lump is that Bushra Rai feels-no axillary lumps -will order mammogram and left breast US and will follow up thereafter-no family or personal hx of breast cancer         Problem List Items Addressed This Visit    None  Visit Diagnoses     Mass overlapping multiple quadrants of left breast    -  Primary    Relevant Orders    Mammo diagnostic bilateral w cad    US breast left limited (diagnostic)            Subjective:      Patient ID: Lisa Vera is a 74 y o  female  Bushra Rai here because she found a lump in her left breast-is tender-no drainage-no fever-has a lot of anxiety-no family hx of breast cancer-does have a hx of having had a breast biopsy years ago-on that breast-      The following portions of the patient's history were reviewed and updated as appropriate:   Past Medical History:  She has a past medical history of A-fib ([address] ), Anxiety, Arthritis, Carpal tunnel syndrome, Colon polyp, Depression, Disease of thyroid gland, Diverticulitis, Esophageal stricture, HBP (high blood pressure), Hiatal hernia (06/07/2022), Hyperlipidemia, Hypertension, Hypothyroid, Pneumonia, PONV (postoperative nausea and vomiting), Schatzki's ring (06/07/2022), and Sleep apnea ,  _______________________________________________________________________  Medical Problems:  does not have any pertinent problems on file ,  _______________________________________________________________________  Past Surgical History:   has a past surgical history that includes Breast cyst aspiration (Right); Breast biopsy (Right); Appendectomy; Cholecystectomy; Colonoscopy w/ biopsies (2011); Mandible surgery; [place] tooth extraction; EGD (06/07/2022); Cataract extraction, bilateral (2019); Mammo (historical) (Bilateral, 11/27/2018); Mammo (historical) (Bilateral, 10/27/2017); Colonoscopy (06/07/2022); Cataract extraction (Bilateral); Hand surgery (Right); Cervical fusion (N/A, 05/25/2021);  Mandible surgery; and pr surgical arthroscopy shoulder w/rotator cuff rpr (Right, 9/27/2022)  ,  _______________________________________________________________________  Family History:  family history includes Heart disease in her mother; Heart failure in her mother; Hyperlipidemia in her mother; Hypertension in her mother and sister  ,  _______________________________________________________________________  Social History:   reports that she quit smoking about 18 years ago  Her smoking use included cigarettes  She has never used smokeless tobacco  She reports that she does not currently use alcohol  She reports that she does not currently use drugs  ,  _______________________________________________________________________  Allergies:  has No Known Allergies     _______________________________________________________________________  Current Outpatient Medications   Medication Sig Dispense Refill   • acetaminophen (TYLENOL) 500 mg tablet Take 1 tablet (500 mg total) by mouth every 6 (six) hours as needed for moderate pain 20 tablet 0   • ALPRAZolam (XANAX) 0 5 mg tablet TAKE 1 TABLET(0 5 MG) BY MOUTH TWICE DAILY AS NEEDED FOR ANXIETY 60 tablet 5   • atorvastatin (LIPITOR) 10 mg tablet TAKE 1 TABLET BY MOUTH EVERY DAY AT BEDTIME 90 tablet 3   • Diclofenac Sodium (VOLTAREN) 1 % Apply 2 g topically 4 (four) times a day 100 g 0   • escitalopram (LEXAPRO) 5 mg tablet Take 1 tablet (5 mg total) by mouth daily 30 tablet 5   • levothyroxine 50 mcg tablet Take 1 tablet (50 mcg total) by mouth daily 90 tablet 0   • lidocaine (LMX) 4 % cream Apply topically as needed for mild pain 30 g 0   • metoprolol tartrate (LOPRESSOR) 25 mg tablet Take 1 tablet (25 mg total) by mouth every 12 (twelve) hours 180 tablet 3   • Multiple Vitamins-Minerals (CENTRUM ADULTS PO) Take 1 tablet by mouth daily     • omeprazole (PriLOSEC) 20 mg delayed release capsule TAKE 1 CAPSULE(20 MG) BY MOUTH DAILY BEFORE BREAKFAST 90 capsule 1   • ramipril (ALTACE) 10 MG capsule TAKE 1 CAPSULE(10 MG) BY MOUTH TWICE DAILY 180 capsule 3   • tiZANidine (ZANAFLEX) 2 mg tablet Take 1 tablet (2 mg total) by mouth every 8 (eight) hours as needed for muscle spasms 20 tablet 0   • triamcinolone (KENALOG) 0 1 % cream Apply 1 application topically 2 (two) times a day     • warfarin (COUMADIN) 2 mg tablet TAKE 1 AND 1/2 TO 2 TABLETS BY MOUTH DAILY OR AS DIRECTED BY PRESCRIBER (Patient taking differently: TAKE 1 AND 1/2 TO 2 TABLETS BY MOUTH DAILY OR AS DIRECTED BY PRESCRIBER  last dose 9/22/00) 180 tablet 3     No current facility-administered medications for this visit      _______________________________________________________________________  Review of Systems   Constitutional: Negative  Respiratory: Negative  Cardiovascular: Negative  Musculoskeletal: Negative  Hematological: Negative  Objective:  Vitals:    12/22/22 1427   BP: 126/84   BP Location: Left arm   Patient Position: Sitting   Cuff Size: Adult   Pulse: 80   Resp: 16   Temp: (!) 97 2 °F (36 2 °C)   TempSrc: Temporal   SpO2: 98%   Weight: 56 8 kg (125 lb 2 oz)   Height: 5' 3" (1 6 m)     Body mass index is 22 16 kg/m²  Physical Exam  Constitutional:       Appearance: Normal appearance  HENT:      Head: Normocephalic and atraumatic  Cardiovascular:      Heart sounds: Normal heart sounds  Pulmonary:      Effort: Pulmonary effort is normal       Breath sounds: Normal breath sounds  Skin:     Comments: Breast exam:  No nipple retraction, no discharge-left breast feels full with some fibrocystic feeling to it-mild tenderness-right breast seems ok   Neurological:      General: No focal deficit present  Mental Status: She is alert and oriented to person, place, and time     Psychiatric:         Mood and Affect: Mood normal

## 2023-01-06 ENCOUNTER — APPOINTMENT (OUTPATIENT)
Dept: PHYSICAL THERAPY | Facility: REHABILITATION | Age: 73
End: 2023-01-06

## 2023-01-10 ENCOUNTER — EVALUATION (OUTPATIENT)
Dept: PHYSICAL THERAPY | Facility: REHABILITATION | Age: 73
End: 2023-01-10

## 2023-01-10 DIAGNOSIS — M75.101 TEAR OF RIGHT ROTATOR CUFF, UNSPECIFIED TEAR EXTENT, UNSPECIFIED WHETHER TRAUMATIC: ICD-10-CM

## 2023-01-10 DIAGNOSIS — Z98.890 S/P SHOULDER SURGERY: Primary | ICD-10-CM

## 2023-01-10 NOTE — PROGRESS NOTES
PT Re-Evaluation     Today's date: 1/10/2023  Patient name: Belinda Gonzalez  : 1950  MRN: [id number]  Referring provider: Vicente Chen*  Dx:   Encounter Diagnosis     ICD-10-CM    1  S/P shoulder surgery  Z98 [id number] PT plan of care cert/re-cert      2  Tear of right rotator cuff, unspecified tear extent, unspecified whether traumatic  [id number] [id number] PT plan of care cert/re-cert          Start Time: 1016  Stop Time: 1100  Total time in clinic (min): 44 minutes    Assessment  Assessment details: Per patient report and clinical findings, Belinda Gonzalez has made poor progress since starting skilled physical therapy services  Belinda Gonzalez has not been compliant with PT POC since surgery on 22 and initial evaluation on 10/11/22  To this date, Belinda Gonzalez has completed 15 visits of skilled physical therapy  Channing Eastman demonstrates same or slight decrease in PROM of the R shoulder compared to previous re-evaluation in 2022 and continues to be limited compared to her prior level of function  Remaining deficits include impaired and painful A/PROM, impaired UE strength, difficulty reaching OH, and difficulty performing all typical activities  Recommend continued skilled physical therapy services to address remaining deficits to promote progress towards her prior level of function  Impairments: abnormal muscle firing, abnormal muscle tone, abnormal or restricted ROM, activity intolerance, impaired physical strength, lacks appropriate home exercise program, pain with function, scapular dyskinesis, poor posture  and poor body mechanics  Understanding of Dx/Px/POC: good   Prognosis: good    Goals  STG (4 weeks):  1  L shoulder flexion PROM to 125* to promote ability to progress to next phase of post-op protocol  - progressing  2  L shoulder ER PROM to 40* in scapular plane to promote ability to progress to next phase of post-op protocol  - met  3   Decrease pain at worst from 5/10 to 3/10 or less for improved QoL  - progressing    STG (6 weeks):  1  L shoulder PROM WFL to promote progress towards PLOF  - progressing  2  Able to sleep through the night without waking secondary to pain to promote improved sleep quality  - progressing    LTG (14 weeks):  1  Full L shoulder AROM to promote progress towards PLOF  - progressing  2  Able to reach overhead with 0-1/10 pain to promote ability to perform ADLs and household tasks  - progressing  3  Independent with HEP  - progressing    Plan  Plan details: Thank you for referring Rosa Clinton to Physical Therapy at [place] [id number] and for the opportunity to coordinate care  Patient would benefit from: skilled physical therapy  Planned modality interventions: cryotherapy, electrical stimulation/Russian stimulation, TENS, thermotherapy: hydrocollator packs and unattended electrical stimulation  Planned therapy interventions: abdominal trunk stabilization, activity modification, ADL retraining, balance, balance/weight bearing training, behavior modification, body mechanics training, breathing training, fine motor coordination training, flexibility, functional ROM exercises, gait training, graded activity, graded exercise, graded motor, home exercise program, work reintegration, transfer training, therapeutic training, therapeutic exercise, therapeutic activities, stretching, strengthening, self care, postural training, patient education, neuromuscular re-education, muscle pump exercises, motor coordination training, Rob taping, manual therapy, joint mobilization and IADL retraining  Frequency: 2x week  Duration in visits: 10  Plan of Care beginning date: 1/10/2023  Treatment plan discussed with: patient        Subjective Evaluation    History of Present Illness  Date of surgery: 9/27/2022  Mechanism of injury: surgery  Mechanism of injury: 01/10/23:  Rosa Clinton reports feeling she has made 70% progress since TASHA Smallwood reports improvements in motion and ability to use her arm for things like eating and drinking  Despite improvements, Carolina Shaffer reports continued difficulty with reaching overhead, reaching for her seat belt, and all typical activities  22:  Gagandeep Bonds reports feeling she has made 80% progress since IE  Carolina Shaffer reports improvements in ability to lift her arm to get dressed, cooking/baking, ability to go grocery shopping, motion  Carolina Shaffer reports good compliance with HEP  Despite improvements, Carolina Shaffer reports continued difficulty with lifting the arm, ability to drive, and ability to lift and carry things  10/11/22:  Gagandeep Bonds is a 74 y o  female patient presenting s/p RTC (supraspinatus and subscapularis) repair with biceps tenodesis performed by Dr Romario Rousseau on 22  Carolina Shaffer currently having difficulty with dressing, lifting her arm, donning the sling, washing/self hygiene, laying down, sleeping  Pain is relieved or reduced with Tylenol, prescription pain medicine  Pt would like to return to being able to all typical activities  Next follow up with the physician is 22      Pain  Current pain ratin  At best pain ratin  At worst pain ratin  Location: R shoulder  Quality: sharp (soreness)    Social Support  Lives with: adult children    Employment status: not working  Hand dominance: right    Treatments  Previous treatment: physical therapy  Current treatment: physical therapy  Patient Goals  Patient goal: 100% for the shoulder        Objective     Postural Observations    Additional Postural Observation Details  Rounded shoulders, forward head    Active Range of Motion   Cervical/Thoracic Spine       Cervical    Flexion:  Restriction level: minimal  Extension:  Restriction level: moderate  Left lateral flexion:  Restriction level: moderate  Right lateral flexion:  Restriction level moderate  Left Shoulder   Flexion: 140 degrees   Abduction: 155 degrees   External rotation BTH: T1   Internal rotation BTB: T12     Right Shoulder Flexion: 95 degrees   Abduction: 95 degrees   External rotation BTH: T1   Internal rotation BTB: L3 with pain    Left Elbow   Flexion: WFL  Extension: WFL    Passive Range of Motion     Right Shoulder   Flexion: 120 degrees   Abduction: 100 degrees   External rotation 90°: WFL    Strength/Myotome Testing     Left Shoulder     Planes of Motion   Flexion: 4+   Abduction: 4   External rotation at 0°: 4   Internal rotation at 0°: 4     Right Shoulder     Planes of Motion   Flexion: 3-   Abduction: 3-   External rotation at 0°: 4-   Internal rotation at 0°: 4     Left Elbow   Flexion: 4+  Extension: 4-    Tests     Additional Tests Details  Special testing deferring secondary to post-op status              Precautions: Afib, anxiety, arthritis, depression, HTN, hiatal hernia, HLD, hypothyroidism, hx C1-2 fusion    * indicates given as part of HEP  HEP code: [facility]     Daily Treatment Diary    Manuals 11/11 11/14 11/21 11/30 12/7 12/13 1/10    R shoulder PROM  done done done done done done    R elbow PROM                                 Neuro Re-Ed           Scapular retraction with ER           Chin tuck           Prone I's 3x10 R arm off table          Prone T's           Prone Y's           Resisted W's           Sustained YWT's           Serratus push up           Plank shoulder taps           Plank to down dog                      Ther Ex           Patient education       done    Week 1-4           UT stretch*           Levator scap stretch*           Shoulder shrugs           Scapular retraction*           Elbow flex/ext*           Wrist flex/ext*           Gripping*           Table slides - flexion* 20x5" 20x5" 20x10" 20x10''       Table slides - scaption* 20x5" 20x5" 20x10" 20x10''       Shoulder ER with cane 20x5" 20x5" 20x5" 20x5'' 20x5''      Shoulder isometrics against wall (week 4)    10x5'' ea ER  20x5''                 Week 5-7           Pulleys - flexion/scaption 3'/3' 3'/3' 3'/3' 3'/3' 3'/3'  3'/3' Supine shoulder flexion with cane* 20x5" 20x5" 20x5" 20x5'' 20x5'' 20x10" 20x10"    TB rows Pink TB 3x10 Pink TB 3x12 Pink TB 3x12 Pink TB 3x15 GTB 3x10  GTB 3x12     Bicep curls (week 6)           SL shoulder flexion     3x10 3x10 3x10    SL shoulder abduction     3x10 3x10 3x10 1#    SL shoulder ER     3x10  3x10 3x10 1#    Standing scaption w/ strap     nv? 2x10 3x10               Week 8+           Foam roller up wall           TB lat pull down     PTB 2x10      Band pull aparts           IR/ER isometric walkouts     nv?  Pink TB 10x     Active IR/ER           Band wall walks horizontal           Band wall walks vertical           Ball wall circles                                 Ther Activity           1 arm row           1 arm press                      Suitcase carry           90/90 carry           OH carry                                            Modalities           R shoulder CP

## 2023-01-11 ENCOUNTER — APPOINTMENT (OUTPATIENT)
Dept: LAB | Facility: HOSPITAL | Age: 73
End: 2023-01-11
Attending: INTERNAL MEDICINE

## 2023-01-11 ENCOUNTER — ANTICOAG VISIT (OUTPATIENT)
Dept: CARDIOLOGY CLINIC | Facility: CLINIC | Age: 73
End: 2023-01-11

## 2023-01-11 DIAGNOSIS — I48.0 PAF (PAROXYSMAL ATRIAL FIBRILLATION) (HCC): Primary | ICD-10-CM

## 2023-01-12 ENCOUNTER — TELEPHONE (OUTPATIENT)
Dept: FAMILY MEDICINE CLINIC | Facility: CLINIC | Age: 73
End: 2023-01-12

## 2023-01-12 NOTE — TELEPHONE ENCOUNTER
Teena lott/Oliverio [address] is a little confused about why they got a "summary of care" for this patient  He said it's dated from Oct   I told him we would call him back tomorrow 1/13/23

## 2023-01-13 ENCOUNTER — OFFICE VISIT (OUTPATIENT)
Dept: PHYSICAL THERAPY | Facility: REHABILITATION | Age: 73
End: 2023-01-13

## 2023-01-13 ENCOUNTER — TELEPHONE (OUTPATIENT)
Dept: FAMILY MEDICINE CLINIC | Facility: CLINIC | Age: 73
End: 2023-01-13

## 2023-01-13 DIAGNOSIS — Z98.890 S/P SHOULDER SURGERY: Primary | ICD-10-CM

## 2023-01-13 DIAGNOSIS — F41.9 ANXIETY: ICD-10-CM

## 2023-01-13 DIAGNOSIS — M75.101 TEAR OF RIGHT ROTATOR CUFF, UNSPECIFIED TEAR EXTENT, UNSPECIFIED WHETHER TRAUMATIC: ICD-10-CM

## 2023-01-13 RX ORDER — ESCITALOPRAM OXALATE 5 MG/1
5 TABLET ORAL DAILY
Qty: 30 TABLET | Refills: 5 | Status: SHIPPED | OUTPATIENT
Start: 2023-01-13 | End: 2023-01-13

## 2023-01-13 RX ORDER — ESCITALOPRAM OXALATE 5 MG/1
TABLET ORAL
Qty: 90 TABLET | Refills: 1 | Status: SHIPPED | OUTPATIENT
Start: 2023-01-13

## 2023-01-13 NOTE — PROGRESS NOTES
Daily Note     Today's date: 2023  Patient name: Mouna Martines  : 1950  MRN: [id number]  Referring provider: Michelle Sargent*  Dx:   Encounter Diagnosis     ICD-10-CM    1  S/P shoulder surgery  Z98 890       2  Tear of right rotator cuff, unspecified tear extent, unspecified whether traumatic  M75 101           Start Time: 1015  Stop Time: 1102  Total time in clinic (min): 47 minutes    Subjective: Renzo Mackey reports some days her shoulder feels very good but other days feel horrible  Objective: See treatment diary below      Assessment: Tolerated treatment well  Fatigued with prone Is and prone Ts this session  Challenged with TB exercises with moderate cuing for proper form  Patient demonstrated appropriate level of challenge and muscular fatigue throughout session and noted good status at end of visit  Patient demonstrated fatigue post treatment, exhibited good technique with therapeutic exercises and would benefit from continued PT  Plan: Continue per plan of care  Progress treatment as tolerated         Precautions: Afib, anxiety, arthritis, depression, HTN, hiatal hernia, HLD, hypothyroidism, hx C1-2 fusion    * indicates given as part of HEP  HEP code: [facility]     Daily Treatment Diary    Manuals 11/11 11/14 11/21 11/30 12/7 12/13 1/10 1/13   R shoulder PROM  done done done done done done done   R elbow PROM                                 Neuro Re-Ed           Scapular retraction with ER           Chin tuck           Prone I's 3x10 R arm off table       3x10 R arm off table   Prone T's        2x10 R arm off table   Prone Y's           Resisted W's           Sustained YWT's           Serratus push up           Plank shoulder taps           Plank to down dog                      Ther Ex           Patient education       done    Week 1-4           UT stretch*           Levator scap stretch*           Shoulder shrugs           Scapular retraction*           Elbow flex/ext* Wrist flex/ext*           Gripping*           Table slides - flexion* 20x5" 20x5" 20x10" 20x10''       Table slides - scaption* 20x5" 20x5" 20x10" 20x10''       Shoulder ER with cane 20x5" 20x5" 20x5" 20x5'' 20x5''      Shoulder isometrics against wall (week 4)    10x5'' ea ER  20x5''                 Week 5-7           Pulleys - flexion/scaption 3'/3' 3'/3' 3'/3' 3'/3' 3'/3'  3'/3' 3'/3'   Supine shoulder flexion with cane* 20x5" 20x5" 20x5" 20x5'' 20x5'' 20x10" 20x10"    TB rows Pink TB 3x10 Pink TB 3x12 Pink TB 3x12 Pink TB 3x15 GTB 3x10  GTB 3x12  GTB 3x10   Bicep curls (week 6)           SL shoulder flexion     3x10 3x10 3x10    SL shoulder abduction     3x10 3x10 3x10 1#    SL shoulder ER     3x10  3x10 3x10 1#    Standing scaption w/ strap     nv? 2x10 3x10 3x10              Week 8+           Foam roller up wall           TB lat pull down     PTB 2x10   Pink TB 3x10   Band pull aparts        Pink TB 3x10   IR/ER isometric walkouts     nv?  Pink TB 10x     Active IR/ER           Band wall walks horizontal           Band wall walks vertical           Ball wall circles                                 Ther Activity           1 arm row           1 arm press                      Suitcase carry           90/90 carry           OH carry                                            Modalities           R shoulder CP

## 2023-01-13 NOTE — TELEPHONE ENCOUNTER
Needs a refill for Escitalopram 5mg, 1x a day;    Yousuf Almaraz [facility]    Patient ph /# [phone]

## 2023-01-17 ENCOUNTER — OFFICE VISIT (OUTPATIENT)
Dept: PHYSICAL THERAPY | Facility: REHABILITATION | Age: 73
End: 2023-01-17

## 2023-01-17 DIAGNOSIS — M75.101 TEAR OF RIGHT ROTATOR CUFF, UNSPECIFIED TEAR EXTENT, UNSPECIFIED WHETHER TRAUMATIC: ICD-10-CM

## 2023-01-17 DIAGNOSIS — Z98.890 S/P SHOULDER SURGERY: Primary | ICD-10-CM

## 2023-01-17 NOTE — PROGRESS NOTES
Daily Note     Today's date: 2023  Patient name: Alyse Gomez  : 1950   MRN: [id number]  Referring provider: Flory Long*  Dx:   Encounter Diagnosis     ICD-10-CM    1  S/P shoulder surgery  Z98 890       2  Tear of right rotator cuff, unspecified tear extent, unspecified whether traumatic  M75 101           Start Time:   Stop Time:   Total time in clinic (min): 51 minutes    Subjective: Aj Osuna reports she's doing well today  Objective: See treatment diary below      Assessment: Tolerated treatment well  Good form with exercises throughout session without need for cuing this session  Patient demonstrated appropriate level of challenge and muscular fatigue throughout session and noted good status at end of visit  Patient demonstrated fatigue post treatment, exhibited good technique with therapeutic exercises and would benefit from continued PT  Plan: Continue per plan of care  Progress treatment as tolerated         Precautions: Afib, anxiety, arthritis, depression, HTN, hiatal hernia, HLD, hypothyroidism, hx C1-2 fusion    * indicates given as part of HEP  HEP code: [facility]     Daily Treatment Diary    Manuals 1/17  11/21 11/30 12/7 12/13 1/10 1/13   R shoulder PROM done  done done done done done done   R elbow PROM                                 Neuro Re-Ed           Scapular retraction with ER           Chin tuck           Prone I's        3x10 R arm off table   Prone T's        2x10 R arm off table   Prone Y's           Resisted W's           Sustained YWT's           Serratus push up           Plank shoulder taps           Plank to down dog                      Ther Ex           Patient education       done    Week 1-4           UT stretch*           Levator scap stretch*           Shoulder shrugs           Scapular retraction*           Elbow flex/ext*           Wrist flex/ext*           Gripping*           Table slides - flexion*   20x10" 20x10''       Table slides - scaption*   20x10" 20x10''       Shoulder ER with cane   20x5" 20x5'' 20x5''      Shoulder isometrics against wall (week 4)    10x5'' ea ER  20x5''                 Week 5-7           Pulleys - flexion/scaption 3'/3'  3'/3' 3'/3' 3'/3'  3'/3' 3'/3'   Supine shoulder flexion with cane* 20x10"  20x5" 20x5'' 20x5'' 20x10" 20x10"    TB rows GTB   Pink TB 3x12 Pink TB 3x15 GTB 3x10  GTB 3x12  GTB 3x10   Bicep curls (week 6)           SL shoulder flexion 3x12     3x10 3x10 3x10    SL shoulder abduction 3x12 1#    3x10 3x10 3x10 1#    SL shoulder ER 3x12 1#    3x10  3x10 3x10 1#    Standing scaption w/ strap     nv? 2x10 3x10 3x10              Week 8+           Foam roller up wall           TB lat pull down Pink TB 3x10    PTB 2x10   Pink TB 3x10   Band pull aparts Pink TB 3x10       Pink TB 3x10   IR/ER isometric walkouts     nv?  Pink TB 10x     Active IR/ER           Band wall walks horizontal           Band wall walks vertical           Ball wall circles                                 Ther Activity           1 arm row           1 arm press                      Suitcase carry           90/90 carry           OH carry                                            Modalities           R shoulder CP

## 2023-01-20 ENCOUNTER — OFFICE VISIT (OUTPATIENT)
Dept: PHYSICAL THERAPY | Facility: REHABILITATION | Age: 73
End: 2023-01-20

## 2023-01-20 DIAGNOSIS — M75.101 TEAR OF RIGHT ROTATOR CUFF, UNSPECIFIED TEAR EXTENT, UNSPECIFIED WHETHER TRAUMATIC: ICD-10-CM

## 2023-01-20 DIAGNOSIS — Z98.890 S/P SHOULDER SURGERY: Primary | ICD-10-CM

## 2023-01-20 NOTE — PROGRESS NOTES
Daily Note     Today's date: 2023  Patient name: Pascual Park  : 1950   MRN: [id number]  Referring provider: Edwina Abraham*  Dx:   Encounter Diagnosis     ICD-10-CM    1  S/P shoulder surgery  Z98 890       2  Tear of right rotator cuff, unspecified tear extent, unspecified whether traumatic  M75  101                      Subjective: Judith Huynh reports she is feeling sore and tight today in her shoulder  Objective: See treatment diary below      Assessment: Tolerated treatment well  A tight end feel was present in all shoulder directions with guarding present  Continued to focus on shoulder mobility and strengthening this session  Patient demonstrated fatigue post treatment, exhibited good technique with therapeutic exercises and would benefit from continued PT  Plan: Continue per plan of care  Progress treatment as tolerated         Precautions: Afib, anxiety, arthritis, depression, HTN, hiatal hernia, HLD, hypothyroidism, hx C1-2 fusion    * indicates given as part of HEP  HEP code: [facility]     Daily Treatment Diary    Manuals 1/17 1/20  11/30 12/7 12/13 1/10 1/13   R shoulder PROM done done  done done done done done   R elbow PROM                                 Neuro Re-Ed           Scapular retraction with ER           Chin tuck           Prone I's        3x10 R arm off table   Prone T's        2x10 R arm off table   Prone Y's           Resisted W's           Sustained YWT's           Serratus push up           Plank shoulder taps           Plank to down dog                      Ther Ex           Patient education       done    Week 1-4           UT stretch*           Levator scap stretch*           Shoulder shrugs           Scapular retraction*           Elbow flex/ext*           Wrist flex/ext*           Gripping*           Table slides - flexion*    20x10''       Table slides - scaption*    20x10''       Shoulder ER with cane    20x5'' 20x5''      Shoulder isometrics against wall (week 4)    10x5'' ea ER  20x5''                 Week 5-7           Pulleys - flexion/scaption 3'/3' 3'/3'  3'/3' 3'/3'  3'/3' 3'/3'   Supine shoulder flexion with cane* 20x10" 20x10"  20x5'' 20x5'' 20x10" 20x10"    TB rows GTB  GTB 3x10  Pink TB 3x15 GTB 3x10  GTB 3x12  GTB 3x10   Bicep curls (week 6)           SL shoulder flexion 3x12  3x12   3x10 3x10 3x10    SL shoulder abduction 3x12 1# 3x12 1#   3x10 3x10 3x10 1#    SL shoulder ER 3x12 1# 3x12 1#   3x10  3x10 3x10 1#    Standing scaption w/ strap     nv? 2x10 3x10 3x10              Week 8+           Foam roller up wall           TB lat pull down Pink TB 3x10 Pink TB 3x10   PTB 2x10   Pink TB 3x10   Band pull aparts Pink TB 3x10 Pink TB 3x10      Pink TB 3x10   IR/ER isometric walkouts     nv?  Pink TB 10x     Active IR/ER           Band wall walks horizontal           Band wall walks vertical           Ball wall circles                                 Ther Activity           1 arm row           1 arm press                      Suitcase carry           90/90 carry           OH carry                                            Modalities           R shoulder CP

## 2023-01-24 ENCOUNTER — APPOINTMENT (OUTPATIENT)
Dept: PHYSICAL THERAPY | Facility: REHABILITATION | Age: 73
End: 2023-01-24

## 2023-01-25 ENCOUNTER — OFFICE VISIT (OUTPATIENT)
Dept: PHYSICAL THERAPY | Facility: REHABILITATION | Age: 73
End: 2023-01-25

## 2023-01-25 DIAGNOSIS — Z98.890 S/P SHOULDER SURGERY: Primary | ICD-10-CM

## 2023-01-25 DIAGNOSIS — M75.101 TEAR OF RIGHT ROTATOR CUFF, UNSPECIFIED TEAR EXTENT, UNSPECIFIED WHETHER TRAUMATIC: ICD-10-CM

## 2023-01-25 NOTE — PROGRESS NOTES
Daily Note     Today's date: 2023  Patient name: Noelle Washington  : 1950  MRN: [id number]  Referring provider: Stacia Adams*  Dx:   Encounter Diagnosis     ICD-10-CM    1  S/P shoulder surgery  Z98 890       2  Tear of right rotator cuff, unspecified tear extent, unspecified whether traumatic  M75 101           Start Time: 0915  Stop Time: 1000  Total time in clinic (min): 45 minutes    Subjective: [facility] reports her shoulder is feeling "terrible; it aches and pains all the time "      Objective: See treatment diary below      Assessment: Tolerated treatment well  Challenged with exercises throughout session secondary to muscular fatigue  Occasional verbal cuing required for proper form with good ability to incorporate feedback into performance  Patient demonstrated appropriate level of challenge and muscular fatigue throughout session and noted good status at end of visit  Patient demonstrated fatigue post treatment, exhibited good technique with therapeutic exercises and would benefit from continued PT  Plan: Continue per plan of care  Progress treatment as tolerated         Precautions: Afib, anxiety, arthritis, depression, HTN, hiatal hernia, HLD, hypothyroidism, hx C1-2 fusion    * indicates given as part of HEP  HEP code: [facility]     Daily Treatment Diary    Manuals 1/17 1/20 1/25  12/7 12/13 1/10 1/13   R shoulder PROM done done done  done done done done   R elbow PROM                                 Neuro Re-Ed           Scapular retraction with ER           Chin tuck           Prone I's        3x10 R arm off table   Prone T's        2x10 R arm off table   Prone Y's           Resisted W's           Sustained YWT's           Serratus push up           Plank shoulder taps           Plank to down dog                      Ther Ex           Patient education       done    Week 1-4           UT stretch*           Levator scap stretch*           Shoulder shrugs           Scapular retraction*           Elbow flex/ext*           Wrist flex/ext*           Gripping*           Table slides - flexion*           Table slides - scaption*           Shoulder ER with cane     20x5''      Shoulder isometrics against wall (week 4)     ER  20x5''                 Week 5-7           Pulleys - flexion/scaption 3'/3' 3'/3' 3'/3'  3'/3'  3'/3' 3'/3'   Supine shoulder flexion with cane* 20x10" 20x10" 20x10"  20x5'' 20x10" 20x10"    TB rows GTB  GTB 3x10   GTB 3x10  GTB 3x12  GTB 3x10   Bicep curls (week 6)           SL shoulder flexion 3x12  3x12   3x10 3x10 3x10    SL shoulder abduction 3x12 1# 3x12 1#   3x10 3x10 3x10 1#    SL shoulder ER 3x12 1# 3x12 1#   3x10  3x10 3x10 1#    Standing scaption w/ strap   3x10  nv? 2x10 3x10 3x10              Week 8+           Foam roller up wall           TB lat pull down Pink TB 3x10 Pink TB 3x10 Pink TB 3x12  PTB 2x10   Pink TB 3x10   Band pull aparts Pink TB 3x10 Pink TB 3x10 Pink TB 3x12     Pink TB 3x10   IR/ER isometric walkouts     nv?  Pink TB 10x     Active IR/ER   2x10 R        Band wall walks horizontal           Band wall walks vertical           Ball wall circles                                 Ther Activity           1 arm row           1 arm press                      Suitcase carry           90/90 carry           OH carry                                            Modalities           R shoulder CP

## 2023-01-26 ENCOUNTER — RA CDI HCC (OUTPATIENT)
Dept: OTHER | Facility: HOSPITAL | Age: 73
End: 2023-01-26

## 2023-01-26 NOTE — PROGRESS NOTES
F33 9 for 2023  [facility] 75  coding opportunities          Chart Reviewed number of suggestions sent to Provider: 1     Patients Insurance     Medicare Insurance: [employer]

## 2023-01-27 ENCOUNTER — OFFICE VISIT (OUTPATIENT)
Dept: PHYSICAL THERAPY | Facility: REHABILITATION | Age: 73
End: 2023-01-27

## 2023-01-27 DIAGNOSIS — M75.101 TEAR OF RIGHT ROTATOR CUFF, UNSPECIFIED TEAR EXTENT, UNSPECIFIED WHETHER TRAUMATIC: ICD-10-CM

## 2023-01-27 DIAGNOSIS — Z98.890 S/P SHOULDER SURGERY: Primary | ICD-10-CM

## 2023-01-27 NOTE — PROGRESS NOTES
Daily Note     Today's date: 2023  Patient name: Astrid Shi  : 1950  MRN: [id number]  Referring provider: Diego Reed  Dx:   Encounter Diagnosis     ICD-10-CM    1  S/P shoulder surgery  Z98 890       2  Tear of right rotator cuff, unspecified tear extent, unspecified whether traumatic  M75 101           Start Time: 1018  Stop Time: 1117  Total time in clinic (min): 59 minutes    Subjective: No reports of changes since previous session  States she has a follow up with surgeon next week  Objective: See treatment diary below      Assessment: Tolerated treatment fair  Continues to demonstrate limited ROM of the R shoulder in abduction>flexion with limited tolerance at end range  Moderate VCs required for proper form with fair ability to incorporate feedback into performance  Patient demonstrated appropriate level of challenge and muscular fatigue throughout session and noted good status at end of visit  Patient demonstrated fatigue post treatment, exhibited good technique with therapeutic exercises and would benefit from continued PT  Plan: Continue per plan of care  Progress treatment as tolerated         Precautions: Afib, anxiety, arthritis, depression, HTN, hiatal hernia, HLD, hypothyroidism, hx C1-2 fusion    * indicates given as part of HEP  HEP code: [facility]     Daily Treatment Diary    Manuals 1/17 1/20 1/25 1/27  12/13 1/10 1/13   R shoulder PROM done done done done  done done done   R elbow PROM                                 Neuro Re-Ed           Scapular retraction with ER           Chin tuck           Prone I's    3x10 R arm off table    3x10 R arm off table   Prone T's    3x10 R arm off table    2x10 R arm off table   Prone Y's           Resisted W's           Sustained YWT's           Serratus push up           Plank shoulder taps           Plank to down dog                      Ther Ex           Patient education       done    Week 1-4           UT stretch* Levator scap stretch*           Shoulder shrugs           Scapular retraction*           Elbow flex/ext*           Wrist flex/ext*           Gripping*           Table slides - flexion*           Table slides - scaption*           Shoulder ER with cane           Shoulder isometrics against wall (week 4)                      Week 5-7           Pulleys - flexion/scaption 3'/3' 3'/3' 3'/3' 3'/3'   3'/3' 3'/3'   Supine shoulder flexion with cane* 20x10" 20x10" 20x10" 20x10"  20x10" 20x10"    TB rows GTB  GTB 3x10  GTB 3x10  GTB 3x12  GTB 3x10   Bicep curls (week 6)           SL shoulder flexion 3x12  3x12    3x10 3x10    SL shoulder abduction 3x12 1# 3x12 1#    3x10 3x10 1#    SL shoulder ER 3x12 1# 3x12 1#    3x10 3x10 1#    Standing scaption w/ strap   3x10 3x12  2x10 3x10 3x10              Week 8+           Foam roller up wall           TB lat pull down Pink TB 3x10 Pink TB 3x10 Pink TB 3x12 Pink TB 3x12    Pink TB 3x10   Band pull aparts Pink TB 3x10 Pink TB 3x10 Pink TB 3x12 Pink TB 3x12    Pink TB 3x10   IR/ER isometric walkouts      Pink TB 10x     Active IR/ER   2x10 R 3x10 R       Band wall walks horizontal           Band wall walks vertical           Ball wall circles                                 Ther Activity           1 arm row           1 arm press                      Suitcase carry           90/90 carry           OH carry                                            Modalities           R shoulder CP

## 2023-01-30 ENCOUNTER — APPOINTMENT (OUTPATIENT)
Dept: LAB | Facility: HOSPITAL | Age: 73
End: 2023-01-30
Attending: INTERNAL MEDICINE

## 2023-01-30 ENCOUNTER — ANTICOAG VISIT (OUTPATIENT)
Dept: CARDIOLOGY CLINIC | Facility: CLINIC | Age: 73
End: 2023-01-30

## 2023-01-30 DIAGNOSIS — I48.0 PAF (PAROXYSMAL ATRIAL FIBRILLATION) (HCC): Primary | ICD-10-CM

## 2023-01-31 ENCOUNTER — OFFICE VISIT (OUTPATIENT)
Dept: PHYSICAL THERAPY | Facility: REHABILITATION | Age: 73
End: 2023-01-31

## 2023-01-31 DIAGNOSIS — M75.101 TEAR OF RIGHT ROTATOR CUFF, UNSPECIFIED TEAR EXTENT, UNSPECIFIED WHETHER TRAUMATIC: ICD-10-CM

## 2023-01-31 DIAGNOSIS — Z98.890 S/P SHOULDER SURGERY: Primary | ICD-10-CM

## 2023-01-31 DIAGNOSIS — Z76.0 MEDICATION REFILL: ICD-10-CM

## 2023-01-31 RX ORDER — ATORVASTATIN CALCIUM 10 MG/1
10 TABLET, FILM COATED ORAL
Qty: 90 TABLET | Refills: 1 | Status: SHIPPED | OUTPATIENT
Start: 2023-01-31

## 2023-01-31 NOTE — PROGRESS NOTES
Daily Note     Today's date: 2023  Patient name: Unique Jones  : 1950  MRN: [id number]  Referring provider: Thiago Biggs*  Dx:   Encounter Diagnosis     ICD-10-CM    1  S/P shoulder surgery  Z98 890       2  Tear of right rotator cuff, unspecified tear extent, unspecified whether traumatic  M75 101           Start Time: 1100  Stop Time: 1145  Total time in clinic (min): 45 minutes    Subjective: No complaints since previous session  Objective: See treatment diary below      Assessment: Tolerated treatment well  Slow but steady progression fo R shoulder PROM with most limitations noted in abduction and IR  Moderate VCs required for form of most exercises performed throughout session  Patient demonstrated appropriate level of challenge and muscular fatigue throughout session and noted good status at end of visit  Patient demonstrated fatigue post treatment, exhibited good technique with therapeutic exercises and would benefit from continued PT  Plan: Continue per plan of care  Progress treatment as tolerated         Precautions: Afib, anxiety, arthritis, depression, HTN, hiatal hernia, HLD, hypothyroidism, hx C1-2 fusion    * indicates given as part of HEP  HEP code: [facility]     Daily Treatment Diary    Manuals 1/17 1/20 1/25 1/27 1/31  1/10 1/13   R shoulder PROM done done done done done  done done   R elbow PROM                                 Neuro Re-Ed           Scapular retraction with ER     Pink 3x10      Chin tuck           Prone I's    3x10 R arm off table    3x10 R arm off table   Prone T's    3x10 R arm off table    2x10 R arm off table   Prone Y's           Resisted W's           Sustained YWT's           Serratus push up           Plank shoulder taps           Plank to down dog                      Ther Ex           Patient education       done    Week 1-4           UT stretch*           Levator scap stretch*           Shoulder shrugs           Scapular retraction* Elbow flex/ext*           Wrist flex/ext*           Gripping*           Table slides - flexion*           Table slides - scaption*           Shoulder ER with cane           Shoulder isometrics against wall (week 4)                      Week 5-7           Pulleys - flexion/scaption 3'/3' 3'/3' 3'/3' 3'/3' 3'/3'  3'/3' 3'/3'   Supine shoulder flexion with cane* 20x10" 20x10" 20x10" 20x10" 20x10"  20x10"    TB rows GTB  GTB 3x10  GTB 3x10    GTB 3x10   Bicep curls (week 6)           SL shoulder flexion 3x12  3x12     3x10    SL shoulder abduction 3x12 1# 3x12 1#     3x10 1#    SL shoulder ER 3x12 1# 3x12 1#     3x10 1#    Standing scaption w/ strap   3x10 3x12 3x12  3x10 3x10              Week 8+           Foam roller up wall           TB lat pull down Pink TB 3x10 Pink TB 3x10 Pink TB 3x12 Pink TB 3x12 Pink TB 3x12   Pink TB 3x10   Band pull aparts Pink TB 3x10 Pink TB 3x10 Pink TB 3x12 Pink TB 3x12 Pink TB 3x12   Pink TB 3x10   IR/ER isometric walkouts           Active IR/ER   2x10 R 3x10 R       Band wall walks horizontal           Band wall walks vertical           Ball wall circles                                 Ther Activity           1 arm row           1 arm press                      Suitcase carry           90/90 carry           OH carry                                            Modalities           R shoulder CP

## 2023-02-01 ENCOUNTER — OFFICE VISIT (OUTPATIENT)
Dept: OBGYN CLINIC | Facility: CLINIC | Age: 73
End: 2023-02-01

## 2023-02-01 VITALS
SYSTOLIC BLOOD PRESSURE: 139 MMHG | WEIGHT: 127.8 LBS | HEART RATE: 54 BPM | BODY MASS INDEX: 22.64 KG/M2 | HEIGHT: 63 IN | DIASTOLIC BLOOD PRESSURE: 70 MMHG

## 2023-02-01 DIAGNOSIS — Z98.890 S/P RIGHT ROTATOR CUFF REPAIR: Primary | ICD-10-CM

## 2023-02-01 DIAGNOSIS — M75.101 TEAR OF RIGHT ROTATOR CUFF, UNSPECIFIED TEAR EXTENT, UNSPECIFIED WHETHER TRAUMATIC: ICD-10-CM

## 2023-02-01 NOTE — PROGRESS NOTES
Assessment/Plan:  1  S/P right rotator cuff repair        2  Tear of right rotator cuff, unspecified tear extent, unspecified whether traumatic          Scribe Attestation    I,:  Sabino Bethea am acting as a scribe while in the presence of the attending physician :       I,:  Manny Cline MD personally performed the services described in this documentation    as scribed in my presence :         Jethro Craigizzle upon examination and review of the physical therapy notes does demonstrate incremental improvements of her function and strength  I did note that this is a 6 to 9 month recovery  She can finish out her previously scheduled physical therapy appointments  She was encouraged to continue to be active with intolerance  It is our hope that as she continues to progress in her strength and range of motion that her pain will subside  Jethro Maldonado verbalized understanding was amenable to treatment plan presented to her today  I will see her back on an as-needed basis  Subjective:   Amanda Maxwell is a 74 y o  female who presents for follow-up evaluation of her right shoulder  She is 4 5 months status post right shoulder arthroscopy with rotator cuff repair of the subscapularis tendon and supraspinatus tendon each utilizing a speed fix technique and incorporation of biceps tenodesis into subscapularis repair  He states that she does have slow progress with her range of motion and strength  However, is still experiencing pain anteriorly and laterally with overhead reaching  She states that the forward flexion stretching and physical therapy can be quite painful  She states that she has been able to do most of her tasks throughout her day  However, has some concern due to her pain  She is relatively pain-free while at rest   Today she denies any distal paresthesias  Review of Systems   Constitutional: Negative for chills, fever and unexpected weight change     HENT: Negative for hearing loss, nosebleeds and sore throat  Eyes: Negative for pain, redness and visual disturbance  Respiratory: Negative for cough, shortness of breath and wheezing  Cardiovascular: Negative for chest pain, palpitations and leg swelling  Gastrointestinal: Negative for abdominal pain, nausea and vomiting  Endocrine: Negative for polydipsia and polyuria  Genitourinary: Negative for dysuria and hematuria  Musculoskeletal: Positive for arthralgias and myalgias  See HPI   Skin: Negative for rash and wound  Neurological: Negative for dizziness, numbness and headaches  Psychiatric/Behavioral: Negative for decreased concentration and suicidal ideas  The patient is not nervous/anxious  Past Medical History:   Diagnosis Date   • A-fib [facility])    • Anxiety    • Arthritis     shoulder   • Carpal tunnel syndrome    • Colon polyp    • Depression    • Disease of thyroid gland    • Diverticulitis    • Esophageal stricture    • HBP (high blood pressure)    • Hiatal hernia 06/07/2022    Small hiatal hernia   • Hyperlipidemia    • Hypertension    • Hypothyroid    • Pneumonia     2017   • PONV (postoperative nausea and vomiting)    • Schatzki's ring 06/07/2022    Schatzki's ring in the lower esophagus   • Sleep apnea     mild        Past Surgical History:   Procedure Laterality Date   • APPENDECTOMY     • BREAST BIOPSY Right     benign   • BREAST CYST ASPIRATION Right     benign   • CATARACT EXTRACTION Bilateral    • CATARACT EXTRACTION, BILATERAL  2019   • CERVICAL FUSION N/A 05/25/2021    Procedure: FUSION CERVICAL POSTERIOR C1-C2 with allograft and neuromonitoring);   Surgeon: Fern Rodriguez MD;  Location: [facility];  Service: Orthopedics   • CHOLECYSTECTOMY     • COLONOSCOPY  06/07/2022   • COLONOSCOPY W/ BIOPSIES  2011   • EGD  06/07/2022   • HAND SURGERY Right    • MAMMO (HISTORICAL) Bilateral 11/27/2018   • MAMMO (HISTORICAL) Bilateral 10/27/2017   • MANDIBLE SURGERY     • MANDIBLE SURGERY     • MN SURGICAL ARTHROSCOPY SHOULDER W/ROTATOR CUFF RPR Right 2022    Procedure: ARTHROSCOPIC ROTATOR CUFF REPAIR OF SUBSCAPULARIS BICEPS TENODESIS EXTENSIVE DEBRIDEMENT;  Surgeon: Marysol Dwyer MD;  Location: [facility];  Service: Orthopedics   • WISDOM TOOTH EXTRACTION      X4       Family History   Problem Relation Age of Onset   • Heart disease Mother    • Hypertension Mother    • Hyperlipidemia Mother    • Heart failure Mother    • Hypertension Sister        Social History     Occupational History   • Not on file   Tobacco Use   • Smoking status: Former     Types: Cigarettes     Quit date:      Years since quittin 0   • Smokeless tobacco: Never   Vaping Use   • Vaping Use: Never used   Substance and Sexual Activity   • Alcohol use: Not Currently     Comment: last drink 7 years ago   • Drug use: Not Currently   • Sexual activity: Not Currently         Current Outpatient Medications:   •  acetaminophen (TYLENOL) 500 mg tablet, Take 1 tablet (500 mg total) by mouth every 6 (six) hours as needed for moderate pain, Disp: 20 tablet, Rfl: 0  •  ALPRAZolam (XANAX) 0 5 mg tablet, TAKE 1 TABLET(0 5 MG) BY MOUTH TWICE DAILY AS NEEDED FOR ANXIETY, Disp: 60 tablet, Rfl: 5  •  atorvastatin (LIPITOR) 10 mg tablet, Take 1 tablet (10 mg total) by mouth daily at bedtime, Disp: 90 tablet, Rfl: 1  •  Diclofenac Sodium (VOLTAREN) 1 %, Apply 2 g topically 4 (four) times a day, Disp: 100 g, Rfl: 0  •  escitalopram (LEXAPRO) 5 mg tablet, TAKE 1 TABLET(5 MG) BY MOUTH DAILY, Disp: 90 tablet, Rfl: 1  •  levothyroxine 50 mcg tablet, Take 1 tablet (50 mcg total) by mouth daily, Disp: 90 tablet, Rfl: 0  •  lidocaine (LMX) 4 % cream, Apply topically as needed for mild pain, Disp: 30 g, Rfl: 0  •  metoprolol tartrate (LOPRESSOR) 25 mg tablet, Take 1 tablet (25 mg total) by mouth every 12 (twelve) hours, Disp: 180 tablet, Rfl: 3  •  Multiple Vitamins-Minerals (CENTRUM ADULTS PO), Take 1 tablet by mouth daily, Disp: , Rfl:   •  omeprazole (PriLOSEC) 20 mg delayed release capsule, TAKE 1 CAPSULE(20 MG) BY MOUTH DAILY BEFORE BREAKFAST, Disp: 90 capsule, Rfl: 1  •  ramipril (ALTACE) 10 MG capsule, TAKE 1 CAPSULE(10 MG) BY MOUTH TWICE DAILY, Disp: 180 capsule, Rfl: 3  •  tiZANidine (ZANAFLEX) 2 mg tablet, Take 1 tablet (2 mg total) by mouth every 8 (eight) hours as needed for muscle spasms, Disp: 20 tablet, Rfl: 0  •  triamcinolone (KENALOG) 0 1 % cream, Apply 1 application topically 2 (two) times a day, Disp: , Rfl:   •  warfarin (COUMADIN) 2 mg tablet, TAKE 1 AND 1/2 TO 2 TABLETS BY MOUTH DAILY OR AS DIRECTED BY PRESCRIBER (Patient taking differently: TAKE 1 AND 1/2 TO 2 TABLETS BY MOUTH DAILY OR AS DIRECTED BY PRESCRIBER  last dose 9/22/00), Disp: 180 tablet, Rfl: 3    No Known Allergies    Objective:  Vitals:    02/01/23 1215   BP: 139/70   Pulse: (!) 54       Right Shoulder Exam     Tenderness   Right shoulder tenderness location: Lateral shoulder  Range of Motion   Active abduction: 130   External rotation: 60   Internal rotation 0 degrees: L2     Muscle Strength   Abduction: 4/5   Internal rotation: 5/5   External rotation: 5/5     Other   Erythema: absent  Scars: present  Sensation: normal  Pulse: present            Physical Exam  Vitals reviewed  HENT:      Head: Normocephalic and atraumatic  Eyes:      General:         Right eye: No discharge  Left eye: No discharge  Conjunctiva/sclera: Conjunctivae normal       Pupils: Pupils are equal, round, and reactive to light  Cardiovascular:      Rate and Rhythm: Normal rate  Pulmonary:      Effort: Pulmonary effort is normal  No respiratory distress  Musculoskeletal:      Cervical back: Normal range of motion and neck supple  Comments: As noted in HPI   Skin:     General: Skin is warm and dry  Neurological:      Mental Status: She is alert and oriented to person, place, and time           I have personally reviewed pertinent films in PACS and my interpretation is as follows: No images reviewed today      This document was created using speech voice recognition software  Grammatical errors, random word insertions, pronoun errors, and incomplete sentences are an occasional consequence of this system due to software limitations, ambient noise, and hardware issues  Any formal questions or concerns about content, text, or information contained within the body of this dictation should be directly addressed to the provider for clarification

## 2023-02-02 ENCOUNTER — OFFICE VISIT (OUTPATIENT)
Dept: FAMILY MEDICINE CLINIC | Facility: CLINIC | Age: 73
End: 2023-02-02

## 2023-02-02 VITALS
DIASTOLIC BLOOD PRESSURE: 72 MMHG | BODY MASS INDEX: 22.68 KG/M2 | TEMPERATURE: 97.2 F | SYSTOLIC BLOOD PRESSURE: 128 MMHG | RESPIRATION RATE: 18 BRPM | WEIGHT: 128 LBS | HEIGHT: 63 IN

## 2023-02-02 DIAGNOSIS — M25.571 RIGHT ANKLE PAIN, UNSPECIFIED CHRONICITY: Primary | ICD-10-CM

## 2023-02-02 DIAGNOSIS — M79.671 RIGHT FOOT PAIN: ICD-10-CM

## 2023-02-02 NOTE — PROGRESS NOTES
Assessment/Plan: Recommend ankle brace, elevation, heating pad, tylenol prn, ankle Xray and exercises         Problem List Items Addressed This Visit    None  Visit Diagnoses     Right ankle pain, unspecified chronicity    -  Primary    Relevant Orders    XR ankle 3+ vw right            Subjective:      Patient ID: Edin Dumas is a 74 y o  female  Treconor De Jesus has been having right ankle pain and intermittent swelling-says she didn't fall or twist it, may have arthritis in it      The following portions of the patient's history were reviewed and updated as appropriate:   Past Medical History:  She has a past medical history of A-fib ([address] ), Anxiety, Arthritis, Carpal tunnel syndrome, Colon polyp, Depression, Disease of thyroid gland, Diverticulitis, Esophageal stricture, HBP (high blood pressure), Hiatal hernia (06/07/2022), Hyperlipidemia, Hypertension, Hypothyroid, Pneumonia, PONV (postoperative nausea and vomiting), Schatzki's ring (06/07/2022), and Sleep apnea ,  _______________________________________________________________________  Medical Problems:  does not have any pertinent problems on file ,  _______________________________________________________________________  Past Surgical History:   has a past surgical history that includes Breast cyst aspiration (Right); Breast biopsy (Right); Appendectomy; Cholecystectomy; Colonoscopy w/ biopsies (2011); Mandible surgery; [place] tooth extraction; EGD (06/07/2022); Cataract extraction, bilateral (2019); Mammo (historical) (Bilateral, 11/27/2018); Mammo (historical) (Bilateral, 10/27/2017); Colonoscopy (06/07/2022); Cataract extraction (Bilateral); Hand surgery (Right); Cervical fusion (N/A, 05/25/2021); Mandible surgery; and pr surgical arthroscopy shoulder w/rotator cuff rpr (Right, 9/27/2022)  ,  _______________________________________________________________________  Family History:  family history includes Heart disease in her mother; Heart failure in her mother; Hyperlipidemia in her mother; Hypertension in her mother and sister  ,  _______________________________________________________________________  Social History:   reports that she quit smoking about 19 years ago  Her smoking use included cigarettes  She has never used smokeless tobacco  She reports that she does not currently use alcohol  She reports that she does not currently use drugs  ,  _______________________________________________________________________  Allergies:  has No Known Allergies     _______________________________________________________________________  Current Outpatient Medications   Medication Sig Dispense Refill   • acetaminophen (TYLENOL) 500 mg tablet Take 1 tablet (500 mg total) by mouth every 6 (six) hours as needed for moderate pain 20 tablet 0   • ALPRAZolam (XANAX) 0 5 mg tablet TAKE 1 TABLET(0 5 MG) BY MOUTH TWICE DAILY AS NEEDED FOR ANXIETY 60 tablet 5   • atorvastatin (LIPITOR) 10 mg tablet Take 1 tablet (10 mg total) by mouth daily at bedtime 90 tablet 1   • Diclofenac Sodium (VOLTAREN) 1 % Apply 2 g topically 4 (four) times a day 100 g 0   • escitalopram (LEXAPRO) 5 mg tablet TAKE 1 TABLET(5 MG) BY MOUTH DAILY 90 tablet 1   • levothyroxine 50 mcg tablet Take 1 tablet (50 mcg total) by mouth daily 90 tablet 0   • lidocaine (LMX) 4 % cream Apply topically as needed for mild pain 30 g 0   • metoprolol tartrate (LOPRESSOR) 25 mg tablet Take 1 tablet (25 mg total) by mouth every 12 (twelve) hours 180 tablet 3   • Multiple Vitamins-Minerals (CENTRUM ADULTS PO) Take 1 tablet by mouth daily     • omeprazole (PriLOSEC) 20 mg delayed release capsule TAKE 1 CAPSULE(20 MG) BY MOUTH DAILY BEFORE BREAKFAST 90 capsule 1   • ramipril (ALTACE) 10 MG capsule TAKE 1 CAPSULE(10 MG) BY MOUTH TWICE DAILY 180 capsule 3   • tiZANidine (ZANAFLEX) 2 mg tablet Take 1 tablet (2 mg total) by mouth every 8 (eight) hours as needed for muscle spasms 20 tablet 0   • triamcinolone (KENALOG) 0 1 % cream Apply 1 application topically 2 (two) times a day     • warfarin (COUMADIN) 2 mg tablet TAKE 1 AND 1/2 TO 2 TABLETS BY MOUTH DAILY OR AS DIRECTED BY PRESCRIBER (Patient taking differently: TAKE 1 AND 1/2 TO 2 TABLETS BY MOUTH DAILY OR AS DIRECTED BY PRESCRIBER  last dose 9/22/00) 180 tablet 3     No current facility-administered medications for this visit      _______________________________________________________________________  Review of Systems   Constitutional: Negative  HENT: Negative  Respiratory: Negative  Musculoskeletal: Positive for arthralgias, joint swelling and myalgias  Objective:  Vitals:    02/02/23 1001   BP: 128/72   BP Location: Left arm   Patient Position: Sitting   Cuff Size: Adult   Resp: 18   Temp: (!) 97 2 °F (36 2 °C)   TempSrc: Temporal   Weight: 58 1 kg (128 lb)   Height: 5' 3" (1 6 m)     Body mass index is 22 67 kg/m²  Physical Exam  Constitutional:       Appearance: Normal appearance  HENT:      Head: Normocephalic and atraumatic  Nose: Nose normal       Mouth/Throat:      Mouth: Mucous membranes are moist    Cardiovascular:      Rate and Rhythm: Normal rate and regular rhythm  Pulmonary:      Effort: Pulmonary effort is normal    Musculoskeletal:         General: Swelling and tenderness present  Neurological:      Mental Status: She is alert  Psychiatric:         Mood and Affect: Mood normal          Thought Content:  Thought content normal

## 2023-02-06 ENCOUNTER — HOSPITAL ENCOUNTER (OUTPATIENT)
Dept: RADIOLOGY | Facility: HOSPITAL | Age: 73
Discharge: HOME | End: 2023-02-06
Attending: INTERNAL MEDICINE

## 2023-02-06 DIAGNOSIS — M79.671 RIGHT FOOT PAIN: ICD-10-CM

## 2023-02-06 DIAGNOSIS — M25.571 RIGHT ANKLE PAIN, UNSPECIFIED CHRONICITY: ICD-10-CM

## 2023-02-08 DIAGNOSIS — I48.0 PAF (PAROXYSMAL ATRIAL FIBRILLATION) (HCC): Primary | ICD-10-CM

## 2023-02-09 ENCOUNTER — TELEPHONE (OUTPATIENT)
Dept: FAMILY MEDICINE CLINIC | Facility: CLINIC | Age: 73
End: 2023-02-09

## 2023-02-09 ENCOUNTER — HOSPITAL ENCOUNTER (OUTPATIENT)
Dept: RADIOLOGY | Facility: HOSPITAL | Age: 73
Discharge: HOME | End: 2023-02-09

## 2023-02-09 VITALS — BODY MASS INDEX: 22.15 KG/M2 | HEIGHT: 63 IN | WEIGHT: 125 LBS

## 2023-02-09 DIAGNOSIS — N63.25 MASS OVERLAPPING MULTIPLE QUADRANTS OF LEFT BREAST: ICD-10-CM

## 2023-02-09 NOTE — TELEPHONE ENCOUNTER
Francis Bowen called wants a return call to discuss her recent imaging results     Please call Francis Bowen @ # [phone]

## 2023-02-28 ENCOUNTER — TELEPHONE (OUTPATIENT)
Dept: FAMILY MEDICINE CLINIC | Facility: CLINIC | Age: 73
End: 2023-02-28

## 2023-02-28 DIAGNOSIS — F41.9 ANXIETY: ICD-10-CM

## 2023-02-28 RX ORDER — ALPRAZOLAM 0.5 MG/1
0.5 TABLET ORAL 2 TIMES DAILY PRN
Qty: 60 TABLET | Refills: 5 | Status: SHIPPED | OUTPATIENT
Start: 2023-02-28 | End: 2023-06-16

## 2023-03-03 ENCOUNTER — APPOINTMENT (OUTPATIENT)
Dept: LAB | Facility: HOSPITAL | Age: 73
End: 2023-03-03
Attending: INTERNAL MEDICINE

## 2023-03-03 ENCOUNTER — ANTICOAG VISIT (OUTPATIENT)
Dept: CARDIOLOGY CLINIC | Facility: CLINIC | Age: 73
End: 2023-03-03

## 2023-03-03 DIAGNOSIS — I48.0 PAF (PAROXYSMAL ATRIAL FIBRILLATION) (HCC): Primary | ICD-10-CM

## 2023-03-16 DIAGNOSIS — E03.9 HYPOTHYROIDISM, UNSPECIFIED TYPE: ICD-10-CM

## 2023-03-16 RX ORDER — LEVOTHYROXINE SODIUM 0.05 MG/1
TABLET ORAL
Qty: 90 TABLET | Refills: 0 | Status: SHIPPED | OUTPATIENT
Start: 2023-03-16

## 2023-05-02 ENCOUNTER — OFFICE VISIT (OUTPATIENT)
Dept: CARDIOLOGY CLINIC | Facility: CLINIC | Age: 73
End: 2023-05-02

## 2023-05-02 VITALS
BODY MASS INDEX: 22.89 KG/M2 | HEIGHT: 63 IN | TEMPERATURE: 98.6 F | WEIGHT: 129.2 LBS | SYSTOLIC BLOOD PRESSURE: 110 MMHG | DIASTOLIC BLOOD PRESSURE: 68 MMHG | OXYGEN SATURATION: 98 %

## 2023-05-02 DIAGNOSIS — I10 PRIMARY HYPERTENSION: Primary | ICD-10-CM

## 2023-05-02 DIAGNOSIS — I48.0 PAROXYSMAL ATRIAL FIBRILLATION (HCC): ICD-10-CM

## 2023-05-02 DIAGNOSIS — E78.5 HYPERLIPIDEMIA, UNSPECIFIED HYPERLIPIDEMIA TYPE: ICD-10-CM

## 2023-05-02 NOTE — PATIENT INSTRUCTIONS
You were seen today in the Cardiology office for follow up  Please continue your current cardiac medications as prescribed  Thank you for choosing [address]  Please call our office or use [facility] with any questions

## 2023-05-02 NOTE — PROGRESS NOTES
[facility]    CHIEF COMPLAINT:   Chief Complaint   Patient presents with    Atrial Fibrillation    Hypertension     HPI:  Tawanda Stanley is a 74 y o  female with a past medical history of hypertension, hyperlipidemia, paroxysmal atrial fibrillation, and warfarin anticoagulation  She presented to the emergency department on 01/24/2022 after arriving for an EGD/colonoscopy due to a change in bowel habits and dysphagia  She was found to be in rapid atrial fibrillation  She had no prior diagnosis of this in the past   She was treated with IV Cardizem and oral Lopressor and converted to normal sinus rhythm  She was diagnosed with cellulitis/acute bursitis of her right elbow  She underwent I&D and cultures revealed MSSA  She was treated with cephalexin  TTE revealed preserved LVEF  Subsequently in the office we had switched her to warfarin due to the high cost of Eliquis  Interval history: She presents today for follow-up for her atrial fibrillation  She feels well and has no acute complaints  She denies any lightheadedness, dizziness, visual changes, chest pain, palpitations  She denies any bruising or bleeding issues  Her PT/INR levels have been within the appropriate range        The following portions of the patient's history were reviewed and updated as appropriate: allergies, current medications, past family history, past medical history, past social history, past surgical history, and problem list     SINCE LAST OV I REVIEWED WITH THE PATIENT THE INTERIM LABS, TEST RESULTS, CONSULTANT(S) NOTES AND PERFORMED AN INTERIM REVIEW OF HISTORY    Past Medical History:   Diagnosis Date    A-fib ([facility] [address] )     Anxiety     Arthritis     shoulder    Carpal tunnel syndrome     Colon polyp     Depression     Disease of thyroid gland     Diverticulitis     Esophageal stricture     HBP (high blood pressure)     Hiatal hernia 06/07/2022    Small hiatal hernia    Hyperlipidemia     Hypertension     Hypothyroid     Pneumonia     2017    PONV (postoperative nausea and vomiting)     Schatzki's ring 2022    Schatzki's ring in the lower esophagus    Sleep apnea     mild        Past Surgical History:   Procedure Laterality Date    APPENDECTOMY      BREAST BIOPSY Right     benign    BREAST CYST ASPIRATION Right     benign    BREAST SURGERY      milk duct removed    CATARACT EXTRACTION Bilateral     CATARACT EXTRACTION, BILATERAL  2019    CERVICAL FUSION N/A 2021    Procedure: FUSION CERVICAL POSTERIOR C1-C2 with allograft and neuromonitoring);   Surgeon: Mary Gant MD;  Location:  MAIN OR;  Service: Orthopedics    CHOLECYSTECTOMY      COLONOSCOPY  2022    COLONOSCOPY W/ BIOPSIES      EGD  2022    HAND SURGERY Right     MAMMO (HISTORICAL) Bilateral 2018    MAMMO (HISTORICAL) Bilateral 10/27/2017    MANDIBLE SURGERY      MANDIBLE SURGERY      MD SURGICAL ARTHROSCOPY SHOULDER W/ROTATOR CUFF RPR Right 2022    Procedure: ARTHROSCOPIC ROTATOR CUFF REPAIR OF SUBSCAPULARIS BICEPS TENODESIS EXTENSIVE DEBRIDEMENT;  Surgeon: Imelda Camargo MD;  Location: [facility]IN OR;  Service: Orthopedics    WISDOM TOOTH EXTRACTION      X4       Social History     Socioeconomic History    Marital status:      Spouse name: Not on file    Number of children: Not on file    Years of education: Not on file    Highest education level: Not on file   Occupational History    Not on file   Tobacco Use    Smoking status: Former     Types: Cigarettes     Quit date: 2004     Years since quittin 3    Smokeless tobacco: Never   Vaping Use    Vaping Use: Never used   Substance and Sexual Activity    Alcohol use: Not Currently     Comment: last drink 7 years ago    Drug use: Not Currently    Sexual activity: Not Currently   Other Topics Concern    Not on file   Social History Narrative    Do you currently or have you served in the [employer]: No    Were you activated, into active duty, as a member of the [employer] or as a Reservist: No    Occupation: retired    Marital status:     Exercise level: Occasional treadmill    General stress level: Low    Alcohol intake: None stopped 7 years ago    Caffeine intake:  Moderate    Chewing tobacco: none    Illicit drugs: none    Guns present in home: No    Seat belts used routinely: Yes    Sunscreen used routinely: Yes    Smoke alarm in home: Yes    Advance directive: Yes     Social Determinants of Health     Financial Resource Strain: Not on file   Food Insecurity: Not on file   Transportation Needs: Not on file   Physical Activity: Not on file   Stress: Not on file   Social Connections: Not on file   Intimate Partner Violence: Not on file   Housing Stability: Not on file       Family History   Problem Relation Age of Onset    Heart disease Mother     Hypertension Mother     Hyperlipidemia Mother     Heart failure Mother     Hypertension Sister        No Known Allergies    Current Outpatient Medications   Medication Sig Dispense Refill    acetaminophen (TYLENOL) 500 mg tablet Take 1 tablet (500 mg total) by mouth every 6 (six) hours as needed for moderate pain 20 tablet 0    ALPRAZolam (XANAX) 0 5 mg tablet Take 1 tablet (0 5 mg total) by mouth 2 (two) times a day as needed for anxiety 60 tablet 5    atorvastatin (LIPITOR) 10 mg tablet Take 1 tablet (10 mg total) by mouth daily at bedtime 90 tablet 1    Diclofenac Sodium (VOLTAREN) 1 % Apply 2 g topically 4 (four) times a day 100 g 0    escitalopram (LEXAPRO) 5 mg tablet TAKE 1 TABLET(5 MG) BY MOUTH DAILY 90 tablet 1    levothyroxine 50 mcg tablet TAKE 1 TABLET(50 MCG) BY MOUTH DAILY 90 tablet 0    lidocaine (LMX) 4 % cream Apply topically as needed for mild pain 30 g 0    metoprolol tartrate (LOPRESSOR) 25 mg tablet Take 1 tablet (25 mg total) by mouth every 12 (twelve) hours 180 tablet 3    Multiple Vitamins-Minerals (CENTRUM "ADULTS PO) Take 1 tablet by mouth daily      omeprazole (PriLOSEC) 20 mg delayed release capsule TAKE 1 CAPSULE(20 MG) BY MOUTH DAILY BEFORE BREAKFAST 90 capsule 1    ramipril (ALTACE) 10 MG capsule TAKE 1 CAPSULE(10 MG) BY MOUTH TWICE DAILY 180 capsule 3    tiZANidine (ZANAFLEX) 2 mg tablet Take 1 tablet (2 mg total) by mouth every 8 (eight) hours as needed for muscle spasms 20 tablet 0    triamcinolone (KENALOG) 0 1 % cream Apply 1 application topically 2 (two) times a day      warfarin (COUMADIN) 2 mg tablet TAKE 1 AND 1/2 TO 2 TABLETS BY MOUTH DAILY OR AS DIRECTED BY PRESCRIBER (Patient taking differently: TAKE 1 AND 1/2 TO 2 TABLETS BY MOUTH DAILY OR AS DIRECTED BY PRESCRIBER  last dose 9/22/00) 180 tablet 3     No current facility-administered medications for this visit  /68   Temp 98 6 °F (37 °C)   Ht 5' 3\" (1 6 m)   Wt 58 6 kg (129 lb 3 2 oz)   SpO2 98%   BMI 22 89 kg/m²     Review of Systems   All other systems reviewed and are negative  Physical Exam  Vitals reviewed  Constitutional:       General: She is not in acute distress  Appearance: She is not toxic-appearing  HENT:      Head: Normocephalic and atraumatic  Cardiovascular:      Rate and Rhythm: Normal rate and regular rhythm  Pulses: Normal pulses  Heart sounds: Normal heart sounds  No murmur heard  No gallop  Pulmonary:      Effort: Pulmonary effort is normal  No respiratory distress  Breath sounds: Normal breath sounds  No wheezing or rales  Abdominal:      General: Abdomen is flat  Bowel sounds are normal  There is no distension  Palpations: Abdomen is soft  Tenderness: There is no abdominal tenderness  There is no guarding  Musculoskeletal:      Right lower leg: No edema  Left lower leg: No edema  Skin:     General: Skin is warm and dry  Capillary Refill: Capillary refill takes less than 2 seconds  Coloration: Skin is not jaundiced or pale     Neurological:     " Mental Status: She is alert  Psychiatric:         Mood and Affect: Mood normal          Behavior: Behavior normal           Lab Results   Component Value Date    K 4 2 09/06/2022     09/06/2022    CO2 31 09/06/2022    BUN 16 09/06/2022    CREATININE 0 91 09/06/2022    CALCIUM 9 6 09/06/2022    ALT 28 08/17/2022    AST 32 08/17/2022    INR 2 76 (H) 04/14/2023       Lab Results   Component Value Date    HDL 51 08/17/2022    LDLCALC 67 08/17/2022    TRIG 125 08/17/2022       Lab Results   Component Value Date    WBC 6 74 09/06/2022    HGB 12 1 09/06/2022    HCT 38 5 09/06/2022     09/06/2022       Lab Results   Component Value Date     04/26/2021    HGBA1C 5 8 (H) 04/26/2021     Cardiac studies:   Results for orders placed or performed in visit on 05/02/23   POCT ECG    Impression    Sinus bradycardia  ASMI, age undetermined       TTE - 1/25/22:  Left Ventricle Left ventricular cavity size is normal  Wall thickness is normal  The left ventricular ejection fraction is 60%  Systolic function is normal   Wall motion is normal  Diastolic function is normal    Right Ventricle Right ventricular cavity size is normal  Systolic function is normal  Wall thickness is normal    Left Atrium The atrium is normal in size  Right Atrium The atrium is normal in size  Aortic Valve The aortic valve is trileaflet  The leaflets are not thickened  The leaflets are not calcified  The leaflets exhibit normal mobility  There is mild regurgitation  There is no evidence of stenosis  Mitral Valve The mitral valve has normal function  There is mild annular calcification  There is moderate regurgitation  There is no evidence of stenosis  The valve has normal function  Tricuspid Valve Tricuspid valve structure is normal  There is moderate regurgitation  There is no evidence of stenosis  The estimated right ventricular systolic pressure is 41 62 mmHg     Pulmonic Valve Pulmonic valve structure is normal  There is no evidence of regurgitation  There is no evidence of stenosis  Ascending Aorta The aortic root is normal in size  IVC/SVC The right atrial pressure is estimated at 3 0 mmHg  The inferior vena cava is normal in size  Pericardium There is no pericardial effusion  The pericardium is normal in appearance  ASSESSMENT AND PLAN:  [place] was seen today for atrial fibrillation and hypertension  Diagnoses and all orders for this visit:    Primary hypertension: Blood pressure is acceptable on ramipril 10 mg daily    Paroxysmal atrial fibrillation [facility]): Nonvalvular paroxysmal atrial fibrillation diagnosed in January 2022 after she presented for an EGD and colonoscopy  She was treated with IV Cardizem and oral Lopressor and subsequently converted to sinus rhythm  Her ECG today demonstrates sinus bradycardia  Higher doses of metoprolol had caused fatigue and she is doing well on metoprolol tartrate 25 mg twice daily  Eliquis was unaffordable and she was switched to warfarin  Her INRs have been therapeutic  No bruising or bleeding issues  -     metoprolol tartrate (LOPRESSOR) 25 mg tablet; Take 1 tablet (25 mg total) by mouth every 12 (twelve) hours  -     POCT ECG    Hyperlipidemia, unspecified hyperlipidemia type: Lipids are well controlled  Lipid panel from 08/17/2022 with total cholesterol 143, triglycerides 125, HDL 51, LDL 67  Continue atorvastatin 10 mg daily      Lucian Blunt MD

## 2023-05-10 ENCOUNTER — RA CDI HCC (OUTPATIENT)
Dept: OTHER | Facility: HOSPITAL | Age: 73
End: 2023-05-10

## 2023-05-10 NOTE — PROGRESS NOTES
[id number]   [facility]  coding opportunities          Chart Reviewed number of suggestions sent to Provider: 1     Patients Insurance     Medicare Insurance: [employer]

## 2023-05-16 ENCOUNTER — TELEPHONE (OUTPATIENT)
Dept: GASTROENTEROLOGY | Facility: CLINIC | Age: 73
End: 2023-05-16

## 2023-05-17 ENCOUNTER — OFFICE VISIT (OUTPATIENT)
Dept: FAMILY MEDICINE CLINIC | Facility: CLINIC | Age: 73
End: 2023-05-17

## 2023-05-17 VITALS
HEART RATE: 72 BPM | SYSTOLIC BLOOD PRESSURE: 118 MMHG | WEIGHT: 130.25 LBS | TEMPERATURE: 97.9 F | DIASTOLIC BLOOD PRESSURE: 78 MMHG | OXYGEN SATURATION: 99 % | BODY MASS INDEX: 23.08 KG/M2 | RESPIRATION RATE: 16 BRPM | HEIGHT: 63 IN

## 2023-05-17 DIAGNOSIS — Z13.820 SCREENING FOR OSTEOPOROSIS: ICD-10-CM

## 2023-05-17 DIAGNOSIS — I48.0 PAF (PAROXYSMAL ATRIAL FIBRILLATION) (HCC): ICD-10-CM

## 2023-05-17 DIAGNOSIS — I10 PRIMARY HYPERTENSION: Primary | ICD-10-CM

## 2023-05-17 DIAGNOSIS — M54.2 NECK PAIN: ICD-10-CM

## 2023-05-17 DIAGNOSIS — F41.9 ANXIETY: ICD-10-CM

## 2023-05-17 DIAGNOSIS — E78.5 HYPERLIPIDEMIA, UNSPECIFIED HYPERLIPIDEMIA TYPE: ICD-10-CM

## 2023-05-17 NOTE — PROGRESS NOTES
Assessment/Plan:         Problem List Items Addressed This Visit        Cardiovascular and Mediastinum    Primary hypertension - Primary     Well controlled on ramipril and metoprolol         PAF (paroxysmal atrial fibrillation) (HCC)     On metoprolol for rate control and warfarin            Other    Neck pain    Anxiety    Hyperlipidemia   Other Visit Diagnoses     Screening for osteoporosis        DXA ordered            Subjective:      Patient ID: Candice Guerin is a 74 y o  female  [facility] here for follow up on her HTN, OA, anxiety, hypothyroidism, sleep apnea-doing ok, does have neck pain that is rather chronic-has some foot and ankle pain which interfere with her ability to walk and do yardwork  Has a hx of PAF too and is on Coumadin      The following portions of the patient's history were reviewed and updated as appropriate:   Past Medical History:  She has a past medical history of A-fib ([address] ), Anxiety, Arthritis, Carpal tunnel syndrome, Colon polyp, Depression, Disease of thyroid gland, Diverticulitis, Esophageal stricture, HBP (high blood pressure), Hiatal hernia (06/07/2022), Hyperlipidemia, Hypertension, Hypothyroid, Pneumonia, PONV (postoperative nausea and vomiting), Schatzki's ring (06/07/2022), and Sleep apnea ,  _______________________________________________________________________  Medical Problems:  does not have any pertinent problems on file ,  _______________________________________________________________________  Past Surgical History:   has a past surgical history that includes Breast cyst aspiration (Right); Breast biopsy (Right); Appendectomy; Cholecystectomy; Colonoscopy w/ biopsies (2011); Mandible surgery; [place] tooth extraction; EGD (06/07/2022); Cataract extraction, bilateral (2019); Mammo (historical) (Bilateral, 11/27/2018); Mammo (historical) (Bilateral, 10/27/2017); Colonoscopy (06/07/2022); Cataract extraction (Bilateral); Hand surgery (Right);  Cervical fusion (N/A, 05/25/2021); Mandible surgery; pr surgical arthroscopy shoulder w/rotator cuff rpr (Right, 09/27/2022); and Breast surgery  ,  _______________________________________________________________________  Family History:  family history includes Heart disease in her mother; Heart failure in her mother; Hyperlipidemia in her mother; Hypertension in her mother and sister  ,  _______________________________________________________________________  Social History:   reports that she quit smoking about 19 years ago  Her smoking use included cigarettes  She has never used smokeless tobacco  She reports that she does not currently use alcohol  She reports that she does not currently use drugs  ,  _______________________________________________________________________  Allergies:  has No Known Allergies     _______________________________________________________________________  Current Outpatient Medications   Medication Sig Dispense Refill   • acetaminophen (TYLENOL) 500 mg tablet Take 1 tablet (500 mg total) by mouth every 6 (six) hours as needed for moderate pain 20 tablet 0   • ALPRAZolam (XANAX) 0 5 mg tablet Take 1 tablet (0 5 mg total) by mouth 2 (two) times a day as needed for anxiety 60 tablet 5   • atorvastatin (LIPITOR) 10 mg tablet Take 1 tablet (10 mg total) by mouth daily at bedtime 90 tablet 1   • Diclofenac Sodium (VOLTAREN) 1 % Apply 2 g topically 4 (four) times a day 100 g 0   • escitalopram (LEXAPRO) 5 mg tablet TAKE 1 TABLET(5 MG) BY MOUTH DAILY 90 tablet 1   • levothyroxine 50 mcg tablet TAKE 1 TABLET(50 MCG) BY MOUTH DAILY 90 tablet 0   • lidocaine (LMX) 4 % cream Apply topically as needed for mild pain 30 g 0   • metoprolol tartrate (LOPRESSOR) 25 mg tablet Take 1 tablet (25 mg total) by mouth every 12 (twelve) hours 180 tablet 3   • Multiple Vitamins-Minerals (CENTRUM ADULTS PO) Take 1 tablet by mouth daily     • omeprazole (PriLOSEC) 20 mg delayed release capsule TAKE 1 CAPSULE(20 MG) BY MOUTH DAILY BEFORE "BREAKFAST 90 capsule 1   • ramipril (ALTACE) 10 MG capsule TAKE 1 CAPSULE(10 MG) BY MOUTH TWICE DAILY 180 capsule 3   • tiZANidine (ZANAFLEX) 2 mg tablet Take 1 tablet (2 mg total) by mouth every 8 (eight) hours as needed for muscle spasms 20 tablet 0   • triamcinolone (KENALOG) 0 1 % cream Apply 1 application topically 2 (two) times a day     • warfarin (COUMADIN) 2 mg tablet TAKE 1 AND 1/2 TO 2 TABLETS BY MOUTH DAILY OR AS DIRECTED BY PRESCRIBER (Patient taking differently: TAKE 1 AND 1/2 TO 2 TABLETS BY MOUTH DAILY OR AS DIRECTED BY PRESCRIBER  last dose 9/22/00) 180 tablet 3     No current facility-administered medications for this visit      _______________________________________________________________________  Review of Systems   Respiratory: Negative  Cardiovascular: Negative  Gastrointestinal: Negative  Genitourinary: Negative  Musculoskeletal: Positive for arthralgias  Hematological: Negative  Psychiatric/Behavioral: The patient is nervous/anxious  Objective:  Vitals:    05/17/23 1304   BP: 118/78   BP Location: Left arm   Patient Position: Sitting   Cuff Size: Adult   Pulse: 72   Resp: 16   Temp: 97 9 °F (36 6 °C)   TempSrc: Tympanic   SpO2: 99%   Weight: 59 1 kg (130 lb 4 oz)   Height: 5' 3\" (1 6 m)     Body mass index is 23 07 kg/m²  Physical Exam  Constitutional:       Appearance: Normal appearance  HENT:      Head: Normocephalic and atraumatic  Right Ear: External ear normal       Left Ear: External ear normal       Nose: Nose normal       Mouth/Throat:      Mouth: Mucous membranes are moist    Eyes:      Extraocular Movements: Extraocular movements intact  Cardiovascular:      Rate and Rhythm: Normal rate and regular rhythm  Heart sounds: Normal heart sounds  Pulmonary:      Effort: Pulmonary effort is normal       Breath sounds: Normal breath sounds  Abdominal:      Palpations: Abdomen is soft     Musculoskeletal:         General: Normal range of " motion  Cervical back: Normal range of motion and neck supple  Skin:     General: Skin is warm  Capillary Refill: Capillary refill takes less than 2 seconds  Neurological:      General: No focal deficit present  Mental Status: She is alert and oriented to person, place, and time  Mental status is at baseline  Psychiatric:         Mood and Affect: Mood normal          Behavior: Behavior normal          Thought Content:  Thought content normal          Judgment: Judgment normal

## 2023-05-22 ENCOUNTER — TELEPHONE (OUTPATIENT)
Dept: FAMILY MEDICINE CLINIC | Facility: CLINIC | Age: 73
End: 2023-05-22

## 2023-05-22 DIAGNOSIS — M54.2 NECK PAIN: Primary | ICD-10-CM

## 2023-05-22 NOTE — TELEPHONE ENCOUNTER
Was in for an ov and wanted to know if you can order her an xray of her neck? She said its still bothering her      Patient ph # [phone]

## 2023-05-23 ENCOUNTER — ANTICOAG VISIT (OUTPATIENT)
Dept: CARDIOLOGY CLINIC | Facility: CLINIC | Age: 73
End: 2023-05-23

## 2023-05-23 ENCOUNTER — APPOINTMENT (OUTPATIENT)
Dept: LAB | Facility: HOSPITAL | Age: 73
End: 2023-05-23
Attending: INTERNAL MEDICINE

## 2023-05-23 ENCOUNTER — HOSPITAL ENCOUNTER (OUTPATIENT)
Dept: RADIOLOGY | Facility: HOSPITAL | Age: 73
Discharge: HOME | End: 2023-05-23

## 2023-05-23 DIAGNOSIS — M54.2 NECK PAIN: ICD-10-CM

## 2023-05-23 DIAGNOSIS — I48.0 PAF (PAROXYSMAL ATRIAL FIBRILLATION) (HCC): Primary | ICD-10-CM

## 2023-06-16 DIAGNOSIS — F41.9 ANXIETY: ICD-10-CM

## 2023-06-16 RX ORDER — ALPRAZOLAM 0.5 MG/1
1 TABLET ORAL 2 TIMES DAILY PRN
Qty: 60 TABLET | Refills: 5 | Status: SHIPPED | OUTPATIENT
Start: 2023-06-16

## 2023-06-16 RX ORDER — ALPRAZOLAM 0.5 MG/1
0.5 TABLET ORAL 2 TIMES DAILY PRN
Qty: 60 TABLET | Refills: 5 | Status: CANCELLED | OUTPATIENT
Start: 2023-06-16

## 2023-06-16 NOTE — TELEPHONE ENCOUNTER
Patient would like her ALPRAZolam (XANAX) 0 5 mg tablet increased, she said it's not working    [employer]

## 2023-06-23 ENCOUNTER — TELEPHONE (OUTPATIENT)
Dept: GASTROENTEROLOGY | Facility: CLINIC | Age: 73
End: 2023-06-23

## 2023-06-23 ENCOUNTER — OFFICE VISIT (OUTPATIENT)
Dept: GASTROENTEROLOGY | Facility: CLINIC | Age: 73
End: 2023-06-23

## 2023-06-23 VITALS
SYSTOLIC BLOOD PRESSURE: 145 MMHG | DIASTOLIC BLOOD PRESSURE: 72 MMHG | WEIGHT: 130 LBS | BODY MASS INDEX: 23.04 KG/M2 | HEART RATE: 59 BPM | HEIGHT: 63 IN

## 2023-06-23 DIAGNOSIS — K22.2 SCHATZKI'S RING: ICD-10-CM

## 2023-06-23 DIAGNOSIS — R13.10 DYSPHAGIA, UNSPECIFIED TYPE: ICD-10-CM

## 2023-06-23 DIAGNOSIS — Z12.11 COLON CANCER SCREENING: ICD-10-CM

## 2023-06-23 DIAGNOSIS — K58.0 IRRITABLE BOWEL SYNDROME WITH DIARRHEA: Primary | ICD-10-CM

## 2023-06-23 DIAGNOSIS — K64.9 HEMORRHOIDS, UNSPECIFIED HEMORRHOID TYPE: ICD-10-CM

## 2023-06-23 DIAGNOSIS — K21.9 GASTROESOPHAGEAL REFLUX DISEASE WITHOUT ESOPHAGITIS: ICD-10-CM

## 2023-06-23 NOTE — PROGRESS NOTES
[facility] - Outpatient Follow-up Note  Kavita Snyder 74 y o  female MRN: [id number]  Encounter: [id number]          ASSESSMENT AND PLAN:      1  Irritable bowel syndrome with diarrhea  Patient has history of ongoing diarrhea  Patient reports that she will drink her coffee have a bowel movement after breakfast  that is normal and then can have bowel movement later in the day associated with urgency  Patient reports having approximately 2-3 bowel movements a day 3-4 times a week  Patient is not taking a fiber supplement  Patient is taking antidiarrhea medication which was prescribed by her PCP which does improve her diarrhea patient denies any associated symptoms  Patient denies blood in stool, blood from rectal area, or black tarry stool  Patient denies abdominal pain or cramping associated with bowel movements     Patient is not eating a high-fiber diet  Patient reports she tried Pepto-Bismol but that did not improve her symptoms  Patient had colonoscopy done 6/7/2022 which  showed no colon polyps, scattered diverticuli throughout the left colon and internal hemorrhoids  Normal colonic mucosa  -Continue antidiarrhea medication as needed  - rifaximin (XIFAXAN) 550 mg tablet; Take 1 tablet (550 mg total) by mouth every 8 (eight) hours for 14 days  Dispense: 42 tablet; Refill: 2  -High fiber diet  -Start fiber supplement 2 gummies or capsules daily    2  Gastroesophageal reflux disease without esophagitis  3  Dysphagia, unspecified type  4  Schatzki's ring  Patient has history of GERD, dysphagia, and Schatzki's ring  Patient reports she continues to experience intermittent episodes of dysphagia with solid foods  Patient denies nausea, vomiting, acid reflux, heartburn, epigastric or abdominal pain  Dysphagia may be secondary to schatzki which may require dilation  'a ring   - EGD; Schedule for EGD  Prep and procedure explained to patient in detail    Further recommendations pending results of EGD  -Continue  antireflux measures and precautions    -Continue pantoprazole  5  Colon cancer screening  6  Hemorrhoids, unspecified hemorrhoid type  Up to date colon cancer screening  Colonoscopy done 6/7/2022 which  showed no colon polyps, scattered diverticuli throughout the left colon and internal hemorrhoids  Normal colonic mucosa  Follow up     ______________________________________________________________________    SUBJECTIVE: This is a 68-year-old female who presents to the office for follow-up  Patient reports she continues to have intermittent episodes of dysphagia  Patient denies acid reflux, heartburn, nausea, vomiting, epigastric or abdominal pain  Patient denies blood in stool, blood from rectal area, or black tarry stool  Patient has history of ongoing diarrhea  Patient reports that she will drink her coffee have a bowel movement after breakfast  that is normal and then can have bowel movement later in the day associated with urgency  Patient reports having approximately 2-3 bowel movements a day 3-4 times a week  Patient is not taking a fiber supplement  Patient is taking antidiarrhea medication which was prescribed by her PCP which does improve her diarrhea patient denies any associated symptoms  Patient denies blood in stool, blood from rectal area, or black tarry stool  Patient denies abdominal pain or cramping associated with bowel movements     Patient is not eating a high-fiber diet  Patient reports she tried Pepto-Bismol but that did not improve her symptoms  Patient had colonoscopy done 6/7/2022 which  showed no colon polyps, scattered diverticuli throughout the left colon and internal hemorrhoids  Normal colonic mucosa  EGD done 6/7/2022 showed healed pyloric channel ulcer with slight narrowing and pyloric channel  Small hiatal hernia  Normal duodenum  Schatzki's Ring in lower esophagusBiopsy showed no goblet cell intestinal metaplasia    Negative for dysplasia and malignancy  [place] REVIEW OF SYSTEMS IS OTHERWISE NEGATIVE  Historical Information   Past Medical History:   Diagnosis Date   • A-fib [facility])    • Anxiety    • Arthritis     shoulder   • Carpal tunnel syndrome    • Colon polyp    • Depression    • Disease of thyroid gland    • Diverticulitis    • Esophageal stricture    • HBP (high blood pressure)    • Hiatal hernia 06/07/2022    Small hiatal hernia   • Hyperlipidemia    • Hypertension    • Hypothyroid    • Pneumonia     2017   • PONV (postoperative nausea and vomiting)    • Schatzki's ring 06/07/2022    Schatzki's ring in the lower esophagus   • Sleep apnea     mild      Past Surgical History:   Procedure Laterality Date   • APPENDECTOMY     • BREAST BIOPSY Right     benign   • BREAST CYST ASPIRATION Right     benign   • BREAST SURGERY      milk duct removed   • CATARACT EXTRACTION Bilateral    • CATARACT EXTRACTION, BILATERAL  2019   • CERVICAL FUSION N/A 05/25/2021    Procedure: FUSION CERVICAL POSTERIOR C1-C2 with allograft and neuromonitoring);   Surgeon: Ivonne Colon MD;  Location:  MAIN OR;  Service: Orthopedics   • CHOLECYSTECTOMY     • COLONOSCOPY  06/07/2022   • COLONOSCOPY W/ BIOPSIES  2011   • EGD  06/07/2022   • HAND SURGERY Right    • MAMMO (HISTORICAL) Bilateral 11/27/2018   • MAMMO (HISTORICAL) Bilateral 10/27/2017   • MANDIBLE SURGERY     • MANDIBLE SURGERY     • [place] SURGICAL ARTHROSCOPY SHOULDER W/ROTATOR CUFF RPR Right 09/27/2022    Procedure: ARTHROSCOPIC ROTATOR CUFF REPAIR OF SUBSCAPULARIS BICEPS TENODESIS EXTENSIVE DEBRIDEMENT;  Surgeon: Norah Leblanc MD;  Location: [facility];  Service: Orthopedics   • WISDOM TOOTH EXTRACTION      X4     Social History   Social History     Substance and Sexual Activity   Alcohol Use Not Currently    Comment: last drink 7 years ago     Social History     Substance and Sexual Activity   Drug Use Not Currently     Social History     Tobacco Use   Smoking Status Former   • Types: "Cigarettes   • Quit date:    • Years since quittin 4   Smokeless Tobacco Never     Family History   Problem Relation Age of Onset   • Heart disease Mother    • Hypertension Mother    • Hyperlipidemia Mother    • Heart failure Mother    • Hypertension Sister        Meds/Allergies       Current Outpatient Medications:   •  acetaminophen (TYLENOL) 500 mg tablet  •  ALPRAZolam (XANAX) 0 5 mg tablet  •  atorvastatin (LIPITOR) 10 mg tablet  •  escitalopram (LEXAPRO) 5 mg tablet  •  levothyroxine 50 mcg tablet  •  metoprolol tartrate (LOPRESSOR) 25 mg tablet  •  Multiple Vitamins-Minerals (CENTRUM ADULTS PO)  •  omeprazole (PriLOSEC) 20 mg delayed release capsule  •  ramipril (ALTACE) 10 MG capsule  •  rifaximin (XIFAXAN) 550 mg tablet  •  tiZANidine (ZANAFLEX) 2 mg tablet  •  warfarin (COUMADIN) 2 mg tablet  •  Diclofenac Sodium (VOLTAREN) 1 %  •  lidocaine (LMX) 4 % cream  •  triamcinolone (KENALOG) 0 1 % cream    No Known Allergies        Objective     Blood pressure 145/72, pulse 59, height 5' 3\" (1 6 m), weight 59 kg (130 lb)  Body mass index is 23 03 kg/m²  PHYSICAL EXAM:      General Appearance:   Alert, cooperative, no distress   HEENT:   Normocephalic, atraumatic, anicteric      Neck:  Supple, symmetrical, trachea midline   Lungs:   Clear to auscultation bilaterally; no rales, rhonchi or wheezing; respirations unlabored    Heart[de-identified]   Regular rate and rhythm; no murmur, rub, or gallop  Abdomen:   Soft, non-tender, non-distended; normal bowel sounds; no masses, no organomegaly    Genitalia:   Deferred    Rectal:   Deferred    Extremities:  No cyanosis, clubbing or edema    Pulses:  2+ and symmetric    Skin:  No jaundice, rashes, or lesions    Lymph nodes:  No palpable cervical lymphadenopathy        Lab Results:   No visits with results within 1 Day(s) from this visit  Latest known visit with results is:    Ancillary Orders on 2023   Component Date Value   • Protime 2023 23 5 (H)    • INR " 05/23/2023 2 05 (H)          Radiology Results:   No results found

## 2023-06-23 NOTE — PATIENT INSTRUCTIONS
High-fiber diet  Start fiber supplement daily 2 Gummies or 2 tablets  Continue to follow antireflux diet and measures-to 2 cups of coffee a day    Avoid chocolate, caffeine, carbonated beverages and tomato based products

## 2023-06-23 NOTE — TELEPHONE ENCOUNTER
"[facility] Assessment    Name: Kavita Snyder  YOB: 1950  Last Height: 5' 3\" (1 6 m)  Last weight: 59 kg (130 lb)  BMI: 23 03 kg/m²  Procedure: Egd  Diagnosis: see order  Date of procedure: 8/7/23  Prep: given at appt   Responsible : yes  Phone#: [phone]  Name completing form: Kusum Felix  Date form completed: 06/23/23      If the patient answers yes to any of these questions, schedule in a hospital  Are you pregnant: No  Do you rely on a wheelchair for mobility: No  Have you been diagnosed with End Stage Renal Disease (ESRD): No  Do you need oxygen during the day: No  Have you had a heart attack or stroke within the past three months: No  Have you had a seizure within the past three months: No  Have you ever been informed by anesthesia that you have a difficult airway: No  Additional Questions  Have you had any cardiac testing or are under the care of a Cardiologist (see cardiac list): Yes (Comment: Obtain Cardiac Clearance)  Cardiac list:   Do you have an implanted cardiac defibrillator: No (Comment:  This patient should be scheduled in the hospital)    Have any bleeding problems, such as anemia or hemophilia (If patient has H&H result below 8, schedule in hospital   H&H must be within 30 days of procedure): No    Had an organ transplant within the past 3 months: No    Do you have any present infections: No  Do you get short of breath when walking a few blocks: No  Have you been diagnosed with diabetes: No  Comments (provide cardiac provider information if applicable): Dr Sindhu Siegel and takes Coumadin       "

## 2023-06-23 NOTE — TELEPHONE ENCOUNTER
Scheduled date of EGD(as of today): 8/7/23  Physician performing EGD: Dr Odette Kwok  Location of EGD: [facility]  Instructions reviewed with patient by: ilene given at appt   Clearances: Will fax cardiac/Coumadin clearance 1 mo prior to procedure to Dr Mel Hollingsworth

## 2023-06-23 NOTE — TELEPHONE ENCOUNTER
Faxed cardiac/coumadin clearance to Dr Lau Space [phone]  Will call office in two weeks if do not see letter started in chart [phone]

## 2023-07-11 ENCOUNTER — TELEPHONE (OUTPATIENT)
Age: 73
End: 2023-07-11

## 2023-07-12 NOTE — TELEPHONE ENCOUNTER
Approved  [id number]  Prior authorization approved   Case ID: [id number]      Payer:  [employer] Part D    [phone]     [phone]    Request Reference Number: [id number].  XIFAXAN      TAB 550MG is approved through 07/25/2023.  Your patient may now fill this prescription and it will be covered.    Approval Details    Authorization number: [id number]   Authorized from July 11, 2023 to July 25, 2023      Electronic appeal:  Not supported   View History

## 2023-07-20 ENCOUNTER — APPOINTMENT (OUTPATIENT)
Dept: LAB | Facility: HOSPITAL | Age: 73
End: 2023-07-20
Attending: INTERNAL MEDICINE
Payer: MEDICARE

## 2023-07-20 ENCOUNTER — ANTICOAG VISIT (OUTPATIENT)
Dept: CARDIOLOGY CLINIC | Facility: CLINIC | Age: 73
End: 2023-07-20

## 2023-07-20 DIAGNOSIS — I48.0 PAF (PAROXYSMAL ATRIAL FIBRILLATION) (HCC): Primary | ICD-10-CM

## 2023-07-30 DIAGNOSIS — F41.9 ANXIETY: ICD-10-CM

## 2023-07-31 RX ORDER — ESCITALOPRAM OXALATE 5 MG/1
TABLET ORAL
Qty: 90 TABLET | Refills: 1 | Status: SHIPPED | OUTPATIENT
Start: 2023-07-31 | End: 2023-08-08 | Stop reason: SDUPTHER

## 2023-08-08 ENCOUNTER — TELEPHONE (OUTPATIENT)
Dept: FAMILY MEDICINE CLINIC | Facility: CLINIC | Age: 73
End: 2023-08-08

## 2023-08-08 DIAGNOSIS — Z76.0 MEDICATION REFILL: ICD-10-CM

## 2023-08-08 DIAGNOSIS — F41.9 ANXIETY: ICD-10-CM

## 2023-08-08 RX ORDER — ESCITALOPRAM OXALATE 5 MG/1
5 TABLET ORAL DAILY
Qty: 90 TABLET | Refills: 1 | Status: SHIPPED | OUTPATIENT
Start: 2023-08-08

## 2023-08-08 RX ORDER — ATORVASTATIN CALCIUM 10 MG/1
10 TABLET, FILM COATED ORAL
Qty: 90 TABLET | Refills: 1 | Status: SHIPPED | OUTPATIENT
Start: 2023-08-08

## 2023-08-14 ENCOUNTER — HOSPITAL ENCOUNTER (OUTPATIENT)
Dept: RADIOLOGY | Facility: HOSPITAL | Age: 73
Discharge: HOME | End: 2023-08-14
Payer: MEDICARE

## 2023-08-14 ENCOUNTER — OFFICE VISIT (OUTPATIENT)
Dept: OBGYN CLINIC | Facility: CLINIC | Age: 73
End: 2023-08-14
Payer: MEDICARE

## 2023-08-14 VITALS
WEIGHT: 130.2 LBS | SYSTOLIC BLOOD PRESSURE: 97 MMHG | HEIGHT: 63 IN | HEART RATE: 71 BPM | BODY MASS INDEX: 23.07 KG/M2 | OXYGEN SATURATION: 97 % | DIASTOLIC BLOOD PRESSURE: 69 MMHG

## 2023-08-14 DIAGNOSIS — M17.11 PRIMARY OSTEOARTHRITIS OF RIGHT KNEE: ICD-10-CM

## 2023-08-14 DIAGNOSIS — M19.90 ARTHRITIS: ICD-10-CM

## 2023-08-14 DIAGNOSIS — M17.12 PRIMARY OSTEOARTHRITIS OF LEFT KNEE: ICD-10-CM

## 2023-08-14 DIAGNOSIS — M25.561 PAIN IN BOTH KNEES, UNSPECIFIED CHRONICITY: ICD-10-CM

## 2023-08-14 DIAGNOSIS — M25.561 PAIN IN BOTH KNEES, UNSPECIFIED CHRONICITY: Primary | ICD-10-CM

## 2023-08-14 DIAGNOSIS — M25.562 PAIN IN BOTH KNEES, UNSPECIFIED CHRONICITY: ICD-10-CM

## 2023-08-14 DIAGNOSIS — M25.562 PAIN IN BOTH KNEES, UNSPECIFIED CHRONICITY: Primary | ICD-10-CM

## 2023-08-14 PROCEDURE — 73562 X-RAY EXAM OF KNEE 3: CPT

## 2023-08-14 PROCEDURE — 99213 OFFICE O/P EST LOW 20 MIN: CPT | Performed by: PHYSICIAN ASSISTANT

## 2023-08-14 PROCEDURE — 20610 DRAIN/INJ JOINT/BURSA W/O US: CPT | Performed by: PHYSICIAN ASSISTANT

## 2023-08-14 RX ORDER — TRIAMCINOLONE ACETONIDE 40 MG/ML
80 INJECTION, SUSPENSION INTRA-ARTICULAR; INTRAMUSCULAR
Status: COMPLETED | OUTPATIENT
Start: 2023-08-14 | End: 2023-08-14

## 2023-08-14 RX ORDER — LIDOCAINE HYDROCHLORIDE 10 MG/ML
2 INJECTION, SOLUTION INFILTRATION; PERINEURAL
Status: COMPLETED | OUTPATIENT
Start: 2023-08-14 | End: 2023-08-14

## 2023-08-14 RX ORDER — BUPIVACAINE HYDROCHLORIDE 5 MG/ML
2 INJECTION, SOLUTION EPIDURAL; INTRACAUDAL
Status: COMPLETED | OUTPATIENT
Start: 2023-08-14 | End: 2023-08-14

## 2023-08-14 RX ADMIN — BUPIVACAINE HYDROCHLORIDE 2 ML: 5 INJECTION, SOLUTION EPIDURAL; INTRACAUDAL at 13:30

## 2023-08-14 RX ADMIN — TRIAMCINOLONE ACETONIDE 80 MG: 40 INJECTION, SUSPENSION INTRA-ARTICULAR; INTRAMUSCULAR at 13:30

## 2023-08-14 RX ADMIN — LIDOCAINE HYDROCHLORIDE 2 ML: 10 INJECTION, SOLUTION INFILTRATION; PERINEURAL at 13:30

## 2023-08-14 NOTE — PROGRESS NOTES
Assessment/Plan   Diagnoses and all orders for this visit:            Primary osteoarthritis of left knee  - Cortisone injection today  - Ice as needed  - Follow up with Dr. Rose Mary Sánchez or Dr. Evan Esparza in 3mo        Subjective   Patient ID: Sonja Abreu is a 74 y.o. female. Vitals:    08/14/23 1315   BP: 97/69   Pulse: 71   SpO2: 97%     73yo female comes in for an evluation of her left knee. She has a history of osteoarthritis and has had injections in the distant past.  She had been doing well and then her pain has been increased over the last 2 months. She has had a few falls, but there was no specific injury. No fevers or chills. She is adamant that she never wants surgery. The pain is dull in character, mild in severity, pain does not radiate and is not associated with numbness.         The following portions of the patient's history were reviewed and updated as appropriate: allergies, current medications, past family history, past medical history, past social history, past surgical history and problem list.    Review of Systems  Ortho Exam  Past Medical History:   Diagnosis Date   • A-fib [facility])    • Anxiety    • Arthritis     shoulder   • Carpal tunnel syndrome    • Colon polyp    • Depression    • Disease of thyroid gland    • Diverticulitis    • Esophageal stricture    • HBP (high blood pressure)    • Hiatal hernia 06/07/2022    Small hiatal hernia   • Hyperlipidemia    • Hypertension    • Hypothyroid    • Pneumonia     2017   • PONV (postoperative nausea and vomiting)    • Schatzki's ring 06/07/2022    Schatzki's ring in the lower esophagus   • Sleep apnea     mild      Past Surgical History:   Procedure Laterality Date   • APPENDECTOMY     • BREAST BIOPSY Right     benign   • BREAST CYST ASPIRATION Right     benign   • BREAST SURGERY      milk duct removed   • CATARACT EXTRACTION Bilateral    • CATARACT EXTRACTION, BILATERAL  2019   • CERVICAL FUSION N/A 05/25/2021    Procedure: FUSION CERVICAL POSTERIOR C1-C2 with allograft and neuromonitoring); Surgeon: Herlinda Polk MD;  Location:  MAIN OR;  Service: Orthopedics   • CHOLECYSTECTOMY     • COLONOSCOPY  2022   • COLONOSCOPY W/ BIOPSIES     • EGD  2022   • HAND SURGERY Right    • MAMMO (HISTORICAL) Bilateral 2018   • MAMMO (HISTORICAL) Bilateral 10/27/2017   • MANDIBLE SURGERY     • MANDIBLE SURGERY     • [place] SURGICAL ARTHROSCOPY SHOULDER W/ROTATOR CUFF RPR Right 2022    Procedure: ARTHROSCOPIC ROTATOR CUFF REPAIR OF SUBSCAPULARIS BICEPS TENODESIS EXTENSIVE DEBRIDEMENT;  Surgeon: Jonathan Odell MD;  Location: [facility];  Service: Orthopedics   • WISDOM TOOTH EXTRACTION      X4     Family History   Problem Relation Age of Onset   • Heart disease Mother    • Hypertension Mother    • Hyperlipidemia Mother    • Heart failure Mother    • Hypertension Sister      Social History     Occupational History   • Not on file   Tobacco Use   • Smoking status: Former     Types: Cigarettes     Quit date:      Years since quittin.6   • Smokeless tobacco: Never   Vaping Use   • Vaping Use: Never used   Substance and Sexual Activity   • Alcohol use: Not Currently     Comment: last drink 7 years ago   • Drug use: Not Currently   • Sexual activity: Not Currently       Review of Systems   Constitutional: Negative. HENT: Negative. Eyes: Negative. Respiratory: Negative. Cardiovascular: Negative. Gastrointestinal: Negative. Endocrine: Negative. Genitourinary: Negative. Musculoskeletal: As below. .   Allergic/Immunologic: Negative. Neurological: Negative. Hematological: Negative. Psychiatric/Behavioral: Negative.         Objective   Physical Exam      · Constitutional: Awake, Alert, Oriented  · Eyes: EOMI  · Psych: Mood and affect appropriate  · Heart: regular rate   · Lungs: No audible wheezing  · Abdomen: No guarding  · Lymph: no lymphedema  • left Knee:  - Appearance  • No swelling, discoloration, deformity, or ecchymosis  • Quad bulk/tone is low but symmetric  - Effusion  • none  - Palpation  • + Tenderness lateral joint line., + Tenderness patellar tendon   •  No tenderness about the medial joint line, patella, patellar tendon, MCL, LCL, hamstrings, or medial / lateral tibial plateau.  - ROM  • Extension: 15 and Flexion: 110  - Special Tests  • Anterior Drawer Test negative, Posterior Drawer Test negative, Valgus Stress Test negative, Varus Stress Test negative and Patellar apprehension negative  - Motor  • normal 5/5 in all planes  - NVI distally    I have personally reviewed pertinent films in PACS and my interpretation is osteoarthritis noted. Large joint arthrocentesis: L knee  Universal Protocol:  Consent: Verbal consent obtained. Risks and benefits: risks, benefits and alternatives were discussed  Consent given by: patient  Time out: Immediately prior to procedure a "time out" was called to verify the correct patient, procedure, equipment, support staff and site/side marked as required. Timeout called at: 8/14/2023 2:05 PM.  Patient understanding: patient states understanding of the procedure being performed  Site marked: the operative site was marked  Radiology Images displayed and confirmed.  If images not available, report reviewed: imaging studies available  Patient identity confirmed: verbally with patient    Supporting Documentation  Indications: pain   Procedure Details  Location: knee - L knee  Needle size: 22 G  Ultrasound guidance: no  Approach: lateral  Medications administered: 2 mL bupivacaine (PF) 0.5 %; 2 mL lidocaine 1 %; 80 mg triamcinolone acetonide 40 mg/mL    Patient tolerance: patient tolerated the procedure well with no immediate complications  Dressing:  Sterile dressing applied

## 2023-08-15 ENCOUNTER — RA CDI HCC (OUTPATIENT)
Dept: OTHER | Facility: HOSPITAL | Age: 73
End: 2023-08-15

## 2023-08-15 NOTE — PROGRESS NOTES
F33.9  [address] [facility] coding opportunities          Chart Reviewed number of suggestions sent to Provider: 1     Patients Insurance     Medicare Insurance: [employer]

## 2023-08-20 DIAGNOSIS — I48.91 ATRIAL FIBRILLATION WITH RVR (HCC): Primary | ICD-10-CM

## 2023-08-21 RX ORDER — WARFARIN SODIUM 2 MG/1
TABLET ORAL
Qty: 180 TABLET | Refills: 3 | Status: SHIPPED | OUTPATIENT
Start: 2023-08-21

## 2023-08-22 ENCOUNTER — OFFICE VISIT (OUTPATIENT)
Dept: FAMILY MEDICINE CLINIC | Facility: CLINIC | Age: 73
End: 2023-08-22
Payer: MEDICARE

## 2023-08-22 VITALS
WEIGHT: 132 LBS | BODY MASS INDEX: 24.29 KG/M2 | HEIGHT: 62 IN | TEMPERATURE: 98.8 F | RESPIRATION RATE: 16 BRPM | SYSTOLIC BLOOD PRESSURE: 120 MMHG | OXYGEN SATURATION: 98 % | HEART RATE: 62 BPM | DIASTOLIC BLOOD PRESSURE: 56 MMHG

## 2023-08-22 DIAGNOSIS — G47.33 OBSTRUCTIVE SLEEP APNEA SYNDROME: ICD-10-CM

## 2023-08-22 DIAGNOSIS — Z79.01 CHRONIC ANTICOAGULATION: ICD-10-CM

## 2023-08-22 DIAGNOSIS — F33.9 DEPRESSION, RECURRENT (HCC): Primary | ICD-10-CM

## 2023-08-22 DIAGNOSIS — Z13.820 OSTEOPOROSIS SCREENING: ICD-10-CM

## 2023-08-22 DIAGNOSIS — F33.9 EPISODE OF RECURRENT MAJOR DEPRESSIVE DISORDER, UNSPECIFIED DEPRESSION EPISODE SEVERITY (HCC): ICD-10-CM

## 2023-08-22 DIAGNOSIS — E03.8 OTHER SPECIFIED HYPOTHYROIDISM: ICD-10-CM

## 2023-08-22 DIAGNOSIS — I48.0 PAF (PAROXYSMAL ATRIAL FIBRILLATION) (HCC): ICD-10-CM

## 2023-08-22 DIAGNOSIS — I10 PRIMARY HYPERTENSION: ICD-10-CM

## 2023-08-22 DIAGNOSIS — Z71.89 ACP (ADVANCE CARE PLANNING): ICD-10-CM

## 2023-08-22 DIAGNOSIS — F41.9 ANXIETY: ICD-10-CM

## 2023-08-22 PROBLEM — Z12.11 COLON CANCER SCREENING: Status: RESOLVED | Noted: 2023-06-23 | Resolved: 2023-08-22

## 2023-08-22 PROCEDURE — 99214 OFFICE O/P EST MOD 30 MIN: CPT | Performed by: INTERNAL MEDICINE

## 2023-08-22 PROCEDURE — G0439 PPPS, SUBSEQ VISIT: HCPCS | Performed by: INTERNAL MEDICINE

## 2023-08-22 PROCEDURE — 99497 ADVNCD CARE PLAN 30 MIN: CPT | Performed by: INTERNAL MEDICINE

## 2023-08-22 RX ORDER — ESCITALOPRAM OXALATE 10 MG/1
10 TABLET ORAL DAILY
Qty: 30 TABLET | Refills: 3 | Status: SHIPPED | OUTPATIENT
Start: 2023-08-22

## 2023-08-22 NOTE — PROGRESS NOTES
Assessment and Plan:     Problem List Items Addressed This Visit        Endocrine    Other specified hypothyroidism    Relevant Orders    Lipid panel    TSH, 3rd generation       Respiratory    Sleep apnea    Relevant Orders    Ambulatory Referral to Cardiology       Cardiovascular and Mediastinum    Primary hypertension    Relevant Orders    CBC and differential    Comprehensive metabolic panel    A-fib (HCC)       Other    Anxiety    Relevant Medications    escitalopram (LEXAPRO) 10 mg tablet    Chronic anticoagulation    Depression, recurrent (HCC) - Primary    Relevant Medications    escitalopram (LEXAPRO) 10 mg tablet    Episode of recurrent major depressive disorder, unspecified depression episode severity ([address])    Relevant Medications    escitalopram (LEXAPRO) 10 mg tablet   Other Visit Diagnoses     ACP (advance care planning)          Juliocesar Jaime doing ok , we discussed getting a copy of her living will on the chart-advised to get DXA scan UTD on breast and colon cancer screening-BP well controlled-will follow up with Cardiologist Dr Radhika Sage because of fatigue, she may need another echocardiogram-will bump up her dosage of lexapro    Depression Screening and Follow-up Plan: Patient's depression screening was positive with a PHQ-2 score of 6. Their PHQ-9 score was 14. Patient assessed for underlying major depression. Brief counseling provided and recommend additional follow-up/re-evaluation next office visit. Falls Plan of Care: balance, strength, and gait training instructions were provided. Preventive health issues were discussed with patient, and age appropriate screening tests were ordered as noted in patient's After Visit Summary. Personalized health advice and appropriate referrals for health education or preventive services given if needed, as noted in patient's After Visit Summary.      History of Present Illness:     Patient presents for a Medicare Wellness Visit    Juliocesar Martinezm here for [address] [facility], [facility] discussion, and to touch base on some of her chronic issues-asked me to bump up her dose of escitalopram-     Patient Care Team:  Lukasz Florence MD as PCP - General (Internal Medicine)     Review of Systems:     Review of Systems   Constitutional: Negative. HENT: Negative. Respiratory: Negative. Cardiovascular: Negative. Musculoskeletal: Positive for arthralgias, back pain, gait problem and neck pain. Psychiatric/Behavioral: Positive for dysphoric mood. The patient is nervous/anxious.          Problem List:     Patient Active Problem List   Diagnosis   • Neck pain   • Spondylosis of cervical region without myelopathy or radiculopathy   • Occipital neuralgia of left side   • Pre-op examination   • Cervical vertebral fusion   • Gastroesophageal reflux disease without esophagitis   • Anxiety   • Primary hypertension   • Other specified hypothyroidism   • Hyperlipidemia   • Sleep apnea   • A-fib (HCC)   • Cellulitis   • Transaminitis   • Olecranon bursitis of right elbow   • Chronic anticoagulation   • Irritable bowel syndrome with diarrhea   • Dysphagia   • Colon cancer screening   • Hemorrhoids   • Schatzki's ring   • Depression, recurrent ([address])   • Episode of recurrent major depressive disorder, unspecified depression episode severity ([address])      Past Medical and Surgical History:     Past Medical History:   Diagnosis Date   • A-fib [facility])    • Anxiety    • Arthritis     shoulder   • Carpal tunnel syndrome    • Colon polyp    • Depression    • Disease of thyroid gland    • Diverticulitis    • Esophageal stricture    • HBP (high blood pressure)    • Hiatal hernia 06/07/2022    Small hiatal hernia   • Hyperlipidemia    • Hypertension    • Hypothyroid    • Pneumonia     2017   • PONV (postoperative nausea and vomiting)    • Schatzki's ring 06/07/2022    Schatzki's ring in the lower esophagus   • Sleep apnea     mild      Past Surgical History:   Procedure Laterality Date   • APPENDECTOMY     • BREAST BIOPSY Right     benign   • BREAST CYST ASPIRATION Right     benign   • BREAST SURGERY      milk duct removed   • CATARACT EXTRACTION Bilateral    • CATARACT EXTRACTION, BILATERAL     • CERVICAL FUSION N/A 2021    Procedure: FUSION CERVICAL POSTERIOR C1-C2 with allograft and neuromonitoring);   Surgeon: Cruz Duff MD;  Location:  MAIN OR;  Service: Orthopedics   • CHOLECYSTECTOMY     • COLONOSCOPY  2022   • COLONOSCOPY W/ BIOPSIES     • EGD  2022   • HAND SURGERY Right    • MAMMO (HISTORICAL) Bilateral 2018   • MAMMO (HISTORICAL) Bilateral 10/27/2017   • MANDIBLE SURGERY     • MANDIBLE SURGERY     • NC SURGICAL ARTHROSCOPY SHOULDER W/ROTATOR CUFF RPR Right 2022    Procedure: ARTHROSCOPIC ROTATOR CUFF REPAIR OF SUBSCAPULARIS BICEPS TENODESIS EXTENSIVE DEBRIDEMENT;  Surgeon: Edinson Lloyd MD;  Location: [facility] MAIN OR;  Service: Orthopedics   • WISDOM TOOTH EXTRACTION      X4      Family History:     Family History   Problem Relation Age of Onset   • Heart disease Mother    • Hypertension Mother    • Hyperlipidemia Mother    • Heart failure Mother    • Hypertension Sister       Social History:     Social History     Socioeconomic History   • Marital status:      Spouse name: None   • Number of children: None   • Years of education: None   • Highest education level: None   Occupational History   • None   Tobacco Use   • Smoking status: Former     Types: Cigarettes     Quit date:      Years since quittin.6   • Smokeless tobacco: Never   Vaping Use   • Vaping Use: Never used   Substance and Sexual Activity   • Alcohol use: Not Currently     Comment: last drink 7 years ago   • Drug use: Not Currently   • Sexual activity: Not Currently   Other Topics Concern   • None   Social History Narrative    Do you currently or have you served in the [address] [place] [address] [employer]: No    Were you activated, into active duty, as a member of the [employer] or as a Reservist: No Occupation: retired    Marital status:     Exercise level: Occasional treadmill    General stress level: Low    Alcohol intake: None stopped 7 years ago    Caffeine intake: Moderate    Chewing tobacco: none    Illicit drugs: none    Guns present in home: No    Seat belts used routinely: Yes    Sunscreen used routinely: Yes    Smoke alarm in home: Yes    Advance directive: Yes     Social Determinants of Health     Financial Resource Strain: Low Risk  (8/22/2023)    Overall Financial Resource Strain (CARDIA)    • Difficulty of Paying Living Expenses: Not hard at all   Food Insecurity: Not on file   Transportation Needs: No Transportation Needs (8/22/2023)    PRAPARE - Transportation    • Lack of Transportation (Medical): No    • Lack of Transportation (Non-Medical):  No   Physical Activity: Not on file   Stress: Not on file   Social Connections: Not on file   Intimate Partner Violence: Not on file   Housing Stability: Not on file      Medications and Allergies:     Current Outpatient Medications   Medication Sig Dispense Refill   • acetaminophen (TYLENOL) 500 mg tablet Take 1 tablet (500 mg total) by mouth every 6 (six) hours as needed for moderate pain 20 tablet 0   • ALPRAZolam (XANAX) 0.5 mg tablet Take 2 tablets (1 mg total) by mouth 2 (two) times a day as needed for anxiety 60 tablet 5   • atorvastatin (LIPITOR) 10 mg tablet Take 1 tablet (10 mg total) by mouth daily at bedtime 90 tablet 1   • escitalopram (LEXAPRO) 10 mg tablet Take 1 tablet (10 mg total) by mouth daily 30 tablet 3   • levothyroxine 50 mcg tablet TAKE 1 TABLET(50 MCG) BY MOUTH DAILY 90 tablet 1   • metoprolol tartrate (LOPRESSOR) 25 mg tablet Take 1 tablet (25 mg total) by mouth every 12 (twelve) hours 180 tablet 3   • Multiple Vitamins-Minerals (CENTRUM ADULTS PO) Take 1 tablet by mouth daily     • ramipril (ALTACE) 10 MG capsule TAKE 1 CAPSULE(10 MG) BY MOUTH TWICE DAILY 180 capsule 3   • tiZANidine (ZANAFLEX) 2 mg tablet Take 1 tablet (2 mg total) by mouth every 8 (eight) hours as needed for muscle spasms 20 tablet 0   • warfarin (COUMADIN) 2 mg tablet TAKE 1 AND 1/2 TO 2 TABLETS BY MOUTH DAILY AS DIRECTED BY PRESCRIBER 180 tablet 3   • lidocaine (LMX) 4 % cream Apply topically as needed for mild pain (Patient not taking: Reported on 6/23/2023) 30 g 0     No current facility-administered medications for this visit. No Known Allergies   Immunizations:     Immunization History   Administered Date(s) Administered   • COVID-19 PFIZER VACCINE 0.3 ML IM 07/14/2021, 08/04/2021, 02/06/2022   • INFLUENZA 10/08/2018, 11/17/2021, 11/15/2022   • Influenza, high dose seasonal 0.7 mL 09/16/2020, 11/15/2022   • Pneumococcal Conjugate 13-Valent 11/09/2015   • Pneumococcal Polysaccharide PPV23 01/13/2022   • influenza, trivalent, adjuvanted 11/07/2019      Health Maintenance:         Topic Date Due   • Breast Cancer Screening: Mammogram  02/09/2024   • Hepatitis C Screening  Completed   • Colorectal Cancer Screening  Discontinued         Topic Date Due   • COVID-19 Vaccine (4 - Pfizer series) 04/03/2022   • Influenza Vaccine (1) 09/01/2023      Medicare Screening Tests and Risk Assessments:     Christy Mayfield is here for her Subsequent Wellness visit. Health Risk Assessment:   Patient rates overall health as poor. Patient feels that their physical health rating is slightly worse. Patient is very dissatisfied with their life. Eyesight was rated as slightly worse. Hearing was rated as same. Patient feels that their emotional and mental health rating is slightly worse. Patients states they are often angry. Patient states they are always unusually tired/fatigued. Pain experienced in the last 7 days has been a lot. Patient's pain rating has been 10/10. Patient states that she has experienced no weight loss or gain in last 6 months. Depression Screening:   PHQ-2 Score: 6  PHQ-9 Score: 14      Fall Risk Screening:    In the past year, patient has experienced: history of falling in past year    Number of falls: 2 or more  Injured during fall?: Yes    Feels unsteady when standing or walking?: Yes    Worried about falling?: Yes      Urinary Incontinence Screening:   Patient has leaked urine accidently in the last six months. Home Safety:  Patient does not have trouble with stairs inside or outside of their home. Patient has working smoke alarms and has no working carbon monoxide detector. Home safety hazards include: none. Nutrition:   Current diet is Regular. Medications:   Patient is currently taking over-the-counter supplements. OTC medications include: see medication list. Patient is able to manage medications. Activities of Daily Living (ADLs)/Instrumental Activities of Daily Living (IADLs):   Walk and transfer into and out of bed and chair?: Yes  Dress and groom yourself?: Yes    Bathe or shower yourself?: Yes    Feed yourself?  Yes  Do your laundry/housekeeping?: Yes  Manage your money, pay your bills and track your expenses?: Yes  Make your own meals?: Yes    Do your own shopping?: Yes    Previous Hospitalizations:   Any hospitalizations or ED visits within the last 12 months?: Yes    How many hospitalizations have you had in the last year?: 1-2    Advance Care Planning:   Living will: Yes    Advanced directive: Yes    Advanced directive counseling given: Yes    End of Life Decisions reviewed with patient: Yes      Cognitive Screening:   Provider or family/friend/caregiver concerned regarding cognition?: No    PREVENTIVE SCREENINGS      Cardiovascular Screening:    General: Screening Not Indicated and History Lipid Disorder      Diabetes Screening:     General: Screening Current      Colorectal Cancer Screening:     General: Screening Current      Breast Cancer Screening:     General: Screening Current      Cervical Cancer Screening:    General: Screening Not Indicated      Osteoporosis Screening:    General: Screening Not Indicated and History Osteoporosis Due for: DXA Axial      Abdominal Aortic Aneurysm (AAA) Screening:        General: Screening Not Indicated      Lung Cancer Screening:     General: Screening Not Indicated      Hepatitis C Screening:    General: Screening Current    Screening, Brief Intervention, and Referral to Treatment (SBIRT)    Screening  Typical number of drinks in a day: 0  Typical number of drinks in a week: 0  Interpretation: Low risk drinking behavior. No results found. Physical Exam:     /56 (BP Location: Left arm, Patient Position: Sitting, Cuff Size: Standard)   Pulse 62   Temp 98.8 °F (37.1 °C) (Tympanic)   Resp 16   Ht 5' 2" (1.575 m)   Wt 59.9 kg (132 lb)   SpO2 98%   BMI 24.14 kg/m²     Physical Exam  Constitutional:       Appearance: Normal appearance. HENT:      Head: Normocephalic and atraumatic. Right Ear: External ear normal.      Left Ear: External ear normal.      Nose: Nose normal.      Mouth/Throat:      Mouth: Mucous membranes are moist.   Eyes:      Extraocular Movements: Extraocular movements intact. Cardiovascular:      Rate and Rhythm: Normal rate and regular rhythm. Heart sounds: Normal heart sounds. Pulmonary:      Effort: Pulmonary effort is normal.      Breath sounds: Normal breath sounds. Abdominal:      Palpations: Abdomen is soft. Musculoskeletal:         General: Normal range of motion. Cervical back: Normal range of motion and neck supple. Skin:     General: Skin is warm. Capillary Refill: Capillary refill takes less than 2 seconds. Neurological:      General: No focal deficit present. Mental Status: She is alert and oriented to person, place, and time. Psychiatric:         Mood and Affect: Mood normal.         Behavior: Behavior normal.         Thought Content:  Thought content normal.         Judgment: Judgment normal.          Chip Raman MD

## 2023-08-22 NOTE — PATIENT INSTRUCTIONS
Medicare Preventive Visit Patient Instructions  Thank you for completing your Welcome to Medicare Visit or Medicare Annual Wellness Visit today. Your next wellness visit will be due in one year (8/22/2024). The screening/preventive services that you may require over the next 5-10 years are detailed below. Some tests may not apply to you based off risk factors and/or age. Screening tests ordered at today's visit but not completed yet may show as past due. Also, please note that scanned in results may not display below. Preventive Screenings:  Service Recommendations Previous Testing/Comments   Colorectal Cancer Screening  * Colonoscopy    * Fecal Occult Blood Test (FOBT)/Fecal Immunochemical Test (FIT)  * Fecal DNA/Cologuard Test  * Flexible Sigmoidoscopy Age: 43-73 years old   Colonoscopy: every 10 years (may be performed more frequently if at higher risk)  OR  FOBT/FIT: every 1 year  OR  Cologuard: every 3 years  OR  Sigmoidoscopy: every 5 years  Screening may be recommended earlier than age 39 if at higher risk for colorectal cancer. Also, an individualized decision between you and your healthcare provider will decide whether screening between the ages of 77-80 would be appropriate. Colonoscopy: 06/07/2022  FOBT/FIT: Not on file  Cologuard: Not on file  Sigmoidoscopy: Not on file    Screening Current     Breast Cancer Screening Age: 36 years old  Frequency: every 1-2 years  Not required if history of left and right mastectomy Mammogram: 02/09/2023    Screening Current   Cervical Cancer Screening Between the ages of 21-29, pap smear recommended once every 3 years. Between the ages of 32-69, can perform pap smear with HPV co-testing every 5 years.    Recommendations may differ for women with a history of total hysterectomy, cervical cancer, or abnormal pap smears in past. Pap Smear: Not on file    Screening Not Indicated   Hepatitis C Screening Once for adults born between 1945 and 1965  More frequently in patients at high risk for Hepatitis C Hep C Antibody: 09/29/2021    Screening Current   Diabetes Screening 1-2 times per year if you're at risk for diabetes or have pre-diabetes Fasting glucose: 104 mg/dL (9/6/2022)  A1C: 5.8 % (4/26/2021)  Screening Current   Cholesterol Screening Once every 5 years if you don't have a lipid disorder. May order more often based on risk factors. Lipid panel: 08/17/2022    Screening Not Indicated  History Lipid Disorder     Other Preventive Screenings Covered by Medicare:  1. Abdominal Aortic Aneurysm (AAA) Screening: covered once if your at risk. You're considered to be at risk if you have a family history of AAA. 2. Lung Cancer Screening: covers low dose CT scan once per year if you meet all of the following conditions: (1) Age 48-67; (2) No signs or symptoms of lung cancer; (3) Current smoker or have quit smoking within the last 15 years; (4) You have a tobacco smoking history of at least 20 pack years (packs per day multiplied by number of years you smoked); (5) You get a written order from a healthcare provider. 3. Glaucoma Screening: covered annually if you're considered high risk: (1) You have diabetes OR (2) Family history of glaucoma OR (3)  aged 48 and older OR (3)  American aged 72 and older  3. Osteoporosis Screening: covered every 2 years if you meet one of the following conditions: (1) You're estrogen deficient and at risk for osteoporosis based off medical history and other findings; (2) Have a vertebral abnormality; (3) On glucocorticoid therapy for more than 3 months; (4) Have primary hyperparathyroidism; (5) On osteoporosis medications and need to assess response to drug therapy. · Last bone density test (DXA Scan): Not on file. 5. HIV Screening: covered annually if you're between the age of 14-79. Also covered annually if you are younger than 13 and older than 72 with risk factors for HIV infection.  For pregnant patients, it is covered up to 3 times per pregnancy. Immunizations:  Immunization Recommendations   Influenza Vaccine Annual influenza vaccination during flu season is recommended for all persons aged >= 6 months who do not have contraindications   Pneumococcal Vaccine   * Pneumococcal conjugate vaccine = PCV13 (Prevnar 13), PCV15 (Vaxneuvance), PCV20 (Prevnar 20)  * Pneumococcal polysaccharide vaccine = PPSV23 (Pneumovax) Adults 20-63 years old: 1-3 doses may be recommended based on certain risk factors  Adults 72 years old: 1-2 doses may be recommended based off what pneumonia vaccine you previously received   Hepatitis B Vaccine 3 dose series if at intermediate or high risk (ex: diabetes, end stage renal disease, liver disease)   Tetanus (Td) Vaccine - COST NOT COVERED BY MEDICARE PART B Following completion of primary series, a booster dose should be given every 10 years to maintain immunity against tetanus. Td may also be given as tetanus wound prophylaxis. Tdap Vaccine - COST NOT COVERED BY MEDICARE PART B Recommended at least once for all adults. For pregnant patients, recommended with each pregnancy. Shingles Vaccine (Shingrix) - COST NOT COVERED BY MEDICARE PART B  2 shot series recommended in those aged 48 and above     Health Maintenance Due:      Topic Date Due   • Breast Cancer Screening: Mammogram  02/09/2024   • Hepatitis C Screening  Completed   • Colorectal Cancer Screening  Discontinued     Immunizations Due:      Topic Date Due   • COVID-19 Vaccine (4 - Pfizer series) 04/03/2022   • Influenza Vaccine (1) 09/01/2023     Advance Directives   What are advance directives? Advance directives are legal documents that state your wishes and plans for medical care. These plans are made ahead of time in case you lose your ability to make decisions for yourself. Advance directives can apply to any medical decision, such as the treatments you want, and if you want to donate organs.    What are the types of advance directives? There are many types of advance directives, and each state has rules about how to use them. You may choose a combination of any of the following:  · Living will: This is a written record of the treatment you want. You can also choose which treatments you do not want, which to limit, and which to stop at a certain time. This includes surgery, medicine, IV fluid, and tube feedings. · Durable power of  for healthcare [facility]): This is a written record that states who you want to make healthcare choices for you when you are unable to make them for yourself. This person, called a proxy, is usually a family member or a friend. You may choose more than 1 proxy. · Do not resuscitate (DNR) order:  A DNR order is used in case your heart stops beating or you stop breathing. It is a request not to have certain forms of treatment, such as CPR. A DNR order may be included in other types of advance directives. · Medical directive: This covers the care that you want if you are in a coma, near death, or unable to make decisions for yourself. You can list the treatments you want for each condition. Treatment may include pain medicine, surgery, blood transfusions, dialysis, IV or tube feedings, and a ventilator (breathing machine). · Values history: This document has questions about your views, beliefs, and how you feel and think about life. This information can help others choose the care that you would choose. Why are advance directives important? An advance directive helps you control your care. Although spoken wishes may be used, it is better to have your wishes written down. Spoken wishes can be misunderstood, or not followed. Treatments may be given even if you do not want them. An advance directive may make it easier for your family to make difficult choices about your care. Depression   Depression  is a medical condition that causes feelings of sadness or hopelessness that do not go away. Depression may cause you to lose interest in things you used to enjoy. These feelings may interfere with your daily life. Call your local emergency number (911 in the [address]) if:   · You think about harming yourself or someone else. · You have done something on purpose to hurt yourself. The following resources are available at any time to help you, if needed:   · [place]: 3-793.810.5465 (2-461-441-KLGU)   · Suicide Hotline: 3-383.440.3430 (8-977-XVDJIAO)   · For a list of international numbers: https://save.org/find-help/international-resources/  Treatment for depression may include medicine to relieve depression. Medicine is often used together with therapy. Therapy is a way for you to talk about your feelings and anything that may be causing depression. Therapy can be done alone or in a group. It may also be done with family members or a significant other. · Get regular physical activity. · Create a regular sleep schedule. · Eat a variety of healthy foods. · Do not drink alcohol or use drugs. Fall Prevention    Fall prevention  includes ways to make your home and other areas safer. It also includes ways you can move more carefully to prevent a fall. Health conditions that cause changes in your blood pressure, vision, or muscle strength and coordination may increase your risk for falls. Medicines may also increase your risk for falls if they make you dizzy, weak, or sleepy. Fall prevention tips:   · Stand or sit up slowly. · Use assistive devices as directed. · Wear shoes that fit well and have soles that . · Wear a personal alarm. · Stay active. · Manage your medical conditions. Home Safety Tips:  · Add items to prevent falls in the bathroom. · Keep paths clear. · Install bright lights in your home. · Keep items you use often on shelves within reach. · Paint or place reflective tape on the edges of your stairs.     Urinary Incontinence Urinary incontinence (UI)  is when you lose control of your bladder. UI develops because your bladder cannot store or empty urine properly. The 3 most common types of UI are stress incontinence, urge incontinence, or both. Medicines:   · May be given to help strengthen your bladder control. Report any side effects of medication to your healthcare provider. Do pelvic muscle exercises often:  Your pelvic muscles help you stop urinating. Squeeze these muscles tight for 5 seconds, then relax for 5 seconds. Gradually work up to squeezing for 10 seconds. Do 3 sets of 15 repetitions a day, or as directed. This will help strengthen your pelvic muscles and improve bladder control. Train your bladder:  Go to the bathroom at set times, such as every 2 hours, even if you do not feel the urge to go. You can also try to hold your urine when you feel the urge to go. For example, hold your urine for 5 minutes when you feel the urge to go. As that becomes easier, hold your urine for 10 minutes. Self-care:   · Keep a UI record. Write down how often you leak urine and how much you leak. Make a note of what you were doing when you leaked urine. · Drink liquids as directed. You may need to limit the amount of liquid you drink to help control your urine leakage. Do not drink any liquid right before you go to bed. Limit or do not have drinks that contain caffeine or alcohol. · Prevent constipation. Eat a variety of high-fiber foods. Good examples are high-fiber cereals, beans, vegetables, and whole-grain breads. Walking is the best way to trigger your intestines to have a bowel movement. · Exercise regularly and maintain a healthy weight. Weight loss and exercise will decrease pressure on your bladder and help you control your leakage. · Use a catheter as directed  to help empty your bladder. A catheter is a tiny, plastic tube that is put into your bladder to drain your urine. · Go to behavior therapy as directed. Behavior therapy may be used to help you learn to control your urge to urinate. © Copyright [employer] 2018 Information is for End User's use only and may not be sold, redistributed or otherwise used for commercial purposes.  All illustrations and images included in CareNotes® are the copyrighted property of [employer].D.A.M., Inc. or  [address]

## 2023-09-01 ENCOUNTER — ANTICOAG VISIT (OUTPATIENT)
Dept: CARDIOLOGY CLINIC | Facility: CLINIC | Age: 73
End: 2023-09-01

## 2023-09-01 ENCOUNTER — APPOINTMENT (OUTPATIENT)
Dept: LAB | Facility: HOSPITAL | Age: 73
End: 2023-09-01
Payer: MEDICARE

## 2023-09-01 DIAGNOSIS — E03.8 OTHER SPECIFIED HYPOTHYROIDISM: ICD-10-CM

## 2023-09-01 DIAGNOSIS — I10 PRIMARY HYPERTENSION: ICD-10-CM

## 2023-09-01 DIAGNOSIS — I48.11 LONGSTANDING PERSISTENT ATRIAL FIBRILLATION (HCC): Primary | ICD-10-CM

## 2023-09-01 LAB
ALBUMIN SERPL BCP-MCNC: 4.5 G/DL (ref 3.5–5)
ALP SERPL-CCNC: 40 U/L (ref 34–104)
ALT SERPL W P-5'-P-CCNC: 18 U/L (ref 7–52)
ANION GAP SERPL CALCULATED.3IONS-SCNC: 5 MMOL/L
AST SERPL W P-5'-P-CCNC: 28 U/L (ref 13–39)
BASOPHILS # BLD AUTO: 0.08 THOUSANDS/ÂΜL (ref 0–0.1)
BASOPHILS NFR BLD AUTO: 1 % (ref 0–1)
BILIRUB SERPL-MCNC: 0.69 MG/DL (ref 0.2–1)
BUN SERPL-MCNC: 18 MG/DL (ref 5–25)
CALCIUM SERPL-MCNC: 10 MG/DL (ref 8.4–10.2)
CHLORIDE SERPL-SCNC: 99 MMOL/L (ref 96–108)
CHOLEST SERPL-MCNC: 179 MG/DL
CO2 SERPL-SCNC: 29 MMOL/L (ref 21–32)
CREAT SERPL-MCNC: 1 MG/DL (ref 0.6–1.3)
EOSINOPHIL # BLD AUTO: 0.47 THOUSAND/ÂΜL (ref 0–0.61)
EOSINOPHIL NFR BLD AUTO: 7 % (ref 0–6)
ERYTHROCYTE [DISTWIDTH] IN BLOOD BY AUTOMATED COUNT: 12.6 % (ref 11.6–15.1)
GFR SERPL CREATININE-BSD FRML MDRD: 55 ML/MIN/1.73SQ M
GLUCOSE P FAST SERPL-MCNC: 107 MG/DL (ref 65–99)
HCT VFR BLD AUTO: 41.3 % (ref 34.8–46.1)
HDLC SERPL-MCNC: 65 MG/DL
HGB BLD-MCNC: 13.5 G/DL (ref 11.5–15.4)
IMM GRANULOCYTES # BLD AUTO: 0.02 THOUSAND/UL (ref 0–0.2)
IMM GRANULOCYTES NFR BLD AUTO: 0 % (ref 0–2)
LDLC SERPL CALC-MCNC: 92 MG/DL (ref 0–100)
LYMPHOCYTES # BLD AUTO: 1.4 THOUSANDS/ÂΜL (ref 0.6–4.47)
LYMPHOCYTES NFR BLD AUTO: 19 % (ref 14–44)
MCH RBC QN AUTO: 30.5 PG (ref 26.8–34.3)
MCHC RBC AUTO-ENTMCNC: 32.7 G/DL (ref 31.4–37.4)
MCV RBC AUTO: 93 FL (ref 82–98)
MONOCYTES # BLD AUTO: 0.49 THOUSAND/ÂΜL (ref 0.17–1.22)
MONOCYTES NFR BLD AUTO: 7 % (ref 4–12)
NEUTROPHILS # BLD AUTO: 4.75 THOUSANDS/ÂΜL (ref 1.85–7.62)
NEUTS SEG NFR BLD AUTO: 66 % (ref 43–75)
NONHDLC SERPL-MCNC: 114 MG/DL
NRBC BLD AUTO-RTO: 0 /100 WBCS
PLATELET # BLD AUTO: 305 THOUSANDS/UL (ref 149–390)
PMV BLD AUTO: 9.1 FL (ref 8.9–12.7)
POTASSIUM SERPL-SCNC: 4.1 MMOL/L (ref 3.5–5.3)
PROT SERPL-MCNC: 7.5 G/DL (ref 6.4–8.4)
RBC # BLD AUTO: 4.43 MILLION/UL (ref 3.81–5.12)
SODIUM SERPL-SCNC: 133 MMOL/L (ref 135–147)
TRIGL SERPL-MCNC: 108 MG/DL
TSH SERPL DL<=0.05 MIU/L-ACNC: 6.1 UIU/ML (ref 0.45–4.5)
WBC # BLD AUTO: 7.21 THOUSAND/UL (ref 4.31–10.16)

## 2023-09-01 PROCEDURE — 80061 LIPID PANEL: CPT

## 2023-09-01 PROCEDURE — 85025 COMPLETE CBC W/AUTO DIFF WBC: CPT

## 2023-09-01 PROCEDURE — 84443 ASSAY THYROID STIM HORMONE: CPT

## 2023-09-01 PROCEDURE — 80053 COMPREHEN METABOLIC PANEL: CPT

## 2023-09-05 ENCOUNTER — TELEPHONE (OUTPATIENT)
Dept: FAMILY MEDICINE CLINIC | Facility: CLINIC | Age: 73
End: 2023-09-05

## 2023-09-05 DIAGNOSIS — E03.8 OTHER SPECIFIED HYPOTHYROIDISM: Primary | ICD-10-CM

## 2023-09-05 DIAGNOSIS — E03.9 HYPOTHYROIDISM, UNSPECIFIED TYPE: ICD-10-CM

## 2023-09-05 RX ORDER — LEVOTHYROXINE SODIUM 0.07 MG/1
75 TABLET ORAL DAILY
Qty: 90 TABLET | OUTPATIENT
Start: 2023-09-05

## 2023-09-05 RX ORDER — LEVOTHYROXINE SODIUM 0.07 MG/1
75 TABLET ORAL DAILY
Qty: 30 TABLET | Refills: 6 | Status: SHIPPED | OUTPATIENT
Start: 2023-09-05

## 2023-09-05 NOTE — TELEPHONE ENCOUNTER
[facility] received script for ortho supplies, but needed OV notes too.   I faxed them to: [phone]

## 2023-09-14 ENCOUNTER — TELEPHONE (OUTPATIENT)
Age: 73
End: 2023-09-14

## 2023-09-14 DIAGNOSIS — G89.29 CHRONIC NECK PAIN: Primary | ICD-10-CM

## 2023-09-14 DIAGNOSIS — M54.2 CHRONIC NECK PAIN: Primary | ICD-10-CM

## 2023-09-14 NOTE — TELEPHONE ENCOUNTER
Called patient to let her know Xray for her neck was placed and went straight to voicemail,left a detailed message.

## 2023-09-14 NOTE — TELEPHONE ENCOUNTER
Patient called she is still having neck pain it makes hard to sleep   She would like Dr Steffi Campbell in an order for a neck xray   Call patient when it is entered

## 2023-09-15 ENCOUNTER — HOSPITAL ENCOUNTER (OUTPATIENT)
Dept: RADIOLOGY | Facility: HOSPITAL | Age: 73
End: 2023-09-15
Attending: INTERNAL MEDICINE
Payer: MEDICARE

## 2023-09-15 DIAGNOSIS — M54.2 CHRONIC NECK PAIN: ICD-10-CM

## 2023-09-15 DIAGNOSIS — G89.29 CHRONIC NECK PAIN: ICD-10-CM

## 2023-09-15 PROCEDURE — 70360 X-RAY EXAM OF NECK: CPT

## 2023-09-18 ENCOUNTER — TELEPHONE (OUTPATIENT)
Dept: OTHER | Facility: HOSPITAL | Age: 73
End: 2023-09-18

## 2023-09-18 NOTE — TELEPHONE ENCOUNTER
Patient called asking if result of her neck x-ray was in. Advised patient it was not and that someone would reach out once it was in. Patient asked that if someone calls her and she does not answer cause she doesn't answer often that the results be left on her answering machine.

## 2023-09-20 NOTE — TELEPHONE ENCOUNTER
Patient called regarding her results I advised they are still not in yet from radiology. Once they are received then Dr Magalis Allen will review them. She mention that if she is not home when results are called in to leave a message.

## 2023-09-22 NOTE — TELEPHONE ENCOUNTER
Patient called back following up on results of xray. I advised her that results are still not in. Patient requested that we leave her a message with the results when they are available if she does not answer.

## 2023-10-06 NOTE — PROGRESS NOTES
Vancomycin Assessment    Esha Macias is a 66 y o  female who is currently receiving vancomycin 1000mg IV q24hrs for skin-soft tissue infection     Relevant clinical data and objective history reviewed:  Creatinine   Date Value Ref Range Status   01/25/2022 0 82 0 40 - 1 10 mg/dL Final     Comment:     Standardized to IDMS reference method   01/24/2022 1 05 0 40 - 1 10 mg/dL Final     Comment:     Standardized to IDMS reference method   01/22/2022 0 91 0 40 - 1 10 mg/dL Final     Comment:     Standardized to IDMS reference method     /92   Pulse 84   Temp 100 °F (37 8 °C) (Tympanic)   Resp 20   Ht 5' 2" (1 575 m)   Wt 58 5 kg (129 lb)   SpO2 96%   BMI 23 59 kg/m²   I/O last 3 completed shifts: In: 560 [P O :360; IV Piggyback:200]  Out: -   Lab Results   Component Value Date/Time    BUN 12 01/25/2022 04:39 AM    WBC 12 48 (H) 01/25/2022 04:39 AM    HGB 10 3 (L) 01/25/2022 04:39 AM    HCT 31 5 (L) 01/25/2022 04:39 AM    MCV 92 01/25/2022 04:39 AM     01/25/2022 04:39 AM     Temp Readings from Last 3 Encounters:   01/25/22 100 °F (37 8 °C) (Tympanic)   01/24/22 98 6 °F (37 °C) (Temporal)   01/22/22 97 6 °F (36 4 °C) (Oral)     Vancomycin Days of Therapy: 2    Assessment/Plan  The patient is currently on vancomycin utilizing scheduled dosing  Baseline risks associated with therapy include: pre-existing renal impairment, concomitant nephrotoxic medications, and advanced age  The patient is receiving 1000mg IV q24hrs with the most recent vancomycin level being not at steady-state and sub-therapeutic based on a goal of 10-15 (mild infection/cellulitis) ; therefore, after clinical evaluation will be changed to 1250mg IV q24hrs   Pharmacy will continue to follow closely for s/sx of nephrotoxicity, infusion reactions, and appropriateness of therapy  BMP and CBC will be ordered per protocol    Plan for trough as patient approaches steady state, prior to the other  dose at approximately 1100 on 01/27/22  Pharmacy will continue to follow the patients culture results and clinical progress daily      Jeffry Nguyen, Pharmacist Podiatry

## 2023-10-09 NOTE — PROGRESS NOTES
Progress Note - Infectious Disease   Heath Paniagua 66 y o  female MRN: [id number]  Unit/Bed#: -01 Encounter: [id number]      Impression/Plan:  1  SIRS versus Sepsis  POA with tachycardia and leukocytosis   Patient found to be in AFib with RVR responding to Cardizem   White cell count is trending down   Admission blood cultures negative to date   Source appears to be #2    -antibiotics as below  -monitor temperature and hemodynamics  -serial exam     2  Septic right olecranon bursitis/cellulitis   Without improvement on 2 days of Augmentin/Bactrim outpatient  Right olecranon bursa was aspirated and 4 mL of murky colored fluid with white cell count of 315,482  22 Body fluid 4+ MSSA  S/p bedside I&D 22  Clinically improving  -discontinue vancomycin  -transition to p o  Keflex 500 mg every 6 hours to complete 10 day total antibiotic course post I and D through 2/3/22   -monitor temperature and hemodynamics  -arm elevation  -serial exam  -continue local wound care     3  New onset AFib   In setting #1/2   Patient now returned to sinus rhythm  -Cardiology ongoing management  -volume management   -recheck BMP     4   GERD and change in bowel habits   Patient elective EGD/colonoscopy postponed secondary to above  -monitor symptoms  -symptomatic management per primary care team  -follow-up with GI outpatient     Antibiotics:  Vancomycin D6     Above impression and plan discussed in detail with patient, RN, and Dr Edna Teran      Subjective:  Patient has no fever, chills, sweats; no nausea, vomiting, diarrhea today; no cough, shortness of breath; + right elbow pain s/p I&D improving   Patient appears to be tolerating IV antibiotics   No new symptoms      Objective:  Vitals:  Temp:  [98 2 °F (36 8 °C)-99 °F (37 2 °C)] 98 2 °F (36 8 °C)  HR:  [66-79] 66  Resp:  [18] 18  BP: (121-138)/(53-71) 138/67  SpO2:  [97 %-100 %] 97 %  Temp (24hrs), Av 5 °F (36 9 °C), Min:98 2 °F (36 8 °C), Max:99 °F (37 2 °C)  Current: Temperature: 98 2 °F (36 8 °C)    Physical Exam:   General Appearance:  Alert, interactive, nontoxic, no acute distress  Throat: Oropharynx moist without lesions  Lungs:   Clear to auscultation bilaterally; no wheezes, rhonchi or rales; respirations unlabored   Heart:  RRR; no murmur   Abdomen:   Soft, non-tender, non-distended, positive bowel sounds  Extremities: No clubbing, cyanosis or LE edema, right forearm/upper arm Ace wrapped with increased range of motion, less tenderness, no strike through drainage, or spreading erythema outside wrap   : No Polo, no SPT   Skin: No new rashes or lesions  IV site nontender  Labs, Imaging, & Other studies:   All pertinent labs and imaging studies were personally reviewed  Results from last 7 days   Lab Units 01/28/22  0449 01/27/22  0534 01/26/22  0452   WBC Thousand/uL 10 00 10 24* 11 50*   HEMOGLOBIN g/dL 10 7* 10 5* 11 1*   PLATELETS Thousands/uL 351 303 302     Results from last 7 days   Lab Units 01/28/22  0449 01/27/22  0534 01/27/22  0534 01/26/22  0452 01/26/22  0452 01/25/22  0439 01/24/22  1117   SODIUM mmol/L 139  --  139  --  135   < > 137   POTASSIUM mmol/L 3 5   < > 4 0   < > 3 9   < > 3 9   CHLORIDE mmol/L 103   < > 104   < > 103   < > 104   CO2 mmol/L 29   < > 28   < > 25   < > 25   BUN mg/dL 13   < > 13   < > 12   < > 14   CREATININE mg/dL 0 87   < > 0 87   < > 0 73   < > 1 05   EGFR ml/min/1 73sq m 66   < > 66   < > 81   < > 52   CALCIUM mg/dL 9 6   < > 9 5   < > 9 2   < > 9 6   AST U/L 77*  --  93*  --   --   --  64*   ALT U/L 97*   < > 103*  --   --   --  75*   ALK PHOS U/L 210 2*   < > 217 5*  --   --   --  182 6*    < > = values in this interval not displayed  Results from last 7 days   Lab Units 01/24/22  1729 01/24/22  1123 01/24/22  1117 01/22/22  1631   BLOOD CULTURE   --  No Growth at 72 hrs  No Growth at 72 hrs  No Growth After 5 Days     GRAM STAIN RESULT  4+ Polys  No organisms seen  --   --   --    BODY FLUID CULTURE, STERILE  4+ Growth of Staphylococcus aureus*  --   --   --       Right Ovarian Cystectomy 2009  Tachycardia during pregnancy, followed by cards  resolved placenta previa  2019 FT (M) 7#7   2021 FT (M) 8#  Right Ovarian Cystectomy 2009  Tachycardia during pregnancy, followed by cards  resolved placenta previa

## 2023-10-17 ENCOUNTER — OFFICE VISIT (OUTPATIENT)
Dept: FAMILY MEDICINE CLINIC | Facility: CLINIC | Age: 73
End: 2023-10-17
Payer: MEDICARE

## 2023-10-17 VITALS
OXYGEN SATURATION: 97 % | SYSTOLIC BLOOD PRESSURE: 90 MMHG | TEMPERATURE: 97.6 F | RESPIRATION RATE: 16 BRPM | HEIGHT: 61 IN | DIASTOLIC BLOOD PRESSURE: 54 MMHG | WEIGHT: 130 LBS | HEART RATE: 54 BPM | BODY MASS INDEX: 24.55 KG/M2

## 2023-10-17 DIAGNOSIS — Z23 ENCOUNTER FOR IMMUNIZATION: ICD-10-CM

## 2023-10-17 DIAGNOSIS — I95.9 HYPOTENSION, UNSPECIFIED HYPOTENSION TYPE: ICD-10-CM

## 2023-10-17 DIAGNOSIS — R09.81 CONGESTION OF NASAL SINUS: Primary | ICD-10-CM

## 2023-10-17 PROCEDURE — 90662 IIV NO PRSV INCREASED AG IM: CPT | Performed by: INTERNAL MEDICINE

## 2023-10-17 PROCEDURE — G0008 ADMIN INFLUENZA VIRUS VAC: HCPCS | Performed by: INTERNAL MEDICINE

## 2023-10-17 PROCEDURE — 99214 OFFICE O/P EST MOD 30 MIN: CPT | Performed by: INTERNAL MEDICINE

## 2023-10-17 RX ORDER — AZITHROMYCIN 250 MG/1
TABLET, FILM COATED ORAL
Qty: 6 TABLET | Refills: 0 | Status: SHIPPED | OUTPATIENT
Start: 2023-10-17 | End: 2023-10-21

## 2023-10-17 RX ORDER — LORATADINE 10 MG/1
10 TABLET ORAL DAILY
Qty: 30 TABLET | Refills: 3 | Status: SHIPPED | OUTPATIENT
Start: 2023-10-17

## 2023-10-17 RX ORDER — TRIAMCINOLONE ACETONIDE 55 UG/1
1 SPRAY, METERED NASAL DAILY
Qty: 16.9 ML | Refills: 3 | Status: SHIPPED | OUTPATIENT
Start: 2023-10-17

## 2023-10-17 NOTE — PROGRESS NOTES
Assessment/Plan:  Possible allergic rhinitis vs viral URI or sinus infection-will send loratadine and nasacort and send antibiotic to use if worse-pt is reporting some dizziness and fatigue-bp is very low today so will d/c ramipril and is on metoprolol too-hd flu shot given       Problem List Items Addressed This Visit    None  Visit Diagnoses       Congestion of nasal sinus    -  Primary    Relevant Medications    loratadine (CLARITIN) 10 mg tablet    Triamcinolone Acetonide (Nasacort Allergy 24HR) 55 MCG/ACT nasal spray    azithromycin (ZITHROMAX) 250 mg tablet    Encounter for immunization        Relevant Orders    influenza vaccine, high-dose, PF 0.7 mL (FLUZONE HIGH-DOSE)              Subjective:      Patient ID: Bhupendra Colvin is a 74 y.o. female. Denyce Finger here with runny nose for the past 2 weeks, no fever-possibly has allergies-also reports some dizziness and knee pain-bp today is only 90/54        The following portions of the patient's history were reviewed and updated as appropriate:   Past Medical History:  She has a past medical history of A-fib ([address]), Anxiety, Arthritis, Carpal tunnel syndrome, Colon polyp, Depression, Disease of thyroid gland, Diverticulitis, Esophageal stricture, HBP (high blood pressure), Hiatal hernia (06/07/2022), Hyperlipidemia, Hypertension, Hypothyroid, Pneumonia, PONV (postoperative nausea and vomiting), Schatzki's ring (06/07/2022), and Sleep apnea.,  _______________________________________________________________________  Medical Problems:  does not have any pertinent problems on file.,  _______________________________________________________________________  Past Surgical History:   has a past surgical history that includes Breast cyst aspiration (Right); Breast biopsy (Right); Appendectomy; Cholecystectomy; Colonoscopy w/ biopsies (2011); Mandible surgery; [place] tooth extraction; EGD (06/07/2022); Cataract extraction, bilateral (2019);  Mammo (historical) (Bilateral, 11/27/2018); Mammo (historical) (Bilateral, 10/27/2017); Colonoscopy (06/07/2022); Cataract extraction (Bilateral); Hand surgery (Right); Cervical fusion (N/A, 05/25/2021); Mandible surgery; pr surgical arthroscopy shoulder w/rotator cuff rpr (Right, 09/27/2022); and Breast surgery. ,  _______________________________________________________________________  Family History:  family history includes Heart disease in her mother; Heart failure in her mother; Hyperlipidemia in her mother; Hypertension in her mother and sister. ,  _______________________________________________________________________  Social History:   reports that she quit smoking about 19 years ago. Her smoking use included cigarettes. She has never used smokeless tobacco. She reports that she does not currently use alcohol. She reports that she does not currently use drugs. ,  _______________________________________________________________________  Allergies:  has No Known Allergies. .  _______________________________________________________________________  Current Outpatient Medications   Medication Sig Dispense Refill    acetaminophen (TYLENOL) 500 mg tablet Take 1 tablet (500 mg total) by mouth every 6 (six) hours as needed for moderate pain 20 tablet 0    ALPRAZolam (XANAX) 0.5 mg tablet Take 2 tablets (1 mg total) by mouth 2 (two) times a day as needed for anxiety 60 tablet 5    atorvastatin (LIPITOR) 10 mg tablet Take 1 tablet (10 mg total) by mouth daily at bedtime 90 tablet 1    azithromycin (ZITHROMAX) 250 mg tablet Take 2 tablets today then 1 tablet daily x 4 days 6 tablet 0    escitalopram (LEXAPRO) 10 mg tablet Take 1 tablet (10 mg total) by mouth daily 30 tablet 3    levothyroxine 75 mcg tablet Take 1 tablet (75 mcg total) by mouth daily 30 tablet 6    loratadine (CLARITIN) 10 mg tablet Take 1 tablet (10 mg total) by mouth daily 30 tablet 3    metoprolol tartrate (LOPRESSOR) 25 mg tablet Take 1 tablet (25 mg total) by mouth every 12 (twelve) hours 180 tablet 3    Multiple Vitamins-Minerals (CENTRUM ADULTS PO) Take 1 tablet by mouth daily      ramipril (ALTACE) 10 MG capsule TAKE 1 CAPSULE(10 MG) BY MOUTH TWICE DAILY 180 capsule 3    tiZANidine (ZANAFLEX) 2 mg tablet Take 1 tablet (2 mg total) by mouth every 8 (eight) hours as needed for muscle spasms 20 tablet 0    Triamcinolone Acetonide (Nasacort Allergy 24HR) 55 MCG/ACT nasal spray 1 spray by Each Nare route daily 16.9 mL 3    warfarin (COUMADIN) 2 mg tablet TAKE 1 AND 1/2 TO 2 TABLETS BY MOUTH DAILY AS DIRECTED BY PRESCRIBER 180 tablet 3    lidocaine (LMX) 4 % cream Apply topically as needed for mild pain (Patient not taking: Reported on 6/23/2023) 30 g 0     No current facility-administered medications for this visit.     _______________________________________________________________________  Review of Systems   Constitutional:  Negative for fever. HENT:  Positive for congestion and rhinorrhea. Negative for sneezing and sore throat. Respiratory:  Negative for cough and shortness of breath. Cardiovascular: Negative. Musculoskeletal: Negative. Neurological:  Positive for dizziness. Hematological: Negative. Psychiatric/Behavioral: Negative. Objective:  Vitals:    10/17/23 0921   BP: 90/54   BP Location: Left arm   Patient Position: Sitting   Cuff Size: Standard   Pulse: (!) 54   Resp: 16   Temp: 97.6 °F (36.4 °C)   TempSrc: Tympanic   SpO2: 97%   Weight: 59 kg (130 lb)   Height: 5' 1.25" (1.556 m)     Body mass index is 24.36 kg/m². Physical Exam  Constitutional:       Appearance: Normal appearance. HENT:      Head: Normocephalic and atraumatic. Right Ear: External ear normal.      Left Ear: External ear normal.      Nose: Nose normal.      Mouth/Throat:      Mouth: Mucous membranes are moist.   Eyes:      Extraocular Movements: Extraocular movements intact. Cardiovascular:      Rate and Rhythm: Normal rate and regular rhythm.       Heart sounds: Normal heart sounds. Pulmonary:      Effort: Pulmonary effort is normal.      Breath sounds: Normal breath sounds. Musculoskeletal:         General: Normal range of motion. Cervical back: Normal range of motion and neck supple. Skin:     General: Skin is warm. Capillary Refill: Capillary refill takes less than 2 seconds. Neurological:      Mental Status: She is alert. Psychiatric:         Mood and Affect: Mood normal.         Behavior: Behavior normal.         Thought Content:  Thought content normal.

## 2023-10-18 ENCOUNTER — TELEPHONE (OUTPATIENT)
Age: 73
End: 2023-10-18

## 2023-10-18 NOTE — TELEPHONE ENCOUNTER
Pt. Called and said the pharmacy called and left her a message stating that she can't have this medication that Dr. Anna Arriaga prescribed for 7 days but no explanation. She wants to know if there is anything you can do.

## 2023-10-18 NOTE — TELEPHONE ENCOUNTER
Left message for pt to call office back and let us know what medication she can not get.  [facility] for [facility] to get information

## 2023-10-19 ENCOUNTER — ANTICOAG VISIT (OUTPATIENT)
Dept: CARDIOLOGY CLINIC | Facility: CLINIC | Age: 73
End: 2023-10-19

## 2023-10-19 ENCOUNTER — APPOINTMENT (OUTPATIENT)
Dept: LAB | Facility: HOSPITAL | Age: 73
End: 2023-10-19
Attending: INTERNAL MEDICINE
Payer: MEDICARE

## 2023-10-19 DIAGNOSIS — I48.11 LONGSTANDING PERSISTENT ATRIAL FIBRILLATION (HCC): Primary | ICD-10-CM

## 2023-10-19 NOTE — TELEPHONE ENCOUNTER
Spoke to pt and she did not go to the pharmacy yet to see what the problem is with the rx. She will call the office back if she needs anything else from us.

## 2023-10-24 ENCOUNTER — OFFICE VISIT (OUTPATIENT)
Dept: CARDIOLOGY CLINIC | Facility: CLINIC | Age: 73
End: 2023-10-24
Payer: MEDICARE

## 2023-10-24 VITALS
DIASTOLIC BLOOD PRESSURE: 60 MMHG | OXYGEN SATURATION: 98 % | WEIGHT: 129 LBS | HEIGHT: 61 IN | HEART RATE: 70 BPM | TEMPERATURE: 98.3 F | BODY MASS INDEX: 24.35 KG/M2 | SYSTOLIC BLOOD PRESSURE: 112 MMHG

## 2023-10-24 DIAGNOSIS — E78.5 HYPERLIPIDEMIA, UNSPECIFIED HYPERLIPIDEMIA TYPE: ICD-10-CM

## 2023-10-24 DIAGNOSIS — I48.0 PAF (PAROXYSMAL ATRIAL FIBRILLATION) (HCC): ICD-10-CM

## 2023-10-24 DIAGNOSIS — I10 PRIMARY HYPERTENSION: Primary | ICD-10-CM

## 2023-10-24 PROCEDURE — 99213 OFFICE O/P EST LOW 20 MIN: CPT | Performed by: INTERNAL MEDICINE

## 2023-10-24 PROCEDURE — 93000 ELECTROCARDIOGRAM COMPLETE: CPT | Performed by: INTERNAL MEDICINE

## 2023-10-24 NOTE — PROGRESS NOTES
[facility]    CHIEF COMPLAINT:   No chief complaint on file. HPI:  Elle Wellington is a 74 y.o. female with a past medical history of hypertension, hyperlipidemia, paroxysmal atrial fibrillation, chronic anticoagulation with warfarin. She presented to the emergency department on 01/24/2022 after arriving for an EGD/colonoscopy due to a change in bowel habits and dysphagia. She was found to be in rapid atrial fibrillation. She had no prior diagnosis of this in the past.  She was treated with IV Cardizem and oral Lopressor and converted to normal sinus rhythm. She was diagnosed with cellulitis/acute bursitis of her right elbow. She underwent I&D and cultures revealed MSSA. She was treated with cephalexin. TTE revealed preserved LVEF. Subsequently in the office we had switched her to warfarin due to the high cost of Eliquis. OV - 5/2/23: She presents today for follow-up for her atrial fibrillation. She feels well and has no acute complaints. Her PT/INR levels have been within the appropriate range. Interval history: She presents today for general follow up. Recent dizziness and fatigue with low BP and Ramipril was discontinued about 1.5 weeks ago. She has no acute complaints. She denies visual changes, lightheadedness, visual changes, chest pain, palpitations, shortness of breath, bleeding, bruising.     The following portions of the patient's history were reviewed and updated as appropriate: allergies, current medications, past family history, past medical history, past social history, past surgical history, and problem list.    SINCE LAST OV I REVIEWED WITH THE PATIENT THE INTERIM LABS, TEST RESULTS, CONSULTANT(S) NOTES AND PERFORMED AN INTERIM REVIEW OF HISTORY    Past Medical History:   Diagnosis Date    A-fib ([address] [facility])     Anxiety     Arthritis     shoulder    Carpal tunnel syndrome     Colon polyp     Depression     Disease of thyroid gland     Diverticulitis Esophageal stricture     HBP (high blood pressure)     Hiatal hernia 2022    Small hiatal hernia    Hyperlipidemia     Hypertension     Hypothyroid     Pneumonia         PONV (postoperative nausea and vomiting)     Schatzki's ring 2022    Schatzki's ring in the lower esophagus    Sleep apnea     mild        Past Surgical History:   Procedure Laterality Date    APPENDECTOMY      BREAST BIOPSY Right     benign    BREAST CYST ASPIRATION Right     benign    BREAST SURGERY      milk duct removed    CATARACT EXTRACTION Bilateral     CATARACT EXTRACTION, BILATERAL  2019    CERVICAL FUSION N/A 2021    Procedure: FUSION CERVICAL POSTERIOR C1-C2 with allograft and neuromonitoring);   Surgeon: Aramis Ortiz MD;  Location:  MAIN OR;  Service: Orthopedics    CHOLECYSTECTOMY      COLONOSCOPY  2022    COLONOSCOPY W/ BIOPSIES      EGD  2022    HAND SURGERY Right     MAMMO (HISTORICAL) Bilateral 2018    MAMMO (HISTORICAL) Bilateral 10/27/2017    MANDIBLE SURGERY      MANDIBLE SURGERY      [place] SURGICAL ARTHROSCOPY SHOULDER W/ROTATOR CUFF RPR Right 2022    Procedure: ARTHROSCOPIC ROTATOR CUFF REPAIR OF SUBSCAPULARIS BICEPS TENODESIS EXTENSIVE DEBRIDEMENT;  Surgeon: Misty Mart MD;  Location: [facility]IN OR;  Service: Orthopedics    WISDOM TOOTH EXTRACTION      X4       Social History     Socioeconomic History    Marital status:      Spouse name: Not on file    Number of children: Not on file    Years of education: Not on file    Highest education level: Not on file   Occupational History    Not on file   Tobacco Use    Smoking status: Former     Types: Cigarettes     Quit date: 2004     Years since quittin.8    Smokeless tobacco: Never   Vaping Use    Vaping Use: Never used   Substance and Sexual Activity    Alcohol use: Not Currently     Comment: last drink 7 years ago    Drug use: Not Currently    Sexual activity: Not Currently   Other Topics Concern    Not on file   Social History Narrative    Do you currently or have you served in the [address] [place] [address]: No    Were you activated, into active duty, as a member of the [employer] or as a Reservist: No    Occupation: retired    Marital status:     Exercise level: Occasional treadmill    General stress level: Low    Alcohol intake: None stopped 7 years ago    Caffeine intake: Moderate    Chewing tobacco: none    Illicit drugs: none    Guns present in home: No    Seat belts used routinely: Yes    Sunscreen used routinely: Yes    Smoke alarm in home: Yes    Advance directive: Yes     Social Determinants of Health     Financial Resource Strain: Low Risk  (8/22/2023)    Overall Financial Resource Strain (CARDIA)     Difficulty of Paying Living Expenses: Not hard at all   Food Insecurity: Not on file   Transportation Needs: No Transportation Needs (8/22/2023)    PRAPARE - Transportation     Lack of Transportation (Medical): No     Lack of Transportation (Non-Medical):  No   Physical Activity: Not on file   Stress: Not on file   Social Connections: Not on file   Intimate Partner Violence: Not on file   Housing Stability: Not on file       Family History   Problem Relation Age of Onset    Heart disease Mother     Hypertension Mother     Hyperlipidemia Mother     Heart failure Mother     Hypertension Sister        No Known Allergies    Current Outpatient Medications   Medication Sig Dispense Refill    acetaminophen (TYLENOL) 500 mg tablet Take 1 tablet (500 mg total) by mouth every 6 (six) hours as needed for moderate pain 20 tablet 0    ALPRAZolam (XANAX) 0.5 mg tablet Take 2 tablets (1 mg total) by mouth 2 (two) times a day as needed for anxiety 60 tablet 5    atorvastatin (LIPITOR) 10 mg tablet Take 1 tablet (10 mg total) by mouth daily at bedtime 90 tablet 1    escitalopram (LEXAPRO) 10 mg tablet Take 1 tablet (10 mg total) by mouth daily 30 tablet 3    levothyroxine 75 mcg tablet Take 1 tablet (75 mcg total) by mouth daily 30 tablet 6    loratadine (CLARITIN) 10 mg tablet Take 1 tablet (10 mg total) by mouth daily 30 tablet 3    metoprolol tartrate (LOPRESSOR) 25 mg tablet Take 1 tablet (25 mg total) by mouth every 12 (twelve) hours 180 tablet 3    Multiple Vitamins-Minerals (CENTRUM ADULTS PO) Take 1 tablet by mouth daily      tiZANidine (ZANAFLEX) 2 mg tablet Take 1 tablet (2 mg total) by mouth every 8 (eight) hours as needed for muscle spasms 20 tablet 0    Triamcinolone Acetonide (Nasacort Allergy 24HR) 55 MCG/ACT nasal spray 1 spray by Each Nare route daily 16.9 mL 3    warfarin (COUMADIN) 2 mg tablet TAKE 1 AND 1/2 TO 2 TABLETS BY MOUTH DAILY AS DIRECTED BY PRESCRIBER 180 tablet 3    lidocaine (LMX) 4 % cream Apply topically as needed for mild pain (Patient not taking: Reported on 6/23/2023) 30 g 0     No current facility-administered medications for this visit. /60 (BP Location: Left arm, Patient Position: Sitting, Cuff Size: Standard)   Temp 98.3 °F (36.8 °C) (Tympanic)   Ht 5' 0.5" (1.537 m)   Wt 58.5 kg (129 lb)   SpO2 98%   BMI 24.78 kg/m²     Review of Systems   All other systems reviewed and are negative. Physical Exam  Vitals reviewed. Constitutional:       General: She is not in acute distress. Appearance: She is not toxic-appearing. HENT:      Head: Normocephalic and atraumatic. Cardiovascular:      Rate and Rhythm: Normal rate and regular rhythm. Pulses: Normal pulses. Heart sounds: Normal heart sounds. No murmur heard. No gallop. Pulmonary:      Effort: Pulmonary effort is normal. No respiratory distress. Breath sounds: Normal breath sounds. No wheezing or rales. Abdominal:      General: Abdomen is flat. Bowel sounds are normal. There is no distension. Palpations: Abdomen is soft. Tenderness: There is no abdominal tenderness. There is no guarding. Musculoskeletal:      Right lower leg: No edema. Left lower leg: No edema.    Skin: General: Skin is warm and dry. Capillary Refill: Capillary refill takes less than 2 seconds. Coloration: Skin is not jaundiced or pale. Neurological:      Mental Status: She is alert. Psychiatric:         Mood and Affect: Mood normal.         Behavior: Behavior normal.          Lab Results   Component Value Date    K 4.1 09/01/2023    CL 99 09/01/2023    CO2 29 09/01/2023    BUN 18 09/01/2023    CREATININE 1.00 09/01/2023    CALCIUM 10.0 09/01/2023    ALT 18 09/01/2023    AST 28 09/01/2023    INR 3.20 (H) 10/19/2023       Lab Results   Component Value Date    HDL 65 09/01/2023    LDLCALC 92 09/01/2023    TRIG 108 09/01/2023       Lab Results   Component Value Date    WBC 7.21 09/01/2023    HGB 13.5 09/01/2023    HCT 41.3 09/01/2023     09/01/2023       Lab Results   Component Value Date     04/26/2021    HGBA1C 5.8 (H) 04/26/2021     Cardiac studies:   Results for orders placed or performed in visit on 10/24/23   POCT ECG    Impression    Normal sinus rhythm  Septal infarct, age undetermined       TTE - 1/25/22:  Left Ventricle Left ventricular cavity size is normal. Wall thickness is normal. The left ventricular ejection fraction is 60%. Systolic function is normal.  Wall motion is normal. Diastolic function is normal.   Right Ventricle Right ventricular cavity size is normal. Systolic function is normal. Wall thickness is normal.   Left Atrium The atrium is normal in size. Right Atrium The atrium is normal in size. Aortic Valve The aortic valve is trileaflet. The leaflets are not thickened. The leaflets are not calcified. The leaflets exhibit normal mobility. There is mild regurgitation. There is no evidence of stenosis. Mitral Valve The mitral valve has normal function. There is mild annular calcification. There is moderate regurgitation. There is no evidence of stenosis. The valve has normal function.    Tricuspid Valve Tricuspid valve structure is normal. There is moderate regurgitation. There is no evidence of stenosis. The estimated right ventricular systolic pressure is 38.73 mmHg. Pulmonic Valve Pulmonic valve structure is normal. There is no evidence of regurgitation. There is no evidence of stenosis. Ascending Aorta The aortic root is normal in size. IVC/SVC The right atrial pressure is estimated at 3.0 mmHg. The inferior vena cava is normal in size. Pericardium There is no pericardial effusion. The pericardium is normal in appearance. ASSESSMENT AND PLAN:  Kulwinder Anaya was seen today for atrial fibrillation and hypertension. Diagnoses and all orders for this visit:    Primary hypertension: Blood pressure is well-controlled. No longer on Ramipril. Paroxysmal atrial fibrillation [facility]): Nonvalvular paroxysmal atrial fibrillation diagnosed in January 2022 after she presented for an EGD and colonoscopy. She was treated with IV Cardizem and oral Lopressor and subsequently converted to sinus rhythm. -ECG today with NSR  -Doing well on metoprolol 12.5 mg BID - higher doses were causing fatigue   -Continue warfarin, goal INR 2.5    Hyperlipidemia, unspecified hyperlipidemia type: Lipid panel from 9/1/2023: Total cholesterol 179, triglycerides 108, HDL 65, LDL 92.   Continue atorvastatin 10 mg.     Henna Marsh MD

## 2023-10-24 NOTE — PATIENT INSTRUCTIONS
You were seen today in the Cardiology office for follow-up. No medication changes were made today. Please continue all your medications as prescribed. Thank you for choosing [facility].

## 2023-10-27 ENCOUNTER — RA CDI HCC (OUTPATIENT)
Dept: OTHER | Facility: HOSPITAL | Age: 73
End: 2023-10-27

## 2023-10-27 NOTE — PROGRESS NOTES
F33.9  [address] [facility] coding opportunities          Chart Reviewed number of suggestions sent to Provider: 1     Patients Insurance     Medicare Insurance: [employer]

## 2023-10-30 ENCOUNTER — ANTICOAG VISIT (OUTPATIENT)
Dept: CARDIOLOGY CLINIC | Facility: CLINIC | Age: 73
End: 2023-10-30

## 2023-10-30 ENCOUNTER — APPOINTMENT (OUTPATIENT)
Dept: LAB | Facility: HOSPITAL | Age: 73
End: 2023-10-30
Payer: MEDICARE

## 2023-10-30 DIAGNOSIS — E03.8 OTHER SPECIFIED HYPOTHYROIDISM: ICD-10-CM

## 2023-10-30 DIAGNOSIS — I48.11 LONGSTANDING PERSISTENT ATRIAL FIBRILLATION (HCC): Primary | ICD-10-CM

## 2023-11-02 ENCOUNTER — TELEPHONE (OUTPATIENT)
Dept: ADMINISTRATIVE | Facility: OTHER | Age: 73
End: 2023-11-02

## 2023-11-02 NOTE — TELEPHONE ENCOUNTER
11/02/23 9:30 AM    Patient contacted (spoke with patient) to bring Advance Directive, POLST, or Living Will document to next scheduled pcp visit. Thank you.   Lilibeth Mendoza MA  [facility]

## 2023-11-06 ENCOUNTER — OFFICE VISIT (OUTPATIENT)
Dept: FAMILY MEDICINE CLINIC | Facility: CLINIC | Age: 73
End: 2023-11-06
Payer: MEDICARE

## 2023-11-06 VITALS
HEART RATE: 60 BPM | BODY MASS INDEX: 25.52 KG/M2 | OXYGEN SATURATION: 95 % | RESPIRATION RATE: 16 BRPM | SYSTOLIC BLOOD PRESSURE: 94 MMHG | DIASTOLIC BLOOD PRESSURE: 60 MMHG | TEMPERATURE: 97.8 F | HEIGHT: 60 IN | WEIGHT: 130 LBS

## 2023-11-06 DIAGNOSIS — F33.9 EPISODE OF RECURRENT MAJOR DEPRESSIVE DISORDER, UNSPECIFIED DEPRESSION EPISODE SEVERITY (HCC): ICD-10-CM

## 2023-11-06 DIAGNOSIS — I48.0 PAROXYSMAL ATRIAL FIBRILLATION (HCC): ICD-10-CM

## 2023-11-06 DIAGNOSIS — M79.604 PAIN OF RIGHT LOWER EXTREMITY: ICD-10-CM

## 2023-11-06 DIAGNOSIS — N18.31 STAGE 3A CHRONIC KIDNEY DISEASE (HCC): ICD-10-CM

## 2023-11-06 DIAGNOSIS — R53.83 OTHER FATIGUE: Primary | ICD-10-CM

## 2023-11-06 PROCEDURE — 99214 OFFICE O/P EST MOD 30 MIN: CPT | Performed by: INTERNAL MEDICINE

## 2023-11-06 RX ORDER — DESVENLAFAXINE SUCCINATE 50 MG/1
50 TABLET, EXTENDED RELEASE ORAL DAILY
Qty: 30 TABLET | Refills: 5 | Status: SHIPPED | OUTPATIENT
Start: 2023-11-06 | End: 2024-05-04

## 2023-11-06 NOTE — PROGRESS NOTES
Assessment/Plan:Josee here with fatigue, and has some right calf tenderness-some of which may relate to her known knee issues there-she has an upcoming appt with Orthopedics and I did order a venous doppler to rule out a blood clot, which I highly doubt since she's on warfarin for a fib-will try to decrease her metoprolol to 1/2 tablet bid to see if it helps with her fatigue, will taper her off of the lexapro and try to switch her to pristiq, and ordered some updated labwork -will follow up in 6 weeks         Problem List Items Addressed This Visit       A-fib ([address])    Relevant Medications    metoprolol tartrate (LOPRESSOR) 25 mg tablet    Episode of recurrent major depressive disorder, unspecified depression episode severity (HCC)    Relevant Medications    desvenlafaxine succinate (PRISTIQ) 50 mg 24 hr tablet     Other Visit Diagnoses       Other fatigue    -  Primary    Relevant Orders    CBC and differential    Comprehensive metabolic panel    TSH, 3rd generation    Stage 3a chronic kidney disease ([address])        Pain of right lower extremity        Relevant Orders    VAS lower limb venous duplex study, unilateral/limited              Subjective:      Patient ID: Dalton Joseph is a 74 y.o. female. Charu Johns here with a hx of atrial fib, depression, anxiety, hypothyroidism, htn, on chronic warfarin-here because she says she's always tired-her last TSH was 6.09 so we bumped up the dose to 75 mcg daily-she is also on escitalopram so we are going to switch that out after tapering down on it-her bp is quite low today, as well, not to mention the fact that Charu Johns always has a lot of stress with her daughter and just daily life-she also complains of right calf pain-no real swelling-has known issues with the knee there, but I told her I'd probably get a Doppler just to rule out a DVT    Fatigue  Associated symptoms include arthralgias and fatigue.        The following portions of the patient's history were reviewed and updated as appropriate:   Past Medical History:  She has a past medical history of A-fib ([address]), Anxiety, Arthritis, Carpal tunnel syndrome, Colon polyp, Depression, Disease of thyroid gland, Diverticulitis, Esophageal stricture, HBP (high blood pressure), Hiatal hernia (06/07/2022), Hyperlipidemia, Hypertension, Hypothyroid, Pneumonia, PONV (postoperative nausea and vomiting), Schatzki's ring (06/07/2022), and Sleep apnea.,  _______________________________________________________________________  Medical Problems:  does not have any pertinent problems on file.,  _______________________________________________________________________  Past Surgical History:   has a past surgical history that includes Breast cyst aspiration (Right); Breast biopsy (Right); Appendectomy; Cholecystectomy; Colonoscopy w/ biopsies (2011); Mandible surgery; [place] tooth extraction; EGD (06/07/2022); Cataract extraction, bilateral (2019); Mammo (historical) (Bilateral, 11/27/2018); Mammo (historical) (Bilateral, 10/27/2017); Colonoscopy (06/07/2022); Cataract extraction (Bilateral); Hand surgery (Right); Cervical fusion (N/A, 05/25/2021); Mandible surgery; pr surgical arthroscopy shoulder w/rotator cuff rpr (Right, 09/27/2022); and Breast surgery. ,  _______________________________________________________________________  Family History:  family history includes Heart disease in her mother; Heart failure in her mother; Hyperlipidemia in her mother; Hypertension in her mother and sister. ,  _______________________________________________________________________  Social History:   reports that she quit smoking about 19 years ago. Her smoking use included cigarettes. She has never used smokeless tobacco. She reports that she does not currently use alcohol. She reports that she does not currently use drugs. ,  _______________________________________________________________________  Allergies:  has No Known Allergies. .  _______________________________________________________________________  Current Outpatient Medications   Medication Sig Dispense Refill    acetaminophen (TYLENOL) 500 mg tablet Take 1 tablet (500 mg total) by mouth every 6 (six) hours as needed for moderate pain 20 tablet 0    ALPRAZolam (XANAX) 0.5 mg tablet Take 2 tablets (1 mg total) by mouth 2 (two) times a day as needed for anxiety 60 tablet 5    atorvastatin (LIPITOR) 10 mg tablet Take 1 tablet (10 mg total) by mouth daily at bedtime 90 tablet 1    desvenlafaxine succinate (PRISTIQ) 50 mg 24 hr tablet Take 1 tablet (50 mg total) by mouth daily 30 tablet 5    levothyroxine 75 mcg tablet Take 1 tablet (75 mcg total) by mouth daily 30 tablet 6    metoprolol tartrate (LOPRESSOR) 25 mg tablet Take 0.5 tablets (12.5 mg total) by mouth every 12 (twelve) hours 180 tablet 3    Multiple Vitamins-Minerals (CENTRUM ADULTS PO) Take 1 tablet by mouth daily      tiZANidine (ZANAFLEX) 2 mg tablet Take 1 tablet (2 mg total) by mouth every 8 (eight) hours as needed for muscle spasms 20 tablet 0    warfarin (COUMADIN) 2 mg tablet TAKE 1 AND 1/2 TO 2 TABLETS BY MOUTH DAILY AS DIRECTED BY PRESCRIBER 180 tablet 3     No current facility-administered medications for this visit.     _______________________________________________________________________  Review of Systems   Constitutional:  Positive for fatigue. Respiratory: Negative. Cardiovascular: Negative. Gastrointestinal:  Positive for diarrhea. Musculoskeletal:  Positive for arthralgias and gait problem. Psychiatric/Behavioral:  Positive for dysphoric mood. The patient is nervous/anxious. Objective:  Vitals:    11/06/23 1200   BP: 94/60   BP Location: Left arm   Patient Position: Sitting   Cuff Size: Standard   Pulse: 60   Resp: 16   Temp: 97.8 °F (36.6 °C)   TempSrc: Tympanic   SpO2: 95%   Weight: 59 kg (130 lb)   Height: 5' (1.524 m)     Body mass index is 25.39 kg/m².      Physical Exam  Constitutional:       Appearance: Normal appearance. HENT:      Head: Normocephalic and atraumatic. Right Ear: External ear normal.      Left Ear: External ear normal.      Nose: Nose normal.      Mouth/Throat:      Mouth: Mucous membranes are moist.   Eyes:      Extraocular Movements: Extraocular movements intact. Cardiovascular:      Rate and Rhythm: Normal rate and regular rhythm. Heart sounds: Normal heart sounds. Pulmonary:      Effort: Pulmonary effort is normal.      Breath sounds: Normal breath sounds. Musculoskeletal:      Cervical back: Normal range of motion and neck supple. Right lower leg: No edema. Left lower leg: No edema. Comments: Right calf tenderness, no swelling, does hurt when she does flexion and extension with leg   Skin:     General: Skin is warm. Capillary Refill: Capillary refill takes less than 2 seconds. Neurological:      General: No focal deficit present. Mental Status: She is alert and oriented to person, place, and time. Psychiatric:         Mood and Affect: Mood normal.         Behavior: Behavior normal.         Thought Content:  Thought content normal.         Judgment: Judgment normal.

## 2023-11-08 ENCOUNTER — TELEPHONE (OUTPATIENT)
Dept: FAMILY MEDICINE CLINIC | Facility: CLINIC | Age: 73
End: 2023-11-08

## 2023-11-08 NOTE — TELEPHONE ENCOUNTER
Called pt and went straight to voicemail, left a detailed message in regards for  note. -- ok for access center to inform this message.

## 2023-11-08 NOTE — TELEPHONE ENCOUNTER
When Petty Garcia was here a few days ago, she was complaining of fatigue and her blood pressure was very low, so I figured I'd decrease the metoprolol tartrate to 1/2 a tablet twice a day and see if it helps on both fatigue and bp fronts

## 2023-11-08 NOTE — TELEPHONE ENCOUNTER
Patient confused about the Metoprolol instructions. She said if she cuts down she will hardly be taking anything.

## 2023-11-14 ENCOUNTER — TELEPHONE (OUTPATIENT)
Dept: FAMILY MEDICINE CLINIC | Facility: CLINIC | Age: 73
End: 2023-11-14

## 2023-11-14 NOTE — TELEPHONE ENCOUNTER
Patient walked in and still has medication confusion,  I offered an appt this Friday, she can't make it. I tried to explain the Metoprolol and the new medication Desvenlafaxine. She won't take anything new because she doesn't think she should. I explained what it was for and that is was discussed at the visit.

## 2023-11-28 ENCOUNTER — HOSPITAL ENCOUNTER (OUTPATIENT)
Dept: VASCULAR ULTRASOUND | Facility: HOSPITAL | Age: 73
Discharge: HOME | End: 2023-11-28
Attending: INTERNAL MEDICINE
Payer: MEDICARE

## 2023-11-28 DIAGNOSIS — M79.604 PAIN OF RIGHT LOWER EXTREMITY: ICD-10-CM

## 2023-11-28 PROCEDURE — 93971 EXTREMITY STUDY: CPT

## 2023-11-28 PROCEDURE — 93971 EXTREMITY STUDY: CPT | Performed by: SURGERY

## 2023-12-08 ENCOUNTER — APPOINTMENT (OUTPATIENT)
Dept: LAB | Facility: HOSPITAL | Age: 73
End: 2023-12-08
Attending: INTERNAL MEDICINE
Payer: MEDICARE

## 2023-12-08 ENCOUNTER — ANTICOAG VISIT (OUTPATIENT)
Dept: CARDIOLOGY CLINIC | Facility: CLINIC | Age: 73
End: 2023-12-08

## 2023-12-08 DIAGNOSIS — R53.83 OTHER FATIGUE: ICD-10-CM

## 2023-12-08 DIAGNOSIS — I48.11 LONGSTANDING PERSISTENT ATRIAL FIBRILLATION (HCC): Primary | ICD-10-CM

## 2023-12-08 LAB
ALBUMIN SERPL BCP-MCNC: 4.4 G/DL (ref 3.5–5)
ALP SERPL-CCNC: 52 U/L (ref 34–104)
ALT SERPL W P-5'-P-CCNC: 27 U/L (ref 7–52)
ANION GAP SERPL CALCULATED.3IONS-SCNC: 4 MMOL/L
AST SERPL W P-5'-P-CCNC: 32 U/L (ref 13–39)
BASOPHILS # BLD AUTO: 0.07 THOUSANDS/ÂΜL (ref 0–0.1)
BASOPHILS NFR BLD AUTO: 1 % (ref 0–1)
BILIRUB SERPL-MCNC: 0.59 MG/DL (ref 0.2–1)
BUN SERPL-MCNC: 15 MG/DL (ref 5–25)
CALCIUM SERPL-MCNC: 10.6 MG/DL (ref 8.4–10.2)
CHLORIDE SERPL-SCNC: 101 MMOL/L (ref 96–108)
CO2 SERPL-SCNC: 30 MMOL/L (ref 21–32)
CREAT SERPL-MCNC: 0.95 MG/DL (ref 0.6–1.3)
EOSINOPHIL # BLD AUTO: 0.42 THOUSAND/ÂΜL (ref 0–0.61)
EOSINOPHIL NFR BLD AUTO: 5 % (ref 0–6)
ERYTHROCYTE [DISTWIDTH] IN BLOOD BY AUTOMATED COUNT: 12.3 % (ref 11.6–15.1)
GFR SERPL CREATININE-BSD FRML MDRD: 58 ML/MIN/1.73SQ M
GLUCOSE P FAST SERPL-MCNC: 111 MG/DL (ref 65–99)
HCT VFR BLD AUTO: 40.9 % (ref 34.8–46.1)
HGB BLD-MCNC: 13.4 G/DL (ref 11.5–15.4)
IMM GRANULOCYTES # BLD AUTO: 0.01 THOUSAND/UL (ref 0–0.2)
IMM GRANULOCYTES NFR BLD AUTO: 0 % (ref 0–2)
LYMPHOCYTES # BLD AUTO: 1.34 THOUSANDS/ÂΜL (ref 0.6–4.47)
LYMPHOCYTES NFR BLD AUTO: 17 % (ref 14–44)
MCH RBC QN AUTO: 31.3 PG (ref 26.8–34.3)
MCHC RBC AUTO-ENTMCNC: 32.8 G/DL (ref 31.4–37.4)
MCV RBC AUTO: 96 FL (ref 82–98)
MONOCYTES # BLD AUTO: 0.52 THOUSAND/ÂΜL (ref 0.17–1.22)
MONOCYTES NFR BLD AUTO: 7 % (ref 4–12)
NEUTROPHILS # BLD AUTO: 5.47 THOUSANDS/ÂΜL (ref 1.85–7.62)
NEUTS SEG NFR BLD AUTO: 70 % (ref 43–75)
NRBC BLD AUTO-RTO: 0 /100 WBCS
PLATELET # BLD AUTO: 335 THOUSANDS/UL (ref 149–390)
PMV BLD AUTO: 9.2 FL (ref 8.9–12.7)
POTASSIUM SERPL-SCNC: 4 MMOL/L (ref 3.5–5.3)
PROT SERPL-MCNC: 7.6 G/DL (ref 6.4–8.4)
RBC # BLD AUTO: 4.28 MILLION/UL (ref 3.81–5.12)
SODIUM SERPL-SCNC: 135 MMOL/L (ref 135–147)
TSH SERPL DL<=0.05 MIU/L-ACNC: 2.48 UIU/ML (ref 0.45–4.5)
WBC # BLD AUTO: 7.83 THOUSAND/UL (ref 4.31–10.16)

## 2023-12-08 PROCEDURE — 85025 COMPLETE CBC W/AUTO DIFF WBC: CPT

## 2023-12-08 PROCEDURE — 84443 ASSAY THYROID STIM HORMONE: CPT

## 2023-12-08 PROCEDURE — 80053 COMPREHEN METABOLIC PANEL: CPT

## 2023-12-22 NOTE — CONSULTS
Orthopedics   Cindy Potter 65 y o  female MRN: [id number]  Unit/Bed#: Cat Scan      Chief Complaint:   Posterior neck pain    HPI:   65 y  o female complaining of posterior neck pain after a fall approximately 3 days ago  Patient reports that she was in her driveway which was icy and she slipped and fell hitting the back of her neck and her hip  She presents today to receive for further evaluation  She takes aspirin  She does have history of a prior odontoid fracture treated non operatively last year  She reports chronic neck pain secondary to her prior injury  She received an injection on 01/21/2021  At time of evaluation, her pain has improved after receiving Tylenol and Lidoderm patch in ED  Denies any numbness or tingling      Review Of Systems:   · Skin: Normal  · Neuro: See HPI  · Musculoskeletal: See HPI  · 14 point review of systems negative except as stated above     Past Medical History:   Past Medical History:   Diagnosis Date    Anxiety     Arthritis     shoulder    Carpal tunnel syndrome     Depression     Disease of thyroid gland     Diverticulitis     HBP (high blood pressure)     Hyperlipidemia     Hypertension     Hypothyroid     Pneumonia     Psychiatric disorder     Depression       Past Surgical History:   Past Surgical History:   Procedure Laterality Date    APPENDECTOMY      BREAST BIOPSY Right     benign    BREAST CYST ASPIRATION Right     benign    CATARACT EXTRACTION, BILATERAL  2019    CHOLECYSTECTOMY      COLONOSCOPY W/ BIOPSIES  2011    EGD      MAMMO (HISTORICAL) Bilateral 11/27/2018    MAMMO (HISTORICAL) Bilateral 10/27/2017    MANDIBLE SURGERY      WISDOM TOOTH EXTRACTION      X4       Family History:  Family history reviewed and non-contributory  Family History   Problem Relation Age of Onset    Heart disease Mother     Hypertension Mother     Hyperlipidemia Mother     Heart failure Mother     Hypertension Sister        Social History:  Social History     Socioeconomic History    Marital status:      Spouse name: None    Number of children: None    Years of education: None    Highest education level: None   Occupational History    None   Social Needs    Financial resource strain: None    Food insecurity     Worry: None     Inability: None    Transportation needs     Medical: None     Non-medical: None   Tobacco Use    Smoking status: Former Smoker     Quit date:      Years since quittin 1    Smokeless tobacco: Never Used   Substance and Sexual Activity    Alcohol use: No     Comment: last drink 7 years ago   Felice Day Drug use: No    Sexual activity: Not Currently   Lifestyle    Physical activity     Days per week: None     Minutes per session: None    Stress: None   Relationships    Social connections     Talks on phone: None     Gets together: None     Attends Bahai service: None     Active member of club or organization: None     Attends meetings of clubs or organizations: None     Relationship status: None    Intimate partner violence     Fear of current or ex partner: None     Emotionally abused: None     Physically abused: None     Forced sexual activity: None   Other Topics Concern    None   Social History Narrative    Do you currently or have you served in the [employer]: No    Were you activated, into active duty, as a member of the [employer] or as a Reservist: No    Occupation: retired    Marital status:     Exercise level: Occasional treadmill    General stress level: Low    Alcohol intake: None stopped 7 years ago    Caffeine intake:  Moderate    Chewing tobacco: none    Illicit drugs: none    Guns present in home: No    Seat belts used routinely: Yes    Sunscreen used routinely: Yes    Smoke alarm in home: Yes    Advance directive: Yes       Allergies:   No Known Allergies        Labs:  0   Lab Value Date/Time    HCT 37 3 2016 0826    HGB 12 4 2016    WBC 7 50 2016 Meds:    Current Facility-Administered Medications:     lidocaine (LIDODERM) 5 % patch 1 patch, 1 patch, Topical, Once, Nelida Rudd MD, 1 patch at 02/08/21 3693    Current Outpatient Medications:     ALPRAZolam (XANAX) 0 5 mg tablet, Take 1 tablet (0 5 mg total) by mouth 2 (two) times a day as needed for anxiety, Disp: 60 tablet, Rfl: 3    aspirin (ECOTRIN LOW STRENGTH) 81 mg EC tablet, Take 81 mg by mouth daily, Disp: , Rfl:     atorvastatin (LIPITOR) 10 mg tablet, Take 10 mg by mouth 2 (two) times a day, Disp: , Rfl:     baclofen 10 mg tablet, Take 1 tablet (10 mg total) by mouth 2 (two) times a day as needed for muscle spasms, Disp: 30 tablet, Rfl: 5    Calcium Carb-Cholecalciferol (CALCIUM 600+D3) 600-200 MG-UNIT TABS, Take 1 tablet by mouth daily, Disp: , Rfl:     escitalopram (LEXAPRO) 20 mg tablet, Take 1 tablet (20 mg total) by mouth daily, Disp: 30 tablet, Rfl: 5    levothyroxine 100 mcg tablet, Take 1 tablet (100 mcg total) by mouth daily, Disp: 90 tablet, Rfl: 3    Multiple Vitamins-Minerals (CENTRUM ADULTS PO), Take 1 tablet by mouth daily, Disp: , Rfl:     naproxen (NAPROSYN) 500 mg tablet, Take 1 tablet (500 mg total) by mouth 2 (two) times a day with meals, Disp: 60 tablet, Rfl: 5    ramipril (ALTACE) 10 MG capsule, Take 1 capsule (10 mg total) by mouth 2 (two) times a day, Disp: 180 capsule, Rfl: 3    Blood Culture:   No results found for: BLOODCX    Wound Culture:   No results found for: WOUNDCULT    Ins and Outs:  No intake/output data recorded  Physical Exam:   /80 (BP Location: Right arm)   Pulse 75   Temp 98 3 °F (36 8 °C) (Oral)   Resp 16   Ht 5' 3" (1 6 m)   Wt 54 4 kg (120 lb)   SpO2 98%   BMI 21 26 kg/m²   Gen: Alert and oriented to person, place, time  HEENT: EOMI, eyes clear, moist mucus membranes, hearing intact  Respiratory: Bilateral chest rise   No audible wheezing found  Cardiovascular: Regular Rate and Rhythm  Abdomen: soft nontender/nondistended  Musculoskeletal:  Neck and bilateral upper extremities  · Skin intact, no open lesions  · Tenderness to palpation posterior neck  · 5/5 strength on bilateral deltoid, bilateral triceps, bilateral biceps, bilateral wrist extension, bilateral wrist flexion, bilateral  strength  · Sensation intact bilateral axillary, muscular cutaneous, radial, median, ulnar distributions    Radiology:   I personally reviewed the films  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, multiple degenerative changes along all levels with stenosis and foraminal narrowing    _*_*_*_*_*_*_*_*_*_*_*_*_*_*_*_*_*_*_*_*_*_*_*_*_*_*_*_*_*_*_*_*_*_*_*_*_*_*_*_*_*    Assessment:  65 y  o female complaining of posterior neck pain after a mechanical fall 3 days ago  Pain has improved with C-collar and Tylenol while in emergency department  Patient currently without any sensory or motor deficits    No acute interventions warranted at this time    Plan:   · Weight-bearing as tolerated in all extremities with C-collar  · C-spine precautions  · May follow up as outpatient with Dr Joey Graves for re-evaluation  · Patient is in agreement with above plan      Roma Reddy MD yes

## 2023-12-27 ENCOUNTER — ANTICOAG VISIT (OUTPATIENT)
Dept: CARDIOLOGY CLINIC | Facility: CLINIC | Age: 73
End: 2023-12-27

## 2023-12-27 ENCOUNTER — APPOINTMENT (OUTPATIENT)
Dept: LAB | Facility: HOSPITAL | Age: 73
End: 2023-12-27
Payer: MEDICARE

## 2023-12-27 DIAGNOSIS — I48.11 LONGSTANDING PERSISTENT ATRIAL FIBRILLATION (HCC): Primary | ICD-10-CM

## 2024-01-08 ENCOUNTER — APPOINTMENT (OUTPATIENT)
Dept: RADIOLOGY | Facility: AMBULARY SURGERY CENTER | Age: 74
End: 2024-01-08
Attending: ORTHOPAEDIC SURGERY
Payer: MEDICARE

## 2024-01-08 ENCOUNTER — OFFICE VISIT (OUTPATIENT)
Dept: OBGYN CLINIC | Facility: CLINIC | Age: 74
End: 2024-01-08
Payer: MEDICARE

## 2024-01-08 VITALS
DIASTOLIC BLOOD PRESSURE: 94 MMHG | BODY MASS INDEX: 25.52 KG/M2 | HEART RATE: 87 BPM | SYSTOLIC BLOOD PRESSURE: 172 MMHG | HEIGHT: 60 IN | WEIGHT: 130 LBS

## 2024-01-08 DIAGNOSIS — M17.12 PRIMARY OSTEOARTHRITIS OF LEFT KNEE: ICD-10-CM

## 2024-01-08 DIAGNOSIS — M47.812 SPONDYLOSIS OF CERVICAL REGION WITHOUT MYELOPATHY OR RADICULOPATHY: ICD-10-CM

## 2024-01-08 DIAGNOSIS — M25.562 CHRONIC PAIN OF LEFT KNEE: Primary | ICD-10-CM

## 2024-01-08 DIAGNOSIS — G89.29 CHRONIC PAIN OF LEFT KNEE: Primary | ICD-10-CM

## 2024-01-08 DIAGNOSIS — R26.89 IMPAIRMENT OF BALANCE: ICD-10-CM

## 2024-01-08 PROBLEM — S82.402A CLOSED FRACTURE OF SHAFT OF LEFT FIBULA: Status: ACTIVE | Noted: 2024-01-08

## 2024-01-08 PROBLEM — G95.9 CERVICAL MYELOPATHY (HCC): Status: ACTIVE | Noted: 2024-01-08

## 2024-01-08 PROBLEM — M22.2X1 PATELLOFEMORAL DISORDER OF RIGHT KNEE: Status: ACTIVE | Noted: 2024-01-08

## 2024-01-08 PROCEDURE — 99214 OFFICE O/P EST MOD 30 MIN: CPT | Performed by: ORTHOPAEDIC SURGERY

## 2024-01-08 PROCEDURE — 73562 X-RAY EXAM OF KNEE 3: CPT

## 2024-01-08 NOTE — PROGRESS NOTES
Assessment:  1. Chronic pain of left knee  CT lower extremity w wo contrast      2. Primary osteoarthritis of left knee  XR knee 3 vw left non injury      3. Impairment of balance  Ambulatory Referral to Orthopedic Surgery      4. Spondylosis of cervical region without myelopathy or radiculopathy  Ambulatory Referral to Orthopedic Surgery        Patient Active Problem List   Diagnosis    Neck pain    Spondylosis of cervical region without myelopathy or radiculopathy    Occipital neuralgia of left side    Pre-op examination    Cervical vertebral fusion    Gastroesophageal reflux disease without esophagitis    Anxiety    Primary hypertension    Other specified hypothyroidism    Hyperlipidemia    Sleep apnea    A-fib (HCC)    Cellulitis    Transaminitis    Olecranon bursitis of right elbow    Chronic anticoagulation    Irritable bowel syndrome with diarrhea    Dysphagia    Hemorrhoids    Schatzki's ring    Depression, recurrent (HCC)    Episode of recurrent major depressive disorder, unspecified depression episode severity (HCC)    Primary osteoarthritis of left knee    Patellofemoral disorder of right knee    Cervical myelopathy (HCC)    Impairment of balance    Closed fracture of shaft of left fibula    Chronic pain of left knee       Plan:    73 y.o. female with chronic left knee rule out possible fracture of left fibular head complicated by osteoarthritis of left knee.     Discussed and reviewed xray findings of left knee   Discussed xray of left knee reveal lucency of left fibular head. Recommend CT to rule out fracture. Script provided today.   Patient instructed to ambulate with walker secondary to possible fracture. Further intervention to follow CT results.   Patient has had recurrent falls with hx of surgical intervention of cervical spine. +Grijalva's on exam. Recommend Orthopedic Surgery- Spine to better evaluate reason for falls. Referral provided today.   Patient to follow up after results of CT to review  findings and discuss further management.       The patient was seen and examined by Dr. Bruce and myself. The assessment and plan were formulated by Dr. Bruce and I assisted in carrying it out.    Subjective:   Patient ID: Josee Vaca is a 73 y.o. female .    HPI    Patient comes in today with regards to left knee pain.  Patient is referred to us by Christiano Bruce DO for further evaluation. The patient reports that the pain been going on for 3 months. Patient notes about 10 falling episodes secondary to inability to raise right leg leading to falling impact onto left knee. Patient notes pain is located to the medial and lateral aspect of the left knee. Patient notes increase in pain and stiffness when trying to resume activities after rest. Patient rates their pain as 8/10. Injury or trauma prior to onset of pain: Recurrent falls.   Treatments tried: Tylenol with mild relief. Patient has hx of 4 steroid injections, all which helped for about 4 months. .The pain does not radiate. Old injuries or prior surgeries: Hx of arthroscopic sx to left knee. Numbness or tingling: None .      The following portions of the patient's history were reviewed and updated as appropriate: allergies, current medications, past family history, past social history, past surgical history and problem list.    Social History     Socioeconomic History    Marital status:      Spouse name: Not on file    Number of children: Not on file    Years of education: Not on file    Highest education level: Not on file   Occupational History    Not on file   Tobacco Use    Smoking status: Former     Current packs/day: 0.00     Types: Cigarettes     Quit date:      Years since quittin.0    Smokeless tobacco: Never   Vaping Use    Vaping status: Never Used   Substance and Sexual Activity    Alcohol use: Not Currently     Comment: last drink 7 years ago    Drug use: Not Currently    Sexual activity: Not Currently   Other Topics Concern     Not on file   Social History Narrative    Do you currently or have you served in the [place] [employer]: No    Were you activated, into active duty, as a member of the [employer] or as a Reservist: No    Occupation: retired    Marital status:     Exercise level: Occasional treadmill    General stress level: Low    Alcohol intake: None stopped 7 years ago    Caffeine intake: Moderate    Chewing tobacco: none    Illicit drugs: none    Guns present in home: No    Seat belts used routinely: Yes    Sunscreen used routinely: Yes    Smoke alarm in home: Yes    Advance directive: Yes     Social Determinants of Health     Financial Resource Strain: Low Risk  (8/22/2023)    Overall Financial Resource Strain (CARDIA)     Difficulty of Paying Living Expenses: Not hard at all   Food Insecurity: Not on file   Transportation Needs: No Transportation Needs (8/22/2023)    PRAPARE - Transportation     Lack of Transportation (Medical): No     Lack of Transportation (Non-Medical): No   Physical Activity: Not on file   Stress: Not on file   Social Connections: Not on file   Intimate Partner Violence: Not on file   Housing Stability: Not on file     Past Medical History:   Diagnosis Date    A-fib (HCC)     Anxiety     Arthritis     shoulder    Carpal tunnel syndrome     Colon polyp     Depression     Disease of thyroid gland     Diverticulitis     Esophageal stricture     HBP (high blood pressure)     Hiatal hernia 06/07/2022    Small hiatal hernia    Hyperlipidemia     Hypertension     Hypothyroid     Pneumonia     2017    PONV (postoperative nausea and vomiting)     Schatzki's ring 06/07/2022    Schatzki's ring in the lower esophagus    Sleep apnea     mild      Past Surgical History:   Procedure Laterality Date    APPENDECTOMY      BREAST BIOPSY Right     benign    BREAST CYST ASPIRATION Right     benign    BREAST SURGERY      milk duct removed    CATARACT EXTRACTION Bilateral     CATARACT EXTRACTION, BILATERAL  2019     CERVICAL FUSION N/A 05/25/2021    Procedure: FUSION CERVICAL POSTERIOR C1-C2 with allograft and neuromonitoring);  Surgeon: Wesley Camacho MD;  Location:  MAIN OR;  Service: Orthopedics    CHOLECYSTECTOMY      COLONOSCOPY  06/07/2022    COLONOSCOPY W/ BIOPSIES  2011    EGD  06/07/2022    HAND SURGERY Right     MAMMO (HISTORICAL) Bilateral 11/27/2018    MAMMO (HISTORICAL) Bilateral 10/27/2017    MANDIBLE SURGERY      MANDIBLE SURGERY      [place] SURGICAL ARTHROSCOPY SHOULDER W/ROTATOR CUFF RPR Right 09/27/2022    Procedure: ARTHROSCOPIC ROTATOR CUFF REPAIR OF SUBSCAPULARIS BICEPS TENODESIS EXTENSIVE DEBRIDEMENT;  Surgeon: Tony Rose MD;  Location: [facility];  Service: Orthopedics    WISDOM TOOTH EXTRACTION      X4     No Known Allergies  Current Outpatient Medications on File Prior to Visit   Medication Sig Dispense Refill    acetaminophen (TYLENOL) 500 mg tablet Take 1 tablet (500 mg total) by mouth every 6 (six) hours as needed for moderate pain 20 tablet 0    ALPRAZolam (XANAX) 0.5 mg tablet Take 2 tablets (1 mg total) by mouth 2 (two) times a day as needed for anxiety 60 tablet 5    atorvastatin (LIPITOR) 10 mg tablet Take 1 tablet (10 mg total) by mouth daily at bedtime 90 tablet 1    desvenlafaxine succinate (PRISTIQ) 50 mg 24 hr tablet Take 1 tablet (50 mg total) by mouth daily 30 tablet 5    levothyroxine 75 mcg tablet Take 1 tablet (75 mcg total) by mouth daily 30 tablet 6    metoprolol tartrate (LOPRESSOR) 25 mg tablet Take 0.5 tablets (12.5 mg total) by mouth every 12 (twelve) hours 180 tablet 3    Multiple Vitamins-Minerals (CENTRUM ADULTS PO) Take 1 tablet by mouth daily      tiZANidine (ZANAFLEX) 2 mg tablet Take 1 tablet (2 mg total) by mouth every 8 (eight) hours as needed for muscle spasms 20 tablet 0    warfarin (COUMADIN) 2 mg tablet TAKE 1 AND 1/2 TO 2 TABLETS BY MOUTH DAILY AS DIRECTED BY PRESCRIBER 180 tablet 3     No current facility-administered medications on file prior to  visit.       Review of Systems   Constitutional:  Negative for chills and fever.   HENT:  Negative for ear pain and sore throat.    Eyes:  Negative for pain and visual disturbance.   Respiratory:  Negative for cough and shortness of breath.    Cardiovascular:  Negative for chest pain and palpitations.   Gastrointestinal:  Negative for abdominal pain and vomiting.   Genitourinary:  Negative for dysuria and hematuria.   Musculoskeletal:  Negative for arthralgias and back pain.   Skin:  Negative for color change and rash.   Neurological:  Negative for seizures and syncope.   All other systems reviewed and are negative.        Objective:    Vitals:    01/08/24 1532   BP: (!) 172/94   Pulse: 87       Physical Exam  Vitals and nursing note reviewed.   Constitutional:       General: She is not in acute distress.     Appearance: She is well-developed.   HENT:      Head: Normocephalic and atraumatic.   Eyes:      Conjunctiva/sclera: Conjunctivae normal.   Cardiovascular:      Rate and Rhythm: Normal rate and regular rhythm.      Heart sounds: No murmur heard.  Pulmonary:      Effort: Pulmonary effort is normal. No respiratory distress.      Breath sounds: Normal breath sounds.   Abdominal:      Palpations: Abdomen is soft.      Tenderness: There is no abdominal tenderness.   Musculoskeletal:         General: No swelling.      Cervical back: Neck supple.      Left knee: No effusion.      Instability Tests: Lateral Gavi test positive.   Skin:     General: Skin is warm and dry.      Capillary Refill: Capillary refill takes less than 2 seconds.   Neurological:      Mental Status: She is alert.   Psychiatric:         Mood and Affect: Mood normal.         Left Knee Exam     Tenderness   The patient is experiencing tenderness in the lateral joint line, medial joint line and patella.    Range of Motion   Extension:  normal   Flexion:  normal     Tests   Gavi:   Lateral - positive  Left knee patellar apprehension test:  "+Patellar grind.    Other   Sensation: normal  Pulse: present  Effusion: no effusion present    Comments:  + Grijalva's            I have personally reviewed pertinent films in PACS and my interpretation is Xray left knee reveal osteoarthritis of medial and lateral compartments of left knee consistent with Kellgren Grade 3. Bony lucency of left fibular head suspicious for possible fracture.     Procedures  No Procedures performed today        Scribe Attestation      I,:  Daphney Vazquez PA-C am acting as a scribe while in the presence of the attending physician.:       I,:  Christiano Bruce DO personally performed the services described in this documentation    as scribed in my presence.:               Portions of the record may have been created with voice recognition software.  Occasional wrong word or \"sound a like\" substitutions may have occurred due to the inherent limitations of voice recognition software.  Read the chart carefully and recognize, using context, where substitutions have occurred.    "

## 2024-01-12 DIAGNOSIS — F41.9 ANXIETY: ICD-10-CM

## 2024-01-12 RX ORDER — ALPRAZOLAM 0.5 MG/1
1 TABLET ORAL 2 TIMES DAILY PRN
Qty: 60 TABLET | Refills: 0 | Status: SHIPPED | OUTPATIENT
Start: 2024-01-12

## 2024-01-12 NOTE — TELEPHONE ENCOUNTER
Reason for call:   [x] Refill   [] Prior Auth  [] Other:     Office:   [x] PCP/Provider -   [] Specialty/Provider -     Medication: Alprazolam 0.5 mg, take 2 tablets by mouth 2 times a day as needed.       Pharmacy: [id number]     Does the patient have enough for 3 days?   [x] Yes   [] No - Send as HP to POD

## 2024-01-15 ENCOUNTER — HOSPITAL ENCOUNTER (OUTPATIENT)
Dept: CT IMAGING | Facility: HOSPITAL | Age: 74
Discharge: HOME | End: 2024-01-15
Payer: MEDICARE

## 2024-01-15 DIAGNOSIS — G89.29 CHRONIC PAIN OF LEFT KNEE: ICD-10-CM

## 2024-01-15 DIAGNOSIS — M25.562 CHRONIC PAIN OF LEFT KNEE: ICD-10-CM

## 2024-01-15 PROCEDURE — G1004 CDSM NDSC: HCPCS

## 2024-01-15 PROCEDURE — 73700 CT LOWER EXTREMITY W/O DYE: CPT

## 2024-01-22 ENCOUNTER — OFFICE VISIT (OUTPATIENT)
Dept: OBGYN CLINIC | Facility: HOSPITAL | Age: 74
End: 2024-01-22
Payer: MEDICARE

## 2024-01-22 ENCOUNTER — HOSPITAL ENCOUNTER (OUTPATIENT)
Dept: RADIOLOGY | Facility: HOSPITAL | Age: 74
Discharge: HOME | End: 2024-01-22
Attending: ORTHOPAEDIC SURGERY
Payer: MEDICARE

## 2024-01-22 VITALS
BODY MASS INDEX: 25.54 KG/M2 | SYSTOLIC BLOOD PRESSURE: 164 MMHG | WEIGHT: 130.07 LBS | HEART RATE: 66 BPM | HEIGHT: 60 IN | DIASTOLIC BLOOD PRESSURE: 79 MMHG

## 2024-01-22 DIAGNOSIS — M47.812 SPONDYLOSIS OF CERVICAL REGION WITHOUT MYELOPATHY OR RADICULOPATHY: Primary | ICD-10-CM

## 2024-01-22 DIAGNOSIS — R26.89 IMPAIRMENT OF BALANCE: ICD-10-CM

## 2024-01-22 DIAGNOSIS — M47.812 SPONDYLOSIS OF CERVICAL REGION WITHOUT MYELOPATHY OR RADICULOPATHY: ICD-10-CM

## 2024-01-22 PROCEDURE — 72050 X-RAY EXAM NECK SPINE 4/5VWS: CPT

## 2024-01-22 PROCEDURE — 99214 OFFICE O/P EST MOD 30 MIN: CPT | Performed by: ORTHOPAEDIC SURGERY

## 2024-01-22 NOTE — PROGRESS NOTES
Assessment & Plan/Medical Decision Makin y.o. female with Neck Pain , balance issues, hand numbness, and imaging findings most notable for Cervical Spondylosis, C1-2 PSF        The clinical, physical and imaging findings were reviewed with the patient.  Josee  has a constellation of findings consistent with possible Cervical Myelopathy in the setting of cervical degenerative disease, previous C1-2 posterior fusion construct.      Fortunately patient remains neurologically intact and functional, but does continue with worsening balance issues and frequent falls as well as hand numbness.  Physical exam showing +babinski.  We discussed the treatment options including physical therapy, at home exercises, activity modifications, chiropractic medicine, oral medications, interventional spine procedures.  At this time recommend evaluation of neural elements with advanced imaging.    MRI cervical spine to further evaluate patient's symptoms. Will review MRI in detail with patient at follow-up visit and discuss further treatment options at that time.    Patient instructed to return to office/ER sooner if symptoms are not improving, getting worse, or new worrisome/neurologic symptoms arise.  Patient will follow up in after MRI for review and further treatment planning.     Subjective:      Chief Complaint: Balance issues     HPI:  Josee Vaca is a 73 y.o. female presenting for initial visit with chief complaint of balance issues, frequent falls.  Patient has a significant history of a posterior C1-2 spinal fusion with Yvette Camacho few years ago.  She did well after surgery with resolution of her neck pain.  She notes that her balance has been slowly deteriorating over the past year or so.  She also does have bilateral hand numbness.  She denies any significant neck pain and no radicular symptoms.    Denies any rufina trauma. Denies fever or chills, no night sweats. Denies any bladder or bowel changes.       Conservative therapy includes the following:   Medications: none    Injections: none     Physical Therapy: Completed physical therapy after shoulder surgery recently  Chiropractic Medicine: has not attempted  Accupunture/Massage Therapy: has not attempted   These therapeutic modalities were ineffective at providing sustained pain relief/functional improvement.     Nicotine dependent: no  Occupation: retired  Living situation: Lives with family   ADLs: patient is able to perform     Objective:     Family History   Problem Relation Age of Onset    Heart disease Mother     Hypertension Mother     Hyperlipidemia Mother     Heart failure Mother     Hypertension Sister        Past Medical History:   Diagnosis Date    A-fib ([facility])     Anxiety     Arthritis     shoulder    Carpal tunnel syndrome     Colon polyp     Depression     Disease of thyroid gland     Diverticulitis     Esophageal stricture     HBP (high blood pressure)     Hiatal hernia 06/07/2022    Small hiatal hernia    Hyperlipidemia     Hypertension     Hypothyroid     Pneumonia     2017    PONV (postoperative nausea and vomiting)     Schatzki's ring 06/07/2022    Schatzki's ring in the lower esophagus    Sleep apnea     mild        Current Outpatient Medications   Medication Sig Dispense Refill    acetaminophen (TYLENOL) 500 mg tablet Take 1 tablet (500 mg total) by mouth every 6 (six) hours as needed for moderate pain 20 tablet 0    ALPRAZolam (XANAX) 0.5 mg tablet Take 2 tablets (1 mg total) by mouth 2 (two) times a day as needed for anxiety 60 tablet 0    atorvastatin (LIPITOR) 10 mg tablet Take 1 tablet (10 mg total) by mouth daily at bedtime 90 tablet 1    desvenlafaxine succinate (PRISTIQ) 50 mg 24 hr tablet Take 1 tablet (50 mg total) by mouth daily 30 tablet 5    levothyroxine 75 mcg tablet Take 1 tablet (75 mcg total) by mouth daily 30 tablet 6    metoprolol tartrate (LOPRESSOR) 25 mg tablet Take 0.5 tablets (12.5 mg total) by mouth every 12  (twelve) hours 180 tablet 3    Multiple Vitamins-Minerals (CENTRUM ADULTS PO) Take 1 tablet by mouth daily      tiZANidine (ZANAFLEX) 2 mg tablet Take 1 tablet (2 mg total) by mouth every 8 (eight) hours as needed for muscle spasms 20 tablet 0    warfarin (COUMADIN) 2 mg tablet TAKE 1 AND 1/2 TO 2 TABLETS BY MOUTH DAILY AS DIRECTED BY PRESCRIBER 180 tablet 3     No current facility-administered medications for this visit.       Past Surgical History:   Procedure Laterality Date    APPENDECTOMY      BREAST BIOPSY Right     benign    BREAST CYST ASPIRATION Right     benign    BREAST SURGERY      milk duct removed    CATARACT EXTRACTION Bilateral     CATARACT EXTRACTION, BILATERAL  2019    CERVICAL FUSION N/A 2021    Procedure: FUSION CERVICAL POSTERIOR C1-C2 with allograft and neuromonitoring);  Surgeon: Wesley Camacho MD;  Location:  MAIN OR;  Service: Orthopedics    CHOLECYSTECTOMY      COLONOSCOPY  2022    COLONOSCOPY W/ BIOPSIES      EGD  2022    HAND SURGERY Right     MAMMO (HISTORICAL) Bilateral 2018    MAMMO (HISTORICAL) Bilateral 10/27/2017    MANDIBLE SURGERY      MANDIBLE SURGERY      [place] SURGICAL ARTHROSCOPY SHOULDER W/ROTATOR CUFF RPR Right 2022    Procedure: ARTHROSCOPIC ROTATOR CUFF REPAIR OF SUBSCAPULARIS BICEPS TENODESIS EXTENSIVE DEBRIDEMENT;  Surgeon: Tony Rose MD;  Location: [facility];  Service: Orthopedics    WISDOM TOOTH EXTRACTION      X4       Social History     Socioeconomic History    Marital status:      Spouse name: Not on file    Number of children: Not on file    Years of education: Not on file    Highest education level: Not on file   Occupational History    Not on file   Tobacco Use    Smoking status: Former     Current packs/day: 0.00     Types: Cigarettes     Quit date:      Years since quittin.0    Smokeless tobacco: Never   Vaping Use    Vaping status: Never Used   Substance and Sexual Activity    Alcohol use: Not  Currently     Comment: last drink 7 years ago    Drug use: Not Currently    Sexual activity: Not Currently   Other Topics Concern    Not on file   Social History Narrative    Do you currently or have you served in the  Armed Forces: No    Were you activated, into active duty, as a member of the [employer] or as a Reservist: No    Occupation: retired    Marital status:     Exercise level: Occasional treadmill    General stress level: Low    Alcohol intake: None stopped 7 years ago    Caffeine intake: Moderate    Chewing tobacco: none    Illicit drugs: none    Guns present in home: No    Seat belts used routinely: Yes    Sunscreen used routinely: Yes    Smoke alarm in home: Yes    Advance directive: Yes     Social Determinants of Health     Financial Resource Strain: Low Risk  (8/22/2023)    Overall Financial Resource Strain (CARDIA)     Difficulty of Paying Living Expenses: Not hard at all   Food Insecurity: Not on file   Transportation Needs: No Transportation Needs (8/22/2023)    PRAPARE - Transportation     Lack of Transportation (Medical): No     Lack of Transportation (Non-Medical): No   Physical Activity: Not on file   Stress: Not on file   Social Connections: Not on file   Intimate Partner Violence: Not on file   Housing Stability: Not on file       No Known Allergies    Review of Systems  General- denies fever/chills  HEENT- denies hearing loss or sore throat  Eyes- denies eye pain or visual disturbances, denies red eyes  Respiratory- denies cough or SOB  Cardio- denies chest pain or palpitations  GI- denies abdominal pain  Endocrine- denies urinary frequency  Urinary- denies pain with urination  Musculoskeletal- Negative except noted above  Skin- denies rashes or wounds  Neurological- denies dizziness or headache  Psychiatric- denies anxiety or difficulty concentrating    Physical Exam  /79   Pulse 66   Ht 5' (1.524 m)   Wt 59 kg (130 lb 1.1 oz)   BMI 25.40 kg/m²      General/Constitutional: No apparent distress: well-nourished and well developed.  Lymphatic: No appreciable lymphadenopathy  Respiratory: Non-labored breathing  Vascular: No edema, swelling or tenderness, except as noted in detailed exam.  Integumentary: No impressive skin lesions present, except as noted in detailed exam.  Psych: Normal mood and affect, oriented to person, place and time.  MSK: normal other than stated in HPI and exam  Gait & balance: no evidence of myelopathic gait, ambulates Independently     Cervical  spine range of motion:  -Forward flexion chin to chest  -Extension to 60  -Lateral bend 10 right, 10 left  -Rotation 25 right, 25 left.    There is no point tenderness with palpation along the posterior cervical, thoracic, lumbar spine.     Posterior cervical surgical site well-healed    Neurologic:  Upper Extremity Motor Function    Right  Left    Deltoid  5/5  5/5    Bicep  5/5  5/5    Wrist extension  5/5  5/5    Tricep  5/5  5/5    Finger flexion/  5/5  5/5    Hand intrinsic  5/5  5/5      Lower Extremity Motor Function    Right  Left    Iliopsoas  5/5  5/5    Quadriceps 5/5 5/5   Tibialis anterior  5/5  5/5    EHL  5/5  5/5    Gastroc. muscle  5/5  5/5    Heel rise  5/5  5/5    Toe rise  5/5  5/5      Sensory: light touch is intact to bilateral upper and lower extremities     Reflexes:    Right Left   Biceps 2+ 2+   Triceps 2+ 2+   Brachioradialis 2+ 2+   Patellar 1+ 1+   Achilles 1+ 1+   Babinski upgoing neg     Other tests:  Spurling's: not tested  Grijalva's: Negative  Clonus: Negative  Tandem gait: Unable to perform  Romberg: positive   Carpal Tinel/Phalen: negative bilateral   Cubital Tinel: negative bilateral     Diagnostic Tests   IMAGING: I have personally reviewed the images and these are my findings:  Cervical Spine X-rays from 5/23/23: multi level cervical spondylosis with C1-2 bilateral screw and ramon construct that appears intact, no significant changes from previous x-rays in  "2022     Electronic Medical Records were reviewed including Dr. Camacho notes, radiology reports     Procedures, if performed today     None performed       Portions of the record may have been created with voice recognition software.  Occasional wrong word or \"sound a like\" substitutions may have occurred due to the inherent limitations of voice recognition software.  Read the chart carefully and recognize, using context, where substitutions have occurred.    "

## 2024-01-25 DIAGNOSIS — I48.11 LONGSTANDING PERSISTENT ATRIAL FIBRILLATION (HCC): Primary | ICD-10-CM

## 2024-01-26 ENCOUNTER — ANTICOAG VISIT (OUTPATIENT)
Dept: CARDIOLOGY CLINIC | Facility: CLINIC | Age: 74
End: 2024-01-26

## 2024-01-26 ENCOUNTER — APPOINTMENT (OUTPATIENT)
Dept: LAB | Facility: HOSPITAL | Age: 74
End: 2024-01-26
Payer: MEDICARE

## 2024-01-26 DIAGNOSIS — I48.11 LONGSTANDING PERSISTENT ATRIAL FIBRILLATION (HCC): Primary | ICD-10-CM

## 2024-01-26 DIAGNOSIS — I48.11 LONGSTANDING PERSISTENT ATRIAL FIBRILLATION (HCC): ICD-10-CM

## 2024-01-26 LAB
INR PPP: 1.54 (ref 0.84–1.19)
PROTHROMBIN TIME: 18.3 SECONDS (ref 11.6–14.5)

## 2024-01-26 PROCEDURE — 36415 COLL VENOUS BLD VENIPUNCTURE: CPT

## 2024-01-26 PROCEDURE — 85610 PROTHROMBIN TIME: CPT

## 2024-01-31 ENCOUNTER — HOSPITAL ENCOUNTER (OUTPATIENT)
Dept: MRI IMAGING | Facility: HOSPITAL | Age: 74
Discharge: HOME | End: 2024-01-31
Attending: ORTHOPAEDIC SURGERY
Payer: MEDICARE

## 2024-01-31 DIAGNOSIS — R26.89 IMPAIRMENT OF BALANCE: ICD-10-CM

## 2024-01-31 DIAGNOSIS — M47.812 SPONDYLOSIS OF CERVICAL REGION WITHOUT MYELOPATHY OR RADICULOPATHY: ICD-10-CM

## 2024-01-31 PROCEDURE — 72141 MRI NECK SPINE W/O DYE: CPT

## 2024-01-31 PROCEDURE — G1004 CDSM NDSC: HCPCS

## 2024-02-06 ENCOUNTER — OFFICE VISIT (OUTPATIENT)
Dept: OBGYN CLINIC | Facility: HOSPITAL | Age: 74
End: 2024-02-06
Payer: MEDICARE

## 2024-02-06 VITALS — BODY MASS INDEX: 25.54 KG/M2 | WEIGHT: 130.07 LBS | HEIGHT: 60 IN

## 2024-02-06 DIAGNOSIS — M47.812 SPONDYLOSIS OF CERVICAL REGION WITHOUT MYELOPATHY OR RADICULOPATHY: Primary | ICD-10-CM

## 2024-02-06 DIAGNOSIS — Z76.0 MEDICATION REFILL: ICD-10-CM

## 2024-02-06 PROCEDURE — 99213 OFFICE O/P EST LOW 20 MIN: CPT | Performed by: ORTHOPAEDIC SURGERY

## 2024-02-06 RX ORDER — ATORVASTATIN CALCIUM 10 MG/1
10 TABLET, FILM COATED ORAL
Qty: 90 TABLET | Refills: 1 | Status: SHIPPED | OUTPATIENT
Start: 2024-02-06

## 2024-02-06 NOTE — PROGRESS NOTES
Assessment & Plan/Medical Decision Makin y.o. female with Neck Pain , balance issues, hand numbness, and imaging findings most notable for Cervical Spondylosis, C1-2 PSF        The clinical, physical and imaging findings were reviewed with the patient.  Josee  has a constellation of findings consistent with mild  Cervical Myelopathy in the setting of cervical degenerative disease, previous C1-2 posterior fusion construct, mild stenosis at C3-4.     Fortunately patient remains neurologically intact and functional.  We discussed the treatment options including physical therapy, at home exercises, activity modifications, chiropractic medicine, oral medications, interventional spine procedures.  At this time recommend continued conservative treatments.      Referral placed for physical therapy to work on cervical ROM, strengthening, and stretching exercises, as well as balance therapy.      Patient instructed to return to office/ER sooner if symptoms are not improving, getting worse, or new worrisome/neurologic symptoms arise.  Patient will follow up in after MRI for review and further treatment planning.     Subjective:      Chief Complaint: Balance issues     HPI:  Josee Vaca is a 73 y.o. female presenting for initial visit with chief complaint of balance issues, frequent falls.  Patient has a significant history of a posterior C1-2 spinal fusion with Yvette Camacho few years ago.  She did well after surgery with resolution of her neck pain.  She notes that her balance has been slowly deteriorating over the past year or so.  She also does have bilateral hand numbness.  She denies any significant neck pain and no radicular symptoms.    Denies any rufina trauma. Denies fever or chills, no night sweats. Denies any bladder or bowel changes.      Conservative therapy includes the following:   Medications: none    Injections: none     Physical Therapy: Completed physical therapy after shoulder surgery  recently  Chiropractic Medicine: has not attempted  Accupunture/Massage Therapy: has not attempted   These therapeutic modalities were ineffective at providing sustained pain relief/functional improvement.     Nicotine dependent: no  Occupation: retired  Living situation: Lives with family   ADLs: patient is able to perform     2/6/24 Update  Patient is here for follow up.  Reviewed symptoms.  She has had falls but these seem to be more mechanical in nature when tripping going up the steps.  No falls when walking on ground.  Some hand numbness but has not been dropping items.  Only with very mild neck pain.      Objective:     Family History   Problem Relation Age of Onset    Heart disease Mother     Hypertension Mother     Hyperlipidemia Mother     Heart failure Mother     Hypertension Sister        Past Medical History:   Diagnosis Date    A-fib ([facility])     Anxiety     Arthritis     shoulder    Carpal tunnel syndrome     Colon polyp     Depression     Disease of thyroid gland     Diverticulitis     Esophageal stricture     HBP (high blood pressure)     Hiatal hernia 06/07/2022    Small hiatal hernia    Hyperlipidemia     Hypertension     Hypothyroid     Pneumonia     2017    PONV (postoperative nausea and vomiting)     Schatzki's ring 06/07/2022    Schatzki's ring in the lower esophagus    Sleep apnea     mild        Current Outpatient Medications   Medication Sig Dispense Refill    acetaminophen (TYLENOL) 500 mg tablet Take 1 tablet (500 mg total) by mouth every 6 (six) hours as needed for moderate pain 20 tablet 0    ALPRAZolam (XANAX) 0.5 mg tablet Take 2 tablets (1 mg total) by mouth 2 (two) times a day as needed for anxiety 60 tablet 0    atorvastatin (LIPITOR) 10 mg tablet Take 1 tablet (10 mg total) by mouth daily at bedtime 90 tablet 1    desvenlafaxine succinate (PRISTIQ) 50 mg 24 hr tablet Take 1 tablet (50 mg total) by mouth daily 30 tablet 5    levothyroxine 75 mcg tablet Take 1 tablet (75 mcg total) by  mouth daily 30 tablet 6    metoprolol tartrate (LOPRESSOR) 25 mg tablet Take 0.5 tablets (12.5 mg total) by mouth every 12 (twelve) hours 180 tablet 3    Multiple Vitamins-Minerals (CENTRUM ADULTS PO) Take 1 tablet by mouth daily      tiZANidine (ZANAFLEX) 2 mg tablet Take 1 tablet (2 mg total) by mouth every 8 (eight) hours as needed for muscle spasms 20 tablet 0    warfarin (COUMADIN) 2 mg tablet TAKE 1 AND 1/2 TO 2 TABLETS BY MOUTH DAILY AS DIRECTED BY PRESCRIBER 180 tablet 3     No current facility-administered medications for this visit.       Past Surgical History:   Procedure Laterality Date    APPENDECTOMY      BREAST BIOPSY Right     benign    BREAST CYST ASPIRATION Right     benign    BREAST SURGERY      milk duct removed    CATARACT EXTRACTION Bilateral     CATARACT EXTRACTION, BILATERAL  2019    CERVICAL FUSION N/A 05/25/2021    Procedure: FUSION CERVICAL POSTERIOR C1-C2 with allograft and neuromonitoring);  Surgeon: Wesley Camacho MD;  Location:  MAIN OR;  Service: Orthopedics    CHOLECYSTECTOMY      COLONOSCOPY  06/07/2022    COLONOSCOPY W/ BIOPSIES  2011    EGD  06/07/2022    HAND SURGERY Right     MAMMO (HISTORICAL) Bilateral 11/27/2018    MAMMO (HISTORICAL) Bilateral 10/27/2017    MANDIBLE SURGERY      MANDIBLE SURGERY      [facility] SURGICAL ARTHROSCOPY SHOULDER W/ROTATOR CUFF RPR Right 09/27/2022    Procedure: ARTHROSCOPIC ROTATOR CUFF REPAIR OF SUBSCAPULARIS BICEPS TENODESIS EXTENSIVE DEBRIDEMENT;  Surgeon: Tony Rose MD;  Location: [facility];  Service: Orthopedics    WISDOM TOOTH EXTRACTION      X4       Social History     Socioeconomic History    Marital status:      Spouse name: Not on file    Number of children: Not on file    Years of education: Not on file    Highest education level: Not on file   Occupational History    Not on file   Tobacco Use    Smoking status: Former     Current packs/day: 0.00     Types: Cigarettes     Quit date: 2004     Years since quitting:  20.1    Smokeless tobacco: Never   Vaping Use    Vaping status: Never Used   Substance and Sexual Activity    Alcohol use: Not Currently     Comment: last drink 7 years ago    Drug use: Not Currently    Sexual activity: Not Currently   Other Topics Concern    Not on file   Social History Narrative    Do you currently or have you served in the  [employer]: No    Were you activated, into active duty, as a member of the [employer] or as a Reservist: No    Occupation: retired    Marital status:     Exercise level: Occasional treadmill    General stress level: Low    Alcohol intake: None stopped 7 years ago    Caffeine intake: Moderate    Chewing tobacco: none    Illicit drugs: none    Guns present in home: No    Seat belts used routinely: Yes    Sunscreen used routinely: Yes    Smoke alarm in home: Yes    Advance directive: Yes     Social Determinants of Health     Financial Resource Strain: Low Risk  (8/22/2023)    Overall Financial Resource Strain (CARDIA)     Difficulty of Paying Living Expenses: Not hard at all   Food Insecurity: Not on file   Transportation Needs: No Transportation Needs (8/22/2023)    PRAPARE - Transportation     Lack of Transportation (Medical): No     Lack of Transportation (Non-Medical): No   Physical Activity: Not on file   Stress: Not on file   Social Connections: Not on file   Intimate Partner Violence: Not on file   Housing Stability: Not on file       No Known Allergies    Review of Systems  General- denies fever/chills  HEENT- denies hearing loss or sore throat  Eyes- denies eye pain or visual disturbances, denies red eyes  Respiratory- denies cough or SOB  Cardio- denies chest pain or palpitations  GI- denies abdominal pain  Endocrine- denies urinary frequency  Urinary- denies pain with urination  Musculoskeletal- Negative except noted above  Skin- denies rashes or wounds  Neurological- denies dizziness or headache  Psychiatric- denies anxiety or difficulty  concentrating    Physical Exam  Ht 5' (1.524 m)   Wt 59 kg (130 lb 1.1 oz)   BMI 25.40 kg/m²     General/Constitutional: No apparent distress: well-nourished and well developed.  Lymphatic: No appreciable lymphadenopathy  Respiratory: Non-labored breathing  Vascular: No edema, swelling or tenderness, except as noted in detailed exam.  Integumentary: No impressive skin lesions present, except as noted in detailed exam.  Psych: Normal mood and affect, oriented to person, place and time.  MSK: normal other than stated in HPI and exam  Gait & balance: no evidence of myelopathic gait, ambulates Independently     Cervical  spine range of motion:  -Forward flexion chin to chest  -Extension to 60  -Lateral bend 10 right, 10 left  -Rotation 25 right, 25 left.    There is no point tenderness with palpation along the posterior cervical, thoracic, lumbar spine.     Posterior cervical surgical site well-healed    Neurologic:  Upper Extremity Motor Function    Right  Left    Deltoid  5/5  5/5    Bicep  5/5  5/5    Wrist extension  5/5  5/5    Tricep  5/5  5/5    Finger flexion/  5/5  5/5    Hand intrinsic  5/5  5/5      Lower Extremity Motor Function    Right  Left    Iliopsoas  5/5  5/5    Quadriceps 5/5 5/5   Tibialis anterior  5/5  5/5    EHL  5/5  5/5    Gastroc. muscle  5/5  5/5    Heel rise  5/5  5/5    Toe rise  5/5  5/5      Sensory: light touch is intact to bilateral upper and lower extremities     Reflexes:    Right Left   Biceps 2+ 2+   Triceps 2+ 2+   Brachioradialis 2+ 2+   Patellar 1+ 1+   Achilles 1+ 1+   Babinski upgoing neg     Other tests:  Spurling's: not tested  Grijalva's: Negative  Clonus: Negative  Tandem gait: Unable to perform  Romberg: positive   Carpal Tinel/Phalen: negative bilateral   Cubital Tinel: negative bilateral     Diagnostic Tests   IMAGING: I have personally reviewed the images and these are my findings:  Cervical Spine X-rays from 5/23/23: multi level cervical spondylosis with C1-2  "bilateral screw and ramon construct that appears intact, no significant changes from previous x-rays in 2022    Cervical Spine X-rays from 1/31/23: multi level cervical spondylosis with C1-2 bilateral screw and ramon construct that elida, there is disc degeneration most at C3-4 with mild/moderate stenosis, no cord signal abnormalities appreciated     Electronic Medical Records were reviewed including Dr. Camacho notes, radiology reports     Procedures, if performed today     None performed       Portions of the record may have been created with voice recognition software.  Occasional wrong word or \"sound a like\" substitutions may have occurred due to the inherent limitations of voice recognition software.  Read the chart carefully and recognize, using context, where substitutions have occurred.    "

## 2024-02-06 NOTE — TELEPHONE ENCOUNTER
Reason for call:   [x] Refill   [] Prior Auth  [] Other:     Office:   [x] PCP/Provider - Dr Gonzales  [] Specialty/Provider -     Medication: atorvastatin     Dose/Frequency: 10 mg daily    Quantity: 90D w refill    [facility]    Does the patient have enough for 3 days?   [x] Yes   [] No - Send as HP to POD

## 2024-02-07 ENCOUNTER — TELEPHONE (OUTPATIENT)
Dept: FAMILY MEDICINE CLINIC | Facility: CLINIC | Age: 74
End: 2024-02-07

## 2024-02-07 NOTE — TELEPHONE ENCOUNTER
Fax received requesting refill from [facility] [address]:  Atorvastatin 10mg 1 at bed.  #90   Post-Anesthesia Evaluation and Assessment    Patient: Trang Serna MRN: [id number]  SSN: [id number]    YOB: 1953  Age: 75 y.o. Sex: male       Cardiovascular Function/Vital Signs  Visit Vitals    /79    Pulse 76    Temp 36.7 °C (98 °F)    Resp 16    Ht 5' 7\" (1.702 m)    Wt 127.2 kg (280 lb 6 oz)    SpO2 94%    BMI 43.91 kg/m2       Patient is status post No value filed. anesthesia for Procedure(s):  L3-S1 DECOMPRESSION WITH POSTERIOR SPINAL FUSION USING MAZOR ROBOTIC GUIDANCE. Nausea/Vomiting: None    Postoperative hydration reviewed and adequate. Pain:  Pain Scale 1: Numeric (0 - 10) (05/03/18 0511)  Pain Intensity 1: 0 (05/03/18 0511)   Managed    Neurological Status:   Neuro (WDL): Within Defined Limits (05/02/18 1715)  Neuro  LUE Motor Response: Purposeful (05/02/18 1938)  LLE Motor Response: Purposeful (05/02/18 1938)  RUE Motor Response: Purposeful (05/02/18 1938)  RLE Motor Response: Purposeful (05/02/18 1938)   At baseline    Mental Status and Level of Consciousness: Arousable    Pulmonary Status:   O2 Device: CPAP mask;Room air (05/03/18 0231)   Adequate oxygenation and airway patent    Complications related to anesthesia: None    Post-anesthesia assessment completed.  No concerns    Signed By: Saundra Macias MD     May 3, 2018

## 2024-02-12 DIAGNOSIS — F41.9 ANXIETY: ICD-10-CM

## 2024-02-12 RX ORDER — ALPRAZOLAM 0.5 MG/1
1 TABLET ORAL 2 TIMES DAILY PRN
Qty: 60 TABLET | Refills: 0 | Status: SHIPPED | OUTPATIENT
Start: 2024-02-12

## 2024-02-12 NOTE — TELEPHONE ENCOUNTER
Reason for call:   [x] Refill   [] Prior Auth  [] Other:     Office:   [x] PCP/Provider -   [] Specialty/Provider -     Medication:   Alprazolam 0.5mg- take 2 tablets by mouth 2 times a day as needed      [facility]    Does the patient have enough for 3 days?   [] Yes   [x] No - Send as HP to POD

## 2024-02-14 ENCOUNTER — APPOINTMENT (OUTPATIENT)
Dept: LAB | Facility: HOSPITAL | Age: 74
End: 2024-02-14
Payer: MEDICARE

## 2024-02-14 ENCOUNTER — ANTICOAG VISIT (OUTPATIENT)
Dept: CARDIOLOGY CLINIC | Facility: CLINIC | Age: 74
End: 2024-02-14

## 2024-02-14 DIAGNOSIS — I48.11 LONGSTANDING PERSISTENT ATRIAL FIBRILLATION (HCC): Primary | ICD-10-CM

## 2024-02-14 DIAGNOSIS — I48.11 LONGSTANDING PERSISTENT ATRIAL FIBRILLATION (HCC): ICD-10-CM

## 2024-02-14 LAB
INR PPP: 1.95 (ref 0.84–1.19)
PROTHROMBIN TIME: 21.9 SECONDS (ref 11.6–14.5)

## 2024-02-14 PROCEDURE — 85610 PROTHROMBIN TIME: CPT

## 2024-02-14 PROCEDURE — 36415 COLL VENOUS BLD VENIPUNCTURE: CPT

## 2024-02-15 ENCOUNTER — TELEPHONE (OUTPATIENT)
Age: 74
End: 2024-02-15

## 2024-02-15 NOTE — TELEPHONE ENCOUNTER
Reviewed OV note 1/8/24 and pt was to f/u after CT.  CT one 1/15/24.  Pt has not gotten results.  Scheduled pt for Friday 2/16/24 1:45 w/Dr Bruce for CT review and discuss next steps since her left knee is not improving. Thank you.

## 2024-02-15 NOTE — TELEPHONE ENCOUNTER
Caller: Patient    Doctor: Teo    Reason for call: Patient is calling stating her left knee is not improving, and she has been in therapy for her balance.  She would like to know what her next steps are.     Call back#: [phone] - ok to leave message

## 2024-02-16 ENCOUNTER — OFFICE VISIT (OUTPATIENT)
Dept: OBGYN CLINIC | Facility: CLINIC | Age: 74
End: 2024-02-16
Payer: MEDICARE

## 2024-02-16 VITALS — BODY MASS INDEX: 25.52 KG/M2 | HEIGHT: 60 IN | WEIGHT: 130 LBS

## 2024-02-16 DIAGNOSIS — M17.12 PRIMARY OSTEOARTHRITIS OF LEFT KNEE: Primary | ICD-10-CM

## 2024-02-16 PROCEDURE — 99213 OFFICE O/P EST LOW 20 MIN: CPT | Performed by: ORTHOPAEDIC SURGERY

## 2024-02-16 PROCEDURE — 20610 DRAIN/INJ JOINT/BURSA W/O US: CPT

## 2024-02-16 RX ORDER — BETAMETHASONE SODIUM PHOSPHATE AND BETAMETHASONE ACETATE 3; 3 MG/ML; MG/ML
6 INJECTION, SUSPENSION INTRA-ARTICULAR; INTRALESIONAL; INTRAMUSCULAR; SOFT TISSUE
Status: COMPLETED | OUTPATIENT
Start: 2024-02-16 | End: 2024-02-16

## 2024-02-16 RX ORDER — BUPIVACAINE HYDROCHLORIDE 2.5 MG/ML
1 INJECTION, SOLUTION INFILTRATION; PERINEURAL
Status: COMPLETED | OUTPATIENT
Start: 2024-02-16 | End: 2024-02-16

## 2024-02-16 RX ORDER — LIDOCAINE HYDROCHLORIDE 10 MG/ML
1 INJECTION, SOLUTION INFILTRATION; PERINEURAL
Status: COMPLETED | OUTPATIENT
Start: 2024-02-16 | End: 2024-02-16

## 2024-02-16 RX ADMIN — BUPIVACAINE HYDROCHLORIDE 1 ML: 2.5 INJECTION, SOLUTION INFILTRATION; PERINEURAL at 13:45

## 2024-02-16 RX ADMIN — LIDOCAINE HYDROCHLORIDE 1 ML: 10 INJECTION, SOLUTION INFILTRATION; PERINEURAL at 13:45

## 2024-02-16 RX ADMIN — BETAMETHASONE SODIUM PHOSPHATE AND BETAMETHASONE ACETATE 6 MG: 3; 3 INJECTION, SUSPENSION INTRA-ARTICULAR; INTRALESIONAL; INTRAMUSCULAR; SOFT TISSUE at 13:45

## 2024-02-16 NOTE — PROGRESS NOTES
Assessment:  1. Primary osteoarthritis of left knee  Injection procedure prior authorization        Patient Active Problem List   Diagnosis    Neck pain    Spondylosis of cervical region without myelopathy or radiculopathy    Occipital neuralgia of left side    Pre-op examination    Cervical vertebral fusion    Gastroesophageal reflux disease without esophagitis    Anxiety    Primary hypertension    Other specified hypothyroidism    Hyperlipidemia    Sleep apnea    A-fib (HCC)    Cellulitis    Transaminitis    Olecranon bursitis of right elbow    Chronic anticoagulation    Irritable bowel syndrome with diarrhea    Dysphagia    Hemorrhoids    Schatzki's ring    Depression, recurrent (HCC)    Episode of recurrent major depressive disorder, unspecified depression episode severity (HCC)    Primary osteoarthritis of left knee    Patellofemoral disorder of right knee    Cervical myelopathy (HCC)    Impairment of balance    Closed fracture of shaft of left fibula    Chronic pain of left knee       Plan:    73 y.o. female  with left knee osteoarthritis    Discussed left CT of lower extremity negative for any fractures or osseous abnormality  Discussed steroid injection of left knee today secondary to pain and inflammation.  Discussed side effects and risk.  Patient agreeable.  Injection administered.  Discussed gel injections of left knee.  Prior authorization submitted today.  Patient to follow-up for course of gel injections    The assessment and plan were formulated by Dr. Bruce and I assisted in carrying it out.      Subjective:   Patient ID: Josee Vaca is a 73 y.o. female .    HPI    Patient presents to the office for follow up of left knee pain.  Notes consistent pain in the medial and lateral aspect of left knee.  Patient reports 8 out of 10 pain in left knee.  Patient presents today to review results of CT of left lower extremity.     The following portions of the patient's history were reviewed and updated as  appropriate: allergies, current medications, past family history, past social history, past surgical history and problem list.    Social History     Socioeconomic History    Marital status:      Spouse name: Not on file    Number of children: Not on file    Years of education: Not on file    Highest education level: Not on file   Occupational History    Not on file   Tobacco Use    Smoking status: Former     Current packs/day: 0.00     Types: Cigarettes     Quit date:      Years since quittin.1    Smokeless tobacco: Never   Vaping Use    Vaping status: Never Used   Substance and Sexual Activity    Alcohol use: Not Currently     Comment: last drink 7 years ago    Drug use: Not Currently    Sexual activity: Not Currently   Other Topics Concern    Not on file   Social History Narrative    Do you currently or have you served in the [employer]: No    Were you activated, into active duty, as a member of the [employer] or as a Reservist: No    Occupation: retired    Marital status:     Exercise level: Occasional treadmill    General stress level: Low    Alcohol intake: None stopped 7 years ago    Caffeine intake: Moderate    Chewing tobacco: none    Illicit drugs: none    Guns present in home: No    Seat belts used routinely: Yes    Sunscreen used routinely: Yes    Smoke alarm in home: Yes    Advance directive: Yes     Social Determinants of Health     Financial Resource Strain: Low Risk  (2023)    Overall Financial Resource Strain (CARDIA)     Difficulty of Paying Living Expenses: Not hard at all   Food Insecurity: Not on file   Transportation Needs: No Transportation Needs (2023)    PRAPARE - Transportation     Lack of Transportation (Medical): No     Lack of Transportation (Non-Medical): No   Physical Activity: Not on file   Stress: Not on file   Social Connections: Not on file   Intimate Partner Violence: Not on file   Housing Stability: Not on file     Past Medical History:    Diagnosis Date    A-fib (HCC)     Anxiety     Arthritis     shoulder    Carpal tunnel syndrome     Colon polyp     Depression     Disease of thyroid gland     Diverticulitis     Esophageal stricture     HBP (high blood pressure)     Hiatal hernia 06/07/2022    Small hiatal hernia    Hyperlipidemia     Hypertension     Hypothyroid     Pneumonia     2017    PONV (postoperative nausea and vomiting)     Schatzki's ring 06/07/2022    Schatzki's ring in the lower esophagus    Sleep apnea     mild      Past Surgical History:   Procedure Laterality Date    APPENDECTOMY      BREAST BIOPSY Right     benign    BREAST CYST ASPIRATION Right     benign    BREAST SURGERY      milk duct removed    CATARACT EXTRACTION Bilateral     CATARACT EXTRACTION, BILATERAL  2019    CERVICAL FUSION N/A 05/25/2021    Procedure: FUSION CERVICAL POSTERIOR C1-C2 with allograft and neuromonitoring);  Surgeon: Wesley Camacho MD;  Location:  MAIN OR;  Service: Orthopedics    CHOLECYSTECTOMY      COLONOSCOPY  06/07/2022    COLONOSCOPY W/ BIOPSIES  2011    EGD  06/07/2022    HAND SURGERY Right     MAMMO (HISTORICAL) Bilateral 11/27/2018    MAMMO (HISTORICAL) Bilateral 10/27/2017    MANDIBLE SURGERY      MANDIBLE SURGERY      [facility] SURGICAL ARTHROSCOPY SHOULDER W/ROTATOR CUFF RPR Right 09/27/2022    Procedure: ARTHROSCOPIC ROTATOR CUFF REPAIR OF SUBSCAPULARIS BICEPS TENODESIS EXTENSIVE DEBRIDEMENT;  Surgeon: Tony Rose MD;  Location: [facility];  Service: Orthopedics    WISDOM TOOTH EXTRACTION      X4     No Known Allergies  Current Outpatient Medications on File Prior to Visit   Medication Sig Dispense Refill    acetaminophen (TYLENOL) 500 mg tablet Take 1 tablet (500 mg total) by mouth every 6 (six) hours as needed for moderate pain 20 tablet 0    ALPRAZolam (XANAX) 0.5 mg tablet Take 2 tablets (1 mg total) by mouth 2 (two) times a day as needed for anxiety 60 tablet 0    atorvastatin (LIPITOR) 10 mg tablet Take 1 tablet (10 mg total)  by mouth daily at bedtime 90 tablet 1    desvenlafaxine succinate (PRISTIQ) 50 mg 24 hr tablet Take 1 tablet (50 mg total) by mouth daily 30 tablet 5    levothyroxine 75 mcg tablet Take 1 tablet (75 mcg total) by mouth daily 30 tablet 6    metoprolol tartrate (LOPRESSOR) 25 mg tablet Take 0.5 tablets (12.5 mg total) by mouth every 12 (twelve) hours 180 tablet 3    Multiple Vitamins-Minerals (CENTRUM ADULTS PO) Take 1 tablet by mouth daily      tiZANidine (ZANAFLEX) 2 mg tablet Take 1 tablet (2 mg total) by mouth every 8 (eight) hours as needed for muscle spasms 20 tablet 0    warfarin (COUMADIN) 2 mg tablet TAKE 1 AND 1/2 TO 2 TABLETS BY MOUTH DAILY AS DIRECTED BY PRESCRIBER 180 tablet 3     No current facility-administered medications on file prior to visit.       Review of Systems   Constitutional:  Negative for chills and fever.   HENT:  Negative for ear pain and sore throat.    Eyes:  Negative for pain and visual disturbance.   Respiratory:  Negative for cough and shortness of breath.    Cardiovascular:  Negative for chest pain and palpitations.   Gastrointestinal:  Negative for abdominal pain and vomiting.   Genitourinary:  Negative for dysuria and hematuria.   Musculoskeletal:  Negative for arthralgias and back pain.   Skin:  Negative for color change and rash.   Neurological:  Negative for seizures and syncope.   All other systems reviewed and are negative.    See HPi    Objective:    There were no vitals filed for this visit.    Physical Exam  Vitals and nursing note reviewed.   Constitutional:       General: She is not in acute distress.     Appearance: She is well-developed.   HENT:      Head: Normocephalic and atraumatic.   Eyes:      Conjunctiva/sclera: Conjunctivae normal.   Cardiovascular:      Rate and Rhythm: Normal rate and regular rhythm.      Heart sounds: No murmur heard.  Pulmonary:      Effort: Pulmonary effort is normal. No respiratory distress.      Breath sounds: Normal breath sounds.  "  Abdominal:      Palpations: Abdomen is soft.      Tenderness: There is no abdominal tenderness.   Musculoskeletal:         General: No swelling.      Cervical back: Neck supple.      Left knee: No effusion.   Skin:     General: Skin is warm and dry.      Capillary Refill: Capillary refill takes less than 2 seconds.   Neurological:      Mental Status: She is alert.   Psychiatric:         Mood and Affect: Mood normal.         Left Knee Exam     Tenderness   The patient is experiencing tenderness in the medial joint line and lateral joint line.    Range of Motion   Extension:  normal   Flexion:  normal     Other   Erythema: absent  Scars: absent  Sensation: normal  Pulse: present  Swelling: none  Effusion: no effusion present            I have personally reviewed pertinent films in PACS and my interpretation is CT left lower extremity reveals no osseous abnormalities.  Moderate tricompartmental osteoarthritis    Large joint arthrocentesis: L knee  Universal Protocol:  Consent: Verbal consent obtained.  Risks and benefits: risks, benefits and alternatives were discussed  Consent given by: patient  Patient understanding: patient states understanding of the procedure being performed  Patient identity confirmed: verbally with patient  Supporting Documentation  Indications: pain   Procedure Details  Location: knee - L knee  Needle size: 22 G  Ultrasound guidance: no  Approach: anterolateral  Medications administered: 1 mL bupivacaine 0.25 %; 1 mL lidocaine 1 %; 6 mg betamethasone acetate-betamethasone sodium phosphate 6 (3-3) mg/mL    Patient tolerance: patient tolerated the procedure well with no immediate complications  Dressing:  Sterile dressing applied                  Portions of the record may have been created with voice recognition software.  Occasional wrong word or \"sound a like\" substitutions may have occurred due to the inherent limitations of voice recognition software.  Read the chart carefully and " recognize, using context, where substitutions have occurred.

## 2024-02-21 ENCOUNTER — EVALUATION (OUTPATIENT)
Dept: PHYSICAL THERAPY | Facility: REHABILITATION | Age: 74
End: 2024-02-21
Payer: MEDICARE

## 2024-02-21 DIAGNOSIS — R26.89 BALANCE PROBLEM: ICD-10-CM

## 2024-02-21 DIAGNOSIS — M47.812 SPONDYLOSIS OF CERVICAL REGION WITHOUT MYELOPATHY OR RADICULOPATHY: Primary | ICD-10-CM

## 2024-02-21 PROCEDURE — 97530 THERAPEUTIC ACTIVITIES: CPT | Performed by: PHYSICAL THERAPIST

## 2024-02-21 PROCEDURE — 97162 PT EVAL MOD COMPLEX 30 MIN: CPT | Performed by: PHYSICAL THERAPIST

## 2024-02-21 NOTE — PROGRESS NOTES
"PT Evaluation     Today's date: 2024  Patient name: Josee Vaca  : 1950  MRN: [id number]  Referring provider: Benigno Ball MD  Dx:   Encounter Diagnosis     ICD-10-CM    1. Spondylosis of cervical region without myelopathy or radiculopathy  M47.812 Ambulatory Referral to Physical Therapy      2. Balance problem  R26.89           Start Time: 1455  Stop Time: 1600  Total time in clinic (min): 65 minutes    Assessment  Assessment details: Josee Vaca is a 73 y.o. year old female who presents to  with Spondylosis of cervical region without myelopathy or radiculopathy  (primary encounter diagnosis)  Balance problem. Patient presents with main complaints of balance problems. She is at a risk for falls based on all functional outcome measures this visit. Josee presents with the impairments as listed above and would benefit from Physical Therapy to address these impairments, improved quality of life and to maximize function.     Impairments: abnormal gait, activity intolerance, impaired balance, impaired physical strength, lacks appropriate home exercise program and safety issue    Goals  Short-Term Goals: 2-4 weeks  1. Patient will report <7/10 neck pain.  2. Patient will report no falls.    Long-Term Goals: 4-6 weeks  1. Patient will achieve 13\" or less on TUG for decreased risk of falls.  2. Patient independent with HEP at time of discharge.   3. Patient will complete sit to stand transfer with greater ease and less reliance on UE for improved functional strength and decreased risk of falls.    Plan  Patient would benefit from: skilled physical therapy  Planned therapy interventions: balance, gait training, home exercise program, therapeutic exercise, therapeutic activities, transfer training, stretching, strengthening, postural training, patient education and neuromuscular re-education  Frequency: 2x week  Duration in visits: 16  Duration in weeks: 8  Plan of Care beginning date: " "2024  Plan of Care expiration date: 2024  Treatment plan discussed with: patient        Subjective Evaluation    History of Present Illness  Mechanism of injury: Patient is a 73 y.o. presenting to physical therapy with complaints of neck pain and balance problems. She reports pain in the left/middle of the neck and trouble turning her head to the left. She has difficulty looking to the left while driving. She reports over the last 1.5 years since right RTCR she has had more problems with her balance. She reports 5 falls over the last year mostly due to tripping. She states \"I walk like a drunk.\" She can walk for about 5-10 minutes at a time limited by her knee pain and balance. She also reports difficulty standing from a chair.    She reports left knee pain that limits her from walking as well. She is scheduled for gel injections next week.     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, progressed since the prior exam.\"    Home set-up and functional level:  Steps to enter home: 1-2 no HR  First floor set up: Yes  Number of steps to second floor: goes to basement for laundry LHR  Adequate lighting, throw rugs: two dogs,   Previous falls: yes  Owns SPC but does not use it often  Assist at home: 50 yo daughter - partial caretaker for her daughter      Patient Goals  Patient goals for therapy: decreased pain, improved balance, increased motion, increased strength and independence with ADLs/IADLs  Patient goal: take care of yard, plant flowers, clean house, walking dogs  Pain  Current pain ratin  At best pain ratin  At worst pain ratin  Location: left and mid neck  Quality: tight (stiffness)  Relieving factors: medications and change in position  Exacerbated by: turning head to left.    Social Support  Steps to enter house: yes  Stairs in house: yes   Lives in: multiple-level home  Lives with: adult children (50 yo " daughter)    Employment status: not working  Treatments  Previous treatment: physical therapy and medication  Current treatment: medication and physical therapy      Objective     Static Posture     Head  Forward.    Shoulders  Rounded.    Thoracic Spine  Hyperkyphosis.    Postural Observations  Seated posture: poor  Standing posture: poor      Active Range of Motion   Cervical/Thoracic Spine       Cervical    Flexion: 35 degrees  with pain  Extension: 15 degrees      Left lateral flexion: 10 degrees     with pain  Right lateral flexion: 15 degrees        Additional Active Range of Motion Details  Seated cervical rotation:   R 40%   L 20%     Strength/Myotome Testing     Left Shoulder     Planes of Motion   Flexion: 4+   Abduction: 4+     Right Shoulder     Planes of Motion   Flexion: 4   Abduction: 4-     Left Elbow   Flexion: 4  Extension: 4    Right Elbow   Flexion: 4  Extension: 4    Left Hip   Planes of Motion   Flexion: 4    Right Hip   Planes of Motion   Flexion: 4    Left Knee   Flexion: 4  Extension: 3+    Right Knee   Flexion: 4  Extension: 4    Left Ankle/Foot   Dorsiflexion: 5    Right Ankle/Foot   Dorsiflexion: 5  Neuro Exam:     Functional outcomes   TU (seconds)  Functional outcome comment: Unable to stand from standard chair or elevated surface without significant UE reliance    IE performed on 24  TU seconds  FGA:     Romberg EO on ground: 30 seconds  Romberg EC on ground: 5 seconds  Romberg EO on foam: 5 seconds  Romberg EC on foam: 3 seconds  R Tandem stance: 2 seconds  L Tandem stance: 0 seconds                         Precautions: FALLS RISK, Coumadin, HTN, A-fib, posterior C1-C2 fusion, R RTCR (extensive PMH)      Manuals                                       Neuro Re-Ed             FTEO foam nv            Semi-tandem firm nv                         Backwards walking                                                    Ther Ex             Chin tucks nv            TB low  rows nv            TB mid rows nv            TB ER w scap retractions             Cervical rotation w overpressure                          Leg press nv                         Ther Activity             STS elevated surface nv                         Walking uneven surfaces nv            Obstacle course                          Modalities

## 2024-02-22 ENCOUNTER — TELEPHONE (OUTPATIENT)
Dept: OBGYN CLINIC | Facility: HOSPITAL | Age: 74
End: 2024-02-22

## 2024-02-22 NOTE — TELEPHONE ENCOUNTER
Caller: Patient     Doctor: Teo    Reason for call: Patient is starting PT on 3/04 and wants to know if its ok to move forward with the Euflexxa injections.    Call back#: [phone]

## 2024-02-22 NOTE — TELEPHONE ENCOUNTER
Caller: Patient    Doctor: Teo    Reason for call: Patient calling back with clarification to question.    Patient is asking if it is acceptable to have PT and the first dose of the Visco injection on the same day, as they are both scheduled for 3/4 at this point, or if she should reschedule the PT.    Please call patient back to advise.    Call back#: [phone] - Asks we a message if she does not answer

## 2024-02-23 NOTE — TELEPHONE ENCOUNTER
Called and spoke w/pt and relayed Dr Bruce's msg.  She states that she will not be doing both anyway.  She has done one PT and is having pain in her knee.  She just wants it to stop. No further questions/concerns.

## 2024-03-01 ENCOUNTER — OFFICE VISIT (OUTPATIENT)
Dept: PHYSICAL THERAPY | Facility: REHABILITATION | Age: 74
End: 2024-03-01
Payer: MEDICARE

## 2024-03-01 DIAGNOSIS — R26.89 BALANCE PROBLEM: ICD-10-CM

## 2024-03-01 DIAGNOSIS — M47.812 SPONDYLOSIS OF CERVICAL REGION WITHOUT MYELOPATHY OR RADICULOPATHY: Primary | ICD-10-CM

## 2024-03-01 PROCEDURE — 97112 NEUROMUSCULAR REEDUCATION: CPT

## 2024-03-01 PROCEDURE — 97110 THERAPEUTIC EXERCISES: CPT

## 2024-03-01 NOTE — PROGRESS NOTES
"Daily Note     Today's date: 3/1/2024  Patient name: Josee Vaca  : 1950  MRN: [id number]  Referring provider: Benigno Ball MD  Dx:   Encounter Diagnosis     ICD-10-CM    1. 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 Balance problem  R26.89           Start Time: 1028  Stop Time: 1107  Total time in clinic (min): 39 minutes    Subjective: Patient reports \"my knee has been killing me\" at start of session.       Objective: See treatment diary below      Assessment: Tolerated treatment well. Patient demonstrated fatigue post treatment, exhibited good technique with therapeutic exercises, and would benefit from continued PT Initiated POC this session with good tolerance, LOB noted one time throughout session when turning with ambulation, mod assist from therapist required to regain balance. Unable to perform sit to stands with two foam boosts secondary to left knee pain. Tolerated all other TE performed today well.       Plan: Continue per plan of care.  Progress treatment as tolerated.       Precautions: FALLS RISK, Coumadin, HTN, A-fib, posterior C1-C2 fusion, R RTCR (extensive PMH)      Manuals 2/21 3/1                                     Neuro Re-Ed             FTEO foam nv 20''x3           Semi-tandem firm nv 20''x3 ea                        Backwards walking                                                    Ther Ex             Chin tucks nv 10x3'' form           TB low rows nv Pink 2x10           TB mid rows nv Pink 2x10           TB ER w scap retractions             Cervical rotation w overpressure                          Leg press nv            Bike  5' no res           Ther Activity             STS elevated surface nv 2 foam boost pain                        Walking uneven surfaces nv nv           Obstacle course                          Modalities                                            "

## 2024-03-04 ENCOUNTER — PROCEDURE VISIT (OUTPATIENT)
Dept: OBGYN CLINIC | Facility: CLINIC | Age: 74
End: 2024-03-04
Payer: MEDICARE

## 2024-03-04 ENCOUNTER — APPOINTMENT (OUTPATIENT)
Dept: PHYSICAL THERAPY | Facility: REHABILITATION | Age: 74
End: 2024-03-04
Payer: MEDICARE

## 2024-03-04 VITALS — WEIGHT: 130 LBS | BODY MASS INDEX: 25.52 KG/M2 | HEIGHT: 60 IN

## 2024-03-04 DIAGNOSIS — M17.12 ARTHRITIS OF LEFT KNEE: Primary | ICD-10-CM

## 2024-03-04 PROCEDURE — 20610 DRAIN/INJ JOINT/BURSA W/O US: CPT | Performed by: ORTHOPAEDIC SURGERY

## 2024-03-04 RX ORDER — HYALURONATE SODIUM 10 MG/ML
20 SYRINGE (ML) INTRAARTICULAR
Status: COMPLETED | OUTPATIENT
Start: 2024-03-04 | End: 2024-03-04

## 2024-03-04 RX ADMIN — Medication 20 MG: at 12:30

## 2024-03-04 NOTE — PROGRESS NOTES
"1. Arthritis of left knee          Patient is here for her first injection of euflexxa into the left knee.     Patient rates their pain as 5/10 today    Physical exam of the knee shows no effusion no ecchymosis.      Large joint arthrocentesis: L knee  Universal Protocol:  Consent given by: patient  Time out: Immediately prior to procedure a \"time out\" was called to verify the correct patient, procedure, equipment, support staff and site/side marked as required.  Supporting Documentation  Indications: pain   Procedure Details  Location: knee - L knee  Needle size: 22 G  Ultrasound guidance: no  Approach: anterolateral  Medications administered: 20 mg Sodium Hyaluronate (Viscosup) 20 MG/2ML    Patient tolerance: patient tolerated the procedure well with no immediate complications          Patient tolerated procedure follow up 1 week  "

## 2024-03-06 ENCOUNTER — OFFICE VISIT (OUTPATIENT)
Dept: PHYSICAL THERAPY | Facility: REHABILITATION | Age: 74
End: 2024-03-06
Payer: MEDICARE

## 2024-03-06 DIAGNOSIS — M47.812 SPONDYLOSIS OF CERVICAL REGION WITHOUT MYELOPATHY OR RADICULOPATHY: Primary | ICD-10-CM

## 2024-03-06 DIAGNOSIS — R26.89 BALANCE PROBLEM: ICD-10-CM

## 2024-03-06 PROCEDURE — 97110 THERAPEUTIC EXERCISES: CPT | Performed by: PHYSICAL THERAPIST

## 2024-03-06 PROCEDURE — 97112 NEUROMUSCULAR REEDUCATION: CPT | Performed by: PHYSICAL THERAPIST

## 2024-03-06 PROCEDURE — 97530 THERAPEUTIC ACTIVITIES: CPT | Performed by: PHYSICAL THERAPIST

## 2024-03-06 NOTE — PROGRESS NOTES
"Daily Note     Today's date: 3/6/2024  Patient name: Josee Vaca  : 1950  MRN: [id number]  Referring provider: Benigno Ball MD  Dx:   Encounter Diagnosis     ICD-10-CM    1. 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 Balance problem  R26.89           Start Time: 1519  Stop Time: 1604  Total time in clinic (min): 45 minutes    Subjective: Patient reports receiving a gel injection in her knee on Monday.      Objective: See treatment diary below      Assessment: Tolerated treatment well. Patient demonstrated fatigue post treatment, exhibited good technique with therapeutic exercises, and would benefit from continued PT. Mild postural sway with static balance exercises, patient reaches for counter at to stabilize with minor sway. Improved with cues to avoid reaching for objects to steady. Initiated dynamic balance training with walking on compliant surfaces and [place] negotiations to address concerns of ambulation over uneven surfaces and poor right foot clearance.      Plan: Continue per plan of care.      Precautions: FALLS RISK, Coumadin, HTN, A-fib, posterior C1-C2 fusion, R RTCR (extensive PMH)      Manuals 2/21 3/1 3/6                                    Neuro Re-Ed             FTEO foam nv 20''x3 3x30\"          Semi-tandem firm nv 20''x3 ea 3x30\"ea                       Backwards walking                                                    Ther Ex             Chin tucks nv 10x3'' form 10x5\" form          TB low rows nv Pink 2x10 Pink 3x10          TB mid rows nv Pink 2x10 GTB 3x10          TB ER w scap retractions   OTB 2x10          Cervical rotation w overpressure                          Leg press nv  55# 3x10          Bike  5' no res 5' lv 1          Ther Activity             STS elevated surface nv 2 foam boost pain           Cone tap over [place]   nv          Walking uneven surfaces nv nv 4 laps (foam, hurdles, mat)  Uneven mat this visit        "   Obstacle course                          Modalities

## 2024-03-08 ENCOUNTER — OFFICE VISIT (OUTPATIENT)
Dept: PHYSICAL THERAPY | Facility: REHABILITATION | Age: 74
End: 2024-03-08
Payer: MEDICARE

## 2024-03-08 DIAGNOSIS — R26.89 BALANCE PROBLEM: ICD-10-CM

## 2024-03-08 DIAGNOSIS — M47.812 SPONDYLOSIS OF CERVICAL REGION WITHOUT MYELOPATHY OR RADICULOPATHY: Primary | ICD-10-CM

## 2024-03-08 PROCEDURE — 97530 THERAPEUTIC ACTIVITIES: CPT | Performed by: PHYSICAL THERAPIST

## 2024-03-08 PROCEDURE — 97110 THERAPEUTIC EXERCISES: CPT | Performed by: PHYSICAL THERAPIST

## 2024-03-08 PROCEDURE — 97112 NEUROMUSCULAR REEDUCATION: CPT | Performed by: PHYSICAL THERAPIST

## 2024-03-08 NOTE — PROGRESS NOTES
"Daily Note     Today's date: 3/8/2024  Patient name: Josee Vaca  : 1950  MRN: [id number]  Referring provider: Benigno Ball MD  Dx:   Encounter Diagnosis     ICD-10-CM    1. 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 Balance problem  R26.89           Start Time: 08  Stop Time: 930  Total time in clinic (min): 38 minutes    Subjective: Patient reports some knee pain after the bike and leg press last visit, requests to hold on these exercises.      Objective: See treatment diary below      Assessment: Tolerated treatment fair. Patient exhibited good technique with therapeutic exercises and would benefit from continued PT. Held bike and BLE strengthening secondary to complaints of knee pain and patient currently in the process of receiving a series of injections in the knee. Patient has greater posterior sway with static balance exercises and immediately reaches forward to grab onto railing for steadying. Provided cues to attempt to self adjust with forward weight shift prior to reaching but poor carryover noted. Patient fell with attempt at stepping onto foam [place] resulting in lateral loss of balance and subsequent left knee buckling. Patient was controlled to the ground with therapist and gait belt. Assisted to standing with three therapists max A. Patient denies injuries. Incident report completed.      Plan: Continue per plan of care.      Precautions: FALLS RISK, Coumadin, HTN, A-fib, posterior C1-C2 fusion, R RTCR (extensive PMH)      Manuals 2/21 3/1 3 3                                   Neuro Re-Ed             FTEO foam nv 20''x3 3x30\" 3x30\"         Semi-tandem firm nv 20''x3 ea 3x30\"ea 2x30\" ea                      Backwards walking                                                    Ther Ex             Chin tucks nv 10x3'' form 10x5\" form          TB low rows nv Pink 2x10 Pink 3x10 OTB 3x10         TB mid rows nv Pink 2x10 GTB 3x10 Pink 3x10    "      TB ER w scap retractions   OTB 2x10 OTB 3x10         Cervical rotation w overpressure                          Leg press nv  55# 3x10 np         Bike  5' no res 5' lv 1 np         Ther Activity             STS elevated surface nv 2 foam boost pain           Cone tap over [place]   nv Tap to floor 10x b/l         Walking uneven surfaces nv nv 4 laps (foam, hurdles, mat)  Uneven mat, foam circles, hurdles 3 laps         Obstacle course                          Modalities

## 2024-03-11 ENCOUNTER — APPOINTMENT (OUTPATIENT)
Dept: PHYSICAL THERAPY | Facility: REHABILITATION | Age: 74
End: 2024-03-11
Payer: MEDICARE

## 2024-03-11 ENCOUNTER — PROCEDURE VISIT (OUTPATIENT)
Dept: OBGYN CLINIC | Facility: CLINIC | Age: 74
End: 2024-03-11
Payer: MEDICARE

## 2024-03-11 VITALS — BODY MASS INDEX: 25.52 KG/M2 | HEIGHT: 60 IN | WEIGHT: 130 LBS

## 2024-03-11 DIAGNOSIS — M17.12 PRIMARY OSTEOARTHRITIS OF LEFT KNEE: Primary | ICD-10-CM

## 2024-03-11 DIAGNOSIS — F41.9 ANXIETY: ICD-10-CM

## 2024-03-11 PROCEDURE — 20610 DRAIN/INJ JOINT/BURSA W/O US: CPT | Performed by: ORTHOPAEDIC SURGERY

## 2024-03-11 RX ORDER — HYALURONATE SODIUM 10 MG/ML
20 SYRINGE (ML) INTRAARTICULAR
Status: COMPLETED | OUTPATIENT
Start: 2024-03-11 | End: 2024-03-11

## 2024-03-11 RX ORDER — ALPRAZOLAM 0.5 MG/1
1 TABLET ORAL 2 TIMES DAILY PRN
Qty: 60 TABLET | Refills: 0 | Status: SHIPPED | OUTPATIENT
Start: 2024-03-11

## 2024-03-11 RX ADMIN — Medication 20 MG: at 12:30

## 2024-03-11 NOTE — TELEPHONE ENCOUNTER
Reason for call:   [x] Refill   [] Prior Auth  [] Other:     Office:   [x] PCP/Provider - Valarie Gonzales MD  [] Specialty/Provider -     Medication  ALPRAZolam (XANAX) 0.5 mg tablet   Dose: 1 mg Route: Oral Frequency: 2 times daily PRN for anxiety  Dispense Quantity: 60 tablet  Sig: Take 2 tablets (1 mg total) by mouth 2 (two) times a day as needed for anxiety         Pharmacy  [id number] [place], [place]       Does the patient have enough for 3 days?   [x] Yes   [] No - Send as HP to POD

## 2024-03-11 NOTE — PROGRESS NOTES
No diagnosis found.  Patient is here for her 2nd injection of Euflexxa into the left knee.     Patient rates their pain as 5/10 today    Physical exam of the knee shows no effusion no ecchymosis.      Large joint arthrocentesis: L knee  Universal Protocol:  Consent: Verbal consent obtained.  Risks and benefits: risks, benefits and alternatives were discussed  Consent given by: patient  Site marked: the operative site was marked  Radiology Images displayed and confirmed. If images not available, report reviewed: imaging studies available  Patient identity confirmed: verbally with patient  Supporting Documentation  Indications: pain and diagnostic evaluation   Procedure Details  Location: knee - L knee  Preparation: Patient was prepped and draped in the usual sterile fashion  Needle size: 22 G  Ultrasound guidance: no  Approach: lateral  Medications administered: 20 mg Sodium Hyaluronate (Viscosup) 20 MG/2ML    Patient tolerance: patient tolerated the procedure well with no immediate complications  Dressing:  Sterile dressing applied          Patient tolerated procedure follow up in 1 week for Euflexxa #3 in to her left knee

## 2024-03-12 ENCOUNTER — APPOINTMENT (OUTPATIENT)
Dept: LAB | Facility: HOSPITAL | Age: 74
End: 2024-03-12
Payer: MEDICARE

## 2024-03-12 ENCOUNTER — ANTICOAG VISIT (OUTPATIENT)
Dept: CARDIOLOGY CLINIC | Facility: CLINIC | Age: 74
End: 2024-03-12

## 2024-03-12 DIAGNOSIS — I48.11 LONGSTANDING PERSISTENT ATRIAL FIBRILLATION (HCC): Primary | ICD-10-CM

## 2024-03-12 DIAGNOSIS — I48.11 LONGSTANDING PERSISTENT ATRIAL FIBRILLATION (HCC): ICD-10-CM

## 2024-03-12 LAB
INR PPP: 2.53 (ref 0.84–1.19)
PROTHROMBIN TIME: 26.7 SECONDS (ref 11.6–14.5)

## 2024-03-12 PROCEDURE — 36415 COLL VENOUS BLD VENIPUNCTURE: CPT

## 2024-03-12 PROCEDURE — 85610 PROTHROMBIN TIME: CPT

## 2024-03-13 ENCOUNTER — APPOINTMENT (OUTPATIENT)
Dept: PHYSICAL THERAPY | Facility: REHABILITATION | Age: 74
End: 2024-03-13
Payer: MEDICARE

## 2024-03-15 ENCOUNTER — OFFICE VISIT (OUTPATIENT)
Dept: PHYSICAL THERAPY | Facility: REHABILITATION | Age: 74
End: 2024-03-15
Payer: MEDICARE

## 2024-03-15 DIAGNOSIS — M47.812 SPONDYLOSIS OF CERVICAL REGION WITHOUT MYELOPATHY OR RADICULOPATHY: Primary | ICD-10-CM

## 2024-03-15 DIAGNOSIS — R26.89 BALANCE PROBLEM: ICD-10-CM

## 2024-03-15 PROCEDURE — 97110 THERAPEUTIC EXERCISES: CPT

## 2024-03-15 PROCEDURE — 97530 THERAPEUTIC ACTIVITIES: CPT

## 2024-03-15 PROCEDURE — 97112 NEUROMUSCULAR REEDUCATION: CPT

## 2024-03-15 NOTE — PROGRESS NOTES
"Daily Note     Today's date: 3/15/2024  Patient name: Josee Vaca  : 1950  MRN: [id number]  Referring provider: Benigno Ball MD  Dx:   Encounter Diagnosis     ICD-10-CM    1. 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 Balance problem  R26.89                      Subjective: Patient reports her L knee is sore, she has her last injection next week.       Objective: See treatment diary below      Assessment: Patient did well with outlined program. Gait belt utilized for balance exercises as charted. Cues to stand tall and look fwd with FT/EO/EC exercises, she will reach for the railing quickly sandro she feels any sway. Added head turns on firm surface which was not very challenging but due to the fwd lean when on foam did not attempt today. CGA with uneven surfaces, floor taps, and tandem amb which was added for increased challenge. She has occasional missteps due to slight LOB but was able to recover each time with [place]. She is challenged with sequencing for smoother gait pattern but did much better as she repeated the exercise. Did not perform any LE strengthening or bike as she is still having discomfort and is waiting for her last gel injection. Patient demonstrated fatigue post treatment and would benefit from continued PT      Plan: Progress treatment as tolerated.       Precautions: FALLS RISK, Coumadin, HTN, A-fib, posterior C1-C2 fusion, R RTCR (extensive PMH)      Manuals 2/21 3/1 3/6 3/8 3/15                                  Neuro Re-Ed             FTEO foam nv 20''x3 3x30\" 3x30\" 3x30\"        Semi-tandem firm nv 20''x3 ea 3x30\"ea 2x30\" ea 2x30\" ea        FTEO firm c/ head turns     1 min   CGA        Backwards walking             Tandem ambulation     4 laps   Gait belt                                  Ther Ex             Chin tucks nv 10x3'' form 10x5\" form          TB low rows nv Pink 2x10 Pink 3x10 OTB 3x10 Otb 3x10        TB mid rows nv Pink 2x10 " GTB 3x10 Pink 3x10 [place] 3x10        TB ER w scap retractions   OTB 2x10 OTB 3x10 OTB 3x10 seated         Cervical rotation w overpressure                          Leg press nv  55# 3x10 np         Bike  5' no res 5' lv 1 np         Ther Activity             STS elevated surface nv 2 foam boost pain           Cone tap over [place]   nv Tap to floor 10x b/l Tap to floor 15x B  Gait belt         Walking uneven surfaces nv nv 4 laps (foam, hurdles, mat)  Uneven mat, foam circles, hurdles 3 laps 1 small plank, black mat c/ 3 hurdles  5 laps        Obstacle course                          Modalities

## 2024-03-18 ENCOUNTER — OFFICE VISIT (OUTPATIENT)
Dept: PHYSICAL THERAPY | Facility: REHABILITATION | Age: 74
End: 2024-03-18
Payer: MEDICARE

## 2024-03-18 DIAGNOSIS — M47.812 SPONDYLOSIS OF CERVICAL REGION WITHOUT MYELOPATHY OR RADICULOPATHY: Primary | ICD-10-CM

## 2024-03-18 DIAGNOSIS — R26.89 BALANCE PROBLEM: ICD-10-CM

## 2024-03-18 PROCEDURE — 97110 THERAPEUTIC EXERCISES: CPT

## 2024-03-18 PROCEDURE — 97530 THERAPEUTIC ACTIVITIES: CPT

## 2024-03-18 PROCEDURE — 97112 NEUROMUSCULAR REEDUCATION: CPT

## 2024-03-18 NOTE — PROGRESS NOTES
"Daily Note     Today's date: 3/18/2024  Patient name: Josee Vaca  : 1950  MRN: [id number]  Referring provider: Benigno Ball MD  Dx:   Encounter Diagnosis     ICD-10-CM    1. 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 Balance problem  R26.89           Start Time: 1455  Stop Time: 1540  Total time in clinic (min): 45 minutes    Subjective: Patient states \"I am terrible, the knee is killing me.\"       Objective: See treatment diary below      Assessment: Tolerated treatment well. Patient demonstrated fatigue post treatment, exhibited good technique with therapeutic exercises, and would benefit from continued PT Good tolerance to all TE performed, occasional min/mod assist required from therapist during negotiation of obstacle course with patient occasionally requiring  cues for proper sequencing and for safe pace.       Plan: Continue per plan of care.  Progress treatment as tolerated.       Precautions: FALLS RISK, Coumadin, HTN, A-fib, posterior C1-C2 fusion, R RTCR (extensive PMH)      Manuals 2/21 3/1 3/6 3/8 3/15 3/18                                 Neuro Re-Ed             FTEO foam nv 20''x3 3x30\" 3x30\" 3x30\" 3x30''       Semi-tandem firm nv 20''x3 ea 3x30\"ea 2x30\" ea 2x30\" ea 3x30'' ea       FTEO firm c/ head turns     1 min   CGA 30'' x2 ea       Backwards walking             Tandem ambulation     4 laps   Gait belt                                  Ther Ex             Chin tucks nv 10x3'' form 10x5\" form          TB low rows nv Pink 2x10 Pink 3x10 OTB 3x10 Otb 3x10 Pink TB 3x10       TB mid rows nv Pink 2x10 GTB 3x10 Pink 3x10 Pink 3x10 GTB 3x10       TB ER w scap retractions   OTB 2x10 OTB 3x10 OTB 3x10 seated  OTB 3x12 seated       Cervical rotation w overpressure                          Leg press nv  55# 3x10 np         Bike  5' no res 5' lv 1 np  5' lvl 1       Ther Activity             STS elevated surface nv 2 foam boost pain           Cone tap " over [place]   nv Tap to floor 10x b/l Tap to floor 15x B  Gait belt  Tap to floor 2x10 ea        Walking uneven surfaces nv nv 4 laps (foam, hurdles, mat)  Uneven mat, foam circles, hurdles 3 laps 1 small plank, black mat c/ 3 hurdles  5 laps 3 laps hurdles, foam, uneven mat- gait belt       Obstacle course                          Modalities

## 2024-03-20 ENCOUNTER — PROCEDURE VISIT (OUTPATIENT)
Dept: OBGYN CLINIC | Facility: CLINIC | Age: 74
End: 2024-03-20
Payer: MEDICARE

## 2024-03-20 ENCOUNTER — APPOINTMENT (OUTPATIENT)
Dept: PHYSICAL THERAPY | Facility: REHABILITATION | Age: 74
End: 2024-03-20
Payer: MEDICARE

## 2024-03-20 VITALS — WEIGHT: 130 LBS | HEIGHT: 60 IN | BODY MASS INDEX: 25.52 KG/M2

## 2024-03-20 DIAGNOSIS — M17.12 ARTHRITIS OF LEFT KNEE: Primary | ICD-10-CM

## 2024-03-20 PROCEDURE — 20610 DRAIN/INJ JOINT/BURSA W/O US: CPT | Performed by: ORTHOPAEDIC SURGERY

## 2024-03-20 RX ORDER — HYALURONATE SODIUM 10 MG/ML
20 SYRINGE (ML) INTRAARTICULAR
Status: COMPLETED | OUTPATIENT
Start: 2024-03-20 | End: 2024-03-20

## 2024-03-20 RX ADMIN — Medication 20 MG: at 10:15

## 2024-03-20 NOTE — PROGRESS NOTES
"1. Arthritis of left knee          Patient is here for her third injection of Euflexxa into the left knee.     Patient rates their pain as 5/10 today    Physical exam of the knee shows no effusion no ecchymosis.      Large joint arthrocentesis: L knee  Universal Protocol:  Consent given by: patient  Time out: Immediately prior to procedure a \"time out\" was called to verify the correct patient, procedure, equipment, support staff and site/side marked as required.  Supporting Documentation  Indications: pain   Procedure Details  Location: knee - L knee  Needle size: 22 G  Ultrasound guidance: no  Approach: anterolateral  Medications administered: 20 mg Sodium Hyaluronate (Viscosup) 20 MG/2ML    Patient tolerance: patient tolerated the procedure well with no immediate complications          Patient tolerated procedure follow up as needed did tell her that if she is not doing better to come in for a cortisone injection at 4 weeks we also talked about that if these injections do not work probably the only thing that I can offer her at that point is a total knee but she does not want surgery.  "

## 2024-03-22 ENCOUNTER — OFFICE VISIT (OUTPATIENT)
Dept: PHYSICAL THERAPY | Facility: REHABILITATION | Age: 74
End: 2024-03-22
Payer: MEDICARE

## 2024-03-22 DIAGNOSIS — M47.812 SPONDYLOSIS OF CERVICAL REGION WITHOUT MYELOPATHY OR RADICULOPATHY: Primary | ICD-10-CM

## 2024-03-22 DIAGNOSIS — R26.89 BALANCE PROBLEM: ICD-10-CM

## 2024-03-22 PROCEDURE — 97140 MANUAL THERAPY 1/> REGIONS: CPT

## 2024-03-22 PROCEDURE — 97110 THERAPEUTIC EXERCISES: CPT

## 2024-03-22 PROCEDURE — 97112 NEUROMUSCULAR REEDUCATION: CPT

## 2024-03-22 NOTE — PROGRESS NOTES
"Daily Note     Today's date: 3/22/2024  Patient name: Josee Vaca  : 1950  MRN: [id number]  Referring provider: Benigno Ball MD  Dx:   Encounter Diagnosis     ICD-10-CM    1. 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 Balance problem  R26.89                      Subjective: Declined bike today 2/2 L knee pain. Notes increased pain in L side of neck over the last 2 days stating \"it was so bad yesterday I could hardly drive\".      Objective: See treatment diary below      Assessment: Tolerated treatment well. Notes pain at rest in L side of neck, TTP over L LS, trialed self stretch to help modulate pain with no significant improvement. Does respond positively to STM performed at end of session. Patient demonstrated fatigue post treatment and would benefit from continued PT Will call to schedule additional visits, is waiting for car repair appointment.       Plan: Continue per plan of care.  Progress treatment as tolerated.       Precautions: FALLS RISK, Coumadin, HTN, A-fib, posterior C1-C2 fusion, R RTCR (extensive PMH)      Manuals 2/21 3/1 3/6 3/8 3/15 3/18 3/22      STM L UT/LS       VR                   Neuro Re-Ed             FTEO foam nv 20''x3 3x30\" 3x30\" 3x30\" 3x30'' 3x30\"      Semi-tandem firm nv 20''x3 ea 3x30\"ea 2x30\" ea 2x30\" ea 3x30'' ea 3x30\" ea      FTEO firm c/ head turns     1 min   CGA 30'' x2 ea 30\"x2 ea      Backwards walking             Tandem ambulation     4 laps   Gait belt                                  Ther Ex             Chin tucks nv 10x3'' form 10x5\" form          LS stretch       4x30\"      TB low rows nv Pink 2x10 Pink 3x10 OTB 3x10 Otb 3x10 Pink TB 3x10 Pink TB 3x10      TB mid rows nv Pink 2x10 GTB 3x10 Pink 3x10 Pink 3x10 GTB 3x10 GTB 3x10      TB ER w scap retractions   OTB 2x10 OTB 3x10 OTB 3x10 seated  OTB 3x12 seated OTB 3x12       Cervical rotation w overpressure                          Leg press nv  55# 3x10 np       "   Bike  5' no res 5' lv 1 np  5' lvl 1 declined      Ther Activity             STS elevated surface nv 2 foam boost pain           Cone tap over [place]   nv Tap to floor 10x b/l Tap to floor 15x B  Gait belt  Tap to floor 2x10 ea  Tap to floor 2x10 ea       Walking uneven surfaces nv nv 4 laps (foam, hurdles, mat)  Uneven mat, foam circles, hurdles 3 laps 1 small plank, black mat c/ 3 hurdles  5 laps 3 laps hurdles, foam, uneven mat- gait belt       Obstacle course                          Modalities

## 2024-04-12 DIAGNOSIS — F33.9 EPISODE OF RECURRENT MAJOR DEPRESSIVE DISORDER, UNSPECIFIED DEPRESSION EPISODE SEVERITY (HCC): ICD-10-CM

## 2024-04-12 RX ORDER — DESVENLAFAXINE SUCCINATE 50 MG/1
50 TABLET, EXTENDED RELEASE ORAL DAILY
Qty: 30 TABLET | Refills: 5 | Status: SHIPPED | OUTPATIENT
Start: 2024-04-12 | End: 2024-10-09

## 2024-04-12 NOTE — TELEPHONE ENCOUNTER
Reason for call:   [x] Refill   [] Prior Auth  [] Other:     Office:   [x] PCP/Provider - Valarie Gonzales MD   [] Specialty/Provider -     Medication: desvenlafaxine succinate (PRISTIQ) 50 mg 24 hr tablet     Dose/Frequency: Take 1 tablet (50 mg total) by mouth daily,     Quantity: 30    Pharmacy: [facility] #[id number] [place], [place] [id number] [address] [facility] [phone]    Does the patient have enough for 3 days?   [] Yes   [x] No - Send as HP to POD

## 2024-04-13 DIAGNOSIS — E03.9 HYPOTHYROIDISM, UNSPECIFIED TYPE: ICD-10-CM

## 2024-04-13 RX ORDER — LEVOTHYROXINE SODIUM 0.07 MG/1
75 TABLET ORAL DAILY
Qty: 30 TABLET | Refills: 5 | Status: SHIPPED | OUTPATIENT
Start: 2024-04-13

## 2024-04-15 ENCOUNTER — OFFICE VISIT (OUTPATIENT)
Dept: OBGYN CLINIC | Facility: CLINIC | Age: 74
End: 2024-04-15
Payer: MEDICARE

## 2024-04-15 VITALS — HEIGHT: 60 IN | WEIGHT: 130 LBS | BODY MASS INDEX: 25.52 KG/M2

## 2024-04-15 DIAGNOSIS — M17.12 ARTHRITIS OF LEFT KNEE: Primary | ICD-10-CM

## 2024-04-15 PROCEDURE — 20610 DRAIN/INJ JOINT/BURSA W/O US: CPT | Performed by: ORTHOPAEDIC SURGERY

## 2024-04-15 PROCEDURE — 99212 OFFICE O/P EST SF 10 MIN: CPT | Performed by: ORTHOPAEDIC SURGERY

## 2024-04-15 RX ORDER — BETAMETHASONE SODIUM PHOSPHATE AND BETAMETHASONE ACETATE 3; 3 MG/ML; MG/ML
6 INJECTION, SUSPENSION INTRA-ARTICULAR; INTRALESIONAL; INTRAMUSCULAR; SOFT TISSUE
Status: COMPLETED | OUTPATIENT
Start: 2024-04-15 | End: 2024-04-15

## 2024-04-15 RX ORDER — BUPIVACAINE HYDROCHLORIDE 2.5 MG/ML
2 INJECTION, SOLUTION EPIDURAL; INFILTRATION; INTRACAUDAL
Status: COMPLETED | OUTPATIENT
Start: 2024-04-15 | End: 2024-04-15

## 2024-04-15 RX ADMIN — BUPIVACAINE HYDROCHLORIDE 2 ML: 2.5 INJECTION, SOLUTION EPIDURAL; INFILTRATION; INTRACAUDAL at 12:15

## 2024-04-15 RX ADMIN — BETAMETHASONE SODIUM PHOSPHATE AND BETAMETHASONE ACETATE 6 MG: 3; 3 INJECTION, SUSPENSION INTRA-ARTICULAR; INTRALESIONAL; INTRAMUSCULAR; SOFT TISSUE at 12:15

## 2024-04-15 NOTE — PROGRESS NOTES
Assessment:  1. Arthritis of left knee          Patient Active Problem List   Diagnosis    Neck pain    Spondylosis of cervical region without myelopathy or radiculopathy    Occipital neuralgia of left side    Pre-op examination    Cervical vertebral fusion    Gastroesophageal reflux disease without esophagitis    Anxiety    Primary hypertension    Other specified hypothyroidism    Hyperlipidemia    Sleep apnea    A-fib (HCC)    Cellulitis    Transaminitis    Olecranon bursitis of right elbow    Chronic anticoagulation    Irritable bowel syndrome with diarrhea    Dysphagia    Hemorrhoids    Schatzki's ring    Depression, recurrent (HCC)    Episode of recurrent major depressive disorder, unspecified depression episode severity (HCC)    Arthritis of left knee    Patellofemoral disorder of right knee    Cervical myelopathy (HCC)    Impairment of balance    Closed fracture of shaft of left fibula    Chronic pain of left knee           Plan      Follow-up on as-needed basis.  Cortisone injection given today.            Subjective:     Patient ID:    Chief Complaint:Josee Vaca 74 y.o. female      HPI    Patient comes in today with regards to her left knee.  She reports it is doing very well except occasionally when she gets up in the morning she has pain and can be as high as 5 out of 10.  She would like a cortisone injection to see if she can make the pain better.      The following portions of the patient's history were reviewed and updated as appropriate: allergies, current medications, past family history, past social history, past surgical history and problem list.    All organ systems normal    Social History     Socioeconomic History    Marital status:      Spouse name: Not on file    Number of children: Not on file    Years of education: Not on file    Highest education level: Not on file   Occupational History    Not on file   Tobacco Use    Smoking status: Former     Current packs/day: 0.00      Types: Cigarettes     Quit date: 2004     Years since quittin.3    Smokeless tobacco: Never   Vaping Use    Vaping status: Never Used   Substance and Sexual Activity    Alcohol use: Not Currently     Comment: last drink 7 years ago    Drug use: Not Currently    Sexual activity: Not Currently   Other Topics Concern    Not on file   Social History Narrative    Do you currently or have you served in the [employer]: No    Were you activated, into active duty, as a member of the [employer] or as a Reservist: No    Occupation: retired    Marital status:     Exercise level: Occasional treadmill    General stress level: Low    Alcohol intake: None stopped 7 years ago    Caffeine intake: Moderate    Chewing tobacco: none    Illicit drugs: none    Guns present in home: No    Seat belts used routinely: Yes    Sunscreen used routinely: Yes    Smoke alarm in home: Yes    Advance directive: Yes     Social Determinants of Health     Financial Resource Strain: Low Risk  (2023)    Overall Financial Resource Strain (CARDIA)     Difficulty of Paying Living Expenses: Not hard at all   Food Insecurity: Not on file   Transportation Needs: No Transportation Needs (2023)    PRAPARE - Transportation     Lack of Transportation (Medical): No     Lack of Transportation (Non-Medical): No   Physical Activity: Not on file   Stress: Not on file   Social Connections: Not on file   Intimate Partner Violence: Not on file   Housing Stability: Not on file     Past Medical History:   Diagnosis Date    A-fib (HCC)     Anxiety     Arthritis     shoulder    Carpal tunnel syndrome     Colon polyp     Depression     Disease of thyroid gland     Diverticulitis     Esophageal stricture     HBP (high blood pressure)     Hiatal hernia 2022    Small hiatal hernia    Hyperlipidemia     Hypertension     Hypothyroid     Pneumonia     2017    PONV (postoperative nausea and vomiting)     Tomaski's ring 2022    Jeremiah  ring in the lower esophagus    Sleep apnea     mild      Past Surgical History:   Procedure Laterality Date    APPENDECTOMY      BREAST BIOPSY Right     benign    BREAST CYST ASPIRATION Right     benign    BREAST SURGERY      milk duct removed    CATARACT EXTRACTION Bilateral     CATARACT EXTRACTION, BILATERAL  2019    CERVICAL FUSION N/A 05/25/2021    Procedure: FUSION CERVICAL POSTERIOR C1-C2 with allograft and neuromonitoring);  Surgeon: Wesley Camacho MD;  Location:  MAIN OR;  Service: Orthopedics    CHOLECYSTECTOMY      COLONOSCOPY  06/07/2022    COLONOSCOPY W/ BIOPSIES  2011    EGD  06/07/2022    HAND SURGERY Right     MAMMO (HISTORICAL) Bilateral 11/27/2018    MAMMO (HISTORICAL) Bilateral 10/27/2017    MANDIBLE SURGERY      MANDIBLE SURGERY      IA SURGICAL ARTHROSCOPY SHOULDER W/ROTATOR CUFF RPR Right 09/27/2022    Procedure: ARTHROSCOPIC ROTATOR CUFF REPAIR OF SUBSCAPULARIS BICEPS TENODESIS EXTENSIVE DEBRIDEMENT;  Surgeon: Tony Rose MD;  Location: [facility];  Service: Orthopedics    WISDOM TOOTH EXTRACTION      X4     No Known Allergies  Current Outpatient Medications on File Prior to Visit   Medication Sig Dispense Refill    acetaminophen (TYLENOL) 500 mg tablet Take 1 tablet (500 mg total) by mouth every 6 (six) hours as needed for moderate pain 20 tablet 0    ALPRAZolam (XANAX) 0.5 mg tablet Take 2 tablets (1 mg total) by mouth 2 (two) times a day as needed for anxiety 60 tablet 0    atorvastatin (LIPITOR) 10 mg tablet Take 1 tablet (10 mg total) by mouth daily at bedtime 90 tablet 1    desvenlafaxine succinate (PRISTIQ) 50 mg 24 hr tablet Take 1 tablet (50 mg total) by mouth daily 30 tablet 5    levothyroxine 75 mcg tablet TAKE 1 TABLET(75 MCG) BY MOUTH DAILY 30 tablet 5    metoprolol tartrate (LOPRESSOR) 25 mg tablet Take 0.5 tablets (12.5 mg total) by mouth every 12 (twelve) hours 180 tablet 3    Multiple Vitamins-Minerals (CENTRUM ADULTS PO) Take 1 tablet by mouth daily       "tiZANidine (ZANAFLEX) 2 mg tablet Take 1 tablet (2 mg total) by mouth every 8 (eight) hours as needed for muscle spasms 20 tablet 0    warfarin (COUMADIN) 2 mg tablet TAKE 1 AND 1/2 TO 2 TABLETS BY MOUTH DAILY AS DIRECTED BY PRESCRIBER 180 tablet 3     No current facility-administered medications on file prior to visit.              Objective:        Ortho Exam    No effusion no ecchymosis no erythema.  Large joint arthrocentesis: L knee  Universal Protocol:  Consent given by: patient  Time out: Immediately prior to procedure a \"time out\" was called to verify the correct patient, procedure, equipment, support staff and site/side marked as required.  Supporting Documentation  Indications: pain   Procedure Details  Location: knee - L knee  Needle size: 22 G  Ultrasound guidance: no  Approach: anterolateral  Medications administered: 6 mg betamethasone acetate-betamethasone sodium phosphate 6 (3-3) mg/mL; 2 mL bupivacaine (PF) 0.25 %      mL of betamethasone were administered for total of 6 mg                Portions of the record may have been created with voice recognition software.  Occasional wrong word or \"sound a like\" substitutions may have occurred due to the inherent limitations of voice recognition software.  Read the chart carefully and recognize, using context, where substitutions have occurred.  "

## 2024-04-17 ENCOUNTER — TELEPHONE (OUTPATIENT)
Dept: ADMINISTRATIVE | Facility: OTHER | Age: 74
End: 2024-04-17

## 2024-04-17 NOTE — TELEPHONE ENCOUNTER
04/17/24 1:19 PM    Patient contacted (left message) to bring Advance Directive, POLST, or Living Will document to next scheduled pcp visit.    Thank you.  Candi Soriano  [facility]

## 2024-04-22 ENCOUNTER — OFFICE VISIT (OUTPATIENT)
Dept: FAMILY MEDICINE CLINIC | Facility: CLINIC | Age: 74
End: 2024-04-22
Payer: MEDICARE

## 2024-04-22 ENCOUNTER — ANTICOAG VISIT (OUTPATIENT)
Dept: CARDIOLOGY CLINIC | Facility: CLINIC | Age: 74
End: 2024-04-22

## 2024-04-22 ENCOUNTER — APPOINTMENT (OUTPATIENT)
Dept: LAB | Facility: HOSPITAL | Age: 74
End: 2024-04-22
Payer: MEDICARE

## 2024-04-22 VITALS
BODY MASS INDEX: 24.11 KG/M2 | OXYGEN SATURATION: 98 % | SYSTOLIC BLOOD PRESSURE: 110 MMHG | HEIGHT: 62 IN | WEIGHT: 131 LBS | DIASTOLIC BLOOD PRESSURE: 60 MMHG | HEART RATE: 97 BPM | TEMPERATURE: 99.6 F | RESPIRATION RATE: 16 BRPM

## 2024-04-22 DIAGNOSIS — I48.11 LONGSTANDING PERSISTENT ATRIAL FIBRILLATION (HCC): Primary | ICD-10-CM

## 2024-04-22 DIAGNOSIS — I48.11 LONGSTANDING PERSISTENT ATRIAL FIBRILLATION (HCC): ICD-10-CM

## 2024-04-22 DIAGNOSIS — E03.8 OTHER SPECIFIED HYPOTHYROIDISM: ICD-10-CM

## 2024-04-22 DIAGNOSIS — I48.0 PAROXYSMAL ATRIAL FIBRILLATION (HCC): ICD-10-CM

## 2024-04-22 DIAGNOSIS — G95.9 CERVICAL MYELOPATHY (HCC): ICD-10-CM

## 2024-04-22 DIAGNOSIS — R15.9 INCONTINENCE OF FECES, UNSPECIFIED FECAL INCONTINENCE TYPE: ICD-10-CM

## 2024-04-22 DIAGNOSIS — N18.31 STAGE 3A CHRONIC KIDNEY DISEASE (HCC): ICD-10-CM

## 2024-04-22 DIAGNOSIS — E03.9 HYPOTHYROIDISM, UNSPECIFIED TYPE: ICD-10-CM

## 2024-04-22 DIAGNOSIS — I10 PRIMARY HYPERTENSION: Primary | ICD-10-CM

## 2024-04-22 DIAGNOSIS — F33.9 EPISODE OF RECURRENT MAJOR DEPRESSIVE DISORDER, UNSPECIFIED DEPRESSION EPISODE SEVERITY (HCC): ICD-10-CM

## 2024-04-22 DIAGNOSIS — F41.9 ANXIETY: ICD-10-CM

## 2024-04-22 LAB
INR PPP: 2.5 (ref 0.84–1.19)
PROTHROMBIN TIME: 26.5 SECONDS (ref 11.6–14.5)

## 2024-04-22 PROCEDURE — 36415 COLL VENOUS BLD VENIPUNCTURE: CPT

## 2024-04-22 PROCEDURE — 99214 OFFICE O/P EST MOD 30 MIN: CPT | Performed by: INTERNAL MEDICINE

## 2024-04-22 PROCEDURE — G2211 COMPLEX E/M VISIT ADD ON: HCPCS | Performed by: INTERNAL MEDICINE

## 2024-04-22 PROCEDURE — 85610 PROTHROMBIN TIME: CPT

## 2024-04-22 RX ORDER — DESVENLAFAXINE SUCCINATE 50 MG/1
50 TABLET, EXTENDED RELEASE ORAL DAILY
Qty: 30 TABLET | Refills: 5 | Status: SHIPPED | OUTPATIENT
Start: 2024-04-22 | End: 2024-10-19

## 2024-04-22 RX ORDER — CHOLESTYRAMINE 4 G/9G
1 POWDER, FOR SUSPENSION ORAL 2 TIMES DAILY WITH MEALS
Qty: 60 PACKET | Refills: 5 | Status: SHIPPED | OUTPATIENT
Start: 2024-04-22

## 2024-04-22 RX ORDER — ALPRAZOLAM 0.5 MG/1
1 TABLET ORAL 2 TIMES DAILY PRN
Qty: 60 TABLET | Refills: 0 | Status: SHIPPED | OUTPATIENT
Start: 2024-04-22

## 2024-04-22 NOTE — PROGRESS NOTES
Assessment/Plan:Broad differential for Josee's symptoms-will start with an Xray of the lower back LS spine and she may end up needing an MRI to see why she has the fecal urgency /incontience-referred to GI as well. She did have a colonoscopy in 2022 that was ok.Could be IBS or thyroid related or related to eating greasy foods in the absence of a gallbladder-told her I'd try to send some cholestyramine for her to see if it helps.           Problem List Items Addressed This Visit       Anxiety    Relevant Medications    ALPRAZolam (XANAX) 0.5 mg tablet    Primary hypertension - Primary    Relevant Medications    metoprolol tartrate (LOPRESSOR) 25 mg tablet    Other specified hypothyroidism    Relevant Medications    metoprolol tartrate (LOPRESSOR) 25 mg tablet    Stage 3a chronic kidney disease (HCC)    A-fib (HCC)    Relevant Medications    metoprolol tartrate (LOPRESSOR) 25 mg tablet    Episode of recurrent major depressive disorder, unspecified depression episode severity (HCC)    Relevant Medications    ALPRAZolam (XANAX) 0.5 mg tablet    desvenlafaxine succinate (PRISTIQ) 50 mg 24 hr tablet    Cervical myelopathy (HCC)     Other Visit Diagnoses       Incontinence of feces, unspecified fecal incontinence type        Relevant Medications    cholestyramine (QUESTRAN) 4 g packet    Other Relevant Orders    XR spine lumbar minimum 4 views non injury    TSH, 3rd generation    Comprehensive metabolic panel    CBC and differential    Ambulatory Referral to Gastroenterology    Hypothyroidism, unspecified type        Relevant Medications    metoprolol tartrate (LOPRESSOR) 25 mg tablet    Other Relevant Orders    TSH, 3rd generation              Subjective:      Patient ID: Josee Vaca is a 74 y.o. female.    Josee here with a hx of multiple chronic issues but mainly to mention again her fecal urgency and having accidents. I told Josee there are a variety of possible reasons for this-we know she had a hx of cervical  spine DDD fixed surgically so I told her it's entirely possible if not probable that she has DDD in her LS spine too-she has a lot of anxiety and stress too especially with her daughter's situation so not sure if IBS is playing a role-also had her gallbladder removed awhile ago and does eat more greasy foods than she should. No real weakness in her legs or numbness or tingling and has the typical stress incontinence that one would expect of a 75 yo woman but other than that no issues there.          The following portions of the patient's history were reviewed and updated as appropriate:   Past Medical History:  She has a past medical history of A-fib (HCC), Anxiety, Arthritis, Carpal tunnel syndrome, Colon polyp, Depression, Disease of thyroid gland, Diverticulitis, Esophageal stricture, HBP (high blood pressure), Hiatal hernia (06/07/2022), Hyperlipidemia, Hypertension, Hypothyroid, Pneumonia, PONV (postoperative nausea and vomiting), Schatzki's ring (06/07/2022), and Sleep apnea.,  _______________________________________________________________________  Medical Problems:  does not have any pertinent problems on file.,  _______________________________________________________________________  Past Surgical History:   has a past surgical history that includes Breast cyst aspiration (Right); Breast biopsy (Right); Appendectomy; Cholecystectomy; Colonoscopy w/ biopsies (2011); Mandible surgery; Earle tooth extraction; EGD (06/07/2022); Cataract extraction, bilateral (2019); Mammo (historical) (Bilateral, 11/27/2018); Mammo (historical) (Bilateral, 10/27/2017); Colonoscopy (06/07/2022); Cataract extraction (Bilateral); Hand surgery (Right); Cervical fusion (N/A, 05/25/2021); Mandible surgery; pr surgical arthroscopy shoulder w/rotator cuff rpr (Right, 09/27/2022); and Breast surgery.,  _______________________________________________________________________  Family History:  family history includes Heart disease in her  mother; Heart failure in her mother; Hyperlipidemia in her mother; Hypertension in her mother and sister.,  _______________________________________________________________________  Social History:   reports that she quit smoking about 20 years ago. Her smoking use included cigarettes. She has never used smokeless tobacco. She reports that she does not currently use alcohol. She reports that she does not currently use drugs.,  _______________________________________________________________________  Allergies:  has No Known Allergies..  _______________________________________________________________________  Current Outpatient Medications   Medication Sig Dispense Refill    acetaminophen (TYLENOL) 500 mg tablet Take 1 tablet (500 mg total) by mouth every 6 (six) hours as needed for moderate pain 20 tablet 0    ALPRAZolam (XANAX) 0.5 mg tablet Take 2 tablets (1 mg total) by mouth 2 (two) times a day as needed for anxiety 60 tablet 0    atorvastatin (LIPITOR) 10 mg tablet Take 1 tablet (10 mg total) by mouth daily at bedtime 90 tablet 1    cholestyramine (QUESTRAN) 4 g packet Take 1 packet (4 g total) by mouth 2 (two) times a day with meals 60 packet 5    desvenlafaxine succinate (PRISTIQ) 50 mg 24 hr tablet Take 1 tablet (50 mg total) by mouth daily 30 tablet 5    levothyroxine 75 mcg tablet TAKE 1 TABLET(75 MCG) BY MOUTH DAILY 30 tablet 5    metoprolol tartrate (LOPRESSOR) 25 mg tablet Take 0.5 tablets (12.5 mg total) by mouth every 12 (twelve) hours 180 tablet 3    Multiple Vitamins-Minerals (CENTRUM ADULTS PO) Take 1 tablet by mouth daily      tiZANidine (ZANAFLEX) 2 mg tablet Take 1 tablet (2 mg total) by mouth every 8 (eight) hours as needed for muscle spasms 20 tablet 0    warfarin (COUMADIN) 2 mg tablet TAKE 1 AND 1/2 TO 2 TABLETS BY MOUTH DAILY AS DIRECTED BY PRESCRIBER 180 tablet 3     No current facility-administered medications for this visit.  "    _______________________________________________________________________  Review of Systems   Constitutional: Negative.    Gastrointestinal:  Positive for diarrhea. Negative for abdominal distention, abdominal pain, anal bleeding, blood in stool, constipation, nausea, rectal pain and vomiting.   Genitourinary: Negative.    Musculoskeletal:  Positive for gait problem. Negative for back pain.   Neurological:  Negative for tremors and weakness.   Psychiatric/Behavioral:  Positive for dysphoric mood. The patient is nervous/anxious.          Objective:  Vitals:    04/22/24 1525   BP: 110/60   BP Location: Left arm   Patient Position: Sitting   Cuff Size: Standard   Pulse: 97   Resp: 16   Temp: 99.6 °F (37.6 °C)   TempSrc: Tympanic   SpO2: 98%   Weight: 59.4 kg (131 lb)   Height: 5' 2\" (1.575 m)     Body mass index is 23.96 kg/m².     Physical Exam  Constitutional:       Appearance: Normal appearance.   HENT:      Head: Normocephalic and atraumatic.      Right Ear: External ear normal.      Left Ear: External ear normal.      Nose: Nose normal.      Mouth/Throat:      Mouth: Mucous membranes are moist.   Cardiovascular:      Rate and Rhythm: Normal rate and regular rhythm.      Heart sounds: Murmur heard.   Pulmonary:      Effort: Pulmonary effort is normal.      Breath sounds: Normal breath sounds.   Abdominal:      General: Abdomen is flat.      Palpations: Abdomen is soft.   Genitourinary:     Comments: Did not do rectal exam to check tone  Musculoskeletal:         General: Normal range of motion.      Cervical back: Rigidity present.   Skin:     Findings: Rash present.   Neurological:      General: No focal deficit present.      Mental Status: She is alert and oriented to person, place, and time. Mental status is at baseline.      Motor: No weakness.         "

## 2024-04-30 ENCOUNTER — TELEPHONE (OUTPATIENT)
Age: 74
End: 2024-04-30

## 2024-04-30 NOTE — TELEPHONE ENCOUNTER
Pt called to ask if she could see Dr Mercado in [place] as she does not want to drive on [address]. I advised she does have an office in [place] and when she makes an appt to request it for there. No further action needed.

## 2024-05-08 DIAGNOSIS — E03.9 HYPOTHYROIDISM, UNSPECIFIED TYPE: ICD-10-CM

## 2024-05-08 RX ORDER — LEVOTHYROXINE SODIUM 0.07 MG/1
75 TABLET ORAL DAILY
Qty: 30 TABLET | Refills: 5 | Status: SHIPPED | OUTPATIENT
Start: 2024-05-08

## 2024-05-09 ENCOUNTER — HOSPITAL ENCOUNTER (OUTPATIENT)
Dept: RADIOLOGY | Facility: HOSPITAL | Age: 74
End: 2024-05-09
Attending: INTERNAL MEDICINE
Payer: MEDICARE

## 2024-05-09 ENCOUNTER — APPOINTMENT (OUTPATIENT)
Dept: LAB | Facility: HOSPITAL | Age: 74
End: 2024-05-09
Attending: INTERNAL MEDICINE
Payer: MEDICARE

## 2024-05-09 DIAGNOSIS — R15.9 INCONTINENCE OF FECES, UNSPECIFIED FECAL INCONTINENCE TYPE: ICD-10-CM

## 2024-05-09 DIAGNOSIS — E03.9 HYPOTHYROIDISM, UNSPECIFIED TYPE: ICD-10-CM

## 2024-05-09 LAB
ALBUMIN SERPL BCP-MCNC: 4.3 G/DL (ref 3.5–5)
ALP SERPL-CCNC: 52 U/L (ref 34–104)
ALT SERPL W P-5'-P-CCNC: 21 U/L (ref 7–52)
ANION GAP SERPL CALCULATED.3IONS-SCNC: 7 MMOL/L (ref 4–13)
AST SERPL W P-5'-P-CCNC: 30 U/L (ref 13–39)
BASOPHILS # BLD AUTO: 0.08 THOUSANDS/ÂΜL (ref 0–0.1)
BASOPHILS NFR BLD AUTO: 1 % (ref 0–1)
BILIRUB SERPL-MCNC: 0.5 MG/DL (ref 0.2–1)
BUN SERPL-MCNC: 15 MG/DL (ref 5–25)
CALCIUM SERPL-MCNC: 9.8 MG/DL (ref 8.4–10.2)
CHLORIDE SERPL-SCNC: 101 MMOL/L (ref 96–108)
CO2 SERPL-SCNC: 28 MMOL/L (ref 21–32)
CREAT SERPL-MCNC: 0.87 MG/DL (ref 0.6–1.3)
EOSINOPHIL # BLD AUTO: 0.32 THOUSAND/ÂΜL (ref 0–0.61)
EOSINOPHIL NFR BLD AUTO: 5 % (ref 0–6)
ERYTHROCYTE [DISTWIDTH] IN BLOOD BY AUTOMATED COUNT: 12.3 % (ref 11.6–15.1)
GFR SERPL CREATININE-BSD FRML MDRD: 64 ML/MIN/1.73SQ M
GLUCOSE SERPL-MCNC: 100 MG/DL (ref 65–140)
HCT VFR BLD AUTO: 38 % (ref 34.8–46.1)
HGB BLD-MCNC: 12.4 G/DL (ref 11.5–15.4)
IMM GRANULOCYTES # BLD AUTO: 0.02 THOUSAND/UL (ref 0–0.2)
IMM GRANULOCYTES NFR BLD AUTO: 0 % (ref 0–2)
LYMPHOCYTES # BLD AUTO: 1.88 THOUSANDS/ÂΜL (ref 0.6–4.47)
LYMPHOCYTES NFR BLD AUTO: 26 % (ref 14–44)
MCH RBC QN AUTO: 30.8 PG (ref 26.8–34.3)
MCHC RBC AUTO-ENTMCNC: 32.6 G/DL (ref 31.4–37.4)
MCV RBC AUTO: 94 FL (ref 82–98)
MONOCYTES # BLD AUTO: 0.53 THOUSAND/ÂΜL (ref 0.17–1.22)
MONOCYTES NFR BLD AUTO: 7 % (ref 4–12)
NEUTROPHILS # BLD AUTO: 4.33 THOUSANDS/ÂΜL (ref 1.85–7.62)
NEUTS SEG NFR BLD AUTO: 61 % (ref 43–75)
NRBC BLD AUTO-RTO: 0 /100 WBCS
PLATELET # BLD AUTO: 295 THOUSANDS/UL (ref 149–390)
PMV BLD AUTO: 9.3 FL (ref 8.9–12.7)
POTASSIUM SERPL-SCNC: 4 MMOL/L (ref 3.5–5.3)
PROT SERPL-MCNC: 6.9 G/DL (ref 6.4–8.4)
RBC # BLD AUTO: 4.03 MILLION/UL (ref 3.81–5.12)
SODIUM SERPL-SCNC: 136 MMOL/L (ref 135–147)
TSH SERPL DL<=0.05 MIU/L-ACNC: 3.88 UIU/ML (ref 0.45–4.5)
WBC # BLD AUTO: 7.16 THOUSAND/UL (ref 4.31–10.16)

## 2024-05-09 PROCEDURE — 36415 COLL VENOUS BLD VENIPUNCTURE: CPT

## 2024-05-09 PROCEDURE — 80053 COMPREHEN METABOLIC PANEL: CPT

## 2024-05-09 PROCEDURE — 72110 X-RAY EXAM L-2 SPINE 4/>VWS: CPT

## 2024-05-09 PROCEDURE — 84443 ASSAY THYROID STIM HORMONE: CPT

## 2024-05-09 PROCEDURE — 85025 COMPLETE CBC W/AUTO DIFF WBC: CPT

## 2024-05-21 DIAGNOSIS — F41.9 ANXIETY: ICD-10-CM

## 2024-05-21 RX ORDER — ALPRAZOLAM 0.5 MG/1
1 TABLET ORAL 2 TIMES DAILY PRN
Qty: 60 TABLET | Refills: 0 | Status: SHIPPED | OUTPATIENT
Start: 2024-05-21

## 2024-05-21 NOTE — TELEPHONE ENCOUNTER
Reason for call:   [x] Refill   [] Prior Auth  [] Other:     Office:   [x] PCP/Provider -  Valarie Gonzales MD   [] Specialty/Provider -     Medication:     ALPRAZolam (XANAX) 0.5 mg tablet       Dose/Frequency:     Take 2 tablets (1 mg total) by mouth 2 (two) times a day as needed for anxiety       Quantity: #60    [id number] [address] [facility] [phone]     Does the patient have enough for 3 days?   [] Yes   [x] No - Send as HP to POD

## 2024-05-24 DIAGNOSIS — F33.9 EPISODE OF RECURRENT MAJOR DEPRESSIVE DISORDER, UNSPECIFIED DEPRESSION EPISODE SEVERITY (HCC): ICD-10-CM

## 2024-05-24 RX ORDER — DESVENLAFAXINE SUCCINATE 50 MG/1
50 TABLET, EXTENDED RELEASE ORAL DAILY
Qty: 30 TABLET | Refills: 5 | Status: SHIPPED | OUTPATIENT
Start: 2024-05-24

## 2024-06-06 ENCOUNTER — ANTICOAG VISIT (OUTPATIENT)
Dept: CARDIOLOGY CLINIC | Facility: CLINIC | Age: 74
End: 2024-06-06

## 2024-06-06 ENCOUNTER — APPOINTMENT (OUTPATIENT)
Dept: LAB | Facility: HOSPITAL | Age: 74
End: 2024-06-06
Payer: MEDICARE

## 2024-06-06 DIAGNOSIS — I48.11 LONGSTANDING PERSISTENT ATRIAL FIBRILLATION (HCC): Primary | ICD-10-CM

## 2024-06-06 DIAGNOSIS — I48.11 LONGSTANDING PERSISTENT ATRIAL FIBRILLATION (HCC): ICD-10-CM

## 2024-06-06 LAB
INR PPP: 2.18 (ref 0.84–1.19)
PROTHROMBIN TIME: 23.9 SECONDS (ref 11.6–14.5)

## 2024-06-06 PROCEDURE — 85610 PROTHROMBIN TIME: CPT

## 2024-06-06 PROCEDURE — 36415 COLL VENOUS BLD VENIPUNCTURE: CPT

## 2024-06-17 ENCOUNTER — TELEPHONE (OUTPATIENT)
Age: 74
End: 2024-06-17

## 2024-06-17 NOTE — TELEPHONE ENCOUNTER
Patient called in to report that she is reports the uses of  makes her belch too much and makes her feel sick to her stomach with nausea. so she only takes it in the AM Has used daily since prescribed in April.Reports she has seen improvement in her bowels but still had one episode on incontinence. Reports she gets no warning to move her bowels. Patient wants to know if there is something else she can take for her bowel incontinence; she does not want to refill current order. Please follow up with patient for provider response. Permission to leave information on her voicemail if she is not available.

## 2024-06-25 ENCOUNTER — TELEPHONE (OUTPATIENT)
Dept: FAMILY MEDICINE CLINIC | Facility: CLINIC | Age: 74
End: 2024-06-25

## 2024-06-25 DIAGNOSIS — F41.9 ANXIETY: ICD-10-CM

## 2024-06-25 RX ORDER — ALPRAZOLAM 0.5 MG/1
1 TABLET ORAL 2 TIMES DAILY PRN
Qty: 60 TABLET | Refills: 0 | Status: SHIPPED | OUTPATIENT
Start: 2024-06-25

## 2024-06-25 NOTE — TELEPHONE ENCOUNTER
Reason for call:   [x] Refill   [] Prior Auth  [] Other:     Office:   [x] PCP/Provider -   [] Specialty/Provider -     Medication: ALPRAZolam (XANAX) 0.5 mg tablet  Take 2 tablets (1 mg total) by mouth 2 (two) times a day as needed for anxiety       Pharmacy: [facility] #[id number] [place] - [address]      Does the patient have enough for 3 days?   [] Yes   [x] No - Send as HP to POD

## 2024-06-25 NOTE — TELEPHONE ENCOUNTER
Pt called refill line to ask if there was anything else the doctor can give her besides the Cholestyramine. Pt states that it is terrible to drink and is making her sick to her stomach. Please call pt

## 2024-06-27 DIAGNOSIS — Z76.0 MEDICATION REFILL: Primary | ICD-10-CM

## 2024-06-27 RX ORDER — DICYCLOMINE HCL 20 MG
20 TABLET ORAL 2 TIMES DAILY PRN
Qty: 60 TABLET | Refills: 5 | Status: SHIPPED | OUTPATIENT
Start: 2024-06-27

## 2024-06-27 NOTE — TELEPHONE ENCOUNTER
Patient called back.  I gave her the message & she'd like to try Bentyl then.  Can this be ordered to [facility] at [address]

## 2024-07-11 ENCOUNTER — APPOINTMENT (OUTPATIENT)
Dept: LAB | Facility: HOSPITAL | Age: 74
End: 2024-07-11
Payer: MEDICARE

## 2024-07-11 ENCOUNTER — ANTICOAG VISIT (OUTPATIENT)
Dept: CARDIOLOGY CLINIC | Facility: CLINIC | Age: 74
End: 2024-07-11

## 2024-07-11 DIAGNOSIS — I48.11 LONGSTANDING PERSISTENT ATRIAL FIBRILLATION (HCC): ICD-10-CM

## 2024-07-11 DIAGNOSIS — I48.11 LONGSTANDING PERSISTENT ATRIAL FIBRILLATION (HCC): Primary | ICD-10-CM

## 2024-07-11 LAB
INR PPP: 2.2 (ref 0.84–1.19)
PROTHROMBIN TIME: 24 SECONDS (ref 11.6–14.5)

## 2024-07-11 PROCEDURE — 36415 COLL VENOUS BLD VENIPUNCTURE: CPT

## 2024-07-11 PROCEDURE — 85610 PROTHROMBIN TIME: CPT

## 2024-07-22 DIAGNOSIS — F41.9 ANXIETY: ICD-10-CM

## 2024-07-22 RX ORDER — ALPRAZOLAM 0.5 MG/1
TABLET ORAL
Qty: 60 TABLET | Refills: 0 | Status: SHIPPED | OUTPATIENT
Start: 2024-07-22

## 2024-07-29 ENCOUNTER — CONSULT (OUTPATIENT)
Dept: GASTROENTEROLOGY | Facility: AMBULARY SURGERY CENTER | Age: 74
End: 2024-07-29
Payer: MEDICARE

## 2024-07-29 ENCOUNTER — TELEPHONE (OUTPATIENT)
Dept: GASTROENTEROLOGY | Facility: AMBULARY SURGERY CENTER | Age: 74
End: 2024-07-29

## 2024-07-29 VITALS
SYSTOLIC BLOOD PRESSURE: 118 MMHG | WEIGHT: 129 LBS | BODY MASS INDEX: 23.74 KG/M2 | OXYGEN SATURATION: 99 % | HEIGHT: 62 IN | DIASTOLIC BLOOD PRESSURE: 70 MMHG | HEART RATE: 71 BPM

## 2024-07-29 DIAGNOSIS — R13.19 ESOPHAGEAL DYSPHAGIA: ICD-10-CM

## 2024-07-29 DIAGNOSIS — K21.9 GASTROESOPHAGEAL REFLUX DISEASE WITHOUT ESOPHAGITIS: ICD-10-CM

## 2024-07-29 DIAGNOSIS — R15.9 INCONTINENCE OF FECES, UNSPECIFIED FECAL INCONTINENCE TYPE: ICD-10-CM

## 2024-07-29 DIAGNOSIS — K52.9 CHRONIC DIARRHEA: Primary | ICD-10-CM

## 2024-07-29 PROCEDURE — 99214 OFFICE O/P EST MOD 30 MIN: CPT | Performed by: PHYSICIAN ASSISTANT

## 2024-07-29 RX ORDER — MONTELUKAST SODIUM 4 MG/1
2 TABLET, CHEWABLE ORAL DAILY
Qty: 30 TABLET | Refills: 3 | Status: SHIPPED | OUTPATIENT
Start: 2024-07-29

## 2024-07-29 NOTE — PROGRESS NOTES
Assessment and Plan    #1. Chronic intermittent diarrhea with incontinence: was diagnosed with IBS-D in the past however patient does not have any abdominal pain so suspect functional diarrhea, could have anal sphincter dysfunction  -PCP started patient on bentyl BID which has shown some improvement but she does not have any abdominal pain. Due to her age, she has increased risks for SE with this medication  -would recommend using bentyl as needed for diarrhea/cramping and instead start colestipol 2 g once daily mid day so it is apart from her other meds (she takes thyroid med in AM and coumadin in PM)  -start fiber supplement daily  -if symptoms persist despite fiber and colestipol after a few weeks, patient advised to call, would recommend anal manometry    #2. GERD with dysphagia: patient has intermittent dysphagia symptoms, has hx Schatzki's ring, was scheduled for EGD last year and then it was cancelled  -patient reports only taking tums as needed once weekly.   -plan for EGD with possible dilation  -discussed procedure, risks including perforation, infection, and bleeding, missing a diagnosis, and benefits in detail with patient     --------------------------------------------------------------------------------------------------------------------    Chief Complaint: f/u dysphagia, diarrhea, incontinence    HPI: Josee Vaca is a 74 y.o. female with history of A-fib on Coumadin, hypertension, sleep apnea, chronic diarrhea, GERD, Schatzki's ring, CKD, depression, hyperlipidemia who presents today for follow up for dysphagia, diarrhea, incontinence.  Patient had previously been diagnosed with IBS with diarrhea.  She has had chronic issues with diarrhea.  However she denies any abdominal pain.  Her PCP recently started her on Bentyl twice daily which she has been taking.  She had done diarrhea episode on this medication but has not had any incontinence.  She has had episodes of incontinence in which she will  not be able to make it to the bathroom and have significant urgency which is quite embarrassing when she was out in public.  She is not currently doing a fiber supplement.  She was tried on cholestyramine powder but could not tolerate it which caused vomiting.  She does admit to eating a lot of junk food.  She denies any blood in the stool or black tarry stools.  She reports acid reflux symptoms about once a week for which she will take Tums.  She reports that she will intermittently get dysphagia, this is worse with bread.  She had an EGD in 2022 which was notable for a Schatzki's ring.  She was recommended to come back in 1 to 2 years for dilation but this has not been performed.  She had a colonoscopy at the same time which showed diverticulosis and hemorrhoids but no polyps. No weight loss     Review of Systems:   General: negative for fatigue, fever, night sweats or unexpected weight loss  Psychological: negative for anxiety or depression  Ophthalmic: negative for blurry vision or scleral icterus  ENT: negative for headaches, oral lesions, sore throat, vocal changes or dysphagia  Hematological and Lymphatic: negative for pallor or swollen lymph nodes  Respiratory: negative for cough, shortness of breath or wheezing  Cardiovascular: negative for chest pain, edema or murmur  Gastrointestinal: as mentioned in HPI  Genito-Urinary: negative for dysuria or incontinence  Musculoskeletal: negative for joint pain, joint stiffness or joint swelling  Dermatological: negative for pruritus, rash, or jaundice    Current Medications  Current Outpatient Medications   Medication Sig Dispense Refill    acetaminophen (TYLENOL) 500 mg tablet Take 1 tablet (500 mg total) by mouth every 6 (six) hours as needed for moderate pain 20 tablet 0    ALPRAZolam (XANAX) 0.5 mg tablet TAKE 2 TABLETS(1 MG) BY MOUTH TWICE DAILY AS NEEDED FOR ANXIETY 60 tablet 0    atorvastatin (LIPITOR) 10 mg tablet Take 1 tablet (10 mg total) by mouth daily at  bedtime 90 tablet 1    colestipol (COLESTID) 1 g tablet Take 2 tablets (2 g total) by mouth in the morning 2 hours separate from other meds 30 tablet 3    desvenlafaxine succinate (PRISTIQ) 50 mg 24 hr tablet TAKE 1 TABLET(50 MG) BY MOUTH DAILY 30 tablet 5    dicyclomine (BENTYL) 20 mg tablet Take 1 tablet (20 mg total) by mouth 2 (two) times a day as needed (ibs) 60 tablet 5    levothyroxine 75 mcg tablet Take 1 tablet (75 mcg total) by mouth daily 30 tablet 5    metoprolol tartrate (LOPRESSOR) 25 mg tablet Take 0.5 tablets (12.5 mg total) by mouth every 12 (twelve) hours 180 tablet 3    Multiple Vitamins-Minerals (CENTRUM ADULTS PO) Take 1 tablet by mouth daily      tiZANidine (ZANAFLEX) 2 mg tablet Take 1 tablet (2 mg total) by mouth every 8 (eight) hours as needed for muscle spasms 20 tablet 0    warfarin (COUMADIN) 2 mg tablet TAKE 1 AND 1/2 TO 2 TABLETS BY MOUTH DAILY AS DIRECTED BY PRESCRIBER 180 tablet 3     No current facility-administered medications for this visit.       Past Medical History  Past Medical History:   Diagnosis Date    A-fib (HCC)     Anxiety     Arthritis     shoulder    Carpal tunnel syndrome     Colon polyp     Depression     Disease of thyroid gland     Diverticulitis     Esophageal stricture     HBP (high blood pressure)     Hiatal hernia 06/07/2022    Small hiatal hernia    Hyperlipidemia     Hypertension     Hypothyroid     Pneumonia     2017    PONV (postoperative nausea and vomiting)     Schatzki's ring 06/07/2022    Schatzki's ring in the lower esophagus    Sleep apnea     mild        Past Surgical History  Past Surgical History:   Procedure Laterality Date    APPENDECTOMY      BREAST BIOPSY Right     benign    BREAST CYST ASPIRATION Right     benign    BREAST SURGERY      milk duct removed    CATARACT EXTRACTION Bilateral     CATARACT EXTRACTION, BILATERAL  2019    CERVICAL FUSION N/A 05/25/2021    Procedure: FUSION CERVICAL POSTERIOR C1-C2 with allograft and neuromonitoring);   Surgeon: Wesley Camacho MD;  Location:  MAIN OR;  Service: Orthopedics    CHOLECYSTECTOMY      COLONOSCOPY  2022    COLONOSCOPY W/ BIOPSIES      EGD  2022    HAND SURGERY Right     MAMMO (HISTORICAL) Bilateral 2018    MAMMO (HISTORICAL) Bilateral 10/27/2017    MANDIBLE SURGERY      MANDIBLE SURGERY      IN SURGICAL ARTHROSCOPY SHOULDER W/ROTATOR CUFF RPR Right 2022    Procedure: ARTHROSCOPIC ROTATOR CUFF REPAIR OF SUBSCAPULARIS BICEPS TENODESIS EXTENSIVE DEBRIDEMENT;  Surgeon: Tony Rose MD;  Location: [facility];  Service: Orthopedics    WISDOM TOOTH EXTRACTION      X4       Past Social History   Social History     Socioeconomic History    Marital status:      Spouse name: None    Number of children: None    Years of education: None    Highest education level: None   Occupational History    None   Tobacco Use    Smoking status: Former     Current packs/day: 0.00     Types: Cigarettes     Quit date:      Years since quittin.5    Smokeless tobacco: Never   Vaping Use    Vaping status: Never Used   Substance and Sexual Activity    Alcohol use: Not Currently     Comment: last drink 7 years ago    Drug use: Not Currently    Sexual activity: Not Currently   Other Topics Concern    None   Social History Narrative    Do you currently or have you served in the [employer]: No    Were you activated, into active duty, as a member of the [employer] or as a Reservist: No    Occupation: retired    Marital status:     Exercise level: Occasional treadmill    General stress level: Low    Alcohol intake: None stopped 7 years ago    Caffeine intake: Moderate    Chewing tobacco: none    Illicit drugs: none    Guns present in home: No    Seat belts used routinely: Yes    Sunscreen used routinely: Yes    Smoke alarm in home: Yes    Advance directive: Yes     Social Determinants of Health     Financial Resource Strain: Low Risk  (2023)    Overall  "Financial Resource Strain (CARDIA)     Difficulty of Paying Living Expenses: Not hard at all   Food Insecurity: Not on file   Transportation Needs: No Transportation Needs (8/22/2023)    PRAPARE - Transportation     Lack of Transportation (Medical): No     Lack of Transportation (Non-Medical): No   Physical Activity: Not on file   Stress: Not on file   Social Connections: Not on file   Intimate Partner Violence: Not on file   Housing Stability: Not on file       The following portions of the patient's history were reviewed and updated as appropriate: allergies, current medications, past family history, past medical history, past social history, past surgical history, and problem list.    Vital Signs  Vitals:    07/29/24 1445   BP: 118/70   BP Location: Right arm   Patient Position: Sitting   Cuff Size: Standard   Pulse: 71   SpO2: 99%   Weight: 58.5 kg (129 lb)   Height: 5' 2\" (1.575 m)       Physical Exam:  General appearance: alert, cooperative, no distress  HEENT: normocephalic, anicteric, no eye erythema or discharge, no oropharyngeal thrush  Neck: supple, trachea midline, no adenopathy  Lungs: CTA b/l, no rales, rhonchi, or wheezing, unlabored respirations  Heart: RRR, no murmur, rubs, or gallops  Abdomen: soft, non-tender, non-distended, normal bowel sounds, no masses or organomegaly  Rectal: deferred  Extremities: no cyanosis, clubbing, or edema  Musculoskeletal: normal gait  Skin: color and texture normal, no jaundice, no rashes or lesions  Psychiatric: alert and oriented, normal affect and behavior                                                                    "

## 2024-07-29 NOTE — TELEPHONE ENCOUNTER
Our mutual patient is scheduled for procedure: EGD    On: September 18 , 2024     With: Dr. Jaya Del Rio MD    He/She is taking the following blood thinner: Coumadin (Warfarin)    Can this be stopped  5 days prior to the procedure    Physician Approving clearance: London Charles MD

## 2024-07-29 NOTE — PATIENT INSTRUCTIONS
Colestipol tablet 2 g once daily in afternoon 2 hours separate from other meds    Use dicyclomine as needed for diarrhea or cramping, do not need to use this daily    Start fiber supplement daily, can use metamucil 2 tbsp or fiber capsule/gummies, should take 5 g daily of supplement     If incontinence still occurs after this regiment, would recommend anal manometry.    Update us in 3-4 weeks.

## 2024-08-04 DIAGNOSIS — I48.91 ATRIAL FIBRILLATION WITH RVR (HCC): ICD-10-CM

## 2024-08-05 RX ORDER — WARFARIN SODIUM 2 MG/1
TABLET ORAL
Qty: 180 TABLET | Refills: 3 | Status: SHIPPED | OUTPATIENT
Start: 2024-08-05

## 2024-08-11 DIAGNOSIS — Z76.0 MEDICATION REFILL: ICD-10-CM

## 2024-08-11 RX ORDER — ATORVASTATIN CALCIUM 10 MG/1
10 TABLET, FILM COATED ORAL
Qty: 90 TABLET | Refills: 1 | Status: SHIPPED | OUTPATIENT
Start: 2024-08-11

## 2024-08-15 ENCOUNTER — ANTICOAG VISIT (OUTPATIENT)
Dept: CARDIOLOGY CLINIC | Facility: CLINIC | Age: 74
End: 2024-08-15

## 2024-08-15 ENCOUNTER — APPOINTMENT (OUTPATIENT)
Dept: LAB | Facility: HOSPITAL | Age: 74
End: 2024-08-15
Attending: INTERNAL MEDICINE
Payer: MEDICARE

## 2024-08-15 DIAGNOSIS — I48.11 LONGSTANDING PERSISTENT ATRIAL FIBRILLATION (HCC): Primary | ICD-10-CM

## 2024-08-15 DIAGNOSIS — I48.11 LONGSTANDING PERSISTENT ATRIAL FIBRILLATION (HCC): ICD-10-CM

## 2024-08-15 LAB
INR PPP: 1.94 (ref 0.85–1.19)
PROTHROMBIN TIME: 22.4 SECONDS (ref 12.3–15)

## 2024-08-15 PROCEDURE — 85610 PROTHROMBIN TIME: CPT

## 2024-08-15 PROCEDURE — 36415 COLL VENOUS BLD VENIPUNCTURE: CPT

## 2024-08-21 ENCOUNTER — TELEPHONE (OUTPATIENT)
Dept: ADMINISTRATIVE | Facility: OTHER | Age: 74
End: 2024-08-21

## 2024-08-21 NOTE — TELEPHONE ENCOUNTER
08/21/24 3:23 PM    Patient contacted to bring Advance Directive, POLST, or Living Will document to next scheduled pcp visit.[facility] Department left message.    Thank you.  Klaus Ann MA  [facility]

## 2024-08-22 DIAGNOSIS — F41.9 ANXIETY: ICD-10-CM

## 2024-08-22 RX ORDER — ALPRAZOLAM 0.5 MG
1 TABLET ORAL 2 TIMES DAILY PRN
Qty: 60 TABLET | Refills: 0 | Status: SHIPPED | OUTPATIENT
Start: 2024-08-22

## 2024-08-22 NOTE — TELEPHONE ENCOUNTER
Reason for call:   [x] Refill   [] Prior Auth  [] Other:     Office:   [x] PCP/Provider - Valarie Gonzales  [] Specialty/Provider -     Medication: Alprazolam     Dose/Frequency: 0.5 mg 2 tabs BID PRN    Quantity: 60    [facility]    Does the patient have enough for 3 days?   [x] Yes   [] No - Send as HP to POD

## 2024-08-26 ENCOUNTER — OFFICE VISIT (OUTPATIENT)
Dept: FAMILY MEDICINE CLINIC | Facility: CLINIC | Age: 74
End: 2024-08-26
Payer: MEDICARE

## 2024-08-26 VITALS
RESPIRATION RATE: 16 BRPM | HEIGHT: 62 IN | TEMPERATURE: 98.3 F | WEIGHT: 130 LBS | BODY MASS INDEX: 23.92 KG/M2 | DIASTOLIC BLOOD PRESSURE: 60 MMHG | OXYGEN SATURATION: 98 % | SYSTOLIC BLOOD PRESSURE: 120 MMHG | HEART RATE: 72 BPM

## 2024-08-26 DIAGNOSIS — M43.22 CERVICAL VERTEBRAL FUSION: ICD-10-CM

## 2024-08-26 DIAGNOSIS — I48.11 LONGSTANDING PERSISTENT ATRIAL FIBRILLATION (HCC): ICD-10-CM

## 2024-08-26 DIAGNOSIS — I10 PRIMARY HYPERTENSION: ICD-10-CM

## 2024-08-26 DIAGNOSIS — Z00.00 MEDICARE ANNUAL WELLNESS VISIT, SUBSEQUENT: Primary | ICD-10-CM

## 2024-08-26 DIAGNOSIS — R26.2 AMBULATORY DYSFUNCTION: ICD-10-CM

## 2024-08-26 DIAGNOSIS — N18.31 STAGE 3A CHRONIC KIDNEY DISEASE (HCC): ICD-10-CM

## 2024-08-26 DIAGNOSIS — E03.8 OTHER SPECIFIED HYPOTHYROIDISM: ICD-10-CM

## 2024-08-26 DIAGNOSIS — Z12.31 ENCOUNTER FOR SCREENING MAMMOGRAM FOR MALIGNANT NEOPLASM OF BREAST: ICD-10-CM

## 2024-08-26 DIAGNOSIS — F33.9 EPISODE OF RECURRENT MAJOR DEPRESSIVE DISORDER, UNSPECIFIED DEPRESSION EPISODE SEVERITY (HCC): ICD-10-CM

## 2024-08-26 PROCEDURE — G0439 PPPS, SUBSEQ VISIT: HCPCS | Performed by: INTERNAL MEDICINE

## 2024-08-26 PROCEDURE — 99214 OFFICE O/P EST MOD 30 MIN: CPT | Performed by: INTERNAL MEDICINE

## 2024-08-26 NOTE — PROGRESS NOTES
Ambulatory Visit  Name: Josee Vaca      : 1950      MRN: [id number]  Encounter Provider: Valarie Gonzales MD  Encounter Date: 2024   Encounter department: [facility] MEDICINE    Assessment & Plan   1. Medicare annual wellness visit, subsequent  -     Mammo screening bilateral w 3d & cad; Future  2. Ambulatory dysfunction  -     Ambulatory Referral to Physical Therapy; Future  3. Episode of recurrent major depressive disorder, unspecified depression episode severity (HCC)  4. Encounter for screening mammogram for malignant neoplasm of breast  -     Mammo screening bilateral w 3d & cad; Future  5. Cervical vertebral fusion  6. Primary hypertension  Assessment & Plan:  Well controlled  Orders:  -     CBC and differential; Future  -     Comprehensive metabolic panel; Future  -     Lipid panel; Future  -     TSH, 3rd generation; Future  7. Stage 3a chronic kidney disease (HCC)  Assessment & Plan:  Lab Results   Component Value Date    EGFR 64 2024    EGFR 58 2023    EGFR 55 2023    CREATININE 0.87 2024    CREATININE 0.95 2023    CREATININE 1.00 2023   Check cmp  Orders:  -     Comprehensive metabolic panel; Future  8. Longstanding persistent atrial fibrillation (HCC)  Assessment & Plan:  Rate controlled and is on coumadin due to CHADS VASC score  9. Other specified hypothyroidism  Assessment & Plan:  Tsh ordered       Preventive health issues were discussed with patient, and age appropriate screening tests were ordered as noted in patient's After Visit Summary. Personalized health advice and appropriate referrals for health education or preventive services given if needed, as noted in patient's After Visit Summary.    History of Present Illness     Josee here for MAWV, and to touch base on her hx of anxiety, HTN, a fib, osteoarthritis-doing ok, using a cane now and has had some falls    Hypertension  Associated symptoms include neck pain.    Depression  Associated symptoms include arthralgias and neck pain.   Hyperlipidemia       Patient Care Team:  Valarie Gonzales MD as PCP - General (Internal Medicine)    Review of Systems   Constitutional: Negative.    HENT: Negative.     Respiratory: Negative.     Gastrointestinal: Negative.    Musculoskeletal:  Positive for arthralgias, back pain, gait problem and neck pain.   Hematological: Negative.    Psychiatric/Behavioral:  Positive for depression.      Medical History Reviewed by provider this encounter:  Tobacco  Allergies  Meds  Problems  Med Hx  Surg Hx  Fam Hx       Annual Wellness Visit Questionnaire   Josee is here for her Subsequent Wellness visit.     Health Risk Assessment:   Patient rates overall health as poor. Patient feels that their physical health rating is slightly worse. Patient is dissatisfied with their life. Eyesight was rated as same. Hearing was rated as same. Patient feels that their emotional and mental health rating is slightly worse. Patients states they are often angry. Patient states they are always unusually tired/fatigued. Pain experienced in the last 7 days has been a lot. Patient's pain rating has been 5/10. Patient states that she has experienced weight loss or gain in last 6 months.     Depression Screening:   PHQ-9 Score: 0      Fall Risk Screening:   In the past year, patient has experienced: history of falling in past year    Number of falls: 2 or more  Injured during fall?: No    Feels unsteady when standing or walking?: Yes    Worried about falling?: Yes      Urinary Incontinence Screening:   Patient has leaked urine accidently in the last six months.     Home Safety:  Patient has trouble with stairs inside or outside of their home. Patient has working smoke alarms and has working carbon monoxide detector. Home safety hazards include: none.     Nutrition:   Current diet is Regular.     Medications:   Patient is currently taking over-the-counter supplements. OTC  medications include: see medication list. Patient is able to manage medications.     Activities of Daily Living (ADLs)/Instrumental Activities of Daily Living (IADLs):   Walk and transfer into and out of bed and chair?: Yes  Dress and groom yourself?: Yes    Bathe or shower yourself?: Yes    Feed yourself? Yes  Do your laundry/housekeeping?: Yes  Manage your money, pay your bills and track your expenses?: Yes  Make your own meals?: Yes    Do your own shopping?: Yes    Previous Hospitalizations:   Any hospitalizations or ED visits within the last 12 months?: No      Advance Care Planning:   Living will: Yes    Durable POA for healthcare: Yes    Advanced directive: Yes    Advanced directive counseling given: Yes      Cognitive Screening:   Provider or family/friend/caregiver concerned regarding cognition?: No    PREVENTIVE SCREENINGS      Cardiovascular Screening:    General: Screening Not Indicated and History Lipid Disorder      Diabetes Screening:     General: Screening Current      Colorectal Cancer Screening:     General: Screening Not Indicated      Breast Cancer Screening:     General: Screening Current      Cervical Cancer Screening:    General: Screening Not Indicated      Osteoporosis Screening:    General: Risks and Benefits Discussed    Due for: DXA Axial      Abdominal Aortic Aneurysm (AAA) Screening:        General: Screening Not Indicated      Lung Cancer Screening:     General: Screening Not Indicated      Hepatitis C Screening:    General: Screening Current    Screening, Brief Intervention, and Referral to Treatment (SBIRT)    Screening  Typical number of drinks in a day: 0  Typical number of drinks in a week: 0  Interpretation: Low risk drinking behavior.    Single Item Drug Screening:  How often have you used an illegal drug (including marijuana) or a prescription medication for non-medical reasons in the past year? never    Single Item Drug Screen Score: 0  Interpretation: Negative screen for  "possible drug use disorder    Social Determinants of Health     Financial Resource Strain: Low Risk  (8/22/2023)    Overall Financial Resource Strain (CARDIA)     Difficulty of Paying Living Expenses: Not hard at all   Transportation Needs: No Transportation Needs (8/22/2023)    PRAPARE - Transportation     Lack of Transportation (Medical): No     Lack of Transportation (Non-Medical): No     No results found.    Objective     /60 (BP Location: Left arm, Patient Position: Sitting, Cuff Size: Standard)   Pulse 72   Temp 98.3 °F (36.8 °C) (Tympanic)   Resp 16   Ht 5' 2\" (1.575 m)   Wt 59 kg (130 lb)   SpO2 98%   BMI 23.78 kg/m²     Physical Exam  Constitutional:       Appearance: Normal appearance.   HENT:      Head: Normocephalic and atraumatic.      Right Ear: External ear normal.      Left Ear: External ear normal.      Nose: Nose normal.      Mouth/Throat:      Mouth: Mucous membranes are moist.   Cardiovascular:      Rate and Rhythm: Normal rate and regular rhythm.      Heart sounds: Normal heart sounds.   Pulmonary:      Effort: Pulmonary effort is normal.      Breath sounds: Normal breath sounds.   Musculoskeletal:         General: Normal range of motion.      Cervical back: Normal range of motion and neck supple.   Skin:     General: Skin is warm.   Neurological:      General: No focal deficit present.      Mental Status: She is alert and oriented to person, place, and time.   Psychiatric:         Mood and Affect: Mood normal.         Thought Content: Thought content normal.         "

## 2024-08-26 NOTE — PATIENT INSTRUCTIONS
Medicare Preventive Visit Patient Instructions  Thank you for completing your Welcome to Medicare Visit or Medicare Annual Wellness Visit today. Your next wellness visit will be due in one year (8/27/2025).  The screening/preventive services that you may require over the next 5-10 years are detailed below. Some tests may not apply to you based off risk factors and/or age. Screening tests ordered at today's visit but not completed yet may show as past due. Also, please note that scanned in results may not display below.  Preventive Screenings:  Service Recommendations Previous Testing/Comments   Colorectal Cancer Screening  * Colonoscopy    * Fecal Occult Blood Test (FOBT)/Fecal Immunochemical Test (FIT)  * Fecal DNA/Cologuard Test  * Flexible Sigmoidoscopy Age: 45-75 years old   Colonoscopy: every 10 years (may be performed more frequently if at higher risk)  OR  FOBT/FIT: every 1 year  OR  Cologuard: every 3 years  OR  Sigmoidoscopy: every 5 years  Screening may be recommended earlier than age 45 if at higher risk for colorectal cancer. Also, an individualized decision between you and your healthcare provider will decide whether screening between the ages of 76-85 would be appropriate. Colonoscopy: 06/07/2022  FOBT/FIT: Not on file  Cologuard: Not on file  Sigmoidoscopy: Not on file    Screening Not Indicated     Breast Cancer Screening Age: 40+ years old  Frequency: every 1-2 years  Not required if history of left and right mastectomy Mammogram: 02/09/2023    Screening Current  Screening Not Indicated   Cervical Cancer Screening Between the ages of 21-29, pap smear recommended once every 3 years.   Between the ages of 30-65, can perform pap smear with HPV co-testing every 5 years.   Recommendations may differ for women with a history of total hysterectomy, cervical cancer, or abnormal pap smears in past. Pap Smear: Not on file    Screening Not Indicated   Hepatitis C Screening Once for adults born between 1945 and  1965  More frequently in patients at high risk for Hepatitis C Hep C Antibody: 09/29/2021    Screening Current   Diabetes Screening 1-2 times per year if you're at risk for diabetes or have pre-diabetes Fasting glucose: 111 mg/dL (12/8/2023)  A1C: 5.8 % (4/26/2021)  Screening Current   Cholesterol Screening Once every 5 years if you don't have a lipid disorder. May order more often based on risk factors. Lipid panel: 09/01/2023    Screening Not Indicated  History Lipid Disorder     Other Preventive Screenings Covered by Medicare:  Abdominal Aortic Aneurysm (AAA) Screening: covered once if your at risk. You're considered to be at risk if you have a family history of AAA.  Lung Cancer Screening: covers low dose CT scan once per year if you meet all of the following conditions: (1) Age 55-77; (2) No signs or symptoms of lung cancer; (3) Current smoker or have quit smoking within the last 15 years; (4) You have a tobacco smoking history of at least 20 pack years (packs per day multiplied by number of years you smoked); (5) You get a written order from a healthcare provider.  Glaucoma Screening: covered annually if you're considered high risk: (1) You have diabetes OR (2) Family history of glaucoma OR (3)  aged 50 and older OR (4)  American aged 65 and older  Osteoporosis Screening: covered every 2 years if you meet one of the following conditions: (1) You're estrogen deficient and at risk for osteoporosis based off medical history and other findings; (2) Have a vertebral abnormality; (3) On glucocorticoid therapy for more than 3 months; (4) Have primary hyperparathyroidism; (5) On osteoporosis medications and need to assess response to drug therapy.   Last bone density test (DXA Scan): Not on file.  HIV Screening: covered annually if you're between the age of 15-65. Also covered annually if you are younger than 15 and older than 65 with risk factors for HIV infection. For pregnant patients, it is  covered up to 3 times per pregnancy.    Immunizations:  Immunization Recommendations   Influenza Vaccine Annual influenza vaccination during flu season is recommended for all persons aged >= 6 months who do not have contraindications   Pneumococcal Vaccine   * Pneumococcal conjugate vaccine = PCV13 (Prevnar 13), PCV15 (Vaxneuvance), PCV20 (Prevnar 20)  * Pneumococcal polysaccharide vaccine = PPSV23 (Pneumovax) Adults 19-63 yo with certain risk factors or if 65+ yo  If never received any pneumonia vaccine: recommend Prevnar 20 (PCV20)  Give PCV20 if previously received 1 dose of PCV13 or PPSV23   Hepatitis B Vaccine 3 dose series if at intermediate or high risk (ex: diabetes, end stage renal disease, liver disease)   Respiratory syncytial virus (RSV) Vaccine - COVERED BY MEDICARE PART D  * RSVPreF3 (Arexvy) CDC recommends that adults 60 years of age and older may receive a single dose of RSV vaccine using shared clinical decision-making (SCDM)   Tetanus (Td) Vaccine - COST NOT COVERED BY MEDICARE PART B Following completion of primary series, a booster dose should be given every 10 years to maintain immunity against tetanus. Td may also be given as tetanus wound prophylaxis.   Tdap Vaccine - COST NOT COVERED BY MEDICARE PART B Recommended at least once for all adults. For pregnant patients, recommended with each pregnancy.   Shingles Vaccine (Shingrix) - COST NOT COVERED BY MEDICARE PART B  2 shot series recommended in those 19 years and older who have or will have weakened immune systems or those 50 years and older     Health Maintenance Due:      Topic Date Due   • Breast Cancer Screening: Mammogram  02/09/2024   • Hepatitis C Screening  Completed   • Colorectal Cancer Screening  Discontinued     Immunizations Due:      Topic Date Due   • COVID-19 Vaccine (5 - 2023-24 season) 09/01/2023   • Influenza Vaccine (1) 09/01/2024     Advance Directives   What are advance directives?  Advance directives are legal documents  that state your wishes and plans for medical care. These plans are made ahead of time in case you lose your ability to make decisions for yourself. Advance directives can apply to any medical decision, such as the treatments you want, and if you want to donate organs.   What are the types of advance directives?  There are many types of advance directives, and each state has rules about how to use them. You may choose a combination of any of the following:  Living will:  This is a written record of the treatment you want. You can also choose which treatments you do not want, which to limit, and which to stop at a certain time. This includes surgery, medicine, IV fluid, and tube feedings.   Durable power of  for healthcare (DPAHC):  This is a written record that states who you want to make healthcare choices for you when you are unable to make them for yourself. This person, called a proxy, is usually a family member or a friend. You may choose more than 1 proxy.  Do not resuscitate (DNR) order:  A DNR order is used in case your heart stops beating or you stop breathing. It is a request not to have certain forms of treatment, such as CPR. A DNR order may be included in other types of advance directives.  Medical directive:  This covers the care that you want if you are in a coma, near death, or unable to make decisions for yourself. You can list the treatments you want for each condition. Treatment may include pain medicine, surgery, blood transfusions, dialysis, IV or tube feedings, and a ventilator (breathing machine).  Values history:  This document has questions about your views, beliefs, and how you feel and think about life. This information can help others choose the care that you would choose.  Why are advance directives important?  An advance directive helps you control your care. Although spoken wishes may be used, it is better to have your wishes written down. Spoken wishes can be misunderstood, or  not followed. Treatments may be given even if you do not want them. An advance directive may make it easier for your family to make difficult choices about your care.   Fall Prevention    Fall prevention  includes ways to make your home and other areas safer. It also includes ways you can move more carefully to prevent a fall. Health conditions that cause changes in your blood pressure, vision, or muscle strength and coordination may increase your risk for falls. Medicines may also increase your risk for falls if they make you dizzy, weak, or sleepy.   Fall prevention tips:   Stand or sit up slowly.    Use assistive devices as directed.    Wear shoes that fit well and have soles that .    Wear a personal alarm.    Stay active.    Manage your medical conditions.    Home Safety Tips:  Add items to prevent falls in the bathroom.    Keep paths clear.    Install bright lights in your home.    Keep items you use often on shelves within reach.    Paint or place reflective tape on the edges of your stairs.    Urinary Incontinence   Urinary incontinence (UI)  is when you lose control of your bladder. UI develops because your bladder cannot store or empty urine properly. The 3 most common types of UI are stress incontinence, urge incontinence, or both.  Medicines:   May be given to help strengthen your bladder control. Report any side effects of medication to your healthcare provider.  Do pelvic muscle exercises often:  Your pelvic muscles help you stop urinating. Squeeze these muscles tight for 5 seconds, then relax for 5 seconds. Gradually work up to squeezing for 10 seconds. Do 3 sets of 15 repetitions a day, or as directed. This will help strengthen your pelvic muscles and improve bladder control.  Train your bladder:  Go to the bathroom at set times, such as every 2 hours, even if you do not feel the urge to go. You can also try to hold your urine when you feel the urge to go. For example, hold your urine for 5  minutes when you feel the urge to go. As that becomes easier, hold your urine for 10 minutes.   Self-care:   Keep a UI record.  Write down how often you leak urine and how much you leak. Make a note of what you were doing when you leaked urine.  Drink liquids as directed. You may need to limit the amount of liquid you drink to help control your urine leakage. Do not drink any liquid right before you go to bed. Limit or do not have drinks that contain caffeine or alcohol.   Prevent constipation.  Eat a variety of high-fiber foods. Good examples are high-fiber cereals, beans, vegetables, and whole-grain breads. Walking is the best way to trigger your intestines to have a bowel movement.  Exercise regularly and maintain a healthy weight.  Weight loss and exercise will decrease pressure on your bladder and help you control your leakage.   Use a catheter as directed  to help empty your bladder. A catheter is a tiny, plastic tube that is put into your bladder to drain your urine.   Go to behavior therapy as directed.  Behavior therapy may be used to help you learn to control your urge to urinate.     © Copyright [employer] 2018 Information is for End User's use only and may not be sold, redistributed or otherwise used for commercial purposes. All illustrations and images included in CareNotes® are the copyrighted property of [employer].A[employer]M., Inc. or [employer]

## 2024-08-26 NOTE — ASSESSMENT & PLAN NOTE
Lab Results   Component Value Date    EGFR 64 05/09/2024    EGFR 58 12/08/2023    EGFR 55 09/01/2023    CREATININE 0.87 05/09/2024    CREATININE 0.95 12/08/2023    CREATININE 1.00 09/01/2023   Check cmp

## 2024-09-04 ENCOUNTER — ANESTHESIA (OUTPATIENT)
Dept: ANESTHESIOLOGY | Facility: HOSPITAL | Age: 74
End: 2024-09-04

## 2024-09-04 ENCOUNTER — ANESTHESIA EVENT (OUTPATIENT)
Dept: ANESTHESIOLOGY | Facility: HOSPITAL | Age: 74
End: 2024-09-04

## 2024-09-05 ENCOUNTER — TELEPHONE (OUTPATIENT)
Age: 74
End: 2024-09-05

## 2024-09-05 NOTE — TELEPHONE ENCOUNTER
Patient called in regards to the  for her procedure, called over to office they will set up the ride share for patient for the procedure. Pts daughter will be with her. Pt aware she is unable to drive.

## 2024-09-11 ENCOUNTER — LAB (OUTPATIENT)
Dept: LAB | Facility: HOSPITAL | Age: 74
End: 2024-09-11
Attending: INTERNAL MEDICINE
Payer: MEDICARE

## 2024-09-11 ENCOUNTER — ANTICOAG VISIT (OUTPATIENT)
Dept: CARDIOLOGY CLINIC | Facility: CLINIC | Age: 74
End: 2024-09-11

## 2024-09-11 DIAGNOSIS — I48.11 LONGSTANDING PERSISTENT ATRIAL FIBRILLATION (HCC): ICD-10-CM

## 2024-09-11 DIAGNOSIS — N18.31 STAGE 3A CHRONIC KIDNEY DISEASE (HCC): ICD-10-CM

## 2024-09-11 DIAGNOSIS — I48.11 LONGSTANDING PERSISTENT ATRIAL FIBRILLATION (HCC): Primary | ICD-10-CM

## 2024-09-11 DIAGNOSIS — I10 PRIMARY HYPERTENSION: ICD-10-CM

## 2024-09-11 LAB
ALBUMIN SERPL BCG-MCNC: 4.5 G/DL (ref 3.5–5)
ALP SERPL-CCNC: 72 U/L (ref 34–104)
ALT SERPL W P-5'-P-CCNC: 42 U/L (ref 7–52)
ANION GAP SERPL CALCULATED.3IONS-SCNC: 7 MMOL/L (ref 4–13)
AST SERPL W P-5'-P-CCNC: 37 U/L (ref 13–39)
BASOPHILS # BLD AUTO: 0.08 THOUSANDS/ΜL (ref 0–0.1)
BASOPHILS NFR BLD AUTO: 1 % (ref 0–1)
BILIRUB SERPL-MCNC: 0.59 MG/DL (ref 0.2–1)
BUN SERPL-MCNC: 19 MG/DL (ref 5–25)
CALCIUM SERPL-MCNC: 10.3 MG/DL (ref 8.4–10.2)
CHLORIDE SERPL-SCNC: 105 MMOL/L (ref 96–108)
CHOLEST SERPL-MCNC: 152 MG/DL
CO2 SERPL-SCNC: 28 MMOL/L (ref 21–32)
CREAT SERPL-MCNC: 0.84 MG/DL (ref 0.6–1.3)
EOSINOPHIL # BLD AUTO: 0.36 THOUSAND/ΜL (ref 0–0.61)
EOSINOPHIL NFR BLD AUTO: 5 % (ref 0–6)
ERYTHROCYTE [DISTWIDTH] IN BLOOD BY AUTOMATED COUNT: 12.4 % (ref 11.6–15.1)
GFR SERPL CREATININE-BSD FRML MDRD: 67 ML/MIN/1.73SQ M
GLUCOSE P FAST SERPL-MCNC: 132 MG/DL (ref 65–99)
HCT VFR BLD AUTO: 41.8 % (ref 34.8–46.1)
HDLC SERPL-MCNC: 61 MG/DL
HGB BLD-MCNC: 13.3 G/DL (ref 11.5–15.4)
IMM GRANULOCYTES # BLD AUTO: 0.02 THOUSAND/UL (ref 0–0.2)
IMM GRANULOCYTES NFR BLD AUTO: 0 % (ref 0–2)
INR PPP: 2.45 (ref 0.85–1.19)
LDLC SERPL CALC-MCNC: 73 MG/DL (ref 0–100)
LYMPHOCYTES # BLD AUTO: 1.4 THOUSANDS/ΜL (ref 0.6–4.47)
LYMPHOCYTES NFR BLD AUTO: 20 % (ref 14–44)
MCH RBC QN AUTO: 30.4 PG (ref 26.8–34.3)
MCHC RBC AUTO-ENTMCNC: 31.8 G/DL (ref 31.4–37.4)
MCV RBC AUTO: 95 FL (ref 82–98)
MONOCYTES # BLD AUTO: 0.48 THOUSAND/ΜL (ref 0.17–1.22)
MONOCYTES NFR BLD AUTO: 7 % (ref 4–12)
NEUTROPHILS # BLD AUTO: 4.85 THOUSANDS/ΜL (ref 1.85–7.62)
NEUTS SEG NFR BLD AUTO: 67 % (ref 43–75)
NONHDLC SERPL-MCNC: 91 MG/DL
NRBC BLD AUTO-RTO: 0 /100 WBCS
PLATELET # BLD AUTO: 311 THOUSANDS/UL (ref 149–390)
PMV BLD AUTO: 9.1 FL (ref 8.9–12.7)
POTASSIUM SERPL-SCNC: 3.8 MMOL/L (ref 3.5–5.3)
PROT SERPL-MCNC: 7.5 G/DL (ref 6.4–8.4)
PROTHROMBIN TIME: 26.7 SECONDS (ref 12.3–15)
RBC # BLD AUTO: 4.38 MILLION/UL (ref 3.81–5.12)
SODIUM SERPL-SCNC: 140 MMOL/L (ref 135–147)
TRIGL SERPL-MCNC: 88 MG/DL
TSH SERPL DL<=0.05 MIU/L-ACNC: 2.79 UIU/ML (ref 0.45–4.5)
WBC # BLD AUTO: 7.19 THOUSAND/UL (ref 4.31–10.16)

## 2024-09-11 PROCEDURE — 85025 COMPLETE CBC W/AUTO DIFF WBC: CPT

## 2024-09-11 PROCEDURE — 80061 LIPID PANEL: CPT

## 2024-09-11 PROCEDURE — 80053 COMPREHEN METABOLIC PANEL: CPT

## 2024-09-11 PROCEDURE — 85610 PROTHROMBIN TIME: CPT

## 2024-09-11 PROCEDURE — 36415 COLL VENOUS BLD VENIPUNCTURE: CPT

## 2024-09-11 PROCEDURE — 84443 ASSAY THYROID STIM HORMONE: CPT

## 2024-09-12 ENCOUNTER — TELEPHONE (OUTPATIENT)
Dept: FAMILY MEDICINE CLINIC | Facility: CLINIC | Age: 74
End: 2024-09-12

## 2024-09-12 NOTE — TELEPHONE ENCOUNTER
----- Message from Valarie Gonzales MD sent at 9/11/2024  2:19 PM EDT -----  Josee's labs look ok,calcium still a bit high and the only other thing is her sugar was 132, which if fasting meets diabetic criteria-I guess we should do an A1c%

## 2024-09-16 ENCOUNTER — CLINICAL SUPPORT (OUTPATIENT)
Dept: FAMILY MEDICINE CLINIC | Facility: CLINIC | Age: 74
End: 2024-09-16
Payer: MEDICARE

## 2024-09-16 DIAGNOSIS — R73.09 HIGH GLUCOSE: Primary | ICD-10-CM

## 2024-09-16 LAB — SL AMB POCT HEMOGLOBIN AIC: 6 (ref ?–6.5)

## 2024-09-16 PROCEDURE — 83036 HEMOGLOBIN GLYCOSYLATED A1C: CPT | Performed by: INTERNAL MEDICINE

## 2024-09-17 ENCOUNTER — TELEPHONE (OUTPATIENT)
Age: 74
End: 2024-09-17

## 2024-09-17 NOTE — TELEPHONE ENCOUNTER
Patient calling to reschedule her EGD. EGD has been rescheduled for 11/8/24. Patient has prep instructions and is aware of medication hold. Patient will need transportation for this appointment, her daughter will be with her.    Will need transportation cancelled for appointment that was originally scheduled for 9/18/24.

## 2024-09-24 ENCOUNTER — HOSPITAL ENCOUNTER (OUTPATIENT)
Facility: HOSPITAL | Age: 74
Discharge: HOME | End: 2024-09-24
Attending: INTERNAL MEDICINE
Payer: MEDICARE

## 2024-09-24 VITALS — WEIGHT: 130 LBS | HEIGHT: 62 IN | BODY MASS INDEX: 23.92 KG/M2

## 2024-09-24 DIAGNOSIS — Z00.00 MEDICARE ANNUAL WELLNESS VISIT, SUBSEQUENT: ICD-10-CM

## 2024-09-24 DIAGNOSIS — Z12.31 ENCOUNTER FOR SCREENING MAMMOGRAM FOR MALIGNANT NEOPLASM OF BREAST: ICD-10-CM

## 2024-09-24 PROCEDURE — 77067 SCR MAMMO BI INCL CAD: CPT

## 2024-09-24 PROCEDURE — 77063 BREAST TOMOSYNTHESIS BI: CPT

## 2024-09-25 DIAGNOSIS — F41.9 ANXIETY: ICD-10-CM

## 2024-09-25 DIAGNOSIS — K52.9 CHRONIC DIARRHEA: ICD-10-CM

## 2024-09-25 DIAGNOSIS — R15.9 INCONTINENCE OF FECES, UNSPECIFIED FECAL INCONTINENCE TYPE: ICD-10-CM

## 2024-09-25 RX ORDER — MONTELUKAST SODIUM 4 MG/1
2 TABLET, CHEWABLE ORAL DAILY
Qty: 60 TABLET | Refills: 5 | Status: SHIPPED | OUTPATIENT
Start: 2024-09-25

## 2024-09-25 RX ORDER — ALPRAZOLAM 0.5 MG
1 TABLET ORAL 2 TIMES DAILY PRN
Qty: 60 TABLET | Refills: 0 | Status: SHIPPED | OUTPATIENT
Start: 2024-09-25

## 2024-10-22 DIAGNOSIS — F41.9 ANXIETY: ICD-10-CM

## 2024-10-22 NOTE — TELEPHONE ENCOUNTER
Reason for call:   [x] Refill   [] Prior Auth  [] Other:     Office:   [x] PCP/Provider - Valarie Gonzales MD  PCP - General  [] Specialty/Provider -     Medication:   ALPRAZolam (XANAX) 0.5 mg tablet 1 mg, 2 times daily PRN           Pharmacy: [facility] #[id number] [address]      Does the patient have enough for 3 days?   [x] Yes   [] No - Send as HP to POD

## 2024-10-23 ENCOUNTER — ANTICOAG VISIT (OUTPATIENT)
Dept: CARDIOLOGY CLINIC | Facility: CLINIC | Age: 74
End: 2024-10-23

## 2024-10-23 ENCOUNTER — APPOINTMENT (OUTPATIENT)
Dept: LAB | Facility: HOSPITAL | Age: 74
End: 2024-10-23
Attending: INTERNAL MEDICINE
Payer: MEDICARE

## 2024-10-23 DIAGNOSIS — I48.11 LONGSTANDING PERSISTENT ATRIAL FIBRILLATION (HCC): Primary | ICD-10-CM

## 2024-10-23 DIAGNOSIS — I48.11 LONGSTANDING PERSISTENT ATRIAL FIBRILLATION (HCC): ICD-10-CM

## 2024-10-23 LAB
INR PPP: 2.39 (ref 0.85–1.19)
PROTHROMBIN TIME: 26.2 SECONDS (ref 12.3–15)

## 2024-10-23 PROCEDURE — 85610 PROTHROMBIN TIME: CPT

## 2024-10-23 PROCEDURE — 36415 COLL VENOUS BLD VENIPUNCTURE: CPT

## 2024-10-23 RX ORDER — ALPRAZOLAM 0.5 MG
1 TABLET ORAL 2 TIMES DAILY PRN
Qty: 60 TABLET | Refills: 0 | Status: SHIPPED | OUTPATIENT
Start: 2024-10-23

## 2024-10-25 ENCOUNTER — ANESTHESIA (OUTPATIENT)
Dept: ANESTHESIOLOGY | Facility: HOSPITAL | Age: 74
End: 2024-10-25

## 2024-10-25 ENCOUNTER — ANESTHESIA EVENT (OUTPATIENT)
Dept: ANESTHESIOLOGY | Facility: HOSPITAL | Age: 74
End: 2024-10-25

## 2024-11-06 ENCOUNTER — TELEPHONE (OUTPATIENT)
Age: 74
End: 2024-11-06

## 2024-11-06 NOTE — TELEPHONE ENCOUNTER
Pt called in to check on the time for procedure on 11/8/24 advised she will get a call tomorrow between 2-6 pm for arrival time also advised transportation is booked.

## 2024-11-07 ENCOUNTER — TELEPHONE (OUTPATIENT)
Dept: GASTROENTEROLOGY | Facility: AMBULARY SURGERY CENTER | Age: 74
End: 2024-11-07

## 2024-11-08 ENCOUNTER — HOSPITAL ENCOUNTER (OUTPATIENT)
Dept: GASTROENTEROLOGY | Facility: AMBULARY SURGERY CENTER | Age: 74
Setting detail: OUTPATIENT SURGERY
End: 2024-11-08
Payer: MEDICARE

## 2024-11-08 ENCOUNTER — ANESTHESIA EVENT (OUTPATIENT)
Dept: GASTROENTEROLOGY | Facility: AMBULARY SURGERY CENTER | Age: 74
End: 2024-11-08
Payer: MEDICARE

## 2024-11-08 VITALS
TEMPERATURE: 97 F | SYSTOLIC BLOOD PRESSURE: 180 MMHG | HEIGHT: 62 IN | BODY MASS INDEX: 23.19 KG/M2 | OXYGEN SATURATION: 98 % | DIASTOLIC BLOOD PRESSURE: 85 MMHG | WEIGHT: 126 LBS | HEART RATE: 69 BPM | RESPIRATION RATE: 18 BRPM

## 2024-11-08 DIAGNOSIS — K21.9 GASTROESOPHAGEAL REFLUX DISEASE WITHOUT ESOPHAGITIS: ICD-10-CM

## 2024-11-08 DIAGNOSIS — R13.19 ESOPHAGEAL DYSPHAGIA: ICD-10-CM

## 2024-11-08 PROCEDURE — 88305 TISSUE EXAM BY PATHOLOGIST: CPT | Performed by: PATHOLOGY

## 2024-11-08 PROCEDURE — 43239 EGD BIOPSY SINGLE/MULTIPLE: CPT | Performed by: INTERNAL MEDICINE

## 2024-11-08 RX ORDER — PROPOFOL 10 MG/ML
INJECTION, EMULSION INTRAVENOUS AS NEEDED
Status: DISCONTINUED | OUTPATIENT
Start: 2024-11-08 | End: 2024-11-08

## 2024-11-08 RX ORDER — SODIUM CHLORIDE, SODIUM LACTATE, POTASSIUM CHLORIDE, CALCIUM CHLORIDE 600; 310; 30; 20 MG/100ML; MG/100ML; MG/100ML; MG/100ML
INJECTION, SOLUTION INTRAVENOUS CONTINUOUS PRN
Status: DISCONTINUED | OUTPATIENT
Start: 2024-11-08 | End: 2024-11-08

## 2024-11-08 RX ORDER — PANTOPRAZOLE SODIUM 40 MG/1
40 TABLET, DELAYED RELEASE ORAL DAILY
Qty: 90 TABLET | Refills: 2 | Status: SHIPPED | OUTPATIENT
Start: 2024-11-08 | End: 2025-02-06

## 2024-11-08 RX ORDER — LIDOCAINE HYDROCHLORIDE 20 MG/ML
INJECTION, SOLUTION EPIDURAL; INFILTRATION; INTRACAUDAL; PERINEURAL AS NEEDED
Status: DISCONTINUED | OUTPATIENT
Start: 2024-11-08 | End: 2024-11-08

## 2024-11-08 RX ADMIN — PROPOFOL 10 MG: 10 INJECTION, EMULSION INTRAVENOUS at 11:02

## 2024-11-08 RX ADMIN — SODIUM CHLORIDE, SODIUM LACTATE, POTASSIUM CHLORIDE, AND CALCIUM CHLORIDE: .6; .31; .03; .02 INJECTION, SOLUTION INTRAVENOUS at 10:03

## 2024-11-08 RX ADMIN — LIDOCAINE HYDROCHLORIDE 80 MG: 20 INJECTION, SOLUTION EPIDURAL; INFILTRATION; INTRACAUDAL at 11:00

## 2024-11-08 RX ADMIN — PROPOFOL 100 MG: 10 INJECTION, EMULSION INTRAVENOUS at 11:00

## 2024-11-08 RX ADMIN — PROPOFOL 10 MG: 10 INJECTION, EMULSION INTRAVENOUS at 11:04

## 2024-11-08 NOTE — ANESTHESIA POSTPROCEDURE EVALUATION
Post-Op Assessment Note    CV Status:  Stable  Pain Score: 0    Pain management: adequate       Mental Status:  Arousable   Hydration Status:  Stable   PONV Controlled:  None   Airway Patency:  Patent     Post Op Vitals Reviewed: Yes    No anethesia notable event occurred.    Staff: CRNA           Last Filed PACU Vitals:  Vitals Value Taken Time   Temp 99.3 °F (37.4 °C) 11/08/24 1112   Pulse 62 11/08/24 1112   /65 11/08/24 1112   Resp 16 11/08/24 1112   SpO2 98 % 11/08/24 1112       Modified Danielito:  Activity: 0 (11/8/2024 11:13 AM)  Respiration: 2 (11/8/2024 11:13 AM)  Circulation: 2 (11/8/2024 11:13 AM)  Consciousness: 0 (11/8/2024 11:13 AM)  Oxygen Saturation: 2 (11/8/2024 11:13 AM)  Modified Danielito Score: 6 (11/8/2024 11:13 AM)

## 2024-11-08 NOTE — H&P
History and Physical - [facility]  Josee Vaca 74 y.o. female MRN: [id number]    HPI: Josee Vaca is a 74 y.o. year old female who presents for evaluation of GERD and dysphagia.      Review of Systems    Historical Information   Past Medical History:   Diagnosis Date    A-fib (HCC)     Anxiety     Arthritis     shoulder    Carpal tunnel syndrome     Colon polyp     Depression     Disease of thyroid gland     Diverticulitis     Esophageal stricture     HBP (high blood pressure)     Hiatal hernia 06/07/2022    Small hiatal hernia    Hyperlipidemia     Hypertension     Hypothyroid     Pneumonia     2017    PONV (postoperative nausea and vomiting)     Schatzki's ring 06/07/2022    Schatzki's ring in the lower esophagus    Sleep apnea     mild      Past Surgical History:   Procedure Laterality Date    APPENDECTOMY      BREAST BIOPSY Right     benign    BREAST CYST ASPIRATION Right     benign    BREAST SURGERY      milk duct removed    CATARACT EXTRACTION Bilateral     CATARACT EXTRACTION, BILATERAL  2019    CERVICAL FUSION N/A 05/25/2021    Procedure: FUSION CERVICAL POSTERIOR C1-C2 with allograft and neuromonitoring);  Surgeon: Wesley Camacho MD;  Location:  MAIN OR;  Service: Orthopedics    CHOLECYSTECTOMY      COLONOSCOPY  06/07/2022    COLONOSCOPY W/ BIOPSIES  2011    EGD  06/07/2022    HAND SURGERY Right     MAMMO (HISTORICAL) Bilateral 11/27/2018    MAMMO (HISTORICAL) Bilateral 10/27/2017    MANDIBLE SURGERY      MANDIBLE SURGERY      ND SURGICAL ARTHROSCOPY SHOULDER W/ROTATOR CUFF RPR Right 09/27/2022    Procedure: ARTHROSCOPIC ROTATOR CUFF REPAIR OF SUBSCAPULARIS BICEPS TENODESIS EXTENSIVE DEBRIDEMENT;  Surgeon: Tony Rose MD;  Location: [facility];  Service: Orthopedics    WISDOM TOOTH EXTRACTION      X4     Social History   Social History     Substance and Sexual Activity   Alcohol Use Not Currently    Comment: last drink 7 years ago     Social History  "    Substance and Sexual Activity   Drug Use Not Currently     Social History     Tobacco Use   Smoking Status Former    Current packs/day: 0.00    Types: Cigarettes    Quit date:     Years since quittin.8   Smokeless Tobacco Never     Family History   Problem Relation Age of Onset    Heart disease Mother     Hypertension Mother     Hyperlipidemia Mother     Heart failure Mother     Hypertension Sister        Meds/Allergies     Not in a hospital admission.    No Known Allergies    Objective     /78   Pulse 60   Temp 97.7 °F (36.5 °C) (Temporal)   Resp 12   Ht 5' 2\" (1.575 m)   Wt 57.2 kg (126 lb)   SpO2 98%   BMI 23.05 kg/m²       PHYSICAL EXAM    Gen: NAD  CV: RRR  CHEST: Clear  ABD: soft, NT/ND  EXT: no edema  Neuro: AAO      ASSESSMENT/PLAN:  This is a 74 y.o. year old female here for evaluation of GERD and dysphagia.    PLAN:   Procedure: EGD with possible dilation.      "

## 2024-11-08 NOTE — TELEPHONE ENCOUNTER
Tatiana from [facility] calling to get information on the patients pickup, transferred to Maggie at the office to assist.

## 2024-11-08 NOTE — ANESTHESIA PREPROCEDURE EVALUATION
Procedure:  EGD    Relevant Problems   CARDIO   (+) A-fib ([facility])   (+) Hemorrhoids   (+) Hyperlipidemia   (+) Primary hypertension      ENDO   (+) Other specified hypothyroidism      GI/HEPATIC   (+) Dysphagia   (+) Gastroesophageal reflux disease without esophagitis      /RENAL   (+) Stage 3a chronic kidney disease (HCC)      MUSCULOSKELETAL   (+) Arthritis of left knee   (+) Spondylosis of cervical region without myelopathy or radiculopathy      NEURO/PSYCH   (+) Anxiety   (+) Depression, recurrent ([facility])   (+) Episode of recurrent major depressive disorder, unspecified depression episode severity ([facility])   (+) Occipital neuralgia of left side      PULMONARY   (+) Sleep apnea      Neurology/Sleep   (+) Cervical myelopathy (HCC)      FEN/Gastrointestinal   (+) Schatzki's ring        Physical Exam    Airway    Mallampati score: II  TM Distance: >3 FB  Neck ROM: full     Dental   No notable dental hx     Cardiovascular      Pulmonary      Other Findings  post-pubertal.      Anesthesia Plan  ASA Score- 3     Anesthesia Type- IV sedation with anesthesia with ASA Monitors.         Additional Monitors:     Airway Plan:            Plan Factors-Exercise tolerance (METS): >4 METS.    Chart reviewed.    Patient summary reviewed.    Patient is not a current smoker.  Patient did not smoke on day of surgery.            Induction- intravenous.    Postoperative Plan-     Perioperative Resuscitation Plan - Level 1 - Full Code.       Informed Consent- Anesthetic plan and risks discussed with patient.  I personally reviewed this patient with the CRNA. Discussed and agreed on the Anesthesia Plan with the CRNA..

## 2024-11-13 PROCEDURE — 88305 TISSUE EXAM BY PATHOLOGIST: CPT | Performed by: PATHOLOGY

## 2024-11-14 ENCOUNTER — RESULTS FOLLOW-UP (OUTPATIENT)
Dept: GASTROENTEROLOGY | Facility: CLINIC | Age: 74
End: 2024-11-14

## 2024-11-14 NOTE — TELEPHONE ENCOUNTER
----- Message from Larissa Mercado MD sent at 11/14/2024 12:32 PM EST -----  Please inform the patient that there is no evidence of H. pylori bacteria.  No evidence of allergic condition.  Continue pantoprazole 40 mg once daily.  Thank you  Dr. Mercado

## 2024-11-14 NOTE — TELEPHONE ENCOUNTER
Attempted to call patient with EGD results and provider recommendations, however, I received voicemail. Advised to return call to office to discuss.     Please, relay results if patient returns call. Thank you!

## 2024-11-18 ENCOUNTER — TELEPHONE (OUTPATIENT)
Dept: GASTROENTEROLOGY | Facility: AMBULARY SURGERY CENTER | Age: 74
End: 2024-11-18

## 2024-11-18 DIAGNOSIS — F33.9 EPISODE OF RECURRENT MAJOR DEPRESSIVE DISORDER, UNSPECIFIED DEPRESSION EPISODE SEVERITY (HCC): ICD-10-CM

## 2024-11-18 DIAGNOSIS — E03.9 HYPOTHYROIDISM, UNSPECIFIED TYPE: ICD-10-CM

## 2024-11-18 RX ORDER — DESVENLAFAXINE 50 MG/1
50 TABLET, FILM COATED, EXTENDED RELEASE ORAL DAILY
Qty: 30 TABLET | Refills: 5 | Status: SHIPPED | OUTPATIENT
Start: 2024-11-18

## 2024-11-18 RX ORDER — LEVOTHYROXINE SODIUM 75 UG/1
75 TABLET ORAL DAILY
Qty: 30 TABLET | Refills: 5 | Status: SHIPPED | OUTPATIENT
Start: 2024-11-18

## 2024-11-18 NOTE — TELEPHONE ENCOUNTER
Reason for call: Out of medications  [x] Refill   [] Prior Auth  [] Other:     Office:   [x] PCP/Provider -   [] Specialty/Provider -     Medication:   Levothyroxine   75 mcg daily  Desvenlafaxine  50 mg daily    Dose/Frequency:      Quantity: 90    [facility]     Does the patient have enough for 3 days?   [] Yes   [x] No - Send as HP to POD

## 2024-11-18 NOTE — TELEPHONE ENCOUNTER
Patient calling regarding results of EGD.  Had already discussed biopsy results but was wondering if had hiatal hernia.  Advised patient that she had a 1 cm sliding hernia as noted from EGD.  Patient states understanding.  No further questions.

## 2024-11-18 NOTE — TELEPHONE ENCOUNTER
Patient returned call for results. Results relayed with patient. Called transferred to nurse triage as patient has further questions.

## 2024-11-18 NOTE — TELEPHONE ENCOUNTER
Attempted to call patient with EGD results and provider recommendations, however, I received voicemail. Advised to return call to office to discuss.     Second attempt letter sent.    Please, relay results if patient returns call. Thank you!

## 2024-11-18 NOTE — TELEPHONE ENCOUNTER
Patient called rx refill line for medication refill and also requested phone number for gastroenterology, she stated she received a phone call but lost the phone number.    I provided patient with [phone]

## 2024-11-22 NOTE — TELEPHONE ENCOUNTER
Patient called checking on status of refills. Patient made aware medication was refilled on 11/18 for 30 tablets each with 5 refills to [facility] in [facility]. Patient instructed to contact the pharmacy to obtain refills of medication. Patient verbalized understanding.

## 2024-11-25 ENCOUNTER — OFFICE VISIT (OUTPATIENT)
Dept: FAMILY MEDICINE CLINIC | Facility: CLINIC | Age: 74
End: 2024-11-25
Payer: MEDICARE

## 2024-11-25 VITALS
WEIGHT: 126 LBS | TEMPERATURE: 97.7 F | OXYGEN SATURATION: 99 % | SYSTOLIC BLOOD PRESSURE: 120 MMHG | RESPIRATION RATE: 16 BRPM | DIASTOLIC BLOOD PRESSURE: 60 MMHG | BODY MASS INDEX: 23.19 KG/M2 | HEART RATE: 51 BPM | HEIGHT: 62 IN

## 2024-11-25 DIAGNOSIS — R21 RASH: ICD-10-CM

## 2024-11-25 DIAGNOSIS — Z78.0 POST-MENOPAUSAL: ICD-10-CM

## 2024-11-25 DIAGNOSIS — M25.531 RIGHT WRIST PAIN: Primary | ICD-10-CM

## 2024-11-25 DIAGNOSIS — F41.9 ANXIETY: ICD-10-CM

## 2024-11-25 DIAGNOSIS — I10 PRIMARY HYPERTENSION: ICD-10-CM

## 2024-11-25 PROCEDURE — 99213 OFFICE O/P EST LOW 20 MIN: CPT | Performed by: INTERNAL MEDICINE

## 2024-11-25 PROCEDURE — G2211 COMPLEX E/M VISIT ADD ON: HCPCS | Performed by: INTERNAL MEDICINE

## 2024-11-25 RX ORDER — ALPRAZOLAM 0.5 MG
1 TABLET ORAL 2 TIMES DAILY PRN
Qty: 60 TABLET | Refills: 0 | Status: SHIPPED | OUTPATIENT
Start: 2024-11-25

## 2024-11-25 NOTE — TELEPHONE ENCOUNTER
Patient called in to request a refill for ALPRAZolam (XANAX) 0.5 mg tablet. Please review and send in if appropriate to   [facility] #[id number] - [place] -

## 2024-11-25 NOTE — PROGRESS NOTES
Assessment/Plan:lIkely bone spur or ganglion cyst-recommend Xray and voltaren gel and wrist splint          Problem List Items Addressed This Visit       Primary hypertension     Other Visit Diagnoses         Right wrist pain    -  Primary    Relevant Orders    XR wrist 2 vw right    XR hand 2 vw right      Rash          Post-menopausal        Relevant Orders    DXA bone density spine hip and pelvis              Subjective:      Patient ID: Josee Vaca is a 74 y.o. female.    Josee here complaining of right wrist cyst or bump that is very tender-has a lot of aches and pains in her hands likely arthritis        The following portions of the patient's history were reviewed and updated as appropriate:   Past Medical History:  She has a past medical history of A-fib (HCC), Anxiety, Arthritis, Carpal tunnel syndrome, Colon polyp, Depression, Disease of thyroid gland, Diverticulitis, Esophageal stricture, HBP (high blood pressure), Hiatal hernia (06/07/2022), Hyperlipidemia, Hypertension, Hypothyroid, Pneumonia, PONV (postoperative nausea and vomiting), Schatzki's ring (06/07/2022), and Sleep apnea.,  _______________________________________________________________________  Medical Problems:  does not have any pertinent problems on file.,  _______________________________________________________________________  Past Surgical History:   has a past surgical history that includes Breast cyst aspiration (Right); Breast biopsy (Right); Appendectomy; Cholecystectomy; Colonoscopy w/ biopsies (2011); Mandible surgery; [place] tooth extraction; EGD (06/07/2022); Cataract extraction, bilateral (2019); Mammo (historical) (Bilateral, 11/27/2018); Mammo (historical) (Bilateral, 10/27/2017); Colonoscopy (06/07/2022); Cataract extraction (Bilateral); Hand surgery (Right); Cervical fusion (N/A, 05/25/2021); Mandible surgery; pr surgical arthroscopy shoulder w/rotator cuff rpr (Right, 09/27/2022); and Breast  surgery.,  _______________________________________________________________________  Family History:  family history includes Heart disease in her mother; Heart failure in her mother; Hyperlipidemia in her mother; Hypertension in her mother and sister.,  _______________________________________________________________________  Social History:   reports that she quit smoking about 20 years ago. Her smoking use included cigarettes. She has never used smokeless tobacco. She reports that she does not currently use alcohol. She reports that she does not currently use drugs.,  _______________________________________________________________________  Allergies:  has no known allergies..  _______________________________________________________________________  Current Outpatient Medications   Medication Sig Dispense Refill    acetaminophen (TYLENOL) 500 mg tablet Take 1 tablet (500 mg total) by mouth every 6 (six) hours as needed for moderate pain 20 tablet 0    ALPRAZolam (XANAX) 0.5 mg tablet Take 2 tablets (1 mg total) by mouth 2 (two) times a day as needed for anxiety 60 tablet 0    atorvastatin (LIPITOR) 10 mg tablet TAKE 1 TABLET(10 MG) BY MOUTH DAILY AT BEDTIME 90 tablet 1    colestipol (COLESTID) 1 g tablet Take 2 tablets (2 g total) by mouth in the morning 2 hours separate from other meds 60 tablet 5    desvenlafaxine succinate (PRISTIQ) 50 mg 24 hr tablet Take 1 tablet (50 mg total) by mouth daily 30 tablet 5    dicyclomine (BENTYL) 20 mg tablet Take 1 tablet (20 mg total) by mouth 2 (two) times a day as needed (ibs) 60 tablet 5    levothyroxine 75 mcg tablet Take 1 tablet (75 mcg total) by mouth daily 30 tablet 5    metoprolol tartrate (LOPRESSOR) 25 mg tablet Take 0.5 tablets (12.5 mg total) by mouth every 12 (twelve) hours 180 tablet 3    Multiple Vitamins-Minerals (CENTRUM ADULTS PO) Take 1 tablet by mouth daily      pantoprazole (PROTONIX) 40 mg tablet Take 1 tablet (40 mg total) by mouth daily 90 tablet 2     "tiZANidine (ZANAFLEX) 2 mg tablet Take 1 tablet (2 mg total) by mouth every 8 (eight) hours as needed for muscle spasms 20 tablet 0    warfarin (COUMADIN) 2 mg tablet TAKE 1 AND 1/2 TO 2 TABLETS BY MOUTH DAILY AS DIRECTED BY PRESCRIBER 180 tablet 3     No current facility-administered medications for this visit.     _______________________________________________________________________  Review of Systems   Constitutional: Negative.    Respiratory: Negative.     Cardiovascular: Negative.    Musculoskeletal:  Positive for arthralgias.         Objective:  Vitals:    11/25/24 1409   BP: 120/60   BP Location: Left arm   Patient Position: Sitting   Cuff Size: Standard   Pulse: (!) 51   Resp: 16   Temp: 97.7 °F (36.5 °C)   TempSrc: Tympanic   SpO2: 99%   Weight: 57.2 kg (126 lb)   Height: 5' 2\" (1.575 m)     Body mass index is 23.05 kg/m².     Physical Exam  Constitutional:       Appearance: Normal appearance.   HENT:      Head: Normocephalic and atraumatic.      Right Ear: External ear normal.      Left Ear: External ear normal.      Nose: Nose normal.      Mouth/Throat:      Mouth: Mucous membranes are moist.   Cardiovascular:      Rate and Rhythm: Normal rate and regular rhythm.   Neurological:      Mental Status: She is alert.         "

## 2024-12-02 ENCOUNTER — ANTICOAG VISIT (OUTPATIENT)
Dept: CARDIOLOGY CLINIC | Facility: CLINIC | Age: 74
End: 2024-12-02

## 2024-12-02 ENCOUNTER — HOSPITAL ENCOUNTER (OUTPATIENT)
Dept: RADIOLOGY | Facility: HOSPITAL | Age: 74
Discharge: HOME | End: 2024-12-02
Payer: MEDICARE

## 2024-12-02 ENCOUNTER — APPOINTMENT (OUTPATIENT)
Dept: LAB | Facility: HOSPITAL | Age: 74
End: 2024-12-02
Attending: INTERNAL MEDICINE
Payer: MEDICARE

## 2024-12-02 DIAGNOSIS — I48.11 LONGSTANDING PERSISTENT ATRIAL FIBRILLATION (HCC): Primary | ICD-10-CM

## 2024-12-02 DIAGNOSIS — I48.11 LONGSTANDING PERSISTENT ATRIAL FIBRILLATION (HCC): ICD-10-CM

## 2024-12-02 DIAGNOSIS — M25.531 RIGHT WRIST PAIN: ICD-10-CM

## 2024-12-02 LAB
INR PPP: 2.93 (ref 0.85–1.19)
PROTHROMBIN TIME: 30.5 SECONDS (ref 12.3–15)

## 2024-12-02 PROCEDURE — 85610 PROTHROMBIN TIME: CPT

## 2024-12-02 PROCEDURE — 73100 X-RAY EXAM OF WRIST: CPT

## 2024-12-02 PROCEDURE — 73120 X-RAY EXAM OF HAND: CPT

## 2024-12-02 PROCEDURE — 36415 COLL VENOUS BLD VENIPUNCTURE: CPT

## 2024-12-04 ENCOUNTER — TELEPHONE (OUTPATIENT)
Age: 74
End: 2024-12-04

## 2024-12-05 ENCOUNTER — RESULTS FOLLOW-UP (OUTPATIENT)
Dept: FAMILY MEDICINE CLINIC | Facility: CLINIC | Age: 74
End: 2024-12-05

## 2024-12-09 ENCOUNTER — HOSPITAL ENCOUNTER (OUTPATIENT)
Facility: HOSPITAL | Age: 74
Discharge: HOME | End: 2024-12-09
Attending: INTERNAL MEDICINE
Payer: MEDICARE

## 2024-12-09 VITALS — BODY MASS INDEX: 23.79 KG/M2 | WEIGHT: 126 LBS | HEIGHT: 61 IN

## 2024-12-09 DIAGNOSIS — Z78.0 POST-MENOPAUSAL: ICD-10-CM

## 2024-12-09 PROCEDURE — 77080 DXA BONE DENSITY AXIAL: CPT

## 2024-12-18 DIAGNOSIS — M19.90 OSTEOARTHRITIS, UNSPECIFIED OSTEOARTHRITIS TYPE, UNSPECIFIED SITE: Primary | ICD-10-CM

## 2024-12-23 DIAGNOSIS — F41.9 ANXIETY: ICD-10-CM

## 2024-12-23 RX ORDER — ALPRAZOLAM 0.5 MG
1 TABLET ORAL 2 TIMES DAILY PRN
Qty: 60 TABLET | Refills: 0 | Status: SHIPPED | OUTPATIENT
Start: 2024-12-23

## 2024-12-23 NOTE — TELEPHONE ENCOUNTER
Patient requesting a refill for ALPRAZolam (XANAX) 0.5 mg tablet. Please review and send in if appropriate to   [id number] - [address] -.

## 2025-01-13 ENCOUNTER — TELEPHONE (OUTPATIENT)
Age: 75
End: 2025-01-13

## 2025-01-13 NOTE — TELEPHONE ENCOUNTER
Requesting referral to [facility] orthopedics for issues with right arm. She did have an x-ray on 12/2 that showed arthritis, she thinks it is something more than that. She can't really use her right hand and pain goes from thumb half way up her arm. Stated she is already on arthritis meds and coumadin when I asked if she'd like something prescribed.    Please call her to advise on referral. Thank you.

## 2025-01-14 DIAGNOSIS — R20.2 NUMBNESS AND TINGLING: Primary | ICD-10-CM

## 2025-01-14 DIAGNOSIS — R20.0 NUMBNESS AND TINGLING: Primary | ICD-10-CM

## 2025-01-15 ENCOUNTER — ANTICOAG VISIT (OUTPATIENT)
Dept: CARDIOLOGY CLINIC | Facility: CLINIC | Age: 75
End: 2025-01-15

## 2025-01-15 ENCOUNTER — APPOINTMENT (OUTPATIENT)
Dept: LAB | Facility: HOSPITAL | Age: 75
End: 2025-01-15
Attending: INTERNAL MEDICINE
Payer: MEDICARE

## 2025-01-15 DIAGNOSIS — I48.11 LONGSTANDING PERSISTENT ATRIAL FIBRILLATION (HCC): Primary | ICD-10-CM

## 2025-01-15 DIAGNOSIS — I48.11 LONGSTANDING PERSISTENT ATRIAL FIBRILLATION (HCC): ICD-10-CM

## 2025-01-15 LAB
INR PPP: 2.31 (ref 0.85–1.19)
PROTHROMBIN TIME: 25.5 SECONDS (ref 12.3–15)

## 2025-01-15 PROCEDURE — 85610 PROTHROMBIN TIME: CPT

## 2025-01-15 PROCEDURE — 36415 COLL VENOUS BLD VENIPUNCTURE: CPT

## 2025-01-22 ENCOUNTER — OFFICE VISIT (OUTPATIENT)
Dept: OBGYN CLINIC | Facility: CLINIC | Age: 75
End: 2025-01-22
Payer: MEDICARE

## 2025-01-22 VITALS — WEIGHT: 126 LBS | BODY MASS INDEX: 23.79 KG/M2 | HEIGHT: 61 IN

## 2025-01-22 DIAGNOSIS — R20.0 NUMBNESS AND TINGLING: ICD-10-CM

## 2025-01-22 DIAGNOSIS — M18.11 PRIMARY OSTEOARTHRITIS OF FIRST CARPOMETACARPAL JOINT OF RIGHT HAND: ICD-10-CM

## 2025-01-22 DIAGNOSIS — R20.2 NUMBNESS AND TINGLING: ICD-10-CM

## 2025-01-22 DIAGNOSIS — M19.031 DRUJ (DISTAL RADIOULNAR JOINT) ARTHROSIS, PRIMARY, RIGHT: ICD-10-CM

## 2025-01-22 DIAGNOSIS — M19.049 HAND ARTHRITIS: ICD-10-CM

## 2025-01-22 DIAGNOSIS — M65.4 DE QUERVAIN'S TENOSYNOVITIS: Primary | ICD-10-CM

## 2025-01-22 PROCEDURE — 20550 NJX 1 TENDON SHEATH/LIGAMENT: CPT | Performed by: ORTHOPAEDIC SURGERY

## 2025-01-22 PROCEDURE — 99214 OFFICE O/P EST MOD 30 MIN: CPT | Performed by: ORTHOPAEDIC SURGERY

## 2025-01-22 RX ORDER — BETAMETHASONE SODIUM PHOSPHATE AND BETAMETHASONE ACETATE 3; 3 MG/ML; MG/ML
3 INJECTION, SUSPENSION INTRA-ARTICULAR; INTRALESIONAL; INTRAMUSCULAR; SOFT TISSUE
Status: COMPLETED | OUTPATIENT
Start: 2025-01-22 | End: 2025-01-22

## 2025-01-22 RX ORDER — BUPIVACAINE HYDROCHLORIDE 2.5 MG/ML
0.5 INJECTION, SOLUTION INFILTRATION; PERINEURAL
Status: COMPLETED | OUTPATIENT
Start: 2025-01-22 | End: 2025-01-22

## 2025-01-22 RX ADMIN — BUPIVACAINE HYDROCHLORIDE 0.5 ML: 2.5 INJECTION, SOLUTION INFILTRATION; PERINEURAL at 09:30

## 2025-01-22 RX ADMIN — BETAMETHASONE SODIUM PHOSPHATE AND BETAMETHASONE ACETATE 3 MG: 3; 3 INJECTION, SUSPENSION INTRA-ARTICULAR; INTRALESIONAL; INTRAMUSCULAR; SOFT TISSUE at 09:30

## 2025-01-22 NOTE — PROGRESS NOTES
Assessment:  1. De Quervain's tenosynovitis  Brace    Ambulatory referral to PT/OT hand therapy      2. Numbness and tingling  Ambulatory Referral to Orthopedic Surgery    Ambulatory referral to PT/OT hand therapy      3. Hand arthritis  Ambulatory referral to PT/OT hand therapy      4. DRUJ (distal radioulnar joint) arthrosis, primary, right  Ambulatory referral to PT/OT hand therapy      5. Primary osteoarthritis of first carpometacarpal joint of right hand  Ambulatory referral to PT/OT hand therapy        Patient Active Problem List   Diagnosis    Neck pain    Spondylosis of cervical region without myelopathy or radiculopathy    Occipital neuralgia of left side    Pre-op examination    Cervical vertebral fusion    Gastroesophageal reflux disease without esophagitis    Anxiety    Primary hypertension    Other specified hypothyroidism    Stage 3a chronic kidney disease (HCC)    Hyperlipidemia    Sleep apnea    A-fib (HCC)    Cellulitis    Transaminitis    Olecranon bursitis of right elbow    Chronic anticoagulation    Irritable bowel syndrome with diarrhea    Dysphagia    Hemorrhoids    Schatzki's ring    Depression, recurrent (HCC)    Episode of recurrent major depressive disorder, unspecified depression episode severity (HCC)    Arthritis of left knee    Patellofemoral disorder of right knee    Cervical myelopathy (HCC)    Impairment of balance    Closed fracture of shaft of left fibula    Chronic pain of left knee    De Quervain's tenosynovitis         Plan:  Josee Vaca is a 74 y.o. female with right de quervain's tenosynovitis, severe diffuse hand and wrist osteoarthritis  Physical exam performed, diagnostic imaging reviewed, and thorough subjective history obtained today, all of which are most consistent with above diagnosis. .   Operative and non-operative treatment options were discussed at length with the patient today, as well as expected recoveries and outcomes of each. The patient was given the  opportunity to ask questions regarding each and all of his questions were answered today.   X-ray obtained today and reviewed with the patient demonstrating severe osteoarthritis of the CMC joint, DRUJ joint, and diffusely about the wrist and hand. No acute fracture.   Discussed that this is amenable to non-operative management, and the lump on the radial wrist is likely secondary to swelling in the first dorsal compartment  Patient was offered, accepted, and received a cortisone injection of the right first dorsal compartment today. Patient tolerated procedure well with no immediate complications. Post-injection protocols and expectations were discussed with the patient.   Order placed today for patient to initiate outpatient Occupational Therapy  Order placed today for thumb spica brace to be worn as needed  Apply Voltaren gel to painful area up to 4 times a day  May use ice or heat as needed for pain relief  May take NSAIDs/Tylenol as needed for pain control      Subjective:    Patient ID:  Josee Vaca 74 y.o. female    HPI  Josee Vaca is a 74 y.o. female who presents today for evaluation and treatment of right wrist pain ongoing for approximately 6 months. She thinks she slammed the radial aspect of her wrist on the bed rail after a procedure at the hospital while she was coming out of anesthesia. She notes severe pain minimally relieved with Tylenol. She denies paraesthesias. She denies mechanical symptoms. She denies swelling, ecchymosis. She does note a lump on the radial aspect of her wrist.         The following portions of the patient's history were reviewed and updated as appropriate: allergies, current medications, past family history, past social history, past surgical history and problem list.      Social History     Socioeconomic History    Marital status: /Civil Union     Spouse name: Not on file    Number of children: Not on file    Years of education: Not on file    Highest  education level: Not on file   Occupational History    Not on file   Tobacco Use    Smoking status: Former     Current packs/day: 0.00     Types: Cigarettes     Quit date: 2004     Years since quittin.0    Smokeless tobacco: Never   Vaping Use    Vaping status: Never Used   Substance and Sexual Activity    Alcohol use: Not Currently     Comment: last drink 7 years ago    Drug use: Not Currently    Sexual activity: Not Currently   Other Topics Concern    Not on file   Social History Narrative    Do you currently or have you served in the [employer]: No    Were you activated, into active duty, as a member of the [employer] or as a Reservist: No    Occupation: retired    Marital status:     Exercise level: Occasional treadmill    General stress level: Low    Alcohol intake: None stopped 7 years ago    Caffeine intake: Moderate    Chewing tobacco: none    Illicit drugs: none    Guns present in home: No    Seat belts used routinely: Yes    Sunscreen used routinely: Yes    Smoke alarm in home: Yes    Advance directive: Yes     Social Drivers of Health     Financial Resource Strain: Low Risk  (2023)    Overall Financial Resource Strain (CARDIA)     Difficulty of Paying Living Expenses: Not hard at all   Food Insecurity: Not on file   Transportation Needs: No Transportation Needs (2023)    PRAPARE - Transportation     Lack of Transportation (Medical): No     Lack of Transportation (Non-Medical): No   Physical Activity: Not on file   Stress: Not on file   Social Connections: Not on file   Intimate Partner Violence: Not on file   Housing Stability: Not on file     Past Medical History:   Diagnosis Date    A-fib (HCC)     Anxiety     Arthritis     shoulder    Carpal tunnel syndrome     Colon polyp     Depression     Disease of thyroid gland     Diverticulitis     Esophageal stricture     HBP (high blood pressure)     Hiatal hernia 2022    Small hiatal hernia    Hyperlipidemia      Hypertension     Hypothyroid     Pneumonia     2017    PONV (postoperative nausea and vomiting)     Schatzki's ring 06/07/2022    Schatzki's ring in the lower esophagus    Sleep apnea     mild      Past Surgical History:   Procedure Laterality Date    APPENDECTOMY      BREAST BIOPSY Right     benign    BREAST CYST ASPIRATION Right     benign    BREAST SURGERY      milk duct removed    CATARACT EXTRACTION Bilateral     CATARACT EXTRACTION, BILATERAL  2019    CERVICAL FUSION N/A 05/25/2021    Procedure: FUSION CERVICAL POSTERIOR C1-C2 with allograft and neuromonitoring);  Surgeon: Wesley Camacho MD;  Location:  MAIN OR;  Service: Orthopedics    CHOLECYSTECTOMY      COLONOSCOPY  06/07/2022    COLONOSCOPY W/ BIOPSIES  2011    EGD  06/07/2022    HAND SURGERY Right     MAMMO (HISTORICAL) Bilateral 11/27/2018    MAMMO (HISTORICAL) Bilateral 10/27/2017    MANDIBLE SURGERY      MANDIBLE SURGERY      RI SURGICAL ARTHROSCOPY SHOULDER W/ROTATOR CUFF RPR Right 09/27/2022    Procedure: ARTHROSCOPIC ROTATOR CUFF REPAIR OF SUBSCAPULARIS BICEPS TENODESIS EXTENSIVE DEBRIDEMENT;  Surgeon: Tony Rose MD;  Location: [facility];  Service: Orthopedics    WISDOM TOOTH EXTRACTION      X4     No Known Allergies  Current Outpatient Medications on File Prior to Visit   Medication Sig Dispense Refill    acetaminophen (TYLENOL) 500 mg tablet Take 1 tablet (500 mg total) by mouth every 6 (six) hours as needed for moderate pain 20 tablet 0    ALPRAZolam (XANAX) 0.5 mg tablet Take 2 tablets (1 mg total) by mouth 2 (two) times a day as needed for anxiety 60 tablet 0    atorvastatin (LIPITOR) 10 mg tablet TAKE 1 TABLET(10 MG) BY MOUTH DAILY AT BEDTIME 90 tablet 1    colestipol (COLESTID) 1 g tablet Take 2 tablets (2 g total) by mouth in the morning 2 hours separate from other meds 60 tablet 5    desvenlafaxine succinate (PRISTIQ) 50 mg 24 hr tablet Take 1 tablet (50 mg total) by mouth daily 30 tablet 5    Diclofenac Sodium  "(VOLTAREN) 1 % Apply 2 g topically 4 (four) times a day 240 g 3    dicyclomine (BENTYL) 20 mg tablet Take 1 tablet (20 mg total) by mouth 2 (two) times a day as needed (ibs) 60 tablet 5    levothyroxine 75 mcg tablet Take 1 tablet (75 mcg total) by mouth daily 30 tablet 5    metoprolol tartrate (LOPRESSOR) 25 mg tablet Take 0.5 tablets (12.5 mg total) by mouth every 12 (twelve) hours 180 tablet 3    Multiple Vitamins-Minerals (CENTRUM ADULTS PO) Take 1 tablet by mouth daily      pantoprazole (PROTONIX) 40 mg tablet Take 1 tablet (40 mg total) by mouth daily 90 tablet 2    tiZANidine (ZANAFLEX) 2 mg tablet Take 1 tablet (2 mg total) by mouth every 8 (eight) hours as needed for muscle spasms 20 tablet 0    warfarin (COUMADIN) 2 mg tablet TAKE 1 AND 1/2 TO 2 TABLETS BY MOUTH DAILY AS DIRECTED BY PRESCRIBER 180 tablet 3     No current facility-administered medications on file prior to visit.            Objective:    Review of Systems  Pertinent items are noted in HPI.  All other systems were reviewed and are negative.    Physical Exam  Ht 5' 1\" (1.549 m)   Wt 57.2 kg (126 lb)   BMI 23.81 kg/m²   Cons: Appears well.  No apparent distress.  Psych: Alert. Oriented x3.  Mood and affect normal.  Eyes: PERRLA, EOMI  Resp: Normal effort.  No audible wheezing or stridor.  CV: Palpable pulse.  No discernable arrhythmia.  No LE edema.  Lymph:  No palpable cervical, axillary, or inguinal lymphadenopathy.  Skin: Warm.  No palpable masses.  No visible lesions.  Neuro: Normal muscle tone.  Normal and symmetric DTR's.    Ortho Exam  Right De Quervain's Exam:  Patient presents with no obvious anatomical deformity  Skin is warm and dry to touch with no signs of erythema, ecchymosis, or infection  No soft tissue swelling or effusion noted  Nontender thumb CMC joint or A1 pulley  Positive tender to palpation over 1st dorsal extensor compartment   Positive palpable nodule  Positive crepitus over 1st dorsal extensor compartment   Positive " "Finkelstein's test    Positive pain with resisted abduction of the thumb  Opposition strength 5/5  Intrinsic strength 5/5.  AIN 5/5  Demonstrates normal wrist, elbow, and shoulder motion  Forearm compartments are soft and supple  2+ distal radial pulse with brisk capillary refill to the fingers  Radial, median, and ulnar motor and sensory distribution intact  Sensations light to touch intact distally      Procedures  Hand/upper extremity injection: R extensor compartment 1  Universal Protocol:  Consent: Verbal consent obtained.  Risks and benefits: risks, benefits and alternatives were discussed  Consent given by: patient  Time out: Immediately prior to procedure a \"time out\" was called to verify the correct patient, procedure, equipment, support staff and site/side marked as required.  Patient understanding: patient states understanding of the procedure being performed  Site marked: the operative site was marked  Supporting Documentation  Indications: diagnostic, tendon swelling and pain   Procedure Details  Condition:de Quervain's tenosynovitis Site: R extensor compartment 1   Preparation: Patient was prepped and draped in the usual sterile fashion  Needle size: 25 G  Approach: radial  Medications administered: 0.5 mL bupivacaine 0.25 %; 3 mg betamethasone acetate-betamethasone sodium phosphate 6 (3-3) mg/mL  Patient tolerance: patient tolerated the procedure well with no immediate complications  Dressing:  Sterile dressing applied              Diagnostics, reviewed and taken today if performed as documented:  I have personally reviewed pertinent films in PACS and my interpretation is severe osteoarthritis diffusely about the hand and wrist, especially at the CMC joint and DRUJ. Diffuse subchondral cysts, sclerotic changes, osteophyte formation.        Scribe Attestation      I,:  Zahida Lombardi am acting as a scribe while in the presence of the attending physician.:       I,:  Christiano Bruce, DO personally " "performed the services described in this documentation    as scribed in my presence.:             Portions of the record may have been created with voice recognition software.  Occasional wrong word or \"sound a like\" substitutions may have occurred due to the inherent limitations of voice recognition software.  Read the chart carefully and recognize, using context, where substitutions have occurred.  "

## 2025-01-24 ENCOUNTER — OFFICE VISIT (OUTPATIENT)
Dept: CARDIOLOGY CLINIC | Facility: CLINIC | Age: 75
End: 2025-01-24
Payer: MEDICARE

## 2025-01-24 VITALS
WEIGHT: 126 LBS | HEART RATE: 60 BPM | TEMPERATURE: 96.7 F | SYSTOLIC BLOOD PRESSURE: 138 MMHG | OXYGEN SATURATION: 98 % | DIASTOLIC BLOOD PRESSURE: 64 MMHG | HEIGHT: 61 IN | BODY MASS INDEX: 23.79 KG/M2

## 2025-01-24 DIAGNOSIS — I48.0 PAROXYSMAL ATRIAL FIBRILLATION (HCC): Primary | ICD-10-CM

## 2025-01-24 DIAGNOSIS — N18.2 CKD (CHRONIC KIDNEY DISEASE) STAGE 2, GFR 60-89 ML/MIN: ICD-10-CM

## 2025-01-24 DIAGNOSIS — E78.2 MIXED HYPERLIPIDEMIA: ICD-10-CM

## 2025-01-24 DIAGNOSIS — I10 PRIMARY HYPERTENSION: ICD-10-CM

## 2025-01-24 PROCEDURE — 99214 OFFICE O/P EST MOD 30 MIN: CPT | Performed by: INTERNAL MEDICINE

## 2025-01-24 PROCEDURE — 93000 ELECTROCARDIOGRAM COMPLETE: CPT | Performed by: INTERNAL MEDICINE

## 2025-01-24 NOTE — PROGRESS NOTES
[facility]    CHIEF COMPLAINT:   No chief complaint on file.    HPI:  Josee Vaca is a 74 y.o. female with a past medical history of hypertension, hyperlipidemia, paroxysmal atrial fibrillation, chronic anticoagulation with warfarin.    She presented to the emergency department on 01/24/2022 after arriving for an EGD/colonoscopy due to a change in bowel habits and dysphagia.  She was found to be in rapid atrial fibrillation.  She had no prior diagnosis of this in the past.  She was treated with IV Cardizem and oral Lopressor and converted to normal sinus rhythm.  She was diagnosed with cellulitis/acute bursitis of her right elbow.  She underwent I&D and cultures revealed MSSA.  She was treated with cephalexin.  TTE revealed preserved LVEF.  Subsequently in the office we had switched her to warfarin due to the high cost of Eliquis.     OV - 5/2/23: She presents today for follow-up for her atrial fibrillation.  She feels well and has no acute complaints.  Her PT/INR levels have been within the appropriate range.      OV - 10/24/23: She presents today for general follow up. Recent dizziness and fatigue with low BP and Ramipril was discontinued about 1.5 weeks ago.     Interval history: Presents today for follow up. Reports joints pains. Occasional numbness in the feet at night. She denies any lightheadedness, palpitations, chest pain, shortness of breath, orthopnea, PND, leg edema, bruising, bleeding.     The following portions of the patient's history were reviewed and updated as appropriate: allergies, current medications, past family history, past medical history, past social history, past surgical history, and problem list.    SINCE LAST OV I REVIEWED WITH THE PATIENT THE INTERIM LABS, TEST RESULTS, CONSULTANT(S) NOTES AND PERFORMED AN INTERIM REVIEW OF HISTORY    Past Medical History:   Diagnosis Date    A-fib (HCC)     Anxiety     Arthritis     shoulder    Carpal tunnel syndrome      Colon polyp     Depression     Disease of thyroid gland     Diverticulitis     Esophageal stricture     HBP (high blood pressure)     Hiatal hernia 2022    Small hiatal hernia    Hyperlipidemia     Hypertension     Hypothyroid     Pneumonia         PONV (postoperative nausea and vomiting)     Schatzki's ring 2022    Schatzki's ring in the lower esophagus    Sleep apnea     mild        Past Surgical History:   Procedure Laterality Date    APPENDECTOMY      BREAST BIOPSY Right     benign    BREAST CYST ASPIRATION Right     benign    BREAST SURGERY      milk duct removed    CATARACT EXTRACTION Bilateral     CATARACT EXTRACTION, BILATERAL  2019    CERVICAL FUSION N/A 2021    Procedure: FUSION CERVICAL POSTERIOR C1-C2 with allograft and neuromonitoring);  Surgeon: Wesley Camacho MD;  Location:  MAIN OR;  Service: Orthopedics    CHOLECYSTECTOMY      COLONOSCOPY  2022    COLONOSCOPY W/ BIOPSIES      EGD  2022    HAND SURGERY Right     MAMMO (HISTORICAL) Bilateral 2018    MAMMO (HISTORICAL) Bilateral 10/27/2017    MANDIBLE SURGERY      MANDIBLE SURGERY      TX SURGICAL ARTHROSCOPY SHOULDER W/ROTATOR CUFF RPR Right 2022    Procedure: ARTHROSCOPIC ROTATOR CUFF REPAIR OF SUBSCAPULARIS BICEPS TENODESIS EXTENSIVE DEBRIDEMENT;  Surgeon: Tony Rose MD;  Location: [facility];  Service: Orthopedics    WISDOM TOOTH EXTRACTION      X4       Social History     Socioeconomic History    Marital status: /Civil Union     Spouse name: Not on file    Number of children: Not on file    Years of education: Not on file    Highest education level: Not on file   Occupational History    Not on file   Tobacco Use    Smoking status: Former     Current packs/day: 0.00     Types: Cigarettes     Quit date:      Years since quittin.0    Smokeless tobacco: Never   Vaping Use    Vaping status: Never Used   Substance and Sexual Activity    Alcohol use: Not Currently      Comment: last drink 7 years ago    Drug use: Not Currently    Sexual activity: Not Currently   Other Topics Concern    Not on file   Social History Narrative    Do you currently or have you served in the [place] [employer]: No    Were you activated, into active duty, as a member of the [employer] or as a Reservist: No    Occupation: retired    Marital status:     Exercise level: Occasional treadmill    General stress level: Low    Alcohol intake: None stopped 7 years ago    Caffeine intake: Moderate    Chewing tobacco: none    Illicit drugs: none    Guns present in home: No    Seat belts used routinely: Yes    Sunscreen used routinely: Yes    Smoke alarm in home: Yes    Advance directive: Yes     Social Drivers of Health     Financial Resource Strain: Low Risk  (8/22/2023)    Overall Financial Resource Strain (CARDIA)     Difficulty of Paying Living Expenses: Not hard at all   Food Insecurity: Not on file   Transportation Needs: No Transportation Needs (8/22/2023)    PRAPARE - Transportation     Lack of Transportation (Medical): No     Lack of Transportation (Non-Medical): No   Physical Activity: Not on file   Stress: Not on file   Social Connections: Not on file   Intimate Partner Violence: Not on file   Housing Stability: Not on file       Family History   Problem Relation Age of Onset    Heart disease Mother     Hypertension Mother     Hyperlipidemia Mother     Heart failure Mother     Hypertension Sister        No Known Allergies    Current Outpatient Medications   Medication Sig Dispense Refill    acetaminophen (TYLENOL) 500 mg tablet Take 1 tablet (500 mg total) by mouth every 6 (six) hours as needed for moderate pain 20 tablet 0    ALPRAZolam (XANAX) 0.5 mg tablet Take 2 tablets (1 mg total) by mouth 2 (two) times a day as needed for anxiety 60 tablet 0    atorvastatin (LIPITOR) 10 mg tablet TAKE 1 TABLET(10 MG) BY MOUTH DAILY AT BEDTIME 90 tablet 1    colestipol (COLESTID) 1 g tablet Take 2 tablets  "(2 g total) by mouth in the morning 2 hours separate from other meds 60 tablet 5    desvenlafaxine succinate (PRISTIQ) 50 mg 24 hr tablet Take 1 tablet (50 mg total) by mouth daily 30 tablet 5    Diclofenac Sodium (VOLTAREN) 1 % Apply 2 g topically 4 (four) times a day 240 g 3    dicyclomine (BENTYL) 20 mg tablet Take 1 tablet (20 mg total) by mouth 2 (two) times a day as needed (ibs) 60 tablet 5    levothyroxine 75 mcg tablet Take 1 tablet (75 mcg total) by mouth daily 30 tablet 5    metoprolol tartrate (LOPRESSOR) 25 mg tablet Take 0.5 tablets (12.5 mg total) by mouth every 12 (twelve) hours 180 tablet 3    Multiple Vitamins-Minerals (CENTRUM ADULTS PO) Take 1 tablet by mouth daily      pantoprazole (PROTONIX) 40 mg tablet Take 1 tablet (40 mg total) by mouth daily 90 tablet 2    tiZANidine (ZANAFLEX) 2 mg tablet Take 1 tablet (2 mg total) by mouth every 8 (eight) hours as needed for muscle spasms 20 tablet 0    warfarin (COUMADIN) 2 mg tablet TAKE 1 AND 1/2 TO 2 TABLETS BY MOUTH DAILY AS DIRECTED BY PRESCRIBER 180 tablet 3     No current facility-administered medications for this visit.       /64 (BP Location: Left arm, Patient Position: Sitting, Cuff Size: Adult)   Pulse 60   Temp (!) 96.7 °F (35.9 °C) (Tympanic)   Ht 5' 1\" (1.549 m)   Wt 57.2 kg (126 lb)   SpO2 98%   BMI 23.81 kg/m²     Review of Systems   All other systems reviewed and are negative.      Physical Exam  Vitals reviewed.   Constitutional:       General: She is not in acute distress.     Appearance: Normal appearance. She is not toxic-appearing.   HENT:      Head: Normocephalic and atraumatic.   Cardiovascular:      Rate and Rhythm: Normal rate and regular rhythm.      Pulses: Normal pulses.      Heart sounds: Normal heart sounds. No murmur heard.     No gallop.   Pulmonary:      Effort: Pulmonary effort is normal. No respiratory distress.      Breath sounds: Normal breath sounds. No wheezing or rales.   Abdominal:      General: " Abdomen is flat. Bowel sounds are normal. There is no distension.      Palpations: Abdomen is soft.      Tenderness: There is no abdominal tenderness. There is no guarding.   Musculoskeletal:      Right lower leg: No edema.      Left lower leg: No edema.   Skin:     General: Skin is warm and dry.      Coloration: Skin is not jaundiced or pale.   Neurological:      Mental Status: She is alert.   Psychiatric:         Mood and Affect: Mood normal.          Lab Results   Component Value Date    K 3.8 09/11/2024     09/11/2024    CO2 28 09/11/2024    BUN 19 09/11/2024    CREATININE 0.84 09/11/2024    CALCIUM 10.3 (H) 09/11/2024    ALT 42 09/11/2024    AST 37 09/11/2024    INR 2.31 (H) 01/15/2025       Lab Results   Component Value Date    HDL 61 09/11/2024    LDLCALC 73 09/11/2024    TRIG 88 09/11/2024       Lab Results   Component Value Date    WBC 7.19 09/11/2024    HGB 13.3 09/11/2024    HCT 41.8 09/11/2024     09/11/2024       Lab Results   Component Value Date     04/26/2021    HGBA1C 6.0 09/16/2024     Cardiac studies:   Results for orders placed or performed in visit on 01/24/25   POCT ECG    Impression    Normal sinus rhythm  Low voltage QRS  SMI, age undetermined             TTE - 1/25/22:  Left Ventricle Left ventricular cavity size is normal. Wall thickness is normal. The left ventricular ejection fraction is 60%. Systolic function is normal.  Wall motion is normal. Diastolic function is normal.   Right Ventricle Right ventricular cavity size is normal. Systolic function is normal. Wall thickness is normal.   Left Atrium The atrium is normal in size.   Right Atrium The atrium is normal in size.   Aortic Valve The aortic valve is trileaflet. The leaflets are not thickened. The leaflets are not calcified. The leaflets exhibit normal mobility. There is mild regurgitation. There is no evidence of stenosis.   Mitral Valve The mitral valve has normal function. There is mild annular calcification.  There is moderate regurgitation. There is no evidence of stenosis. The valve has normal function.   Tricuspid Valve Tricuspid valve structure is normal. There is moderate regurgitation. There is no evidence of stenosis. The estimated right ventricular systolic pressure is 51.00 mmHg.   Pulmonic Valve Pulmonic valve structure is normal. There is no evidence of regurgitation. There is no evidence of stenosis.   Ascending Aorta The aortic root is normal in size.   IVC/SVC The right atrial pressure is estimated at 3.0 mmHg. The inferior vena cava is normal in size.   Pericardium There is no pericardial effusion. The pericardium is normal in appearance.       ASSESSMENT AND PLAN:  Josee was seen today for atrial fibrillation and hypertension.    Diagnoses and all orders for this visit:    Primary hypertension: No longer on ramipril due to lightheadedness. Blood pressure is adequately controlled.    Paroxysmal atrial fibrillation (HCC): History of paroxysmal atrial fibrillation diagnosed in January 2022 after presenting for EGD/colonoscopy.  Treated with IV Cardizem and oral metoprolol with subsequent conversion to sinus rhythm.  - ECG today NSR  - Continue metoprolol tartrate 12.5 mg twice daily-she was unable to tolerate higher doses due to fatigue  - Continue warfarin, goal INR 2.0-3.0    Hyperlipidemia, unspecified hyperlipidemia type: Lipid panel from 9/11/2024-total cholesterol 152, triglycerides 88, HDL 61, LDL 73.  Continue atorvastatin 10 mg daily.     London Charles MD

## 2025-01-28 DIAGNOSIS — F41.9 ANXIETY: ICD-10-CM

## 2025-01-28 RX ORDER — ALPRAZOLAM 0.5 MG
1 TABLET ORAL 2 TIMES DAILY PRN
Qty: 60 TABLET | Refills: 0 | Status: SHIPPED | OUTPATIENT
Start: 2025-01-28

## 2025-01-28 NOTE — TELEPHONE ENCOUNTER
Reason for call:   [x] Refill   [] Prior Auth  [] Other:     Office:   [x] PCP/Provider - [facility]   [] Specialty/Provider -     Medication: ALPRAZolam (XANAX) 0.5 mg tab    Dose/Frequency: 1 mg, 2 times daily PRN     Quantity: 60    Pharmacy: [facility] #[id number]     Does the patient have enough for 3 days?   [x] Yes   [] No - Send as HP to POD

## 2025-01-30 DIAGNOSIS — I48.0 PAROXYSMAL ATRIAL FIBRILLATION (HCC): Primary | ICD-10-CM

## 2025-02-02 DIAGNOSIS — Z76.0 MEDICATION REFILL: ICD-10-CM

## 2025-02-02 RX ORDER — ATORVASTATIN CALCIUM 10 MG/1
10 TABLET, FILM COATED ORAL
Qty: 90 TABLET | Refills: 1 | Status: ON HOLD | OUTPATIENT
Start: 2025-02-02

## 2025-02-04 ENCOUNTER — EVALUATION (OUTPATIENT)
Dept: OCCUPATIONAL THERAPY | Facility: CLINIC | Age: 75
End: 2025-02-04
Payer: MEDICARE

## 2025-02-04 DIAGNOSIS — M19.031 DRUJ (DISTAL RADIOULNAR JOINT) ARTHROSIS, PRIMARY, RIGHT: ICD-10-CM

## 2025-02-04 DIAGNOSIS — M65.4 DE QUERVAIN'S TENOSYNOVITIS: ICD-10-CM

## 2025-02-04 DIAGNOSIS — R20.0 NUMBNESS AND TINGLING: ICD-10-CM

## 2025-02-04 DIAGNOSIS — M18.11 PRIMARY OSTEOARTHRITIS OF FIRST CARPOMETACARPAL JOINT OF RIGHT HAND: ICD-10-CM

## 2025-02-04 DIAGNOSIS — M19.049 HAND ARTHRITIS: ICD-10-CM

## 2025-02-04 DIAGNOSIS — R20.2 NUMBNESS AND TINGLING: ICD-10-CM

## 2025-02-04 PROCEDURE — 97110 THERAPEUTIC EXERCISES: CPT

## 2025-02-04 PROCEDURE — 97165 OT EVAL LOW COMPLEX 30 MIN: CPT

## 2025-02-04 NOTE — PROGRESS NOTES
OT Evaluation     Today's date: 2025  Patient name: Josee Vaca  : 1950  MRN: [id number]  Referring provider: Christiano Bruce DO  Dx:   Encounter Diagnosis     ICD-10-CM    1. Numbness and tingling  R20.0 Ambulatory referral to PT/OT hand therapy    R20.2       2. De Quervain's tenosynovitis  M65.4 Ambulatory referral to PT/OT hand therapy      3. Hand arthritis  M19.049 Ambulatory referral to PT/OT hand therapy      4. DRUJ (distal radioulnar joint) arthrosis, primary, right  M19.031 Ambulatory referral to PT/OT hand therapy      5. Primary osteoarthritis of first carpometacarpal joint of right hand  M18.11 Ambulatory referral to PT/OT hand therapy                     Assessment  Impairments: abnormal or restricted ROM, activity intolerance, impaired physical strength, pain with function and weight-bearing intolerance  Symptom irritability: moderate  Other deficits: executive functioning    Assessment details: Patient presents to OP OT hand therapy services due to a diagnosis of DeQuervain's tenosynovitis and severe diffuse arthritis of all joints of the hand including the CMC joints and DRUJ. Patient symptoms in the 1st dorsal compartment has been present for over 7 months. Patient had initial appointment with orthopedics on 25 where a CSI injection was administered into the 1st dorsal compartment and they were recommended bracing in a thumb spica brace.  Patient reports significant improvement following the injection. There is a prominent nodule at the 1st dorsal compartment near the radial styloid. Patient displays significant deformity in the majority of their joints due to the arthritis. Point tenderness is located in the 1st dorsal compartment and at the DRUJ.  Patient presents with moderate pain in the 1st dorsal compartment during general AROM. Wrist ROM is decreased bilaterally secondary to arthritis. Thumb AROM is decreased bilaterally. /pinch strength is significantly decreased in  the R as compared to the contralateral side. Positive special tests including Finkelstein's, CMC grind, and shoulder sign which is indicative and consistent with diagnosis. Patient was instructed to begin bracing at nighttime to decrease overall symptoms. Patient was provided a custom HEP and instructed on how to complete it. Patient would benefit from skill OT services to decrease pain and increase overall function. See below for a more detailed assessment.     Goals  STG:    Patient will increase wrist AROM in all planes by >20 degrees in 4 weeks    Patient will oppose to the base of the 5th digit in 4 weeks    Patient will report overall decreased pain by 1-2 grades in 4 weeks    Patient will increase  strength by >10 lbs in 4 weeks      LTG:    Patient will display wrist AROM WNL as compared to the contralateral side in 8 weeks or discharge    Patient will display digit AROM WNL as compared to the contralateral side in 8 weeks or discharge    Patient will report resolution of pain in the 1st dorsal compartment in 8 weeks or discharge    Patient will display  strength WNL compared to the contralateral side in 8 weeks or discharge      Plan  Patient would benefit from: OT eval and custom splinting  Referral necessary: No  Planned modality interventions: TENS, thermotherapy: hydrocollator packs and ultrasound    Planned therapy interventions: compression, functional ROM exercises, fine motor coordination training, graded activity, graded exercise, home exercise program, IASTM, joint mobilization, kinesiology taping, manual therapy, Rob taping, nerve gliding, orthotic management and training, orthotic fitting/training, patient education, strengthening, stretching, therapeutic activities and therapeutic exercise    Frequency: 1x week  Duration in weeks: 10  Plan of Care beginning date: 2/4/2025  Plan of Care expiration date: 4/4/2025  Plan details: Patient would benefit from skilled OP OT hand therapy  "services 1x a week for 8 weeks to decrease pain, increase strength, and return to full functional status.         Subjective Evaluation    History of Present Illness  Mechanism of injury: Subjective:   \"I got an injection and it helped a lot\". \"The left hand doesn't bend well because I broke the ring finger and it hurts\". \"I don't think things are getting better\". \"I worked hard at [employer]\". \"Everything is hard to do because of my hands\". \"I can't grab a railing because it hurts\". \"When I make a fist with the left hand it locks\". \"The index finger on the right used to lock but it doesn't anymore\".           Recurrent probem    Quality of life: good    Patient Goals  Patient goals for therapy: decreased pain, increased motion, increased strength and independence with ADLs/IADLs    Pain  Current pain ratin  At best pain ratin  At worst pain rating: 10  Location: B/L hands and wrists, 1st dorsal compartment, and left RF  Quality: sharp  Relieving factors: relaxation  Aggravating factors: lifting (ADLs, general motion, lifting, gripping)  Progression: worsening    Social Support    Employment status: not working  Hand dominance: right      Diagnostic Tests  X-ray: abnormal  Treatments  Previous treatment: injection treatment  Current treatment: occupational therapy        Objective     Observations     Left Wrist/Hand   Positive for deformity.     Right Wrist/Hand   Positive for deformity.     Additional Observation Details  Severe deformity of multiple joints in the bilateral hands    Tenderness     Right Wrist/Hand   Tenderness in the first dorsal compartment.     Additional Tenderness Details  R:  Nodule in the 1st dorsal compartment is very point tender    Neurological Testing     Sensation     Wrist/Hand   Left   Intact: light touch    Right   Intact: light touch    Active Range of Motion     Left Wrist   Wrist flexion: 65 degrees   Wrist extension: 50 degrees     Right Wrist   Wrist flexion: 70 degrees "   Wrist extension: 45 degrees     Left Thumb   Palmar Abduction     CMC: 35 degrees  Radial abduction    CMC: 30 degrees  Kapandji score: 10 degrees      Right Thumb   Palmar Abduction    CMC: 35  Radial Abduction    CMC: 35  Kapandji score: 10 degrees    Additional Active Range of Motion Details  R:  Full composite fist    L:   RF 2.5 CM from DPC    Strength/Myotome Testing     Left Wrist/Hand      (2nd hand position)     Trial 1: 10    Right Wrist/Hand      (2nd hand position)     Trial 1: 26.6    Tests     Left Wrist/Hand   Positive CMC grind, CMC lever test positive and CMC shoulder sign.     Right Wrist/Hand   Positive CMC grind, CMC lever test positive and CMC shoulder sign.     Swelling     Left Wrist/Hand   Thumb     Proximal: 5.7 cm    Distal: 5 cm  Circumference wrist: 14.5 cm    Right Wrist/Hand   Thumb     Proximal: 5.5 cm    Distal: 5.5 cm  Circumference wrist: 15.4 cm             Precautions: De-Quervain's, Severe Arthritis in multiple joints including CMC and DRUJ      Manuals 2/4            IASTM             MEM                                       Neuro Re-Ed                                                                                                        Ther Ex             HEP Tendon glide    Wrist AROM    Opposition            Wrist AROM             Digit AROM             Opp marbles             Dex balls             Hook fist pegs             Pencil grasps             Power web              Dex balls             Wrist PRE w/ dowel                                                    Ther Activity             Barnard sort                                                                 Modalities             MHP

## 2025-02-10 ENCOUNTER — HOSPITAL ENCOUNTER (INPATIENT)
Facility: HOSPITAL | Age: 75
LOS: 2 days | Discharge: HOME | DRG: 179 | End: 2025-02-13
Attending: EMERGENCY MEDICINE | Admitting: INTERNAL MEDICINE
Payer: MEDICARE

## 2025-02-10 DIAGNOSIS — R53.1 GENERALIZED WEAKNESS: ICD-10-CM

## 2025-02-10 DIAGNOSIS — U07.1 COVID-19: Primary | ICD-10-CM

## 2025-02-10 DIAGNOSIS — R94.5 ABNORMAL LIVER FUNCTION: ICD-10-CM

## 2025-02-10 DIAGNOSIS — R26.2 AMBULATORY DYSFUNCTION: ICD-10-CM

## 2025-02-10 LAB
ALBUMIN SERPL BCG-MCNC: 4.4 G/DL (ref 3.5–5)
ALP SERPL-CCNC: 71 U/L (ref 34–104)
ALT SERPL W P-5'-P-CCNC: 61 U/L (ref 7–52)
AMORPH URATE CRY URNS QL MICRO: NORMAL
ANION GAP SERPL CALCULATED.3IONS-SCNC: 9 MMOL/L (ref 4–13)
AST SERPL W P-5'-P-CCNC: 63 U/L (ref 13–39)
BACTERIA UR QL AUTO: NORMAL /HPF
BASOPHILS # BLD AUTO: 0.03 THOUSANDS/ÂΜL (ref 0–0.1)
BASOPHILS NFR BLD AUTO: 1 % (ref 0–1)
BILIRUB SERPL-MCNC: 0.38 MG/DL (ref 0.2–1)
BILIRUB UR QL STRIP: NEGATIVE
BUN SERPL-MCNC: 15 MG/DL (ref 5–25)
CALCIUM SERPL-MCNC: 9.7 MG/DL (ref 8.4–10.2)
CARDIAC TROPONIN I PNL SERPL HS: 8 NG/L (ref ?–50)
CHLORIDE SERPL-SCNC: 100 MMOL/L (ref 96–108)
CLARITY UR: ABNORMAL
CO2 SERPL-SCNC: 25 MMOL/L (ref 21–32)
COLOR UR: ABNORMAL
CREAT SERPL-MCNC: 0.78 MG/DL (ref 0.6–1.3)
EOSINOPHIL # BLD AUTO: 0 THOUSAND/ÂΜL (ref 0–0.61)
EOSINOPHIL NFR BLD AUTO: 0 % (ref 0–6)
ERYTHROCYTE [DISTWIDTH] IN BLOOD BY AUTOMATED COUNT: 12.9 % (ref 11.6–15.1)
FLUAV AG UPPER RESP QL IA.RAPID: NEGATIVE
FLUBV AG UPPER RESP QL IA.RAPID: NEGATIVE
GFR SERPL CREATININE-BSD FRML MDRD: 74 ML/MIN/1.73SQ M
GLUCOSE SERPL-MCNC: 154 MG/DL (ref 65–140)
GLUCOSE UR STRIP-MCNC: NEGATIVE MG/DL
HCT VFR BLD AUTO: 38.2 % (ref 34.8–46.1)
HGB BLD-MCNC: 12.6 G/DL (ref 11.5–15.4)
HGB UR QL STRIP.AUTO: ABNORMAL
IMM GRANULOCYTES # BLD AUTO: 0.02 THOUSAND/UL (ref 0–0.2)
IMM GRANULOCYTES NFR BLD AUTO: 0 % (ref 0–2)
KETONES UR STRIP-MCNC: NEGATIVE MG/DL
LEUKOCYTE ESTERASE UR QL STRIP: NEGATIVE
LYMPHOCYTES # BLD AUTO: 0.17 THOUSANDS/ÂΜL (ref 0.6–4.47)
LYMPHOCYTES NFR BLD AUTO: 3 % (ref 14–44)
MAGNESIUM SERPL-MCNC: 1.9 MG/DL (ref 1.9–2.7)
MCH RBC QN AUTO: 30.4 PG (ref 26.8–34.3)
MCHC RBC AUTO-ENTMCNC: 33 G/DL (ref 31.4–37.4)
MCV RBC AUTO: 92 FL (ref 82–98)
MONOCYTES # BLD AUTO: 0.6 THOUSAND/ÂΜL (ref 0.17–1.22)
MONOCYTES NFR BLD AUTO: 11 % (ref 4–12)
NEUTROPHILS # BLD AUTO: 4.74 THOUSANDS/ÂΜL (ref 1.85–7.62)
NEUTS SEG NFR BLD AUTO: 85 % (ref 43–75)
NITRITE UR QL STRIP: NEGATIVE
NON-SQ EPI CELLS URNS QL MICRO: NORMAL /HPF
PH UR STRIP.AUTO: 7 [PH]
PLATELET # BLD AUTO: 240 THOUSANDS/UL (ref 149–390)
PMV BLD AUTO: 9.2 FL (ref 8.9–12.7)
POTASSIUM SERPL-SCNC: 3.8 MMOL/L (ref 3.5–5.3)
PROT SERPL-MCNC: 7.4 G/DL (ref 6.4–8.4)
PROT UR STRIP-MCNC: NEGATIVE MG/DL
RBC # BLD AUTO: 4.15 MILLION/UL (ref 3.81–5.12)
RBC #/AREA URNS AUTO: NORMAL /HPF
SARS-COV+SARS-COV-2 AG RESP QL IA.RAPID: POSITIVE
SODIUM SERPL-SCNC: 134 MMOL/L (ref 135–147)
SP GR UR STRIP.AUTO: 1.01 (ref 1–1.03)
UROBILINOGEN UR STRIP-ACNC: <2 MG/DL
WBC # BLD AUTO: 5.79 THOUSAND/UL (ref 4.31–10.16)
WBC #/AREA URNS AUTO: NORMAL /HPF

## 2025-02-10 PROCEDURE — 85025 COMPLETE CBC W/AUTO DIFF WBC: CPT

## 2025-02-10 PROCEDURE — 80053 COMPREHEN METABOLIC PANEL: CPT

## 2025-02-10 PROCEDURE — 83735 ASSAY OF MAGNESIUM: CPT

## 2025-02-10 PROCEDURE — 87811 SARS-COV-2 COVID19 W/OPTIC: CPT

## 2025-02-10 PROCEDURE — 93005 ELECTROCARDIOGRAM TRACING: CPT

## 2025-02-10 PROCEDURE — 84484 ASSAY OF TROPONIN QUANT: CPT

## 2025-02-10 PROCEDURE — 99285 EMERGENCY DEPT VISIT HI MDM: CPT

## 2025-02-10 PROCEDURE — 81001 URINALYSIS AUTO W/SCOPE: CPT

## 2025-02-10 PROCEDURE — 96360 HYDRATION IV INFUSION INIT: CPT

## 2025-02-10 PROCEDURE — 36415 COLL VENOUS BLD VENIPUNCTURE: CPT

## 2025-02-10 PROCEDURE — 87804 INFLUENZA ASSAY W/OPTIC: CPT

## 2025-02-10 RX ADMIN — SODIUM CHLORIDE 500 ML: 0.9 INJECTION, SOLUTION INTRAVENOUS at 22:57

## 2025-02-11 ENCOUNTER — APPOINTMENT (OUTPATIENT)
Dept: OCCUPATIONAL THERAPY | Facility: CLINIC | Age: 75
End: 2025-02-11
Payer: MEDICARE

## 2025-02-11 LAB
2HR DELTA HS TROPONIN: 3 NG/L
4HR DELTA HS TROPONIN: 4 NG/L
ALBUMIN SERPL BCG-MCNC: 3.8 G/DL (ref 3.5–5)
ALP SERPL-CCNC: 59 U/L (ref 34–104)
ALT SERPL W P-5'-P-CCNC: 53 U/L (ref 7–52)
ANION GAP SERPL CALCULATED.3IONS-SCNC: 7 MMOL/L (ref 4–13)
AST SERPL W P-5'-P-CCNC: 55 U/L (ref 13–39)
ATRIAL RATE: 76 BPM
BASOPHILS # BLD AUTO: 0.02 THOUSANDS/ÂΜL (ref 0–0.1)
BASOPHILS NFR BLD AUTO: 0 % (ref 0–1)
BILIRUB SERPL-MCNC: 0.36 MG/DL (ref 0.2–1)
BUN SERPL-MCNC: 13 MG/DL (ref 5–25)
CALCIUM SERPL-MCNC: 8.8 MG/DL (ref 8.4–10.2)
CARDIAC TROPONIN I PNL SERPL HS: 11 NG/L (ref ?–50)
CARDIAC TROPONIN I PNL SERPL HS: 12 NG/L (ref ?–50)
CHLORIDE SERPL-SCNC: 106 MMOL/L (ref 96–108)
CO2 SERPL-SCNC: 26 MMOL/L (ref 21–32)
CREAT SERPL-MCNC: 0.68 MG/DL (ref 0.6–1.3)
EOSINOPHIL # BLD AUTO: 0 THOUSAND/ÂΜL (ref 0–0.61)
EOSINOPHIL NFR BLD AUTO: 0 % (ref 0–6)
ERYTHROCYTE [DISTWIDTH] IN BLOOD BY AUTOMATED COUNT: 12.9 % (ref 11.6–15.1)
GFR SERPL CREATININE-BSD FRML MDRD: 85 ML/MIN/1.73SQ M
GLUCOSE SERPL-MCNC: 103 MG/DL (ref 65–140)
HCT VFR BLD AUTO: 33.5 % (ref 34.8–46.1)
HGB BLD-MCNC: 11.3 G/DL (ref 11.5–15.4)
IMM GRANULOCYTES # BLD AUTO: 0.01 THOUSAND/UL (ref 0–0.2)
IMM GRANULOCYTES NFR BLD AUTO: 0 % (ref 0–2)
INR PPP: 2.61 (ref 0.85–1.19)
LYMPHOCYTES # BLD AUTO: 0.57 THOUSANDS/ÂΜL (ref 0.6–4.47)
LYMPHOCYTES NFR BLD AUTO: 12 % (ref 14–44)
MCH RBC QN AUTO: 31.1 PG (ref 26.8–34.3)
MCHC RBC AUTO-ENTMCNC: 33.7 G/DL (ref 31.4–37.4)
MCV RBC AUTO: 92 FL (ref 82–98)
MONOCYTES # BLD AUTO: 0.8 THOUSAND/ÂΜL (ref 0.17–1.22)
MONOCYTES NFR BLD AUTO: 17 % (ref 4–12)
NEUTROPHILS # BLD AUTO: 3.25 THOUSANDS/ÂΜL (ref 1.85–7.62)
NEUTS SEG NFR BLD AUTO: 71 % (ref 43–75)
NRBC BLD AUTO-RTO: 0 /100 WBCS
P AXIS: 80 DEGREES
PLATELET # BLD AUTO: 215 THOUSANDS/UL (ref 149–390)
PMV BLD AUTO: 9 FL (ref 8.9–12.7)
POTASSIUM SERPL-SCNC: 3.5 MMOL/L (ref 3.5–5.3)
PR INTERVAL: 160 MS
PROT SERPL-MCNC: 6.3 G/DL (ref 6.4–8.4)
PROTHROMBIN TIME: 28.6 SECONDS (ref 12.3–15)
QRS AXIS: 48 DEGREES
QRSD INTERVAL: 122 MS
QT INTERVAL: 450 MS
QTC INTERVAL: 506 MS
RBC # BLD AUTO: 3.63 MILLION/UL (ref 3.81–5.12)
SODIUM SERPL-SCNC: 139 MMOL/L (ref 135–147)
T WAVE AXIS: 56 DEGREES
VENTRICULAR RATE: 76 BPM
WBC # BLD AUTO: 4.65 THOUSAND/UL (ref 4.31–10.16)

## 2025-02-11 PROCEDURE — 99222 1ST HOSP IP/OBS MODERATE 55: CPT | Performed by: PHYSICIAN ASSISTANT

## 2025-02-11 PROCEDURE — 93010 ELECTROCARDIOGRAM REPORT: CPT | Performed by: INTERNAL MEDICINE

## 2025-02-11 PROCEDURE — 84484 ASSAY OF TROPONIN QUANT: CPT

## 2025-02-11 PROCEDURE — 80053 COMPREHEN METABOLIC PANEL: CPT | Performed by: PHYSICIAN ASSISTANT

## 2025-02-11 PROCEDURE — 97167 OT EVAL HIGH COMPLEX 60 MIN: CPT

## 2025-02-11 PROCEDURE — 97163 PT EVAL HIGH COMPLEX 45 MIN: CPT

## 2025-02-11 PROCEDURE — 85025 COMPLETE CBC W/AUTO DIFF WBC: CPT | Performed by: PHYSICIAN ASSISTANT

## 2025-02-11 PROCEDURE — 85610 PROTHROMBIN TIME: CPT | Performed by: PHYSICIAN ASSISTANT

## 2025-02-11 PROCEDURE — 36415 COLL VENOUS BLD VENIPUNCTURE: CPT | Performed by: PHYSICIAN ASSISTANT

## 2025-02-11 RX ORDER — COLESTIPOL HYDROCHLORIDE 1 G/1
2 TABLET ORAL DAILY
Status: DISCONTINUED | OUTPATIENT
Start: 2025-02-11 | End: 2025-02-11

## 2025-02-11 RX ORDER — BENZONATATE 100 MG/1
100 CAPSULE ORAL 3 TIMES DAILY PRN
Status: DISCONTINUED | OUTPATIENT
Start: 2025-02-11 | End: 2025-02-13 | Stop reason: HOSPADM

## 2025-02-11 RX ORDER — LEVOTHYROXINE SODIUM 75 UG/1
75 TABLET ORAL
Status: DISCONTINUED | OUTPATIENT
Start: 2025-02-11 | End: 2025-02-13 | Stop reason: HOSPADM

## 2025-02-11 RX ORDER — ATORVASTATIN CALCIUM 10 MG/1
10 TABLET, FILM COATED ORAL
Status: DISCONTINUED | OUTPATIENT
Start: 2025-02-11 | End: 2025-02-13 | Stop reason: HOSPADM

## 2025-02-11 RX ORDER — ALPRAZOLAM 0.5 MG
1 TABLET ORAL 2 TIMES DAILY PRN
Status: DISCONTINUED | OUTPATIENT
Start: 2025-02-11 | End: 2025-02-13 | Stop reason: HOSPADM

## 2025-02-11 RX ORDER — ACETAMINOPHEN 325 MG/1
650 TABLET ORAL EVERY 6 HOURS PRN
Status: DISCONTINUED | OUTPATIENT
Start: 2025-02-11 | End: 2025-02-13 | Stop reason: HOSPADM

## 2025-02-11 RX ORDER — TIZANIDINE 2 MG/1
2 TABLET ORAL EVERY 8 HOURS PRN
Status: DISCONTINUED | OUTPATIENT
Start: 2025-02-11 | End: 2025-02-13 | Stop reason: HOSPADM

## 2025-02-11 RX ORDER — GUAIFENESIN 600 MG/1
600 TABLET, EXTENDED RELEASE ORAL EVERY 12 HOURS SCHEDULED
Status: DISCONTINUED | OUTPATIENT
Start: 2025-02-11 | End: 2025-02-13 | Stop reason: HOSPADM

## 2025-02-11 RX ORDER — DESVENLAFAXINE 50 MG/1
50 TABLET, FILM COATED, EXTENDED RELEASE ORAL DAILY
Status: DISCONTINUED | OUTPATIENT
Start: 2025-02-11 | End: 2025-02-13 | Stop reason: HOSPADM

## 2025-02-11 RX ORDER — WARFARIN SODIUM 4 MG/1
4 TABLET ORAL
Status: DISCONTINUED | OUTPATIENT
Start: 2025-02-11 | End: 2025-02-13 | Stop reason: HOSPADM

## 2025-02-11 RX ORDER — PANTOPRAZOLE SODIUM 40 MG/1
40 TABLET, DELAYED RELEASE ORAL
Status: DISCONTINUED | OUTPATIENT
Start: 2025-02-11 | End: 2025-02-13 | Stop reason: HOSPADM

## 2025-02-11 RX ADMIN — REMDESIVIR 200 MG: 100 INJECTION, POWDER, LYOPHILIZED, FOR SOLUTION INTRAVENOUS at 02:07

## 2025-02-11 RX ADMIN — WARFARIN SODIUM 4 MG: 4 TABLET ORAL at 18:37

## 2025-02-11 RX ADMIN — LEVOTHYROXINE SODIUM 75 MCG: 75 TABLET ORAL at 05:53

## 2025-02-11 RX ADMIN — PANTOPRAZOLE SODIUM 40 MG: 40 TABLET, DELAYED RELEASE ORAL at 08:14

## 2025-02-11 RX ADMIN — DICLOFENAC SODIUM 2 G: 10 GEL TOPICAL at 18:37

## 2025-02-11 RX ADMIN — ATORVASTATIN CALCIUM 10 MG: 10 TABLET, FILM COATED ORAL at 16:44

## 2025-02-11 RX ADMIN — GUAIFENESIN 600 MG: 600 TABLET ORAL at 02:06

## 2025-02-11 RX ADMIN — METOPROLOL TARTRATE 12.5 MG: 25 TABLET, FILM COATED ORAL at 23:27

## 2025-02-11 RX ADMIN — DICLOFENAC SODIUM 2 G: 10 GEL TOPICAL at 09:22

## 2025-02-11 RX ADMIN — GUAIFENESIN 600 MG: 600 TABLET ORAL at 09:22

## 2025-02-11 RX ADMIN — METOPROLOL TARTRATE 12.5 MG: 25 TABLET, FILM COATED ORAL at 08:14

## 2025-02-11 RX ADMIN — DESVENLAFAXINE 50 MG: 50 TABLET, EXTENDED RELEASE ORAL at 09:22

## 2025-02-11 NOTE — PHYSICAL THERAPY NOTE
Physical Therapy Evaluation:    2 forms of pt ID verified:name,birthdate and pt ID jatinder    Patient's Name: Josee Vaca    Admitting Diagnosis  Weakness [R53.1]    Problem List  Patient Active Problem List   Diagnosis    Neck pain    Spondylosis of cervical region without myelopathy or radiculopathy    Occipital neuralgia of left side    Pre-op examination    Cervical vertebral fusion    Gastroesophageal reflux disease without esophagitis    Anxiety    Primary hypertension    Other specified hypothyroidism    CKD (chronic kidney disease) stage 2, GFR 60-89 ml/min    Hyperlipidemia    Sleep apnea    Paroxysmal atrial fibrillation (HCC)    Cellulitis    Transaminitis    Olecranon bursitis of right elbow    Chronic anticoagulation    Irritable bowel syndrome with diarrhea    Dysphagia    Hemorrhoids    Schatzki's ring    Depression, recurrent (HCC)    Episode of recurrent major depressive disorder, unspecified depression episode severity (HCC)    Arthritis of left knee    Patellofemoral disorder of right knee    Cervical myelopathy (HCC)    Impairment of balance    Closed fracture of shaft of left fibula    Chronic pain of left knee    De Quervain's tenosynovitis    Numbness and tingling    Hand arthritis    DRUJ (distal radioulnar joint) arthrosis, primary, right    Primary osteoarthritis of first carpometacarpal joint of right hand    Ambulatory dysfunction    COVID-19       Past Medical History  Past Medical History:   Diagnosis Date    A-fib (HCC)     Anxiety     Arthritis     shoulder    Carpal tunnel syndrome     Colon polyp     Depression     Disease of thyroid gland     Diverticulitis     Esophageal stricture     HBP (high blood pressure)     Hiatal hernia 06/07/2022    Small hiatal hernia    Hyperlipidemia     Hypertension     Hypothyroid     Pneumonia     2017    PONV (postoperative nausea and vomiting)     Schatzki's ring 06/07/2022    Schatzki's ring in the lower esophagus    Sleep apnea     mild   "      Past Surgical History  Past Surgical History:   Procedure Laterality Date    APPENDECTOMY      BREAST BIOPSY Right     benign    BREAST CYST ASPIRATION Right     benign    BREAST SURGERY      milk duct removed    CATARACT EXTRACTION Bilateral     CATARACT EXTRACTION, BILATERAL  2019    CERVICAL FUSION N/A 05/25/2021    Procedure: FUSION CERVICAL POSTERIOR C1-C2 with allograft and neuromonitoring);  Surgeon: Wesley Camacho MD;  Location:  MAIN OR;  Service: Orthopedics    CHOLECYSTECTOMY      COLONOSCOPY  06/07/2022    COLONOSCOPY W/ BIOPSIES  2011    EGD  06/07/2022    HAND SURGERY Right     MAMMO (HISTORICAL) Bilateral 11/27/2018    MAMMO (HISTORICAL) Bilateral 10/27/2017    MANDIBLE SURGERY      MANDIBLE SURGERY      MO SURGICAL ARTHROSCOPY SHOULDER W/ROTATOR CUFF RPR Right 09/27/2022    Procedure: ARTHROSCOPIC ROTATOR CUFF REPAIR OF SUBSCAPULARIS BICEPS TENODESIS EXTENSIVE DEBRIDEMENT;  Surgeon: Tony Rose MD;  Location: [facility];  Service: Orthopedics    WISDOM TOOTH EXTRACTION      X4          02/11/25 1116   PT Last Visit   PT Visit Date 02/11/25   Note Type   Note type Evaluation   Pain Assessment   Pain Assessment Tool 0-10   Pain Score No Pain   Restrictions/Precautions   Other Precautions Contact/isolation;Airborne/isolation;Cognitive;Multiple lines;Telemetry;Fall Risk;Hard of hearing  (recommend chair and bed alarm)   Home Living   Type of Home House  (2 SH with 4 NICHOL)   Home Layout Two level;Performs ADLs on one level;Able to live on main level with bedroom/bathroom  (4 NICHOL, first floor setup)   Home Equipment Walker;Cane   Additional Comments pt lives with daughter \"who has grand mal seizures\". Pt reports x1 recent fall, (+)drive, was receiving outpt therapy for hand PTA,reports being completely (I) for ADLs and IADLs, pt reports assiting daughter with her medications and reports being (I) with personal medication management.   Prior Function   Level of [place] " "Independent with ADLs;Independent with functional mobility;Independent with IADLS  (per pt PTA)   Lives With Daughter   Receives Help From Family   IADLs Independent with driving;Independent with meal prep;Independent with medication management  (per pt PTA)   Falls in the last 6 months 1 to 4   Vocational Retired   General   Additional Pertinent History (+)COVID, ambulation dysfunction, h/o x1 recnet fall   Family/Caregiver Present No   Cognition   Overall Cognitive Status Impaired  (tangetial with thoughts and verbal communication, repeative at times with same story and when speaking about daughter's medical conditions)   Arousal/Participation Cooperative   Attention Difficulty attending to directions   Orientation Level Oriented X4   Following Commands Follows one step commands with increased time or repetition  (very nervous and anxious personality and behavior throughout PT IE)   Subjective   Subjective pt supine in ED stretcher esting comfortably;pt willing and agreeable to work with PT and to participate in therapy intervention; \"I just have to get home. I'm worried about my daugther\".   RLE Assessment   RLE Assessment   (at least 4/5 grossly throughout)   LLE Assessment   LLE Assessment   (at least 4/5 grossly throughout)   Vision-Basic Assessment   Current Vision Wears glasses all the time   Coordination   Movements are Fluid and Coordinated 0   Coordination and Movement Description ataxic and unsteady gait and movement pattern, needs A to navigate and steer RW during upright mobility,dec BLE step length,forward flexed posture throughout mobility   Sensation WFL   Light Touch   RLE Light Touch Grossly intact   LLE Light Touch Grossly intact   Bed Mobility   Supine to Sit 5  Supervision   Additional items Assist x 1;HOB elevated;Bedrails;Increased time required;Verbal cues   Sit to Supine 5  Supervision   Additional items Assist x 1;Bedrails;Increased time required;Verbal cues;HOB elevated   Transfers   Sit " to Stand 5  Supervision   Additional items Assist x 1;Bedrails;Increased time required;Verbal cues   Stand to Sit 5  Supervision   Additional items Assist x 1;Bedrails;Increased time required;Verbal cues   Ambulation/Elevation   Gait pattern Poor UE support;Narrow NAMITA;Forward Flexion;Shuffling;Inconsistent lou;Foward flexed;Short stride;Ataxia;Step to;Excessively slow  (stop and starting inbetween 10 feet of ambulation distance with A needed to steer and navigate RW in ED hallway and during turns)   Gait Assistance 4  Minimal assist   Additional items Assist x 1;Verbal cues;Tactile cues   Assistive Device Rolling walker  (pt reports no use of DME PTA)   Distance 25 feet with use of RW on tile surfaces   Balance   Static Sitting Fair   Dynamic Sitting Fair   Static Standing Fair   Dynamic Standing Poor +   Ambulatory Poor +   Endurance Deficit   Endurance Deficit Yes   Endurance Deficit Description fatigues easily, SOB following 25 feet of activity and mobility,use of RW for ambulation   Activity Tolerance   Activity Tolerance Patient limited by fatigue  (fair)   Medical Staff Made Aware OTR Rosa   Nurse Made Aware yes   Assessment   Prognosis Fair   Problem List Decreased strength;Decreased endurance;Impaired balance;Decreased mobility;Impaired judgement;Decreased safety awareness;Impaired hearing;Decreased skin integrity   Assessment Pt is a 75 yo female [facility] ED 2* ambulation dysfunction and (+)COVID. Pt lives with daugther who has multiple medical issues, no use of DME PTA, pt reports x1 recent fall, pt reports being (I) with mobility no DME, ADLs and IADLs, pt reports providing A for daughter daily with IADLs and medication maangement. Pt currently is not at functional mobility baseline, needs A for mobility with use of RW,ataxic and unsteady gait and movement pattern,nervous and anxious behavior and personality,impulsive, multiple lines,telemetry monitoring and currently contact and airborne  "precautions. Pt demonstrates minimal deficits during functional mobility and gait including dec endurance, dec balance, dec BLE strength, impulsive, dec safety awareness, dec activity tolerance and needs min Ax1 for GT with use of RW, S for bed mobility and TT. Pt would cont to benefit from skilled inpt PT services to maximize functional independence and to dec caregiver burden upon being DC from the hospital. Currently recommending level 3->HHPT with RW   Barriers to Discharge Decreased caregiver support;Inaccessible home environment  ((+)INCHOL and pt reports providing all IADLs for her and her daugther PTA. Per pt, daughter has \"lots of medical issues\".)   Goals   Patient Goals to go home and to make sure her daugther is okay   STG Expiration Date 02/21/25   Short Term Goal #1 in 7-10 days: (1) Pt will be able to ambulate greater than 150 feet  with use of RW on various surfaces needing S to mod (I) level of A without LOB  in order to A pt to return to PLOF, (2) activity tolerance:45 mins/45mins, (3) pt will be able to perform sit to stand transfers needing mod (I) level of A to and from various surfaces consistently in order to return to PLOF, (4) pt will be able to perform BM needing mod (I) level of A to A pt to return to PLOF, (5) (I) with BLE therapeutic ex HEP in various positions to A pt to inc balance,strength,mobility,endurance  (6) inc balance 1/2 grade in order to dec fall risk, (7) pt will be able to go up and down 4 steps needing S level of A in order to navigate NICHOL as able and as needed prior to D/C, (8) cont to provide pt and pt family education for safe D/C planning, (9) inc BLE strength 1/2 to 1 full grade in order to A pt to inc balance,strength,mobility,endurance   PT Treatment Day 0   Plan   Treatment/Interventions Functional transfer training;LE strengthening/ROM;Elevations;Therapeutic exercise;Endurance training;Patient/family training;Equipment eval/education;Bed mobility;Gait " training;Continued evaluation;Spoke to nursing;OT   PT Frequency 2-3x/week   Discharge Recommendation   Rehab Resource Intensity Level, PT III (Minimum Resource Intensity)   Equipment Recommended Walker   Walker Package Recommended Wheeled walker   Change/add to Walker Package? No   AM-PAC Basic Mobility Inpatient   Turning in Flat Bed Without Bedrails 3   Lying on Back to Sitting on Edge of Flat Bed Without Bedrails 3   Moving Bed to Chair 3   Standing Up From Chair Using Arms 3   Walk in Room 3   Climb 3-5 Stairs With Railing 2   Basic Mobility Inpatient Raw Score 17   Basic Mobility Standardized Score 39.67   [facility] Highest Level Of Mobility   -HLM Goal 5: Stand one or more mins   -HLM Achieved 7: Walk 25 feet or more         @Katie Mccloud, PT, DPT@

## 2025-02-11 NOTE — ASSESSMENT & PLAN NOTE
Patient endorsing generalized weakness, fatigue, nausea/vomiting x 2 days  COVID rapid antigen positive  Patient is afebrile, stable on room air  IV remdesivir x 3 days  Supportive care

## 2025-02-11 NOTE — ED PROVIDER NOTES
Time reflects when diagnosis was documented in both MDM as applicable and the Disposition within this note       Time User Action Codes Description Comment    2/10/2025 11:54 PM Natali Garcia Add [U07.1] COVID-19     2/10/2025 11:54 PM Natali Garcia Add [R53.1] Generalized weakness     2/10/2025 11:54 PM Natali Garcia Add [R26.2] Ambulatory dysfunction           ED Disposition       ED Disposition   Admit    Condition   Stable    Date/Time   Mon Feb 10, 2025 11:54 PM    Comment   Case was discussed with Maggie Aguilar and the patient's admission status was agreed to be Admission Status: observation status to the service of Dr. Benavidez.               Assessment & Plan       Medical Decision Making  Patient seen, examined and noted to have COVID-19, generalized weakness and ambulatory dysfunction.  Patient coming in via EMS after she was unable to get up off of the floor which she lowered herself to when she was not able to get up off of the couch.  She lives with her daughter but daughter was unable to help her up.  She notes that she has just been feeling weaker the last few days.  Tested positive for COVID here today.  Patient is weak and having difficulty ambulating and does not feel safe going home she was placed in observation here.    Differential diagnosis includes but is not limited to metabolic abnormality, dehydration, viral illness, anemia, ACS, MI, other infectious process including UTI    Patient's labs notable for: Mentioned above    EKG: was interpreted by myself as above.     Discussed patient's case with Maggie Ramirez ([facility]) regarding admission who accepted the patient for further evaluation and management.    Patient at time of discharge well-appearing in no acute distress, all questions answered. Patient agreeable to plan.  Patient's vitals, lab/imaging results, diagnosis, and treatment plan were discussed with the patient. All new/changed medications were discussed with  patient, specifically, route of administration, how often and when to take, and where they can be picked up. Strict return precautions as well as close follow up with PCP was discussed with the patient and the patient was agreeable to my recommendations. Patient verbally acknowledged understanding of the above communications. Strict return precautions were provided. All labs reviewed and utilized in the medical decision making process.    Amount and/or Complexity of Data Reviewed  Labs: ordered. Decision-making details documented in ED Course.    Risk  Decision regarding hospitalization.        ED Course as of 02/11/25 0252   Mon Feb 10, 2025   6177 SARS COV Rapid Antigen(!): Positive       Medications   ALPRAZolam (XANAX) tablet 1 mg (has no administration in time range)   atorvastatin (LIPITOR) tablet 10 mg (has no administration in time range)   desvenlafaxine succinate (PRISTIQ) 24 hr tablet 50 mg (has no administration in time range)   Diclofenac Sodium (VOLTAREN) 1 % topical gel 2 g (has no administration in time range)   levothyroxine tablet 75 mcg (has no administration in time range)   metoprolol tartrate (LOPRESSOR) tablet 12.5 mg (has no administration in time range)   pantoprazole (PROTONIX) EC tablet 40 mg (has no administration in time range)   tiZANidine (ZANAFLEX) tablet 2 mg (has no administration in time range)   warfarin (COUMADIN) tablet 4 mg (has no administration in time range)   benzonatate (TESSALON PERLES) capsule 100 mg (has no administration in time range)   trimethobenzamide (TIGAN) IM injection 200 mg (has no administration in time range)   acetaminophen (TYLENOL) tablet 650 mg (has no administration in time range)   remdesivir (Veklury) 200 mg in sodium chloride 0.9 % 290 mL IVPB (200 mg Intravenous New Bag 2/11/25 0207)     Followed by   remdesivir (Veklury) 100 mg in sodium chloride 0.9 % 270 mL IVPB (has no administration in time range)   guaiFENesin (MUCINEX) 12 hr tablet 600 mg  (600 mg Oral Given 2/11/25 0206)   sodium chloride 0.9 % bolus 500 mL (0 mL Intravenous Stopped 2/10/25 5921)       ED Risk Strat Scores                                              History of Present Illness       Chief Complaint   Patient presents with    Weakness - Generalized     Pt coming fro home, reports increased generalized weakness past few days, fall 2 weeks ago. Nausea/vomiting       Past Medical History:   Diagnosis Date    A-fib (HCC)     Anxiety     Arthritis     shoulder    Carpal tunnel syndrome     Colon polyp     Depression     Disease of thyroid gland     Diverticulitis     Esophageal stricture     HBP (high blood pressure)     Hiatal hernia 06/07/2022    Small hiatal hernia    Hyperlipidemia     Hypertension     Hypothyroid     Pneumonia     2017    PONV (postoperative nausea and vomiting)     Schatzki's ring 06/07/2022    Schatzki's ring in the lower esophagus    Sleep apnea     mild       Past Surgical History:   Procedure Laterality Date    APPENDECTOMY      BREAST BIOPSY Right     benign    BREAST CYST ASPIRATION Right     benign    BREAST SURGERY      milk duct removed    CATARACT EXTRACTION Bilateral     CATARACT EXTRACTION, BILATERAL  2019    CERVICAL FUSION N/A 05/25/2021    Procedure: FUSION CERVICAL POSTERIOR C1-C2 with allograft and neuromonitoring);  Surgeon: Wesley Camacho MD;  Location:  MAIN OR;  Service: Orthopedics    CHOLECYSTECTOMY      COLONOSCOPY  06/07/2022    COLONOSCOPY W/ BIOPSIES  2011    EGD  06/07/2022    HAND SURGERY Right     MAMMO (HISTORICAL) Bilateral 11/27/2018    MAMMO (HISTORICAL) Bilateral 10/27/2017    MANDIBLE SURGERY      MANDIBLE SURGERY      KS SURGICAL ARTHROSCOPY SHOULDER W/ROTATOR CUFF RPR Right 09/27/2022    Procedure: ARTHROSCOPIC ROTATOR CUFF REPAIR OF SUBSCAPULARIS BICEPS TENODESIS EXTENSIVE DEBRIDEMENT;  Surgeon: Tony Rose MD;  Location: [facility];  Service: Orthopedics    WISDOM TOOTH EXTRACTION      X4      Family History    Problem Relation Age of Onset    Heart disease Mother     Hypertension Mother     Hyperlipidemia Mother     Heart failure Mother     Hypertension Sister       Social History     Tobacco Use    Smoking status: Former     Current packs/day: 0.00     Types: Cigarettes     Quit date:      Years since quittin.1    Smokeless tobacco: Never   Vaping Use    Vaping status: Never Used   Substance Use Topics    Alcohol use: Not Currently     Comment: last drink 7 years ago    Drug use: Not Currently      E-Cigarette/Vaping    E-Cigarette Use Never User       E-Cigarette/Vaping Substances    Nicotine No     THC No     CBD No     Flavoring No     Other No     Unknown No       I have reviewed and agree with the history as documented.     Josee Vaca is a 74 y.o. female with a PMH of paroxysmal A-fib on Plavix presenting to the ED on 2025 with generalized weakness. Patient endorses that she has been feeling increased generalized weakness for the last few days.  Also endorsing nausea and vomiting.  States she fell 2 weeks ago but that she did not hit her head.  Is coming in via EMS today because she could not get up off the couch so she lowered herself to the ground and then could not get up off of the ground.  Patient lives with her daughter and her daughter was unable to help her up either.  Patient does walk with a walker at home. Patient denies chest pain, shortness of breath, cough, fevers, dysuria, frequency, hematuria, abdominal pain, flank pain or any other complaints at this time.             Review of Systems   Constitutional:  Positive for fatigue. Negative for chills and fever.   HENT:  Negative for congestion and sore throat.    Respiratory:  Negative for cough and shortness of breath.    Cardiovascular:  Negative for chest pain and palpitations.   Gastrointestinal:  Positive for nausea and vomiting. Negative for abdominal pain.   Genitourinary:  Negative for dysuria, frequency, hematuria  and urgency.   Musculoskeletal:  Positive for gait problem.   Skin:  Negative for wound.   Neurological:  Negative for dizziness, light-headedness and numbness.           Objective       ED Triage Vitals [02/10/25 2141]   Temperature Pulse Blood Pressure Respirations SpO2 Patient Position - Orthostatic VS   98.7 °F (37.1 °C) 79 170/81 18 98 % Sitting      Temp Source Heart Rate Source BP Location FiO2 (%) Pain Score    Oral Monitor Right arm -- --      Vitals      Date and Time Temp Pulse SpO2 Resp BP Pain Score FACES Pain Rating User   02/11/25 0200 -- 76 95 % 17 149/86 -- -- DB   02/10/25 2141 98.7 °F (37.1 °C) 79 98 % 18 170/81 -- -- RI            Physical Exam  Vitals and nursing note reviewed.   Constitutional:       General: She is not in acute distress.     Appearance: Normal appearance. She is normal weight. She is not ill-appearing, toxic-appearing or diaphoretic.   HENT:      Head: Normocephalic and atraumatic. No raccoon eyes or Orlando's sign.      Right Ear: Tympanic membrane, ear canal and external ear normal. There is no impacted cerumen. No hemotympanum.      Left Ear: Tympanic membrane, ear canal and external ear normal. There is no impacted cerumen. No hemotympanum.      Nose: Nose normal. No congestion or rhinorrhea.      Mouth/Throat:      Mouth: Mucous membranes are moist.   Eyes:      General: No scleral icterus.        Right eye: No discharge.         Left eye: No discharge.      Extraocular Movements: Extraocular movements intact.      Conjunctiva/sclera: Conjunctivae normal.   Cardiovascular:      Rate and Rhythm: Normal rate and regular rhythm.      Heart sounds: Normal heart sounds. No murmur heard.     No friction rub. No gallop.   Pulmonary:      Effort: Pulmonary effort is normal. No respiratory distress.      Breath sounds: Normal breath sounds. No wheezing, rhonchi or rales.   Abdominal:      General: Abdomen is flat. Bowel sounds are normal. There is no distension.      Palpations:  Abdomen is soft.      Tenderness: There is no abdominal tenderness. There is no right CVA tenderness, left CVA tenderness, guarding or rebound.   Musculoskeletal:         General: No signs of injury. Normal range of motion.      Cervical back: Normal range of motion and neck supple. No rigidity.      Right lower leg: No edema.      Left lower leg: No edema.   Skin:     General: Skin is warm.      Capillary Refill: Capillary refill takes less than 2 seconds.      Coloration: Skin is not pale.      Findings: No erythema or rash.   Neurological:      General: No focal deficit present.      Mental Status: She is alert and oriented to person, place, and time. Mental status is at baseline.      GCS: GCS eye subscore is 4. GCS verbal subscore is 5. GCS motor subscore is 6.      Cranial Nerves: Cranial nerves 2-12 are intact. No cranial nerve deficit, dysarthria or facial asymmetry.      Sensory: Sensation is intact. No sensory deficit.      Motor: Motor function is intact. No weakness, atrophy, abnormal muscle tone, seizure activity or pronator drift.      Coordination: Coordination normal.   Psychiatric:         Mood and Affect: Mood normal.         Behavior: Behavior normal.         Results Reviewed       Procedure Component Value Units Date/Time    HS Troponin I 4hr [[id number]] Collected: 02/11/25 0211    Lab Status: In process Specimen: Blood from Arm, Left Updated: 02/11/25 0223    HS Troponin I 2hr [[id number]]  (Normal) Collected: 02/11/25 0021    Lab Status: Final result Specimen: Blood from Arm, Left Updated: 02/11/25 0050     hs TnI 2hr 11 ng/L      Delta 2hr hsTnI 3 ng/L     Protime-INR [[id number]]  (Abnormal) Collected: 02/11/25 0021    Lab Status: Final result Specimen: Blood from Arm, Left Updated: 02/11/25 0049     Protime 28.6 seconds      INR 2.61    Narrative:      INR Therapeutic Range    Indication                                             INR Range      Atrial Fibrillation                                                2.0-3.0  Hypercoagulable State                                    2.0.2.3  Left Ventricular Asist Device                            2.0-3.0  Mechanical Heart Valve                                  -    Aortic(with afib, MI, embolism, HF, LA enlargement,    and/or coagulopathy)                                     2.0-3.0 (2.5-3.5)     Mitral                                                             2.5-3.5  Prosthetic/Bioprosthetic Heart Valve               2.0-3.0  Venous thromboembolism (VTE: VT, PE        2.0-3.0    FLU/COVID Rapid Antigen (30 min. TAT) - Preferred screening test in ED [[id number]]  (Abnormal) Collected: 02/10/25 2300    Lab Status: Final result Specimen: Nares from Nose Updated: 02/10/25 2021     SARS COV Rapid Antigen Positive     Influenza A Rapid Antigen Negative     Influenza B Rapid Antigen Negative    Narrative:      This test has been performed using the [employer] Val 2 FLU+SARS Antigen test under the Emergency Use Authorization (EUA). This test has been validated by the  and verified by the performing laboratory. The Val uses lateral flow immunofluorescent sandwich assay to detect SARS-COV, Influenza A and Influenza B Antigen.     The [employer] Val 2 SARS Antigen test does not differentiate between SARS-CoV and SARS-CoV-2.     Negative results are presumptive and may be confirmed with a molecular assay, if necessary, for patient management. Negative results do not rule out SARS-CoV-2 or influenza infection and should not be used as the sole basis for treatment or patient management decisions. A negative test result may occur if the level of antigen in a sample is below the limit of detection of this test.     Positive results are indicative of the presence of viral antigens, but do not rule out bacterial infection or co-infection with other viruses.     All test results should be used as an adjunct to clinical observations and other information available to  the provider.    FOR PEDIATRIC PATIENTS - copy/paste COVID Guidelines URL to browser: [email or web address]    CBC and differential [[id number]]  (Abnormal) Collected: 02/10/25 2202    Lab Status: Final result Specimen: Blood from Arm, Left Updated: 02/10/25 2306     WBC 5.79 Thousand/uL      RBC 4.15 Million/uL      Hemoglobin 12.6 g/dL      Hematocrit 38.2 %      MCV 92 fL      MCH 30.4 pg      MCHC 33.0 g/dL      RDW 12.9 %      MPV 9.2 fL      Platelets 240 Thousands/uL      Segmented % 85 %      Immature Grans % 0 %      Lymphocytes % 3 %      Monocytes % 11 %      Eosinophils Relative 0 %      Basophils Relative 1 %      Absolute Neutrophils 4.74 Thousands/µL      Absolute Immature Grans 0.02 Thousand/uL      Absolute Lymphocytes 0.17 Thousands/µL      Absolute Monocytes 0.60 Thousand/µL      Eosinophils Absolute 0.00 Thousand/µL      Basophils Absolute 0.03 Thousands/µL     Narrative:      This is an appended report.  These results have been appended to a previously verified report.    Urine Microscopic [[id number]] Collected: 02/10/25 2202    Lab Status: Final result Specimen: Urine, Clean Catch Updated: 02/10/25 2238     RBC, UA 1-2 /hpf      WBC, UA None Seen /hpf      Epithelial Cells None Seen /hpf      Bacteria, UA None Seen /hpf      Amorphous Crystals, UA Occasional    HS Troponin 0hr (reflex protocol) [[id number]]  (Normal) Collected: 02/10/25 2202    Lab Status: Final result Specimen: Blood from Arm, Left Updated: 02/10/25 2230     hs TnI 0hr 8 ng/L     Comprehensive metabolic panel [[id number]]  (Abnormal) Collected: 02/10/25 2202    Lab Status: Final result Specimen: Blood from Arm, Left Updated: 02/10/25 2224     Sodium 134 mmol/L      Potassium 3.8 mmol/L      Chloride 100 mmol/L      CO2 25 mmol/L      ANION GAP 9 mmol/L      BUN 15 mg/dL      Creatinine 0.78 mg/dL      Glucose 154 mg/dL      Calcium 9.7 mg/dL      AST 63 U/L      ALT 61 U/L       Alkaline Phosphatase 71 U/L      Total Protein 7.4 g/dL      Albumin 4.4 g/dL      Total Bilirubin 0.38 mg/dL      eGFR 74 ml/min/1.73sq m     Narrative:      National Kidney Disease Foundation guidelines for Chronic Kidney Disease (CKD):     Stage 1 with normal or high GFR (GFR > 90 mL/min/1.73 square meters)    Stage 2 Mild CKD (GFR = 60-89 mL/min/1.73 square meters)    Stage 3A Moderate CKD (GFR = 45-59 mL/min/1.73 square meters)    Stage 3B Moderate CKD (GFR = 30-44 mL/min/1.73 square meters)    Stage 4 Severe CKD (GFR = 15-29 mL/min/1.73 square meters)    Stage 5 End Stage CKD (GFR <15 mL/min/1.73 square meters)  Note: GFR calculation is accurate only with a steady state creatinine    Magnesium [[id number]]  (Normal) Collected: 02/10/25 2202    Lab Status: Final result Specimen: Blood from Arm, Left Updated: 02/10/25 2224     Magnesium 1.9 mg/dL     UA w Reflex to Microscopic w Reflex to Culture [[id number]]  (Abnormal) Collected: 02/10/25 2202    Lab Status: Final result Specimen: Urine, Clean Catch Updated: 02/10/25 2217     Color, UA Light Yellow     Clarity, UA Turbid     Specific Gravity, UA 1.014     pH, UA 7.0     Leukocytes, UA Negative     Nitrite, UA Negative     Protein, UA Negative mg/dl      Glucose, UA Negative mg/dl      Ketones, UA Negative mg/dl      Urobilinogen, UA <2.0 mg/dl      Bilirubin, UA Negative     Occult Blood, UA Trace            No orders to display       Procedures    ED Medication and Procedure Management   Prior to Admission Medications   Prescriptions Last Dose Informant Patient Reported? Taking?   ALPRAZolam (XANAX) 0.5 mg tablet   No No   Sig: Take 2 tablets (1 mg total) by mouth 2 (two) times a day as needed for anxiety   Diclofenac Sodium (VOLTAREN) 1 %   No No   Sig: Apply 2 g topically 4 (four) times a day   Multiple Vitamins-Minerals (CENTRUM ADULTS PO)  Self Yes No   Sig: Take 1 tablet by mouth daily   acetaminophen (TYLENOL) 500 mg tablet  Self No No   Sig: Take 1  tablet (500 mg total) by mouth every 6 (six) hours as needed for moderate pain   atorvastatin (LIPITOR) 10 mg tablet   No No   Sig: TAKE 1 TABLET(10 MG) BY MOUTH DAILY AT BEDTIME   colestipol (COLESTID) 1 g tablet   No No   Sig: Take 2 tablets (2 g total) by mouth in the morning 2 hours separate from other meds   desvenlafaxine succinate (PRISTIQ) 50 mg 24 hr tablet   No No   Sig: Take 1 tablet (50 mg total) by mouth daily   dicyclomine (BENTYL) 20 mg tablet   No No   Sig: Take 1 tablet (20 mg total) by mouth 2 (two) times a day as needed (ibs)   levothyroxine 75 mcg tablet   No No   Sig: Take 1 tablet (75 mcg total) by mouth daily   metoprolol tartrate (LOPRESSOR) 25 mg tablet   No No   Sig: Take 0.5 tablets (12.5 mg total) by mouth every 12 (twelve) hours   pantoprazole (PROTONIX) 40 mg tablet   No No   Sig: Take 1 tablet (40 mg total) by mouth daily   tiZANidine (ZANAFLEX) 2 mg tablet  Self No No   Sig: Take 1 tablet (2 mg total) by mouth every 8 (eight) hours as needed for muscle spasms   warfarin (COUMADIN) 2 mg tablet   No No   Sig: TAKE 1 AND 1/2 TO 2 TABLETS BY MOUTH DAILY AS DIRECTED BY PRESCRIBER      Facility-Administered Medications: None     Patient's Medications   Discharge Prescriptions    No medications on file     No discharge procedures on file.  ED SEPSIS DOCUMENTATION   Time reflects when diagnosis was documented in both MDM as applicable and the Disposition within this note       Time User Action Codes Description Comment    2/10/2025 11:54 PM Natali Garcia [U07.1] COVID-19     2/10/2025 11:54 PM Natali Garcia [R53.1] Generalized weakness     2/10/2025 11:54 PM Natali Garcia [R26.2] Ambulatory dysfunction                  Natali Garcia PA-C  02/11/25 0252

## 2025-02-11 NOTE — PLAN OF CARE
Problem: PHYSICAL THERAPY ADULT  Goal: Performs mobility at highest level of function for planned discharge setting.  See evaluation for individualized goals.  Description: Treatment/Interventions: Functional transfer training, LE strengthening/ROM, Elevations, Therapeutic exercise, Endurance training, Patient/family training, Equipment eval/education, Bed mobility, Gait training, Continued evaluation, Spoke to nursing, OT  Equipment Recommended: Walker       See flowsheet documentation for full assessment, interventions and recommendations.  Note: Prognosis: Fair  Problem List: Decreased strength, Decreased endurance, Impaired balance, Decreased mobility, Impaired judgement, Decreased safety awareness, Impaired hearing, Decreased skin integrity  Assessment: Pt is a 75 yo female arrived to [facility] ED 2* ambulation dysfunction and (+)COVID. Pt lives with daugther who has multiple medical issues, no use of DME PTA, pt reports x1 recent fall, pt reports being (I) with mobility no DME, ADLs and IADLs, pt reports providing A for daughter daily with IADLs and medication maangement. Pt currently is not at functional mobility baseline, needs A for mobility with use of RW,ataxic and unsteady gait and movement pattern,nervous and anxious behavior and personality,impulsive, multiple lines,telemetry monitoring and currently contact and airborne precautions. Pt demonstrates minimal deficits during functional mobility and gait including dec endurance, dec balance, dec BLE strength, impulsive, dec safety awareness, dec activity tolerance and needs min Ax1 for GT with use of RW, S for bed mobility and TT. Pt would cont to benefit from skilled inpt PT services to maximize functional independence and to dec caregiver burden upon being DC from the hospital. Currently recommending level 3->HHPT with RW  Barriers to Discharge: Decreased caregiver support, Inaccessible home environment ((+)NICHOL and pt reports providing all IADLs for her  "and her daugther PTA. Per pt, daughter has \"lots of medical issues\".)     Rehab Resource Intensity Level, PT: III (Minimum Resource Intensity)    See flowsheet documentation for full assessment.        "

## 2025-02-11 NOTE — PLAN OF CARE
Problem: OCCUPATIONAL THERAPY ADULT  Goal: Performs self-care activities at highest level of function for planned discharge setting.  See evaluation for individualized goals.  Description: Treatment Interventions: ADL retraining, Functional transfer training, UE strengthening/ROM, Endurance training, Cognitive reorientation, Patient/family training, Equipment evaluation/education, Compensatory technique education, Continued evaluation, Energy conservation, Activityengagement  Equipment Recommended: Shower/Tub chair with back ($)       See flowsheet documentation for full assessment, interventions and recommendations.   Note: Limitation: Decreased ADL status, Decreased Safe judgement during ADL, Decreased endurance, Decreased self-care trans, Decreased high-level ADLs  Prognosis: Fair  Assessment: Pt is a 73 yo female who presented to [facility] with generalized weakness and difficulty ambulating in which pt found with COVID. Pt dx w/ ambulatory dysfunction. Pt  has a past medical history of A-fib (HCC), Anxiety, Arthritis, Carpal tunnel syndrome, Colon polyp, Depression, Disease of thyroid gland, Diverticulitis, Esophageal stricture, HBP (high blood pressure), Hiatal hernia (06/07/2022), Hyperlipidemia, Hypertension, Hypothyroid, Pneumonia, PONV (postoperative nausea and vomiting), Schatzki's ring (06/07/2022), and Sleep apnea. Pt with active OT orders in which OT consulted to assess pt's functional status and occupational performance to determine safe d/c needs. Pt seen with PT to increase safety, decrease fall risk, and maximize functional/occupational performance 2* medical complexity which is a regression from pt's functional baseline. Pt lives with her daughter in a 2  with 4 N[facility] with [facility]. PTA, pt was independent in ADLs/IADLs and uses RW vs SPC for functional mobility. (+) driving. Currently, pt performing bed mobility w/ supervision, functional transfers @ supervision level w/ RW, functional  mobility w/ Min A x1 w/ RW, and UB/LB ADLs @ supervision level. Pt demonstrates the following limitations/impairments which impact the pt's ability to engage in valued occupations: balance, endurance/activity tolerance, standing tolerance, functional reach, postural/trunk control, strength, safety awareness, and pain. From an OT standpoint, recommend discharge w/ Level 3 resources, once medically stable. The patient's raw score on the -PAC Daily Activity Inpatient Short Form is 20. A raw score of greater than or equal to 19 suggests the patient may benefit from discharge to home. Please refer to the recommendation of the Occupational Therapist for safe discharge planning.  Pt would benefit from skilled OT services 3-5x/wk to address acute care needs and underlying performance skills to promote safety, decrease fall risk, and enhance occupational performance to return to PLOF. Goals to be met within the next 10-14 days.     Rehab Resource Intensity Level, OT: III (Minimum Resource Intensity) (Continue OP OT for hand therapy)

## 2025-02-11 NOTE — H&P
H&P - Hospitalist   Name: Josee Vaca 74 y.o. female I MRN: [id number]  Unit/Bed#: ED-21 I Date of Admission: 2/10/2025   Date of Service: 2/11/2025 I Hospital Day: 0     Assessment & Plan  Ambulatory dysfunction  Patient presents with generalized weakness and difficulty with ambulation  Likely secondary to COVID infection  Fall precautions  Management for COVID as stated below  PT/OT  COVID-19  Patient endorsing generalized weakness, fatigue, nausea/vomiting x 2 days  COVID rapid antigen positive  Patient is afebrile, stable on room air  IV remdesivir x 3 days  Supportive care  Paroxysmal atrial fibrillation (HCC)  Continue Coumadin, monitor PT/INR  Continue Lopressor  Transaminitis  Mild transaminitis noted likely in the setting of COVID infection  Monitor CMP  Anxiety  Continue Pristiq and as needed Xanax  Primary hypertension  Continue Lopressor  Other specified hypothyroidism  Continue levothyroxine  CKD (chronic kidney disease) stage 2, GFR 60-89 ml/min  Lab Results   Component Value Date    EGFR 74 02/10/2025    EGFR 67 09/11/2024    EGFR 64 05/09/2024    CREATININE 0.78 02/10/2025    CREATININE 0.84 09/11/2024    CREATININE 0.87 05/09/2024     Creatinine currently stable at baseline 0.8  Monitor creatinine  Hyperlipidemia  Continue Colestid, statin      VTE Pharmacologic Prophylaxis: VTE Score: 4 Moderate Risk (Score 3-4) - Pharmacological DVT Prophylaxis Ordered: warfarin (Coumadin).  Code Status: Level 1 - Full Code   Discussion with family: Patient declined call to .     Anticipated Length of Stay: Patient will be admitted on an observation basis with an anticipated length of stay of less than 2 midnights secondary to ambulatory dysfunction 2/2 COVID infection .    History of Present Illness   Chief Complaint: generalized weakness, ambulatory dysfunction, nausea/vomiting    Josee Vaca is a 74 y.o. female with a PMH of A-fib on Coumadin, anxiety, hypertension, hypothyroidism, CKD who  presents with generalized weakness and difficulty with ambulation today.  Patient reports that she could not get up from the couch today so she called 911.  She was noted to be COVID-positive in the ER.  Reports that her daughter has been sick denies any fever/chills.  She endorses fatigue, nausea/vomiting, cough.  Ambulates with cane at baseline.    Review of Systems   Constitutional:  Positive for fatigue. Negative for chills and fever.   HENT:  Positive for congestion.    Respiratory:  Positive for cough. Negative for shortness of breath.    Cardiovascular:  Negative for chest pain, palpitations and leg swelling.   Gastrointestinal:  Positive for nausea and vomiting. Negative for abdominal pain, constipation and diarrhea.   Genitourinary:  Negative for dysuria.   Neurological:  Positive for weakness.   Psychiatric/Behavioral:  Negative for confusion.         Historical Information   Past Medical History:   Diagnosis Date    A-fib (HCC)     Anxiety     Arthritis     shoulder    Carpal tunnel syndrome     Colon polyp     Depression     Disease of thyroid gland     Diverticulitis     Esophageal stricture     HBP (high blood pressure)     Hiatal hernia 06/07/2022    Small hiatal hernia    Hyperlipidemia     Hypertension     Hypothyroid     Pneumonia     2017    PONV (postoperative nausea and vomiting)     Schatzki's ring 06/07/2022    Schatzki's ring in the lower esophagus    Sleep apnea     mild      Past Surgical History:   Procedure Laterality Date    APPENDECTOMY      BREAST BIOPSY Right     benign    BREAST CYST ASPIRATION Right     benign    BREAST SURGERY      milk duct removed    CATARACT EXTRACTION Bilateral     CATARACT EXTRACTION, BILATERAL  2019    CERVICAL FUSION N/A 05/25/2021    Procedure: FUSION CERVICAL POSTERIOR C1-C2 with allograft and neuromonitoring);  Surgeon: Wesley Camacho MD;  Location: [facility];  Service: Orthopedics    CHOLECYSTECTOMY      COLONOSCOPY  06/07/2022    COLONOSCOPY W/  BIOPSIES  2011    EGD  2022    HAND SURGERY Right     MAMMO (HISTORICAL) Bilateral 2018    MAMMO (HISTORICAL) Bilateral 10/27/2017    MANDIBLE SURGERY      MANDIBLE SURGERY      MA SURGICAL ARTHROSCOPY SHOULDER W/ROTATOR CUFF RPR Right 2022    Procedure: ARTHROSCOPIC ROTATOR CUFF REPAIR OF SUBSCAPULARIS BICEPS TENODESIS EXTENSIVE DEBRIDEMENT;  Surgeon: Tony Rose MD;  Location: [facility];  Service: Orthopedics    WISDOM TOOTH EXTRACTION      X4     Social History     Tobacco Use    Smoking status: Former     Current packs/day: 0.00     Types: Cigarettes     Quit date:      Years since quittin.1    Smokeless tobacco: Never   Vaping Use    Vaping status: Never Used   Substance and Sexual Activity    Alcohol use: Not Currently     Comment: last drink 7 years ago    Drug use: Not Currently    Sexual activity: Not Currently     E-Cigarette/Vaping    E-Cigarette Use Never User      E-Cigarette/Vaping Substances    Nicotine No     THC No     CBD No     Flavoring No     Other No     Unknown No      Family History   Problem Relation Age of Onset    Heart disease Mother     Hypertension Mother     Hyperlipidemia Mother     Heart failure Mother     Hypertension Sister      Social History:  Marital Status: /Civil Union   Patient Pre-hospital Living Situation: Home  Patient Pre-hospital Level of Mobility: walks with cane  Patient Pre-hospital Diet Restrictions: none    Meds/Allergies   I have reviewed home medications with patient personally.  Prior to Admission medications    Medication Sig Start Date End Date Taking? Authorizing Provider   acetaminophen (TYLENOL) 500 mg tablet Take 1 tablet (500 mg total) by mouth every 6 (six) hours as needed for moderate pain 22   Carolina Bravo MD   ALPRAZolam (XANAX) 0.5 mg tablet Take 2 tablets (1 mg total) by mouth 2 (two) times a day as needed for anxiety 25   Valarie Gonzales MD   atorvastatin (LIPITOR) 10 mg tablet TAKE 1  TABLET(10 MG) BY MOUTH DAILY AT BEDTIME 2/2/25   Valarie Gonzales MD   colestipol (COLESTID) 1 g tablet Take 2 tablets (2 g total) by mouth in the morning 2 hours separate from other meds 9/25/24   Candi Lee PA-C   desvenlafaxine succinate (PRISTIQ) 50 mg 24 hr tablet Take 1 tablet (50 mg total) by mouth daily 11/18/24   Valarie Gonzales MD   Diclofenac Sodium (VOLTAREN) 1 % Apply 2 g topically 4 (four) times a day 12/18/24   Valarie Gonzales MD   dicyclomine (BENTYL) 20 mg tablet Take 1 tablet (20 mg total) by mouth 2 (two) times a day as needed (ibs) 6/27/24   Valarie Gonzales MD   levothyroxine 75 mcg tablet Take 1 tablet (75 mcg total) by mouth daily 11/18/24   Valarie Gonzales MD   metoprolol tartrate (LOPRESSOR) 25 mg tablet Take 0.5 tablets (12.5 mg total) by mouth every 12 (twelve) hours 4/22/24   Valarie Gonzales MD   Multiple Vitamins-Minerals (CENTRUM ADULTS PO) Take 1 tablet by mouth daily    Historical Provider, MD   pantoprazole (PROTONIX) 40 mg tablet Take 1 tablet (40 mg total) by mouth daily 11/8/24 2/6/25  Larissa Mercado MD   tiZANidine (ZANAFLEX) 2 mg tablet Take 1 tablet (2 mg total) by mouth every 8 (eight) hours as needed for muscle spasms 11/28/22   Jackie Sanchez MD   warfarin (COUMADIN) 2 mg tablet TAKE 1 AND 1/2 TO 2 TABLETS BY MOUTH DAILY AS DIRECTED BY PRESCRIBER 8/5/24   Lnodon Charles MD     No Known Allergies    Objective :  Temp:  [98.7 °F (37.1 °C)] 98.7 °F (37.1 °C)  HR:  [79] 79  BP: (170)/(81) 170/81  Resp:  [18] 18  SpO2:  [98 %] 98 %  O2 Device: None (Room air)    Physical Exam  Constitutional:       General: She is not in acute distress.  HENT:      Mouth/Throat:      Mouth: Mucous membranes are moist.   Eyes:      General: No scleral icterus.  Cardiovascular:      Rate and Rhythm: Normal rate and regular rhythm.      Heart sounds: Normal heart sounds.   Pulmonary:      Effort: Pulmonary effort is normal.      Breath sounds: Normal breath sounds.   Abdominal:      General:  Abdomen is flat. Bowel sounds are normal.      Palpations: Abdomen is soft.      Tenderness: There is no abdominal tenderness.   Musculoskeletal:      Right lower leg: No edema.      Left lower leg: No edema.   Skin:     General: Skin is warm and dry.   Neurological:      General: No focal deficit present.      Mental Status: She is alert and oriented to person, place, and time.   Psychiatric:         Mood and Affect: Mood normal.          Lines/Drains:            Lab Results: I have reviewed the following results:  Results from last 7 days   Lab Units 02/10/25  2202   WBC Thousand/uL 5.79   HEMOGLOBIN g/dL 12.6   HEMATOCRIT % 38.2   PLATELETS Thousands/uL 240   SEGS PCT % 85*   LYMPHO PCT % 3*   MONO PCT % 11   EOS PCT % 0     Results from last 7 days   Lab Units 02/10/25  2202   SODIUM mmol/L 134*   POTASSIUM mmol/L 3.8   CHLORIDE mmol/L 100   CO2 mmol/L 25   BUN mg/dL 15   CREATININE mg/dL 0.78   ANION GAP mmol/L 9   CALCIUM mg/dL 9.7   ALBUMIN g/dL 4.4   TOTAL BILIRUBIN mg/dL 0.38   ALK PHOS U/L 71   ALT U/L 61*   AST U/L 63*   GLUCOSE RANDOM mg/dL 154*     Results from last 7 days   Lab Units 02/11/25  0021   INR  2.61*         Lab Results   Component Value Date    HGBA1C 6.0 09/16/2024    HGBA1C 5.8 (H) 04/26/2021           Imaging Results Review: No pertinent imaging studies reviewed.  Other Study Results Review: EKG was reviewed.     Administrative Statements   I have spent a total time of 60 minutes in caring for this patient on the day of the visit/encounter including Diagnostic results, Prognosis, Risks and benefits of tx options, Instructions for management, Patient and family education, Importance of tx compliance, Risk factor reductions, Impressions, Counseling / Coordination of care, Documenting in the medical record, Reviewing / ordering tests, medicine, procedures  , and Obtaining or reviewing history  .    ** Please Note: This note has been constructed using a voice recognition system. **

## 2025-02-11 NOTE — ASSESSMENT & PLAN NOTE
Lab Results   Component Value Date    EGFR 74 02/10/2025    EGFR 67 09/11/2024    EGFR 64 05/09/2024    CREATININE 0.78 02/10/2025    CREATININE 0.84 09/11/2024    CREATININE 0.87 05/09/2024     Creatinine currently stable at baseline 0.8  Monitor creatinine

## 2025-02-11 NOTE — OCCUPATIONAL THERAPY NOTE
Occupational Therapy Evaluation     Patient Name: Josee Vaca  Today's Date: 2/11/2025  Problem List  Principal Problem:    Ambulatory dysfunction  Active Problems:    Anxiety    Primary hypertension    Other specified hypothyroidism    CKD (chronic kidney disease) stage 2, GFR 60-89 ml/min    Hyperlipidemia    Paroxysmal atrial fibrillation (HCC)    Transaminitis    COVID-19    Past Medical History  Past Medical History:   Diagnosis Date    A-fib (HCC)     Anxiety     Arthritis     shoulder    Carpal tunnel syndrome     Colon polyp     Depression     Disease of thyroid gland     Diverticulitis     Esophageal stricture     HBP (high blood pressure)     Hiatal hernia 06/07/2022    Small hiatal hernia    Hyperlipidemia     Hypertension     Hypothyroid     Pneumonia     2017    PONV (postoperative nausea and vomiting)     Schatzki's ring 06/07/2022    Schatzki's ring in the lower esophagus    Sleep apnea     mild      Past Surgical History  Past Surgical History:   Procedure Laterality Date    APPENDECTOMY      BREAST BIOPSY Right     benign    BREAST CYST ASPIRATION Right     benign    BREAST SURGERY      milk duct removed    CATARACT EXTRACTION Bilateral     CATARACT EXTRACTION, BILATERAL  2019    CERVICAL FUSION N/A 05/25/2021    Procedure: FUSION CERVICAL POSTERIOR C1-C2 with allograft and neuromonitoring);  Surgeon: Wesley Camacho MD;  Location:  MAIN OR;  Service: Orthopedics    CHOLECYSTECTOMY      COLONOSCOPY  06/07/2022    COLONOSCOPY W/ BIOPSIES  2011    EGD  06/07/2022    HAND SURGERY Right     MAMMO (HISTORICAL) Bilateral 11/27/2018    MAMMO (HISTORICAL) Bilateral 10/27/2017    MANDIBLE SURGERY      MANDIBLE SURGERY      WI SURGICAL ARTHROSCOPY SHOULDER W/ROTATOR CUFF RPR Right 09/27/2022    Procedure: ARTHROSCOPIC ROTATOR CUFF REPAIR OF SUBSCAPULARIS BICEPS TENODESIS EXTENSIVE DEBRIDEMENT;  Surgeon: Tony Rose MD;  Location: [facility];  Service: Orthopedics    [facility]  EXTRACTION      X4           02/11/25 1028   OT Last Visit   OT Visit Date 02/11/25   Note Type   Note type Evaluation   Additional Comments Pt seen w/ PT to increase safety, decrease fall risk, and maximize functional/occupational performance 2* medical complexity whcih is a regression from pt's functional baseline.   Pain Assessment   Pain Assessment Tool 0-10   Pain Score No Pain   Hospital Pain Intervention(s) Repositioned;Ambulation/increased activity;Emotional support   Restrictions/Precautions   Weight Bearing Precautions Per Order No   Other Precautions Multiple lines;Telemetry;Fall Risk;Contact/isolation;Airborne/isolation  ((+) COVID)   Home Living   Type of Home House  (2  with 4 NICHOL)   Home Layout Two level;Performs ADLs on one level;Able to live on main level with bedroom/bathroom;Access   Bathroom Shower/Tub Tub/shower unit   Bathroom Toilet Standard   Bathroom Equipment Grab bars in shower;Commode   Bathroom Accessibility Accessible   Home Equipment Walker;Cane;Quad cane   Additional Comments Pt reports that she lives in a 2  with 4 NICHOL with her daughter and stays on the first floor with main bedroom/bathroom; uses RW vs SPC for functional mobility   Prior Function   Level of [place] Independent with ADLs;Independent with functional mobility;Independent with IADLS   Lives With Daughter   Receives Help From Family   IADLs Independent with driving;Independent with meal prep;Independent with medication management   Falls in the last 6 months 1 to 4  (1 recent fall)   Vocational Retired   Comments (+) driving; reports that her daughter has a seizure disorder and that she manages her meds for her daughter; reports that she was recently going to OP OT for hand therapy   Lifestyle   Autonomy PTA, pt was independent in ADLs/IADLs and uses RW vs SPC for functional mobility; (+) driving   Reciprocal Relationships Lives with her daughter   Service to Others Retired reporting that she previously worked  "for [employer]   Intrinsic Gratification Enjoys spending time with her dogs and watching TV   Subjective   Subjective \"I was going to therapy for my hand!\"   ADL   Where Assessed Edge of bed   Eating Assistance 5  Supervision/Setup   Grooming Assistance 5  Supervision/Setup   UB Bathing Assistance 5  Supervision/Setup   LB Bathing Assistance 5  Supervision/Setup   UB Dressing Assistance 5  Supervision/Setup   LB Dressing Assistance 5  Supervision/Setup   Toileting Assistance  5  Supervision/Setup   Functional Assistance 5  Supervision/Setup   Bed Mobility   Supine to Sit 5  Supervision   Additional items Bedrails;Increased time required;Verbal cues   Sit to Supine 5  Supervision   Additional items Bedrails;Increased time required;Verbal cues   Additional Comments At end of session, pt left lying supine in bed with all functional needs in reach   Transfers   Sit to Stand 5  Supervision   Additional items Increased time required;Verbal cues   Stand to Sit 5  Supervision   Additional items Increased time required;Verbal cues   Additional Comments w/ RW for safety and support   Functional Mobility   Functional Mobility 4  Minimal assistance   Additional Comments Pt completed short household functional mobility distances w/ Min A x1 w/ RW for safety and support with verbal cues for RW management and safety   Additional items Rolling walker   Balance   Static Sitting Good   Dynamic Sitting Fair +   Static Standing Fair   Dynamic Standing Fair   Ambulatory Fair -   Activity Tolerance   Activity Tolerance Patient tolerated treatment well;Patient limited by fatigue   Medical Staff Made Aware ROSAS Faulkner   Nurse Made Aware RN cleared   RUE Assessment   RUE Assessment WFL   LUE Assessment   LUE Assessment WFL   Hand Function   Gross Motor Coordination Functional   Fine Motor Coordination Impaired   Hand Function Comments Pt was going to OP OT for hand therapy 2* DeQuervain's syndrome and severe diffuse arthritis   Cognition "   Arousal/Participation Alert;Responsive;Arousable;Cooperative   Attention Attends with cues to redirect   Orientation Level Oriented X4   Memory Decreased recall of precautions   Following Commands Follows one step commands without difficulty   Comments Pt pleasant and cooperative; @ times, requires verbal cues for safety, proper technique, and redirection as pt tangential @ times; would benefit from formal cognitive assessment to help determine safe d/c needs   Assessment   Limitation Decreased ADL status;Decreased Safe judgement during ADL;Decreased endurance;Decreased self-care trans;Decreased high-level ADLs   Prognosis Fair   Assessment Pt is a 73 yo female who presented to [facility] with generalized weakness and difficulty ambulating in which pt found with COVID. Pt dx w/ ambulatory dysfunction. Pt  has a past medical history of A-fib (HCC), Anxiety, Arthritis, Carpal tunnel syndrome, Colon polyp, Depression, Disease of thyroid gland, Diverticulitis, Esophageal stricture, HBP (high blood pressure), Hiatal hernia (06/07/2022), Hyperlipidemia, Hypertension, Hypothyroid, Pneumonia, PONV (postoperative nausea and vomiting), Schatzki's ring (06/07/2022), and Sleep apnea. Pt with active OT orders in which OT consulted to assess pt's functional status and occupational performance to determine safe d/c needs. Pt seen with PT to increase safety, decrease fall risk, and maximize functional/occupational performance 2* medical complexity which is a regression from pt's functional baseline. Pt lives with her daughter in a 2  with 4 NICHOL with FFSU. PTA, pt was independent in ADLs/IADLs and uses RW vs SPC for functional mobility. (+) driving. Currently, pt performing bed mobility w/ supervision, functional transfers @ supervision level w/ RW, functional mobility w/ Min A x1 w/ RW, and UB/LB ADLs @ supervision level. Pt demonstrates the following limitations/impairments which impact the pt's ability to engage in  valued occupations: balance, endurance/activity tolerance, standing tolerance, functional reach, postural/trunk control, strength, safety awareness, and pain. From an OT standpoint, recommend discharge w/ Level 3 resources, once medically stable. The patient's raw score on the AM-PAC Daily Activity Inpatient Short Form is 20. A raw score of greater than or equal to 19 suggests the patient may benefit from discharge to home. Please refer to the recommendation of the Occupational Therapist for safe discharge planning.  Pt would benefit from skilled OT services 3-5x/wk to address acute care needs and underlying performance skills to promote safety, decrease fall risk, and enhance occupational performance to return to [facility]. Goals to be met within the next 10-14 days.   Goals   Patient Goals To go home and get better   LTG Time Frame 10-14   Long Term Goal #1 See OT goals listed below   Plan   Treatment Interventions ADL retraining;Functional transfer training;UE strengthening/ROM;Endurance training;Cognitive reorientation;Patient/family training;Equipment evaluation/education;Compensatory technique education;Continued evaluation;Energy conservation;Activityengagement   Goal Expiration Date 02/25/25   OT Frequency 3-5x/wk   Discharge Recommendation   Rehab Resource Intensity Level, OT III (Minimum Resource Intensity)  (Continue OP OT for hand therapy)   Equipment Recommended Shower/Tub chair with back ($)   AM-PAC Daily Activity Inpatient   Lower Body Dressing 3   Bathing 3   Toileting 3   Upper Body Dressing 3   Grooming 4   Eating 4   Daily Activity Raw Score 20   Daily Activity Standardized Score (Calc for Raw Score >=11) 42.03   AM-[facility] Applied Cognition Inpatient   Following a Speech/Presentation 3   Understanding Ordinary Conversation 4   Taking Medications 3   Remembering Where Things Are Placed or Put Away 4   Remembering List of 4-5 Errands 3   Taking Care of Complicated Tasks 3   Applied Cognition Raw Score 20    Applied Cognition Standardized Score 41.76   End of Consult   Education Provided Yes   Patient Position at End of Consult Supine;All needs within reach   Nurse Communication Nurse aware of consult     OT GOALS:    Pt will improve functional mobility during ADL/IADL/leisure tasks with Mod I using AE/DME prn.    Pt will improve activity tolerance/functional endurance during ADL/IADL/leisure tasks for at least 20 minutes to improve occupational performance and engagement in valued occupations using AE/DME prn.    Pt will engage in ongoing functional/formal cognitive assessments to assist with safe d/c planning and increase safety during functional tasks.    Pt will participate in simulated IADL management task w/ Mod I to increase independence and engagement in valued occupations w/ good balance/safety.    Pt will improve dynamic standing balance for at least 15 minutes with Mod I during functional tasks to decrease fall risk and improve independence and engagement in ADL/IADL/leisure activities.    Pt will follow 100% of multi-step commands in ADL/IADL/leisure activities to improve functional cognition used in functional daily routines.    Pt will complete functional transfers on and off all surfaces used in daily routines with Mod I for safety to maximize functional/occupational performance.    Pt will complete all bed mobility tasks with Mod I to serve as a prerequisite for EOB/OOB ADL/IADL/leisure tasks, optimize positioning/comfort, and increase functional independence.    Pt will independently demonstrate good carryover of safety precautions and education/training during ADL/IADL/leisure tasks with energy conservation techniques s/p skilled instruction without verbal cues.    Pt will complete UB ADL tasks with Mod I using AE/DME prn to increase functional independence in ADL/IADL/leisure tasks.    Pt will complete LB ADL tasks with Mod I using AE/DME prn to increase functional independence in ADL/IADL/leisure  tasks.     Pt will complete toileting tasks with Mod I and good hygiene/thoroughness using AE/DME prn to increase functional independence.    Pt will independently identify and utilize 2-3 positive coping strategies to enhance overall wellbeing and engagement in valued occupations.    Rosa Bradford MS, OTR/L

## 2025-02-11 NOTE — ASSESSMENT & PLAN NOTE
Patient presents with generalized weakness and difficulty with ambulation  Likely secondary to COVID infection  Fall precautions  Management for COVID as stated below  PT/OT

## 2025-02-12 LAB
ALBUMIN SERPL BCG-MCNC: 3.8 G/DL (ref 3.5–5)
ALP SERPL-CCNC: 56 U/L (ref 34–104)
ALT SERPL W P-5'-P-CCNC: 54 U/L (ref 7–52)
ANION GAP SERPL CALCULATED.3IONS-SCNC: 8 MMOL/L (ref 4–13)
AST SERPL W P-5'-P-CCNC: 65 U/L (ref 13–39)
BASOPHILS # BLD AUTO: 0.03 THOUSANDS/ÂΜL (ref 0–0.1)
BASOPHILS NFR BLD AUTO: 1 % (ref 0–1)
BILIRUB SERPL-MCNC: 0.32 MG/DL (ref 0.2–1)
BUN SERPL-MCNC: 12 MG/DL (ref 5–25)
CALCIUM SERPL-MCNC: 8.6 MG/DL (ref 8.4–10.2)
CHLORIDE SERPL-SCNC: 102 MMOL/L (ref 96–108)
CO2 SERPL-SCNC: 23 MMOL/L (ref 21–32)
CREAT SERPL-MCNC: 0.76 MG/DL (ref 0.6–1.3)
EOSINOPHIL # BLD AUTO: 0.01 THOUSAND/ÂΜL (ref 0–0.61)
EOSINOPHIL NFR BLD AUTO: 0 % (ref 0–6)
ERYTHROCYTE [DISTWIDTH] IN BLOOD BY AUTOMATED COUNT: 12.9 % (ref 11.6–15.1)
GFR SERPL CREATININE-BSD FRML MDRD: 76 ML/MIN/1.73SQ M
GLUCOSE SERPL-MCNC: 86 MG/DL (ref 65–140)
HCT VFR BLD AUTO: 35.3 % (ref 34.8–46.1)
HGB BLD-MCNC: 12 G/DL (ref 11.5–15.4)
IMM GRANULOCYTES # BLD AUTO: 0.01 THOUSAND/UL (ref 0–0.2)
IMM GRANULOCYTES NFR BLD AUTO: 0 % (ref 0–2)
LYMPHOCYTES # BLD AUTO: 0.69 THOUSANDS/ÂΜL (ref 0.6–4.47)
LYMPHOCYTES NFR BLD AUTO: 19 % (ref 14–44)
MCH RBC QN AUTO: 30.7 PG (ref 26.8–34.3)
MCHC RBC AUTO-ENTMCNC: 34 G/DL (ref 31.4–37.4)
MCV RBC AUTO: 90 FL (ref 82–98)
MONOCYTES # BLD AUTO: 0.68 THOUSAND/ÂΜL (ref 0.17–1.22)
MONOCYTES NFR BLD AUTO: 19 % (ref 4–12)
NEUTROPHILS # BLD AUTO: 2.2 THOUSANDS/ÂΜL (ref 1.85–7.62)
NEUTS SEG NFR BLD AUTO: 61 % (ref 43–75)
NRBC BLD AUTO-RTO: 0 /100 WBCS
PLATELET # BLD AUTO: 199 THOUSANDS/UL (ref 149–390)
PMV BLD AUTO: 9.5 FL (ref 8.9–12.7)
POTASSIUM SERPL-SCNC: 3.6 MMOL/L (ref 3.5–5.3)
PROT SERPL-MCNC: 6.2 G/DL (ref 6.4–8.4)
RBC # BLD AUTO: 3.91 MILLION/UL (ref 3.81–5.12)
SODIUM SERPL-SCNC: 133 MMOL/L (ref 135–147)
WBC # BLD AUTO: 3.62 THOUSAND/UL (ref 4.31–10.16)

## 2025-02-12 PROCEDURE — 85025 COMPLETE CBC W/AUTO DIFF WBC: CPT | Performed by: INTERNAL MEDICINE

## 2025-02-12 PROCEDURE — 80053 COMPREHEN METABOLIC PANEL: CPT | Performed by: INTERNAL MEDICINE

## 2025-02-12 PROCEDURE — 99232 SBSQ HOSP IP/OBS MODERATE 35: CPT | Performed by: INTERNAL MEDICINE

## 2025-02-12 RX ADMIN — REMDESIVIR 100 MG: 100 INJECTION, POWDER, LYOPHILIZED, FOR SOLUTION INTRAVENOUS at 01:24

## 2025-02-12 RX ADMIN — ACETAMINOPHEN 650 MG: 325 TABLET, FILM COATED ORAL at 08:30

## 2025-02-12 RX ADMIN — DESVENLAFAXINE 50 MG: 50 TABLET, EXTENDED RELEASE ORAL at 08:29

## 2025-02-12 RX ADMIN — WARFARIN SODIUM 4 MG: 4 TABLET ORAL at 17:00

## 2025-02-12 RX ADMIN — METOPROLOL TARTRATE 12.5 MG: 25 TABLET, FILM COATED ORAL at 08:28

## 2025-02-12 RX ADMIN — ATORVASTATIN CALCIUM 10 MG: 10 TABLET, FILM COATED ORAL at 17:00

## 2025-02-12 RX ADMIN — LEVOTHYROXINE SODIUM 75 MCG: 75 TABLET ORAL at 06:28

## 2025-02-12 RX ADMIN — GUAIFENESIN 600 MG: 600 TABLET ORAL at 21:57

## 2025-02-12 RX ADMIN — GUAIFENESIN 600 MG: 600 TABLET ORAL at 08:28

## 2025-02-12 RX ADMIN — PANTOPRAZOLE SODIUM 40 MG: 40 TABLET, DELAYED RELEASE ORAL at 06:28

## 2025-02-12 RX ADMIN — METOPROLOL TARTRATE 12.5 MG: 25 TABLET, FILM COATED ORAL at 21:57

## 2025-02-12 RX ADMIN — ACETAMINOPHEN 650 MG: 325 TABLET, FILM COATED ORAL at 21:58

## 2025-02-12 NOTE — PROGRESS NOTES
Progress Note - Hospitalist   Name: Josee Vaca 74 y.o. female I MRN: [id number]  Unit/Bed#: S -01 I Date of Admission: 2/10/2025   Date of Service: 2/12/2025 I Hospital Day: 1    Assessment & Plan  Ambulatory dysfunction  Patient presents with generalized weakness and difficulty with ambulation  Likely secondary to COVID infection  Fall precautions  Management for COVID as stated below  PT/OT  COVID-19  Patient endorsing generalized weakness, fatigue, nausea/vomiting x 2 days  COVID rapid antigen positive  Patient is afebrile, stable on room air  IV remdesivir x 3 days  Supportive care  Paroxysmal atrial fibrillation (HCC)  Continue Coumadin, monitor PT/INR  Continue Lopressor  Transaminitis  Mild transaminitis noted likely in the setting of COVID infection  Monitor CMP  Anxiety  Continue Pristiq and as needed Xanax  Primary hypertension  Continue Lopressor  Blood pressure is 118/54  Other specified hypothyroidism  Continue levothyroxine  CKD (chronic kidney disease) stage 2, GFR 60-89 ml/min  Lab Results   Component Value Date    EGFR 76 02/12/2025    EGFR 85 02/11/2025    EGFR 74 02/10/2025    CREATININE 0.76 02/12/2025    CREATININE 0.68 02/11/2025    CREATININE 0.78 02/10/2025     Creatinine currently stable at baseline 0.8  Monitor creatinine  Hyperlipidemia  Continue Colestid, statin    VTE Pharmacologic Prophylaxis: VTE Score: 4 Moderate Risk (Score 3-4) - Pharmacological DVT Prophylaxis Ordered: heparin.    Mobility:   Basic Mobility Inpatient Raw Score: 17  -[facility] Goal: 5: Stand one or more mins  -[facility] Achieved: 6: Walk 10 steps or more      Patient Centered Rounds: I performed bedside rounds with nursing staff today.   Discussions with Specialists or Other Care Team Provider: Discussed with nursing.     Education and Discussions with Family / Patient:  called sister - .     Current Length of Stay: 1 day(s)  Current Patient Status: Inpatient   Certification Statement: The patient will continue to  require additional inpatient hospital stay due to weakness, covid. Possible DC.   Discharge Plan: Anticipate discharge in 24-48 hrs to home.    Code Status: Level 1 - Full Code    Subjective   Patient seen and examined   No new symptoms.     Objective :  Temp:  [98.4 °F (36.9 °C)-99.1 °F (37.3 °C)] 98.6 °F (37 °C)  HR:  [74-91] 74  BP: (114-141)/(54-67) 118/54  Resp:  [16-18] 16  SpO2:  [93 %-100 %] 93 %  O2 Device: None (Room air)    There is no height or weight on file to calculate BMI.     Input and Output Summary (last 24 hours):     Intake/Output Summary (Last 24 hours) at 2/12/2025 1642  Last data filed at 2/12/2025 1301  Gross per 24 hour   Intake 562 ml   Output 370 ml   Net 192 ml       Physical Exam  Constitutional:       General: She is not in acute distress.     Appearance: She is not ill-appearing, toxic-appearing or diaphoretic.   HENT:      Head: Normocephalic.      Mouth/Throat:      Mouth: Mucous membranes are moist.   Eyes:      General: No scleral icterus.        Right eye: No discharge.         Left eye: No discharge.      Pupils: Pupils are equal, round, and reactive to light.   Cardiovascular:      Rate and Rhythm: Normal rate.      Heart sounds: No murmur heard.     No friction rub. No gallop.   Pulmonary:      Effort: Pulmonary effort is normal. No respiratory distress.      Breath sounds: No stridor. No wheezing, rhonchi or rales.   Chest:      Chest wall: No tenderness.   Abdominal:      General: There is no distension.      Palpations: There is no mass.      Tenderness: There is no abdominal tenderness. There is no right CVA tenderness, left CVA tenderness, guarding or rebound.      Hernia: No hernia is present.   Musculoskeletal:      Right lower leg: No edema.      Left lower leg: No edema.   Skin:     Capillary Refill: Capillary refill takes less than 2 seconds.      Coloration: Skin is not jaundiced or pale.      Findings: No bruising, erythema, lesion or rash.   Neurological:       General: No focal deficit present.      Mental Status: She is alert.      Cranial Nerves: No cranial nerve deficit.      Sensory: No sensory deficit.      Motor: No weakness.      Coordination: Coordination normal.      Gait: Gait normal.      Deep Tendon Reflexes: Reflexes normal.   Psychiatric:         Mood and Affect: Mood normal.           Lines/Drains:              Lab Results: I have reviewed the following results:   Results from last 7 days   Lab Units 02/12/25  0540   WBC Thousand/uL 3.62*   HEMOGLOBIN g/dL 12.0   HEMATOCRIT % 35.3   PLATELETS Thousands/uL 199   SEGS PCT % 61   LYMPHO PCT % 19   MONO PCT % 19*   EOS PCT % 0     Results from last 7 days   Lab Units 02/12/25  0540   SODIUM mmol/L 133*   POTASSIUM mmol/L 3.6   CHLORIDE mmol/L 102   CO2 mmol/L 23   BUN mg/dL 12   CREATININE mg/dL 0.76   ANION GAP mmol/L 8   CALCIUM mg/dL 8.6   ALBUMIN g/dL 3.8   TOTAL BILIRUBIN mg/dL 0.32   ALK PHOS U/L 56   ALT U/L 54*   AST U/L 65*   GLUCOSE RANDOM mg/dL 86     Results from last 7 days   Lab Units 02/11/25  0021   INR  2.61*                   Recent Cultures (last 7 days):         Imaging Results Review: No pertinent imaging studies reviewed.  Other Study Results Review: No additional pertinent studies reviewed.    Last 24 Hours Medication List:     Current Facility-Administered Medications:     acetaminophen (TYLENOL) tablet 650 mg, Q6H PRN    ALPRAZolam (XANAX) tablet 1 mg, BID PRN    atorvastatin (LIPITOR) tablet 10 mg, Daily With Dinner    benzonatate (TESSALON PERLES) capsule 100 mg, TID PRN    desvenlafaxine succinate (PRISTIQ) 24 hr tablet 50 mg, Daily    Diclofenac Sodium (VOLTAREN) 1 % topical gel 2 g, 4x Daily    guaiFENesin (MUCINEX) 12 hr tablet 600 mg, Q12H PABLO    levothyroxine tablet 75 mcg, Early Morning    metoprolol tartrate (LOPRESSOR) partial tablet 12.5 mg, Q12H PABLO    pantoprazole (PROTONIX) EC tablet 40 mg, Daily Before Breakfast    [COMPLETED] remdesivir (Veklury) 200 mg in sodium  chloride 0.9 % 290 mL IVPB, Q24H, Last Rate: Stopped (02/11/25 0351) **FOLLOWED BY** remdesivir (Veklury) 100 mg in sodium chloride 0.9 % 270 mL IVPB, Q24H, Last Rate: Stopped (02/12/25 0230)    tiZANidine (ZANAFLEX) tablet 2 mg, Q8H PRN    trimethobenzamide (TIGAN) IM injection 200 mg, Q6H PRN    warfarin (COUMADIN) tablet 4 mg, Daily (warfarin)    Administrative Statements   Today, Patient Was Seen By: Ora Hopkins MD  I have spent a total time of 30 minutes in caring for this patient on the day of the visit/encounter including Diagnostic results, Prognosis, Risks and benefits of tx options, Instructions for management, Patient and family education, Importance of tx compliance, Risk factor reductions, Impressions, Counseling / Coordination of care, Documenting in the medical record, Reviewing / ordering tests, medicine, procedures  , Obtaining or reviewing history  , and Communicating with other healthcare professionals .    **Please Note: This note may have been constructed using a voice recognition system.**

## 2025-02-12 NOTE — ASSESSMENT & PLAN NOTE
Lab Results   Component Value Date    EGFR 76 02/12/2025    EGFR 85 02/11/2025    EGFR 74 02/10/2025    CREATININE 0.76 02/12/2025    CREATININE 0.68 02/11/2025    CREATININE 0.78 02/10/2025     Creatinine currently stable at baseline 0.8  Monitor creatinine

## 2025-02-13 VITALS
OXYGEN SATURATION: 93 % | DIASTOLIC BLOOD PRESSURE: 75 MMHG | HEART RATE: 81 BPM | SYSTOLIC BLOOD PRESSURE: 126 MMHG | RESPIRATION RATE: 18 BRPM | TEMPERATURE: 98.9 F

## 2025-02-13 LAB
ALBUMIN SERPL BCG-MCNC: 4.1 G/DL (ref 3.5–5)
ALP SERPL-CCNC: 58 U/L (ref 34–104)
ALT SERPL W P-5'-P-CCNC: 53 U/L (ref 7–52)
ANION GAP SERPL CALCULATED.3IONS-SCNC: 6 MMOL/L (ref 4–13)
AST SERPL W P-5'-P-CCNC: 62 U/L (ref 13–39)
BILIRUB SERPL-MCNC: 0.3 MG/DL (ref 0.2–1)
BUN SERPL-MCNC: 15 MG/DL (ref 5–25)
CALCIUM SERPL-MCNC: 9.1 MG/DL (ref 8.4–10.2)
CHLORIDE SERPL-SCNC: 102 MMOL/L (ref 96–108)
CO2 SERPL-SCNC: 27 MMOL/L (ref 21–32)
CREAT SERPL-MCNC: 0.8 MG/DL (ref 0.6–1.3)
GFR SERPL CREATININE-BSD FRML MDRD: 71 ML/MIN/1.73SQ M
GLUCOSE SERPL-MCNC: 182 MG/DL (ref 65–140)
INR PPP: 2.14 (ref 0.85–1.19)
POTASSIUM SERPL-SCNC: 3.5 MMOL/L (ref 3.5–5.3)
PROT SERPL-MCNC: 6.9 G/DL (ref 6.4–8.4)
PROTHROMBIN TIME: 24.6 SECONDS (ref 12.3–15)
SODIUM SERPL-SCNC: 135 MMOL/L (ref 135–147)

## 2025-02-13 PROCEDURE — 99239 HOSP IP/OBS DSCHRG MGMT >30: CPT | Performed by: INTERNAL MEDICINE

## 2025-02-13 PROCEDURE — 80053 COMPREHEN METABOLIC PANEL: CPT | Performed by: INTERNAL MEDICINE

## 2025-02-13 PROCEDURE — 85610 PROTHROMBIN TIME: CPT | Performed by: INTERNAL MEDICINE

## 2025-02-13 RX ADMIN — LEVOTHYROXINE SODIUM 75 MCG: 75 TABLET ORAL at 04:54

## 2025-02-13 RX ADMIN — METOPROLOL TARTRATE 12.5 MG: 25 TABLET, FILM COATED ORAL at 10:05

## 2025-02-13 RX ADMIN — GUAIFENESIN 600 MG: 600 TABLET ORAL at 10:04

## 2025-02-13 RX ADMIN — REMDESIVIR 100 MG: 100 INJECTION, POWDER, LYOPHILIZED, FOR SOLUTION INTRAVENOUS at 01:24

## 2025-02-13 RX ADMIN — PANTOPRAZOLE SODIUM 40 MG: 40 TABLET, DELAYED RELEASE ORAL at 04:54

## 2025-02-13 RX ADMIN — DESVENLAFAXINE 50 MG: 50 TABLET, EXTENDED RELEASE ORAL at 10:05

## 2025-02-13 NOTE — ASSESSMENT & PLAN NOTE
Patient endorsing generalized weakness, fatigue, nausea/vomiting x 2 days  COVID rapid antigen positive  Patient is afebrile, stable on room air  Patient received 3 days of IV remdesivir  She feels improved

## 2025-02-13 NOTE — ASSESSMENT & PLAN NOTE
Patient presents with generalized weakness and difficulty with ambulation  Likely secondary to COVID infection  Fall precautions  Management for COVID as stated below  Physical therapy and Occupational Therapy have seen the patient and determined that she is safe from a mobility standpoint to return home

## 2025-02-13 NOTE — PLAN OF CARE
Problem: OCCUPATIONAL THERAPY ADULT  Goal: Performs self-care activities at highest level of function for planned discharge setting.  See evaluation for individualized goals.  Description:   Outcome: Adequate for Discharge     Problem: PHYSICAL THERAPY ADULT  Goal: Performs mobility at highest level of function for planned discharge setting.  See evaluation for individualized goals.  Description: Treatment/Interventions: Functional transfer training, LE strengthening/ROM, Elevations, Therapeutic exercise, Endurance training, Patient/family training, Equipment eval/education, Bed mobility, Gait training, Continued evaluation, Spoke to nursing, OT  Equipment Recommended: Walker       See flowsheet documentation for full assessment, interventions and recommendations.  Outcome: Adequate for Discharge     Problem: PAIN - ADULT  Goal: Verbalizes/displays adequate comfort level or baseline comfort level  Description: Interventions:  - Encourage patient to monitor pain and request assistance  - Assess pain using appropriate pain scale  - Administer analgesics based on type and severity of pain and evaluate response  - Implement non-pharmacological measures as appropriate and evaluate response  - Consider cultural and social influences on pain and pain management  - Notify physician/advanced practitioner if interventions unsuccessful or patient reports new pain  2/13/2025 1721 by Marie De Anda RN  Outcome: Adequate for Discharge  2/13/2025 1523 by Marie De Anda RN  Outcome: Progressing     Problem: INFECTION - ADULT  Goal: Absence or prevention of progression during hospitalization  Description: INTERVENTIONS:  - Assess and monitor for signs and symptoms of infection  - Monitor lab/diagnostic results  - Monitor all insertion sites, i.e. indwelling lines, tubes, and drains  - Monitor endotracheal if appropriate and nasal secretions for changes in amount and color  - Essie appropriate cooling/warming therapies per  order  - Administer medications as ordered  - Instruct and encourage patient and family to use good hand hygiene technique  - Identify and instruct in appropriate isolation precautions for identified infection/condition  2/13/2025 1721 by Marie De Anda RN  Outcome: Adequate for Discharge  2/13/2025 1523 by Marie De Anda RN  Outcome: Progressing  Goal: Absence of fever/infection during neutropenic period  Description: INTERVENTIONS:  - Monitor WBC    2/13/2025 1721 by Marie De Anda RN  Outcome: Adequate for Discharge  2/13/2025 1523 by Marie De Anda RN  Outcome: Progressing     Problem: SAFETY ADULT  Goal: Patient will remain free of falls  Description: INTERVENTIONS:  - Educate patient/family on patient safety including physical limitations  - Instruct patient to call for assistance with activity   - Consult OT/PT to assist with strengthening/mobility   - Keep Call bell within reach  - Keep bed low and locked with side rails adjusted as appropriate  - Keep care items and personal belongings within reach  - Initiate and maintain comfort rounds  - Make Fall Risk Sign visible to staff  - Offer Toileting every  Hours, in advance of need  - Initiate/Maintain alarm  - Obtain necessary fall risk management equipment:   - Apply yellow socks and bracelet for high fall risk patients  - Consider moving patient to room near nurses station  2/13/2025 1721 by Marie De Anda RN  Outcome: Adequate for Discharge  2/13/2025 1523 by Marie De Anda RN  Outcome: Progressing  Goal: Maintain or return to baseline ADL function  Description: INTERVENTIONS:  -  Assess patient's ability to carry out ADLs; assess patient's baseline for ADL function and identify physical deficits which impact ability to perform ADLs (bathing, care of mouth/teeth, toileting, grooming, dressing, etc.)  - Assess/evaluate cause of self-care deficits   - Assess range of motion  - Assess patient's mobility; develop plan if impaired  - Assess patient's need for  assistive devices and provide as appropriate  - Encourage maximum independence but intervene and supervise when necessary  - Involve family in performance of ADLs  - Assess for home care needs following discharge   - Consider OT consult to assist with ADL evaluation and planning for discharge  - Provide patient education as appropriate  2/13/2025 1721 by Marie De Anda RN  Outcome: Adequate for Discharge  2/13/2025 1523 by Marie De Anda RN  Outcome: Progressing  Goal: Maintains/Returns to pre admission functional level  Description: INTERVENTIONS:  - Perform AM-PAC 6 Click Basic Mobility/ Daily Activity assessment daily.  - Set and communicate daily mobility goal to care team and patient/family/caregiver.   - Collaborate with rehabilitation services on mobility goals if consulted  - Perform Range of Motion 2 times a day.  - Reposition patient every 2 hours.  - Dangle patient 2 times a day  - Stand patient 2 times a day  - Ambulate patient 2 times a day  - Out of bed to chair 2 times a day   - Out of bed for meals 2 times a day  - Out of bed for toileting  - Record patient progress and toleration of activity level   2/13/2025 1721 by Marie De Anda RN  Outcome: Adequate for Discharge  2/13/2025 1523 by Marie De Anda RN  Outcome: Progressing     Problem: DISCHARGE PLANNING  Goal: Discharge to home or other facility with appropriate resources  Description: INTERVENTIONS:  - Identify barriers to discharge w/patient and caregiver  - Arrange for needed discharge resources and transportation as appropriate  - Identify discharge learning needs (meds, wound care, etc.)  - Arrange for interpretive services to assist at discharge as needed  - Refer to Case Management Department for coordinating discharge planning if the patient needs post-hospital services based on physician/advanced practitioner order or complex needs related to functional status, cognitive ability, or social support system  2/13/2025 1721 by Marie De Anda  RN  Outcome: Adequate for Discharge  2/13/2025 1523 by Marie De Anda RN  Outcome: Progressing     Problem: Knowledge Deficit  Goal: Patient/family/caregiver demonstrates understanding of disease process, treatment plan, medications, and discharge instructions  Description: Complete learning assessment and assess knowledge base.  Interventions:  - Provide teaching at level of understanding  - Provide teaching via preferred learning methods  2/13/2025 1721 by Marie De Anda RN  Outcome: Adequate for Discharge  2/13/2025 1523 by Marie De Anda RN  Outcome: Progressing

## 2025-02-13 NOTE — ASSESSMENT & PLAN NOTE
Mild transaminitis noted likely in the setting of COVID infection  Monitor CMP  Still mildly elevated   Ast 63 alt 54  Given that this is stable and not markedly elevated I have advised the patient not to take any further Tylenol and to follow-up with her primary care doctor with repeat lab work in 3 to 5 weeks.  I have also given her a GI referral completion.

## 2025-02-13 NOTE — DISCHARGE SUMMARY
Discharge Summary - Hospitalist   Name: Josee Vaca 74 y.o. female I MRN: [id number]  Unit/Bed#: S -01 I Date of Admission: 2/10/2025   Date of Service: 2/13/2025 I Hospital Day: 2     Assessment & Plan  Ambulatory dysfunction  Patient presents with generalized weakness and difficulty with ambulation  Likely secondary to COVID infection  Fall precautions  Management for COVID as stated below  Physical therapy and Occupational Therapy have seen the patient and determined that she is safe from a mobility standpoint to return home  COVID-19  Patient endorsing generalized weakness, fatigue, nausea/vomiting x 2 days  COVID rapid antigen positive  Patient is afebrile, stable on room air  Patient received 3 days of IV remdesivir  She feels improved  Paroxysmal atrial fibrillation (HCC)  Continue Coumadin, monitor PT/INR  Continue Lopressor  INR is therapeutic  Transaminitis  Mild transaminitis noted likely in the setting of COVID infection  Monitor CMP  Still mildly elevated   Ast 63 alt 54  Given that this is stable and not markedly elevated I have advised the patient not to take any further Tylenol and to follow-up with her primary care doctor with repeat lab work in 3 to 5 weeks.  I have also given her a GI referral completion.  Anxiety  Continue Pristiq and as needed Xanax  Primary hypertension  Continue Lopressor  Blood pressure is 126/75  Other specified hypothyroidism  Continue levothyroxine  CKD (chronic kidney disease) stage 2, GFR 60-89 ml/min  Lab Results   Component Value Date    EGFR 71 02/13/2025    EGFR 76 02/12/2025    EGFR 85 02/11/2025    CREATININE 0.80 02/13/2025    CREATININE 0.76 02/12/2025    CREATININE 0.68 02/11/2025     Creatinine currently stable at baseline 0.8  Monitor creatinine  Hyperlipidemia  Continue Colestid, statin     Medical Problems       Resolved Problems  Date Reviewed: 2/4/2025   None       Discharging Physician / Practitioner: Ora Hopkins MD  PCP: Valarie Gonzales,  "MD  Admission Date:   Admission Orders (From admission, onward)       Ordered        02/11/25 1828  INPATIENT ADMISSION  Once            02/10/25 2354  Place in Observation  Once                          Discharge Date: 02/13/25    Consultations During Hospital Stay:  None .    Procedures Performed:   None .    Significant Findings / Test Results:   None    Incidental Findings:   Elevated liver enzymes.    I reviewed the above mentioned incidental findings with the patient and/or family and they expressed understanding.    Test Results Pending at Discharge (will require follow up):   Should have work up of liver enzymes being high.      Outpatient Tests Requested:  CMP by PCP in 3-5 days.     Complications:  None.     Reason for Admission: Weakness    Hospital Course:   Josee Vaca is a 74 y.o. female patient who originally presented to the hospital on 2/10/2025 due to weakness.    The patient was found to be positive for COVID-19 and started on IV remdesivir for 3 days.  She never required any oxygen and her vitals were stable.  At the time of discharge she felt much improved when compared to her initial presentation and was eager to be discharged home.    She was recommended to have a CMP checked in 3 to 5 days time with her primary care doctor.  I also gave her a GI referral for completion to follow-up with GI for abnormal liver function tests.    At the time of discharge she felt \"back to normal\"        Condition at Discharge: stable    Discharge Day Visit / Exam:   Subjective:    Patient seen and examined, \" excited to see her dogs and her daughter\"  Denies any complaints, states she is urinating normally and also having normal bowel movements.  She says she has been eating well and drinking well.    Has been ambulating around the room independently and is eager to be discharged home.  Vitals: Blood Pressure: 126/75 (02/13/25 0742)  Pulse: 81 (02/13/25 0742)  Temperature: 98.9 °F (37.2 °C) (02/13/25 " 0742)  Temp Source: Oral (02/13/25 0742)  Respirations: 18 (02/13/25 0742)  SpO2: 93 % (02/13/25 0742)  Physical Exam  Constitutional:       General: She is not in acute distress.     Appearance: She is not ill-appearing, toxic-appearing or diaphoretic.   HENT:      Head: Normocephalic.      Mouth/Throat:      Mouth: Mucous membranes are moist.   Eyes:      General: No scleral icterus.        Right eye: No discharge.         Left eye: No discharge.      Pupils: Pupils are equal, round, and reactive to light.   Cardiovascular:      Rate and Rhythm: Normal rate.      Heart sounds: No murmur heard.     No friction rub. No gallop.   Pulmonary:      Effort: Pulmonary effort is normal. No respiratory distress.      Breath sounds: No stridor. No wheezing, rhonchi or rales.   Chest:      Chest wall: No tenderness.   Abdominal:      General: There is no distension.      Palpations: There is no mass.      Tenderness: There is no abdominal tenderness. There is no right CVA tenderness, left CVA tenderness, guarding or rebound.      Hernia: No hernia is present.   Musculoskeletal:      Right lower leg: No edema.      Left lower leg: No edema.   Skin:     Coloration: Skin is not jaundiced or pale.      Findings: No bruising, erythema, lesion or rash.   Neurological:      General: No focal deficit present.      Mental Status: She is alert and oriented to person, place, and time.      Cranial Nerves: No cranial nerve deficit.      Sensory: No sensory deficit.      Motor: No weakness.      Coordination: Coordination normal.      Gait: Gait normal.      Deep Tendon Reflexes: Reflexes normal.   Psychiatric:         Mood and Affect: Mood normal.          Discussion with Family:  called sister yesterday and informed her that she may be discharged today - she stated she did not need to be called again for updates. .  I called the daughter as well and she was in agreement with discharge plan and also with follow-up regarding LFTs and  primary care doctor    Discharge instructions/Information to patient and family:   See after visit summary for information provided to patient and family.      Provisions for Follow-Up Care:  See after visit summary for information related to follow-up care and any pertinent home health orders.      Mobility at time of Discharge:   Basic Mobility Inpatient Raw Score: 17  JH-HLM Goal: 5: Stand one or more mins  JH-HLM Achieved: 7: Walk 25 feet or more  Patient was discharged home, she was ambulating around her room with a walker independently     Disposition:   Home    Planned Readmission: None .    Discharge Medications:  See after visit summary for reconciled discharge medications provided to patient and/or family.      Administrative Statements   Discharge Statement:  I have spent a total time of 45 minutes in caring for this patient on the day of the visit/encounter. >30 minutes of time was spent on: Diagnostic results, Prognosis, Risks and benefits of tx options, Instructions for management, Patient and family education, Importance of tx compliance, Risk factor reductions, Impressions, Counseling / Coordination of care, Documenting in the medical record, Reviewing / ordering tests, medicine, procedures  , and Communicating with other healthcare professionals .    **Please Note: This note may have been constructed using a voice recognition system**

## 2025-02-13 NOTE — ASSESSMENT & PLAN NOTE
Lab Results   Component Value Date    EGFR 71 02/13/2025    EGFR 76 02/12/2025    EGFR 85 02/11/2025    CREATININE 0.80 02/13/2025    CREATININE 0.76 02/12/2025    CREATININE 0.68 02/11/2025     Creatinine currently stable at baseline 0.8  Monitor creatinine

## 2025-02-13 NOTE — CASE MANAGEMENT
Case Management Assessment & Discharge Planning Note    Patient name Josee ROGERS /S -01 MRN [id number]  : 1950 Date 2025       Current Admission Date: 2/10/2025  Current Admission Diagnosis:Ambulatory dysfunction   Patient Active Problem List    Diagnosis Date Noted Date Diagnosed    Ambulatory dysfunction 02/10/2025     COVID-19 02/10/2025     De Quervain's tenosynovitis 2025     Numbness and tingling 2025     Hand arthritis 2025     DRUJ (distal radioulnar joint) arthrosis, primary, right 2025     Primary osteoarthritis of first carpometacarpal joint of right hand 2025     Arthritis of left knee 2024     Patellofemoral disorder of right knee 2024     Cervical myelopathy (HCC) 2024     Impairment of balance 2024     Closed fracture of shaft of left fibula 2024     Chronic pain of left knee 2024     Depression, recurrent (HCC) 2023     Episode of recurrent major depressive disorder, unspecified depression episode severity (HCC) 2023     Irritable bowel syndrome with diarrhea 2023     Dysphagia 2023     Hemorrhoids 2023     Schatzki's ring 2023     Chronic anticoagulation 10/26/2022     Olecranon bursitis of right elbow 2022     Transaminitis 2022     Hyperlipidemia 2022     Sleep apnea 2022     Paroxysmal atrial fibrillation (HCC) 2022     Cellulitis 2022     CKD (chronic kidney disease) stage 2, GFR 60-89 ml/min 2022     Other specified hypothyroidism 2022     Gastroesophageal reflux disease without esophagitis 12/15/2021     Anxiety 12/15/2021     Primary hypertension 12/15/2021     Cervical vertebral fusion 06/10/2021     Pre-op examination 2021     Occipital neuralgia of left side      Spondylosis of cervical region without myelopathy or radiculopathy      Neck pain 2020       LOS (days): 2  Geometric Mean LOS  (GMLOS) (days): 2.5  Days to GMLOS:0.8     OBJECTIVE:    Risk of Unplanned Readmission Score: 10.42         Current admission status: Inpatient       Preferred Pharmacy:   [id number] [place], [place] - [address] [facility]  [address]  [facility] [place] [address]  Phone: [phone] Fax: [phone]    Primary Care Provider: Valarie Gonzales MD    Primary Insurance: MEDICARE  Secondary Insurance: [employer] [place]    ASSESSMENT:  Active Health Care Proxies       Ginny Aldana [facility] Care Representative - Sister   Primary Phone: [phone] (Home)                           Readmission Root Cause  30 Day Readmission: No    Patient Information  Admitted from:: Home  Mental Status: Alert  During Assessment patient was accompanied by: Not accompanied during assessment  Assessment information provided by:: Patient  Primary Caregiver: Self  Support Systems: Family members  County of Residence: [place]  What city do you live in?: [place]  Home entry access options. Select all that apply.: Stairs  Number of steps to enter home.: 1  Type of Current Residence: [address] [place] home  Upon entering residence, is there a bedroom on the main floor (no further steps)?: Yes  Upon entering residence, is there a bathroom on the main floor (no further steps)?: Yes  Living Arrangements: Lives w/ Children    Activities of Daily Living Prior to Admission  Functional Status: Independent  Completes ADLs independently?: Yes  Does patient use assisted devices?: Yes  Assisted Devices (DME) used: Walker, Straight Cane  Does patient currently own DME?: Yes  What DME does the patient currently own?: Straight Cane, Walker, Bedside Commode  Does patient have a history of Outpatient Therapy (PT/OT)?: Yes  Does the patient have a history of Short-Term Rehab?: No  Does patient have a history of HHC?: No  Does patient currently have HHC?: No         Patient Information Continued  Income Source:  Pension/intermediate  Does patient have prescription coverage?: Yes  Does patient receive dialysis treatments?: No  Does patient have a history of substance abuse?: No  Does patient have a history of Mental Health Diagnosis?: No         Means of Transportation  Means of Transport to App:: Drives Self      DISCHARGE DETAILS:    Discharge planning discussed with:: Patient at bedside  Freedom of Choice: Yes     CM contacted family/caregiver?: Yes  Were Treatment Team discharge recommendations reviewed with patient/caregiver?: Yes          Contacts  Reason/Outcome: Discharge Planning, Continuity of Care        Other Referral/Resources/Interventions Provided:  Referral Comments: Pt is rec'd for level 3 reahb. Pt would like to continue with OP Therpay.    Pt resides in a 2sh, 1 NICHOL.  Pt is the sole caretaker for her adult daughter who resides with her. Pt states that she is independent w/ adls, drives, uses a can or RW at baseline. Pt would like to continue with OP Therapy vs HHC.  Pt will need  van transport home.

## 2025-02-13 NOTE — CASE MANAGEMENT
Case Management Discharge Planning Note    Patient name Josee ROGERS /S -01 MRN [id number]  : 1950 Date 2025       Current Admission Date: 2/10/2025  Current Admission Diagnosis:Ambulatory dysfunction   Patient Active Problem List    Diagnosis Date Noted Date Diagnosed    Ambulatory dysfunction 02/10/2025     COVID-19 02/10/2025     De Quervain's tenosynovitis 2025     Numbness and tingling 2025     Hand arthritis 2025     DRUJ (distal radioulnar joint) arthrosis, primary, right 2025     Primary osteoarthritis of first carpometacarpal joint of right hand 2025     Arthritis of left knee 2024     Patellofemoral disorder of right knee 2024     Cervical myelopathy (HCC) 2024     Impairment of balance 2024     Closed fracture of shaft of left fibula 2024     Chronic pain of left knee 2024     Depression, recurrent (HCC) 2023     Episode of recurrent major depressive disorder, unspecified depression episode severity (HCC) 2023     Irritable bowel syndrome with diarrhea 2023     Dysphagia 2023     Hemorrhoids 2023     Schatzki's ring 2023     Chronic anticoagulation 10/26/2022     Olecranon bursitis of right elbow 2022     Transaminitis 2022     Hyperlipidemia 2022     Sleep apnea 2022     Paroxysmal atrial fibrillation (HCC) 2022     Cellulitis 2022     CKD (chronic kidney disease) stage 2, GFR 60-89 ml/min 2022     Other specified hypothyroidism 2022     Gastroesophageal reflux disease without esophagitis 12/15/2021     Anxiety 12/15/2021     Primary hypertension 12/15/2021     Cervical vertebral fusion 06/10/2021     Pre-op examination 2021     Occipital neuralgia of left side      Spondylosis of cervical region without myelopathy or radiculopathy      Neck pain 2020       LOS (days): 2  Geometric Mean LOS (GMLOS)  (days): 2.5  Days to GMLOS:0.8     OBJECTIVE:  Risk of Unplanned Readmission Score: 10.42         Current admission status: Inpatient   Preferred Pharmacy:   [id number] - [facility], [place] - [address]  [address] [facility] [place] [address]  Phone: [phone] Fax: [phone]    Primary Care Provider: Valarie Gonzales MD    Primary Insurance: MEDICARE  Secondary Insurance: [place] [place]    DISCHARGE DETAILS:    Discharge planning discussed with:: Patient at bedside  Freedom of Choice: Yes     CM contacted family/caregiver?: Yes  Were Treatment Team discharge recommendations reviewed with patient/caregiver?: Yes          Contacts  Reason/Outcome: Discharge Planning, Continuity of Care        Other Referral/Resources/Interventions Provided:  Referral Comments: Pt is rec'd for level 3 reahb. Pt would like to continue with OP Therpay.    Pt resides in a 2 sh, 1 marti with      Pt resides in a 2sh, 1 MARTI.  Pt is the sole caretaker for her adult daughter who resides with her. Pt states that she is independent w/ adls, drives, uses a can or RW at baseline. Pt would like to continue with OP Therapy vs HHC.  Pt will need  van transport home.

## 2025-02-13 NOTE — DISCHARGE INSTR - AVS FIRST PAGE
Dear Josee Vaca,     It was our pleasure to care for you here at [facility].  It is our hope that we were always able to exceed the expected standards for your care during your stay.  You were hospitalized due to weakness.  You were cared for on the third floor floor under the service of Ora Hopkins MD with the [facility] who covers for your primary care physician (PCP), Valarie Gonzales MD, while you were hospitalized.  If you have any questions or concerns related to this hospitalization, you may contact us at .  For follow up as well as medication refills, we recommend that you follow up with your primary care physician.  A registered nurse will reach out to you by phone within a few days after your discharge to answer any additional questions that you may have after going home.  However, at this time we provide for you here, the most important instructions / recommendations at discharge:     Notable Medication Adjustments -   You should stop taking Tylenol  Testing Required after Discharge -   You need to have your liver function test checked in 3 to 5 days following your discharge  Follow-up with your primary care doctor you can call them today to schedule an appointment in 5 days time and asked them to order the lab work that is needed  I have referred you to gastroenterology as well for your abnormal liver function tests  ** Please contact your PCP to request testing orders for any of the testing recommended here **  Important follow up information -   If you experience worsening weakness, nausea vomiting, fevers chills, change in your skin color, abdominal pain, chest pain, difficulty breathing please come back into hospital or seek urgent medical attention.  Other Instructions -   As above  Please review this entire after visit summary as additional general instructions including medication list, appointments, activity, diet,  any pertinent wound care, and other additional recommendations from your care team that may be provided for you.      Sincerely,     Ora Hopkins MD

## 2025-02-13 NOTE — CASE MANAGEMENT
Case Management Discharge Planning Note    Patient name Josee ROGERS /S -01 MRN [id number]  : 1950 Date 2025       Current Admission Date: 2/10/2025  Current Admission Diagnosis:Ambulatory dysfunction   Patient Active Problem List    Diagnosis Date Noted Date Diagnosed    Ambulatory dysfunction 02/10/2025     COVID-19 02/10/2025     De Quervain's tenosynovitis 2025     Numbness and tingling 2025     Hand arthritis 2025     DRUJ (distal radioulnar joint) arthrosis, primary, right 2025     Primary osteoarthritis of first carpometacarpal joint of right hand 2025     Arthritis of left knee 2024     Patellofemoral disorder of right knee 2024     Cervical myelopathy (HCC) 2024     Impairment of balance 2024     Closed fracture of shaft of left fibula 2024     Chronic pain of left knee 2024     Depression, recurrent (HCC) 2023     Episode of recurrent major depressive disorder, unspecified depression episode severity (HCC) 2023     Irritable bowel syndrome with diarrhea 2023     Dysphagia 2023     Hemorrhoids 2023     Schatzki's ring 2023     Chronic anticoagulation 10/26/2022     Olecranon bursitis of right elbow 2022     Transaminitis 2022     Hyperlipidemia 2022     Sleep apnea 2022     Paroxysmal atrial fibrillation (HCC) 2022     Cellulitis 2022     CKD (chronic kidney disease) stage 2, GFR 60-89 ml/min 2022     Other specified hypothyroidism 2022     Gastroesophageal reflux disease without esophagitis 12/15/2021     Anxiety 12/15/2021     Primary hypertension 12/15/2021     Cervical vertebral fusion 06/10/2021     Pre-op examination 2021     Occipital neuralgia of left side      Spondylosis of cervical region without myelopathy or radiculopathy      Neck pain 2020       LOS (days): 2  Geometric Mean LOS (GMLOS)  (days): 2.5  Days to GMLOS:0.7     OBJECTIVE:  Risk of Unplanned Readmission Score: 10.44         Current admission status: Inpatient   Preferred Pharmacy:   [id number] - [place], [place] - [address] [facility]  [address]  [facility] [place] [address]  Phone: [phone] Fax: [phone]    Primary Care Provider: Valarie Gonzales MD    Primary Insurance: MEDICARE  Secondary Insurance: [employer] [place]    DISCHARGE DETAILS:    Discharge planning discussed with:: Patient at bedside  Freedom of Choice: Yes  Comments - Freedom of Choice: Home     Were Treatment Team discharge recommendations reviewed with patient/caregiver?: Yes  Did patient/caregiver verbalize understanding of patient care needs?: Yes  Were patient/caregiver advised of the risks associated with not following Treatment Team discharge recommendations?: Yes    Contacts  Patient Contacts: Patient  Contact Method: In Person  Reason/Outcome: Continuity of Care, Discharge Planning    Requested Home Health Care         Is the patient interested in HHC at discharge?: No    DME Referral Provided  Referral made for DME?: No    Other Referral/Resources/Interventions Provided:  Interventions: Transportation  Referral Comments: CM requested a 3:30pm WCV p/u via [employer] for pt to return home today. CM notified provider, primary nurse, and patient of same.    Would you like to participate in our [facility] service program?  : No - Declined    Treatment Team Recommendation: Home, Outpatient Rehab  Discharge Destination Plan:: Home, Outpatient Rehab  Transport at Discharge : Wheelchair van     Number/Name of Dispatcher: [employer] [phone]  Transported by (Company and Unit #): [id number]  ETA of Transport (Date): 02/13/25  ETA of Transport (Time): 0815

## 2025-02-13 NOTE — PLAN OF CARE
Problem: PAIN - ADULT  Goal: Verbalizes/displays adequate comfort level or baseline comfort level  Description: Interventions:  - Encourage patient to monitor pain and request assistance  - Assess pain using appropriate pain scale  - Administer analgesics based on type and severity of pain and evaluate response  - Implement non-pharmacological measures as appropriate and evaluate response  - Consider cultural and social influences on pain and pain management  - Notify physician/advanced practitioner if interventions unsuccessful or patient reports new pain  Outcome: Progressing     Problem: INFECTION - ADULT  Goal: Absence or prevention of progression during hospitalization  Description: INTERVENTIONS:  - Assess and monitor for signs and symptoms of infection  - Monitor lab/diagnostic results  - Monitor all insertion sites, i.e. indwelling lines, tubes, and drains  - Monitor endotracheal if appropriate and nasal secretions for changes in amount and color  - [place] appropriate cooling/warming therapies per order  - Administer medications as ordered  - Instruct and encourage patient and family to use good hand hygiene technique  - Identify and instruct in appropriate isolation precautions for identified infection/condition  Outcome: Progressing  Goal: Absence of fever/infection during neutropenic period  Description: INTERVENTIONS:  - Monitor WBC    Outcome: Progressing     Problem: SAFETY ADULT  Goal: Patient will remain free of falls  Description: INTERVENTIONS:  - Educate patient/family on patient safety including physical limitations  - Instruct patient to call for assistance with activity   - Consult OT/PT to assist with strengthening/mobility   - Keep Call bell within reach  - Keep bed low and locked with side rails adjusted as appropriate  - Keep care items and personal belongings within reach  - Initiate and maintain comfort rounds  - Make Fall Risk Sign visible to staff  - Offer Toileting every 2 Hours,  in advance of need  - Initiate/Maintain alarm  - Obtain necessary fall risk management equipment:   - Apply yellow socks and bracelet for high fall risk patients  - Consider moving patient to room near nurses station  Outcome: Progressing  Goal: Maintain or return to baseline ADL function  Description: INTERVENTIONS:  -  Assess patient's ability to carry out ADLs; assess patient's baseline for ADL function and identify physical deficits which impact ability to perform ADLs (bathing, care of mouth/teeth, toileting, grooming, dressing, etc.)  - Assess/evaluate cause of self-care deficits   - Assess range of motion  - Assess patient's mobility; develop plan if impaired  - Assess patient's need for assistive devices and provide as appropriate  - Encourage maximum independence but intervene and supervise when necessary  - Involve family in performance of ADLs  - Assess for home care needs following discharge   - Consider OT consult to assist with ADL evaluation and planning for discharge  - Provide patient education as appropriate  Outcome: Progressing  Goal: Maintains/Returns to pre admission functional level  Description: INTERVENTIONS:  - Perform AM-PAC 6 Click Basic Mobility/ Daily Activity assessment daily.  - Set and communicate daily mobility goal to care team and patient/family/caregiver.   - Collaborate with rehabilitation services on mobility goals if consulted  - Perform Range of Motion 2 times a day.  - Reposition patient every 2 hours.  - Dangle patient 2 times a day  - Stand patient 2 times a day  - Ambulate patient 2 times a day  - Out of bed to chair 2 times a day   - Out of bed for meals 2 times a day  - Out of bed for toileting  - Record patient progress and toleration of activity level   Outcome: Progressing     Problem: DISCHARGE PLANNING  Goal: Discharge to home or other facility with appropriate resources  Description: INTERVENTIONS:  - Identify barriers to discharge w/patient and caregiver  -  Arrange for needed discharge resources and transportation as appropriate  - Identify discharge learning needs (meds, wound care, etc.)  - Arrange for interpretive services to assist at discharge as needed  - Refer to Case Management Department for coordinating discharge planning if the patient needs post-hospital services based on physician/advanced practitioner order or complex needs related to functional status, cognitive ability, or social support system  Outcome: Progressing     Problem: Knowledge Deficit  Goal: Patient/family/caregiver demonstrates understanding of disease process, treatment plan, medications, and discharge instructions  Description: Complete learning assessment and assess knowledge base.  Interventions:  - Provide teaching at level of understanding  - Provide teaching via preferred learning methods  Outcome: Progressing

## 2025-02-14 ENCOUNTER — APPOINTMENT (EMERGENCY)
Dept: CT IMAGING | Facility: HOSPITAL | Age: 75
End: 2025-02-14
Payer: MEDICARE

## 2025-02-14 ENCOUNTER — TELEPHONE (OUTPATIENT)
Age: 75
End: 2025-02-14

## 2025-02-14 ENCOUNTER — HOSPITAL ENCOUNTER (INPATIENT)
Facility: HOSPITAL | Age: 75
LOS: 3 days | Discharge: NURSING, REHAB OR LONG-TERM CARE | End: 2025-02-18
Attending: EMERGENCY MEDICINE | Admitting: INTERNAL MEDICINE
Payer: MEDICARE

## 2025-02-14 DIAGNOSIS — R82.71 ASYMPTOMATIC BACTERIURIA: ICD-10-CM

## 2025-02-14 DIAGNOSIS — G89.29 CHRONIC PAIN OF LEFT KNEE: ICD-10-CM

## 2025-02-14 DIAGNOSIS — F41.9 ANXIETY: ICD-10-CM
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
## 2025-02-14 DIAGNOSIS — E03.9 HYPOTHYROID: ICD-10-CM

## 2025-02-14 DIAGNOSIS — R26.2 AMBULATORY DYSFUNCTION: ICD-10-CM

## 2025-02-14 DIAGNOSIS — M25.562 CHRONIC PAIN OF LEFT KNEE: ICD-10-CM

## 2025-02-14 DIAGNOSIS — I48.91 ATRIAL FIBRILLATION WITH RVR (HCC): ICD-10-CM

## 2025-02-14 DIAGNOSIS — R53.1 GENERALIZED WEAKNESS: Primary | ICD-10-CM

## 2025-02-14 DIAGNOSIS — M62.82 RHABDOMYOLYSIS: ICD-10-CM

## 2025-02-14 LAB
2HR DELTA HS TROPONIN: 0 NG/L
4HR DELTA HS TROPONIN: 1 NG/L
ANION GAP SERPL CALCULATED.3IONS-SCNC: 8 MMOL/L (ref 4–13)
BACTERIA UR QL AUTO: ABNORMAL /HPF
BASOPHILS # BLD MANUAL: 0 THOUSAND/UL (ref 0–0.1)
BASOPHILS NFR MAR MANUAL: 0 % (ref 0–1)
BILIRUB UR QL STRIP: NEGATIVE
BUN SERPL-MCNC: 15 MG/DL (ref 5–25)
CALCIUM SERPL-MCNC: 8.8 MG/DL (ref 8.4–10.2)
CARDIAC TROPONIN I PNL SERPL HS: 11 NG/L (ref ?–50)
CARDIAC TROPONIN I PNL SERPL HS: 11 NG/L (ref ?–50)
CARDIAC TROPONIN I PNL SERPL HS: 12 NG/L (ref ?–50)
CHLORIDE SERPL-SCNC: 96 MMOL/L (ref 96–108)
CK SERPL-CCNC: 848 U/L (ref 26–192)
CLARITY UR: CLEAR
CO2 SERPL-SCNC: 29 MMOL/L (ref 21–32)
COLOR UR: YELLOW
CREAT SERPL-MCNC: 0.72 MG/DL (ref 0.6–1.3)
EOSINOPHIL # BLD MANUAL: 0 THOUSAND/UL (ref 0–0.4)
EOSINOPHIL NFR BLD MANUAL: 0 % (ref 0–6)
ERYTHROCYTE [DISTWIDTH] IN BLOOD BY AUTOMATED COUNT: 12.7 % (ref 11.6–15.1)
GFR SERPL CREATININE-BSD FRML MDRD: 81 ML/MIN/1.73SQ M
GLUCOSE SERPL-MCNC: 152 MG/DL (ref 65–140)
GLUCOSE UR STRIP-MCNC: NEGATIVE MG/DL
HCT VFR BLD AUTO: 39.9 % (ref 34.8–46.1)
HGB BLD-MCNC: 13.3 G/DL (ref 11.5–15.4)
HGB UR QL STRIP.AUTO: ABNORMAL
INR PPP: 3.21 (ref 0.85–1.19)
KETONES UR STRIP-MCNC: NEGATIVE MG/DL
LEUKOCYTE ESTERASE UR QL STRIP: ABNORMAL
LYMPHOCYTES # BLD AUTO: 0.62 THOUSAND/UL (ref 0.6–4.47)
LYMPHOCYTES # BLD AUTO: 10 % (ref 14–44)
MAGNESIUM SERPL-MCNC: 1.7 MG/DL (ref 1.9–2.7)
MCH RBC QN AUTO: 30.3 PG (ref 26.8–34.3)
MCHC RBC AUTO-ENTMCNC: 33.3 G/DL (ref 31.4–37.4)
MCV RBC AUTO: 91 FL (ref 82–98)
MONOCYTES # BLD AUTO: 0.34 THOUSAND/UL (ref 0–1.22)
MONOCYTES NFR BLD: 6 % (ref 4–12)
NEUTROPHILS # BLD MANUAL: 4.66 THOUSAND/UL (ref 1.85–7.62)
NEUTS BAND NFR BLD MANUAL: 10 % (ref 0–8)
NEUTS SEG NFR BLD AUTO: 73 % (ref 43–75)
NITRITE UR QL STRIP: NEGATIVE
NON-SQ EPI CELLS URNS QL MICRO: ABNORMAL /HPF
OTHER STN SPEC: ABNORMAL
PH UR STRIP.AUTO: 6 [PH]
PLATELET # BLD AUTO: 249 THOUSANDS/UL (ref 149–390)
PLATELET BLD QL SMEAR: ADEQUATE
PLATELET CLUMP BLD QL SMEAR: PRESENT
PMV BLD AUTO: 9.9 FL (ref 8.9–12.7)
POTASSIUM SERPL-SCNC: 3.6 MMOL/L (ref 3.5–5.3)
PROT UR STRIP-MCNC: NEGATIVE MG/DL
PROTHROMBIN TIME: 32.7 SECONDS (ref 12.3–15)
RBC # BLD AUTO: 4.39 MILLION/UL (ref 3.81–5.12)
RBC #/AREA URNS AUTO: ABNORMAL /HPF
RBC MORPH BLD: PRESENT
SODIUM SERPL-SCNC: 133 MMOL/L (ref 135–147)
SP GR UR STRIP.AUTO: 1.01 (ref 1–1.03)
T4 FREE SERPL-MCNC: 0.96 NG/DL (ref 0.61–1.12)
TOXIC GRANULES BLD QL SMEAR: PRESENT
TSH SERPL DL<=0.05 MIU/L-ACNC: 8.62 UIU/ML (ref 0.45–4.5)
UROBILINOGEN UR QL STRIP.AUTO: 0.2 E.U./DL
VARIANT LYMPHS # BLD AUTO: 1 %
WBC # BLD AUTO: 5.61 THOUSAND/UL (ref 4.31–10.16)
WBC #/AREA URNS AUTO: ABNORMAL /HPF

## 2025-02-14 PROCEDURE — 74176 CT ABD & PELVIS W/O CONTRAST: CPT

## 2025-02-14 PROCEDURE — 85007 BL SMEAR W/DIFF WBC COUNT: CPT

## 2025-02-14 PROCEDURE — 85027 COMPLETE CBC AUTOMATED: CPT

## 2025-02-14 PROCEDURE — 80048 BASIC METABOLIC PNL TOTAL CA: CPT

## 2025-02-14 PROCEDURE — 84439 ASSAY OF FREE THYROXINE: CPT

## 2025-02-14 PROCEDURE — 36415 COLL VENOUS BLD VENIPUNCTURE: CPT

## 2025-02-14 PROCEDURE — 84443 ASSAY THYROID STIM HORMONE: CPT

## 2025-02-14 PROCEDURE — 70450 CT HEAD/BRAIN W/O DYE: CPT

## 2025-02-14 PROCEDURE — 99223 1ST HOSP IP/OBS HIGH 75: CPT

## 2025-02-14 PROCEDURE — 93005 ELECTROCARDIOGRAM TRACING: CPT

## 2025-02-14 PROCEDURE — 96374 THER/PROPH/DIAG INJ IV PUSH: CPT

## 2025-02-14 PROCEDURE — 81003 URINALYSIS AUTO W/O SCOPE: CPT

## 2025-02-14 PROCEDURE — 84484 ASSAY OF TROPONIN QUANT: CPT

## 2025-02-14 PROCEDURE — 83735 ASSAY OF MAGNESIUM: CPT

## 2025-02-14 PROCEDURE — 99284 EMERGENCY DEPT VISIT MOD MDM: CPT

## 2025-02-14 PROCEDURE — 85610 PROTHROMBIN TIME: CPT

## 2025-02-14 PROCEDURE — 99285 EMERGENCY DEPT VISIT HI MDM: CPT | Performed by: EMERGENCY MEDICINE

## 2025-02-14 PROCEDURE — 82550 ASSAY OF CK (CPK): CPT

## 2025-02-14 PROCEDURE — 81001 URINALYSIS AUTO W/SCOPE: CPT

## 2025-02-14 RX ORDER — ATORVASTATIN CALCIUM 10 MG/1
10 TABLET, FILM COATED ORAL
Status: DISCONTINUED | OUTPATIENT
Start: 2025-02-14 | End: 2025-02-18 | Stop reason: HOSPADM

## 2025-02-14 RX ORDER — WARFARIN SODIUM 2 MG/1
2 TABLET ORAL
Status: DISCONTINUED | OUTPATIENT
Start: 2025-02-14 | End: 2025-02-18 | Stop reason: HOSPADM

## 2025-02-14 RX ORDER — PANTOPRAZOLE SODIUM 40 MG/1
40 TABLET, DELAYED RELEASE ORAL
Status: DISCONTINUED | OUTPATIENT
Start: 2025-02-15 | End: 2025-02-18 | Stop reason: HOSPADM

## 2025-02-14 RX ORDER — SODIUM CHLORIDE 9 MG/ML
100 INJECTION, SOLUTION INTRAVENOUS CONTINUOUS
Status: DISCONTINUED | OUTPATIENT
Start: 2025-02-14 | End: 2025-02-14

## 2025-02-14 RX ORDER — TIZANIDINE 2 MG/1
2 TABLET ORAL EVERY 8 HOURS PRN
Status: DISCONTINUED | OUTPATIENT
Start: 2025-02-14 | End: 2025-02-18 | Stop reason: HOSPADM

## 2025-02-14 RX ORDER — ENOXAPARIN SODIUM 100 MG/ML
40 INJECTION SUBCUTANEOUS DAILY
Status: DISCONTINUED | OUTPATIENT
Start: 2025-02-15 | End: 2025-02-14

## 2025-02-14 RX ORDER — COLESTIPOL HYDROCHLORIDE 1 G/1
2 TABLET ORAL DAILY
Status: DISCONTINUED | OUTPATIENT
Start: 2025-02-14 | End: 2025-02-14

## 2025-02-14 RX ORDER — DESVENLAFAXINE 50 MG/1
50 TABLET, FILM COATED, EXTENDED RELEASE ORAL DAILY
Status: DISCONTINUED | OUTPATIENT
Start: 2025-02-15 | End: 2025-02-18 | Stop reason: HOSPADM

## 2025-02-14 RX ORDER — LEVOTHYROXINE SODIUM 75 UG/1
75 TABLET ORAL DAILY
Status: DISCONTINUED | OUTPATIENT
Start: 2025-02-15 | End: 2025-02-18 | Stop reason: HOSPADM

## 2025-02-14 RX ORDER — MAGNESIUM SULFATE 1 G/100ML
1 INJECTION INTRAVENOUS ONCE
Status: COMPLETED | OUTPATIENT
Start: 2025-02-14 | End: 2025-02-14

## 2025-02-14 RX ORDER — ALPRAZOLAM 0.5 MG
1 TABLET ORAL 2 TIMES DAILY PRN
Status: DISCONTINUED | OUTPATIENT
Start: 2025-02-14 | End: 2025-02-18 | Stop reason: HOSPADM

## 2025-02-14 RX ORDER — SODIUM CHLORIDE, SODIUM GLUCONATE, SODIUM ACETATE, POTASSIUM CHLORIDE, MAGNESIUM CHLORIDE, SODIUM PHOSPHATE, DIBASIC, AND POTASSIUM PHOSPHATE .53; .5; .37; .037; .03; .012; .00082 G/100ML; G/100ML; G/100ML; G/100ML; G/100ML; G/100ML; G/100ML
125 INJECTION, SOLUTION INTRAVENOUS CONTINUOUS
Status: DISPENSED | OUTPATIENT
Start: 2025-02-14 | End: 2025-02-15

## 2025-02-14 RX ADMIN — SODIUM CHLORIDE 100 ML/HR: 0.9 INJECTION, SOLUTION INTRAVENOUS at 19:43

## 2025-02-14 RX ADMIN — MAGNESIUM SULFATE HEPTAHYDRATE 1 G: 1 INJECTION, SOLUTION INTRAVENOUS at 19:39

## 2025-02-14 RX ADMIN — SODIUM CHLORIDE, SODIUM GLUCONATE, SODIUM ACETATE, POTASSIUM CHLORIDE, MAGNESIUM CHLORIDE, SODIUM PHOSPHATE, DIBASIC, AND POTASSIUM PHOSPHATE 125 ML/HR: .53; .5; .37; .037; .03; .012; .00082 INJECTION, SOLUTION INTRAVENOUS at 21:01

## 2025-02-14 RX ADMIN — ATORVASTATIN CALCIUM 10 MG: 10 TABLET, FILM COATED ORAL at 21:03

## 2025-02-14 RX ADMIN — METOPROLOL TARTRATE 12.5 MG: 25 TABLET, FILM COATED ORAL at 21:03

## 2025-02-14 NOTE — TELEPHONE ENCOUNTER
Pt stated that she came home from hospital last night has covid, feels weak, resting on the  floor but not in pain and needs an at home physical therapist.     Pt was told to call an ambulance to help her get up. However, pt declined.     Please contact pt to arrange assistance for physical therapy. Thank you for your kind assistance.

## 2025-02-14 NOTE — ED PROVIDER NOTES
Time reflects when diagnosis was documented in both MDM as applicable and the Disposition within this note       Time User Action Codes Description Comment    2/14/2025  5:47 PM Christiano Fernandez [R53.1] Generalized weakness     2/14/2025  7:32 PM Christiano Fernandez [M62.82] Rhabdomyolysis     2/14/2025  7:32 PM Christiano Fernandez [E03.9] Hypothyroid     2/14/2025  7:32 PM Christiano Fernandez [W19.XXXA] Fall, initial encounter     2/14/2025  7:35 PM Christiano Fernandez [R82.71] Asymptomatic bacteriuria           ED Disposition       ED Disposition   Admit    Condition   Stable    Date/Time   Fri Feb 14, 2025  7:39 PM    Comment   Case was discussed with Dr. Betancourt and the patient's admission status was agreed to be Admission Status: observation status to the service of Dr. Lei Betancourt .               Assessment & Plan       Medical Decision Making  74-year-old who is presenting from EMS 2/2 fall and downtime of roughly 8 hours.  Denying head strike or loss of consciousness.  Differential includes mechanical fall, ambulatory dysfunction, age-related atrophy.  EKG shows no sign of ischemic changes compared to prior.  CBC unremarkable. BMP shows pseudohyponatremia with sodium of 133 and glucose of 152 corrected sodium is within normal limits. Magnesium 1.7 repleted with magnesium sulfate. INR mildly elevated at 3.2. UA remarkable for 1+ leukocytes and 1+ occult blood, nitrate negative.  Urine microscopic notable for 4-10 WBCs and occasional bacteria. Patient denying dysuria. CK elevated 848.  0 troponin of 11.  CT head as well as CT abdomen pelvis pending official read.  Plan to admit for rhabdomyolysis and generalized weakness.    Amount and/or Complexity of Data Reviewed  Labs: ordered.  Radiology: ordered.    Risk  Prescription drug management.  Decision regarding hospitalization.           Medications   magnesium sulfate IVPB (premix) SOLN 1 g (1 g Intravenous New Bag 2/14/25 1939)   sodium chloride 0.9 % infusion (has no  administration in time range)       ED Risk Strat Scores                    History of Present Illness       Chief Complaint   Patient presents with    Difficulty Walking     Patient brought in by EMS from home due to inability to get up from the floor this AM. Pt reports sliding to ground and when she went to get up she didn't feel strong enough. Pt was on the ground since this AM, but was able to crawl around. Per EMS, pt requesting to see case management to arrange PT at home. +COVID on Tuesday. Pt c/o generalized body aches. -Headstrike +Warfarin     Past Medical History:   Diagnosis Date    A-fib (HCC)     Anxiety     Arthritis     shoulder    Carpal tunnel syndrome     Colon polyp     Depression     Disease of thyroid gland     Diverticulitis     Esophageal stricture     HBP (high blood pressure)     Hiatal hernia 06/07/2022    Small hiatal hernia    Hyperlipidemia     Hypertension     Hypothyroid     Pneumonia     2017    PONV (postoperative nausea and vomiting)     Schatzki's ring 06/07/2022    Schatzki's ring in the lower esophagus    Sleep apnea     mild       Past Surgical History:   Procedure Laterality Date    APPENDECTOMY      BREAST BIOPSY Right     benign    BREAST CYST ASPIRATION Right     benign    BREAST SURGERY      milk duct removed    CATARACT EXTRACTION Bilateral     CATARACT EXTRACTION, BILATERAL  2019    CERVICAL FUSION N/A 05/25/2021    Procedure: FUSION CERVICAL POSTERIOR C1-C2 with allograft and neuromonitoring);  Surgeon: Wesley Camacho MD;  Location: [facility];  Service: Orthopedics    CHOLECYSTECTOMY      COLONOSCOPY  06/07/2022    COLONOSCOPY W/ BIOPSIES  2011    EGD  06/07/2022    HAND SURGERY Right     MAMMO (HISTORICAL) Bilateral 11/27/2018    MAMMO (HISTORICAL) Bilateral 10/27/2017    MANDIBLE SURGERY      MANDIBLE SURGERY      SC SURGICAL ARTHROSCOPY SHOULDER W/ROTATOR CUFF RPR Right 09/27/2022    Procedure: ARTHROSCOPIC ROTATOR CUFF REPAIR OF SUBSCAPULARIS BICEPS TENODESIS  "EXTENSIVE DEBRIDEMENT;  Surgeon: Tony Rose MD;  Location: [facility] OR;  Service: Orthopedics    WISDOM TOOTH EXTRACTION      X4      Family History   Problem Relation Age of Onset    Heart disease Mother     Hypertension Mother     Hyperlipidemia Mother     Heart failure Mother     Hypertension Sister       Social History     Tobacco Use    Smoking status: Former     Current packs/day: 0.00     Types: Cigarettes     Quit date:      Years since quittin.1    Smokeless tobacco: Never   Vaping Use    Vaping status: Never Used   Substance Use Topics    Alcohol use: Not Currently     Comment: last drink 7 years ago    Drug use: Not Currently      E-Cigarette/Vaping    E-Cigarette Use Never User       E-Cigarette/Vaping Substances    Nicotine No     THC No     CBD No     Flavoring No     Other No     Unknown No       I have reviewed and agree with the history as documented.     74-year-old female who was brought in by EMS after a fall at home.  Patient was diagnosed with COVID roughly 4 days ago and at that time had a similar fall.  Patient states that since then she has had generalized weakness, body aches, decreased appetite.  She endorses waking up this morning and attempting to get out of bed when she \"slid\" down the side of her bed and fell onto her butt and low back.  Patient denying head strike.  Patient denying any tenderness from the fall.  She is endorsing generalized muscle tenderness which she attributes to attempting to stand and from receiving help from her daughter to stand.  Patient lives at home with her daughter who has intellectual disability and has been on the ground since roughly 8 am.  The patient and her daughter were unable to get her onto her feet.  She called her primary care physician who recommended calling emergency services.  Of note patient does take warfarin for A-fib.  She is denying chest pain, shortness of breath, changes in urination, changes in bowel.  She is " denying current fevers, chills, nausea, vomiting, diarrhea though she does endorse these symptoms earlier in the week.        Review of Systems   All other systems reviewed and are negative.      Objective       ED Triage Vitals [02/14/25 1709]   Temperature Pulse Blood Pressure Respirations SpO2 Patient Position - Orthostatic VS   98.9 °F (37.2 °C) 77 120/64 18 98 % Lying      Temp Source Heart Rate Source BP Location FiO2 (%) Pain Score    Oral Monitor Right arm -- 10 - Worst Possible Pain      Vitals      Date and Time Temp Pulse SpO2 Resp BP Pain Score FACES Pain Rating User   02/14/25 1940 -- 72 99 % 16 143/70 No Pain -- BHUMIKA   02/14/25 1709 98.9 °F (37.2 °C) 77 98 % 18 120/64 10 - Worst Possible Pain -- DG            Physical Exam  Constitutional:       General: She is not in acute distress.     Appearance: Normal appearance. She is not ill-appearing.   HENT:      Head: Normocephalic and atraumatic.      Nose: Nose normal.      Mouth/Throat:      Mouth: Mucous membranes are moist.      Pharynx: Oropharynx is clear.   Eyes:      Extraocular Movements: Extraocular movements intact.      Conjunctiva/sclera: Conjunctivae normal.      Pupils: Pupils are equal, round, and reactive to light.   Cardiovascular:      Rate and Rhythm: Normal rate and regular rhythm.      Heart sounds: No murmur heard.  Pulmonary:      Effort: Pulmonary effort is normal.      Breath sounds: Normal breath sounds. No wheezing, rhonchi or rales.   Abdominal:      General: Abdomen is flat. Bowel sounds are normal.      Palpations: Abdomen is soft.      Tenderness: There is no abdominal tenderness.   Musculoskeletal:         General: No tenderness (No point tenderness or tenderness to palpation.  Patient endorsing tenderness upon flexion of her abdomen).      Right lower leg: No edema.      Left lower leg: No edema.   Skin:     General: Skin is warm and dry.   Neurological:      General: No focal deficit present.      Mental Status: She is alert  and oriented to person, place, and time.      Cranial Nerves: Cranial nerves 2-12 are intact.      Sensory: Sensation is intact.      Motor: Motor function is intact. No weakness.      Coordination: Coordination is intact.   Psychiatric:         Mood and Affect: Mood normal.         Behavior: Behavior normal.       Results Reviewed       Procedure Component Value Units Date/Time    RBC Morphology Reflex Test [[id number]] Collected: 02/14/25 1748    Lab Status: Final result Specimen: Blood from Arm, Left Updated: 02/14/25 1903    CBC and differential [[id number]]  (Normal) Collected: 02/14/25 1748    Lab Status: Final result Specimen: Blood from Arm, Left Updated: 02/14/25 1852     WBC 5.61 Thousand/uL      RBC 4.39 Million/uL      Hemoglobin 13.3 g/dL      Hematocrit 39.9 %      MCV 91 fL      MCH 30.3 pg      MCHC 33.3 g/dL      RDW 12.7 %      MPV 9.9 fL      Platelets 249 Thousands/uL     Narrative:      This is an appended report.  These results have been appended to a previously verified report.    Manual Differential(PHLEBS Do Not Order) [[id number]]  (Abnormal) Collected: 02/14/25 1748    Lab Status: Final result Specimen: Blood from Arm, Left Updated: 02/14/25 1852     Segmented % 73 %      Bands % 10 %      Lymphocytes % 10 %      Monocytes % 6 %      Eosinophils % 0 %      Basophils % 0 %      Atypical Lymphocytes % 1 %      Absolute Neutrophils 4.66 Thousand/uL      Absolute Lymphocytes 0.62 Thousand/uL      Absolute Monocytes 0.34 Thousand/uL      Absolute Eosinophils 0.00 Thousand/uL      Absolute Basophils 0.00 Thousand/uL      Total Counted --     Toxic Granulation Present     RBC Morphology Present     Platelet Estimate Adequate     Clumped Platelets Present    TSH, 3rd generation with Free T4 reflex [[id number]]  (Abnormal) Collected: 02/14/25 1748    Lab Status: Final result Specimen: Blood from Arm, Left Updated: 02/14/25 1835     TSH 3RD GENERATON 8.615 uIU/mL     T4, free [[id number]] Collected:  02/14/25 1748    Lab Status: In process Specimen: Blood from Arm, Left Updated: 02/14/25 1835    HS Troponin 0hr (reflex protocol) [[id number]]  (Normal) Collected: 02/14/25 1756    Lab Status: Final result Specimen: Blood from Arm, Left Updated: 02/14/25 1831     hs TnI 0hr 11 ng/L     HS Troponin I 2hr [[id number]]     Lab Status: No result Specimen: Blood     Urine Microscopic [[id number]]  (Abnormal) Collected: 02/14/25 1805    Lab Status: Final result Specimen: Urine, Other Updated: 02/14/25 1829     RBC, UA None Seen /hpf      WBC, UA 4-10 /hpf      Epithelial Cells Occasional /hpf      Bacteria, UA Occasional /hpf      OTHER OBSERVATIONS Transitional Epithelial Cells    Basic metabolic panel [[id number]]  (Abnormal) Collected: 02/14/25 1748    Lab Status: Final result Specimen: Blood from Arm, Left Updated: 02/14/25 1822     Sodium 133 mmol/L      Potassium 3.6 mmol/L      Chloride 96 mmol/L      CO2 29 mmol/L      ANION GAP 8 mmol/L      BUN 15 mg/dL      Creatinine 0.72 mg/dL      Glucose 152 mg/dL      Calcium 8.8 mg/dL      eGFR 81 ml/min/1.73sq m     Narrative:      National Kid[employer] guidelines for Chronic Kidney Disease (CKD):     Stage 1 with normal or high GFR (GFR > 90 mL/min/1.73 square meters)    Stage 2 Mild CKD (GFR = 60-89 mL/min/1.73 square meters)    Stage 3A Moderate CKD (GFR = 45-59 mL/min/1.73 square meters)    Stage 3B Moderate CKD (GFR = 30-44 mL/min/1.73 square meters)    Stage 4 Severe CKD (GFR = 15-29 mL/min/1.73 square meters)    Stage 5 End Stage CKD (GFR <15 mL/min/1.73 square meters)  Note: GFR calculation is accurate only with a steady state creatinine    CK [[id number]]  (Abnormal) Collected: 02/14/25 1748    Lab Status: Final result Specimen: Blood from Arm, Left Updated: 02/14/25 1822     Total  U/L     Magnesium [[id number]]  (Abnormal) Collected: 02/14/25 1748    Lab Status: Final result Specimen: Blood from Arm, Left Updated: 02/14/25 1816     Magnesium 1.7  mg/dL     UA (URINE) with reflex to Scope [[id number]]  (Abnormal) Collected: 02/14/25 1805    Lab Status: Final result Specimen: Urine, Other Updated: 02/14/25 1812     Color, UA Yellow     Clarity, UA Clear     Specific Gravity, UA 1.010     pH, UA 6.0     Leukocytes, UA 1+     Nitrite, UA Negative     Protein, UA Negative mg/dl      Glucose, UA Negative mg/dl      Ketones, UA Negative mg/dl      Urobilinogen, UA 0.2 E.U./dl      Bilirubin, UA Negative     Occult Blood, UA 1+    Protime-INR [[id number]]  (Abnormal) Collected: 02/14/25 1748    Lab Status: Final result Specimen: Blood from Arm, Left Updated: 02/14/25 1812     Protime 32.7 seconds      INR 3.21    Narrative:      INR Therapeutic Range    Indication                                             INR Range      Atrial Fibrillation                                               2.0-3.0  Hypercoagulable State                                    2.0.2.3  Left Ventricular Asist Device                            2.0-3.0  Mechanical Heart Valve                                  -    Aortic(with afib, MI, embolism, HF, LA enlargement,    and/or coagulopathy)                                     2.0-3.0 (2.5-3.5)     Mitral                                                             2.5-3.5  Prosthetic/Bioprosthetic Heart Valve               2.0-3.0  Venous thromboembolism (VTE: VT, PE        2.0-3.0            CT abdomen pelvis wo contrast   Final Interpretation by Vitaliy Miranda MD (02/14 1931)      No acute findings in the abdomen or pelvis within the limits of unenhanced technique.      Colonic diverticulosis.      Workstation performed: [id number]         CT head without contrast   Final Interpretation by Vitaliy Miranda MD (02/14 1922)      No acute intracranial abnormality.                  Workstation performed: [id number]           Procedures    ED Medication and Procedure Management   Prior to Admission Medications   Prescriptions Last Dose Informant  Patient Reported? Taking?   ALPRAZolam (XANAX) 0.5 mg tablet   No No   Sig: Take 2 tablets (1 mg total) by mouth 2 (two) times a day as needed for anxiety   Diclofenac Sodium (VOLTAREN) 1 %   No No   Sig: Apply 2 g topically 4 (four) times a day   Multiple Vitamins-Minerals (CENTRUM ADULTS PO)  Self Yes No   Sig: Take 1 tablet by mouth daily   atorvastatin (LIPITOR) 10 mg tablet   No No   Sig: TAKE 1 TABLET(10 MG) BY MOUTH DAILY AT BEDTIME   colestipol (COLESTID) 1 g tablet   No No   Sig: Take 2 tablets (2 g total) by mouth in the morning 2 hours separate from other meds   desvenlafaxine succinate (PRISTIQ) 50 mg 24 hr tablet   No No   Sig: Take 1 tablet (50 mg total) by mouth daily   dicyclomine (BENTYL) 20 mg tablet   No No   Sig: Take 1 tablet (20 mg total) by mouth 2 (two) times a day as needed (ibs)   levothyroxine 75 mcg tablet   No No   Sig: Take 1 tablet (75 mcg total) by mouth daily   metoprolol tartrate (LOPRESSOR) 25 mg tablet   No No   Sig: Take 0.5 tablets (12.5 mg total) by mouth every 12 (twelve) hours   pantoprazole (PROTONIX) 40 mg tablet   No No   Sig: Take 1 tablet (40 mg total) by mouth daily   tiZANidine (ZANAFLEX) 2 mg tablet  Self No No   Sig: Take 1 tablet (2 mg total) by mouth every 8 (eight) hours as needed for muscle spasms   warfarin (COUMADIN) 2 mg tablet   No No   Sig: TAKE 1 AND 1/2 TO 2 TABLETS BY MOUTH DAILY AS DIRECTED BY PRESCRIBER      Facility-Administered Medications: None     Patient's Medications   Discharge Prescriptions    No medications on file     No discharge procedures on file.  ED SEPSIS DOCUMENTATION   Time reflects when diagnosis was documented in both MDM as applicable and the Disposition within this note       Time User Action Codes Description Comment    2/14/2025  5:47 PM Christiano Fernandez [R53.1] Generalized weakness     2/14/2025  7:32 PM Christiano Fernandez [M62.82] Rhabdomyolysis     2/14/2025  7:32 PM Christiano Fernandez [E03.9] Hypothyroid     2/14/2025  7:32 PM  Christiano Fernandez Add [W19.XXXA] Fall, initial encounter     2/14/2025  7:35 PM Christiano Fernandez Add [R82.71] Asymptomatic bacteriuria                  Christiano Fernandez MD  02/14/25 1941

## 2025-02-15 LAB
ALBUMIN SERPL BCG-MCNC: 3.4 G/DL (ref 3.5–5)
ALP SERPL-CCNC: 43 U/L (ref 34–104)
ALT SERPL W P-5'-P-CCNC: 42 U/L (ref 7–52)
ANION GAP SERPL CALCULATED.3IONS-SCNC: 4 MMOL/L (ref 4–13)
AST SERPL W P-5'-P-CCNC: 51 U/L (ref 13–39)
BASOPHILS # BLD AUTO: 0.01 THOUSANDS/ΜL (ref 0–0.1)
BASOPHILS NFR BLD AUTO: 0 % (ref 0–1)
BILIRUB SERPL-MCNC: 0.44 MG/DL (ref 0.2–1)
BUN SERPL-MCNC: 12 MG/DL (ref 5–25)
CALCIUM ALBUM COR SERPL-MCNC: 8.9 MG/DL (ref 8.3–10.1)
CALCIUM SERPL-MCNC: 8.4 MG/DL (ref 8.4–10.2)
CHLORIDE SERPL-SCNC: 102 MMOL/L (ref 96–108)
CK SERPL-CCNC: 666 U/L (ref 26–192)
CO2 SERPL-SCNC: 29 MMOL/L (ref 21–32)
CREAT SERPL-MCNC: 0.68 MG/DL (ref 0.6–1.3)
EOSINOPHIL # BLD AUTO: 0.08 THOUSAND/ΜL (ref 0–0.61)
EOSINOPHIL NFR BLD AUTO: 2 % (ref 0–6)
ERYTHROCYTE [DISTWIDTH] IN BLOOD BY AUTOMATED COUNT: 12.8 % (ref 11.6–15.1)
GFR SERPL CREATININE-BSD FRML MDRD: 85 ML/MIN/1.73SQ M
GLUCOSE P FAST SERPL-MCNC: 95 MG/DL (ref 65–99)
GLUCOSE SERPL-MCNC: 95 MG/DL (ref 65–140)
HCT VFR BLD AUTO: 36.4 % (ref 34.8–46.1)
HGB BLD-MCNC: 12 G/DL (ref 11.5–15.4)
IMM GRANULOCYTES # BLD AUTO: 0.01 THOUSAND/UL (ref 0–0.2)
IMM GRANULOCYTES NFR BLD AUTO: 0 % (ref 0–2)
INR PPP: 4.83 (ref 0.85–1.19)
LYMPHOCYTES # BLD AUTO: 1.15 THOUSANDS/ΜL (ref 0.6–4.47)
LYMPHOCYTES NFR BLD AUTO: 25 % (ref 14–44)
MAGNESIUM SERPL-MCNC: 2.2 MG/DL (ref 1.9–2.7)
MCH RBC QN AUTO: 30.6 PG (ref 26.8–34.3)
MCHC RBC AUTO-ENTMCNC: 33 G/DL (ref 31.4–37.4)
MCV RBC AUTO: 93 FL (ref 82–98)
MONOCYTES # BLD AUTO: 0.47 THOUSAND/ΜL (ref 0.17–1.22)
MONOCYTES NFR BLD AUTO: 10 % (ref 4–12)
NEUTROPHILS # BLD AUTO: 2.92 THOUSANDS/ΜL (ref 1.85–7.62)
NEUTS SEG NFR BLD AUTO: 63 % (ref 43–75)
NRBC BLD AUTO-RTO: 0 /100 WBCS
PLATELET # BLD AUTO: 216 THOUSANDS/UL (ref 149–390)
PMV BLD AUTO: 9.5 FL (ref 8.9–12.7)
POTASSIUM SERPL-SCNC: 3.7 MMOL/L (ref 3.5–5.3)
PROT SERPL-MCNC: 5.7 G/DL (ref 6.4–8.4)
PROTHROMBIN TIME: 44.4 SECONDS (ref 12.3–15)
RBC # BLD AUTO: 3.92 MILLION/UL (ref 3.81–5.12)
SODIUM SERPL-SCNC: 135 MMOL/L (ref 135–147)
WBC # BLD AUTO: 4.64 THOUSAND/UL (ref 4.31–10.16)

## 2025-02-15 PROCEDURE — 82550 ASSAY OF CK (CPK): CPT

## 2025-02-15 PROCEDURE — 85025 COMPLETE CBC W/AUTO DIFF WBC: CPT

## 2025-02-15 PROCEDURE — 80053 COMPREHEN METABOLIC PANEL: CPT

## 2025-02-15 PROCEDURE — 83735 ASSAY OF MAGNESIUM: CPT

## 2025-02-15 PROCEDURE — 99232 SBSQ HOSP IP/OBS MODERATE 35: CPT | Performed by: INTERNAL MEDICINE

## 2025-02-15 PROCEDURE — 85610 PROTHROMBIN TIME: CPT

## 2025-02-15 PROCEDURE — 36415 COLL VENOUS BLD VENIPUNCTURE: CPT

## 2025-02-15 RX ADMIN — PANTOPRAZOLE SODIUM 40 MG: 40 TABLET, DELAYED RELEASE ORAL at 06:39

## 2025-02-15 RX ADMIN — METOPROLOL TARTRATE 12.5 MG: 25 TABLET, FILM COATED ORAL at 08:16

## 2025-02-15 RX ADMIN — METOPROLOL TARTRATE 12.5 MG: 25 TABLET, FILM COATED ORAL at 22:40

## 2025-02-15 RX ADMIN — SODIUM CHLORIDE, SODIUM GLUCONATE, SODIUM ACETATE, POTASSIUM CHLORIDE, MAGNESIUM CHLORIDE, SODIUM PHOSPHATE, DIBASIC, AND POTASSIUM PHOSPHATE 125 ML/HR: .53; .5; .37; .037; .03; .012; .00082 INJECTION, SOLUTION INTRAVENOUS at 04:30

## 2025-02-15 RX ADMIN — ATORVASTATIN CALCIUM 10 MG: 10 TABLET, FILM COATED ORAL at 22:40

## 2025-02-15 RX ADMIN — LEVOTHYROXINE SODIUM 75 MCG: 25 TABLET ORAL at 08:15

## 2025-02-15 RX ADMIN — DESVENLAFAXINE 50 MG: 50 TABLET, FILM COATED, EXTENDED RELEASE ORAL at 08:16

## 2025-02-15 NOTE — ED NOTES
Pt assisted with the bedpan. Pt continent of urine and feces. Bedding was a little wet, bedding changed, pt cleaned and repositioned.      Estela Davey RN  02/15/25 1989

## 2025-02-15 NOTE — ED NOTES
Report received from previous shift. Pt is resting at this time.     Estela Davey, RN  02/14/25 2025

## 2025-02-15 NOTE — ED NOTES
Patient cleaned and changed out of gown, fresh linen placed on bed. Skin cleaned, dried and cream applied to frequently moist areas.      Sam Platt RN  02/15/25 8510

## 2025-02-15 NOTE — ASSESSMENT & PLAN NOTE
Patient with PMH of hypertension, hyperlipidemia, hypothyroidism, anxiety, A-fib on warfarin who presents with fall.    Patient slipped from the bed to the floor.    Patient denied any loss of consciousness or head trauma.    Patient felt weak and unable to stand up.    Patient reported weakness for the last few days after being diagnosed with COVID.  Patient was recently discharged from [facility] due to ambulatory function and weakness after COVID-19 infection.    Labs showed sodium 133, potassium 3.6, troponin normal, magnesium 1.7, total , INR 3.21, TSH 8.6 UA unremarkable.    CT head/abdomen/pelvis are unremarkable except for diverticulosis.   Plan  Fall precautions  PT/OT  Continue IV fluid  Continue to monitor CK   for possible rehab

## 2025-02-15 NOTE — ASSESSMENT & PLAN NOTE
Patient with PMH of hypertension, hyperlipidemia, hypothyroidism, anxiety, A-fib on warfarin who presents with fall.    Patient slipped from the bed to the floor.    Patient denied any loss of consciousness or head trauma.    Patient felt weak and unable to stand up.    Patient reported weakness for the last few days after being diagnosed with COVID.  Patient was recently discharged from [facility] due to ambulatory function and weakness after COVID-19 infection.    Labs showed sodium 133, potassium 3.6, troponin normal, magnesium 1.7, total , INR 3.21, TSH 8.6 UA unremarkable.    CT head/abdomen/pelvis are unremarkable except for diverticulosis.   Plan  Fall precautions  PT/OT  Continue IV fluid  Continue to monitor CK   for possible rehab

## 2025-02-15 NOTE — ASSESSMENT & PLAN NOTE
Lab Results   Component Value Date    EGFR 81 02/14/2025    EGFR 71 02/13/2025    EGFR 76 02/12/2025    CREATININE 0.72 02/14/2025    CREATININE 0.80 02/13/2025    CREATININE 0.76 02/12/2025

## 2025-02-15 NOTE — ASSESSMENT & PLAN NOTE
Patient with history of paroxysmal A-fib on warfarin  Patient INR today is 3.2  Unclear warfarin dose.  Notes from 1 to 2 mg daily  Plan  Continue metoprolol  Repeat INR the morning if it is still supratherapeutic will hold warfarin

## 2025-02-15 NOTE — H&P
H&P - Hospitalist   Name: Josee Vaca 74 y.o. female I MRN: [id number]  Unit/Bed#: ED 14 I Date of Admission: 2/14/2025   Date of Service: 2/14/2025 I Hospital Day: 0     Assessment & Plan  Fall  Patient with PMH of hypertension, hyperlipidemia, hypothyroidism, anxiety, A-fib on warfarin who presents with fall.    Patient slipped from the bed to the floor.    Patient denied any loss of consciousness or head trauma.    Patient felt weak and unable to stand up.    Patient reported weakness for the last few days after being diagnosed with COVID.  Patient was recently discharged from [facility] due to ambulatory function and weakness after COVID-19 infection.    Labs showed sodium 133, potassium 3.6, troponin normal, magnesium 1.7, total , INR 3.21, TSH 8.6 UA unremarkable.    CT head/abdomen/pelvis are unremarkable except for diverticulosis.   Plan  Fall precautions  PT/OT  Continue IV fluid  Continue to monitor CK   for possible rehab  Ambulatory dysfunction  See management under fall  Anxiety  Continue desvenlafaxine and Xanax as needed  CKD (chronic kidney disease) stage 2, GFR 60-89 ml/min  Lab Results   Component Value Date    EGFR 81 02/14/2025    EGFR 71 02/13/2025    EGFR 76 02/12/2025    CREATININE 0.72 02/14/2025    CREATININE 0.80 02/13/2025    CREATININE 0.76 02/12/2025     Paroxysmal A-fib (HCC)  Patient with history of paroxysmal A-fib on warfarin  Patient INR today is 3.2  Unclear warfarin dose.  Notes from 1 to 2 mg daily  Plan  Continue metoprolol  Repeat INR the morning if it is still supratherapeutic will hold warfarin  COVID-19  Diagnosed on 2/10 with COVID status post receiving IV remdesivir  Hyperlipidemia  Continue statin  Primary hypertension  Continue metoprolol  Hypothyroidism  On levothyroxine  TSH is 8  Continue levothyroxine  Follow-up on T4      VTE Pharmacologic Prophylaxis:   Moderate Risk (Score 3-4) - Pharmacological DVT Prophylaxis Ordered: warfarin  (Coumadin).  Code Status: Level 3 - DNAR and DNI       Anticipated Length of Stay: Patient will be admitted on an observation basis with an anticipated length of stay of less than 2 midnights secondary to ambulatory dysfunction.    History of Present Illness   Chief Complaint: Weakness    Josee Vaca is a 74 y.o. female with a PMH of hypertension, hyperlipidemia, hypothyroidism, anxiety, A-fib on warfarin who presents with fall.  Patient slipped from the bed to the floor.  Patient denied any loss of consciousness or head trauma.  Patient felt weak and unable to stand up.  Patient reported weakness for the last few days after being diagnosed with COVID.  Patient was received discharged from [facility] due to the same.  In the ED vitals blood pressure 120/64, respiration 18, heart rate 77, temperature 98.9.  Labs showed sodium 133, potassium 3.6, troponin normal, magnesium 1.7, total , INR 3.21, TSH 8.6 UA unremarkable.  CT head/abdomen/pelvis are unremarkable except for diverticulosis.  Will admit patient for physical therapy evaluation.  Patient is level 3.  Patient denied smoking, alcohol or drugs.    Review of Systems   Constitutional:  Positive for activity change and fatigue. Negative for appetite change, diaphoresis, fever and unexpected weight change.   HENT:  Positive for sore throat. Negative for drooling, ear discharge, hearing loss, mouth sores, postnasal drip, rhinorrhea and voice change.    Eyes:  Negative for pain, redness, itching and visual disturbance.   Respiratory:  Positive for cough. Negative for apnea, choking, chest tightness, shortness of breath and wheezing.    Cardiovascular:  Negative for chest pain, palpitations and leg swelling.   Gastrointestinal:  Negative for abdominal distention, abdominal pain, anal bleeding, diarrhea and nausea.   Endocrine: Negative for heat intolerance and polyphagia.   Genitourinary:  Negative for decreased urine volume, dyspareunia, flank pain,  genital sores, menstrual problem, urgency and vaginal bleeding.   Musculoskeletal:  Negative for arthralgias, gait problem, myalgias and neck pain.   Skin:  Negative for pallor and rash.   Neurological:  Positive for weakness. Negative for tremors, seizures, syncope, facial asymmetry, speech difficulty, light-headedness and headaches.   Psychiatric/Behavioral:  Negative for agitation, behavioral problems, confusion, hallucinations and self-injury. The patient is not hyperactive.        Historical Information   Past Medical History:   Diagnosis Date    A-fib (HCC)     Anxiety     Arthritis     shoulder    Carpal tunnel syndrome     Colon polyp     Depression     Disease of thyroid gland     Diverticulitis     Esophageal stricture     HBP (high blood pressure)     Hiatal hernia 06/07/2022    Small hiatal hernia    Hyperlipidemia     Hypertension     Hypothyroid     Pneumonia     2017    PONV (postoperative nausea and vomiting)     Schatzki's ring 06/07/2022    Schatzki's ring in the lower esophagus    Sleep apnea     mild      Past Surgical History:   Procedure Laterality Date    APPENDECTOMY      BREAST BIOPSY Right     benign    BREAST CYST ASPIRATION Right     benign    BREAST SURGERY      milk duct removed    CATARACT EXTRACTION Bilateral     CATARACT EXTRACTION, BILATERAL  2019    CERVICAL FUSION N/A 05/25/2021    Procedure: FUSION CERVICAL POSTERIOR C1-C2 with allograft and neuromonitoring);  Surgeon: Wesley Camacho MD;  Location: [facility];  Service: Orthopedics    CHOLECYSTECTOMY      COLONOSCOPY  06/07/2022    COLONOSCOPY W/ BIOPSIES  2011    EGD  06/07/2022    HAND SURGERY Right     MAMMO (HISTORICAL) Bilateral 11/27/2018    MAMMO (HISTORICAL) Bilateral 10/27/2017    MANDIBLE SURGERY      MANDIBLE SURGERY      MO SURGICAL ARTHROSCOPY SHOULDER W/ROTATOR CUFF RPR Right 09/27/2022    Procedure: ARTHROSCOPIC ROTATOR CUFF REPAIR OF SUBSCAPULARIS BICEPS TENODESIS EXTENSIVE DEBRIDEMENT;  Surgeon: Tony Colbert  MD Rose;  Location: [facility];  Service: Orthopedics    WISDOM TOOTH EXTRACTION      X4     Social History     Tobacco Use    Smoking status: Former     Current packs/day: 0.00     Types: Cigarettes     Quit date:      Years since quittin.    Smokeless tobacco: Never   Vaping Use    Vaping status: Never Used   Substance and Sexual Activity    Alcohol use: Not Currently     Comment: last drink 7 years ago    Drug use: Not Currently    Sexual activity: Not Currently     E-Cigarette/Vaping    E-Cigarette Use Never User      E-Cigarette/Vaping Substances    Nicotine No     THC No     CBD No     Flavoring No     Other No     Unknown No      Family history non-contributory  Social History:  Marital Status: /Civil Union       Meds/Allergies   I have reviewed home medications with patient personally.  Prior to Admission medications    Medication Sig Start Date End Date Taking? Authorizing Provider   ALPRAZolam (XANAX) 0.5 mg tablet Take 2 tablets (1 mg total) by mouth 2 (two) times a day as needed for anxiety 25   Valarie Gonzales MD   atorvastatin (LIPITOR) 10 mg tablet TAKE 1 TABLET(10 MG) BY MOUTH DAILY AT BEDTIME 25   Valarie Gonzales MD   colestipol (COLESTID) 1 g tablet Take 2 tablets (2 g total) by mouth in the morning 2 hours separate from other meds 24   Candi Lee PA-C   desvenlafaxine succinate (PRISTIQ) 50 mg 24 hr tablet Take 1 tablet (50 mg total) by mouth daily 24   Valarie Gonzales MD   Diclofenac Sodium (VOLTAREN) 1 % Apply 2 g topically 4 (four) times a day 24   Valarie Gonzales MD   dicyclomine (BENTYL) 20 mg tablet Take 1 tablet (20 mg total) by mouth 2 (two) times a day as needed (ibs) 24   Valarie Gonzales MD   levothyroxine 75 mcg tablet Take 1 tablet (75 mcg total) by mouth daily 24   Valarie Gonzales MD   metoprolol tartrate (LOPRESSOR) 25 mg tablet Take 0.5 tablets (12.5 mg total) by mouth every 12 (twelve) hours 24   Valarie Gonzales  MD   Multiple Vitamins-Minerals (CENTRUM ADULTS PO) Take 1 tablet by mouth daily    Historical Provider, MD   pantoprazole (PROTONIX) 40 mg tablet Take 1 tablet (40 mg total) by mouth daily 11/8/24 2/6/25  Larissa Mercado MD   tiZANidine (ZANAFLEX) 2 mg tablet Take 1 tablet (2 mg total) by mouth every 8 (eight) hours as needed for muscle spasms 11/28/22   Jackie Sanchez MD   warfarin (COUMADIN) 2 mg tablet TAKE 1 AND 1/2 TO 2 TABLETS BY MOUTH DAILY AS DIRECTED BY PRESCRIBER 8/5/24   London Charles MD     No Known Allergies    Objective :  Temp:  [98.9 °F (37.2 °C)] 98.9 °F (37.2 °C)  HR:  [72-77] 74  BP: (120-143)/(60-70) 130/67  Resp:  [16-18] 18  SpO2:  [98 %-99 %] 99 %  O2 Device: None (Room air)    Physical Exam  Constitutional:       General: She is not in acute distress.     Appearance: She is not ill-appearing, toxic-appearing or diaphoretic.   HENT:      Head: Normocephalic and atraumatic.      Nose: Nose normal. No congestion or rhinorrhea.   Cardiovascular:      Rate and Rhythm: Normal rate and regular rhythm.      Pulses: Normal pulses.      Heart sounds: Normal heart sounds. No murmur heard.     No friction rub. No gallop.   Pulmonary:      Effort: Pulmonary effort is normal. No respiratory distress.      Breath sounds: No stridor. No wheezing or rhonchi.   Abdominal:      General: Abdomen is flat. There is no distension.      Palpations: There is no mass.      Tenderness: There is no abdominal tenderness.      Hernia: No hernia is present.   Musculoskeletal:         General: No swelling, tenderness, deformity or signs of injury. Normal range of motion.      Cervical back: Normal range of motion. No rigidity or tenderness.   Lymphadenopathy:      Cervical: No cervical adenopathy.   Skin:     General: Skin is warm.      Coloration: Skin is not jaundiced or pale.      Findings: No bruising.   Neurological:      General: No focal deficit present.      Mental Status: She is alert.      Cranial Nerves: No  cranial nerve deficit.      Sensory: No sensory deficit.      Motor: No weakness.   Psychiatric:         Mood and Affect: Mood normal.         Behavior: Behavior normal.         Thought Content: Thought content normal.         Judgment: Judgment normal.               Lab Results: I have reviewed the following results:  Results from last 7 days   Lab Units 02/14/25 1748 02/12/25  0540   WBC Thousand/uL 5.61 3.62*   HEMOGLOBIN g/dL 13.3 12.0   HEMATOCRIT % 39.9 35.3   PLATELETS Thousands/uL 249 199   BANDS PCT % 10*  --    SEGS PCT %  --  61   LYMPHO PCT % 10* 19   MONO PCT % 6 19*   EOS PCT % 0 0     Results from last 7 days   Lab Units 02/14/25  1748 02/13/25  1003   SODIUM mmol/L 133* 135   POTASSIUM mmol/L 3.6 3.5   CHLORIDE mmol/L 96 102   CO2 mmol/L 29 27   BUN mg/dL 15 15   CREATININE mg/dL 0.72 0.80   ANION GAP mmol/L 8 6   CALCIUM mg/dL 8.8 9.1   ALBUMIN g/dL  --  4.1   TOTAL BILIRUBIN mg/dL  --  0.30   ALK PHOS U/L  --  58   ALT U/L  --  53*   AST U/L  --  62*   GLUCOSE RANDOM mg/dL 152* 182*     Results from last 7 days   Lab Units 02/14/25 1748   INR  3.21*         Lab Results   Component Value Date    HGBA1C 6.0 09/16/2024    HGBA1C 5.8 (H) 04/26/2021           Imaging Results Review: I reviewed radiology reports from this admission including: chest xray.  Other Study Results Review: EKG was reviewed.     Administrative Statements   I have spent a total time of 60 minutes in caring for this patient on the day of the visit/encounter including Diagnostic results, Prognosis, and Risks and benefits of tx options.    ** Please Note: This note has been constructed using a voice recognition system. **

## 2025-02-15 NOTE — PROGRESS NOTES
Progress Note - Hospitalist   Name: Josee Vaca 74 y.o. female I MRN: [id number]  Unit/Bed#: ED 14 I Date of Admission: 2/14/2025   Date of Service: 2/15/2025 I Hospital Day: 0    Assessment & Plan  Fall  Patient with PMH of hypertension, hyperlipidemia, hypothyroidism, anxiety, A-fib on warfarin who presents with fall.    Patient slipped from the bed to the floor.    Patient denied any loss of consciousness or head trauma.    Patient felt weak and unable to stand up.    Patient reported weakness for the last few days after being diagnosed with COVID.  Patient was recently discharged from [facility] due to ambulatory function and weakness after COVID-19 infection.    Labs showed sodium 133, potassium 3.6, troponin normal, magnesium 1.7, total , INR 3.21, TSH 8.6 UA unremarkable.    CT head/abdomen/pelvis are unremarkable except for diverticulosis.   Plan  Fall precautions  PT/OT  Continue IV fluid  Continue to monitor CK   for possible rehab  Ambulatory dysfunction  See management under fall  Anxiety  Continue desvenlafaxine and Xanax as needed  CKD (chronic kidney disease) stage 2, GFR 60-89 ml/min  Lab Results   Component Value Date    EGFR 85 02/15/2025    EGFR 81 02/14/2025    EGFR 71 02/13/2025    CREATININE 0.68 02/15/2025    CREATININE 0.72 02/14/2025    CREATININE 0.80 02/13/2025     Paroxysmal A-fib (HCC)  Patient with history of paroxysmal A-fib on warfarin  Patient INR today is 3.2  Unclear warfarin dose.  Notes from 1 to 2 mg daily  Plan  Continue metoprolol  Hold warfarin  COVID-19  Diagnosed on 2/10 with COVID status post receiving IV remdesivir  Hyperlipidemia  Continue statin  Primary hypertension  Continue metoprolol  Hypothyroidism  On levothyroxine  TSH is 8  Continue levothyroxine  Follow-up on T4    VTE Pharmacologic Prophylaxis:   High Risk (Score >/= 5) - Pharmacological DVT Prophylaxis Ordered: warfarin (Coumadin). Sequential Compression Devices Ordered.    Mobility:    Basic Mobility Inpatient Raw Score: 14  JH-HLM Goal: 4: Move to chair/commode  JH-HLM Achieved: 3: Sit at edge of bed  JH-HLM Goal achieved. Continue to encourage appropriate mobility.    Patient Centered Rounds: I performed bedside rounds with nursing staff today.   Discussions with Specialists or Other Care Team Provider:     Education and Discussions with Family / Patient: Updated  (daughter) via phone.    Current Length of Stay: 0 day(s)  Current Patient Status: Inpatient   Certification Statement: The patient will continue to require additional inpatient hospital stay due to PT OT eval and possible placement  Discharge Plan: Anticipate discharge in 48 hrs to discharge location to be determined pending rehab evaluations.    Code Status: Level 3 - DNAR and DNI    Subjective   Patient denies any acute complaints today apart from some mild weakness in her lower extremities    Objective :  Temp:  [98.6 °F (37 °C)-98.9 °F (37.2 °C)] 98.6 °F (37 °C)  HR:  [68-77] 70  BP: (120-148)/(60-79) 148/79  Resp:  [15-18] 15  SpO2:  [96 %-99 %] 97 %  O2 Device: None (Room air)    There is no height or weight on file to calculate BMI.     Input and Output Summary (last 24 hours):     Intake/Output Summary (Last 24 hours) at 2/15/2025 1339  Last data filed at 2/15/2025 0431  Gross per 24 hour   Intake 1152.5 ml   Output --   Net 1152.5 ml       Physical Exam  Vitals and nursing note reviewed.   Constitutional:       General: She is not in acute distress.     Appearance: She is well-developed. She is not toxic-appearing or diaphoretic.   HENT:      Head: Normocephalic and atraumatic.   Eyes:      General: No scleral icterus.     Conjunctiva/sclera: Conjunctivae normal.   Cardiovascular:      Rate and Rhythm: Normal rate and regular rhythm.      Heart sounds: No murmur heard.     No friction rub. No gallop.   Pulmonary:      Effort: Pulmonary effort is normal. No respiratory distress.      Breath sounds: Normal  breath sounds. No stridor. No wheezing, rhonchi or rales.   Chest:      Chest wall: No tenderness.   Abdominal:      General: There is no distension.      Palpations: Abdomen is soft. There is no mass.      Tenderness: There is no abdominal tenderness. There is no guarding or rebound.      Hernia: No hernia is present.   Musculoskeletal:         General: No swelling or tenderness.      Cervical back: Neck supple.   Skin:     General: Skin is warm and dry.      Capillary Refill: Capillary refill takes less than 2 seconds.   Neurological:      Mental Status: She is alert and oriented to person, place, and time.   Psychiatric:         Mood and Affect: Mood normal.           Lines/Drains:              Lab Results: I have reviewed the following results:   Results from last 7 days   Lab Units 02/15/25  0445 02/14/25  1748   WBC Thousand/uL 4.64 5.61   HEMOGLOBIN g/dL 12.0 13.3   HEMATOCRIT % 36.4 39.9   PLATELETS Thousands/uL 216 249   BANDS PCT %  --  10*   SEGS PCT % 63  --    LYMPHO PCT % 25 10*   MONO PCT % 10 6   EOS PCT % 2 0     Results from last 7 days   Lab Units 02/15/25  0445   SODIUM mmol/L 135   POTASSIUM mmol/L 3.7   CHLORIDE mmol/L 102   CO2 mmol/L 29   BUN mg/dL 12   CREATININE mg/dL 0.68   ANION GAP mmol/L 4   CALCIUM mg/dL 8.4   ALBUMIN g/dL 3.4*   TOTAL BILIRUBIN mg/dL 0.44   ALK PHOS U/L 43   ALT U/L 42   AST U/L 51*   GLUCOSE RANDOM mg/dL 95     Results from last 7 days   Lab Units 02/15/25  0543   INR  4.83*                   Recent Cultures (last 7 days):         Imaging Results Review: No pertinent imaging studies reviewed.  Other Study Results Review: No additional pertinent studies reviewed.    Last 24 Hours Medication List:     Current Facility-Administered Medications:     ALPRAZolam (XANAX) tablet 1 mg, BID PRN    atorvastatin (LIPITOR) tablet 10 mg, HS    desvenlafaxine succinate (PRISTIQ) 24 hr tablet 50 mg, Daily    levothyroxine tablet 75 mcg, Daily    metoprolol tartrate (LOPRESSOR)  tablet 12.5 mg, Q12H PABLO    multi-electrolyte (PLASMALYTE-A/ISOLYTE-S PH 7.4) IV solution, Continuous, Last Rate: Stopped (02/15/25 0431)    pantoprazole (PROTONIX) EC tablet 40 mg, Daily Before Breakfast    tiZANidine (ZANAFLEX) tablet 2 mg, Q8H PRN    [Held by provider] warfarin (COUMADIN) tablet 2 mg, Daily (warfarin)    Administrative Statements   Today, Patient Was Seen By: Edin Martel DO      **Please Note: This note may have been constructed using a voice recognition system.**

## 2025-02-15 NOTE — ASSESSMENT & PLAN NOTE
Lab Results   Component Value Date    EGFR 85 02/15/2025    EGFR 81 02/14/2025    EGFR 71 02/13/2025    CREATININE 0.68 02/15/2025    CREATININE 0.72 02/14/2025    CREATININE 0.80 02/13/2025

## 2025-02-15 NOTE — ASSESSMENT & PLAN NOTE
Patient with history of paroxysmal A-fib on warfarin  Patient INR today is 3.2  Unclear warfarin dose.  Notes from 1 to 2 mg daily  Plan  Continue metoprolol  Hold warfarin

## 2025-02-16 LAB
ATRIAL RATE: 74 BPM
INR PPP: 4.52 (ref 0.85–1.19)
P AXIS: 31 DEGREES
PR INTERVAL: 150 MS
PROTHROMBIN TIME: 42.3 SECONDS (ref 12.3–15)
QRS AXIS: -31 DEGREES
QRSD INTERVAL: 108 MS
QT INTERVAL: 422 MS
QTC INTERVAL: 468 MS
T WAVE AXIS: 75 DEGREES
VENTRICULAR RATE: 74 BPM

## 2025-02-16 PROCEDURE — 99232 SBSQ HOSP IP/OBS MODERATE 35: CPT | Performed by: INTERNAL MEDICINE

## 2025-02-16 PROCEDURE — 36415 COLL VENOUS BLD VENIPUNCTURE: CPT

## 2025-02-16 PROCEDURE — 85610 PROTHROMBIN TIME: CPT

## 2025-02-16 PROCEDURE — 93010 ELECTROCARDIOGRAM REPORT: CPT | Performed by: INTERNAL MEDICINE

## 2025-02-16 RX ORDER — ACETAMINOPHEN 325 MG/1
975 TABLET ORAL ONCE
Status: COMPLETED | OUTPATIENT
Start: 2025-02-16 | End: 2025-02-16

## 2025-02-16 RX ORDER — ACETAMINOPHEN 325 MG/1
TABLET ORAL
Status: DISPENSED
Start: 2025-02-16 | End: 2025-02-16

## 2025-02-16 RX ORDER — ACETAMINOPHEN 325 MG/1
975 TABLET ORAL EVERY 8 HOURS PRN
Status: DISCONTINUED | OUTPATIENT
Start: 2025-02-16 | End: 2025-02-18 | Stop reason: HOSPADM

## 2025-02-16 RX ADMIN — METOPROLOL TARTRATE 12.5 MG: 25 TABLET, FILM COATED ORAL at 22:09

## 2025-02-16 RX ADMIN — ACETAMINOPHEN 975 MG: 325 TABLET, FILM COATED ORAL at 09:51

## 2025-02-16 RX ADMIN — DESVENLAFAXINE 50 MG: 50 TABLET, FILM COATED, EXTENDED RELEASE ORAL at 09:45

## 2025-02-16 RX ADMIN — ATORVASTATIN CALCIUM 10 MG: 10 TABLET, FILM COATED ORAL at 22:09

## 2025-02-16 RX ADMIN — PANTOPRAZOLE SODIUM 40 MG: 40 TABLET, DELAYED RELEASE ORAL at 06:26

## 2025-02-16 RX ADMIN — METOPROLOL TARTRATE 12.5 MG: 25 TABLET, FILM COATED ORAL at 09:46

## 2025-02-16 RX ADMIN — LEVOTHYROXINE SODIUM 75 MCG: 25 TABLET ORAL at 09:45

## 2025-02-16 NOTE — ASSESSMENT & PLAN NOTE
Patient with PMH of hypertension, hyperlipidemia, hypothyroidism, anxiety, A-fib on warfarin who presents with fall.    Patient slipped from the bed to the floor.    Patient denied any loss of consciousness or head trauma.    Patient felt weak and unable to stand up.    Patient reported weakness for the last few days after being diagnosed with COVID.  Patient was recently discharged from [facility] due to ambulatory function and weakness after COVID-19 infection.    Labs showed sodium 133, potassium 3.6, troponin normal, magnesium 1.7, total , INR 3.21, TSH 8.6 UA unremarkable.    CT head/abdomen/pelvis are unremarkable except for diverticulosis.   Plan  Fall precautions  PT/OT  Continue IV fluid  Continue to monitor CK   for possible rehab

## 2025-02-16 NOTE — ASSESSMENT & PLAN NOTE
Patient with history of paroxysmal A-fib on warfarin  Patient INR today is above 4  Unclear warfarin dose.  Notes from 1 to 2 mg daily  Plan  Continue metoprolol  Hold warfarin

## 2025-02-16 NOTE — PLAN OF CARE
Problem: Potential for Falls  Goal: Patient will remain free of falls  Description: INTERVENTIONS:  - Educate patient/family on patient safety including physical limitations  - Instruct patient to call for assistance with activity   - Consult OT/PT to assist with strengthening/mobility   - Keep Call bell within reach  - Keep bed low and locked with side rails adjusted as appropriate  - Keep care items and personal belongings within reach  - Initiate and maintain comfort rounds  - Make Fall Risk Sign visible to staff  - Initiate/Maintain 24/7 alarm  - Obtain necessary fall risk management equipment: rw  - Apply yellow socks and bracelet for high fall risk patients  - Consider moving patient to room near nurses station  Outcome: Progressing     Problem: PAIN - ADULT  Goal: Verbalizes/displays adequate comfort level or baseline comfort level  Description: Interventions:  - Encourage patient to monitor pain and request assistance  - Assess pain using appropriate pain scale  - Administer analgesics based on type and severity of pain and evaluate response  - Implement non-pharmacological measures as appropriate and evaluate response  - Consider cultural and social influences on pain and pain management  - Notify physician/advanced practitioner if interventions unsuccessful or patient reports new pain  Outcome: Progressing     Problem: INFECTION - ADULT  Goal: Absence or prevention of progression during hospitalization  Description: INTERVENTIONS:  - Assess and monitor for signs and symptoms of infection  - Monitor lab/diagnostic results  - Monitor all insertion sites, i.e. indwelling lines, tubes, and drains  - Monitor endotracheal if appropriate and nasal secretions for changes in amount and color  - [place] appropriate cooling/warming therapies per order  - Administer medications as ordered  - Instruct and encourage patient and family to use good hand hygiene technique  - Identify and instruct in appropriate  isolation precautions for identified infection/condition  Outcome: Progressing

## 2025-02-16 NOTE — PROGRESS NOTES
Progress Note - Hospitalist   Name: Josee Vaca 74 y.o. female I MRN: [id number]  Unit/Bed#: -01 I Date of Admission: 2/14/2025   Date of Service: 2/16/2025 I Hospital Day: 1    Assessment & Plan  Fall  Patient with PMH of hypertension, hyperlipidemia, hypothyroidism, anxiety, A-fib on warfarin who presents with fall.    Patient slipped from the bed to the floor.    Patient denied any loss of consciousness or head trauma.    Patient felt weak and unable to stand up.    Patient reported weakness for the last few days after being diagnosed with COVID.  Patient was recently discharged from [facility] due to ambulatory function and weakness after COVID-19 infection.    Labs showed sodium 133, potassium 3.6, troponin normal, magnesium 1.7, total , INR 3.21, TSH 8.6 UA unremarkable.    CT head/abdomen/pelvis are unremarkable except for diverticulosis.   Plan  Fall precautions  PT/OT  Continue IV fluid  Continue to monitor CK   for possible rehab  Ambulatory dysfunction  See management under fall  Anxiety  Continue desvenlafaxine and Xanax as needed  CKD (chronic kidney disease) stage 2, GFR 60-89 ml/min  Lab Results   Component Value Date    EGFR 85 02/15/2025    EGFR 81 02/14/2025    EGFR 71 02/13/2025    CREATININE 0.68 02/15/2025    CREATININE 0.72 02/14/2025    CREATININE 0.80 02/13/2025     Paroxysmal A-fib (HCC)  Patient with history of paroxysmal A-fib on warfarin  Patient INR today is above 4  Unclear warfarin dose.  Notes from 1 to 2 mg daily  Plan  Continue metoprolol  Hold warfarin  COVID-19  Diagnosed on 2/10 with COVID status post receiving IV remdesivir  Hyperlipidemia  Continue statin  Primary hypertension  Continue metoprolol  Hypothyroidism  On levothyroxine  TSH is 8  Continue levothyroxine  Follow-up on T4    VTE Pharmacologic Prophylaxis:   Moderate Risk (Score 3-4) - Pharmacological DVT Prophylaxis Ordered: warfarin (Coumadin).    Mobility:   Basic Mobility Inpatient Raw  Score: 14  JH-HLM Goal: 4: Move to chair/commode  JH-HLM Achieved: 3: Sit at edge of bed  JH-HLM Goal achieved. Continue to encourage appropriate mobility.    Patient Centered Rounds: I performed bedside rounds with nursing staff today.   Discussions with Specialists or Other Care Team Provider:     Education and Discussions with Family / Patient: Updated  (daughter) via phone.    Current Length of Stay: 1 day(s)  Current Patient Status: Inpatient   Certification Statement: The patient will continue to require additional inpatient hospital stay due to pt ot eval for placement to rehab  Discharge Plan: Anticipate discharge in 24-48 hrs to discharge location to be determined pending rehab evaluations.    Code Status: Level 3 - DNAR and DNI    Subjective   Patient denies any acute complaints    Objective :  Temp:  [98 °F (36.7 °C)-98.6 °F (37 °C)] 98 °F (36.7 °C)  HR:  [70-79] 78  BP: (140-160)/(64-99) 144/64  Resp:  [15-16] 16  SpO2:  [96 %-97 %] 96 %  O2 Device: None (Room air)    Body mass index is 22.93 kg/m².     Input and Output Summary (last 24 hours):     Intake/Output Summary (Last 24 hours) at 2/16/2025 0940  Last data filed at 2/16/2025 0401  Gross per 24 hour   Intake 480 ml   Output 400 ml   Net 80 ml       Physical Exam  Vitals and nursing note reviewed.   Constitutional:       General: She is not in acute distress.     Appearance: She is well-developed. She is not toxic-appearing or diaphoretic.   HENT:      Head: Normocephalic and atraumatic.   Eyes:      General: No scleral icterus.     Conjunctiva/sclera: Conjunctivae normal.   Cardiovascular:      Rate and Rhythm: Normal rate and regular rhythm.      Heart sounds: No murmur heard.     No friction rub. No gallop.   Pulmonary:      Effort: Pulmonary effort is normal. No respiratory distress.      Breath sounds: Normal breath sounds. No stridor. No wheezing, rhonchi or rales.   Chest:      Chest wall: No tenderness.   Abdominal:       General: There is no distension.      Palpations: Abdomen is soft. There is no mass.      Tenderness: There is no abdominal tenderness. There is no guarding or rebound.      Hernia: No hernia is present.   Musculoskeletal:         General: No swelling or tenderness.      Cervical back: Neck supple.   Skin:     General: Skin is warm and dry.      Capillary Refill: Capillary refill takes less than 2 seconds.   Neurological:      Mental Status: She is alert and oriented to person, place, and time.   Psychiatric:         Mood and Affect: Mood normal.           Lines/Drains:              Lab Results: I have reviewed the following results:   Results from last 7 days   Lab Units 02/15/25  0445 02/14/25  1748   WBC Thousand/uL 4.64 5.61   HEMOGLOBIN g/dL 12.0 13.3   HEMATOCRIT % 36.4 39.9   PLATELETS Thousands/uL 216 249   BANDS PCT %  --  10*   SEGS PCT % 63  --    LYMPHO PCT % 25 10*   MONO PCT % 10 6   EOS PCT % 2 0     Results from last 7 days   Lab Units 02/15/25  0445   SODIUM mmol/L 135   POTASSIUM mmol/L 3.7   CHLORIDE mmol/L 102   CO2 mmol/L 29   BUN mg/dL 12   CREATININE mg/dL 0.68   ANION GAP mmol/L 4   CALCIUM mg/dL 8.4   ALBUMIN g/dL 3.4*   TOTAL BILIRUBIN mg/dL 0.44   ALK PHOS U/L 43   ALT U/L 42   AST U/L 51*   GLUCOSE RANDOM mg/dL 95     Results from last 7 days   Lab Units 02/16/25  0552   INR  4.52*                   Recent Cultures (last 7 days):         Imaging Results Review: No pertinent imaging studies reviewed.  Other Study Results Review: No additional pertinent studies reviewed.    Last 24 Hours Medication List:     Current Facility-Administered Medications:     ALPRAZolam (XANAX) tablet 1 mg, BID PRN    atorvastatin (LIPITOR) tablet 10 mg, HS    desvenlafaxine succinate (PRISTIQ) 24 hr tablet 50 mg, Daily    levothyroxine tablet 75 mcg, Daily    metoprolol tartrate (LOPRESSOR) partial tablet 12.5 mg, Q12H PABLO    pantoprazole (PROTONIX) EC tablet 40 mg, Daily Before Breakfast    tiZANidine  (ZANAFLEX) tablet 2 mg, Q8H PRN    [Held by provider] warfarin (COUMADIN) tablet 2 mg, Daily (warfarin)    Administrative Statements   Today, Patient Was Seen By: Edin Martel DO      **Please Note: This note may have been constructed using a voice recognition system.**

## 2025-02-17 LAB
ANION GAP SERPL CALCULATED.3IONS-SCNC: 8 MMOL/L (ref 4–13)
BUN SERPL-MCNC: 10 MG/DL (ref 5–25)
CALCIUM SERPL-MCNC: 8.6 MG/DL (ref 8.4–10.2)
CHLORIDE SERPL-SCNC: 105 MMOL/L (ref 96–108)
CK SERPL-CCNC: 256 U/L (ref 26–192)
CO2 SERPL-SCNC: 24 MMOL/L (ref 21–32)
CREAT SERPL-MCNC: 0.7 MG/DL (ref 0.6–1.3)
ERYTHROCYTE [DISTWIDTH] IN BLOOD BY AUTOMATED COUNT: 12.8 % (ref 11.6–15.1)
GFR SERPL CREATININE-BSD FRML MDRD: 84 ML/MIN/1.73SQ M
GLUCOSE SERPL-MCNC: 102 MG/DL (ref 65–140)
HCT VFR BLD AUTO: 39.2 % (ref 34.8–46.1)
HGB BLD-MCNC: 12.6 G/DL (ref 11.5–15.4)
INR PPP: 3.64 (ref 0.85–1.19)
MCH RBC QN AUTO: 30.1 PG (ref 26.8–34.3)
MCHC RBC AUTO-ENTMCNC: 32.1 G/DL (ref 31.4–37.4)
MCV RBC AUTO: 94 FL (ref 82–98)
PLATELET # BLD AUTO: 269 THOUSANDS/UL (ref 149–390)
PMV BLD AUTO: 10.1 FL (ref 8.9–12.7)
POTASSIUM SERPL-SCNC: 3.9 MMOL/L (ref 3.5–5.3)
PROTHROMBIN TIME: 35.9 SECONDS (ref 12.3–15)
RBC # BLD AUTO: 4.19 MILLION/UL (ref 3.81–5.12)
SODIUM SERPL-SCNC: 137 MMOL/L (ref 135–147)
WBC # BLD AUTO: 6.17 THOUSAND/UL (ref 4.31–10.16)

## 2025-02-17 PROCEDURE — 85610 PROTHROMBIN TIME: CPT | Performed by: INTERNAL MEDICINE

## 2025-02-17 PROCEDURE — 80048 BASIC METABOLIC PNL TOTAL CA: CPT | Performed by: INTERNAL MEDICINE

## 2025-02-17 PROCEDURE — 97163 PT EVAL HIGH COMPLEX 45 MIN: CPT

## 2025-02-17 PROCEDURE — 85027 COMPLETE CBC AUTOMATED: CPT | Performed by: INTERNAL MEDICINE

## 2025-02-17 PROCEDURE — 97116 GAIT TRAINING THERAPY: CPT

## 2025-02-17 PROCEDURE — 97167 OT EVAL HIGH COMPLEX 60 MIN: CPT

## 2025-02-17 PROCEDURE — 82550 ASSAY OF CK (CPK): CPT | Performed by: INTERNAL MEDICINE

## 2025-02-17 PROCEDURE — 99232 SBSQ HOSP IP/OBS MODERATE 35: CPT | Performed by: INTERNAL MEDICINE

## 2025-02-17 RX ADMIN — METOPROLOL TARTRATE 12.5 MG: 25 TABLET, FILM COATED ORAL at 21:22

## 2025-02-17 RX ADMIN — DESVENLAFAXINE 50 MG: 50 TABLET, FILM COATED, EXTENDED RELEASE ORAL at 10:00

## 2025-02-17 RX ADMIN — METOPROLOL TARTRATE 12.5 MG: 25 TABLET, FILM COATED ORAL at 10:00

## 2025-02-17 RX ADMIN — ACETAMINOPHEN 975 MG: 325 TABLET, FILM COATED ORAL at 16:04

## 2025-02-17 RX ADMIN — PANTOPRAZOLE SODIUM 40 MG: 40 TABLET, DELAYED RELEASE ORAL at 06:32

## 2025-02-17 RX ADMIN — ALPRAZOLAM 1 MG: 0.5 TABLET ORAL at 16:04

## 2025-02-17 RX ADMIN — ATORVASTATIN CALCIUM 10 MG: 10 TABLET, FILM COATED ORAL at 21:22

## 2025-02-17 RX ADMIN — LEVOTHYROXINE SODIUM 75 MCG: 25 TABLET ORAL at 10:00

## 2025-02-17 NOTE — CASE MANAGEMENT
Case Management Assessment    Patient name Josee Vaca  Location /-01 MRN [id number]  : 1950 Date 2025       Current Admission Date: 2025  Current Admission Diagnosis:Fall   Patient Active Problem List    Diagnosis Date Noted Date Diagnosed    Fall 2025     Ambulatory dysfunction 02/10/2025     COVID-19 02/10/2025     De Quervain's tenosynovitis 2025     Numbness and tingling 2025     Hand arthritis 2025     DRUJ (distal radioulnar joint) arthrosis, primary, right 2025     Primary osteoarthritis of first carpometacarpal joint of right hand 2025     Arthritis of left knee 2024     Patellofemoral disorder of right knee 2024     Cervical myelopathy (HCC) 2024     Impairment of balance 2024     Closed fracture of shaft of left fibula 2024     Chronic pain of left knee 2024     Depression, recurrent (HCC) 2023     Episode of recurrent major depressive disorder, unspecified depression episode severity (HCC) 2023     Irritable bowel syndrome with diarrhea 2023     Dysphagia 2023     Hemorrhoids 2023     Schatzki's ring 2023     Chronic anticoagulation 10/26/2022     Olecranon bursitis of right elbow 2022     Transaminitis 2022     Hyperlipidemia 2022     Sleep apnea 2022     Paroxysmal A-fib (HCC) 2022     Cellulitis 2022     CKD (chronic kidney disease) stage 2, GFR 60-89 ml/min 2022     Hypothyroidism 2022     Gastroesophageal reflux disease without esophagitis 12/15/2021     Anxiety 12/15/2021     Primary hypertension 12/15/2021     Cervical vertebral fusion 06/10/2021     Pre-op examination 2021     Occipital neuralgia of left side      Spondylosis of cervical region without myelopathy or radiculopathy      Neck pain 2020       LOS (days): 2  Geometric Mean LOS (GMLOS) (days): 2.5  Days to GMLOS:0.7  Final Anesthesia Post-op Assessment    Patient: Yvette Guerra  Procedure(s) Performed: REPAIR HAMMER TOE METATARSAL OSTEOTOMY TENDON RELEASE CAPSULOTOMY (PODIATRY)   Anesthesia type: Monitor Anesthesia Care    Vital Last Value   Temperature 37 °C (98.6 °F) (10/04/17 0950)   Pulse 57 (10/04/17 1025)   Respiratory Rate 20 (10/04/17 1025)   Non-Invasive   Blood Pressure 155/67 (10/04/17 1025)   Arterial  Blood Pressure     Pulse Oximetry 98 % (10/04/17 1025)     Last 24 I/O:   Intake/Output Summary (Last 24 hours) at 10/04/17 1152  Last data filed at 10/04/17 0950   Gross per 24 hour   Intake             1150 ml   Output                3 ml   Net             1147 ml       PATIENT LOCATION: Phase II  POST-OP VITAL SIGNS: stable  LEVEL OF CONSCIOUSNESS: awake, oriented, participates in exam, alert and answers questions appropriately  RESPIRATORY STATUS: spontaneous ventilation  CARDIOVASCULAR: blood pressure returned to baseline  HYDRATION: euvolemic    PAIN MANAGEMENT: well controlled  NAUSEA: None  AIRWAY PATENCY: patent  POST-OP ASSESSMENT: no complications, patient tolerated procedure well with no complications, no evidence of recall and sufficiently recovered from acute administration of anesthesia effects and able to participate in evaluation  COMPLICATIONS: none  HANDOFF:  Handoff to receiving nurse was performed and questions were answered           OBJECTIVE:  PATIENT READMITTED TO HOSPITAL  Risk of Unplanned Readmission Score: 11.82     Current admission status: Inpatient     Preferred Pharmacy:   [id number] - [place], [place] - [address]  [facility] [place] [address]  Phone: [phone] Fax: [phone]    Primary Care Provider: Valarie Gonzales MD    Primary Insurance: MEDICARE  Secondary Insurance: [employer] [place]    ASSESSMENT:  Active Health Care Proxies       Ginny Aldana Health Care Representative - Sister   Primary Phone: [phone] (Home)            Advance Directives  Does patient have a Health Care POA?: Yes  Does patient have Advance Directives?: Yes  Primary Contact: Ginny Aldana (sister)    Readmission Root Cause  30 Day Readmission: No    Patient Information  Admitted from:: Home  Mental Status: Alert  During Assessment patient was accompanied by: Not accompanied during assessment  Assessment information provided by:: Patient  Primary Caregiver: Self  Support Systems: Family members  County of Residence: [place]  [facility] do you live in?: [place]  Type of Current Residence: [address] home  Upon entering residence, is there a bedroom on the main floor (no further steps)?: Yes  Upon entering residence, is there a bathroom on the main floor (no further steps)?: Yes  Living Arrangements: Lives w/ Daughter  Is patient a ?: No    Activities of Daily Living Prior to Admission  Functional Status: Independent  Completes ADLs independently?: Yes  Ambulates independently?: Yes  Does patient use assisted devices?: Yes  Assisted Devices (DME) used: Rollator, Straight Cane  Does patient currently own DME?: Yes  What DME does the patient currently own?: Rollator, Straight Cane  Does patient have a history of Outpatient Therapy (PT/OT)?: No  Does the patient have a history of Short-Term Rehab?: No  Does patient have a history of HHC?: No  Does patient currently have HHC?:  No    Patient Information Continued  Income Source: Pension/FDC  Does patient have prescription coverage?: Yes  Does patient receive dialysis treatments?: No  Does patient have a history of substance abuse?: No  Does patient have a history of Mental Health Diagnosis?: Yes (Depression)  Is patient receiving treatment for mental health?: Yes  Has patient received inpatient treatment related to mental health in the last 2 years?: No    Means of Transportation  Means of Transport to [facility] [place] Drives Self      CM spoke with the patient over the phone. The patient reports she lives at home with her daughter. The patient is aware that PT/OT evals are pending for level of care recommendations. CM will continue to follow the patient through discharge.    breast milk and formula in bottle

## 2025-02-17 NOTE — PLAN OF CARE
Problem: Prexisting or High Potential for Compromised Skin Integrity  Goal: Skin integrity is maintained or improved  Description: INTERVENTIONS:  - Identify patients at risk for skin breakdown  - Assess and monitor skin integrity  - Assess and monitor nutrition and hydration status  - Monitor labs   - Assess for incontinence   - Turn and reposition patient  - Assist with mobility/ambulation  - Relieve pressure over bony prominences  - Avoid friction and shearing  - Provide appropriate hygiene as needed including keeping skin clean and dry  - Evaluate need for skin moisturizer/barrier cream  - Collaborate with interdisciplinary team   - Patient/family teaching  - Consider wound care consult   Outcome: Progressing     Problem: Potential for Falls  Goal: Patient will remain free of falls  Description: INTERVENTIONS:  - Educate patient/family on patient safety including physical limitations  - Instruct patient to call for assistance with activity   - Consult OT/PT to assist with strengthening/mobility   - Keep Call bell within reach  - Keep bed low and locked with side rails adjusted as appropriate  - Keep care items and personal belongings within reach  - Initiate and maintain comfort rounds  - Make Fall Risk Sign visible to staff  - Offer Toileting every  Hours, in advance of need  - Initiate/Maintain alarm  - Obtain necessary fall risk management equipment:   - Apply yellow socks and bracelet for high fall risk patients  - Consider moving patient to room near nurses station  Outcome: Progressing     Problem: PAIN - ADULT  Goal: Verbalizes/displays adequate comfort level or baseline comfort level  Description: Interventions:  - Encourage patient to monitor pain and request assistance  - Assess pain using appropriate pain scale  - Administer analgesics based on type and severity of pain and evaluate response  - Implement non-pharmacological measures as appropriate and evaluate response  - Consider cultural and  social influences on pain and pain management  - Notify physician/advanced practitioner if interventions unsuccessful or patient reports new pain  Outcome: Progressing     Problem: INFECTION - ADULT  Goal: Absence or prevention of progression during hospitalization  Description: INTERVENTIONS:  - Assess and monitor for signs and symptoms of infection  - Monitor lab/diagnostic results  - Monitor all insertion sites, i.e. indwelling lines, tubes, and drains  - Monitor endotracheal if appropriate and nasal secretions for changes in amount and color  - [place] appropriate cooling/warming therapies per order  - Administer medications as ordered  - Instruct and encourage patient and family to use good hand hygiene technique  - Identify and instruct in appropriate isolation precautions for identified infection/condition  Outcome: Progressing  Goal: Absence of fever/infection during neutropenic period  Description: INTERVENTIONS:  - Monitor WBC    Outcome: Progressing     Problem: SAFETY ADULT  Goal: Patient will remain free of falls  Description: INTERVENTIONS:  - Educate patient/family on patient safety including physical limitations  - Instruct patient to call for assistance with activity   - Consult OT/PT to assist with strengthening/mobility   - Keep Call bell within reach  - Keep bed low and locked with side rails adjusted as appropriate  - Keep care items and personal belongings within reach  - Initiate and maintain comfort rounds  - Make Fall Risk Sign visible to staff  - Offer Toileting every  Hours, in advance of need  - Initiate/Maintain alarm  - Obtain necessary fall risk management equipment:   - Apply yellow socks and bracelet for high fall risk patients  - Consider moving patient to room near nurses station  Outcome: Progressing  Goal: Maintain or return to baseline ADL function  Description: INTERVENTIONS:  -  Assess patient's ability to carry out ADLs; assess patient's baseline for ADL function and  identify physical deficits which impact ability to perform ADLs (bathing, care of mouth/teeth, toileting, grooming, dressing, etc.)  - Assess/evaluate cause of self-care deficits   - Assess range of motion  - Assess patient's mobility; develop plan if impaired  - Assess patient's need for assistive devices and provide as appropriate  - Encourage maximum independence but intervene and supervise when necessary  - Involve family in performance of ADLs  - Assess for home care needs following discharge   - Consider OT consult to assist with ADL evaluation and planning for discharge  - Provide patient education as appropriate  Outcome: Progressing  Goal: Maintains/Returns to pre admission functional level  Description: INTERVENTIONS:  - Perform AM-PAC 6 Click Basic Mobility/ Daily Activity assessment daily.  - Set and communicate daily mobility goal to care team and patient/family/caregiver.   - Collaborate with rehabilitation services on mobility goals if consulted  - Perform Range of Motion  times a day.  - Reposition patient every  hours.  - Dangle patient  times a day  - Stand patient  times a day  - Ambulate patient  times a day  - Out of bed to chair  times a day   - Out of bed for meal times a day  - Out of bed for toileting  - Record patient progress and toleration of activity level   Outcome: Progressing     Problem: DISCHARGE PLANNING  Goal: Discharge to home or other facility with appropriate resources  Description: INTERVENTIONS:  - Identify barriers to discharge w/patient and caregiver  - Arrange for needed discharge resources and transportation as appropriate  - Identify discharge learning needs (meds, wound care, etc.)  - Arrange for interpretive services to assist at discharge as needed  - Refer to Case Management Department for coordinating discharge planning if the patient needs post-hospital services based on physician/advanced practitioner order or complex needs related to functional status, cognitive  ability, or social support system  Outcome: Progressing     Problem: Knowledge Deficit  Goal: Patient/family/caregiver demonstrates understanding of disease process, treatment plan, medications, and discharge instructions  Description: Complete learning assessment and assess knowledge base.  Interventions:  - Provide teaching at level of understanding  - Provide teaching via preferred learning methods  Outcome: Progressing

## 2025-02-17 NOTE — PLAN OF CARE
Problem: Prexisting or High Potential for Compromised Skin Integrity  Goal: Skin integrity is maintained or improved  Description: INTERVENTIONS:  - Identify patients at risk for skin breakdown  - Assess and monitor skin integrity  - Assess and monitor nutrition and hydration status  - Monitor labs   - Assess for incontinence   - Turn and reposition patient  - Assist with mobility/ambulation  - Relieve pressure over bony prominences  - Avoid friction and shearing  - Provide appropriate hygiene as needed including keeping skin clean and dry  - Evaluate need for skin moisturizer/barrier cream  - Collaborate with interdisciplinary team   - Patient/family teaching  - Consider wound care consult   Outcome: Progressing     Problem: Potential for Falls  Goal: Patient will remain free of falls  Description: INTERVENTIONS:  - Educate patient/family on patient safety including physical limitations  - Instruct patient to call for assistance with activity   - Consult OT/PT to assist with strengthening/mobility   - Keep Call bell within reach  - Keep bed low and locked with side rails adjusted as appropriate  - Keep care items and personal belongings within reach  - Initiate and maintain comfort rounds  - Make Fall Risk Sign visible to staff  - Offer Toileting every 2 Hours, in advance of need  - Initiate/Maintain bed alarm  - Obtain necessary fall risk management equipment: yellow socks  Problem: INFECTION - ADULT  Goal: Absence or prevention of progression during hospitalization  Description: INTERVENTIONS:  - Assess and monitor for signs and symptoms of infection  - Monitor lab/diagnostic results  - Monitor all insertion sites, i.e. indwelling lines, tubes, and drains  - Monitor endotracheal if appropriate and nasal secretions for changes in amount and color  - [place] appropriate cooling/warming therapies per order  - Administer medications as ordered  - Instruct and encourage patient and family to use good hand hygiene  technique  - Identify and instruct in appropriate isolation precautions for identified infection/condition  Outcome: Progressing  Goal: Absence of fever/infection during neutropenic period  Description: INTERVENTIONS:  - Monitor WBC    Outcome: Progressing     - Apply yellow socks and bracelet for high fall risk patients  - Consider moving patient to room near nurses station  Outcome: Progressing

## 2025-02-17 NOTE — PROGRESS NOTES
Progress Note - Hospitalist   Name: Josee Vaca 74 y.o. female I MRN: [id number]  Unit/Bed#: -01 I Date of Admission: 2/14/2025   Date of Service: 2/17/2025 I Hospital Day: 2    Assessment & Plan  Fall  Patient with PMH of hypertension, hyperlipidemia, hypothyroidism, anxiety, A-fib on warfarin who presents with fall.    Patient slipped from the bed to the floor.    Patient denied any loss of consciousness or head trauma.    Patient felt weak and unable to stand up.    Patient reported weakness for the last few days after being diagnosed with COVID.  Patient was recently discharged from [facility] due to ambulatory function and weakness after COVID-19 infection.    Labs showed sodium 133, potassium 3.6, troponin normal, magnesium 1.7, total , INR 3.21, TSH 8.6 UA unremarkable.    CT head/abdomen/pelvis are unremarkable except for diverticulosis.   Plan  Fall precautions  PT/OT  Continue IV fluid  Continue to monitor CK   for possible rehab  Ambulatory dysfunction  See management under fall  Anxiety  Continue desvenlafaxine and Xanax as needed  CKD (chronic kidney disease) stage 2, GFR 60-89 ml/min  Lab Results   Component Value Date    EGFR 84 02/17/2025    EGFR 85 02/15/2025    EGFR 81 02/14/2025    CREATININE 0.70 02/17/2025    CREATININE 0.68 02/15/2025    CREATININE 0.72 02/14/2025     Paroxysmal A-fib (HCC)  Patient with history of paroxysmal A-fib on warfarin  Patient INR today is above 4  Unclear warfarin dose.  Notes from 1 to 2 mg daily  Plan  Continue metoprolol  Hold warfarin  COVID-19  Diagnosed on 2/10 with COVID status post receiving IV remdesivir  Hyperlipidemia  Continue statin  Primary hypertension  Continue metoprolol  Hypothyroidism  On levothyroxine  TSH is 8  Continue levothyroxine  Follow-up on T4    VTE Pharmacologic Prophylaxis:   High Risk (Score >/= 5) - Pharmacological DVT Prophylaxis Ordered: warfarin (Coumadin). Sequential Compression Devices  Ordered.    Mobility:   Basic Mobility Inpatient Raw Score: 20  JH-HLM Goal: 6: Walk 10 steps or more  JH-HLM Achieved: 7: Walk 25 feet or more  JH-HLM Goal achieved. Continue to encourage appropriate mobility.    Patient Centered Rounds: I performed bedside rounds with nursing staff today.   Discussions with Specialists or Other Care Team Provider:     Education and Discussions with Family / Patient: Patient declined call to .     Current Length of Stay: 2 day(s)  Current Patient Status: Inpatient   Certification Statement: The patient will continue to require additional inpatient hospital stay due to pt ot eval  Discharge Plan: Anticipate discharge in 24-48 hrs to discharge location to be determined pending rehab evaluations.    Code Status: Level 3 - DNAR and DNI    Subjective   Patient denies any acute complaints    Objective :  Temp:  [97 °F (36.1 °C)-98.1 °F (36.7 °C)] 97 °F (36.1 °C)  HR:  [68-75] 74  BP: (123-155)/(69-80) 136/71  Resp:  [16] 16  SpO2:  [97 %-98 %] 97 %  O2 Device: None (Room air)    Body mass index is 22.93 kg/m².     Input and Output Summary (last 24 hours):     Intake/Output Summary (Last 24 hours) at 2/17/2025 0948  Last data filed at 2/17/2025 0601  Gross per 24 hour   Intake 940 ml   Output --   Net 940 ml       Physical Exam  Vitals and nursing note reviewed.   Constitutional:       General: She is not in acute distress.     Appearance: She is well-developed. She is not toxic-appearing or diaphoretic.   HENT:      Head: Normocephalic and atraumatic.   Eyes:      General: No scleral icterus.     Conjunctiva/sclera: Conjunctivae normal.   Cardiovascular:      Rate and Rhythm: Normal rate and regular rhythm.      Heart sounds: No murmur heard.     No friction rub. No gallop.   Pulmonary:      Effort: Pulmonary effort is normal. No respiratory distress.      Breath sounds: Normal breath sounds. No stridor. No wheezing, rhonchi or rales.   Chest:      Chest wall: No  tenderness.   Abdominal:      General: There is no distension.      Palpations: Abdomen is soft. There is no mass.      Tenderness: There is no abdominal tenderness. There is no guarding or rebound.      Hernia: No hernia is present.   Musculoskeletal:         General: No swelling or tenderness.      Cervical back: Neck supple.   Skin:     General: Skin is warm and dry.      Capillary Refill: Capillary refill takes less than 2 seconds.   Neurological:      Mental Status: She is alert and oriented to person, place, and time.   Psychiatric:         Mood and Affect: Mood normal.           Lines/Drains:              Lab Results: I have reviewed the following results:   Results from last 7 days   Lab Units 02/17/25 0444 02/15/25  0445 02/14/25  1748   WBC Thousand/uL 6.17 4.64 5.61   HEMOGLOBIN g/dL 12.6 12.0 13.3   HEMATOCRIT % 39.2 36.4 39.9   PLATELETS Thousands/uL 269 216 249   BANDS PCT %  --   --  10*   SEGS PCT %  --  63  --    LYMPHO PCT %  --  25 10*   MONO PCT %  --  10 6   EOS PCT %  --  2 0     Results from last 7 days   Lab Units 02/17/25 0444 02/15/25  0445   SODIUM mmol/L 137 135   POTASSIUM mmol/L 3.9 3.7   CHLORIDE mmol/L 105 102   CO2 mmol/L 24 29   BUN mg/dL 10 12   CREATININE mg/dL 0.70 0.68   ANION GAP mmol/L 8 4   CALCIUM mg/dL 8.6 8.4   ALBUMIN g/dL  --  3.4*   TOTAL BILIRUBIN mg/dL  --  0.44   ALK PHOS U/L  --  43   ALT U/L  --  42   AST U/L  --  51*   GLUCOSE RANDOM mg/dL 102 95     Results from last 7 days   Lab Units 02/17/25 0444   INR  3.64*                   Recent Cultures (last 7 days):         Imaging Results Review: No pertinent imaging studies reviewed.  Other Study Results Review: No additional pertinent studies reviewed.    Last 24 Hours Medication List:     Current Facility-Administered Medications:     acetaminophen (TYLENOL) tablet 975 mg, Q8H PRN    ALPRAZolam (XANAX) tablet 1 mg, BID PRN    atorvastatin (LIPITOR) tablet 10 mg, HS    desvenlafaxine succinate (PRISTIQ) 24 hr  tablet 50 mg, Daily    levothyroxine tablet 75 mcg, Daily    metoprolol tartrate (LOPRESSOR) partial tablet 12.5 mg, Q12H PABLO    pantoprazole (PROTONIX) EC tablet 40 mg, Daily Before Breakfast    tiZANidine (ZANAFLEX) tablet 2 mg, Q8H PRN    [Held by provider] warfarin (COUMADIN) tablet 2 mg, Daily (warfarin)    Administrative Statements   Today, Patient Was Seen By: Edin Martel DO      **Please Note: This note may have been constructed using a voice recognition system.**

## 2025-02-17 NOTE — ASSESSMENT & PLAN NOTE
Lab Results   Component Value Date    EGFR 84 02/17/2025    EGFR 85 02/15/2025    EGFR 81 02/14/2025    CREATININE 0.70 02/17/2025    CREATININE 0.68 02/15/2025    CREATININE 0.72 02/14/2025

## 2025-02-17 NOTE — CASE MANAGEMENT
Case Management Progress Note    Patient name Josee Vaca  Location /-01 MRN [id number]  : 1950 Date 2025       LOS (days): 2  Geometric Mean LOS (GMLOS) (days): 2.5  Days to GMLOS:0.5        OBJECTIVE:        Current admission status: Inpatient  Preferred Pharmacy:   [id number] [place], [place] - [address] [address] [facility] [address]  Phone: [phone] Fax: [phone]    Primary Care Provider: Valarie Gonzales MD    Primary Insurance: MEDICARE  Secondary Insurance: [facility]    PROGRESS NOTE:    Cm advised therapy recs of [facility]. CM placed blanket referral in [facility]. CM to follow for accepting facilities.

## 2025-02-17 NOTE — PLAN OF CARE
Problem: PHYSICAL THERAPY ADULT  Goal: Performs mobility at highest level of function for planned discharge setting.  See evaluation for individualized goals.  Description: Treatment/Interventions: ADL retraining, Functional transfer training, LE strengthening/ROM, Elevations, Therapeutic exercise, Endurance training, Patient/family training, Equipment eval/education, Bed mobility, Gait training, Compensatory technique education, Spoke to nursing, Spoke to case management, OT          See flowsheet documentation for full assessment, interventions and recommendations.  Note:    Problem List: Decreased strength, Decreased endurance, Impaired balance, Decreased mobility, Decreased coordination, Decreased safety awareness, Pain  Assessment: Patient seen for Physical Therapy evaluation. Patient admitted with Fall.  Comorbidities affecting patient's physical performance include: HTN, CKD 2, PAF, sleep apnea, IBS, cervical myelopathy, chronic pain L knee.  Personal factors affecting patient at time of initial evaluation include: lives in two story house, stairs to enter home, inability to navigate community distances, inability to navigate level surfaces without external assistance, inability to perform dynamic tasks in community, and positive fall history. Prior to admission, patient was independent with functional mobility with cane, independent with functional mobility without assistive device, independent with ADLS, independent with IADLS, living with dtr in a two level home with 1 vs 4 steps to enter, ambulating household distance, and ambulating community distances.  Please find objective findings from Physical Therapy assessment regarding body systems outlined above with impairments and limitations including weakness, impaired balance, decreased endurance, impaired coordination, gait deviations, pain, decreased activity tolerance, decreased functional mobility tolerance, decreased safety awareness, fall risk, and  SOB upon exertion.  The Barthel Index was used as a functional outcome tool presenting with a score of Barthel Index Score: 45 today indicating marked limitations of functional mobility and ADLS.  Patient's clinical presentation is currently unstable/unpredictable as seen in patient's presentation of vital sign response, changing level of pain, increased fall risk, new onset of impairment of functional mobility, decreased endurance, and new onset of weakness. Pt would benefit from continued Physical Therapy treatment to address deficits as defined above and maximize level of functional mobility. As demonstrated by objective findings, the assigned level of complexity for this evaluation is high.The patient's AM-[facility] Basic Mobility Inpatient Short Form Raw Score is 17. A Raw score of greater than 16 suggests the patient may benefit from discharge to home. Please also refer to the recommendation of the Physical Therapist for safe discharge planning.  Barriers to Discharge: Inaccessible home environment, Decreased caregiver support  Barriers to Discharge Comments: Pt lives in a 2 floor home - dtr resides on 2nd floor and needs assist from pt as well as laundry in the basement. 1 NICHOL. Pt with multiple recent falls, 1 as reason for admission. Pt is functioning below baseline level of mobility. Pt is not safe for IN home  Rehab Resource Intensity Level, PT: II (Moderate Resource Intensity)    See flowsheet documentation for full assessment.

## 2025-02-17 NOTE — ASSESSMENT & PLAN NOTE
Patient with PMH of hypertension, hyperlipidemia, hypothyroidism, anxiety, A-fib on warfarin who presents with fall.    Patient slipped from the bed to the floor.    Patient denied any loss of consciousness or head trauma.    Patient felt weak and unable to stand up.    Patient reported weakness for the last few days after being diagnosed with COVID.  Patient was recently discharged from [facility] due to ambulatory function and weakness after COVID-19 infection.    Labs showed sodium 133, potassium 3.6, troponin normal, magnesium 1.7, total , INR 3.21, TSH 8.6 UA unremarkable.    CT head/abdomen/pelvis are unremarkable except for diverticulosis.   Plan  Fall precautions  PT/OT  Continue IV fluid  Continue to monitor CK   for possible rehab

## 2025-02-17 NOTE — OCCUPATIONAL THERAPY NOTE
Occupational Therapy Evaluation     Patient Name: Josee Vaca  Today's Date: 2/17/2025  Problem List  Principal Problem:    Fall  Active Problems:    Anxiety    Primary hypertension    Hypothyroidism    CKD (chronic kidney disease) stage 2, GFR 60-89 ml/min    Hyperlipidemia    Paroxysmal A-fib (HCC)    Ambulatory dysfunction    COVID-19    Past Medical History  Past Medical History:   Diagnosis Date    A-fib (HCC)     Anxiety     Arthritis     shoulder    Carpal tunnel syndrome     Colon polyp     Depression     Disease of thyroid gland     Diverticulitis     Esophageal stricture     HBP (high blood pressure)     Hiatal hernia 06/07/2022    Small hiatal hernia    Hyperlipidemia     Hypertension     Hypothyroid     Pneumonia     2017    PONV (postoperative nausea and vomiting)     Schatzki's ring 06/07/2022    Schatzki's ring in the lower esophagus    Sleep apnea     mild      Past Surgical History  Past Surgical History:   Procedure Laterality Date    APPENDECTOMY      BREAST BIOPSY Right     benign    BREAST CYST ASPIRATION Right     benign    BREAST SURGERY      milk duct removed    CATARACT EXTRACTION Bilateral     CATARACT EXTRACTION, BILATERAL  2019    CERVICAL FUSION N/A 05/25/2021    Procedure: FUSION CERVICAL POSTERIOR C1-C2 with allograft and neuromonitoring);  Surgeon: Wesley aCmacho MD;  Location:  MAIN OR;  Service: Orthopedics    CHOLECYSTECTOMY      COLONOSCOPY  06/07/2022    COLONOSCOPY W/ BIOPSIES  2011    EGD  06/07/2022    HAND SURGERY Right     MAMMO (HISTORICAL) Bilateral 11/27/2018    MAMMO (HISTORICAL) Bilateral 10/27/2017    MANDIBLE SURGERY      MANDIBLE SURGERY      OK SURGICAL ARTHROSCOPY SHOULDER W/ROTATOR CUFF RPR Right 09/27/2022    Procedure: ARTHROSCOPIC ROTATOR CUFF REPAIR OF SUBSCAPULARIS BICEPS TENODESIS EXTENSIVE DEBRIDEMENT;  Surgeon: Tony Rose MD;  Location: [facility]IN OR;  Service: Orthopedics    WISDOM TOOTH EXTRACTION      X4             02/17/25 3752  "  OT Last Visit   OT Visit Date 02/17/25   Note Type   Note type Evaluation   Pain Assessment   Pain Assessment Tool 0-10   Pain Score 6   Pain Location/Orientation Orientation: Bilateral;Orientation: Lower;Location: Back   Effect of Pain on Daily Activities limits comfort and activity tolerance   Patient's Stated Pain Goal No pain   Hospital Pain Intervention(s) Repositioned;Ambulation/increased activity;Emotional support;Rest   Restrictions/Precautions   Weight Bearing Precautions Per Order No   Other Precautions Airborne/isolation;Contact/isolation;Fall Risk;Bed Alarm;Chair Alarm;Pain  (+COVID)   Home Living   Type of Home House   Home Layout Two level;Able to live on main level with bedroom/bathroom;Performs ADLs on one level;Laundry in basement  (1 NICHOL with rails in the front vs 4 NICHOL w/out rail in the back;pt stays on the 1st floor, dtr stays on the 2nd floor;pt does amb up/down steps to 2nd floor to assist dtr and to basement for laundry)   Bathroom Shower/Tub Tub/shower unit  (1st floor)   Bathroom Toilet Raised  (\"senior height\")   Bathroom Equipment Grab bars in shower;Commode   Bathroom Accessibility Accessible   Home Equipment Cane;Quad cane  (RW)   Prior Function   Level of [place] Independent with ADLs;Independent with functional mobility;Independent with IADLS   Lives With Daughter  (dtr unable to assist pt;dtr has grand mal seizures;pt is caregiver for dtr)   Receives Help From Other (Comment)  (No assist available)   IADLs Independent with driving;Independent with meal prep;Independent with medication management   Falls in the last 6 months 1 to 4  (at least 3, 1, reason for admission)   Lifestyle   Autonomy Pt lives with daughter who has grand mal seizures, In a 2 STH, 1 NICHOL from front, Pt was Indep for all I/ADLs, Indep ambulation w/o AD, +  and is caregiver for daughter. Pt reports she has no assistance at home available to her.   Service to Others caregiver to daughter   General "   Family/Caregiver Present No   ADL   Eating Assistance 7  Independent   Grooming Assistance 5  Supervision/Setup   Grooming Deficit Setup;Verbal cueing;Supervision/safety;Increased time to complete;Wash/dry hands;Wash/dry face;Brushing hair   UB Dressing Assistance 5  Supervision/Setup   UB Dressing Deficit Setup;Supervision/safety;Increased time to complete  (gown management)   LB Dressing Assistance 4  Minimal Assistance   LB Dressing Deficit Steadying;Verbal cueing;Supervision/safety;Increased time to complete   Toileting Assistance  5  Supervision/Setup   Toileting Deficit Verbal cueing;Supervision/safety;Increased time to complete  (GB)   Bed Mobility   Supine to Sit 4  Minimal assistance   Additional items Assist x 1;Verbal cues;Increased time required;Bedrails;HOB elevated   Transfers   Sit to Stand 4  Minimal assistance   Additional items Assist x 1;Verbal cues;Increased time required;Armrests   Stand to Sit 4  Minimal assistance   Additional items Assist x 1;Verbal cues;Increased time required;Armrests   Tub transfer 4  Minimal assistance   Additional items Assist x 1;Verbal cues  (GB)   Activity Tolerance   Activity Tolerance Patient limited by fatigue;Treatment limited secondary to medical complications (Comment);Patient limited by pain   Medical Staff Made Aware PT Sylvia   Nurse Made Aware RN   RUE Assessment   RUE Assessment WFL   LUE Assessment   LUE Assessment WFL   Vision-Basic Assessment   Current Vision Wears glasses all the time   Cognition   Overall Cognitive Status WFL   Orientation Level Oriented X4   Following Commands Follows one step commands with increased time or repetition   Assessment   Limitation Decreased ADL status;Decreased UE strength;Decreased Safe judgement during ADL;Decreased endurance;Decreased self-care trans;Decreased high-level ADLs   Prognosis Good   Assessment Pt is a 74 y.o. female seen for OT evaluation s/p admission to [facility] on 2/14/2025 due to fall  being down ~8 hours (COVID+)  . Diagnosed with Fall. Personal and env factors supporting pt at time of IE include (I) PLOF, accessible home environment, and FFSU.  PMH: HTN, CKD 2, PAF, sleep apnea, IBS, cervical myelopathy, chronic pain L knee, +hx of falls. Pt was living with her daughter who has seizures in a 2 STH + basement, 1st floor s/u with tub/shower and requires to go to basement for laundry. Pt was Indep for all I/ADLs, Indep ambulation w/o AD +  and caregiver to daughter.  Pt presents today with decreased act kim, balance deficits, generalized weakness, decreased insight into deficits and deconditioning.  Upon initial evaluation, pt appears to be performing below baseline functional status. Personal and env factors inhibiting engagement in occupations include advanced age, limited social support, current role of caregiver for daughter, difficulty completing ADLs, difficulty completing IADLs, and has steps to enter home .  Pt was Sup for bed mobility, Carolyn for sit<>stand and functional ambulation with RW. She was able to complete grooming standing at sink at Sup level, S/Carolyn for gown management and Carolyn for LB ADLs. Due to pt's current functional limitations and medical complications pt is functioning below baseline. Patient would benefit from OT services within the acute care setting to maximize level of functional independence in the following areas self-care transfers, functional mobility, and ADLs. From OT standpoint, recommendation at time of D/C would be Level II.   Goals   Patient Goals to get stronger   LTG Time Frame 10-14   Plan   Treatment Interventions ADL retraining;Functional transfer training;UE strengthening/ROM;Endurance training;Cognitive reorientation;Patient/family training;Equipment evaluation/education;Compensatory technique education;Continued evaluation;Energy conservation;Activityengagement   Goal Expiration Date 03/03/25   OT Treatment Day 0   OT Frequency 3-5x/wk    Discharge Recommendation   Rehab Resource Intensity Level, OT II (Moderate Resource Intensity)   AM-PAC Daily Activity Inpatient   Lower Body Dressing 3   Bathing 3   Toileting 3   Upper Body Dressing 4   Grooming 4   Eating 4   Daily Activity Raw Score 21   Daily Activity Standardized Score (Calc for Raw Score >=11) 44.27   AM-PAC Applied Cognition Inpatient   Following a Speech/Presentation 4   Understanding Ordinary Conversation 4   Taking Medications 4   Remembering Where Things Are Placed or Put Away 4   Remembering List of 4-5 Errands 4   Taking Care of Complicated Tasks 4   Applied Cognition Raw Score 24   Applied Cognition Standardized Score 62.21   End of Consult   Education Provided Yes   Patient Position at End of Consult Bedside chair;Bed/Chair alarm activated;All needs within reach   Nurse Communication Nurse aware of consult       This session, pt required and most appropriately benefited from skilled OT/PT co-treatment due to extensive physical assistance of SKILLED therapists, significant regression from functional baseline, and decreased activity tolerance. OT and PT goals were addressed separately as seen in documentation.      GOALS:   Goals established in order to promote pt's established goal of to get stronger       -Patient will be Mod I with LB dressing with use of AE and AD as needed in order to increase (I) with ADLs    -Patient will be Mod I with LB bathing with use of AE and AD as needed in order to increase (I) with ADLs    -Patient will complete toileting w/ Mod I w/ G hygiene/thoroughness in order to reduce caregiver burden    -Patient will demonstrate Mod I with bed mobility for ability to manage own comfort and initiate OOB tasks.     -Patient will perform functional transfers with Mod I to/from all surfaces using DME as needed in order to increase (I) with functional tasks    -Patient will be Mod I with functional mobility to/from bathroom for increased independence with toileting  tasks    -Patient will tolerate therapeutic activities for greater than 30 min, in order to increase tolerance for functional activities.       -Patient will engage in ongoing cognitive assessment in order to assist with safe discharge planning/recommendations.    -Patient will independently integrate one pacing strategy into morning ADLs.    -Patient will demonstrate standing for 5-7 min in order to increase active participation in functional activities    -Patient will be mod I with item retrieval in order to facilitate safe techniques/management at home upon d/c

## 2025-02-17 NOTE — PHYSICAL THERAPY NOTE
PHYSICAL THERAPY EVALUATION/TREATMENT    NAME:  Josee Vaca  AGE:   74 y.o.  Mrn:   [id number]  Length Of Stay: 2    ADMIT DX:  Rhabdomyolysis [M62.82]  Hypothyroid [E03.9]  Difficulty walking [R26.2]  Asymptomatic bacteriuria [R82.71]  Generalized weakness [R53.1]    Past Medical History:   Diagnosis Date    A-fib (HCC)     Anxiety     Arthritis     shoulder    Carpal tunnel syndrome     Colon polyp     Depression     Disease of thyroid gland     Diverticulitis     Esophageal stricture     HBP (high blood pressure)     Hiatal hernia 06/07/2022    Small hiatal hernia    Hyperlipidemia     Hypertension     Hypothyroid     Pneumonia     2017    PONV (postoperative nausea and vomiting)     Schatzki's ring 06/07/2022    Schatzki's ring in the lower esophagus    Sleep apnea     mild      Past Surgical History:   Procedure Laterality Date    APPENDECTOMY      BREAST BIOPSY Right     benign    BREAST CYST ASPIRATION Right     benign    BREAST SURGERY      milk duct removed    CATARACT EXTRACTION Bilateral     CATARACT EXTRACTION, BILATERAL  2019    CERVICAL FUSION N/A 05/25/2021    Procedure: FUSION CERVICAL POSTERIOR C1-C2 with allograft and neuromonitoring);  Surgeon: Wesley Camacho MD;  Location:  MAIN OR;  Service: Orthopedics    CHOLECYSTECTOMY      COLONOSCOPY  06/07/2022    COLONOSCOPY W/ BIOPSIES  2011    EGD  06/07/2022    HAND SURGERY Right     MAMMO (HISTORICAL) Bilateral 11/27/2018    MAMMO (HISTORICAL) Bilateral 10/27/2017    MANDIBLE SURGERY      MANDIBLE SURGERY      SC SURGICAL ARTHROSCOPY SHOULDER W/ROTATOR CUFF RPR Right 09/27/2022    Procedure: ARTHROSCOPIC ROTATOR CUFF REPAIR OF SUBSCAPULARIS BICEPS TENODESIS EXTENSIVE DEBRIDEMENT;  Surgeon: Tony Rose MD;  Location: [facility];  Service: Orthopedics    WISDOM TOOTH EXTRACTION      X4        02/17/25 1315   PT Last Visit   PT Visit Date 02/17/25   Note Type   Note type Evaluation   Pain Assessment   Pain Assessment Tool 0-10  "  Pain Score 6   Pain Location/Orientation Orientation: Bilateral;Orientation: Lower;Location: Back   Effect of Pain on Daily Activities limits comfort and activity tolerance   Patient's Stated Pain Goal No pain   Hospital Pain Intervention(s) Repositioned;Ambulation/increased activity;Emotional support;Rest   Restrictions/Precautions   Other Precautions Airborne/isolation;Contact/isolation;Fall Risk;Bed Alarm;Chair Alarm;Pain   Home Living   Type of Home House   Home Layout Two level;Able to live on main level with bedroom/bathroom;Performs ADLs on one level;Laundry in basement  (1 NICHOL with rails in the front vs 4 NICHOL w/out rail in the back;pt stays on the 1st floor, dtr stays on the 2nd floor;pt does amb up/down steps to 2nd floor to assist dtr and to basement for laundry)   Bathroom Shower/Tub Tub/shower unit  (on 1st floor)   Bathroom Toilet Raised  (\"senior height\")   Bathroom Equipment Grab bars in shower;Commode   Bathroom Accessibility Accessible   Home Equipment Cane;Quad cane  (RW)   Prior Function   Level of [place] Independent with ADLs;Independent with functional mobility;Independent with IADLS   Lives With Daughter  (dtr unable to assist pt;dtr has grand mal seizures;pt is CG for dtr)   Receives Help From Other (Comment)  (pt reports no assist)   IADLs Independent with driving;Independent with meal prep;Independent with medication management   Falls in the last 6 months 1 to 4  (at least 3, 1, reason for admission)   Comments Pt normally amb w/out an AD PTA - occasional use of cane especially 1st thing in the am   General   Additional Pertinent History Pt is adm with a fall - slipped from bed to floor, unable to stand up. Pt is also a recent dc home from [facility] where pt was adm for COVID-19.   Family/Caregiver Present No   Cognition   Overall Cognitive Status WFL   Arousal/Participation Cooperative   Orientation Level Oriented X4   Memory Decreased short term memory;Decreased recall of recent " "events;Decreased recall of precautions   Following Commands Follows one step commands with increased time or repetition   Comments At least 2 pt identifiers including name and    Subjective   Subjective \"I\"m not ready to go home, I need help to get up\"   RLE Assessment   RLE Assessment WFL  (grossly 3 to 3+/5)   LLE Assessment   LLE Assessment WFL  (grossly 3 to 3+/5)   Vision-Basic Assessment   Current Vision Wears glasses all the time   Light Touch   RLE Light Touch Grossly intact   LLE Light Touch Grossly intact   Bed Mobility   Supine to Sit 4  Minimal assistance   Additional items Assist x 1;Verbal cues;Increased time required;Bedrails;HOB elevated  (trunk management)   Transfers   Sit to Stand 4  Minimal assistance   Additional items Assist x 1;Verbal cues;Increased time required;Armrests  (cues for safe hand placement)   Stand to Sit 4  Minimal assistance   Additional items Assist x 1;Verbal cues;Increased time required;Armrests   Stand pivot   (cues for safe hand placement)   Ambulation/Elevation   Gait pattern Short stride;Decreased foot clearance;Step through pattern  (minimal, generalized unsteadiness)   Gait Assistance 4  Minimal assist   Additional items Assist x 1;Verbal cues;Tactile cues   Assistive Device Rolling walker   Distance 20 feet with change in direction;pt fatigued at end of amb   Balance   Static Sitting Fair   Static Standing   (F-/F with RW)   Ambulatory   (P+/F- with RW)   Endurance Deficit   Endurance Deficit Yes   Endurance Deficit Description Limited overall activity, standing and gait endurance;self reported fatigue   Activity Tolerance   Activity Tolerance Patient limited by fatigue;Treatment limited secondary to medical complications (Comment);Patient limited by pain  (weakness and deconditioning)   Medical Staff Made Aware OT;CM   Nurse Made Aware RN pre/post   Assessment   Problem List Decreased strength;Decreased endurance;Impaired balance;Decreased mobility;Decreased " coordination;Decreased safety awareness;Pain   Assessment Patient seen for Physical Therapy evaluation. Patient admitted with Fall.  Comorbidities affecting patient's physical performance include: HTN, CKD 2, PAF, sleep apnea, IBS, cervical myelopathy, chronic pain L knee.  Personal factors affecting patient at time of initial evaluation include: lives in two story house, stairs to enter home, inability to navigate community distances, inability to navigate level surfaces without external assistance, inability to perform dynamic tasks in community, and positive fall history. Prior to admission, patient was independent with functional mobility with cane, independent with functional mobility without assistive device, independent with ADLS, independent with IADLS, living with dtr in a two level home with 1 vs 4 steps to enter, ambulating household distance, and ambulating community distances.  Please find objective findings from Physical Therapy assessment regarding body systems outlined above with impairments and limitations including weakness, impaired balance, decreased endurance, impaired coordination, gait deviations, pain, decreased activity tolerance, decreased functional mobility tolerance, decreased safety awareness, fall risk, and SOB upon exertion.  The Barthel Index was used as a functional outcome tool presenting with a score of Barthel Index Score: 45 today indicating marked limitations of functional mobility and ADLS.  Patient's clinical presentation is currently unstable/unpredictable as seen in patient's presentation of vital sign response, changing level of pain, increased fall risk, new onset of impairment of functional mobility, decreased endurance, and new onset of weakness. Pt would benefit from continued Physical Therapy treatment to address deficits as defined above and maximize level of functional mobility. As demonstrated by objective findings, the assigned level of complexity for this  "evaluation is high.    The patient's AM-PAC Basic Mobility Inpatient Short Form Raw Score is 17. A Raw score of greater than 16 suggests the patient may benefit from discharge to home. Please also refer to the recommendation of the Physical Therapist for safe discharge planning.   Barriers to Discharge Inaccessible home environment;Decreased caregiver support   Barriers to Discharge Comments Pt lives in a 2 floor home - dtr resides on 2nd floor and needs assist from pt as well as laundry in the basement. 1 NICHOL. Pt with multiple recent falls, 1 as reason for admission. Pt is functioning below baseline level of mobility. Pt is not safe for dc home   Goals   Patient Goals \"to get stronger\"   [facility] Expiration Date 02/27/25   Short Term Goal #1 Patient will: Increase bilateral LE strength 1 grade to facilitate independent mobility, Perform all bed mobility tasks independently to improve pt's independence w/ repositioning for decrease risk of skin breakdown, Perform all transfers independently consistently from various height surfaces in order to improve I w/ engagement w/ real-world environments/situations, Ambulate at least 500+ ft. with least restrictive assistive device independently w/o LOB to facilitate return and engagement w/ previous living environment, Navigate 1 vs 4 stairs independently with unilateral handrail to either improve independence w/ entering home and/or so patient can fully access living areas in home, Increase all balance 1 grade to decrease risk for falls, Tolerate at least 30 consecutive minutes of activity to demonstrate improved activity tolerance and endurance, and Tolerate 3 hr OOB to faciliate upright tolerance   Plan   Treatment/Interventions ADL retraining;Functional transfer training;LE strengthening/ROM;Elevations;Therapeutic exercise;Endurance training;Patient/family training;Equipment eval/education;Bed mobility;Gait training;Compensatory technique education;Spoke to nursing;Spoke to " case management;OT   PT Frequency 3-5x/wk   Discharge Recommendation   Rehab Resource Intensity Level, PT II (Moderate Resource Intensity)   Additional Comments Pending medical optimization, LOS, progress with PT and increased home support, pt may be able to progress to Level III Minimum Resource Intensity   AM-PAC Basic Mobility Inpatient   Turning in Flat Bed Without Bedrails 3   Lying on Back to Sitting on Edge of Flat Bed Without Bedrails 3   Moving Bed to Chair 3   Standing Up From Chair Using Arms 3   Walk in Room 3   Climb 3-5 Stairs With Railing 2   Basic Mobility Inpatient Raw Score 17   Basic Mobility Standardized Score 39.67   [facility] Highest Level Of Mobility   -HL Goal 5: Stand one or more mins   -HLM Achieved 6: Walk 10 steps or more   Barthel Index   Feeding 10   Bathing 0   Grooming Score 0   Dressing Score 5   Bladder Score 5   Bowels Score 10   Toilet Use Score 5   Transfers (Bed/Chair) Score 10   Mobility (Level Surface) Score 0   Stairs Score 0   Barthel Index Score 45   Additional Treatment Session   Start Time 1315   End Time 1330   Treatment Assessment Pt agreeable for additional trial of amb with the RW. Pt trans sit to stand with min A and cues for safe hand placement. Pt amb with the RW 30 feet with change in direction with min A. Pt trans stand to sit with min A and cues for safe hand placement. Pt with self reported fatigue with limited activity. A: Pt with limited tolerance to PT eval and treatment. Pt is weak, not functioning at baseline level of mobility and is at increased risk for falls and subsequent readmission. While adm, pt will cont to benefit from skilled PT services to cont to increase pt's strength, balance, endurance, safe functional mobility and gait. At this time, pt will benefit from cont cont amb with a RW - pt normally amb w/out an AD except for use of cane in the am. When medically stable for dc, pt is appropriate for Level II Moderate Resource Intensity.    End of Consult   Patient Position at End of Consult Bedside chair;Bed/Chair alarm activated;All needs within reach   Licensure   [place] License Number  Anna L Paxton PT

## 2025-02-17 NOTE — CASE MANAGEMENT
Case Management Progress Note    Patient name Josee Vaca  Location /-01 MRN [id number]  : 1950 Date 2025       LOS (days): 2  Geometric Mean LOS (GMLOS) (days): 2.5  Days to GMLOS:0.4        OBJECTIVE:        Current admission status: Inpatient  Preferred Pharmacy:   [id number] [place], [place] - [id number] [facility] [id number]  Phone: [phone] Fax: [phone]    Primary Care Provider: Valarie Gonzales MD    Primary Insurance: MEDICARE  Secondary Insurance: [facility]    PROGRESS NOTE:    Cm advised patient choice is [facility]. [facility] in [place]. Per facility, they can accept tomorrow.

## 2025-02-17 NOTE — PLAN OF CARE
Problem: OCCUPATIONAL THERAPY ADULT  Goal: Performs self-care activities at highest level of function for planned discharge setting.  See evaluation for individualized goals.  Description: Treatment Interventions: ADL retraining, Functional transfer training, UE strengthening/ROM, Endurance training, Cognitive reorientation, Patient/family training, Equipment evaluation/education, Compensatory technique education, Continued evaluation, Energy conservation, Activityengagement          See flowsheet documentation for full assessment, interventions and recommendations.   Note: Limitation: Decreased ADL status, Decreased UE strength, Decreased Safe judgement during ADL, Decreased endurance, Decreased self-care trans, Decreased high-level ADLs  Prognosis: Good  Assessment: Pt is a 74 y.o. female seen for OT evaluation s/p admission to [facility] on [facility]/14/2025 due to fall being down ~8 hours (COVID+)  . Diagnosed with Fall. Personal and env factors supporting pt at time of IE include (I) PLOF, accessible home environment, and FFSU.  PMH: HTN, CKD 2, PAF, sleep apnea, IBS, cervical myelopathy, chronic pain L knee, +hx of falls. Pt was living with her daughter who has seizures in a 2 STH + basement, 1st floor s/u with tub/shower and requires to go to basement for laundry. Pt was Indep for all I/ADLs, Indep ambulation w/o AD +  and caregiver to daughter.  Pt presents today with decreased act kim, balance deficits, generalized weakness, decreased insight into deficits and deconditioning.  Upon initial evaluation, pt appears to be performing below baseline functional status. Personal and env factors inhibiting engagement in occupations include advanced age, limited social support, current role of caregiver for daughter, difficulty completing ADLs, difficulty completing IADLs, and has steps to enter home .  Pt was Sup for bed mobility, Carolyn for sit<>stand and functional ambulation with RW. She was able to  complete grooming standing at sink at Sup level, S/Carolyn for gown management and Carolyn for LB ADLs. Due to pt's current functional limitations and medical complications pt is functioning below baseline. Patient would benefit from OT services within the acute care setting to maximize level of functional independence in the following areas self-care transfers, functional mobility, and ADLs. From OT standpoint, recommendation at time of D/C would be Level II.     Rehab Resource Intensity Level, OT: II (Moderate Resource Intensity)

## 2025-02-18 ENCOUNTER — APPOINTMENT (OUTPATIENT)
Dept: OCCUPATIONAL THERAPY | Facility: CLINIC | Age: 75
End: 2025-02-18
Payer: MEDICARE

## 2025-02-18 VITALS
WEIGHT: 121.38 LBS | DIASTOLIC BLOOD PRESSURE: 85 MMHG | RESPIRATION RATE: 16 BRPM | HEART RATE: 77 BPM | OXYGEN SATURATION: 95 % | BODY MASS INDEX: 22.92 KG/M2 | SYSTOLIC BLOOD PRESSURE: 135 MMHG | TEMPERATURE: 97.2 F | HEIGHT: 61 IN

## 2025-02-18 PROCEDURE — 99239 HOSP IP/OBS DSCHRG MGMT >30: CPT | Performed by: INTERNAL MEDICINE

## 2025-02-18 RX ORDER — ACETAMINOPHEN 325 MG/1
975 TABLET ORAL EVERY 8 HOURS PRN
Qty: 30 TABLET | Refills: 0
Start: 2025-02-18

## 2025-02-18 RX ORDER — ALPRAZOLAM 0.5 MG
1 TABLET ORAL 2 TIMES DAILY PRN
Qty: 5 TABLET | Refills: 0 | Status: SHIPPED | OUTPATIENT
Start: 2025-02-18

## 2025-02-18 RX ORDER — WARFARIN SODIUM 2 MG/1
2 TABLET ORAL
Qty: 10 TABLET | Refills: 0
Start: 2025-02-20

## 2025-02-18 RX ADMIN — METOPROLOL TARTRATE 12.5 MG: 25 TABLET, FILM COATED ORAL at 10:04

## 2025-02-18 RX ADMIN — LEVOTHYROXINE SODIUM 75 MCG: 25 TABLET ORAL at 10:04

## 2025-02-18 RX ADMIN — PANTOPRAZOLE SODIUM 40 MG: 40 TABLET, DELAYED RELEASE ORAL at 10:03

## 2025-02-18 RX ADMIN — DESVENLAFAXINE 50 MG: 50 TABLET, FILM COATED, EXTENDED RELEASE ORAL at 10:04

## 2025-02-18 NOTE — ASSESSMENT & PLAN NOTE
Lab Results   Component Value Date    EGFR 84 02/17/2025    EGFR 85 02/15/2025    EGFR 81 02/14/2025    CREATININE 0.70 02/17/2025    CREATININE 0.68 02/15/2025    CREATININE 0.72 02/14/2025     Creatinine at baseline

## 2025-02-18 NOTE — ASSESSMENT & PLAN NOTE
Patient with history of paroxysmal A-fib on warfarin  Patient INR still supratherapeutic.  Hold Coumadin on discharge.  Plan to restart after 2 days after getting repeat INR.

## 2025-02-18 NOTE — ASSESSMENT & PLAN NOTE
Patient with PMH of hypertension, hyperlipidemia, hypothyroidism, anxiety, A-fib on warfarin who presents with fall.    Patient slipped from the bed to the floor.    Patient denied any loss of consciousness or head trauma.    Patient felt weak and unable to stand up.    Patient reported weakness after being diagnosed with COVID.  Patient was recently discharged from [facility] due to ambulatory function and weakness after COVID-19 infection.    Evaluated by PT and OT.  Medically stable for discharge to rehab.

## 2025-02-18 NOTE — PLAN OF CARE
Problem: Prexisting or High Potential for Compromised Skin Integrity  Goal: Skin integrity is maintained or improved  Description: INTERVENTIONS:  - Identify patients at risk for skin breakdown  - Assess and monitor skin integrity  - Assess and monitor nutrition and hydration status  - Monitor labs   - Assess for incontinence   - Turn and reposition patient  - Assist with mobility/ambulation  - Relieve pressure over bony prominences  - Avoid friction and shearing  - Provide appropriate hygiene as needed including keeping skin clean and dry  - Evaluate need for skin moisturizer/barrier cream  - Collaborate with interdisciplinary team   - Patient/family teaching  - Consider wound care consult   Outcome: Progressing     Problem: Potential for Falls  Goal: Patient will remain free of falls  Description: INTERVENTIONS:  - Educate patient/family on patient safety including physical limitations  - Instruct patient to call for assistance with activity   - Consult OT/PT to assist with strengthening/mobility   - Keep Call bell within reach  - Keep bed low and locked with side rails adjusted as appropriate  - Keep care items and personal belongings within reach  - Initiate and maintain comfort rounds  - Make Fall Risk Sign visible to staff  - Offer Toileting every 2 Hours, in advance of need  - Initiate/Maintain bed alarm  - Obtain necessary fall risk management equipment: alarmed, yellow socks/bracelet  - Apply yellow socks and bracelet for high fall risk patients  - Consider moving patient to room near nurses station  Outcome: Progressing     Problem: PAIN - ADULT  Goal: Verbalizes/displays adequate comfort level or baseline comfort level  Description: Interventions:  - Encourage patient to monitor pain and request assistance  - Assess pain using appropriate pain scale  - Administer analgesics based on type and severity of pain and evaluate response  - Implement non-pharmacological measures as appropriate and evaluate  response  - Consider cultural and social influences on pain and pain management  - Notify physician/advanced practitioner if interventions unsuccessful or patient reports new pain  Outcome: Progressing     Problem: INFECTION - ADULT  Goal: Absence or prevention of progression during hospitalization  Description: INTERVENTIONS:  - Assess and monitor for signs and symptoms of infection  - Monitor lab/diagnostic results  - Monitor all insertion sites, i.e. indwelling lines, tubes, and drains  - Monitor endotracheal if appropriate and nasal secretions for changes in amount and color  - Bricelyn appropriate cooling/warming therapies per order  - Administer medications as ordered  - Instruct and encourage patient and family to use good hand hygiene technique  - Identify and instruct in appropriate isolation precautions for identified infection/condition  Outcome: Progressing  Goal: Absence of fever/infection during neutropenic period  Description: INTERVENTIONS:  - Monitor WBC    Outcome: Progressing     Problem: SAFETY ADULT  Goal: Patient will remain free of falls  Description: INTERVENTIONS:  - Educate patient/family on patient safety including physical limitations  - Instruct patient to call for assistance with activity   - Consult OT/PT to assist with strengthening/mobility   - Keep Call bell within reach  - Keep bed low and locked with side rails adjusted as appropriate  - Keep care items and personal belongings within reach  - Initiate and maintain comfort rounds  - Make Fall Risk Sign visible to staff  - Offer Toileting every 2 Hours, in advance of need  - Initiate/Maintain bed alarm  - Obtain necessary fall risk management equipment: call bell in reach, alarmed, yellow socks/bracelet  - Apply yellow socks and bracelet for high fall risk patients  - Consider moving patient to room near nurses station  Outcome: Progressing  Goal: Maintain or return to baseline ADL function  Description: INTERVENTIONS:  -  Assess  patient's ability to carry out ADLs; assess patient's baseline for ADL function and identify physical deficits which impact ability to perform ADLs (bathing, care of mouth/teeth, toileting, grooming, dressing, etc.)  - Assess/evaluate cause of self-care deficits   - Assess range of motion  - Assess patient's mobility; develop plan if impaired  - Assess patient's need for assistive devices and provide as appropriate  - Encourage maximum independence but intervene and supervise when necessary  - Involve family in performance of ADLs  - Assess for home care needs following discharge   - Consider OT consult to assist with ADL evaluation and planning for discharge  - Provide patient education as appropriate  Outcome: Progressing  Goal: Maintains/Returns to pre admission functional level  Description: INTERVENTIONS:  - Perform AM-PAC 6 Click Basic Mobility/ Daily Activity assessment daily.  - Set and communicate daily mobility goal to care team and patient/family/caregiver.   - Collaborate with rehabilitation services on mobility goals if consulted  - Perform Range of Motion 3 times a day.  - Reposition patient every 2 hours.  - Dangle patient 3 times a day  - Stand patient 3 times a day  - Ambulate patient 3 times a day  - Out of bed to chair 3 times a day   - Out of bed for meals 3 times a day  - Out of bed for toileting  - Record patient progress and toleration of activity level   Outcome: Progressing     Problem: DISCHARGE PLANNING  Goal: Discharge to home or other facility with appropriate resources  Description: INTERVENTIONS:  - Identify barriers to discharge w/patient and caregiver  - Arrange for needed discharge resources and transportation as appropriate  - Identify discharge learning needs (meds, wound care, etc.)  - Arrange for interpretive services to assist at discharge as needed  - Refer to Case Management Department for coordinating discharge planning if the patient needs post-hospital services based on  physician/advanced practitioner order or complex needs related to functional status, cognitive ability, or social support system  Outcome: Progressing     Problem: Knowledge Deficit  Goal: Patient/family/caregiver demonstrates understanding of disease process, treatment plan, medications, and discharge instructions  Description: Complete learning assessment and assess knowledge base.  Interventions:  - Provide teaching at level of understanding  - Provide teaching via preferred learning methods  Outcome: Progressing

## 2025-02-18 NOTE — DISCHARGE SUMMARY
Discharge Summary - Hospitalist   Name: Josee Vaca 74 y.o. female I MRN: [id number]  Unit/Bed#: -01 I Date of Admission: 2/14/2025   Date of Service: 2/18/2025 I Hospital Day: 3     Assessment & Plan  Fall  Patient with PMH of hypertension, hyperlipidemia, hypothyroidism, anxiety, A-fib on warfarin who presents with fall.    Patient slipped from the bed to the floor.    Patient denied any loss of consciousness or head trauma.    Patient felt weak and unable to stand up.    Patient reported weakness after being diagnosed with COVID.  Patient was recently discharged from [facility] due to ambulatory function and weakness after COVID-19 infection.    Evaluated by PT and OT.  Medically stable for discharge to rehab.  Ambulatory dysfunction  See management under fall  Anxiety  Continue desvenlafaxine and Xanax as needed  CKD (chronic kidney disease) stage 2, GFR 60-89 ml/min  Lab Results   Component Value Date    EGFR 84 02/17/2025    EGFR 85 02/15/2025    EGFR 81 02/14/2025    CREATININE 0.70 02/17/2025    CREATININE 0.68 02/15/2025    CREATININE 0.72 02/14/2025     Creatinine at baseline  Paroxysmal A-fib (HCC)  Patient with history of paroxysmal A-fib on warfarin  Patient INR still supratherapeutic.  Hold Coumadin on discharge.  Plan to restart after 2 days after getting repeat INR.  COVID-19  Diagnosed on 2/10 with COVID status post receiving IV remdesivir  Now stable from COVID standpoint.  Hyperlipidemia  Continue statin  Primary hypertension  Continue metoprolol  Hypothyroidism  On levothyroxine  TSH is 8  Continue levothyroxine       Discharging Physician / Practitioner: Alexei Benavidez MD  PCP: Valarie Gonzales MD  Admission Date:   Admission Orders (From admission, onward)       Ordered        02/15/25 1337  INPATIENT ADMISSION  Once            02/14/25 1940  Place in Observation  Once                          Discharge Date: 02/18/25    Medical Problems       Resolved Problems  Date Reviewed:  "2/15/2025   None           Reason for Admission: fall    Hospital Course:     Josee Vaca is a 74 y.o. female patient who originally presented to the hospital on 2/14/2025 due to all from ambulatory dysfunction/weakness from recent COVID infection.  Otherwise asymptomatic from COVID standpoint.  Medically stable.  Evaluated by PT and OT recommended rehab.  Patient and family agreeable.  Patient to be discharged to rehab.  For more details refer above.    Please see above list of diagnoses and related plan for additional information.     Condition at Discharge: good     Discharge Day Visit / Exam:     Subjective:  Pt seen and examined by me in morning.  Planing of feeling tired and fatigued.  No other specific complaints    Vitals: Blood Pressure: 135/85 (02/18/25 0813)  Pulse: 77 (02/18/25 0813)  Temperature: (!) 97.2 °F (36.2 °C) (02/18/25 0813)  Temp Source: Tympanic (02/18/25 0813)  Respirations: 16 (02/18/25 0813)  Height: 5' 1\" (154.9 cm) (02/16/25 0844)  Weight - Scale: 55.1 kg (121 lb 6 oz) (02/16/25 0844)  SpO2: 95 % (02/18/25 0813)  Exam:   Physical Exam    Constitutional: Pt appears comfortable. Not in any acute distress.  Cardiovascular: Normal rate, regular rhythm, normal heart sounds.  No murmur heard.  Pulmonary/Chest: Effort normal, air entry b/l equal. No respiratory distress. Pt has no wheezes or crackles.   Abdominal: Soft. Non-distended, Non-tender. Bowel sounds are normal.   Neurological: awake, alert. Moving all extremities spontaneously.  Psychiatric: normal mood and affect.      Discussion with Family: called and updated daughter    Discharge instructions/Information to patient and family:   See after visit summary for information provided to patient and family.      Provisions for Follow-Up Care:  See after visit summary for information related to follow-up care and any pertinent home health orders.      Disposition:     Other: [facility] [place] skilled nursing    Planned Readmission: no   "   Discharge Statement:  I spent 35 minutes discharging the patient. This time was spent on the day of discharge. I had direct contact with the patient on the day of discharge. Greater than 50% of the total time was spent examining patient, answering all patient questions, arranging and discussing plan of care with patient as well as directly providing post-discharge instructions.  Additional time then spent on discharge activities.    Discharge Medications:  See after visit summary for reconciled discharge medications provided to patient and family.      ** Please Note: This note has been constructed using a voice recognition system **

## 2025-02-18 NOTE — ASSESSMENT & PLAN NOTE
Diagnosed on 2/10 with COVID status post receiving IV remdesivir  Now stable from COVID standpoint.

## 2025-02-18 NOTE — PLAN OF CARE
Problem: Prexisting or High Potential for Compromised Skin Integrity  Goal: Skin integrity is maintained or improved  Description: INTERVENTIONS:  - Identify patients at risk for skin breakdown  - Assess and monitor skin integrity  - Assess and monitor nutrition and hydration status  - Monitor labs   - Assess for incontinence   - Turn and reposition patient  - Assist with mobility/ambulation  - Relieve pressure over bony prominences  - Avoid friction and shearing  - Provide appropriate hygiene as needed including keeping skin clean and dry  - Evaluate need for skin moisturizer/barrier cream  - Collaborate with interdisciplinary team   - Patient/family teaching  - Consider wound care consult   Outcome: Progressing     Problem: Potential for Falls  Goal: Patient will remain free of falls  Description: INTERVENTIONS:  - Educate patient/family on patient safety including physical limitations  - Instruct patient to call for assistance with activity   - Consult OT/PT to assist with strengthening/mobility   - Keep Call bell within reach  - Keep bed low and locked with side rails adjusted as appropriate  - Keep care items and personal belongings within reach  - Initiate and maintain comfort rounds  - Make Fall Risk Sign visible to staff  - Offer Toileting every 2 Hours, in advance of need  - Initiate/Maintain alarm  - Obtain necessary fall risk management equipment:   - Apply yellow socks and bracelet for high fall risk patients  - Consider moving patient to room near nurses station  Outcome: Progressing     Problem: PAIN - ADULT  Goal: Verbalizes/displays adequate comfort level or baseline comfort level  Description: Interventions:  - Encourage patient to monitor pain and request assistance  - Assess pain using appropriate pain scale  - Administer analgesics based on type and severity of pain and evaluate response  - Implement non-pharmacological measures as appropriate and evaluate response  - Consider cultural and  social influences on pain and pain management  - Notify physician/advanced practitioner if interventions unsuccessful or patient reports new pain  Outcome: Progressing     Problem: INFECTION - ADULT  Goal: Absence or prevention of progression during hospitalization  Description: INTERVENTIONS:  - Assess and monitor for signs and symptoms of infection  - Monitor lab/diagnostic results  - Monitor all insertion sites, i.e. indwelling lines, tubes, and drains  - Monitor endotracheal if appropriate and nasal secretions for changes in amount and color  - [place] appropriate cooling/warming therapies per order  - Administer medications as ordered  - Instruct and encourage patient and family to use good hand hygiene technique  - Identify and instruct in appropriate isolation precautions for identified infection/condition  Outcome: Progressing     Problem: SAFETY ADULT  Goal: Patient will remain free of falls  Description: INTERVENTIONS:  - Educate patient/family on patient safety including physical limitations  - Instruct patient to call for assistance with activity   - Consult OT/PT to assist with strengthening/mobility   - Keep Call bell within reach  - Keep bed low and locked with side rails adjusted as appropriate  - Keep care items and personal belongings within reach  - Initiate and maintain comfort rounds  - Make Fall Risk Sign visible to staff  - Offer Toileting every 2 Hours, in advance of need  - Initiate/Maintain alarm  - Obtain necessary fall risk management equipment:   - Apply yellow socks and bracelet for high fall risk patients  - Consider moving patient to room near nurses station  Outcome: Progressing  Goal: Maintain or return to baseline ADL function  Description: INTERVENTIONS:  -  Assess patient's ability to carry out ADLs; assess patient's baseline for ADL function and identify physical deficits which impact ability to perform ADLs (bathing, care of mouth/teeth, toileting, grooming, dressing,  etc.)  - Assess/evaluate cause of self-care deficits   - Assess range of motion  - Assess patient's mobility; develop plan if impaired  - Assess patient's need for assistive devices and provide as appropriate  - Encourage maximum independence but intervene and supervise when necessary  - Involve family in performance of ADLs  - Assess for home care needs following discharge   - Consider OT consult to assist with ADL evaluation and planning for discharge  - Provide patient education as appropriate  Outcome: Progressing  Goal: Maintains/Returns to pre admission functional level  Description: INTERVENTIONS:  - Perform AM-PAC 6 Click Basic Mobility/ Daily Activity assessment daily.  - Set and communicate daily mobility goal to care team and patient/family/caregiver.   - Collaborate with rehabilitation services on mobility goals if consulted  - Perform Range of Motion 4 times a day.  - Reposition patient every 2 hours.  - Dangle patient 3 times a day  - Stand patient 3 times a day  - Ambulate patient 3 times a day  - Out of bed to chair 3 times a day   - Out of bed for meals 3 times a day  - Out of bed for toileting  - Record patient progress and toleration of activity level   Outcome: Progressing     Problem: DISCHARGE PLANNING  Goal: Discharge to home or other facility with appropriate resources  Description: INTERVENTIONS:  - Identify barriers to discharge w/patient and caregiver  - Arrange for needed discharge resources and transportation as appropriate  - Identify discharge learning needs (meds, wound care, etc.)  - Arrange for interpretive services to assist at discharge as needed  - Refer to Case Management Department for coordinating discharge planning if the patient needs post-hospital services based on physician/advanced practitioner order or complex needs related to functional status, cognitive ability, or social support system  Outcome: Progressing     Problem: Knowledge Deficit  Goal:  Patient/family/caregiver demonstrates understanding of disease process, treatment plan, medications, and discharge instructions  Description: Complete learning assessment and assess knowledge base.  Interventions:  - Provide teaching at level of understanding  - Provide teaching via preferred learning methods  Outcome: Progressing

## 2025-02-18 NOTE — CASE MANAGEMENT
Case Management Discharge Planning Note    Patient name Josee Vaca  Location /-01 MRN [id number]  : 1950 Date 2025       Current Admission Date: 2025  Current Admission Diagnosis:Fall   Patient Active Problem List    Diagnosis Date Noted Date Diagnosed    Fall 2025     Ambulatory dysfunction 02/10/2025     COVID-19 02/10/2025     De Quervain's tenosynovitis 2025     Numbness and tingling 2025     Hand arthritis 2025     DRUJ (distal radioulnar joint) arthrosis, primary, right 2025     Primary osteoarthritis of first carpometacarpal joint of right hand 2025     Arthritis of left knee 2024     Patellofemoral disorder of right knee 2024     Cervical myelopathy (HCC) 2024     Impairment of balance 2024     Closed fracture of shaft of left fibula 2024     Chronic pain of left knee 2024     Depression, recurrent (HCC) 2023     Episode of recurrent major depressive disorder, unspecified depression episode severity (HCC) 2023     Irritable bowel syndrome with diarrhea 2023     Dysphagia 2023     Hemorrhoids 2023     Schatzki's ring 2023     Chronic anticoagulation 10/26/2022     Olecranon bursitis of right elbow 2022     Transaminitis 2022     Hyperlipidemia 2022     Sleep apnea 2022     Paroxysmal A-fib (HCC) 2022     Cellulitis 2022     CKD (chronic kidney disease) stage 2, GFR 60-89 ml/min 2022     Hypothyroidism 2022     Gastroesophageal reflux disease without esophagitis 12/15/2021     Anxiety 12/15/2021     Primary hypertension 12/15/2021     Cervical vertebral fusion 06/10/2021     Pre-op examination 2021     Occipital neuralgia of left side      Spondylosis of cervical region without myelopathy or radiculopathy      Neck pain 2020       LOS (days): 3  Geometric Mean LOS (GMLOS) (days): 2.5  Days to GMLOS:-0.4      OBJECTIVE:  Risk of Unplanned Readmission Score: 11.77     Current admission status: Inpatient   Preferred Pharmacy:   [id number] - [place], [place] - [address] [facility]  [address] [facility] [place] [address]  Phone: [phone] Fax: [phone]    Primary Care Provider: Valarie Gonzales MD    Primary Insurance: MEDICARE  Secondary Insurance: [employer]    DISCHARGE DETAILS:    Discharge planning discussed with:: Patient, Patient's Sister  Freedom of Choice: Yes  Comments - Freedom of Choice: Choice for STR @ [facility] contacted family/caregiver?: Yes  Were Treatment Team discharge recommendations reviewed with patient/caregiver?: Yes  Did patient/caregiver verbalize understanding of patient care needs?: N/A- going to facility  Were patient/caregiver advised of the risks associated with not following Treatment Team discharge recommendations?: Yes    Requested Home Health Care         Is the patient interested in HHC at discharge?: No    DME Referral Provided  Referral made for DME?: No    Other Referral/Resources/Interventions Provided:  Interventions: None Indicated    Would you like to participate in our [facility] service program?  : No - Declined    Treatment Team Recommendation: Short Term Rehab  Discharge Destination Plan:: Short Term Rehab  Transport at Discharge : [facility]    ETA of Transport (Date): 25  ETA of Transport (Time): 1300    Accepting Facility Name, City & State : [facility]: [address] [place] [id number]  Receiving Facility/Agency Phone Number: [phone]  Facility/Agency Fax Number: [phone]

## 2025-02-19 ENCOUNTER — TRANSITIONAL CARE MANAGEMENT (OUTPATIENT)
Dept: FAMILY MEDICINE CLINIC | Facility: CLINIC | Age: 75
End: 2025-02-19

## 2025-02-25 ENCOUNTER — APPOINTMENT (OUTPATIENT)
Dept: OCCUPATIONAL THERAPY | Facility: CLINIC | Age: 75
End: 2025-02-25
Payer: MEDICARE

## 2025-03-17 DIAGNOSIS — F33.9 EPISODE OF RECURRENT MAJOR DEPRESSIVE DISORDER, UNSPECIFIED DEPRESSION EPISODE SEVERITY (HCC): ICD-10-CM

## 2025-03-18 RX ORDER — DESVENLAFAXINE 50 MG/1
50 TABLET, FILM COATED, EXTENDED RELEASE ORAL DAILY
Qty: 90 TABLET | Refills: 1 | Status: SHIPPED | OUTPATIENT
Start: 2025-03-18

## 2025-03-27 ENCOUNTER — TELEPHONE (OUTPATIENT)
Dept: ADMINISTRATIVE | Facility: OTHER | Age: 75
End: 2025-03-27

## 2025-03-27 NOTE — TELEPHONE ENCOUNTER
03/27/25 11:14 AM    Patient contacted to bring Advance Directive, POLST, or Living Will document to next scheduled pcp visit.[facility] Department left message.    Thank you.  Nevaeh George MA  [facility]

## 2025-03-28 ENCOUNTER — OFFICE VISIT (OUTPATIENT)
Dept: FAMILY MEDICINE CLINIC | Facility: CLINIC | Age: 75
End: 2025-03-28
Payer: MEDICARE

## 2025-03-28 VITALS
WEIGHT: 124 LBS | OXYGEN SATURATION: 95 % | BODY MASS INDEX: 23.41 KG/M2 | HEART RATE: 59 BPM | DIASTOLIC BLOOD PRESSURE: 68 MMHG | SYSTOLIC BLOOD PRESSURE: 130 MMHG | HEIGHT: 61 IN

## 2025-03-28 DIAGNOSIS — R26.2 AMBULATORY DYSFUNCTION: ICD-10-CM

## 2025-03-28 DIAGNOSIS — M47.812 SPONDYLOSIS OF CERVICAL REGION WITHOUT MYELOPATHY OR RADICULOPATHY: ICD-10-CM

## 2025-03-28 DIAGNOSIS — F41.9 ANXIETY: ICD-10-CM

## 2025-03-28 DIAGNOSIS — I10 PRIMARY HYPERTENSION: Primary | ICD-10-CM

## 2025-03-28 DIAGNOSIS — I48.0 PAROXYSMAL A-FIB (HCC): ICD-10-CM

## 2025-03-28 DIAGNOSIS — G95.9 CERVICAL MYELOPATHY (HCC): ICD-10-CM

## 2025-03-28 DIAGNOSIS — E03.9 HYPOTHYROIDISM, UNSPECIFIED TYPE: ICD-10-CM

## 2025-03-28 DIAGNOSIS — R26.89 IMPAIRMENT OF BALANCE: ICD-10-CM

## 2025-03-28 DIAGNOSIS — E78.2 MIXED HYPERLIPIDEMIA: ICD-10-CM

## 2025-03-28 PROCEDURE — G2211 COMPLEX E/M VISIT ADD ON: HCPCS | Performed by: INTERNAL MEDICINE

## 2025-03-28 PROCEDURE — 99214 OFFICE O/P EST MOD 30 MIN: CPT | Performed by: INTERNAL MEDICINE

## 2025-03-28 NOTE — PROGRESS NOTES
Assessment/Plan: Told Josee to try taking a B12 supplement for energy - has to be careful about not falling         Problem List Items Addressed This Visit       Spondylosis of cervical region without myelopathy or radiculopathy    Anxiety    On Prisqtiq now         Primary hypertension - Primary    Hypothyroidism    Hyperlipidemia    On atorvastatin          Paroxysmal A-fib (HCC)    On coumadin          Cervical myelopathy (HCC)    Impairment of balance    Ambulatory dysfunction    Doing better, has a walker at home to use              Subjective:      Patient ID: Josee Vaca is a 74 y.o. female.    Josee here s/p hospitalization for Covid and weakness then had to be re-hospitalized and then went to rehab facility -had PT and OT -also has a hx of a fib, ambulatory dysfunction, HTN, HL-doing ok at home, still wobbly at home, and is thinking about getting a shower chair-lives with her daughter Jessi who is disabled and her 2 dogs-wondering if she can get on something to help her energy        The following portions of the patient's history were reviewed and updated as appropriate:   Past Medical History:  She has a past medical history of A-fib (HCC), Anxiety, Arthritis, Carpal tunnel syndrome, Colon polyp, Depression, Disease of thyroid gland, Diverticulitis, Esophageal stricture, HBP (high blood pressure), Hiatal hernia (06/07/2022), Hyperlipidemia, Hypertension, Hypothyroid, Pneumonia, PONV (postoperative nausea and vomiting), Schatzki's ring (06/07/2022), and Sleep apnea.,  _______________________________________________________________________  Medical Problems:  does not have any pertinent problems on file.,  _______________________________________________________________________  Past Surgical History:   has a past surgical history that includes Breast cyst aspiration (Right); Breast biopsy (Right); Appendectomy; Cholecystectomy; Colonoscopy w/ biopsies (2011); Mandible surgery; [place] tooth extraction;  EGD (06/07/2022); Cataract extraction, bilateral (2019); Mammo (historical) (Bilateral, 11/27/2018); Mammo (historical) (Bilateral, 10/27/2017); Colonoscopy (06/07/2022); Cataract extraction (Bilateral); Hand surgery (Right); Cervical fusion (N/A, 05/25/2021); Mandible surgery; pr surgical arthroscopy shoulder w/rotator cuff rpr (Right, 09/27/2022); and Breast surgery.,  _______________________________________________________________________  Family History:  family history includes Heart disease in her mother; Heart failure in her mother; Hyperlipidemia in her mother; Hypertension in her mother and sister.,  _______________________________________________________________________  Social History:   reports that she quit smoking about 21 years ago. Her smoking use included cigarettes. She has never used smokeless tobacco. She reports that she does not currently use alcohol. She reports that she does not currently use drugs.,  _______________________________________________________________________  Allergies:  has no known allergies..  _______________________________________________________________________  Current Outpatient Medications   Medication Sig Dispense Refill    acetaminophen (TYLENOL) 325 mg tablet Take 3 tablets (975 mg total) by mouth every 8 (eight) hours as needed for mild pain or moderate pain 30 tablet 0    ALPRAZolam (XANAX) 0.5 mg tablet Take 2 tablets (1 mg total) by mouth 2 (two) times a day as needed for anxiety 5 tablet 0    atorvastatin (LIPITOR) 10 mg tablet TAKE 1 TABLET(10 MG) BY MOUTH DAILY AT BEDTIME 90 tablet 1    colestipol (COLESTID) 1 g tablet Take 2 tablets (2 g total) by mouth in the morning 2 hours separate from other meds 60 tablet 5    desvenlafaxine succinate (PRISTIQ) 50 mg 24 hr tablet TAKE 1 TABLET(50 MG) BY MOUTH DAILY 90 tablet 1    Diclofenac Sodium (VOLTAREN) 1 % Apply 2 g topically 4 (four) times a day 240 g 3    levothyroxine 75 mcg tablet Take 1 tablet (75 mcg total)  "by mouth daily 30 tablet 5    metoprolol tartrate (LOPRESSOR) 25 mg tablet Take 0.5 tablets (12.5 mg total) by mouth every 12 (twelve) hours 180 tablet 3    Multiple Vitamins-Minerals (CENTRUM ADULTS PO) Take 1 tablet by mouth daily      tiZANidine (ZANAFLEX) 2 mg tablet Take 1 tablet (2 mg total) by mouth every 8 (eight) hours as needed for muscle spasms 20 tablet 0    warfarin (COUMADIN) 2 mg tablet Take 1 tablet (2 mg total) by mouth daily Recheck INR in 1-2 days and start once INR less than 2.5. Monitor INR closely after that. Goal INR 2-3. Do not start before February 20, 2025. 10 tablet 0    pantoprazole (PROTONIX) 40 mg tablet Take 1 tablet (40 mg total) by mouth daily 90 tablet 2     No current facility-administered medications for this visit.     _______________________________________________________________________  Review of Systems   Constitutional:  Positive for fatigue.   Respiratory: Negative.     Cardiovascular: Negative.    Gastrointestinal: Negative.    Musculoskeletal:  Positive for neck pain.   Neurological:  Positive for weakness.   Hematological: Negative.    Psychiatric/Behavioral:  The patient is nervous/anxious.          Objective:  Vitals:    03/28/25 0948   BP: 130/68   BP Location: Left arm   Patient Position: Sitting   Cuff Size: Standard   Pulse: 59   SpO2: 95%   Weight: 56.2 kg (124 lb)   Height: 5' 1\" (1.549 m)     Body mass index is 23.43 kg/m².     Physical Exam  Constitutional:       Appearance: Normal appearance.   HENT:      Head: Normocephalic and atraumatic.      Right Ear: External ear normal.      Left Ear: External ear normal.      Nose: Nose normal.      Mouth/Throat:      Mouth: Mucous membranes are moist.   Eyes:      Extraocular Movements: Extraocular movements intact.   Cardiovascular:      Rate and Rhythm: Normal rate and regular rhythm.      Heart sounds: Normal heart sounds.   Pulmonary:      Effort: Pulmonary effort is normal.      Breath sounds: Normal breath " sounds.   Musculoskeletal:         General: Normal range of motion.      Cervical back: Normal range of motion and neck supple.   Skin:     General: Skin is warm.   Neurological:      General: No focal deficit present.      Mental Status: She is alert and oriented to person, place, and time.   Psychiatric:         Mood and Affect: Mood normal.         Thought Content: Thought content normal.

## 2025-04-03 ENCOUNTER — OFFICE VISIT (OUTPATIENT)
Dept: OBGYN CLINIC | Facility: CLINIC | Age: 75
End: 2025-04-03
Payer: MEDICARE

## 2025-04-03 VITALS — WEIGHT: 124 LBS | HEIGHT: 61 IN | BODY MASS INDEX: 23.41 KG/M2

## 2025-04-03 DIAGNOSIS — M65.4 DE QUERVAIN'S TENOSYNOVITIS: Primary | ICD-10-CM

## 2025-04-03 PROCEDURE — 99213 OFFICE O/P EST LOW 20 MIN: CPT | Performed by: ORTHOPAEDIC SURGERY

## 2025-04-03 PROCEDURE — 20550 NJX 1 TENDON SHEATH/LIGAMENT: CPT | Performed by: ORTHOPAEDIC SURGERY

## 2025-04-03 RX ORDER — BUPIVACAINE HYDROCHLORIDE 2.5 MG/ML
0.5 INJECTION, SOLUTION INFILTRATION; PERINEURAL
Status: COMPLETED | OUTPATIENT
Start: 2025-04-03 | End: 2025-04-03

## 2025-04-03 RX ORDER — BETAMETHASONE SODIUM PHOSPHATE AND BETAMETHASONE ACETATE 3; 3 MG/ML; MG/ML
3 INJECTION, SUSPENSION INTRA-ARTICULAR; INTRALESIONAL; INTRAMUSCULAR; SOFT TISSUE
Status: COMPLETED | OUTPATIENT
Start: 2025-04-03 | End: 2025-04-03

## 2025-04-03 RX ADMIN — BETAMETHASONE SODIUM PHOSPHATE AND BETAMETHASONE ACETATE 3 MG: 3; 3 INJECTION, SUSPENSION INTRA-ARTICULAR; INTRALESIONAL; INTRAMUSCULAR; SOFT TISSUE at 14:00

## 2025-04-03 RX ADMIN — BUPIVACAINE HYDROCHLORIDE 0.5 ML: 2.5 INJECTION, SOLUTION INFILTRATION; PERINEURAL at 14:00

## 2025-04-03 NOTE — ASSESSMENT & PLAN NOTE
Repeat De Quervain's CSI  Voltaren gel up to 4 times daily on wrist  Thumb spica brace PRN  Orders:    Hand/upper extremity injection: R extensor compartment 1    Brace

## 2025-04-03 NOTE — PROGRESS NOTES
Name: Josee Vaca      : 1950      MRN: [id number]  Encounter Provider: Christiano Bruce DO  Encounter Date: 4/3/2025   Encounter department: [facility]  :  Assessment & Plan  De Quervain's tenosynovitis  Repeat De Quervain's CSI  Voltaren gel up to 4 times daily on wrist  Thumb spica brace PRN  Orders:    Hand/upper extremity injection: R extensor compartment 1    Brace        Plan:  Josee Vaca is a 74 y.o. female with right thumb CMC arthritis  Patient was offered, accepted, and received a cortisone injection of the first extensor compartment today. Patient tolerated procedure well with no immediate complications. Post-injection protocols and expectations were discussed with the patient.   Continue Physical Therapy until discharged from their care, transition to home exercise program  Apply Voltaren gel to painful area up to 4 times a day  May use ice or heat as needed for pain relief     Subjective:    Patient ID:  Josee Vaca 74 y.o. female    Josee Vaca is a 74 y.o. female who presents today for follow-up evaluation of right thumb pain. She was last seen on 2025 and received a corticosteroid injection for De Quervain's tenosynovitis. She reports 2 months of relief from the injection. Since then she has been unable to attend OT hand therapy due to being in and out of the hospital with COVID. Today, she indicates her pain is located over the first right CMC joint. She feels lifting the hand weights at therapy while she was at the rehab facility aggravated her pain.      The following portions of the patient's history were reviewed and updated as appropriate: allergies, current medications, past family history, past social history, past surgical history and problem list.      Social History     Socioeconomic History    Marital status: /Civil Union     Spouse name: Not on file    Number of children: Not on file    Years of education: Not on file     Highest education level: Not on file   Occupational History    Not on file   Tobacco Use    Smoking status: Former     Current packs/day: 0.00     Types: Cigarettes     Quit date: 2004     Years since quittin.2    Smokeless tobacco: Never   Vaping Use    Vaping status: Never Used   Substance and Sexual Activity    Alcohol use: Not Currently     Comment: last drink 7 years ago    Drug use: Not Currently    Sexual activity: Not Currently   Other Topics Concern    Not on file   Social History Narrative    Do you currently or have you served in the [employer]: No    Were you activated, into active duty, as a member of the [employer] or as a Reservist: No    Occupation: retired    Marital status:     Exercise level: Occasional treadmill    General stress level: Low    Alcohol intake: None stopped 7 years ago    Caffeine intake: Moderate    Chewing tobacco: none    Illicit drugs: none    Guns present in home: No    Seat belts used routinely: Yes    Sunscreen used routinely: Yes    Smoke alarm in home: Yes    Advance directive: Yes     Social Drivers of Health     Financial Resource Strain: Low Risk  (2023)    Overall Financial Resource Strain (CARDIA)     Difficulty of Paying Living Expenses: Not hard at all   Food Insecurity: No Food Insecurity (2025)    Nursing - Inadequate Food Risk Classification     Worried About Running Out of Food in the Last Year: Not on file     Ran Out of Food in the Last Year: Not on file     Ran Out of Food in the Last Year: Never true   Transportation Needs: No Transportation Needs (2025)    Nursing - Transportation Risk Classification     Lack of Transportation: Not on file     Lack of Transportation: No   Physical Activity: Not on file   Stress: Not on file   Social Connections: Not on file   Intimate Partner Violence: Unknown (2025)    Nursing IPS     Feels Physically and Emotionally Safe: Not on file     Physically Hurt by Someone: Not on file      Humiliated or Emotionally Abused by Someone: Not on file     Physically Hurt by Someone: No     Hurt or Threatened by Someone: No   Housing Stability: Unknown (2025)    Nursing: Inadequate Housing Risk Classification     Has Housing: Not on file     Worried About Losing Housing: Not on file     Unable to Get Utilities: Not on file     Unable to Pay for Housing in the Last Year: No     Has Housin     Past Medical History:   Diagnosis Date    A-fib (HCC)     Anxiety     Arthritis     shoulder    Carpal tunnel syndrome     Colon polyp     Depression     Disease of thyroid gland     Diverticulitis     Esophageal stricture     HBP (high blood pressure)     Hiatal hernia 2022    Small hiatal hernia    Hyperlipidemia     Hypertension     Hypothyroid     Pneumonia         PONV (postoperative nausea and vomiting)     Schatzki's ring 2022    Schatzki's ring in the lower esophagus    Sleep apnea     mild      Past Surgical History:   Procedure Laterality Date    APPENDECTOMY      BREAST BIOPSY Right     benign    BREAST CYST ASPIRATION Right     benign    BREAST SURGERY      milk duct removed    CATARACT EXTRACTION Bilateral     CATARACT EXTRACTION, BILATERAL  2019    CERVICAL FUSION N/A 2021    Procedure: FUSION CERVICAL POSTERIOR C1-C2 with allograft and neuromonitoring);  Surgeon: Wesley Camacho MD;  Location:  MAIN OR;  Service: Orthopedics    CHOLECYSTECTOMY      COLONOSCOPY  2022    COLONOSCOPY W/ BIOPSIES      EGD  2022    HAND SURGERY Right     MAMMO (HISTORICAL) Bilateral 2018    MAMMO (HISTORICAL) Bilateral 10/27/2017    MANDIBLE SURGERY      MANDIBLE SURGERY      [place] SURGICAL ARTHROSCOPY SHOULDER W/ROTATOR CUFF RPR Right 2022    Procedure: ARTHROSCOPIC ROTATOR CUFF REPAIR OF SUBSCAPULARIS BICEPS TENODESIS EXTENSIVE DEBRIDEMENT;  Surgeon: Tony Rose MD;  Location: [facility];  Service: Orthopedics    WISDOM TOOTH EXTRACTION      X4      No Known Allergies  Current Outpatient Medications on File Prior to Visit   Medication Sig Dispense Refill    acetaminophen (TYLENOL) 325 mg tablet Take 3 tablets (975 mg total) by mouth every 8 (eight) hours as needed for mild pain or moderate pain 30 tablet 0    ALPRAZolam (XANAX) 0.5 mg tablet Take 2 tablets (1 mg total) by mouth 2 (two) times a day as needed for anxiety 5 tablet 0    atorvastatin (LIPITOR) 10 mg tablet TAKE 1 TABLET(10 MG) BY MOUTH DAILY AT BEDTIME 90 tablet 1    colestipol (COLESTID) 1 g tablet Take 2 tablets (2 g total) by mouth in the morning 2 hours separate from other meds 60 tablet 5    desvenlafaxine succinate (PRISTIQ) 50 mg 24 hr tablet TAKE 1 TABLET(50 MG) BY MOUTH DAILY 90 tablet 1    Diclofenac Sodium (VOLTAREN) 1 % Apply 2 g topically 4 (four) times a day 240 g 3    levothyroxine 75 mcg tablet Take 1 tablet (75 mcg total) by mouth daily 30 tablet 5    metoprolol tartrate (LOPRESSOR) 25 mg tablet Take 0.5 tablets (12.5 mg total) by mouth every 12 (twelve) hours 180 tablet 3    Multiple Vitamins-Minerals (CENTRUM ADULTS PO) Take 1 tablet by mouth daily      pantoprazole (PROTONIX) 40 mg tablet Take 1 tablet (40 mg total) by mouth daily 90 tablet 2    tiZANidine (ZANAFLEX) 2 mg tablet Take 1 tablet (2 mg total) by mouth every 8 (eight) hours as needed for muscle spasms 20 tablet 0    warfarin (COUMADIN) 2 mg tablet Take 1 tablet (2 mg total) by mouth daily Recheck INR in 1-2 days and start once INR less than 2.5. Monitor INR closely after that. Goal INR 2-3. Do not start before February 20, 2025. 10 tablet 0     No current facility-administered medications on file prior to visit.            Objective:    Review of Systems  Pertinent items are noted in HPI.  All other systems were reviewed and are negative.    Physical Exam  Cons: Appears well.  No apparent distress.  Psych: Alert. Oriented x3.  Mood and affect normal.  Eyes: PERRLA, EOMI  Resp: Normal effort.  No audible wheezing  "or stridor.  CV: Palpable pulse.  No discernable arrhythmia.  No LE edema.  Lymph:  No palpable cervical, axillary, or inguinal lymphadenopathy.  Skin: Warm.  No palpable masses.  No visible lesions.  Neuro: Normal muscle tone.  Normal and symmetric DTR's.    BMI:   Estimated body mass index is 23.43 kg/m² as calculated from the following:    Height as of this encounter: 5' 1\" (1.549 m).    Weight as of this encounter: 56.2 kg (124 lb).  Lab Results   Component Value Date    HGBA1C 6.0 09/16/2024         Right Hand Exam     Tenderness   Right hand tenderness location: first CMC joint and radial styloid.    Range of Motion   The patient has normal right wrist ROM.     Other   Erythema: absent  Scars: absent  Sensation: normal  Pulse: present    Comments:  Skin is warm and dry to touch with no signs of erythema, ecchymosis, or infection  + shoulder sign  + CMC grind  Non tender first extensor compartment  Full FDS, FDP, extensor mechanisms are intact  No rotational deformity with composite finger flexion  Demonstrates normal wrist, elbow, and shoulder motion  Forearm compartments are soft and supple  2+ distal radial pulse with brisk capillary refill to the fingers  Radial, median, and ulnar motor and sensory distribution intact  Sensations light to touch intact distally             Procedures  Hand/upper extremity injection: R extensor compartment 1  Universal Protocol:  Consent: Verbal consent obtained.  Risks and benefits: risks, benefits and alternatives were discussed  Consent given by: patient  Time out: Immediately prior to procedure a \"time out\" was called to verify the correct patient, procedure, equipment, support staff and site/side marked as required.  Patient understanding: patient states understanding of the procedure being performed  Site marked: the operative site was marked  Patient identity confirmed: verbally with patient  Supporting Documentation  Indications: pain and tendon swelling   Procedure " "Details  Condition:de Quervain's tenosynovitis Site: R extensor compartment 1   Preparation: Patient was prepped and draped in the usual sterile fashion  Needle size: 25 G  Approach: radial  Medications administered: 0.5 mL bupivacaine 0.25 %; 3 mg betamethasone acetate-betamethasone sodium phosphate 6 (3-3) mg/mL  Patient tolerance: patient tolerated the procedure well with no immediate complications  Dressing:  Sterile dressing applied             Diagnostics, reviewed and taken today if performed as documented:  I have personally reviewed pertinent films in PACS.    X-rays of the right hand obtained on 12/2/2024 demonstrate severe degenerative changes throughout the hand and fingers. No acute fractures or dislocations.    Scribe Attestation      I,:  Raiza De Los Santos am acting as a scribe while in the presence of the attending physician.:       I,:  Christiano Bruce DO personally performed the services described in this documentation    as scribed in my presence.:             Portions of the record may have been created with voice recognition software.  Occasional wrong word or \"sound a like\" substitutions may have occurred due to the inherent limitations of voice recognition software.  Read the chart carefully and recognize, using context, where substitutions have occurred.   "

## 2025-04-14 ENCOUNTER — ANTICOAG VISIT (OUTPATIENT)
Dept: CARDIOLOGY CLINIC | Facility: CLINIC | Age: 75
End: 2025-04-14

## 2025-04-14 ENCOUNTER — APPOINTMENT (OUTPATIENT)
Dept: LAB | Facility: HOSPITAL | Age: 75
End: 2025-04-14
Attending: INTERNAL MEDICINE
Payer: MEDICARE

## 2025-04-14 DIAGNOSIS — I48.0 PAROXYSMAL A-FIB (HCC): Primary | ICD-10-CM

## 2025-04-14 DIAGNOSIS — I48.0 PAROXYSMAL ATRIAL FIBRILLATION (HCC): ICD-10-CM

## 2025-04-14 LAB
INR PPP: 3 (ref 0.85–1.19)
PROTHROMBIN TIME: 31.1 SECONDS (ref 12.3–15)

## 2025-04-14 PROCEDURE — 36415 COLL VENOUS BLD VENIPUNCTURE: CPT

## 2025-04-14 PROCEDURE — 85610 PROTHROMBIN TIME: CPT

## 2025-05-07 DIAGNOSIS — F41.9 ANXIETY: ICD-10-CM

## 2025-05-07 RX ORDER — ALPRAZOLAM 0.5 MG
1 TABLET ORAL 2 TIMES DAILY PRN
Qty: 5 TABLET | Refills: 0 | Status: SHIPPED | OUTPATIENT
Start: 2025-05-07

## 2025-05-07 NOTE — TELEPHONE ENCOUNTER
Reason for call:   [x] Refill   [] Prior Auth  [] Other:     Office:   [x] PCP/Provider -   [] Specialty/Provider -     Medication:   ALPRAZolam (XANAX) 0.5 mg tablet    Dose/Frequency: \Sig: Take 2 tablets (1 mg total) by mouth 2 (two) times a day as needed for anxiety      Quantity: 5    Pharmacy:   [id number] - [place], [place] - [address] [facility]  [address] [facility] [address]  Phone: [phone]  Fax: [phone]  FABIEN #: [id number]    Local Pharmacy   Does the patient have enough for 3 days?   [] Yes   [x] No - Send as HP to POD    Mail Away Pharmacy   Does the patient have enough for 10 days?   [] Yes   [] No - Send as HP to POD\

## 2025-05-20 DIAGNOSIS — F41.9 ANXIETY: ICD-10-CM

## 2025-05-20 RX ORDER — ALPRAZOLAM 0.5 MG
1 TABLET ORAL 2 TIMES DAILY PRN
Qty: 60 TABLET | Refills: 0 | Status: SHIPPED | OUTPATIENT
Start: 2025-05-20

## 2025-05-20 NOTE — TELEPHONE ENCOUNTER
Reason for call:   [x] Refill   [] Prior Auth  [x] Other: Pt is asking for a full month  she only received 5 tabs yesterday - not a duplicate     Office:   [x] PCP/Provider -   [] Specialty/Provider -     Medication: ALPRAZolam (XANAX) 0.5 mg  Take 2 tablets (1 mg total) by mouth 2 (two) times a day as needed for anxiety       Pharmacy: [id number] [place], [place]     Local Pharmacy   Does the patient have enough for 3 days?   [x] Yes   [] No - Send as HP to POD    Mail Away Pharmacy   Does the patient have enough for 10 days?   [] Yes   [] No - Send as HP to POD

## 2025-05-30 ENCOUNTER — APPOINTMENT (OUTPATIENT)
Dept: RADIOLOGY | Facility: AMBULARY SURGERY CENTER | Age: 75
End: 2025-05-30
Attending: ORTHOPAEDIC SURGERY
Payer: MEDICARE

## 2025-05-30 ENCOUNTER — OFFICE VISIT (OUTPATIENT)
Dept: OBGYN CLINIC | Facility: CLINIC | Age: 75
End: 2025-05-30
Payer: MEDICARE

## 2025-05-30 ENCOUNTER — TELEPHONE (OUTPATIENT)
Dept: ADMINISTRATIVE | Facility: OTHER | Age: 75
End: 2025-05-30

## 2025-05-30 VITALS — HEIGHT: 61 IN | BODY MASS INDEX: 23.39 KG/M2 | WEIGHT: 123.9 LBS

## 2025-05-30 DIAGNOSIS — R22.31 MASS OF RIGHT WRIST: Primary | ICD-10-CM

## 2025-05-30 DIAGNOSIS — M71.331 OTHER BURSAL CYST OF WRIST, RIGHT: ICD-10-CM

## 2025-05-30 PROCEDURE — 73110 X-RAY EXAM OF WRIST: CPT

## 2025-05-30 PROCEDURE — 99214 OFFICE O/P EST MOD 30 MIN: CPT | Performed by: ORTHOPAEDIC SURGERY

## 2025-05-30 NOTE — PROGRESS NOTES
Name: Josee Vaca      : 1950      MRN: [id number]  Encounter Provider: Christiano Bruce DO  Encounter Date: 2025   Encounter department: [facility]      Assessment: Josee Vaca is a 75 y.o. female with mass of unknown origin in the setting of De Quervain's tenosynovitis and CMC osteoarthritis, possibly gout.    Assessment & Plan  Mass of right wrist  X-ray obtained today and reviewed with the patient demonstrating no bony abnormalities  Order placed today for US to further evaluate the mass  May use ice or heat as needed for pain relief  May take NSAIDs/Tylenol as needed for pain control  Follow up after US to review results and futher delineate treatment options    Orders:    XR wrist 3+ vw right; Future    US MSK limited; Future          Subjective:  Josee Vaca is a 75 y.o. female who presents today for evaluation and treatment of lump that has developed on the radial aspect of her right wrist. At her last visit on 4/3/25 she received right de Quervain's cortisone injection. She states this has significantly improved the thumb pain she was experiencing, but about one week after the injection she developed a small hard lump right in the area of the injection.       The following portions of the patient's history were reviewed and updated as appropriate: allergies, current medications, past family history, past social history, past surgical history and problem list.      Social History     Socioeconomic History    Marital status: /Civil Union     Spouse name: Not on file    Number of children: Not on file    Years of education: Not on file    Highest education level: Not on file   Occupational History    Not on file   Tobacco Use    Smoking status: Former     Current packs/day: 0.00     Types: Cigarettes     Quit date:      Years since quittin.4    Smokeless tobacco: Never   Vaping Use    Vaping status: Never Used   Substance and Sexual  Activity    Alcohol use: Not Currently     Comment: last drink 7 years ago    Drug use: Not Currently    Sexual activity: Not Currently   Other Topics Concern    Not on file   Social History Narrative    Do you currently or have you served in the [employer]: No    Were you activated, into active duty, as a member of the [employer] or as a Reservist: No    Occupation: retired    Marital status:     Exercise level: Occasional treadmill    General stress level: Low    Alcohol intake: None stopped 7 years ago    Caffeine intake: Moderate    Chewing tobacco: none    Illicit drugs: none    Guns present in home: No    Seat belts used routinely: Yes    Sunscreen used routinely: Yes    Smoke alarm in home: Yes    Advance directive: Yes     Social Drivers of Health     Financial Resource Strain: Low Risk  (8/22/2023)    Overall Financial Resource Strain (CARDIA)     Difficulty of Paying Living Expenses: Not hard at all   Food Insecurity: No Food Insecurity (2/17/2025)    Nursing - Inadequate Food Risk Classification     Worried About Running Out of Food in the Last Year: Not on file     Ran Out of Food in the Last Year: Not on file     Ran Out of Food in the Last Year: Never true   Transportation Needs: No Transportation Needs (2/17/2025)    Nursing - Transportation Risk Classification     Lack of Transportation: Not on file     Lack of Transportation: No   Physical Activity: Not on file   Stress: Not on file   Social Connections: Not on file   Intimate Partner Violence: Unknown (2/17/2025)    Nursing IPS     Feels Physically and Emotionally Safe: Not on file     Physically Hurt by Someone: Not on file     Humiliated or Emotionally Abused by Someone: Not on file     Physically Hurt by Someone: No     Hurt or Threatened by Someone: No   Housing Stability: Unknown (2/17/2025)    Nursing: Inadequate Housing Risk Classification     Has Housing: Not on file     Worried About Losing Housing: Not on file     Unable  "to Get Utilities: Not on file     Unable to Pay for Housing in the Last Year: No     Has Housin     Past Medical History[1]  Past Surgical History[2]  Allergies[3]  Medications Ordered Prior to Encounter[4]         Objective:    Review of Systems  Pertinent items are noted in HPI.  All other systems were reviewed and are negative.    Physical Exam  Cons: Appears well.  No apparent distress.  Psych: Alert. Oriented x3.  Mood and affect normal.  Eyes: PERRLA, EOMI  Resp: Normal effort.  No audible wheezing or stridor.  CV: Palpable pulse.  No discernable arrhythmia.  No LE edema.  Lymph:  No palpable cervical, axillary, or inguinal lymphadenopathy.  Skin: Warm.  No palpable masses.  No visible lesions.  Neuro: Normal muscle tone.  Normal and symmetric DTR's.    BMI:   Estimated body mass index is 23.41 kg/m² as calculated from the following:    Height as of this encounter: 5' 1\" (1.549 m).    Weight as of this encounter: 56.2 kg (123 lb 14.4 oz).  Lab Results   Component Value Date    HGBA1C 6.0 2024         Right Hand Exam     Tenderness   The patient is experiencing tenderness in the dorsal area.    Other   Erythema: absent  Scars: absent  Sensation: normal  Pulse: present    Comments:    3mm x 3mm hard mass at the radial aspect of the distal radius   Immobile  Brisk capillary refill  Sensation intact to Ax/R/M/U nerve distributions              Procedures  Procedures  No Procedures performed today    Diagnostics, reviewed and taken today if performed as documented:  I have personally reviewed pertinent films in PACS and my interpretation is x-ray of the right wrist obtained today reviewed demonstrating: .        Scribe Attestation      I,:  Zahida Lombardi am acting as a scribe while in the presence of the attending physician.:       I,:  Christiano Bruce, DO personally performed the services described in this documentation    as scribed in my presence.:             Portions of the record may have been " "created with voice recognition software.  Occasional wrong word or \"sound a like\" substitutions may have occurred due to the inherent limitations of voice recognition software.  Read the chart carefully and recognize, using context, where substitutions have occurred.         [1]   Past Medical History:  Diagnosis Date    A-fib (HCC)     Anxiety     Arthritis     shoulder    Carpal tunnel syndrome     Colon polyp     Depression     Disease of thyroid gland     Diverticulitis     Esophageal stricture     HBP (high blood pressure)     Hiatal hernia 06/07/2022    Small hiatal hernia    Hyperlipidemia     Hypertension     Hypothyroid     Pneumonia     2017    PONV (postoperative nausea and vomiting)     Schatzki's ring 06/07/2022    Schatzki's ring in the lower esophagus    Sleep apnea     mild    [2]   Past Surgical History:  Procedure Laterality Date    APPENDECTOMY      BREAST BIOPSY Right     benign    BREAST CYST ASPIRATION Right     benign    BREAST SURGERY      milk duct removed    CATARACT EXTRACTION Bilateral     CATARACT EXTRACTION, BILATERAL  2019    CERVICAL FUSION N/A 05/25/2021    Procedure: FUSION CERVICAL POSTERIOR C1-C2 with allograft and neuromonitoring);  Surgeon: Wesley Camacho MD;  Location:  MAIN OR;  Service: Orthopedics    CHOLECYSTECTOMY      COLONOSCOPY  06/07/2022    COLONOSCOPY W/ BIOPSIES  2011    EGD  06/07/2022    HAND SURGERY Right     MAMMO (HISTORICAL) Bilateral 11/27/2018    MAMMO (HISTORICAL) Bilateral 10/27/2017    MANDIBLE SURGERY      MANDIBLE SURGERY      [place] SURGICAL ARTHROSCOPY SHOULDER W/ROTATOR CUFF RPR Right 09/27/2022    Procedure: ARTHROSCOPIC ROTATOR CUFF REPAIR OF SUBSCAPULARIS BICEPS TENODESIS EXTENSIVE DEBRIDEMENT;  Surgeon: Tony Rose MD;  Location: [facility];  Service: Orthopedics    WISDOM TOOTH EXTRACTION      X4   [3] No Known Allergies  [4]   Current Outpatient Medications on File Prior to Visit   Medication Sig Dispense Refill    acetaminophen " (TYLENOL) 325 mg tablet Take 3 tablets (975 mg total) by mouth every 8 (eight) hours as needed for mild pain or moderate pain 30 tablet 0    ALPRAZolam (XANAX) 0.5 mg tablet Take 2 tablets (1 mg total) by mouth 2 (two) times a day as needed for anxiety 60 tablet 0    atorvastatin (LIPITOR) 10 mg tablet TAKE 1 TABLET(10 MG) BY MOUTH DAILY AT BEDTIME 90 tablet 1    colestipol (COLESTID) 1 g tablet Take 2 tablets (2 g total) by mouth in the morning 2 hours separate from other meds 60 tablet 5    desvenlafaxine succinate (PRISTIQ) 50 mg 24 hr tablet TAKE 1 TABLET(50 MG) BY MOUTH DAILY 90 tablet 1    Diclofenac Sodium (VOLTAREN) 1 % Apply 2 g topically 4 (four) times a day 240 g 3    levothyroxine 75 mcg tablet Take 1 tablet (75 mcg total) by mouth daily 30 tablet 5    metoprolol tartrate (LOPRESSOR) 25 mg tablet Take 0.5 tablets (12.5 mg total) by mouth every 12 (twelve) hours 180 tablet 3    Multiple Vitamins-Minerals (CENTRUM ADULTS PO) Take 1 tablet by mouth daily      pantoprazole (PROTONIX) 40 mg tablet Take 1 tablet (40 mg total) by mouth daily 90 tablet 2    tiZANidine (ZANAFLEX) 2 mg tablet Take 1 tablet (2 mg total) by mouth every 8 (eight) hours as needed for muscle spasms 20 tablet 0    warfarin (COUMADIN) 2 mg tablet Take 1 tablet (2 mg total) by mouth daily Recheck INR in 1-2 days and start once INR less than 2.5. Monitor INR closely after that. Goal INR 2-3. Do not start before February 20, 2025. 10 tablet 0     No current facility-administered medications on file prior to visit.

## 2025-05-30 NOTE — TELEPHONE ENCOUNTER
05/30/25 10:25 AM    Patient contacted to bring Advance Directive, POLST, or Living Will document to next scheduled pcp visit.[facility] Department left message.    Thank you.  Nevaeh George MA  [facility]

## 2025-06-02 ENCOUNTER — ANTICOAG VISIT (OUTPATIENT)
Dept: CARDIOLOGY CLINIC | Facility: CLINIC | Age: 75
End: 2025-06-02

## 2025-06-02 ENCOUNTER — OFFICE VISIT (OUTPATIENT)
Dept: FAMILY MEDICINE CLINIC | Facility: CLINIC | Age: 75
End: 2025-06-02
Payer: MEDICARE

## 2025-06-02 ENCOUNTER — RESULTS FOLLOW-UP (OUTPATIENT)
Dept: FAMILY MEDICINE CLINIC | Facility: CLINIC | Age: 75
End: 2025-06-02

## 2025-06-02 ENCOUNTER — APPOINTMENT (OUTPATIENT)
Dept: LAB | Facility: HOSPITAL | Age: 75
End: 2025-06-02
Attending: INTERNAL MEDICINE
Payer: MEDICARE

## 2025-06-02 VITALS
OXYGEN SATURATION: 98 % | HEART RATE: 76 BPM | RESPIRATION RATE: 16 BRPM | WEIGHT: 124 LBS | DIASTOLIC BLOOD PRESSURE: 88 MMHG | SYSTOLIC BLOOD PRESSURE: 130 MMHG | BODY MASS INDEX: 23.41 KG/M2 | HEIGHT: 61 IN

## 2025-06-02 DIAGNOSIS — F41.9 ANXIETY: ICD-10-CM

## 2025-06-02 DIAGNOSIS — G47.39 OTHER SLEEP APNEA: ICD-10-CM

## 2025-06-02 DIAGNOSIS — E55.9 VITAMIN D DEFICIENCY, UNSPECIFIED: ICD-10-CM

## 2025-06-02 DIAGNOSIS — R53.83 OTHER FATIGUE: ICD-10-CM

## 2025-06-02 DIAGNOSIS — I10 PRIMARY HYPERTENSION: ICD-10-CM

## 2025-06-02 DIAGNOSIS — I48.0 PAROXYSMAL A-FIB (HCC): Primary | ICD-10-CM

## 2025-06-02 DIAGNOSIS — E43 UNSPECIFIED SEVERE PROTEIN-CALORIE MALNUTRITION (HCC): ICD-10-CM

## 2025-06-02 DIAGNOSIS — N18.2 CKD (CHRONIC KIDNEY DISEASE) STAGE 2, GFR 60-89 ML/MIN: ICD-10-CM

## 2025-06-02 DIAGNOSIS — I48.0 PAROXYSMAL ATRIAL FIBRILLATION (HCC): ICD-10-CM

## 2025-06-02 DIAGNOSIS — I48.0 PAROXYSMAL A-FIB (HCC): ICD-10-CM

## 2025-06-02 DIAGNOSIS — E87.1 HYPONATREMIA: Primary | ICD-10-CM

## 2025-06-02 DIAGNOSIS — E83.52 HYPERCALCEMIA: ICD-10-CM

## 2025-06-02 DIAGNOSIS — R53.83 OTHER FATIGUE: Primary | ICD-10-CM

## 2025-06-02 DIAGNOSIS — E03.9 HYPOTHYROIDISM, UNSPECIFIED TYPE: ICD-10-CM

## 2025-06-02 DIAGNOSIS — E78.2 MIXED HYPERLIPIDEMIA: ICD-10-CM

## 2025-06-02 PROBLEM — R22.31 MASS OF RIGHT WRIST: Status: ACTIVE | Noted: 2025-06-02

## 2025-06-02 LAB
25(OH)D3 SERPL-MCNC: 62.9 NG/ML (ref 30–100)
ALBUMIN SERPL BCG-MCNC: 4.4 G/DL (ref 3.5–5)
ALP SERPL-CCNC: 85 U/L (ref 34–104)
ALT SERPL W P-5'-P-CCNC: 43 U/L (ref 7–52)
ANION GAP SERPL CALCULATED.3IONS-SCNC: 7 MMOL/L (ref 4–13)
AST SERPL W P-5'-P-CCNC: 47 U/L (ref 13–39)
BASOPHILS # BLD AUTO: 0.09 THOUSANDS/ÂΜL (ref 0–0.1)
BASOPHILS NFR BLD AUTO: 1 % (ref 0–1)
BILIRUB SERPL-MCNC: 0.33 MG/DL (ref 0.2–1)
BUN SERPL-MCNC: 14 MG/DL (ref 5–25)
CALCIUM SERPL-MCNC: 10.3 MG/DL (ref 8.4–10.2)
CHLORIDE SERPL-SCNC: 96 MMOL/L (ref 96–108)
CO2 SERPL-SCNC: 31 MMOL/L (ref 21–32)
CREAT SERPL-MCNC: 0.83 MG/DL (ref 0.6–1.3)
EOSINOPHIL # BLD AUTO: 0.61 THOUSAND/ÂΜL (ref 0–0.61)
EOSINOPHIL NFR BLD AUTO: 6 % (ref 0–6)
ERYTHROCYTE [DISTWIDTH] IN BLOOD BY AUTOMATED COUNT: 13.2 % (ref 11.6–15.1)
FERRITIN SERPL-MCNC: 78 NG/ML (ref 30–307)
GFR SERPL CREATININE-BSD FRML MDRD: 68 ML/MIN/1.73SQ M
GLUCOSE SERPL-MCNC: 102 MG/DL (ref 65–140)
HCT VFR BLD AUTO: 39.6 % (ref 34.8–46.1)
HGB BLD-MCNC: 12.9 G/DL (ref 11.5–15.4)
IMM GRANULOCYTES # BLD AUTO: 0.03 THOUSAND/UL (ref 0–0.2)
IMM GRANULOCYTES NFR BLD AUTO: 0 % (ref 0–2)
INR PPP: 2.6 (ref 0.85–1.19)
IRON SATN MFR SERPL: 14 % (ref 15–50)
IRON SERPL-MCNC: 52 UG/DL (ref 50–212)
LYMPHOCYTES # BLD AUTO: 1.3 THOUSANDS/ÂΜL (ref 0.6–4.47)
LYMPHOCYTES NFR BLD AUTO: 13 % (ref 14–44)
MCH RBC QN AUTO: 30.9 PG (ref 26.8–34.3)
MCHC RBC AUTO-ENTMCNC: 32.6 G/DL (ref 31.4–37.4)
MCV RBC AUTO: 95 FL (ref 82–98)
MONOCYTES # BLD AUTO: 0.77 THOUSAND/ÂΜL (ref 0.17–1.22)
MONOCYTES NFR BLD AUTO: 8 % (ref 4–12)
NEUTROPHILS # BLD AUTO: 7.23 THOUSANDS/ÂΜL (ref 1.85–7.62)
NEUTS SEG NFR BLD AUTO: 72 % (ref 43–75)
NRBC BLD AUTO-RTO: 0 /100 WBCS
PLATELET # BLD AUTO: 405 THOUSANDS/UL (ref 149–390)
PMV BLD AUTO: 9.3 FL (ref 8.9–12.7)
POTASSIUM SERPL-SCNC: 4.4 MMOL/L (ref 3.5–5.3)
PROT SERPL-MCNC: 7.5 G/DL (ref 6.4–8.4)
PROTHROMBIN TIME: 27.9 SECONDS (ref 12.3–15)
RBC # BLD AUTO: 4.18 MILLION/UL (ref 3.81–5.12)
SODIUM SERPL-SCNC: 134 MMOL/L (ref 135–147)
TIBC SERPL-MCNC: 366.8 UG/DL (ref 250–450)
TRANSFERRIN SERPL-MCNC: 262 MG/DL (ref 203–362)
TSH SERPL DL<=0.05 MIU/L-ACNC: 3.96 UIU/ML (ref 0.45–4.5)
UIBC SERPL-MCNC: 315 UG/DL (ref 155–355)
VIT B12 SERPL-MCNC: 660 PG/ML (ref 180–914)
WBC # BLD AUTO: 10.03 THOUSAND/UL (ref 4.31–10.16)

## 2025-06-02 PROCEDURE — 99214 OFFICE O/P EST MOD 30 MIN: CPT | Performed by: INTERNAL MEDICINE

## 2025-06-02 PROCEDURE — 85610 PROTHROMBIN TIME: CPT

## 2025-06-02 PROCEDURE — 36415 COLL VENOUS BLD VENIPUNCTURE: CPT

## 2025-06-02 PROCEDURE — 83550 IRON BINDING TEST: CPT

## 2025-06-02 PROCEDURE — 82306 VITAMIN D 25 HYDROXY: CPT

## 2025-06-02 PROCEDURE — 82607 VITAMIN B-12: CPT

## 2025-06-02 PROCEDURE — 85025 COMPLETE CBC W/AUTO DIFF WBC: CPT

## 2025-06-02 PROCEDURE — G2211 COMPLEX E/M VISIT ADD ON: HCPCS | Performed by: INTERNAL MEDICINE

## 2025-06-02 PROCEDURE — 82728 ASSAY OF FERRITIN: CPT

## 2025-06-02 PROCEDURE — 83540 ASSAY OF IRON: CPT

## 2025-06-02 PROCEDURE — 80053 COMPREHEN METABOLIC PANEL: CPT

## 2025-06-02 PROCEDURE — 84443 ASSAY THYROID STIM HORMONE: CPT

## 2025-06-02 NOTE — ASSESSMENT & PLAN NOTE
X-ray obtained today and reviewed with the patient demonstrating no bony abnormalities  Order placed today for US to further evaluate the mass  May use ice or heat as needed for pain relief  May take NSAIDs/Tylenol as needed for pain control  Follow up after US to review results and futher delineate treatment options    Orders:    XR wrist 3+ vw right; Future    US MSK limited; Future

## 2025-06-02 NOTE — PROGRESS NOTES
Assessment/Plan:Not exactly sure why Josee is very fatigued. It may be her age, and or the stress she has. I did order a host of labs to see where she's at, and if that doesn't pan out, we may have to adjust some of her medications as a lot of them can cause fatigue too         Problem List Items Addressed This Visit       Anxiety    Primary hypertension    Hypothyroidism    CKD (chronic kidney disease) stage 2, GFR 60-89 ml/min    Hyperlipidemia    Sleep apnea    Paroxysmal A-fib (HCC)     Other Visit Diagnoses         Other fatigue    -  Primary    Relevant Orders    CBC and differential    Comprehensive metabolic panel    TSH, 3rd generation    Iron Panel (Includes Ferritin, Iron Sat%, Iron, and TIBC)    Vitamin B12    Vitamin D 25 hydroxy    Echo complete w/ contrast if indicated      Unspecified severe protein-calorie malnutrition (HCC)        Relevant Orders    Iron Panel (Includes Ferritin, Iron Sat%, Iron, and TIBC)    Vitamin B12      Vitamin D deficiency, unspecified        Relevant Orders    Vitamin D 25 hydroxy              Subjective:      Patient ID: Josee Vaca is a 75 y.o. female.    Josee here because she's been very very fatigued-she has a lot of stress in her life, with her daughter Jessi's disabilities, and trying to maintain her home and property-she also has hx of a fib, HTN, hypothyroidism, depression, anxiety        The following portions of the patient's history were reviewed and updated as appropriate:   Past Medical History:  She has a past medical history of A-fib (HCC), Anxiety, Arthritis, Carpal tunnel syndrome, Colon polyp, Depression, Disease of thyroid gland, Diverticulitis, Esophageal stricture, HBP (high blood pressure), Hiatal hernia (06/07/2022), Hyperlipidemia, Hypertension, Hypothyroid, Pneumonia, PONV (postoperative nausea and vomiting), Schatzki's ring (06/07/2022), and Sleep apnea.,  _______________________________________________________________________  Medical  Problems:  does not have any pertinent problems on file.,  _______________________________________________________________________  Past Surgical History:   has a past surgical history that includes Breast cyst aspiration (Right); Breast biopsy (Right); Appendectomy; Cholecystectomy; Colonoscopy w/ biopsies (2011); Mandible surgery; Burt tooth extraction; EGD (06/07/2022); Cataract extraction, bilateral (2019); Mammo (historical) (Bilateral, 11/27/2018); Mammo (historical) (Bilateral, 10/27/2017); Colonoscopy (06/07/2022); Cataract extraction (Bilateral); Hand surgery (Right); Cervical fusion (N/A, 05/25/2021); Mandible surgery; pr surgical arthroscopy shoulder w/rotator cuff rpr (Right, 09/27/2022); and Breast surgery.,  _______________________________________________________________________  Family History:  family history includes Heart disease in her mother; Heart failure in her mother; Hyperlipidemia in her mother; Hypertension in her mother and sister.,  _______________________________________________________________________  Social History:   reports that she quit smoking about 21 years ago. Her smoking use included cigarettes. She has never used smokeless tobacco. She reports that she does not currently use alcohol. She reports that she does not currently use drugs.,  _______________________________________________________________________  Allergies:  has no known allergies..  _______________________________________________________________________  Current Medications[1]  _______________________________________________________________________  Review of Systems   Constitutional:  Positive for fatigue. Negative for unexpected weight change.   HENT: Negative.     Respiratory: Negative.     Cardiovascular: Negative.    Gastrointestinal: Negative.    Musculoskeletal:  Positive for back pain and gait problem.   Psychiatric/Behavioral:  Positive for dysphoric mood. The patient is nervous/anxious.       "    Objective:  Vitals:    06/02/25 1118   BP: 130/88   BP Location: Left arm   Patient Position: Sitting   Cuff Size: Standard   Pulse: 76   Resp: 16   SpO2: 98%   Weight: 56.2 kg (124 lb)   Height: 5' 1\" (1.549 m)     Body mass index is 23.43 kg/m².     Physical Exam  Constitutional:       Appearance: Normal appearance.   HENT:      Head: Normocephalic and atraumatic.      Right Ear: External ear normal.      Left Ear: External ear normal.      Nose: Nose normal.      Mouth/Throat:      Mouth: Mucous membranes are moist.     Eyes:      Extraocular Movements: Extraocular movements intact.      Pupils: Pupils are equal, round, and reactive to light.       Cardiovascular:      Rate and Rhythm: Normal rate and regular rhythm.      Heart sounds: Normal heart sounds.   Pulmonary:      Effort: Pulmonary effort is normal.      Breath sounds: Normal breath sounds.     Musculoskeletal:         General: Normal range of motion.      Cervical back: Normal range of motion and neck supple.     Skin:     General: Skin is warm.     Neurological:      General: No focal deficit present.      Mental Status: She is alert and oriented to person, place, and time.     Psychiatric:         Mood and Affect: Mood normal.         Behavior: Behavior normal.              [1]   Current Outpatient Medications   Medication Sig Dispense Refill    acetaminophen (TYLENOL) 325 mg tablet Take 3 tablets (975 mg total) by mouth every 8 (eight) hours as needed for mild pain or moderate pain 30 tablet 0    ALPRAZolam (XANAX) 0.5 mg tablet Take 2 tablets (1 mg total) by mouth 2 (two) times a day as needed for anxiety 60 tablet 0    atorvastatin (LIPITOR) 10 mg tablet TAKE 1 TABLET(10 MG) BY MOUTH DAILY AT BEDTIME 90 tablet 1    colestipol (COLESTID) 1 g tablet Take 2 tablets (2 g total) by mouth in the morning 2 hours separate from other meds 60 tablet 5    desvenlafaxine succinate (PRISTIQ) 50 mg 24 hr tablet TAKE 1 TABLET(50 MG) BY MOUTH DAILY 90 tablet " 1    Diclofenac Sodium (VOLTAREN) 1 % Apply 2 g topically 4 (four) times a day 240 g 3    levothyroxine 75 mcg tablet Take 1 tablet (75 mcg total) by mouth daily 30 tablet 5    metoprolol tartrate (LOPRESSOR) 25 mg tablet Take 0.5 tablets (12.5 mg total) by mouth every 12 (twelve) hours 180 tablet 3    Multiple Vitamins-Minerals (CENTRUM ADULTS PO) Take 1 tablet by mouth in the morning.      pantoprazole (PROTONIX) 40 mg tablet Take 1 tablet (40 mg total) by mouth daily 90 tablet 2    tiZANidine (ZANAFLEX) 2 mg tablet Take 1 tablet (2 mg total) by mouth every 8 (eight) hours as needed for muscle spasms 20 tablet 0    warfarin (COUMADIN) 2 mg tablet Take 1 tablet (2 mg total) by mouth daily Recheck INR in 1-2 days and start once INR less than 2.5. Monitor INR closely after that. Goal INR 2-3. Do not start before February 20, 2025. 10 tablet 0     No current facility-administered medications for this visit.

## 2025-06-10 DIAGNOSIS — R15.9 INCONTINENCE OF FECES, UNSPECIFIED FECAL INCONTINENCE TYPE: ICD-10-CM

## 2025-06-10 DIAGNOSIS — K52.9 CHRONIC DIARRHEA: ICD-10-CM

## 2025-06-10 RX ORDER — COLESTIPOL HYDROCHLORIDE 1 G/1
2 TABLET ORAL DAILY
Qty: 60 TABLET | Refills: 5 | Status: SHIPPED | OUTPATIENT
Start: 2025-06-10 | End: 2025-06-11

## 2025-06-10 NOTE — TELEPHONE ENCOUNTER
Reason for call:   [x] Refill   [] Prior Auth  [] Other:     Office:   [x] PCP/Provider - Valarie Gonzales  [facility]      [] Specialty/Provider -     Medication: Colestid    Dose/Frequency: 1 g     Quantity: #60     Pharmacy: [facility]   Does the patient have enough for 3 days?   [] Yes   [x] No - Send as HP to POD    Mail Away Pharmacy   Does the patient have enough for 10 days?   [] Yes   [] No - Send as HP to POD

## 2025-06-11 RX ORDER — COLESTIPOL HYDROCHLORIDE 1 G/1
TABLET ORAL
Qty: 180 TABLET | Refills: 1 | Status: SHIPPED | OUTPATIENT
Start: 2025-06-11

## 2025-06-14 DIAGNOSIS — I48.91 ATRIAL FIBRILLATION WITH RVR (HCC): ICD-10-CM

## 2025-06-17 NOTE — TELEPHONE ENCOUNTER
Reason for call:   [x] Refill   [] Prior Auth  [] Other:     Office:   [] PCP/Provider -   [x] Specialty/Provider - [place] Charles    Medication: warfarin (COUMADIN) 2 mg tablet     Dose/Frequency: 2 mg/ Daily    Quantity: 10    Pharmacy: [id number]    Local Pharmacy   Does the patient have enough for 3 days?   [x] Yes   [] No - Send as HP to POD

## 2025-06-18 DIAGNOSIS — F41.9 ANXIETY: ICD-10-CM

## 2025-06-18 DIAGNOSIS — I48.0 PAROXYSMAL ATRIAL FIBRILLATION (HCC): ICD-10-CM

## 2025-06-18 RX ORDER — ALPRAZOLAM 0.5 MG
1 TABLET ORAL 2 TIMES DAILY PRN
Qty: 60 TABLET | Refills: 0 | Status: SHIPPED | OUTPATIENT
Start: 2025-06-18

## 2025-06-18 RX ORDER — METOPROLOL TARTRATE 25 MG/1
12.5 TABLET, FILM COATED ORAL EVERY 12 HOURS
Qty: 180 TABLET | Refills: 1 | Status: SHIPPED | OUTPATIENT
Start: 2025-06-18

## 2025-06-18 NOTE — TELEPHONE ENCOUNTER
Reason for call:   [x] Refill   [] Prior Auth  [] Other:     Office:   [x] PCP/Provider - Valarie Gonzales MD   [] Specialty/Provider -     Medication: ALPRAZolam (XANAX) 0.5 mg tablet / Take 2 tablets (1 mg total) by mouth 2 (two) times a day as needed for anxiety     Pharmacy: [facility] #[id number] [place], [place] - [address]     Local Pharmacy   Does the patient have enough for 3 days?   [] Yes   [x] No - Send as HP to POD

## 2025-06-19 RX ORDER — WARFARIN SODIUM 2 MG/1
TABLET ORAL
Qty: 180 TABLET | Refills: 1 | Status: SHIPPED | OUTPATIENT
Start: 2025-06-19 | End: 2025-06-24 | Stop reason: SDUPTHER

## 2025-06-24 DIAGNOSIS — I48.91 ATRIAL FIBRILLATION WITH RVR (HCC): ICD-10-CM

## 2025-06-24 NOTE — TELEPHONE ENCOUNTER
Reason for call:   [x] Refill   [] Prior Auth  [] Other:     Office:   [] PCP/Provider -   [x] Specialty/Provider - [facility] Charles MD     Medication:  warfarin (COUMADIN) 2 mg tablet    Dose/Frequency:  TAKE 1 AND 1/2 TO 2 TABLETS BY MOUTH DAILY AS DIRECTED BY PRESCRIBER,     Quantity: 180 tablet    Pharmacy: [facility] #[id number] [address] [facility] [phone]        Local Pharmacy   Does the patient have enough for 3 days?   [x] Yes   [] No - Send as HP to POD    Mail Away Pharmacy   Does the patient have enough for 10 days?   [] Yes   [] No - Send as HP to POD

## 2025-06-25 ENCOUNTER — HOSPITAL ENCOUNTER (EMERGENCY)
Facility: HOSPITAL | Age: 75
Discharge: HOME | End: 2025-06-25
Attending: EMERGENCY MEDICINE
Payer: MEDICARE

## 2025-06-25 ENCOUNTER — APPOINTMENT (EMERGENCY)
Dept: CT IMAGING | Facility: HOSPITAL | Age: 75
End: 2025-06-25
Payer: MEDICARE

## 2025-06-25 ENCOUNTER — APPOINTMENT (EMERGENCY)
Dept: RADIOLOGY | Facility: HOSPITAL | Age: 75
End: 2025-06-25
Payer: MEDICARE

## 2025-06-25 VITALS
BODY MASS INDEX: 23.39 KG/M2 | SYSTOLIC BLOOD PRESSURE: 140 MMHG | OXYGEN SATURATION: 98 % | WEIGHT: 123.9 LBS | DIASTOLIC BLOOD PRESSURE: 72 MMHG | RESPIRATION RATE: 18 BRPM | HEART RATE: 74 BPM | TEMPERATURE: 98.3 F | HEIGHT: 61 IN
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
## 2025-06-25 PROCEDURE — 71046 X-RAY EXAM CHEST 2 VIEWS: CPT

## 2025-06-25 PROCEDURE — 99285 EMERGENCY DEPT VISIT HI MDM: CPT | Performed by: EMERGENCY MEDICINE

## 2025-06-25 PROCEDURE — 99284 EMERGENCY DEPT VISIT MOD MDM: CPT

## 2025-06-25 PROCEDURE — 73502 X-RAY EXAM HIP UNI 2-3 VIEWS: CPT

## 2025-06-25 PROCEDURE — 70450 CT HEAD/BRAIN W/O DYE: CPT

## 2025-06-25 RX ORDER — WARFARIN SODIUM 2 MG/1
TABLET ORAL
Qty: 200 TABLET | Refills: 2 | Status: SHIPPED | OUTPATIENT
Start: 2025-06-25

## 2025-06-26 ENCOUNTER — VBI (OUTPATIENT)
Dept: ADMINISTRATIVE | Facility: OTHER | Age: 75
End: 2025-06-26

## 2025-06-26 NOTE — TELEPHONE ENCOUNTER
06/26/25 9:17 AM    Patient contacted post ED visit, [facility] department spoke with patient/caregiver and outreach was successful.    Thank you.  Jabari Hicks MA  [facility]

## 2025-07-02 ENCOUNTER — OFFICE VISIT (OUTPATIENT)
Dept: FAMILY MEDICINE CLINIC | Facility: CLINIC | Age: 75
End: 2025-07-02
Payer: MEDICARE

## 2025-07-02 VITALS
OXYGEN SATURATION: 98 % | HEIGHT: 61 IN | BODY MASS INDEX: 23.22 KG/M2 | DIASTOLIC BLOOD PRESSURE: 88 MMHG | WEIGHT: 123 LBS | HEART RATE: 78 BPM | SYSTOLIC BLOOD PRESSURE: 138 MMHG

## 2025-07-02 DIAGNOSIS — E03.9 HYPOTHYROIDISM, UNSPECIFIED TYPE: ICD-10-CM

## 2025-07-02 DIAGNOSIS — E78.2 MIXED HYPERLIPIDEMIA: ICD-10-CM

## 2025-07-02 DIAGNOSIS — I10 PRIMARY HYPERTENSION: ICD-10-CM

## 2025-07-02 DIAGNOSIS — I48.0 PAROXYSMAL A-FIB (HCC): ICD-10-CM
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
## 2025-07-02 DIAGNOSIS — Z91.81 HISTORY OF FALLING: Primary | ICD-10-CM

## 2025-07-02 DIAGNOSIS — F41.9 ANXIETY: ICD-10-CM

## 2025-07-02 DIAGNOSIS — M47.812 SPONDYLOSIS OF CERVICAL REGION WITHOUT MYELOPATHY OR RADICULOPATHY: ICD-10-CM

## 2025-07-02 DIAGNOSIS — K58.0 IRRITABLE BOWEL SYNDROME WITH DIARRHEA: ICD-10-CM

## 2025-07-02 PROCEDURE — G2211 COMPLEX E/M VISIT ADD ON: HCPCS | Performed by: INTERNAL MEDICINE

## 2025-07-02 PROCEDURE — 99214 OFFICE O/P EST MOD 30 MIN: CPT | Performed by: INTERNAL MEDICINE

## 2025-07-02 NOTE — ED PROVIDER NOTES
Time reflects when diagnosis was documented in both MDM as applicable and the Disposition within this note       Time User Action Codes Description Comment    6/25/2025  4:37 PM Nelly Amezcua Add [W19.XXXA] Fall, initial encounter           ED Disposition       ED Disposition   Discharge    Condition   Stable    Date/Time   Wed Jun 25, 2025  4:37 PM    Comment   Josee Vaca discharge to home/self care.                   Assessment & Plan       Medical Decision Making  75-year-old female presents approximately 4 to 5 days after a slip and fall landing on her R hip while walking out to the garbage cans.  Patient states she has bad knees which caused her to fall.  She reports some right hip pain.  She takes Coumadin and aspirin.  She did not hit her head or lose consciousness.  She has been able to walk since then.  She reports a large bruise on her right hip.  She was made a trauma eval on arrival due to fall on thinners.  Chest x-ray and pelvic x-ray were obtained which showed no acute bony abnormalities per my independent interpretation.  Head CT was obtained which showed no acute traumatic injury.  Patient denies any preceding or following chest pain shortness of breath or lightheadedness related to her fall.  She does have ecchymosis to the right hip but is ambulating steadily and has normal range of motion and no deformity.  Stable for discharge.    Amount and/or Complexity of Data Reviewed  Radiology: ordered.             Medications - No data to display    ED Risk Strat Scores                    No data recorded        SBIRT 22yo+      Flowsheet Row Most Recent Value   Initial Alcohol Screen: US AUDIT-C     1. How often do you have a drink containing alcohol? 0 Filed at: 06/25/2025 1511   Audit-C Score 0 Filed at: 06/25/2025 1511   DOUGIE: How many times in the past year have you...    Used an illegal drug or used a prescription medication for non-medical reasons? Never Filed at: 06/25/2025 1511                             History of Present Illness       Chief Complaint   Patient presents with    Fall     Pt states she slipped and fell this past Saturday 6/21 due to a bad knee while walking to the garbage can; denies any head strike. Reportedly takes coumadin and aspirin daily. Denies any symptoms prior to fall including chest pain, dizziness, or shortness of breath.       Past Medical History[1]   Past Surgical History[2]   Family History[3]   Social History[4]   E-Cigarette/Vaping    E-Cigarette Use Never User       E-Cigarette/Vaping Substances    Nicotine No     THC No     CBD No     Flavoring No     Other No     Unknown No       I have reviewed and agree with the history as documented.     75-year-old female presents approximately 4 to 5 days after a slip and fall landing on her R hip while walking out to the garbage cans.  Patient states she has bad knees which caused her to fall.  She reports some right hip pain.  She takes Coumadin and aspirin.  She did not hit her head or lose consciousness.  She has been able to walk since then.  She reports a large bruise on her right hip.         Review of Systems   All other systems reviewed and are negative.          Objective       ED Triage Vitals   Temperature Pulse Blood Pressure Respirations SpO2 Patient Position - Orthostatic VS   06/25/25 1506 06/25/25 1506 06/25/25 1506 06/25/25 1506 06/25/25 1506 06/25/25 1506   98.3 °F (36.8 °C) 74 140/72 18 98 % Lying      Temp Source Heart Rate Source BP Location FiO2 (%) Pain Score    06/25/25 1506 06/25/25 1506 06/25/25 1506 -- 06/25/25 1508    Oral Monitor Left arm  9      Vitals      Date and Time Temp Pulse SpO2 Resp BP Pain Score FACES Pain Rating User   06/25/25 1508 -- -- -- -- -- 9 -- KLB   06/25/25 1506 98.3 °F (36.8 °C) 74 98 % 18 140/72 -- -- KLB            Physical Exam  Constitutional:       General: She is not in acute distress.     Comments: Airway breathing and circulation intact   HENT:      Head:  Normocephalic and atraumatic.      Nose: Nose normal.      Mouth/Throat:      Mouth: Mucous membranes are moist.     Eyes:      Extraocular Movements: Extraocular movements intact.      Conjunctiva/sclera: Conjunctivae normal.      Pupils: Pupils are equal, round, and reactive to light.       Cardiovascular:      Rate and Rhythm: Normal rate and regular rhythm.      Pulses: Normal pulses.      Heart sounds: Normal heart sounds.   Pulmonary:      Effort: Pulmonary effort is normal.      Breath sounds: Normal breath sounds.   Abdominal:      Palpations: Abdomen is soft.      Tenderness: There is no abdominal tenderness.     Musculoskeletal:         General: Normal range of motion.      Cervical back: Normal range of motion.      Comments: Large ecchymosis to right hip and lateral thigh without deformity, normal range of motion, steady gait     Skin:     General: Skin is warm and dry.     Neurological:      General: No focal deficit present.      Mental Status: She is alert and oriented to person, place, and time.      Comments: GCS 15   Psychiatric:         Mood and Affect: Mood normal.         Behavior: Behavior normal.         Results Reviewed       None            CT head without contrast   Final Interpretation by Prem Arenas DO (06/25 1626)      No acute intracranial abnormality.                  Workstation performed: [id number]         XR chest 2 views   Final Interpretation by Kosta London MD (06/26 1103)      No acute cardiopulmonary disease.            Workstation performed: [id number]         XR hip/pelv 2-3 vws right   Final Interpretation by Ye Ashford DO (06/26 1115)      No acute osseous abnormality.      Degenerative changes as described.         Computerized Assisted Algorithm (CAA) may have been used to analyze all applicable images.            Resident: Marvin Ramachandran I, the attending radiologist, have reviewed the images and agree with the final report above.       Workstation performed: [id number]             Procedures    ED Medication and Procedure Management   Prior to Admission Medications   Prescriptions Last Dose Informant Patient Reported? Taking?   ALPRAZolam (XANAX) 0.5 mg tablet   No No   Sig: Take 2 tablets (1 mg total) by mouth 2 (two) times a day as needed for anxiety   Diclofenac Sodium (VOLTAREN) 1 %   No No   Sig: Apply 2 g topically 4 (four) times a day   Multiple Vitamins-Minerals (CENTRUM ADULTS PO)  Self Yes No   Sig: Take 1 tablet by mouth in the morning.   acetaminophen (TYLENOL) 325 mg tablet   No No   Sig: Take 3 tablets (975 mg total) by mouth every 8 (eight) hours as needed for mild pain or moderate pain   atorvastatin (LIPITOR) 10 mg tablet   No No   Sig: TAKE 1 TABLET(10 MG) BY MOUTH DAILY AT BEDTIME   colestipol (COLESTID) 1 g tablet   No No   Sig: TAKE 2 TABLETS BY MOUTH IN THE MORNING SEPARATE BY 2 HOURS FROM OTHER MEDS   desvenlafaxine succinate (PRISTIQ) 50 mg 24 hr tablet   No No   Sig: TAKE 1 TABLET(50 MG) BY MOUTH DAILY   levothyroxine 75 mcg tablet   No No   Sig: Take 1 tablet (75 mcg total) by mouth daily   metoprolol tartrate (LOPRESSOR) 25 mg tablet   No No   Sig: TAKE 1/2 TABLET BY MOUTH EVERY 12 HOURS   pantoprazole (PROTONIX) 40 mg tablet   No No   Sig: Take 1 tablet (40 mg total) by mouth daily   tiZANidine (ZANAFLEX) 2 mg tablet  Self No No   Sig: Take 1 tablet (2 mg total) by mouth every 8 (eight) hours as needed for muscle spasms   warfarin (COUMADIN) 2 mg tablet   No No   Sig: Take 2 to 3 tablets daily by mouth or as directed by physician      Facility-Administered Medications: None     Discharge Medication List as of 6/25/2025  4:42 PM        CONTINUE these medications which have NOT CHANGED    Details   acetaminophen (TYLENOL) 325 mg tablet Take 3 tablets (975 mg total) by mouth every 8 (eight) hours as needed for mild pain or moderate pain, Starting Tue 2/18/2025, No Print      ALPRAZolam (XANAX) 0.5 mg tablet Take 2  tablets (1 mg total) by mouth 2 (two) times a day as needed for anxiety, Starting Wed 6/18/2025, Normal      atorvastatin (LIPITOR) 10 mg tablet TAKE 1 TABLET(10 MG) BY MOUTH DAILY AT BEDTIME, Starting Sun 2/2/2025, Normal      colestipol (COLESTID) 1 g tablet TAKE 2 TABLETS BY MOUTH IN THE MORNING SEPARATE BY 2 HOURS FROM OTHER MEDS, Normal      desvenlafaxine succinate (PRISTIQ) 50 mg 24 hr tablet TAKE 1 TABLET(50 MG) BY MOUTH DAILY, Starting Tue 3/18/2025, Normal      Diclofenac Sodium (VOLTAREN) 1 % Apply 2 g topically 4 (four) times a day, Starting Wed 12/18/2024, Normal      levothyroxine 75 mcg tablet Take 1 tablet (75 mcg total) by mouth daily, Starting Mon 11/18/2024, Normal      metoprolol tartrate (LOPRESSOR) 25 mg tablet TAKE 1/2 TABLET BY MOUTH EVERY 12 HOURS, Starting Wed 6/18/2025, Normal      Multiple Vitamins-Minerals (CENTRUM ADULTS PO) Take 1 tablet by mouth in the morning., Historical Med      pantoprazole (PROTONIX) 40 mg tablet Take 1 tablet (40 mg total) by mouth daily, Starting Fri 11/8/2024, Until Mon 6/2/2025, Normal      tiZANidine (ZANAFLEX) 2 mg tablet Take 1 tablet (2 mg total) by mouth every 8 (eight) hours as needed for muscle spasms, Starting Mon 11/28/2022, Normal      warfarin (COUMADIN) 2 mg tablet Take 2 to 3 tablets daily by mouth or as directed by physician, Normal           No discharge procedures on file.  ED SEPSIS DOCUMENTATION   Time reflects when diagnosis was documented in both MDM as applicable and the Disposition within this note       Time User Action Codes Description Comment    6/25/2025  4:37 PM Nelly Amezcua Add [W19.XXXA] Fall, initial encounter                    [1]   Past Medical History:  Diagnosis Date    A-fib (HCC)     Anxiety     Arthritis     shoulder    Carpal tunnel syndrome     Colon polyp     Depression     Disease of thyroid gland     Diverticulitis     Esophageal stricture     HBP (high blood pressure)     Hiatal hernia 06/07/2022    Small hiatal  hernia    Hyperlipidemia     Hypertension     Hypothyroid     Pneumonia         PONV (postoperative nausea and vomiting)     Schatzki's ring 2022    Schatzki's ring in the lower esophagus    Sleep apnea     mild    [2]   Past Surgical History:  Procedure Laterality Date    APPENDECTOMY      BREAST BIOPSY Right     benign    BREAST CYST ASPIRATION Right     benign    BREAST SURGERY      milk duct removed    CATARACT EXTRACTION Bilateral     CATARACT EXTRACTION, BILATERAL  2019    CERVICAL FUSION N/A 2021    Procedure: FUSION CERVICAL POSTERIOR C1-C2 with allograft and neuromonitoring);  Surgeon: Wesley Camacho MD;  Location:  MAIN OR;  Service: Orthopedics    CHOLECYSTECTOMY      COLONOSCOPY  2022    COLONOSCOPY W/ BIOPSIES      EGD  2022    HAND SURGERY Right     MAMMO (HISTORICAL) Bilateral 2018    MAMMO (HISTORICAL) Bilateral 10/27/2017    MANDIBLE SURGERY      MANDIBLE SURGERY      SD SURGICAL ARTHROSCOPY SHOULDER W/ROTATOR CUFF RPR Right 2022    Procedure: ARTHROSCOPIC ROTATOR CUFF REPAIR OF SUBSCAPULARIS BICEPS TENODESIS EXTENSIVE DEBRIDEMENT;  Surgeon: Tony Rose MD;  Location: [facility];  Service: Orthopedics    WISDOM TOOTH EXTRACTION      X4   [3]   Family History  Problem Relation Name Age of Onset    Heart disease Mother      Hypertension Mother      Hyperlipidemia Mother      Heart failure Mother      Hypertension Sister     [4]   Social History  Tobacco Use    Smoking status: Former     Current packs/day: 0.00     Types: Cigarettes     Quit date:      Years since quittin.5    Smokeless tobacco: Never   Vaping Use    Vaping status: Never Used   Substance Use Topics    Alcohol use: Not Currently     Comment: last drink 7 years ago    Drug use: Not Currently        Nelly Amezcua MD  [phone]

## 2025-07-02 NOTE — PROGRESS NOTES
Assessment/Plan:Josee s/p fall resulting in hematoma on right leg, sore, thankfully no fractures-Josee is on chronic warfarin therapy for stroke prevention, she is going to get a new rollator walker and will use that especially when outdoors         Problem List Items Addressed This Visit       Spondylosis of cervical region without myelopathy or radiculopathy    Anxiety    Primary hypertension    Hypothyroidism    Hyperlipidemia    Paroxysmal A-fib (HCC)    Irritable bowel syndrome with diarrhea    Fall     Other Visit Diagnoses         History of falling    -  Primary              Subjective:      Patient ID: Josee Vaca is a 75 y.o. female.    Josee here to follow up after a fall she took in her driveway-got really banged up and bruised, she is on warfarin for stroke prevention/ afib. Went to the ER and they did a CT scan of her head, a CXR and hip films-no bleed or fractures noted. Knows that she has to use a walker when outdoors and be very careful about not falling        The following portions of the patient's history were reviewed and updated as appropriate:   Past Medical History:  She has a past medical history of A-fib (HCC), Anxiety, Arthritis, Carpal tunnel syndrome, Colon polyp, Depression, Disease of thyroid gland, Diverticulitis, Esophageal stricture, HBP (high blood pressure), Hiatal hernia (06/07/2022), Hyperlipidemia, Hypertension, Hypothyroid, Pneumonia, PONV (postoperative nausea and vomiting), Schatzki's ring (06/07/2022), and Sleep apnea.,  _______________________________________________________________________  Medical Problems:  does not have any pertinent problems on file.,  _______________________________________________________________________  Past Surgical History:   has a past surgical history that includes Breast cyst aspiration (Right); Breast biopsy (Right); Appendectomy; Cholecystectomy; Colonoscopy w/ biopsies (2011); Mandible surgery; [place] tooth extraction; EGD  "(06/07/2022); Cataract extraction, bilateral (2019); Mammo (historical) (Bilateral, 11/27/2018); Mammo (historical) (Bilateral, 10/27/2017); Colonoscopy (06/07/2022); Cataract extraction (Bilateral); Hand surgery (Right); Cervical fusion (N/A, 05/25/2021); Mandible surgery; pr surgical arthroscopy shoulder w/rotator cuff rpr (Right, 09/27/2022); and Breast surgery.,  _______________________________________________________________________  Family History:  family history includes Heart disease in her mother; Heart failure in her mother; Hyperlipidemia in her mother; Hypertension in her mother and sister.,  _______________________________________________________________________  Social History:   reports that she quit smoking about 21 years ago. Her smoking use included cigarettes. She has never used smokeless tobacco. She reports that she does not currently use alcohol. She reports that she does not currently use drugs.,  _______________________________________________________________________  Allergies:  has no known allergies..  _______________________________________________________________________  Current Medications[1]  _______________________________________________________________________  Review of Systems   Constitutional: Negative.    HENT: Negative.     Respiratory: Negative.     Cardiovascular: Negative.    Gastrointestinal: Negative.    Genitourinary: Negative.    Musculoskeletal:  Positive for arthralgias, back pain and neck pain.   Skin:         bruising         Objective:  Vitals:    07/02/25 1400   BP: 138/88   BP Location: Left arm   Patient Position: Sitting   Cuff Size: Standard   Pulse: 78   SpO2: 98%   Weight: 55.8 kg (123 lb)   Height: 5' 1\" (1.549 m)     Body mass index is 23.24 kg/m².     Physical Exam  HENT:      Head: Normocephalic and atraumatic.      Right Ear: External ear normal.      Left Ear: External ear normal.      Nose: Nose normal.      Mouth/Throat:      Mouth: Mucous membranes " are moist.     Eyes:      Extraocular Movements: Extraocular movements intact.       Cardiovascular:      Rate and Rhythm: Normal rate and regular rhythm.      Heart sounds: Normal heart sounds.   Pulmonary:      Effort: Pulmonary effort is normal.      Breath sounds: Normal breath sounds.     Musculoskeletal:         General: Tenderness present.      Cervical back: Normal range of motion and neck supple.     Skin:     Capillary Refill: Capillary refill takes less than 2 seconds.      Findings: Bruising present.     Neurological:      General: No focal deficit present.      Mental Status: She is alert and oriented to person, place, and time.     Psychiatric:         Behavior: Behavior normal.              [1]   Current Outpatient Medications   Medication Sig Dispense Refill    acetaminophen (TYLENOL) 325 mg tablet Take 3 tablets (975 mg total) by mouth every 8 (eight) hours as needed for mild pain or moderate pain 30 tablet 0    ALPRAZolam (XANAX) 0.5 mg tablet Take 2 tablets (1 mg total) by mouth 2 (two) times a day as needed for anxiety 60 tablet 0    atorvastatin (LIPITOR) 10 mg tablet TAKE 1 TABLET(10 MG) BY MOUTH DAILY AT BEDTIME 90 tablet 1    colestipol (COLESTID) 1 g tablet TAKE 2 TABLETS BY MOUTH IN THE MORNING SEPARATE BY 2 HOURS FROM OTHER MEDS 180 tablet 1    desvenlafaxine succinate (PRISTIQ) 50 mg 24 hr tablet TAKE 1 TABLET(50 MG) BY MOUTH DAILY 90 tablet 1    Diclofenac Sodium (VOLTAREN) 1 % Apply 2 g topically 4 (four) times a day 240 g 3    levothyroxine 75 mcg tablet Take 1 tablet (75 mcg total) by mouth daily 30 tablet 5    metoprolol tartrate (LOPRESSOR) 25 mg tablet TAKE 1/2 TABLET BY MOUTH EVERY 12 HOURS 180 tablet 1    Multiple Vitamins-Minerals (CENTRUM ADULTS PO) Take 1 tablet by mouth in the morning.      tiZANidine (ZANAFLEX) 2 mg tablet Take 1 tablet (2 mg total) by mouth every 8 (eight) hours as needed for muscle spasms 20 tablet 0    warfarin (COUMADIN) 2 mg tablet Take 2 to 3 tablets  daily by mouth or as directed by physician 200 tablet 2    pantoprazole (PROTONIX) 40 mg tablet Take 1 tablet (40 mg total) by mouth daily 90 tablet 2     No current facility-administered medications for this visit.

## 2025-07-15 DIAGNOSIS — E03.9 HYPOTHYROIDISM, UNSPECIFIED TYPE: ICD-10-CM

## 2025-07-17 DIAGNOSIS — E03.9 HYPOTHYROIDISM, UNSPECIFIED TYPE: ICD-10-CM

## 2025-07-17 DIAGNOSIS — F33.9 EPISODE OF RECURRENT MAJOR DEPRESSIVE DISORDER, UNSPECIFIED DEPRESSION EPISODE SEVERITY (HCC): ICD-10-CM

## 2025-07-17 RX ORDER — LEVOTHYROXINE SODIUM 75 UG/1
75 TABLET ORAL DAILY
Qty: 30 TABLET | Refills: 5 | Status: SHIPPED | OUTPATIENT
Start: 2025-07-17

## 2025-07-18 RX ORDER — DESVENLAFAXINE 50 MG/1
50 TABLET, FILM COATED, EXTENDED RELEASE ORAL DAILY
Qty: 30 TABLET | Refills: 5 | Status: SHIPPED | OUTPATIENT
Start: 2025-07-18 | End: 2025-07-24

## 2025-07-18 RX ORDER — LEVOTHYROXINE SODIUM 75 UG/1
75 TABLET ORAL DAILY
Qty: 90 TABLET | OUTPATIENT
Start: 2025-07-18

## 2025-07-21 DIAGNOSIS — F41.9 ANXIETY: ICD-10-CM

## 2025-07-21 RX ORDER — ALPRAZOLAM 0.5 MG
TABLET ORAL
Qty: 60 TABLET | Refills: 0 | Status: SHIPPED | OUTPATIENT
Start: 2025-07-21

## 2025-07-22 ENCOUNTER — ANTICOAG VISIT (OUTPATIENT)
Dept: CARDIOLOGY CLINIC | Facility: CLINIC | Age: 75
End: 2025-07-22

## 2025-07-22 ENCOUNTER — APPOINTMENT (OUTPATIENT)
Dept: LAB | Facility: HOSPITAL | Age: 75
End: 2025-07-22
Attending: INTERNAL MEDICINE
Payer: MEDICARE

## 2025-07-22 DIAGNOSIS — I48.0 PAROXYSMAL A-FIB (HCC): Primary | ICD-10-CM

## 2025-07-22 DIAGNOSIS — I48.0 PAROXYSMAL ATRIAL FIBRILLATION (HCC): ICD-10-CM

## 2025-07-22 DIAGNOSIS — F33.9 EPISODE OF RECURRENT MAJOR DEPRESSIVE DISORDER, UNSPECIFIED DEPRESSION EPISODE SEVERITY (HCC): ICD-10-CM

## 2025-07-22 DIAGNOSIS — E83.52 HYPERCALCEMIA: ICD-10-CM

## 2025-07-22 LAB
ALBUMIN SERPL BCG-MCNC: 4.2 G/DL (ref 3.5–5)
ALP SERPL-CCNC: 56 U/L (ref 34–104)
ALT SERPL W P-5'-P-CCNC: 22 U/L (ref 7–52)
ANION GAP SERPL CALCULATED.3IONS-SCNC: 6 MMOL/L (ref 4–13)
AST SERPL W P-5'-P-CCNC: 28 U/L (ref 13–39)
BILIRUB SERPL-MCNC: 0.34 MG/DL (ref 0.2–1)
BUN SERPL-MCNC: 13 MG/DL (ref 5–25)
CA-I BLD-SCNC: 1.28 MMOL/L (ref 1.12–1.32)
CALCIUM SERPL-MCNC: 10 MG/DL (ref 8.4–10.2)
CHLORIDE SERPL-SCNC: 100 MMOL/L (ref 96–108)
CO2 SERPL-SCNC: 29 MMOL/L (ref 21–32)
CREAT SERPL-MCNC: 0.79 MG/DL (ref 0.6–1.3)
GFR SERPL CREATININE-BSD FRML MDRD: 72 ML/MIN/1.73SQ M
GLUCOSE SERPL-MCNC: 105 MG/DL (ref 65–140)
INR PPP: 2.62 (ref 0.85–1.19)
POTASSIUM SERPL-SCNC: 3.9 MMOL/L (ref 3.5–5.3)
PROT SERPL-MCNC: 7 G/DL (ref 6.4–8.4)
PROTHROMBIN TIME: 28.1 SECONDS (ref 12.3–15)
PTH-INTACT SERPL-MCNC: 15 PG/ML (ref 12–88)
SODIUM SERPL-SCNC: 135 MMOL/L (ref 135–147)

## 2025-07-22 PROCEDURE — 84165 PROTEIN E-PHORESIS SERUM: CPT

## 2025-07-22 PROCEDURE — 36415 COLL VENOUS BLD VENIPUNCTURE: CPT

## 2025-07-22 PROCEDURE — 80053 COMPREHEN METABOLIC PANEL: CPT

## 2025-07-22 PROCEDURE — 82330 ASSAY OF CALCIUM: CPT

## 2025-07-22 PROCEDURE — 83970 ASSAY OF PARATHORMONE: CPT

## 2025-07-22 PROCEDURE — 85610 PROTHROMBIN TIME: CPT

## 2025-07-24 LAB
ALBUMIN SERPL ELPH-MCNC: 3.95 G/DL (ref 3.2–5.1)
ALBUMIN SERPL ELPH-MCNC: 59 % (ref 48–70)
ALPHA1 GLOB SERPL ELPH-MCNC: 0.29 G/DL (ref 0.15–0.47)
ALPHA1 GLOB SERPL ELPH-MCNC: 4.3 % (ref 1.8–7)
ALPHA2 GLOB SERPL ELPH-MCNC: 0.68 G/DL (ref 0.42–1.04)
ALPHA2 GLOB SERPL ELPH-MCNC: 10.1 % (ref 5.9–14.9)
BETA GLOB ABNORMAL SERPL ELPH-MCNC: 0.4 G/DL (ref 0.31–0.57)
BETA1 GLOB SERPL ELPH-MCNC: 6 % (ref 4.7–7.7)
BETA2 GLOB SERPL ELPH-MCNC: 5 % (ref 3.1–7.9)
BETA2+GAMMA GLOB SERPL ELPH-MCNC: 0.34 G/DL (ref 0.2–0.58)
GAMMA GLOB ABNORMAL SERPL ELPH-MCNC: 1.05 G/DL (ref 0.4–1.66)
GAMMA GLOB SERPL ELPH-MCNC: 15.6 % (ref 6.9–22.3)
IGG/ALB SER: 1.44 {RATIO} (ref 1.1–1.8)
PROT SERPL-MCNC: 6.7 G/DL (ref 6.4–8.4)

## 2025-07-24 PROCEDURE — 84165 PROTEIN E-PHORESIS SERUM: CPT | Performed by: PATHOLOGY

## 2025-07-24 RX ORDER — DESVENLAFAXINE 50 MG/1
50 TABLET, FILM COATED, EXTENDED RELEASE ORAL DAILY
Qty: 30 TABLET | Refills: 5 | Status: SHIPPED | OUTPATIENT
Start: 2025-07-24

## 2025-08-07 ENCOUNTER — APPOINTMENT (OUTPATIENT)
Dept: LAB | Facility: HOSPITAL | Age: 75
End: 2025-08-07
Attending: INTERNAL MEDICINE
Payer: MEDICARE

## 2025-08-07 ENCOUNTER — ANTICOAG VISIT (OUTPATIENT)
Dept: CARDIOLOGY CLINIC | Facility: CLINIC | Age: 75
End: 2025-08-07

## 2025-08-07 DIAGNOSIS — I48.0 PAROXYSMAL A-FIB (HCC): Primary | ICD-10-CM

## 2025-08-15 ENCOUNTER — HOSPITAL ENCOUNTER (OUTPATIENT)
Dept: NON INVASIVE DIAGNOSTICS | Facility: HOSPITAL | Age: 75
Discharge: HOME | End: 2025-08-15
Attending: INTERNAL MEDICINE
Payer: MEDICARE

## 2025-08-15 ENCOUNTER — HOSPITAL ENCOUNTER (OUTPATIENT)
Dept: ULTRASOUND IMAGING | Facility: HOSPITAL | Age: 75
Discharge: HOME | End: 2025-08-15
Attending: ORTHOPAEDIC SURGERY
Payer: MEDICARE

## 2025-08-15 ENCOUNTER — APPOINTMENT (OUTPATIENT)
Dept: RADIOLOGY | Facility: HOSPITAL | Age: 75
End: 2025-08-15
Payer: MEDICARE

## 2025-08-18 ENCOUNTER — OFFICE VISIT (OUTPATIENT)
Dept: OBGYN CLINIC | Facility: CLINIC | Age: 75
End: 2025-08-18
Payer: MEDICARE

## 2025-08-18 VITALS — WEIGHT: 123 LBS | HEIGHT: 61 IN | BODY MASS INDEX: 23.22 KG/M2

## 2025-08-18 DIAGNOSIS — R22.31 MASS OF RIGHT WRIST: Primary | ICD-10-CM

## 2025-08-18 PROCEDURE — 99212 OFFICE O/P EST SF 10 MIN: CPT | Performed by: ORTHOPAEDIC SURGERY

## 2025-08-19 DIAGNOSIS — F41.9 ANXIETY: ICD-10-CM

## 2025-08-19 DIAGNOSIS — F33.9 EPISODE OF RECURRENT MAJOR DEPRESSIVE DISORDER, UNSPECIFIED DEPRESSION EPISODE SEVERITY (HCC): ICD-10-CM

## 2025-08-20 RX ORDER — ALPRAZOLAM 0.5 MG
0.5 TABLET ORAL 2 TIMES DAILY PRN
Qty: 60 TABLET | Refills: 0 | Status: SHIPPED | OUTPATIENT
Start: 2025-08-20

## 2025-08-21 RX ORDER — DESVENLAFAXINE 50 MG/1
50 TABLET, FILM COATED, EXTENDED RELEASE ORAL DAILY
Qty: 90 TABLET | Refills: 1 | Status: SHIPPED | OUTPATIENT
Start: 2025-08-21

## (undated) DEVICE — DRAPE SHEET X-LG

## (undated) DEVICE — BLADE SHAVER TORPEDO 4MM 13CM  COOLCUT

## (undated) DEVICE — NEURO PATTIES 1/2 X 1/2

## (undated) DEVICE — SPONGE PVP SCRUB WING STERILE

## (undated) DEVICE — SUT VICRYL 2-0 CT-1 27 IN J259H

## (undated) DEVICE — BIPOLAR SEALER 23-113-1 AQM 2.3: Brand: AQUAMANTYS™

## (undated) DEVICE — PROBE MARKER: Brand: XIA

## (undated) DEVICE — DRAPE C-ARMOUR

## (undated) DEVICE — BETADINE OINTMENT FOIL PACK

## (undated) DEVICE — GLOVE SRG BIOGEL 7.5

## (undated) DEVICE — TOOL 14MH30 LEGEND 14CM 3MM: Brand: MIDAS REX ™

## (undated) DEVICE — TRAY FOLEY 16FR URIMETER SURESTEP

## (undated) DEVICE — GLOVE SRG BIOGEL 8.5

## (undated) DEVICE — TAP: Brand: OASYS

## (undated) DEVICE — NEEDLE 22 G X 1 1/2 SAFETY

## (undated) DEVICE — SYRINGE 50ML LL

## (undated) DEVICE — 3M™ IOBAN™ 2 ANTIMICROBIAL INCISE DRAPE 6648EZ: Brand: IOBAN™ 2

## (undated) DEVICE — SWABSTCK, BENZOIN TINCTURE, 1/PK, STRL: Brand: APLICARE

## (undated) DEVICE — DRAPE SHEET THREE QUARTER

## (undated) DEVICE — GLOVE SRG BIOGEL 6.5

## (undated) DEVICE — INTENDED FOR TISSUE SEPARATION, AND OTHER PROCEDURES THAT REQUIRE A SHARP SURGICAL BLADE TO PUNCTURE OR CUT.: Brand: BARD-PARKER SAFETY BLADES SIZE 11, STERILE

## (undated) DEVICE — DISPOSABLE EQUIPMENT COVER: Brand: SMALL TOWEL DRAPE

## (undated) DEVICE — DRAPE C-ARM X-RAY

## (undated) DEVICE — GAUZE SPONGES,16 PLY: Brand: CURITY

## (undated) DEVICE — MONITORING SPINAL IMPULSE CASE FEE

## (undated) DEVICE — GLOVE INDICATOR PI UNDERGLOVE SZ 7.5 BLUE

## (undated) DEVICE — WRENCH OASYS AUDIBLE TORQUE

## (undated) DEVICE — MAYFIELD® DISPOSABLE ADULT SKULL PIN (PLASTIC BASE): Brand: MAYFIELD®

## (undated) DEVICE — CANNULA BUTTON 8 X 30MM PASSPORT

## (undated) DEVICE — TUBING ARTHROSCOPIC WAVE  MAIN PUMP

## (undated) DEVICE — DRILL: Brand: OASYS

## (undated) DEVICE — CANNULA 5.75 X 70MM BARREL SHAPED BOWL

## (undated) DEVICE — CHLORAPREP HI-LITE 26ML ORANGE

## (undated) DEVICE — PROBE ABLATION  APOLLORF 90 DEG MULTI PORT

## (undated) DEVICE — DRAPE EQUIPMENT RF WAND

## (undated) DEVICE — POSITIONER TRIMANO LIMB BEACH CHAIR

## (undated) DEVICE — LIGHT HANDLE COVER SLEEVE DISP BLUE STELLAR

## (undated) DEVICE — SUT PDS II 0 CTX 60 IN Z990G

## (undated) DEVICE — TIBURON BEACH CHAIR SHOULDER DRAPE: Brand: CONVERTORS

## (undated) DEVICE — DRAPE LAPAROTOMY W/POUCHES

## (undated) DEVICE — GLOVE SRG BIOGEL 8

## (undated) DEVICE — GLOVE INDICATOR PI UNDERGLOVE SZ 6.5 BLUE

## (undated) DEVICE — PROXIMATE SKIN STAPLERS (35 WIDE) CONTAINS 35 STAINLESS STEEL STAPLES (FIXED HEAD): Brand: PROXIMATE

## (undated) DEVICE — DRESSING MEPILEX AG BORDER 4 X 8 IN

## (undated) DEVICE — SUT FIBERWIRE #2 1/2 CIRCLE T-5 38IN AR-7200

## (undated) DEVICE — 3M™ TEGADERM™ TRANSPARENT FILM DRESSING FRAME STYLE, 1626W, 4 IN X 4-3/4 IN (10 CM X 12 CM), 50/CT 4CT/CASE: Brand: 3M™ TEGADERM™

## (undated) DEVICE — INTENDED FOR TISSUE SEPARATION, AND OTHER PROCEDURES THAT REQUIRE A SHARP SURGICAL BLADE TO PUNCTURE OR CUT.: Brand: BARD-PARKER SAFETY BLADES SIZE 10, STERILE

## (undated) DEVICE — HEMOSTATIC MATRIX SURGIFLO 8ML W/THROMBIN

## (undated) DEVICE — TUBING SUCTION 5MM X 12 FT

## (undated) DEVICE — BURR  OVAL 4MM 13CM 8 FLUTE COOLCUT

## (undated) DEVICE — ASTOUND IMPERVIOUS SURGICAL GOWN: Brand: CONVERTORS

## (undated) DEVICE — NEEDLE SUT SCORPION MULTIFIRE

## (undated) DEVICE — OCCLUSIVE GAUZE STRIP,3% BISMUTH TRIBROMOPHENATE IN PETROLATUM BLEND: Brand: XEROFORM

## (undated) DEVICE — SUPPLY FEE STD

## (undated) DEVICE — DUAL SPIKE ADAPTER: Brand: CONMED

## (undated) DEVICE — GLOVE INDICATOR PI UNDERGLOVE SZ 8 BLUE

## (undated) DEVICE — PENCIL ELECTROSURG E-Z CLEAN -0035H

## (undated) DEVICE — BETHLEHEM UNIVERSAL SPINE, KIT: Brand: CARDINAL HEALTH

## (undated) DEVICE — SUT ETHILON 2-0 FSLX 30 IN 1674H

## (undated) DEVICE — PACK ARTHROSCOPY

## (undated) DEVICE — SUT FIBERLINK #2 26IN AR-7235

## (undated) DEVICE — FIBERTAPE 2MM X 7IN AR-7237-7

## (undated) DEVICE — DRESSING MEPILEX BORDER 4 X 8 IN

## (undated) DEVICE — GLOVE INDICATOR PI UNDERGLOVE SZ 8.5 BLUE